# Patient Record
Sex: MALE | Race: WHITE | Employment: OTHER | URBAN - NONMETROPOLITAN AREA
[De-identification: names, ages, dates, MRNs, and addresses within clinical notes are randomized per-mention and may not be internally consistent; named-entity substitution may affect disease eponyms.]

---

## 2017-02-07 ENCOUNTER — HOSPITAL ENCOUNTER (OUTPATIENT)
Dept: NON INVASIVE DIAGNOSTICS | Age: 32
Discharge: HOME | End: 2017-02-07
Payer: MEDICAID

## 2017-02-07 DIAGNOSIS — I42.0 DILATED CARDIOMYOPATHY (HCC): ICD-10-CM

## 2017-02-07 LAB
LV EF: 23 %
LVEF MODALITY: NORMAL

## 2017-02-07 PROCEDURE — 93306 TTE W/DOPPLER COMPLETE: CPT

## 2017-02-09 ENCOUNTER — TELEPHONE (OUTPATIENT)
Dept: CARDIOLOGY | Age: 32
End: 2017-02-09

## 2017-02-13 ENCOUNTER — OFFICE VISIT (OUTPATIENT)
Dept: CARDIOLOGY | Age: 32
End: 2017-02-13
Payer: MEDICAID

## 2017-02-13 VITALS
HEIGHT: 67 IN | SYSTOLIC BLOOD PRESSURE: 116 MMHG | BODY MASS INDEX: 34.37 KG/M2 | DIASTOLIC BLOOD PRESSURE: 74 MMHG | WEIGHT: 219 LBS | HEART RATE: 76 BPM

## 2017-02-13 DIAGNOSIS — I50.21 ACUTE SYSTOLIC CONGESTIVE HEART FAILURE (HCC): Primary | ICD-10-CM

## 2017-02-13 PROCEDURE — 93000 ELECTROCARDIOGRAM COMPLETE: CPT | Performed by: INTERNAL MEDICINE

## 2017-02-13 PROCEDURE — 99244 OFF/OP CNSLTJ NEW/EST MOD 40: CPT | Performed by: INTERNAL MEDICINE

## 2017-05-03 ENCOUNTER — TELEPHONE (OUTPATIENT)
Dept: CARDIOLOGY | Age: 32
End: 2017-05-03

## 2018-02-08 ENCOUNTER — APPOINTMENT (OUTPATIENT)
Dept: CT IMAGING | Age: 33
End: 2018-02-08
Payer: MEDICAID

## 2018-02-08 ENCOUNTER — HOSPITAL ENCOUNTER (EMERGENCY)
Age: 33
Discharge: HOME | End: 2018-02-08
Attending: EMERGENCY MEDICINE
Payer: MEDICAID

## 2018-02-08 ENCOUNTER — APPOINTMENT (OUTPATIENT)
Dept: GENERAL RADIOLOGY | Age: 33
End: 2018-02-08
Payer: MEDICAID

## 2018-02-08 VITALS
OXYGEN SATURATION: 98 % | DIASTOLIC BLOOD PRESSURE: 82 MMHG | WEIGHT: 205 LBS | RESPIRATION RATE: 24 BRPM | HEART RATE: 110 BPM | TEMPERATURE: 97.7 F | SYSTOLIC BLOOD PRESSURE: 116 MMHG | BODY MASS INDEX: 32.18 KG/M2 | HEIGHT: 67 IN

## 2018-02-08 DIAGNOSIS — J18.9 PNEUMONIA DUE TO ORGANISM: ICD-10-CM

## 2018-02-08 DIAGNOSIS — J90 PLEURAL EFFUSION: ICD-10-CM

## 2018-02-08 DIAGNOSIS — I50.9 ACUTE ON CHRONIC CONGESTIVE HEART FAILURE, UNSPECIFIED CONGESTIVE HEART FAILURE TYPE: Primary | ICD-10-CM

## 2018-02-08 LAB
ALBUMIN SERPL-MCNC: 3.5 G/DL (ref 3.5–5.2)
ALP BLD-CCNC: 105 U/L (ref 40–130)
ALT SERPL-CCNC: 21 U/L (ref 5–41)
ANION GAP SERPL CALCULATED.3IONS-SCNC: 15 MMOL/L (ref 7–19)
AST SERPL-CCNC: 19 U/L (ref 5–40)
BASOPHILS ABSOLUTE: 0.1 K/UL (ref 0–0.2)
BASOPHILS RELATIVE PERCENT: 0.5 % (ref 0–1)
BILIRUB SERPL-MCNC: 0.7 MG/DL (ref 0.2–1.2)
BUN BLDV-MCNC: 9 MG/DL (ref 6–20)
CALCIUM SERPL-MCNC: 8.9 MG/DL (ref 8.6–10)
CHLORIDE BLD-SCNC: 100 MMOL/L (ref 98–111)
CO2: 23 MMOL/L (ref 22–29)
CREAT SERPL-MCNC: 0.6 MG/DL (ref 0.5–1.2)
D DIMER: 0.87 UG/ML FEU (ref 0–0.48)
EOSINOPHILS ABSOLUTE: 0.2 K/UL (ref 0–0.6)
EOSINOPHILS RELATIVE PERCENT: 2.2 % (ref 0–5)
GFR NON-AFRICAN AMERICAN: >60
GLUCOSE BLD-MCNC: 150 MG/DL (ref 74–109)
HCT VFR BLD CALC: 40.6 % (ref 42–52)
HEMOGLOBIN: 13.6 G/DL (ref 14–18)
LYMPHOCYTES ABSOLUTE: 2 K/UL (ref 1.1–4.5)
LYMPHOCYTES RELATIVE PERCENT: 18.4 % (ref 20–40)
MCH RBC QN AUTO: 31.1 PG (ref 27–31)
MCHC RBC AUTO-ENTMCNC: 33.5 G/DL (ref 33–37)
MCV RBC AUTO: 92.7 FL (ref 80–94)
MONOCYTES ABSOLUTE: 0.8 K/UL (ref 0–0.9)
MONOCYTES RELATIVE PERCENT: 7.3 % (ref 0–10)
NEUTROPHILS ABSOLUTE: 7.8 K/UL (ref 1.5–7.5)
NEUTROPHILS RELATIVE PERCENT: 71.1 % (ref 50–65)
PDW BLD-RTO: 14.4 % (ref 11.5–14.5)
PERFORMED ON: NORMAL
PLATELET # BLD: 331 K/UL (ref 130–400)
PMV BLD AUTO: 10 FL (ref 9.4–12.4)
POC TROPONIN I: 0.02 NG/ML (ref 0–0.08)
POTASSIUM SERPL-SCNC: 3.8 MMOL/L (ref 3.5–5)
PRO-BNP: 3952 PG/ML (ref 0–450)
RAPID INFLUENZA  B AGN: NEGATIVE
RAPID INFLUENZA A AGN: NEGATIVE
RBC # BLD: 4.38 M/UL (ref 4.7–6.1)
SODIUM BLD-SCNC: 138 MMOL/L (ref 136–145)
TOTAL PROTEIN: 6.8 G/DL (ref 6.6–8.7)
WBC # BLD: 11 K/UL (ref 4.8–10.8)

## 2018-02-08 PROCEDURE — 80053 COMPREHEN METABOLIC PANEL: CPT

## 2018-02-08 PROCEDURE — 6370000000 HC RX 637 (ALT 250 FOR IP): Performed by: EMERGENCY MEDICINE

## 2018-02-08 PROCEDURE — 6360000002 HC RX W HCPCS: Performed by: EMERGENCY MEDICINE

## 2018-02-08 PROCEDURE — 84484 ASSAY OF TROPONIN QUANT: CPT

## 2018-02-08 PROCEDURE — 99285 EMERGENCY DEPT VISIT HI MDM: CPT | Performed by: EMERGENCY MEDICINE

## 2018-02-08 PROCEDURE — 93005 ELECTROCARDIOGRAM TRACING: CPT

## 2018-02-08 PROCEDURE — 71275 CT ANGIOGRAPHY CHEST: CPT

## 2018-02-08 PROCEDURE — 96374 THER/PROPH/DIAG INJ IV PUSH: CPT

## 2018-02-08 PROCEDURE — 87804 INFLUENZA ASSAY W/OPTIC: CPT

## 2018-02-08 PROCEDURE — 71046 X-RAY EXAM CHEST 2 VIEWS: CPT

## 2018-02-08 PROCEDURE — 94640 AIRWAY INHALATION TREATMENT: CPT

## 2018-02-08 PROCEDURE — 36415 COLL VENOUS BLD VENIPUNCTURE: CPT

## 2018-02-08 PROCEDURE — 85379 FIBRIN DEGRADATION QUANT: CPT

## 2018-02-08 PROCEDURE — 6360000004 HC RX CONTRAST MEDICATION: Performed by: EMERGENCY MEDICINE

## 2018-02-08 PROCEDURE — 83880 ASSAY OF NATRIURETIC PEPTIDE: CPT

## 2018-02-08 PROCEDURE — 99285 EMERGENCY DEPT VISIT HI MDM: CPT

## 2018-02-08 PROCEDURE — 85025 COMPLETE CBC W/AUTO DIFF WBC: CPT

## 2018-02-08 RX ORDER — FUROSEMIDE 10 MG/ML
40 INJECTION INTRAMUSCULAR; INTRAVENOUS ONCE
Status: COMPLETED | OUTPATIENT
Start: 2018-02-08 | End: 2018-02-08

## 2018-02-08 RX ORDER — LEVOFLOXACIN 750 MG/1
750 TABLET ORAL DAILY
Qty: 7 TABLET | Refills: 0 | Status: SHIPPED | OUTPATIENT
Start: 2018-02-08 | End: 2018-02-15

## 2018-02-08 RX ORDER — FUROSEMIDE 40 MG/1
40 TABLET ORAL DAILY
Qty: 5 TABLET | Refills: 0 | Status: SHIPPED | OUTPATIENT
Start: 2018-02-08 | End: 2018-08-15 | Stop reason: SDUPTHER

## 2018-02-08 RX ORDER — IPRATROPIUM BROMIDE AND ALBUTEROL SULFATE 2.5; .5 MG/3ML; MG/3ML
1 SOLUTION RESPIRATORY (INHALATION) ONCE
Status: COMPLETED | OUTPATIENT
Start: 2018-02-08 | End: 2018-02-08

## 2018-02-08 RX ADMIN — FUROSEMIDE 40 MG: 10 INJECTION, SOLUTION INTRAMUSCULAR; INTRAVENOUS at 14:25

## 2018-02-08 RX ADMIN — IPRATROPIUM BROMIDE AND ALBUTEROL SULFATE 1 AMPULE: .5; 3 SOLUTION RESPIRATORY (INHALATION) at 12:09

## 2018-02-08 RX ADMIN — IOPAMIDOL 90 ML: 755 INJECTION, SOLUTION INTRAVENOUS at 13:39

## 2018-02-08 ASSESSMENT — ENCOUNTER SYMPTOMS
RHINORRHEA: 0
BACK PAIN: 0
SORE THROAT: 0
DIARRHEA: 0
VOMITING: 0
ABDOMINAL PAIN: 0
COUGH: 1
NAUSEA: 0
SHORTNESS OF BREATH: 1

## 2018-02-09 LAB
EKG P AXIS: 56 DEGREES
EKG P-R INTERVAL: 146 MS
EKG Q-T INTERVAL: 332 MS
EKG QRS DURATION: 100 MS
EKG QTC CALCULATION (BAZETT): 388 MS
EKG T AXIS: 56 DEGREES

## 2018-03-03 ENCOUNTER — HOSPITAL ENCOUNTER (INPATIENT)
Facility: HOSPITAL | Age: 33
LOS: 5 days | Discharge: HOME | End: 2018-03-09
Attending: EMERGENCY MEDICINE | Admitting: INTERNAL MEDICINE

## 2018-03-03 DIAGNOSIS — F15.10 METHAMPHETAMINE ABUSE (HCC): ICD-10-CM

## 2018-03-03 DIAGNOSIS — R00.0 TACHYCARDIA: ICD-10-CM

## 2018-03-03 DIAGNOSIS — J18.9 PNEUMONIA DUE TO INFECTIOUS ORGANISM, UNSPECIFIED LATERALITY, UNSPECIFIED PART OF LUNG: ICD-10-CM

## 2018-03-03 DIAGNOSIS — K42.9 UMBILICAL HERNIA WITHOUT OBSTRUCTION AND WITHOUT GANGRENE: ICD-10-CM

## 2018-03-03 DIAGNOSIS — I50.9 ACUTE ON CHRONIC CONGESTIVE HEART FAILURE, UNSPECIFIED CONGESTIVE HEART FAILURE TYPE: Primary | ICD-10-CM

## 2018-03-03 PROCEDURE — 80053 COMPREHEN METABOLIC PANEL: CPT | Performed by: NURSE PRACTITIONER

## 2018-03-03 PROCEDURE — 84145 PROCALCITONIN (PCT): CPT | Performed by: EMERGENCY MEDICINE

## 2018-03-03 PROCEDURE — 84484 ASSAY OF TROPONIN QUANT: CPT | Performed by: EMERGENCY MEDICINE

## 2018-03-03 PROCEDURE — 83605 ASSAY OF LACTIC ACID: CPT | Performed by: EMERGENCY MEDICINE

## 2018-03-03 PROCEDURE — 80307 DRUG TEST PRSMV CHEM ANLYZR: CPT | Performed by: NURSE PRACTITIONER

## 2018-03-03 PROCEDURE — 99284 EMERGENCY DEPT VISIT MOD MDM: CPT

## 2018-03-03 PROCEDURE — 86140 C-REACTIVE PROTEIN: CPT | Performed by: EMERGENCY MEDICINE

## 2018-03-03 PROCEDURE — 83690 ASSAY OF LIPASE: CPT | Performed by: NURSE PRACTITIONER

## 2018-03-03 PROCEDURE — 83880 ASSAY OF NATRIURETIC PEPTIDE: CPT | Performed by: EMERGENCY MEDICINE

## 2018-03-03 PROCEDURE — 82553 CREATINE MB FRACTION: CPT | Performed by: EMERGENCY MEDICINE

## 2018-03-03 PROCEDURE — 85025 COMPLETE CBC W/AUTO DIFF WBC: CPT | Performed by: NURSE PRACTITIONER

## 2018-03-03 RX ORDER — ASPIRIN 81 MG/1
81 TABLET ORAL
Status: ON HOLD | COMMUNITY
Start: 2016-11-21 | End: 2018-12-20 | Stop reason: SDUPTHER

## 2018-03-04 ENCOUNTER — APPOINTMENT (OUTPATIENT)
Dept: NUCLEAR MEDICINE | Facility: HOSPITAL | Age: 33
End: 2018-03-04

## 2018-03-04 ENCOUNTER — APPOINTMENT (OUTPATIENT)
Dept: CARDIOLOGY | Facility: HOSPITAL | Age: 33
End: 2018-03-04
Attending: FAMILY MEDICINE

## 2018-03-04 ENCOUNTER — APPOINTMENT (OUTPATIENT)
Dept: GENERAL RADIOLOGY | Facility: HOSPITAL | Age: 33
End: 2018-03-04

## 2018-03-04 ENCOUNTER — APPOINTMENT (OUTPATIENT)
Dept: CT IMAGING | Facility: HOSPITAL | Age: 33
End: 2018-03-04

## 2018-03-04 PROBLEM — I50.9 ACUTE ON CHRONIC CONGESTIVE HEART FAILURE (HCC): Status: ACTIVE | Noted: 2018-03-04

## 2018-03-04 PROBLEM — I50.9 CHF EXACERBATION (HCC): Status: ACTIVE | Noted: 2018-03-04

## 2018-03-04 LAB
ALBUMIN SERPL-MCNC: 3.3 G/DL (ref 3.5–5)
ALBUMIN SERPL-MCNC: 3.4 G/DL (ref 3.5–5)
ALBUMIN/GLOB SERPL: 1.2 G/DL (ref 1.1–2.5)
ALBUMIN/GLOB SERPL: 1.3 G/DL (ref 1.1–2.5)
ALP SERPL-CCNC: 103 U/L (ref 24–120)
ALP SERPL-CCNC: 96 U/L (ref 24–120)
ALT SERPL W P-5'-P-CCNC: 126 U/L (ref 0–54)
ALT SERPL W P-5'-P-CCNC: 385 U/L (ref 0–54)
AMPHET+METHAMPHET UR QL: POSITIVE
AMYLASE SERPL-CCNC: 34 U/L (ref 30–110)
ANION GAP SERPL CALCULATED.3IONS-SCNC: 10 MMOL/L (ref 4–13)
ANION GAP SERPL CALCULATED.3IONS-SCNC: 13 MMOL/L (ref 4–13)
ANION GAP SERPL CALCULATED.3IONS-SCNC: 9 MMOL/L (ref 4–13)
ARTERIAL PATENCY WRIST A: POSITIVE
ARTICHOKE IGE QN: 109 MG/DL (ref 0–99)
AST SERPL-CCNC: 105 U/L (ref 7–45)
AST SERPL-CCNC: 411 U/L (ref 7–45)
ATMOSPHERIC PRESS: 760 MMHG
BACTERIA UR QL AUTO: ABNORMAL /HPF
BARBITURATES UR QL SCN: NEGATIVE
BASE EXCESS BLDA CALC-SCNC: -4.9 MMOL/L (ref 0–2)
BASOPHILS # BLD AUTO: 0.05 10*3/MM3 (ref 0–0.2)
BASOPHILS # BLD AUTO: 0.05 10*3/MM3 (ref 0–0.2)
BASOPHILS # BLD AUTO: 0.08 10*3/MM3 (ref 0–0.2)
BASOPHILS NFR BLD AUTO: 0.5 % (ref 0–2)
BASOPHILS NFR BLD AUTO: 0.5 % (ref 0–2)
BASOPHILS NFR BLD AUTO: 0.8 % (ref 0–2)
BDY SITE: ABNORMAL
BENZODIAZ UR QL SCN: NEGATIVE
BH CV ECHO MEAS - AO MAX PG (FULL): 0.75 MMHG
BH CV ECHO MEAS - AO MAX PG: 3.6 MMHG
BH CV ECHO MEAS - AO MEAN PG (FULL): 0 MMHG
BH CV ECHO MEAS - AO MEAN PG: 2 MMHG
BH CV ECHO MEAS - AO ROOT AREA (BSA CORRECTED): 1.5
BH CV ECHO MEAS - AO ROOT AREA: 7.5 CM^2
BH CV ECHO MEAS - AO ROOT DIAM: 3.1 CM
BH CV ECHO MEAS - AO V2 MAX: 94.7 CM/SEC
BH CV ECHO MEAS - AO V2 MEAN: 69.2 CM/SEC
BH CV ECHO MEAS - AO V2 VTI: 14.1 CM
BH CV ECHO MEAS - AVA(I,A): 3.4 CM^2
BH CV ECHO MEAS - AVA(I,D): 3.4 CM^2
BH CV ECHO MEAS - AVA(V,A): 3.4 CM^2
BH CV ECHO MEAS - AVA(V,D): 3.4 CM^2
BH CV ECHO MEAS - BSA(HAYCOCK): 2.2 M^2
BH CV ECHO MEAS - BSA: 2.1 M^2
BH CV ECHO MEAS - BZI_BMI: 32.9 KILOGRAMS/M^2
BH CV ECHO MEAS - BZI_METRIC_HEIGHT: 170.2 CM
BH CV ECHO MEAS - BZI_METRIC_WEIGHT: 95.3 KG
BH CV ECHO MEAS - EDV(CUBED): 592.7 ML
BH CV ECHO MEAS - EDV(TEICH): 384.2 ML
BH CV ECHO MEAS - EF(CUBED): 37 %
BH CV ECHO MEAS - EF(TEICH): 29.2 %
BH CV ECHO MEAS - ESV(CUBED): 373.2 ML
BH CV ECHO MEAS - ESV(TEICH): 272.2 ML
BH CV ECHO MEAS - FS: 14.3 %
BH CV ECHO MEAS - IVS/LVPW: 1.1
BH CV ECHO MEAS - IVSD: 1.1 CM
BH CV ECHO MEAS - LA DIMENSION: 6.1 CM
BH CV ECHO MEAS - LA/AO: 2
BH CV ECHO MEAS - LAT PEAK E' VEL: 5.4 CM/SEC
BH CV ECHO MEAS - LV MASS(C)D: 470.6 GRAMS
BH CV ECHO MEAS - LV MASS(C)DI: 228 GRAMS/M^2
BH CV ECHO MEAS - LV MAX PG: 2.8 MMHG
BH CV ECHO MEAS - LV MEAN PG: 2 MMHG
BH CV ECHO MEAS - LV V1 MAX: 84.2 CM/SEC
BH CV ECHO MEAS - LV V1 MEAN: 61.9 CM/SEC
BH CV ECHO MEAS - LV V1 VTI: 12.6 CM
BH CV ECHO MEAS - LVIDD: 8.4 CM
BH CV ECHO MEAS - LVIDS: 7.2 CM
BH CV ECHO MEAS - LVOT AREA (M): 3.8 CM^2
BH CV ECHO MEAS - LVOT AREA: 3.8 CM^2
BH CV ECHO MEAS - LVOT DIAM: 2.2 CM
BH CV ECHO MEAS - LVPWD: 1 CM
BH CV ECHO MEAS - MED PEAK E' VEL: 5.77 CM/SEC
BH CV ECHO MEAS - MR MAX PG: 115.8 MMHG
BH CV ECHO MEAS - MR MAX VEL: 538 CM/SEC
BH CV ECHO MEAS - MR MEAN PG: 76 MMHG
BH CV ECHO MEAS - MR MEAN VEL: 403 CM/SEC
BH CV ECHO MEAS - MR PISA RADIUS: 0.4 CM
BH CV ECHO MEAS - MR PISA: 1 CM^2
BH CV ECHO MEAS - MR VTI: 131 CM
BH CV ECHO MEAS - MV A MAX VEL: 69 CM/SEC
BH CV ECHO MEAS - MV DEC TIME: 0.13 SEC
BH CV ECHO MEAS - MV E MAX VEL: 137 CM/SEC
BH CV ECHO MEAS - MV E/A: 2
BH CV ECHO MEAS - RAP SYSTOLE: 5 MMHG
BH CV ECHO MEAS - RVSP: 58.3 MMHG
BH CV ECHO MEAS - SI(AO): 51.5 ML/M^2
BH CV ECHO MEAS - SI(CUBED): 106.3 ML/M^2
BH CV ECHO MEAS - SI(LVOT): 23.2 ML/M^2
BH CV ECHO MEAS - SI(TEICH): 54.3 ML/M^2
BH CV ECHO MEAS - SV(AO): 106.4 ML
BH CV ECHO MEAS - SV(CUBED): 219.5 ML
BH CV ECHO MEAS - SV(LVOT): 47.9 ML
BH CV ECHO MEAS - SV(TEICH): 112 ML
BH CV ECHO MEAS - TR MAX VEL: 365 CM/SEC
BILIRUB SERPL-MCNC: 0.3 MG/DL (ref 0.1–1)
BILIRUB SERPL-MCNC: 0.6 MG/DL (ref 0.1–1)
BILIRUB UR QL STRIP: NEGATIVE
BODY TEMPERATURE: 37 C
BUN BLD-MCNC: 17 MG/DL (ref 5–21)
BUN BLD-MCNC: 19 MG/DL (ref 5–21)
BUN BLD-MCNC: 20 MG/DL (ref 5–21)
BUN/CREAT SERPL: 17.2 (ref 7–25)
BUN/CREAT SERPL: 18.8 (ref 7–25)
BUN/CREAT SERPL: 19 (ref 7–25)
CALCIUM SPEC-SCNC: 7.9 MG/DL (ref 8.4–10.4)
CALCIUM SPEC-SCNC: 8.1 MG/DL (ref 8.4–10.4)
CALCIUM SPEC-SCNC: 8.5 MG/DL (ref 8.4–10.4)
CANNABINOIDS SERPL QL: NEGATIVE
CHLORIDE SERPL-SCNC: 103 MMOL/L (ref 98–110)
CHLORIDE SERPL-SCNC: 105 MMOL/L (ref 98–110)
CHLORIDE SERPL-SCNC: 105 MMOL/L (ref 98–110)
CHOLEST SERPL-MCNC: 148 MG/DL (ref 130–200)
CK MB SERPL-CCNC: 2.07 NG/ML (ref 0–5)
CLARITY UR: CLEAR
CO2 SERPL-SCNC: 21 MMOL/L (ref 24–31)
CO2 SERPL-SCNC: 23 MMOL/L (ref 24–31)
CO2 SERPL-SCNC: 26 MMOL/L (ref 24–31)
COCAINE UR QL: NEGATIVE
COLOR UR: YELLOW
CREAT BLD-MCNC: 0.99 MG/DL (ref 0.5–1.4)
CREAT BLD-MCNC: 1.01 MG/DL (ref 0.5–1.4)
CREAT BLD-MCNC: 1.05 MG/DL (ref 0.5–1.4)
CRP SERPL-MCNC: 3.17 MG/DL (ref 0–0.99)
D-LACTATE SERPL-SCNC: 1.7 MMOL/L (ref 0.5–2)
DEPRECATED RDW RBC AUTO: 51.2 FL (ref 40–54)
DEPRECATED RDW RBC AUTO: 51.5 FL (ref 40–54)
DEPRECATED RDW RBC AUTO: 53 FL (ref 40–54)
E/E' RATIO: 23.7
EOSINOPHIL # BLD AUTO: 0.03 10*3/MM3 (ref 0–0.7)
EOSINOPHIL # BLD AUTO: 0.06 10*3/MM3 (ref 0–0.7)
EOSINOPHIL # BLD AUTO: 0.11 10*3/MM3 (ref 0–0.7)
EOSINOPHIL NFR BLD AUTO: 0.3 % (ref 0–4)
EOSINOPHIL NFR BLD AUTO: 0.6 % (ref 0–4)
EOSINOPHIL NFR BLD AUTO: 1.1 % (ref 0–4)
ERYTHROCYTE [DISTWIDTH] IN BLOOD BY AUTOMATED COUNT: 15.8 % (ref 12–15)
ERYTHROCYTE [DISTWIDTH] IN BLOOD BY AUTOMATED COUNT: 15.9 % (ref 12–15)
ERYTHROCYTE [DISTWIDTH] IN BLOOD BY AUTOMATED COUNT: 15.9 % (ref 12–15)
ETHANOL UR QL: <0.01 %
FLUAV AG NPH QL: NEGATIVE
FLUBV AG NPH QL IA: NEGATIVE
GFR SERPL CREATININE-BSD FRML MDRD: 82 ML/MIN/1.73
GFR SERPL CREATININE-BSD FRML MDRD: 86 ML/MIN/1.73
GFR SERPL CREATININE-BSD FRML MDRD: 88 ML/MIN/1.73
GLOBULIN UR ELPH-MCNC: 2.6 GM/DL
GLOBULIN UR ELPH-MCNC: 2.8 GM/DL
GLUCOSE BLD-MCNC: 128 MG/DL (ref 70–100)
GLUCOSE BLD-MCNC: 86 MG/DL (ref 70–100)
GLUCOSE BLD-MCNC: 96 MG/DL (ref 70–100)
GLUCOSE UR STRIP-MCNC: NEGATIVE MG/DL
HAV IGM SERPL QL IA: NEGATIVE
HBV CORE IGM SERPL QL IA: NEGATIVE
HBV SURFACE AG SERPL QL IA: NEGATIVE
HCO3 BLDA-SCNC: 18.8 MMOL/L (ref 20–26)
HCT VFR BLD AUTO: 34.3 % (ref 40–52)
HCT VFR BLD AUTO: 34.7 % (ref 40–52)
HCT VFR BLD AUTO: 37 % (ref 40–52)
HCV AB SER DONR QL: NEGATIVE
HCV S/C RATIO: 0.06 (ref 0–0.99)
HDLC SERPL-MCNC: 22 MG/DL
HGB BLD-MCNC: 11.7 G/DL (ref 14–18)
HGB BLD-MCNC: 11.8 G/DL (ref 14–18)
HGB BLD-MCNC: 12.2 G/DL (ref 14–18)
HGB UR QL STRIP.AUTO: NEGATIVE
HOLD SPECIMEN: NORMAL
HOLD SPECIMEN: NORMAL
HYALINE CASTS UR QL AUTO: ABNORMAL /LPF
IMM GRANULOCYTES # BLD: 0.03 10*3/MM3 (ref 0–0.03)
IMM GRANULOCYTES # BLD: 0.03 10*3/MM3 (ref 0–0.03)
IMM GRANULOCYTES # BLD: 0.04 10*3/MM3 (ref 0–0.03)
IMM GRANULOCYTES NFR BLD: 0.3 % (ref 0–5)
IMM GRANULOCYTES NFR BLD: 0.3 % (ref 0–5)
IMM GRANULOCYTES NFR BLD: 0.4 % (ref 0–5)
INR PPP: 1.33 (ref 0.91–1.09)
KETONES UR QL STRIP: NEGATIVE
LDLC/HDLC SERPL: 4.78 {RATIO}
LEFT ATRIUM VOLUME INDEX: 81.1 ML/M2
LEFT ATRIUM VOLUME: 167 CM3
LEUKOCYTE ESTERASE UR QL STRIP.AUTO: NEGATIVE
LIPASE SERPL-CCNC: 55 U/L (ref 23–203)
LIPASE SERPL-CCNC: 63 U/L (ref 23–203)
LYMPHOCYTES # BLD AUTO: 1.16 10*3/MM3 (ref 0.72–4.86)
LYMPHOCYTES # BLD AUTO: 1.54 10*3/MM3 (ref 0.72–4.86)
LYMPHOCYTES # BLD AUTO: 2.37 10*3/MM3 (ref 0.72–4.86)
LYMPHOCYTES NFR BLD AUTO: 10.8 % (ref 15–45)
LYMPHOCYTES NFR BLD AUTO: 15 % (ref 15–45)
LYMPHOCYTES NFR BLD AUTO: 22.8 % (ref 15–45)
Lab: ABNORMAL
MAGNESIUM SERPL-MCNC: 1.6 MG/DL (ref 1.4–2.2)
MCH RBC QN AUTO: 30.7 PG (ref 28–32)
MCH RBC QN AUTO: 31 PG (ref 28–32)
MCH RBC QN AUTO: 31.1 PG (ref 28–32)
MCHC RBC AUTO-ENTMCNC: 33 G/DL (ref 33–36)
MCHC RBC AUTO-ENTMCNC: 34 G/DL (ref 33–36)
MCHC RBC AUTO-ENTMCNC: 34.1 G/DL (ref 33–36)
MCV RBC AUTO: 90.7 FL (ref 82–95)
MCV RBC AUTO: 91.6 FL (ref 82–95)
MCV RBC AUTO: 93.2 FL (ref 82–95)
METHADONE UR QL SCN: NEGATIVE
MODALITY: ABNORMAL
MONOCYTES # BLD AUTO: 0.67 10*3/MM3 (ref 0.19–1.3)
MONOCYTES # BLD AUTO: 0.97 10*3/MM3 (ref 0.19–1.3)
MONOCYTES # BLD AUTO: 1.01 10*3/MM3 (ref 0.19–1.3)
MONOCYTES NFR BLD AUTO: 6.5 % (ref 4–12)
MONOCYTES NFR BLD AUTO: 9 % (ref 4–12)
MONOCYTES NFR BLD AUTO: 9.7 % (ref 4–12)
NEUTROPHILS # BLD AUTO: 6.88 10*3/MM3 (ref 1.87–8.4)
NEUTROPHILS # BLD AUTO: 7.84 10*3/MM3 (ref 1.87–8.4)
NEUTROPHILS # BLD AUTO: 8.49 10*3/MM3 (ref 1.87–8.4)
NEUTROPHILS NFR BLD AUTO: 66.1 % (ref 39–78)
NEUTROPHILS NFR BLD AUTO: 76.6 % (ref 39–78)
NEUTROPHILS NFR BLD AUTO: 78.7 % (ref 39–78)
NITRITE UR QL STRIP: NEGATIVE
NRBC BLD MANUAL-RTO: 0 /100 WBC (ref 0–0)
NRBC BLD MANUAL-RTO: 0 /100 WBC (ref 0–0)
NRBC BLD MANUAL-RTO: 0.2 /100 WBC (ref 0–0)
NT-PROBNP SERPL-MCNC: 9930 PG/ML (ref 0–450)
NT-PROBNP SERPL-MCNC: ABNORMAL PG/ML (ref 0–450)
OPIATES UR QL: NEGATIVE
PCO2 BLDA: 30.3 MM HG (ref 35–45)
PCP UR QL SCN: NEGATIVE
PH BLDA: 7.4 PH UNITS (ref 7.35–7.45)
PH UR STRIP.AUTO: <=5 [PH] (ref 5–8)
PHOSPHATE SERPL-MCNC: 4 MG/DL (ref 2.5–4.5)
PLATELET # BLD AUTO: 231 10*3/MM3 (ref 130–400)
PLATELET # BLD AUTO: 238 10*3/MM3 (ref 130–400)
PLATELET # BLD AUTO: 275 10*3/MM3 (ref 130–400)
PMV BLD AUTO: 10.1 FL (ref 6–12)
PMV BLD AUTO: 10.4 FL (ref 6–12)
PMV BLD AUTO: 10.6 FL (ref 6–12)
PO2 BLDA: 69.7 MM HG (ref 83–108)
POTASSIUM BLD-SCNC: 4.1 MMOL/L (ref 3.5–5.3)
POTASSIUM BLD-SCNC: 4.4 MMOL/L (ref 3.5–5.3)
POTASSIUM BLD-SCNC: 4.5 MMOL/L (ref 3.5–5.3)
PROCALCITONIN SERPL-MCNC: 0.55 NG/ML
PROT SERPL-MCNC: 5.9 G/DL (ref 6.3–8.7)
PROT SERPL-MCNC: 6.2 G/DL (ref 6.3–8.7)
PROT UR QL STRIP: ABNORMAL
PROTHROMBIN TIME: 16.9 SECONDS (ref 11.9–14.6)
RBC # BLD AUTO: 3.78 10*6/MM3 (ref 4.8–5.9)
RBC # BLD AUTO: 3.79 10*6/MM3 (ref 4.8–5.9)
RBC # BLD AUTO: 3.97 10*6/MM3 (ref 4.8–5.9)
RBC # UR: ABNORMAL /HPF
REF LAB TEST METHOD: ABNORMAL
SAO2 % BLDCOA: 93.7 % (ref 94–99)
SODIUM BLD-SCNC: 136 MMOL/L (ref 135–145)
SODIUM BLD-SCNC: 138 MMOL/L (ref 135–145)
SODIUM BLD-SCNC: 141 MMOL/L (ref 135–145)
SP GR UR STRIP: >=1.03 (ref 1–1.03)
SQUAMOUS #/AREA URNS HPF: ABNORMAL /HPF
TRIGL SERPL-MCNC: 104 MG/DL (ref 0–149)
TROPONIN I SERPL-MCNC: 0.03 NG/ML (ref 0–0.03)
TROPONIN I SERPL-MCNC: 0.07 NG/ML (ref 0–0.03)
UROBILINOGEN UR QL STRIP: ABNORMAL
VENTILATOR MODE: ABNORMAL
WBC NRBC COR # BLD: 10.24 10*3/MM3 (ref 4.8–10.8)
WBC NRBC COR # BLD: 10.4 10*3/MM3 (ref 4.8–10.8)
WBC NRBC COR # BLD: 10.77 10*3/MM3 (ref 4.8–10.8)
WBC UR QL AUTO: ABNORMAL /HPF
WHOLE BLOOD HOLD SPECIMEN: NORMAL
WHOLE BLOOD HOLD SPECIMEN: NORMAL

## 2018-03-04 PROCEDURE — 85025 COMPLETE CBC W/AUTO DIFF WBC: CPT | Performed by: FAMILY MEDICINE

## 2018-03-04 PROCEDURE — 85610 PROTHROMBIN TIME: CPT | Performed by: FAMILY MEDICINE

## 2018-03-04 PROCEDURE — 80053 COMPREHEN METABOLIC PANEL: CPT | Performed by: FAMILY MEDICINE

## 2018-03-04 PROCEDURE — 80061 LIPID PANEL: CPT | Performed by: FAMILY MEDICINE

## 2018-03-04 PROCEDURE — 25010000002 ENOXAPARIN PER 10 MG: Performed by: EMERGENCY MEDICINE

## 2018-03-04 PROCEDURE — 93306 TTE W/DOPPLER COMPLETE: CPT | Performed by: INTERNAL MEDICINE

## 2018-03-04 PROCEDURE — 0 TECHNETIUM ALBUMIN AGGREGATED: Performed by: EMERGENCY MEDICINE

## 2018-03-04 PROCEDURE — 80048 BASIC METABOLIC PNL TOTAL CA: CPT | Performed by: FAMILY MEDICINE

## 2018-03-04 PROCEDURE — 82803 BLOOD GASES ANY COMBINATION: CPT

## 2018-03-04 PROCEDURE — 80307 DRUG TEST PRSMV CHEM ANLYZR: CPT | Performed by: NURSE PRACTITIONER

## 2018-03-04 PROCEDURE — 25010000002 VANCOMYCIN 10 G RECONSTITUTED SOLUTION: Performed by: FAMILY MEDICINE

## 2018-03-04 PROCEDURE — 25010000002 CEFTRIAXONE PER 250 MG: Performed by: EMERGENCY MEDICINE

## 2018-03-04 PROCEDURE — 93306 TTE W/DOPPLER COMPLETE: CPT

## 2018-03-04 PROCEDURE — 78582 LUNG VENTILAT&PERFUS IMAGING: CPT

## 2018-03-04 PROCEDURE — 0 XENON XE 133: Performed by: EMERGENCY MEDICINE

## 2018-03-04 PROCEDURE — 0399T HC MYOCARDL STRAIN IMAG QUAN ASSMT PER SESS: CPT

## 2018-03-04 PROCEDURE — 36600 WITHDRAWAL OF ARTERIAL BLOOD: CPT

## 2018-03-04 PROCEDURE — A9558 XE133 XENON 10MCI: HCPCS | Performed by: EMERGENCY MEDICINE

## 2018-03-04 PROCEDURE — 82150 ASSAY OF AMYLASE: CPT | Performed by: FAMILY MEDICINE

## 2018-03-04 PROCEDURE — 87804 INFLUENZA ASSAY W/OPTIC: CPT | Performed by: FAMILY MEDICINE

## 2018-03-04 PROCEDURE — 80074 ACUTE HEPATITIS PANEL: CPT | Performed by: FAMILY MEDICINE

## 2018-03-04 PROCEDURE — 71045 X-RAY EXAM CHEST 1 VIEW: CPT

## 2018-03-04 PROCEDURE — 74177 CT ABD & PELVIS W/CONTRAST: CPT

## 2018-03-04 PROCEDURE — 87040 BLOOD CULTURE FOR BACTERIA: CPT | Performed by: FAMILY MEDICINE

## 2018-03-04 PROCEDURE — 83690 ASSAY OF LIPASE: CPT | Performed by: FAMILY MEDICINE

## 2018-03-04 PROCEDURE — 25010000002 AZITHROMYCIN PER 500 MG: Performed by: EMERGENCY MEDICINE

## 2018-03-04 PROCEDURE — 25010000002 LORAZEPAM PER 2 MG: Performed by: EMERGENCY MEDICINE

## 2018-03-04 PROCEDURE — 83735 ASSAY OF MAGNESIUM: CPT | Performed by: FAMILY MEDICINE

## 2018-03-04 PROCEDURE — 93005 ELECTROCARDIOGRAM TRACING: CPT | Performed by: EMERGENCY MEDICINE

## 2018-03-04 PROCEDURE — 25010000002 ENOXAPARIN PER 10 MG: Performed by: FAMILY MEDICINE

## 2018-03-04 PROCEDURE — 25010000002 ONDANSETRON PER 1 MG: Performed by: NURSE PRACTITIONER

## 2018-03-04 PROCEDURE — A9540 TC99M MAA: HCPCS | Performed by: EMERGENCY MEDICINE

## 2018-03-04 PROCEDURE — 25010000002 FUROSEMIDE PER 20 MG: Performed by: FAMILY MEDICINE

## 2018-03-04 PROCEDURE — 84100 ASSAY OF PHOSPHORUS: CPT | Performed by: FAMILY MEDICINE

## 2018-03-04 PROCEDURE — 81001 URINALYSIS AUTO W/SCOPE: CPT | Performed by: NURSE PRACTITIONER

## 2018-03-04 PROCEDURE — 84484 ASSAY OF TROPONIN QUANT: CPT | Performed by: FAMILY MEDICINE

## 2018-03-04 PROCEDURE — 83880 ASSAY OF NATRIURETIC PEPTIDE: CPT | Performed by: FAMILY MEDICINE

## 2018-03-04 PROCEDURE — 0 IOPAMIDOL 61 % SOLUTION: Performed by: EMERGENCY MEDICINE

## 2018-03-04 PROCEDURE — 25010000002 FUROSEMIDE PER 20 MG: Performed by: EMERGENCY MEDICINE

## 2018-03-04 PROCEDURE — 0399T ADULT TRANSTHORACIC ECHO COMPLETE W/ CONT IF NECESSARY PER PROTOCOL: CPT | Performed by: INTERNAL MEDICINE

## 2018-03-04 PROCEDURE — 93010 ELECTROCARDIOGRAM REPORT: CPT | Performed by: INTERNAL MEDICINE

## 2018-03-04 RX ORDER — LORAZEPAM 2 MG/ML
1 INJECTION INTRAMUSCULAR ONCE
Status: COMPLETED | OUTPATIENT
Start: 2018-03-04 | End: 2018-03-04

## 2018-03-04 RX ORDER — ASPIRIN 81 MG/1
81 TABLET ORAL DAILY
Status: DISCONTINUED | OUTPATIENT
Start: 2018-03-04 | End: 2018-03-09 | Stop reason: HOSPADM

## 2018-03-04 RX ORDER — FUROSEMIDE 10 MG/ML
60 INJECTION INTRAMUSCULAR; INTRAVENOUS ONCE
Status: COMPLETED | OUTPATIENT
Start: 2018-03-04 | End: 2018-03-04

## 2018-03-04 RX ORDER — IPRATROPIUM BROMIDE AND ALBUTEROL SULFATE 2.5; .5 MG/3ML; MG/3ML
3 SOLUTION RESPIRATORY (INHALATION) EVERY 4 HOURS PRN
Status: DISCONTINUED | OUTPATIENT
Start: 2018-03-04 | End: 2018-03-04

## 2018-03-04 RX ORDER — FUROSEMIDE 10 MG/ML
40 INJECTION INTRAMUSCULAR; INTRAVENOUS DAILY
Status: DISCONTINUED | OUTPATIENT
Start: 2018-03-04 | End: 2018-03-07

## 2018-03-04 RX ORDER — ONDANSETRON 2 MG/ML
4 INJECTION INTRAMUSCULAR; INTRAVENOUS ONCE
Status: COMPLETED | OUTPATIENT
Start: 2018-03-04 | End: 2018-03-04

## 2018-03-04 RX ORDER — FAMOTIDINE 20 MG/1
40 TABLET, FILM COATED ORAL DAILY
Status: DISCONTINUED | OUTPATIENT
Start: 2018-03-04 | End: 2018-03-09 | Stop reason: HOSPADM

## 2018-03-04 RX ORDER — SODIUM CHLORIDE 0.9 % (FLUSH) 0.9 %
1-10 SYRINGE (ML) INJECTION AS NEEDED
Status: DISCONTINUED | OUTPATIENT
Start: 2018-03-04 | End: 2018-03-09 | Stop reason: HOSPADM

## 2018-03-04 RX ORDER — LORAZEPAM 2 MG/ML
1 CONCENTRATE ORAL EVERY 4 HOURS PRN
Status: DISCONTINUED | OUTPATIENT
Start: 2018-03-04 | End: 2018-03-09 | Stop reason: HOSPADM

## 2018-03-04 RX ORDER — ONDANSETRON 2 MG/ML
4 INJECTION INTRAMUSCULAR; INTRAVENOUS EVERY 6 HOURS PRN
Status: DISCONTINUED | OUTPATIENT
Start: 2018-03-04 | End: 2018-03-09 | Stop reason: HOSPADM

## 2018-03-04 RX ADMIN — FUROSEMIDE 60 MG: 10 INJECTION, SOLUTION INTRAMUSCULAR; INTRAVENOUS at 04:06

## 2018-03-04 RX ADMIN — CEFTRIAXONE SODIUM 2 G: 2 INJECTION, POWDER, FOR SOLUTION INTRAMUSCULAR; INTRAVENOUS at 04:14

## 2018-03-04 RX ADMIN — AZITHROMYCIN MONOHYDRATE 500 MG: 500 INJECTION, POWDER, LYOPHILIZED, FOR SOLUTION INTRAVENOUS at 04:23

## 2018-03-04 RX ADMIN — SODIUM CHLORIDE 1000 ML: 9 INJECTION, SOLUTION INTRAVENOUS at 01:59

## 2018-03-04 RX ADMIN — SODIUM CHLORIDE 500 ML: 9 INJECTION, SOLUTION INTRAVENOUS at 00:17

## 2018-03-04 RX ADMIN — ONDANSETRON 4 MG: 2 INJECTION INTRAMUSCULAR; INTRAVENOUS at 00:18

## 2018-03-04 RX ADMIN — FAMOTIDINE 40 MG: 20 TABLET, FILM COATED ORAL at 09:48

## 2018-03-04 RX ADMIN — LORAZEPAM 1 MG: 2 INJECTION INTRAMUSCULAR; INTRAVENOUS at 04:20

## 2018-03-04 RX ADMIN — ENOXAPARIN SODIUM 90 MG: 100 INJECTION SUBCUTANEOUS at 04:13

## 2018-03-04 RX ADMIN — XENON XE-133 16.3 MILLICURIE: 10 GAS RESPIRATORY (INHALATION) at 05:30

## 2018-03-04 RX ADMIN — FUROSEMIDE 40 MG: 10 INJECTION, SOLUTION INTRAMUSCULAR; INTRAVENOUS at 09:47

## 2018-03-04 RX ADMIN — ENOXAPARIN SODIUM 100 MG: 100 INJECTION SUBCUTANEOUS at 17:17

## 2018-03-04 RX ADMIN — VANCOMYCIN HYDROCHLORIDE 1500 MG: 10 INJECTION, POWDER, LYOPHILIZED, FOR SOLUTION INTRAVENOUS at 10:15

## 2018-03-04 RX ADMIN — ASPIRIN 81 MG: 81 TABLET ORAL at 10:15

## 2018-03-04 RX ADMIN — Medication 1 DOSE: at 05:32

## 2018-03-04 RX ADMIN — VANCOMYCIN HYDROCHLORIDE 1500 MG: 10 INJECTION, POWDER, LYOPHILIZED, FOR SOLUTION INTRAVENOUS at 23:16

## 2018-03-04 RX ADMIN — IOPAMIDOL 100 ML: 612 INJECTION, SOLUTION INTRAVENOUS at 01:33

## 2018-03-04 NOTE — PROGRESS NOTES
"Pharmacy Dosing Service  Pharmacokinetics  Vancomycin Initial Evaluation    Assessment/Action/Plan:  Initiated Vancomycin 1500 mg IVPB every 12 hours. Pharmacy will monitor renal function and adjust dose accordingly.     Subjective:  Arjun Oropeza is a 32 y.o. male with a Vancomycin \"Pharmacy to Dose\" consult for the treatment of PNA .    Objective:  Ht: 170.2 cm (67\"); Wt: 95.5 kg (210 lb 9 oz)  Estimated Creatinine Clearance: 115.7 mL/min (by C-G formula based on Cr of 1.01).   Lab Results   Component Value Date    CREATININE 1.01 03/04/2018    CREATININE 1.05 03/03/2018      Lab Results   Component Value Date    WBC 10.77 03/04/2018    WBC 10.24 03/03/2018      Baseline culture results:  Microbiology Results (last 10 days)       ** No results found for the last 240 hours. **            Chastity Tiwari, [facility]  03/04/18 8:46 AM    "

## 2018-03-04 NOTE — PROGRESS NOTES
[facility] Medicine Services  INPATIENT PROGRESS NOTE    Length of Stay: 0  Date of Admission: 3/3/2018  Primary Care Physician: No Known Provider    Subjective   Chief Complaint: distress  HPI   I was asked to see the patient who the nursing staff felt he was in distress. I found a pt who was tachynpeic and tachycardic with a heart rate of 130 on telemetry. He is sleeping. I awaken him and ask him if he is hurting. He denies any pain. He states he uses Meth every 8-12 hours depending on how good it is. His last use was 2 days ago. He is denying any chest pain. Nursing staff placed him on 2L nasal cannula because of his tachypnea. He has a temperature of 100.     Record review  Pt was seen at [facility] on 2/8 with similar complaints. He was diagnosed with acute heart failure and pneumonia-he was prescribed Levaquin and lasix (5 days). He was supposed to follow up with [facility].  He apparently has moved back from [place] and has not been taking his medications.    Review of Systems   All pertinent negatives and positives are as above. All other systems have been reviewed and are negative unless otherwise stated.     Objective    Temp:  [98.2 °F (36.8 °C)-100 °F (37.8 °C)] 100 °F (37.8 °C)  Heart Rate:  [128-159] 131  Resp:  [19-38] 36  BP: (104-122)/(65-86) 112/72  Physical Exam   Constitutional: He appears well-developed and well-nourished. He appears distressed.   HENT:   Head: Normocephalic and atraumatic.   Eyes: EOM are normal. Pupils are equal, round, and reactive to light. No scleral icterus.   Neck: Normal range of motion. Neck supple. No JVD present. Carotid bruit is not present. No tracheal deviation present. No thyromegaly present.   Cardiovascular: Normal rate and regular rhythm.  Exam reveals no gallop and no friction rub.    No murmur heard.  Tachycardia 130   Pulmonary/Chest: Effort normal. No respiratory distress. He has no wheezes. He has rales. He  exhibits no tenderness.   Abdominal: Soft. Bowel sounds are normal. He exhibits no distension. There is no tenderness.   Musculoskeletal: Normal range of motion. He exhibits no edema.   Neurological: He is alert. No cranial nerve deficit.   somnolent   Skin: Skin is warm and dry. No rash noted.   Psychiatric:   sleepy     Results Review:  I have reviewed the labs, radiology results, and diagnostic studies.    Laboratory Data:     Results from last 7 days  Lab Units 03/03/18  2359   WBC 10*3/mm3 10.24   HEMOGLOBIN g/dL 12.2*   HEMATOCRIT % 37.0*   PLATELETS 10*3/mm3 275       Results from last 7 days  Lab Units 03/03/18  2359   SODIUM mmol/L 141   POTASSIUM mmol/L 4.4   CHLORIDE mmol/L 105   CO2 mmol/L 23.0*   BUN mg/dL 20   CREATININE mg/dL 1.05   CALCIUM mg/dL 8.5   BILIRUBIN mg/dL 0.3   ALK PHOS U/L 96   ALT (SGPT) U/L 126*   AST (SGOT) U/L 105*   GLUCOSE mg/dL 128*     Radiology Data:   Imaging Results (last 24 hours)     Procedure Component Value Units Date/Time    CT Abdomen Pelvis With Contrast [[id number]] Updated:  03/04/18 0131    NM Lung Ventilation Perfusion [[id number]] Updated:  03/04/18 0539        CTA from 2/8/2018 at [facility].  1. There is some breathing motion artifact. No CT evidence of  pulmonary embolus.  2. Moderate to severe cardiomegaly with fairly marked enlargement of  the left ventricular and atrial chambers.  3. Small bilateral pleural effusions right greater than left.  4. Patchy groundglass infiltrates in both lungs may be due to edema.  Infection/inflammation are in the differential. There is atelectasis  in both lung bases.  The full report of this exam was immediately signed and available to  the emergency room. The patient is currently in the emergency room.  Signed by Dr Hiren Castillo on 2/8/2018 1:54 PM  I have reviewed the patient current medications.        Weight:        205 pounds    Sonographer              Radha [facility]     BSA:           1.95 m^2      Interpreting  Physician   Sumit BULL     BMI:           36.31 kg/m^2    Procedure    Type of Study     TTE procedure:ECHO NO CONTRAST WITH DOP/COLR.    Study Location: Echo Lab  Technical Quality: Adequate visualization    Patient Status: Outpatient    Indications:Cardiomyopathy, idiopathic dilated.     Conclusions     Summary   Structurally normal mitral valve.   Mitral valve leaflet mobility is normal   RVSP of 35 mm Hg.   Mildly dilated left ventricle.   Generalized hypokinesis of the left ventricle with overall   Left ventricular ejection fraction is visually estimated at 20-25%%   moderate impairment of LV systolic function.   Structurally normal mitral valve.   Mitral valve leaflet mobility is normal   Severe mitral regurgitation is present.   There is severe tricuspid regurgitation with estimated RVSP of 35 mm Hg.   Mildly dilated left ventricle.   Generalized hypokinesis of the left ventricle with overall   Left ventricular ejection fraction is visually estimated at 20-25%%   moderate impairment of LV systolic function.     Signature     ----------------------------------------------------------------   Electronically signed by Sumit Griggs MD(Interpreting   physician) on 02/09/2017 07:43 AM   ----------------------------------------------------------------  Assessment/Plan   Assessment:  1. Acute on Chronic systolic heart failure-previous EF 20-25% 2/9/2017  2. Possible pulmonary embolus-VQ scan is intermediate.   3. Methamphetamine abuse-ongoing  4. Tranamainitis  5. Community acquired pneumonia-tree in bud on CT  6. Abnormal pancreas on CT  7. Anemia    Plan:  1. Have some concern for methamphetamine withdrawal. He was positive for amphetamine on UDS in the emergency department.   2. Rocephin/Azithromycin day 1  3. Will check ABG's. Now  4. Transfer to ICU.   5. Hepatitis panel. May need HIV panel as well.   6  He was given Lasix 60 mg in  the ER. He was also given 1500 ml saline bolus in the ER.   7. Further recommendations are pending.     Discharge Planning: Uncertain discharge.    Chastity Roberts, APRN   03/04/18   7:15 AM     Patient examined while on the floor prior to transfer and again in the unit.  He remains tachypneic and tachycardic.  Heart sounds with gallop with abnormal click possible just incompetent valve.  However, since he uses IV drugs 2-3 times per day must consider valvular endocarditis.  Continue antibiotic.  Fluids should be used with caution.  Will re evaluate this afternoon sooner if nursing notes need.   Echocardiogram.   VQ scan done in ER shows indeterminate   Will add lovenox 1mg/kg q12h  Repeat lab at 1400    Agree with assessment.    Priscilla Winter DO  03/04/18  8:20 AM    ICU time 30 minutes

## 2018-03-04 NOTE — PROGRESS NOTES
Discharge Planning Assessment  [facility]     Patient Name: Arjun Oropeza  MRN: [id number]  Today's Date: 3/4/2018    Admit Date: 3/3/2018          Discharge Needs Assessment       03/04/18 1027    Living Environment    Lives With parent(s)    Home Accessibility no concerns    Stair Railings at Home none    Type of Financial/Environmental Concern no insurance   Med Assist referral.    Transportation Available car;family or friend will provide    Living Environment    Provides Primary Care For no one    Quality Of Family Relationships helpful;involved    Able to Return to Prior Living Arrangements yes    Discharge Needs Assessment    Concerns To Be Addressed substance/tobacco abuse/use concerns   Pt is an active meth user.  SW will address substance abuse issues/tx once pt moves out of ICU.    Readmission Within The Last 30 Days no previous admission in last 30 days    Anticipated Changes Related to Illness none    Equipment Currently Used at Home none    Equipment Needed After Discharge none            Discharge Plan     None        Discharge Placement     No information found                Demographic Summary     None            Functional Status     None            Psychosocial     None            Abuse/Neglect     None            Legal     None            Substance Abuse     None            Patient Forms     None          BRENDEN Daniel

## 2018-03-04 NOTE — NURSING NOTE
Patient received from ED with laboring respirations 38, oxygen applied at 1 lpm nc for laboring respirations. CARMEN Roberts present and Dr. Winter at bedside . Sinus tach 129 per telemetry, abg's drawn

## 2018-03-04 NOTE — PLAN OF CARE
Problem: Patient Care Overview (Adult)  Goal: Plan of Care Review  Outcome: Ongoing (interventions implemented as appropriate)   03/04/18 1505   Coping/Psychosocial Response Interventions   Plan Of Care Reviewed With patient   Patient Care Overview   Progress improving   Outcome Evaluation   Outcome Summary/Follow up Plan -130. RR 24-32. Fever resolved. Flu negative. HIV test pending for AM. Echo obtained.       Problem: Cardiac: Heart Failure (Adult)  Goal: Signs and Symptoms of Listed Potential Problems Will be Absent or Manageable (Cardiac: Heart Failure)  Outcome: Ongoing (interventions implemented as appropriate)      Problem: Infection, Risk/Actual (Adult)  Goal: Identify Related Risk Factors and Signs and Symptoms  Outcome: Ongoing (interventions implemented as appropriate)      Problem: Fall Risk (Adult)  Goal: Identify Related Risk Factors and Signs and Symptoms  Outcome: Ongoing (interventions implemented as appropriate)    Goal: Absence of Falls  Outcome: Ongoing (interventions implemented as appropriate)

## 2018-03-04 NOTE — H&P
"    [facility] Medicine Services  HISTORY AND PHYSICAL    Date of Admission: 3/3/2018  Primary Care Physician: No Known Provider    Subjective     Chief Complaint: Shortness of breath and abdominal pain    History of Present Illness  32-year-old male with past medical history of CHF secondary to methamphetamine use comes into the ER with abdominal pain and shortness of breath.  Patient states this started a few days ago and progressively getting worse.  Patient had similar episodes of shortness of breath in the past secondary to CHF.  During initial evaluation patient was noted to be tachycardic.  CT abdomen pelvis was done.  Possible pneumonia noted.  Pro-calcitonin was elevated.  Lab work noted for transaminitis.  She will be admitted for possible pneumonia, sepsis and possible underlying CHF.        Review of Systems     Otherwise complete ROS reviewed and negative except as mentioned in the HPI.    Past Medical History:   Past Medical History:   Diagnosis Date   • CHF (congestive heart failure)      Past Surgical History:  Past Surgical History:   Procedure Laterality Date   • HAND SURGERY       Social History:  reports that he has been smoking.  He does not have any smokeless tobacco history on file. He reports that he drinks alcohol. He reports that he uses illicit drugs, including Methamphetamines.    Family History: Family history of hypertension    Allergies:  No Known Allergies  Medications:  Prior to Admission medications    Medication Sig Start Date End Date Taking? Authorizing Provider   aspirin 81 MG EC tablet Take 81 mg by mouth. 11/21/16  Yes Historical Provider, MD     Objective     Vital Signs: /86  Pulse (!) 130  Temp 98.5 °F (36.9 °C) (Temporal Artery )   Resp 20  Ht 170.2 cm (67\")  Wt 86.2 kg (190 lb)  SpO2 92%  BMI 29.76 kg/m2       Physical Exam   Constitutional: He is oriented to person, place, and time. He appears well-developed and " well-nourished.   HENT:   Head: Normocephalic and atraumatic.   Eyes: EOM are normal. Pupils are equal, round, and reactive to light.   Neck: Normal range of motion. Neck supple.   Cardiovascular: Normal rate, regular rhythm and normal heart sounds.    Pulmonary/Chest: Effort normal and breath sounds normal.   Abdominal: Soft. Bowel sounds are normal.   Musculoskeletal: Normal range of motion.   Neurological: He is alert and oriented to person, place, and time.   Skin: Skin is warm.   Psychiatric: He has a normal mood and affect. His behavior is normal. Thought content normal.             Results Reviewed:  Lab Results (last 24 hours)     Procedure Component Value Units Date/Time    Comprehensive Metabolic Panel [[id number]]  (Abnormal) Collected:  03/03/18 2359    Specimen:  Blood Updated:  03/04/18 0025     Glucose 128 (H) mg/dL      BUN 20 mg/dL      Creatinine 1.05 mg/dL      Sodium 141 mmol/L      Potassium 4.4 mmol/L      Chloride 105 mmol/L      CO2 23.0 (L) mmol/L      Calcium 8.5 mg/dL      Total Protein 6.2 (L) g/dL      Albumin 3.40 (L) g/dL      ALT (SGPT) 126 (H) U/L      AST (SGOT) 105 (H) U/L      Alkaline Phosphatase 96 U/L      Total Bilirubin 0.3 mg/dL      eGFR Non African Amer 82 mL/min/1.73      Globulin 2.8 gm/dL      A/G Ratio 1.2 g/dL      BUN/Creatinine Ratio 19.0     Anion Gap 13.0 mmol/L     Lipase [[id number]]  (Normal) Collected:  03/03/18 2359    Specimen:  Blood Updated:  03/04/18 0025     Lipase 63 U/L     Ethanol [[id number]]  (Normal) Collected:  03/03/18 2359    Specimen:  Blood Updated:  03/04/18 0025     Ethanol % <0.010 %     Narrative:       Not for legal purposes. Chain of Custody not followed.     CBC & Differential [[id number]] Collected:  03/03/18 2359    Specimen:  Blood Updated:  03/04/18 0048    Narrative:       The following orders were created for panel order CBC & Differential.  Procedure                               Abnormality         Status                     ---------                                -----------         ------                     CBC Auto Differential[[id number]]         Abnormal            Final result                 Please view results for these tests on the individual orders.    CBC Auto Differential [[id number]]  (Abnormal) Collected:  03/03/18 2359    Specimen:  Blood Updated:  03/04/18 0048     WBC 10.24 10*3/mm3      RBC 3.97 (L) 10*6/mm3      Hemoglobin 12.2 (L) g/dL      Hematocrit 37.0 (L) %      MCV 93.2 fL      MCH 30.7 pg      MCHC 33.0 g/dL      RDW 15.9 (H) %      RDW-SD 53.0 fl      MPV 10.6 fL      Platelets 275 10*3/mm3      Neutrophil % 76.6 %      Lymphocyte % 15.0 %      Monocyte % 6.5 %      Eosinophil % 1.1 %      Basophil % 0.5 %      Immature Grans % 0.3 %      Neutrophils, Absolute 7.84 10*3/mm3      Lymphocytes, Absolute 1.54 10*3/mm3      Monocytes, Absolute 0.67 10*3/mm3      Eosinophils, Absolute 0.11 10*3/mm3      Basophils, Absolute 0.05 10*3/mm3      Immature Grans, Absolute 0.03 10*3/mm3      nRBC 0.2 (H) /100 WBC     [place] Draw [[id number]] Collected:  03/03/18 2359    Specimen:  Blood Updated:  03/04/18 0101    Narrative:       The following orders were created for panel order [place] Draw.  Procedure                               Abnormality         Status                     ---------                               -----------         ------                     Light Blue Top[[id number]]                                    Final result               Green Top (Gel)[[id number]]                                   Final result               Lavender Top[[id number]]                                      Final result               Red Top[[id number]]                                           Final result                 Please view results for these tests on the individual orders.    Light Blue Top [[id number]] Collected:  03/03/18 2359    Specimen:  Blood Updated:  03/04/18 0101     Extra Tube hold for add-on      Auto resulted       Green Top (Gel)  [[id number]] Collected:  03/03/18 2359    Specimen:  Blood Updated:  03/04/18 0101     Extra Tube Hold for add-ons.      Auto resulted.       Lavender Top [[id number]] Collected:  03/03/18 2359    Specimen:  Blood Updated:  03/04/18 0101     Extra Tube hold for add-on      Auto resulted       Red Top [[id number]] Collected:  03/03/18 2359    Specimen:  Blood Updated:  03/04/18 0101     Extra Tube Hold for add-ons.      Auto resulted.       Urinalysis With / Microscopic If Indicated - Urine, Clean Catch [[id number]]  (Abnormal) Collected:  03/04/18 0110    Specimen:  Urine from Urine, Clean Catch Updated:  03/04/18 0128     Color, UA Yellow     Appearance, UA Clear     pH, UA <=5.0     Specific Gravity, UA >=1.030     Glucose, UA Negative     Ketones, UA Negative     Bilirubin, UA Negative     Blood, UA Negative     Protein, UA 30 mg/dL (1+) (A)     Leuk Esterase, UA Negative     Nitrite, UA Negative     Urobilinogen, UA 1.0 E.U./dL    Urinalysis, Microscopic Only - Urine, Clean Catch [[id number]]  (Abnormal) Collected:  03/04/18 0110    Specimen:  Urine from Urine, Clean Catch Updated:  03/04/18 0128     RBC, UA 3-5 (A) /HPF      WBC, UA None Seen /HPF      Bacteria, UA None Seen /HPF      Squamous Epithelial Cells, UA None Seen /HPF      Hyaline Casts, UA None Seen /LPF      Methodology Automated Microscopy    Urine Drug Screen - Urine, Clean Catch [[id number]]  (Abnormal) Collected:  03/04/18 0110    Specimen:  Urine from Urine, Clean Catch Updated:  03/04/18 0235     Amphetamine Screen, Urine Positive (A)     Barbiturates Screen, Urine Negative     Benzodiazepine Screen, Urine Negative     Cocaine Screen, Urine Negative     Methadone Screen, Urine Negative     Opiate Screen Negative     Phencyclidine (PCP), Urine Negative     THC, Screen, Urine Negative    Narrative:       Negative Thresholds For Drugs Screened in Urine:    Amphetamines          500 ng/ml  Barbiturates          200 ng/ml  Benzodiazepines       200  ng/ml  Cocaine               150 ng/ml  Methadone             150 ng/ml  Opiates               300 ng/ml  Phencyclidine         25 ng/ml  THC                      50 ng/ml    The normal value for all drugs tested is negative. This report includes final unconfirmed screening results.  A positive result by this assay can be, at your request, sent to the Reference Lab for confirmation by gas chromatography. Unconfirmed results must not be used for non-medical purposes, such as employment or legal testing. Clinical consideration should be applied to any drug of abuse test result, particularly when unconfirmed results are used.    Lactic Acid, Plasma [[id number]]  (Normal) Collected:  03/03/18 2359    Specimen:  Blood Updated:  03/04/18 0433     Lactate 1.7 mmol/L     Procalcitonin [[id number]]  (Abnormal) Collected:  03/03/18 2359    Specimen:  Blood Updated:  03/04/18 0439     Procalcitonin 0.55 (H) ng/mL     Narrative:       SIRS, sepsis, severe sepsis, and septic shock are categorized according to the criteria of the consensus conference of the American College of Chest Physicians/Society of Critical Care Medicine.    PCT < 0.5 ng/mL     Systemic infection (sepsis) is not likely.    PCT >0.5 and < 2.0 ng/mL Systemic infection (sepsis) is possible, but other conditions are known to elevate PCT as well.    PCT > 2.0 ng/mL     Systemic infection (sepsis) is likely, unless other causes are known.      PCT > 10.0 ng/mL    Important systemic inflammatory response, almost exclusively due to severe bacterial sepsis or septic shock.    PCT values of < 0.5 ng/mL do not exclude an infection, because localized infections (without systemic signs) may be associated with such low concentrations, or a systemic infection in its initial stages (<6 hours).  Increased PCT can occur without infection.  PCT concentrations between 0.5 and 2.0 ng/mL should be interpreted taking into account the patients history.  It is recommended to retest  PCT within 6-24 hours if any concentrations < 2.0 ng/mL are obtained.        Imaging Results (last 24 hours)     Procedure Component Value Units Date/Time    CT Abdomen Pelvis With Contrast [[id number]] Updated:  03/04/18 0131        I have personally reviewed and interpreted the radiology studies and ECG obtained at time of admission.     Assessment / Plan     Assessment:     1.  CHF exacerbation  2.  Tachycardia rule out PE  3.  Transaminitis possibly secondary to cirrhosis  4.  Abnormal findings of pancreas on CT  5.  Pneumonia    Plan:      -Admit for further evaluation  -Continue cardiac monitoring  -Continuous pulse ox  -Follow-up echocardiogram  -Strict ins and outs  -Follow-up VQ scan-continue IV therapeutic dose Lovenox  -Follow-up lipase and amylase level  -Follow-up hepatitis panel  -Follow-up blood culture  -Follow-up urinalysis  -Follow-up a.m. chest x-ray  -Continue IV antibiotic  -Lactate noted to be negative  -Consider cardiology consult if indicated  -GI prophylaxis  -DVT prophylaxis          Code Status: Full code     I discussed the patients findings and my recommendations with the patient's RN    Estimated length of stay 2-3 days    Liliana Echeverria MD   03/04/18   4:49 AM

## 2018-03-04 NOTE — PROGRESS NOTES
"Pharmacy Dosing Service  Anticoagulant  Enoxaparin    Assessment/Action/Plan:  Pt received Lovenox 90 mg once dose with subsequent pharmacy to dose for full anticoag for indeterminate vq scan. Ordered INR and initiated Lovenox 100 mg (1mg/kg q12h to begin 12 hours post first dose     Subjective:  Arjun Oropeza is a 32 y.o. male on Enoxaparin 100 mg SQ every 12 hours for indication of Full anticoagulation for indeterminate vq scan .  Objective:  [Ht: 170.2 cm (67\"); Wt: 95.5 kg (210 lb 9 oz); BMI: Body mass index is 32.98 kg/(m^2).]  Estimated Creatinine Clearance: 115.7 mL/min (by C-G formula based on Cr of 1.01).   Lab Results   Component Value Date    INR 1.33 (H) 03/04/2018    PROTIME 16.9 (H) 03/04/2018      Lab Results   Component Value Date    HGB 11.8 (L) 03/04/2018    HGB 12.2 (L) 03/03/2018      Lab Results   Component Value Date     03/04/2018     03/03/2018       Chastity Tiwari [facility]  03/04/18 10:21 AM     "

## 2018-03-05 PROBLEM — R74.01 TRANSAMINITIS: Status: ACTIVE | Noted: 2018-03-05

## 2018-03-05 PROBLEM — R10.9 ABDOMINAL PAIN: Status: ACTIVE | Noted: 2018-03-05

## 2018-03-05 PROBLEM — I42.8 NONISCHEMIC CARDIOMYOPATHY (HCC): Status: ACTIVE | Noted: 2018-03-05

## 2018-03-05 PROBLEM — Z91.199 NONCOMPLIANCE: Status: ACTIVE | Noted: 2018-03-05

## 2018-03-05 PROBLEM — F15.10 METHAMPHETAMINE ABUSE (HCC): Status: ACTIVE | Noted: 2018-03-05

## 2018-03-05 LAB
ALBUMIN SERPL-MCNC: 3.1 G/DL (ref 3.5–5)
ALBUMIN/GLOB SERPL: 1.2 G/DL (ref 1.1–2.5)
ALP SERPL-CCNC: 94 U/L (ref 24–120)
ALT SERPL W P-5'-P-CCNC: 397 U/L (ref 0–54)
ANION GAP SERPL CALCULATED.3IONS-SCNC: 10 MMOL/L (ref 4–13)
AST SERPL-CCNC: 339 U/L (ref 7–45)
BASOPHILS # BLD AUTO: 0.06 10*3/MM3 (ref 0–0.2)
BASOPHILS NFR BLD AUTO: 0.7 % (ref 0–2)
BILIRUB SERPL-MCNC: 0.2 MG/DL (ref 0.1–1)
BUN BLD-MCNC: 17 MG/DL (ref 5–21)
BUN/CREAT SERPL: 18.7 (ref 7–25)
CALCIUM SPEC-SCNC: 7.9 MG/DL (ref 8.4–10.4)
CHLORIDE SERPL-SCNC: 103 MMOL/L (ref 98–110)
CK SERPL-CCNC: 150 U/L (ref 0–203)
CO2 SERPL-SCNC: 24 MMOL/L (ref 24–31)
CREAT BLD-MCNC: 0.91 MG/DL (ref 0.5–1.4)
DEPRECATED RDW RBC AUTO: 52.5 FL (ref 40–54)
EOSINOPHIL # BLD AUTO: 0.27 10*3/MM3 (ref 0–0.7)
EOSINOPHIL NFR BLD AUTO: 3.2 % (ref 0–4)
ERYTHROCYTE [DISTWIDTH] IN BLOOD BY AUTOMATED COUNT: 15.9 % (ref 12–15)
GFR SERPL CREATININE-BSD FRML MDRD: 97 ML/MIN/1.73
GLOBULIN UR ELPH-MCNC: 2.5 GM/DL
GLUCOSE BLD-MCNC: 134 MG/DL (ref 70–100)
HCT VFR BLD AUTO: 34.7 % (ref 40–52)
HGB BLD-MCNC: 11.6 G/DL (ref 14–18)
HIV1+2 AB SER QL: NEGATIVE
IMM GRANULOCYTES # BLD: 0.02 10*3/MM3 (ref 0–0.03)
IMM GRANULOCYTES NFR BLD: 0.2 % (ref 0–5)
LYMPHOCYTES # BLD AUTO: 2.51 10*3/MM3 (ref 0.72–4.86)
LYMPHOCYTES NFR BLD AUTO: 29.5 % (ref 15–45)
MCH RBC QN AUTO: 30.8 PG (ref 28–32)
MCHC RBC AUTO-ENTMCNC: 33.4 G/DL (ref 33–36)
MCV RBC AUTO: 92 FL (ref 82–95)
MONOCYTES # BLD AUTO: 0.85 10*3/MM3 (ref 0.19–1.3)
MONOCYTES NFR BLD AUTO: 10 % (ref 4–12)
NEUTROPHILS # BLD AUTO: 4.79 10*3/MM3 (ref 1.87–8.4)
NEUTROPHILS NFR BLD AUTO: 56.4 % (ref 39–78)
NRBC BLD MANUAL-RTO: 0 /100 WBC (ref 0–0)
PLATELET # BLD AUTO: 255 10*3/MM3 (ref 130–400)
PMV BLD AUTO: 10.4 FL (ref 6–12)
POTASSIUM BLD-SCNC: 3.6 MMOL/L (ref 3.5–5.3)
PROT SERPL-MCNC: 5.6 G/DL (ref 6.3–8.7)
RBC # BLD AUTO: 3.77 10*6/MM3 (ref 4.8–5.9)
SODIUM BLD-SCNC: 137 MMOL/L (ref 135–145)
WBC NRBC COR # BLD: 8.5 10*3/MM3 (ref 4.8–10.8)

## 2018-03-05 PROCEDURE — 25010000002 AZITHROMYCIN PER 500 MG: Performed by: FAMILY MEDICINE

## 2018-03-05 PROCEDURE — 25010000002 FUROSEMIDE PER 20 MG: Performed by: FAMILY MEDICINE

## 2018-03-05 PROCEDURE — 99222 1ST HOSP IP/OBS MODERATE 55: CPT | Performed by: INTERNAL MEDICINE

## 2018-03-05 PROCEDURE — 80053 COMPREHEN METABOLIC PANEL: CPT | Performed by: FAMILY MEDICINE

## 2018-03-05 PROCEDURE — 94799 UNLISTED PULMONARY SVC/PX: CPT

## 2018-03-05 PROCEDURE — 85025 COMPLETE CBC W/AUTO DIFF WBC: CPT | Performed by: FAMILY MEDICINE

## 2018-03-05 PROCEDURE — 25010000002 VANCOMYCIN 10 G RECONSTITUTED SOLUTION: Performed by: FAMILY MEDICINE

## 2018-03-05 PROCEDURE — 82550 ASSAY OF CK (CPK): CPT | Performed by: INTERNAL MEDICINE

## 2018-03-05 PROCEDURE — 25010000002 ENOXAPARIN PER 10 MG: Performed by: FAMILY MEDICINE

## 2018-03-05 PROCEDURE — G0432 EIA HIV-1/HIV-2 SCREEN: HCPCS | Performed by: FAMILY MEDICINE

## 2018-03-05 PROCEDURE — 25010000002 CEFTRIAXONE PER 250 MG: Performed by: FAMILY MEDICINE

## 2018-03-05 RX ORDER — LISINOPRIL 2.5 MG/1
2.5 TABLET ORAL
Status: DISCONTINUED | OUTPATIENT
Start: 2018-03-06 | End: 2018-03-09 | Stop reason: HOSPADM

## 2018-03-05 RX ORDER — ENALAPRILAT 2.5 MG/2ML
0.62 INJECTION INTRAVENOUS EVERY 6 HOURS
Status: DISCONTINUED | OUTPATIENT
Start: 2018-03-05 | End: 2018-03-05

## 2018-03-05 RX ORDER — BENZONATATE 100 MG/1
200 CAPSULE ORAL 3 TIMES DAILY PRN
Status: DISCONTINUED | OUTPATIENT
Start: 2018-03-05 | End: 2018-03-09 | Stop reason: HOSPADM

## 2018-03-05 RX ADMIN — ENOXAPARIN SODIUM 100 MG: 100 INJECTION SUBCUTANEOUS at 07:09

## 2018-03-05 RX ADMIN — LORAZEPAM 1 MG: 2 SOLUTION, CONCENTRATE ORAL at 02:53

## 2018-03-05 RX ADMIN — FUROSEMIDE 40 MG: 10 INJECTION, SOLUTION INTRAMUSCULAR; INTRAVENOUS at 08:20

## 2018-03-05 RX ADMIN — ENALAPRILAT 0.62 MG: 1.25 INJECTION INTRAVENOUS at 08:43

## 2018-03-05 RX ADMIN — AZITHROMYCIN MONOHYDRATE 500 MG: 500 INJECTION, POWDER, LYOPHILIZED, FOR SOLUTION INTRAVENOUS at 04:19

## 2018-03-05 RX ADMIN — CEFTRIAXONE SODIUM 1 G: 1 INJECTION, POWDER, FOR SOLUTION INTRAMUSCULAR; INTRAVENOUS at 04:19

## 2018-03-05 RX ADMIN — LORAZEPAM 1 MG: 2 SOLUTION, CONCENTRATE ORAL at 21:52

## 2018-03-05 RX ADMIN — ASPIRIN 81 MG: 81 TABLET ORAL at 08:20

## 2018-03-05 RX ADMIN — VANCOMYCIN HYDROCHLORIDE 1500 MG: 10 INJECTION, POWDER, LYOPHILIZED, FOR SOLUTION INTRAVENOUS at 20:41

## 2018-03-05 RX ADMIN — ENALAPRILAT 0.62 MG: 1.25 INJECTION INTRAVENOUS at 16:00

## 2018-03-05 RX ADMIN — BENZONATATE 200 MG: 100 CAPSULE ORAL at 22:11

## 2018-03-05 RX ADMIN — BENZONATATE 200 MG: 100 CAPSULE ORAL at 02:54

## 2018-03-05 RX ADMIN — FAMOTIDINE 40 MG: 20 TABLET, FILM COATED ORAL at 08:20

## 2018-03-05 RX ADMIN — VANCOMYCIN HYDROCHLORIDE 1500 MG: 10 INJECTION, POWDER, LYOPHILIZED, FOR SOLUTION INTRAVENOUS at 08:41

## 2018-03-05 NOTE — PLAN OF CARE
Problem: Patient Care Overview (Adult)  Goal: Plan of Care Review  Outcome: Ongoing (interventions implemented as appropriate)   03/05/18 1620   Coping/Psychosocial Response Interventions   Plan Of Care Reviewed With patient   Patient Care Overview   Progress improving     Goal: Adult Individualization and Mutuality  Outcome: Ongoing (interventions implemented as appropriate)    Goal: Discharge Needs Assessment  Outcome: Ongoing (interventions implemented as appropriate)      Problem: Cardiac: Heart Failure (Adult)  Goal: Signs and Symptoms of Listed Potential Problems Will be Absent or Manageable (Cardiac: Heart Failure)  Outcome: Ongoing (interventions implemented as appropriate)      Problem: Infection, Risk/Actual (Adult)  Goal: Infection Prevention/Resolution  Outcome: Ongoing (interventions implemented as appropriate)

## 2018-03-05 NOTE — CONSULTS
[facility] HEART GROUP CONSULT NOTE    Referring Provider: Liliaan Echeverria MD    Reason for Consultation: Nonischemic Cardiomyopathy    Chief complaint:   Chief Complaint   Patient presents with   • Abdominal Pain       Subjective .     History of present illness:  Arjun Oropeza is a 32 y.o. male with a 2 day history of abdominal pain and shortness of breath. Patient was admitted to the hospital and is being worked up for fever, abdominal pain transaminitis and shortness of breath. Patient has a known history of nonischemic cardiomyopathy thought to be caused by IV methamphetamine use. Patient was last seen by a cardiologist at [facility] in February 2017. Patient at that time moved to [place], and has not followed with a medical provider since that time. Patient has been out of his medications since May. Patient previously was on Lisinopril, Aspirin, Lopressor, Bumex and Aldactone. Patient was seen 2/8/17 at [facility] ER and was started on Levaquin and Lasix for pneumonia and acute on chronic congestive heart failure. Patient states for the past two days he has been having abdominal pain and swelling. He states his shortness of breath has also been increasingly worse. Patient continues to use IV methamphetamine. Patient states his breathing has improved with IV diuretics. Blood Cultures have been checked and at 24 hours show no growth.     Cardiac History:  10/31/16 ECHO: EF 20%, severe MR, severe TR, mitral leaflet thickening, dilated LV  11/7/16 Heart Cath: normal coronaries, estimated EF 10%  2/7/17 ECHO: EF 20-25%, severe MR, severe TR    History  Past Medical History:   Diagnosis Date   • CHF (congestive heart failure)    ,   Past Surgical History:   Procedure Laterality Date   • HAND SURGERY     ,   History reviewed. No pertinent family history.,   Social History   Substance Use Topics   • Smoking status: Current Every Day Smoker   • Smokeless tobacco: None   • Alcohol use Yes      Comment: occasional    ,     Medications    Prior to Admission medications    Medication Sig Start Date End Date Taking? Authorizing Provider   aspirin 81 MG EC tablet Take 81 mg by mouth. 11/21/16   Historical Provider, MD       Current Facility-Administered Medications   Medication Dose Route Frequency Provider Last Rate Last Dose   • aspirin EC tablet 81 mg  81 mg Oral Daily Liliana Echeverria MD   81 mg at 03/05/18 0820   • cefTRIAXone (ROCEPHIN) 1 g/10mL IV PUSH syringe  1 g Intravenous Q24H Liliana Echeverria MD   1 g at 03/05/18 0419    And   • AZITHROMYCIN 500 MG/250 ML 0.9% NS IVPB (vial-mate)  500 mg Intravenous Q24H Liliana Echeverria MD   500 mg at 03/05/18 0419   • benzonatate (TESSALON) capsule 200 mg  200 mg Oral TID PRN Liliana Echeverria MD   200 mg at 03/05/18 0254   • enalaprilat (VASOTEC) injection 0.625 mg  0.625 mg Intravenous Q6H Twin Salter MD   0.625 mg at 03/05/18 0843   • [START ON 3/6/2018] enoxaparin (LOVENOX) syringe 40 mg  40 mg Subcutaneous Q24H Twin Salter MD       • famotidine (PEPCID) tablet 40 mg  40 mg Oral Daily Liliana Echeverria MD   40 mg at 03/05/18 0820   • furosemide (LASIX) injection 40 mg  40 mg Intravenous Daily Liliana Echeverria MD   40 mg at 03/05/18 0820   • LORazepam (ATIVAN) 2 MG/ML concentrated solution 1 mg  1 mg Oral Q4H PRN Priscilla Winter DO   1 mg at 03/05/18 0253   • ondansetron (ZOFRAN) injection 4 mg  4 mg Intravenous Q6H PRN Liliana Echeverria MD       • Pharmacy to Dose enoxaparin (LOVENOX)   Does not apply Continuous PRN Priscilla Winter DO       • sodium chloride 0.9 % flush 1-10 mL  1-10 mL Intravenous PRN Liliana Echeverria MD       • vancomycin 1500 mg/500 mL 0.9% NS IVPB ([facility])  1,500 mg Intravenous Q12H Priscilla Winter DO   1,500 mg at 03/05/18 0841       Allergies:  Review of patient's allergies indicates no known allergies.    Review of Systems  Review of Systems   Constitution: Negative for chills, decreased appetite, fever,  malaise/fatigue, weight gain and weight loss.   HENT: Negative for nosebleeds.    Eyes: Negative for visual disturbance.   Cardiovascular: Positive for dyspnea on exertion. Negative for chest pain, leg swelling, near-syncope, orthopnea, palpitations, paroxysmal nocturnal dyspnea and syncope.   Respiratory: Positive for shortness of breath. Negative for cough, hemoptysis and snoring.    Endocrine: Negative for cold intolerance and heat intolerance.   Hematologic/Lymphatic: Negative for bleeding problem. Does not bruise/bleed easily.   Skin: Negative for rash.   Musculoskeletal: Negative for back pain and falls.   Gastrointestinal: Positive for bloating and abdominal pain. Negative for constipation, diarrhea, heartburn, melena, nausea and vomiting.   Genitourinary: Negative for hematuria.   Neurological: Negative for dizziness, headaches and light-headedness.   Psychiatric/Behavioral: Negative for altered mental status.   Allergic/Immunologic: Negative for persistent infections.       Objective     Physical Exam:  Patient Vitals for the past 24 hrs:   BP Temp Temp src Pulse Resp SpO2 Weight   03/05/18 0843 123/90 - - 115 - - -   03/05/18 0834 - - - 115 22 95 % -   03/05/18 0600 93/68 - - 120 20 94 % 95 kg (209 lb 8 oz)   03/05/18 0500 102/71 - - (!) 122 22 95 % -   03/05/18 0405 106/71 - - 104 20 93 % -   03/05/18 0400 106/71 98.5 °F (36.9 °C) Oral 103 - 98 % -   03/05/18 0300 104/71 - - 107 22 91 % -   03/05/18 0205 125/83 - - 107 24 95 % -   03/05/18 0105 99/69 - - 116 20 96 % -   03/05/18 0000 106/67 97.8 °F (36.6 °C) Oral 102 19 97 % -   03/04/18 2300 109/84 - - 110 20 97 % -   03/04/18 2200 122/77 - - 113 20 - -   03/04/18 2100 - - - - 22 - -   03/04/18 2000 105/72 98.2 °F (36.8 °C) Oral 114 18 - -   03/04/18 1900 110/71 - - 105 20 - -   03/04/18 1800 107/84 - - 103 18 - -   03/04/18 1745 121/82 - - 103 21 - -   03/04/18 1730 126/75 - - 107 20 97 % -   03/04/18 1645 110/71 - - 107 22 91 % -   03/04/18 1630 100/72  - - 107 20 93 % -   03/04/18 1430 90/62 - - 110 21 95 % -   03/04/18 1245 115/71 - - 108 (!) 29 91 % -   03/04/18 1215 109/63 - - 109 28 93 % -   03/04/18 1200 110/66 - - 107 28 (!) 86 % -   03/04/18 1145 110/71 - - 110 27 94 % -   03/04/18 1130 114/90 - - 118 24 97 % -   03/04/18 1100 114/65 - - 112 28 92 % -   03/04/18 1045 122/69 - - 117 24 98 % -   03/04/18 1000 109/78 - - (!) 125 28 96 % -   03/04/18 0950 114/79 - - (!) 125 - 98 % -     Physical Exam   Constitutional: He is oriented to person, place, and time. He appears well-developed and well-nourished.   HENT:   Head: Normocephalic and atraumatic.   Eyes: Pupils are equal, round, and reactive to light.   Neck: Normal range of motion. Neck supple. No JVD present. Carotid bruit is not present.   Cardiovascular: Normal rate, regular rhythm, normal heart sounds and intact distal pulses.    Pulmonary/Chest: Effort normal and breath sounds normal.   Abdominal: Soft. Bowel sounds are normal.   Musculoskeletal: Normal range of motion.   Neurological: He is alert and oriented to person, place, and time. He has normal reflexes.   Skin: Skin is warm and dry.   Psychiatric: He has a normal mood and affect. His behavior is normal. Judgment and thought content normal.       Results Review:   I reviewed the patient's new clinical results.  Lab Results (last 72 hours)     Procedure Component Value Units Date/Time    Comprehensive Metabolic Panel [[id number]]  (Abnormal) Collected:  03/03/18 2359    Specimen:  Blood Updated:  03/04/18 0025     Glucose 128 (H) mg/dL      BUN 20 mg/dL      Creatinine 1.05 mg/dL      Sodium 141 mmol/L      Potassium 4.4 mmol/L      Chloride 105 mmol/L      CO2 23.0 (L) mmol/L      Calcium 8.5 mg/dL      Total Protein 6.2 (L) g/dL      Albumin 3.40 (L) g/dL      ALT (SGPT) 126 (H) U/L      AST (SGOT) 105 (H) U/L      Alkaline Phosphatase 96 U/L      Total Bilirubin 0.3 mg/dL      eGFR Non African Amer 82 mL/min/1.73      Globulin 2.8 gm/dL       A/G Ratio 1.2 g/dL      BUN/Creatinine Ratio 19.0     Anion Gap 13.0 mmol/L     Lipase [[id number]]  (Normal) Collected:  03/03/18 2359    Specimen:  Blood Updated:  03/04/18 0025     Lipase 63 U/L     Ethanol [[id number]]  (Normal) Collected:  03/03/18 2359    Specimen:  Blood Updated:  03/04/18 0025     Ethanol % <0.010 %     Narrative:       Not for legal purposes. Chain of Custody not followed.     CBC & Differential [[id number]] Collected:  03/03/18 2359    Specimen:  Blood Updated:  03/04/18 0048    Narrative:       The following orders were created for panel order CBC & Differential.  Procedure                               Abnormality         Status                     ---------                               -----------         ------                     CBC Auto Differential[[id number]]         Abnormal            Final result                 Please view results for these tests on the individual orders.    CBC Auto Differential [[id number]]  (Abnormal) Collected:  03/03/18 2359    Specimen:  Blood Updated:  03/04/18 0048     WBC 10.24 10*3/mm3      RBC 3.97 (L) 10*6/mm3      Hemoglobin 12.2 (L) g/dL      Hematocrit 37.0 (L) %      MCV 93.2 fL      MCH 30.7 pg      MCHC 33.0 g/dL      RDW 15.9 (H) %      RDW-SD 53.0 fl      MPV 10.6 fL      Platelets 275 10*3/mm3      Neutrophil % 76.6 %      Lymphocyte % 15.0 %      Monocyte % 6.5 %      Eosinophil % 1.1 %      Basophil % 0.5 %      Immature Grans % 0.3 %      Neutrophils, Absolute 7.84 10*3/mm3      Lymphocytes, Absolute 1.54 10*3/mm3      Monocytes, Absolute 0.67 10*3/mm3      Eosinophils, Absolute 0.11 10*3/mm3      Basophils, Absolute 0.05 10*3/mm3      Immature Grans, Absolute 0.03 10*3/mm3      nRBC 0.2 (H) /100 WBC     [place] Draw [[id number]] Collected:  03/03/18 2359    Specimen:  Blood Updated:  03/04/18 0101    Narrative:       The following orders were created for panel order [place] Draw.  Procedure                               Abnormality         Status                      ---------                               -----------         ------                     Light Blue Top[[id number]]                                    Final result               Green Top (Gel)[[id number]]                                   Final result               Lavender Top[[id number]]                                      Final result               Red Top[[id number]]                                           Final result                 Please view results for these tests on the individual orders.    Light Blue Top [[id number]] Collected:  03/03/18 2359    Specimen:  Blood Updated:  03/04/18 0101     Extra Tube hold for add-on      Auto resulted       Green Top (Gel) [[id number]] Collected:  03/03/18 2359    Specimen:  Blood Updated:  03/04/18 0101     Extra Tube Hold for add-ons.      Auto resulted.       Lavender Top [[id number]] Collected:  03/03/18 2359    Specimen:  Blood Updated:  03/04/18 0101     Extra Tube hold for add-on      Auto resulted       Red Top [[id number]] Collected:  03/03/18 2359    Specimen:  Blood Updated:  03/04/18 0101     Extra Tube Hold for add-ons.      Auto resulted.       Urinalysis With / Microscopic If Indicated - Urine, Clean Catch [[id number]]  (Abnormal) Collected:  03/04/18 0110    Specimen:  Urine from Urine, Clean Catch Updated:  03/04/18 0128     Color, UA Yellow     Appearance, UA Clear     pH, UA <=5.0     Specific Gravity, UA >=1.030     Glucose, UA Negative     Ketones, UA Negative     Bilirubin, UA Negative     Blood, UA Negative     Protein, UA 30 mg/dL (1+) (A)     Leuk Esterase, UA Negative     Nitrite, UA Negative     Urobilinogen, UA 1.0 E.U./dL    Urinalysis, Microscopic Only - Urine, Clean Catch [[id number]]  (Abnormal) Collected:  03/04/18 0110    Specimen:  Urine from Urine, Clean Catch Updated:  03/04/18 0128     RBC, UA 3-5 (A) /HPF      WBC, UA None Seen /HPF      Bacteria, UA None Seen /HPF      Squamous Epithelial Cells, UA None Seen /HPF      Hyaline  Casts, UA None Seen /LPF      Methodology Automated Microscopy    Urine Drug Screen - Urine, Clean Catch [[id number]]  (Abnormal) Collected:  03/04/18 0110    Specimen:  Urine from Urine, Clean Catch Updated:  03/04/18 0235     Amphetamine Screen, Urine Positive (A)     Barbiturates Screen, Urine Negative     Benzodiazepine Screen, Urine Negative     Cocaine Screen, Urine Negative     Methadone Screen, Urine Negative     Opiate Screen Negative     Phencyclidine (PCP), Urine Negative     THC, Screen, Urine Negative    Narrative:       Negative Thresholds For Drugs Screened in Urine:    Amphetamines          500 ng/ml  Barbiturates          200 ng/ml  Benzodiazepines       200 ng/ml  Cocaine               150 ng/ml  Methadone             150 ng/ml  Opiates               300 ng/ml  Phencyclidine         25 ng/ml  THC                      50 ng/ml    The normal value for all drugs tested is negative. This report includes final unconfirmed screening results.  A positive result by this assay can be, at your request, sent to the Reference Lab for confirmation by gas chromatography. Unconfirmed results must not be used for non-medical purposes, such as employment or legal testing. Clinical consideration should be applied to any drug of abuse test result, particularly when unconfirmed results are used.    Lactic Acid, Plasma [[id number]]  (Normal) Collected:  03/03/18 2359    Specimen:  Blood Updated:  03/04/18 0433     Lactate 1.7 mmol/L     Procalcitonin [[id number]]  (Abnormal) Collected:  03/03/18 2359    Specimen:  Blood Updated:  03/04/18 0439     Procalcitonin 0.55 (H) ng/mL     Narrative:       SIRS, sepsis, severe sepsis, and septic shock are categorized according to the criteria of the consensus conference of the American College of Chest Physicians/Society of Critical Care Medicine.    PCT < 0.5 ng/mL     Systemic infection (sepsis) is not likely.    PCT >0.5 and < 2.0 ng/mL Systemic infection (sepsis) is possible, but  other conditions are known to elevate PCT as well.    PCT > 2.0 ng/mL     Systemic infection (sepsis) is likely, unless other causes are known.      PCT > 10.0 ng/mL    Important systemic inflammatory response, almost exclusively due to severe bacterial sepsis or septic shock.    PCT values of < 0.5 ng/mL do not exclude an infection, because localized infections (without systemic signs) may be associated with such low concentrations, or a systemic infection in its initial stages (<6 hours).  Increased PCT can occur without infection.  PCT concentrations between 0.5 and 2.0 ng/mL should be interpreted taking into account the patients history.  It is recommended to retest PCT within 6-24 hours if any concentrations < 2.0 ng/mL are obtained.    C-reactive Protein [[id number]]  (Abnormal) Collected:  03/03/18 2359    Specimen:  Blood Updated:  03/04/18 0516     C-Reactive Protein 3.17 (H) mg/dL     BNP [[id number]]  (Abnormal) Collected:  03/03/18 2359    Specimen:  Blood Updated:  03/04/18 0526     proBNP 9930.0 (H) pg/mL     Troponin [[id number]]  (Normal) Collected:  03/03/18 2359    Specimen:  Blood Updated:  03/04/18 0526     Troponin I 0.032 ng/mL     CK-MB [[id number]]  (Normal) Collected:  03/03/18 2359    Specimen:  Blood Updated:  03/04/18 0526     CKMB 2.07 ng/mL     Narrative:       CKMB Index not indicated    Blood Gas, Arterial [[id number]]  (Abnormal) Collected:  03/04/18 0715    Specimen:  Arterial Blood Updated:  03/04/18 0719     Site Right Radial     Jed's Test Positive     pH, Arterial 7.402 pH units      pCO2, Arterial 30.3 (L) mm Hg      pO2, Arterial 69.7 (L) mm Hg      HCO3, Arterial 18.8 (L) mmol/L      Base Excess, Arterial -4.9 (L) mmol/L      O2 Saturation, Arterial 93.7 (L) %      Temperature 37.0 C      Barometric Pressure for Blood Gas 760 mmHg      Modality Room Air     Ventilator Mode NA     Collected by [id number]    CBC Auto Differential [[id number]]  (Abnormal) Collected:  03/04/18 0727     Specimen:  Blood Updated:  03/04/18 0820     WBC 10.77 10*3/mm3      RBC 3.79 (L) 10*6/mm3      Hemoglobin 11.8 (L) g/dL      Hematocrit 34.7 (L) %      MCV 91.6 fL      MCH 31.1 pg      MCHC 34.0 g/dL      RDW 15.8 (H) %      RDW-SD 51.5 fl      MPV 10.4 fL      Platelets 231 10*3/mm3      Neutrophil % 78.7 (H) %      Lymphocyte % 10.8 (L) %      Monocyte % 9.0 %      Eosinophil % 0.6 %      Basophil % 0.5 %      Immature Grans % 0.4 %      Neutrophils, Absolute 8.49 (H) 10*3/mm3      Lymphocytes, Absolute 1.16 10*3/mm3      Monocytes, Absolute 0.97 10*3/mm3      Eosinophils, Absolute 0.06 10*3/mm3      Basophils, Absolute 0.05 10*3/mm3      Immature Grans, Absolute 0.04 (H) 10*3/mm3      nRBC 0.0 /100 WBC     Comprehensive Metabolic Panel [[id number]]  (Abnormal) Collected:  03/04/18 0749    Specimen:  Blood Updated:  03/04/18 0834     Glucose 96 mg/dL      BUN 19 mg/dL      Creatinine 1.01 mg/dL      Sodium 136 mmol/L      Potassium 4.5 mmol/L      Chloride 105 mmol/L      CO2 21.0 (L) mmol/L      Calcium 8.1 (L) mg/dL      Total Protein 5.9 (L) g/dL      Albumin 3.30 (L) g/dL      ALT (SGPT) 385 (H) U/L      AST (SGOT) 411 (H) U/L      Alkaline Phosphatase 103 U/L      Total Bilirubin 0.6 mg/dL      eGFR Non African Amer 86 mL/min/1.73      Globulin 2.6 gm/dL      A/G Ratio 1.3 g/dL      BUN/Creatinine Ratio 18.8     Anion Gap 10.0 mmol/L     Magnesium [[id number]]  (Normal) Collected:  03/04/18 0749    Specimen:  Blood Updated:  03/04/18 0834     Magnesium 1.6 mg/dL     Phosphorus [[id number]]  (Normal) Collected:  03/04/18 0749    Specimen:  Blood Updated:  03/04/18 0834     Phosphorus 4.0 mg/dL     Amylase [[id number]]  (Normal) Collected:  03/04/18 0749    Specimen:  Blood Updated:  03/04/18 0834     Amylase 34 U/L     Lipase [[id number]]  (Normal) Collected:  03/04/18 0749    Specimen:  Blood Updated:  03/04/18 0834     Lipase 55 U/L     Lipid Panel [[id number]]  (Abnormal) Collected:  03/04/18 0749    Specimen:   Blood Updated:  03/04/18 0845     Total Cholesterol 148 mg/dL      Triglycerides 104 mg/dL      HDL Cholesterol 22 (L) mg/dL      LDL Cholesterol  109 (H) mg/dL      LDL/HDL Ratio 4.78    BNP [[id number]]  (Abnormal) Collected:  03/04/18 0749    Specimen:  Blood Updated:  03/04/18 0850     proBNP 30882.0 (H) pg/mL     Troponin [[id number]]  (Abnormal) Collected:  03/04/18 0749    Specimen:  Blood Updated:  03/04/18 0850     Troponin I 0.065 (H) ng/mL     Protime-INR [[id number]]  (Abnormal) Collected:  03/04/18 0847    Specimen:  Blood Updated:  03/04/18 0922     Protime 16.9 (H) Seconds      INR 1.33 (H)    Hepatitis Panel, Acute [[id number]]  (Normal) Collected:  03/04/18 0749    Specimen:  Blood Updated:  03/04/18 1204     HCV S/C Ratio 0.06     Hepatitis C Ab Negative     Hep A IgM Negative     Hep B C IgM Negative     Hepatitis B Surface Ag Negative    CBC Auto Differential [[id number]]  (Abnormal) Collected:  03/04/18 1400    Specimen:  Blood Updated:  03/04/18 1409     WBC 10.40 10*3/mm3      RBC 3.78 (L) 10*6/mm3      Hemoglobin 11.7 (L) g/dL      Hematocrit 34.3 (L) %      MCV 90.7 fL      MCH 31.0 pg      MCHC 34.1 g/dL      RDW 15.9 (H) %      RDW-SD 51.2 fl      MPV 10.1 fL      Platelets 238 10*3/mm3      Neutrophil % 66.1 %      Lymphocyte % 22.8 %      Monocyte % 9.7 %      Eosinophil % 0.3 %      Basophil % 0.8 %      Immature Grans % 0.3 %      Neutrophils, Absolute 6.88 10*3/mm3      Lymphocytes, Absolute 2.37 10*3/mm3      Monocytes, Absolute 1.01 10*3/mm3      Eosinophils, Absolute 0.03 10*3/mm3      Basophils, Absolute 0.08 10*3/mm3      Immature Grans, Absolute 0.03 10*3/mm3      nRBC 0.0 /100 WBC     CBC & Differential [[id number]] Collected:  03/04/18 1400    Specimen:  Blood Updated:  03/04/18 1409    Narrative:       The following orders were created for panel order CBC & Differential.  Procedure                               Abnormality         Status                     ---------                                -----------         ------                     CBC Auto Differential[[id number]]        Abnormal            Final result                 Please view results for these tests on the individual orders.    Basic Metabolic Panel [[id number]]  (Abnormal) Collected:  03/04/18 1400    Specimen:  Blood Updated:  03/04/18 1421     Glucose 86 mg/dL      BUN 17 mg/dL      Creatinine 0.99 mg/dL      Sodium 138 mmol/L      Potassium 4.1 mmol/L      Chloride 103 mmol/L      CO2 26.0 mmol/L      Calcium 7.9 (L) mg/dL      eGFR Non African Amer 88 mL/min/1.73      BUN/Creatinine Ratio 17.2     Anion Gap 9.0 mmol/L     Narrative:       GFR Normal >60  Chronic Kidney Disease <60  Kidney Failure <15    Influenza Antigen, Rapid - Swab, Nasopharynx [[id number]]  (Normal) Collected:  03/04/18 1407    Specimen:  Swab from Nasopharynx Updated:  03/04/18 1428     Influenza A Ag, EIA Negative     Influenza B Ag, EIA Negative    Narrative:         Recommend confirmation of negative results by viral culture or molecular assay.    Blood Culture With VAUGHN - Blood, [[id number]]  (Normal) Collected:  03/04/18 0749    Specimen:  Blood from Arm, Left Updated:  03/04/18 2346     Blood Culture No growth at less than 24 hours    Blood Culture With VAUGHN - Blood, [[id number]]  (Normal) Collected:  03/04/18 0834    Specimen:  Blood from Arm, Right Updated:  03/04/18 2346     Blood Culture No growth at less than 24 hours    CBC & Differential [[id number]] Collected:  03/05/18 0143    Specimen:  Blood Updated:  03/05/18 0215    Narrative:       The following orders were created for panel order CBC & Differential.  Procedure                               Abnormality         Status                     ---------                               -----------         ------                     CBC Auto Differential[[id number]]        Abnormal            Final result                 Please view results for these tests on the individual orders.    CBC Auto  Differential [[id number]]  (Abnormal) Collected:  03/05/18 0143    Specimen:  Blood Updated:  03/05/18 0215     WBC 8.50 10*3/mm3      RBC 3.77 (L) 10*6/mm3      Hemoglobin 11.6 (L) g/dL      Hematocrit 34.7 (L) %      MCV 92.0 fL      MCH 30.8 pg      MCHC 33.4 g/dL      RDW 15.9 (H) %      RDW-SD 52.5 fl      MPV 10.4 fL      Platelets 255 10*3/mm3      Neutrophil % 56.4 %      Lymphocyte % 29.5 %      Monocyte % 10.0 %      Eosinophil % 3.2 %      Basophil % 0.7 %      Immature Grans % 0.2 %      Neutrophils, Absolute 4.79 10*3/mm3      Lymphocytes, Absolute 2.51 10*3/mm3      Monocytes, Absolute 0.85 10*3/mm3      Eosinophils, Absolute 0.27 10*3/mm3      Basophils, Absolute 0.06 10*3/mm3      Immature Grans, Absolute 0.02 10*3/mm3      nRBC 0.0 /100 WBC     Comprehensive Metabolic Panel [[id number]]  (Abnormal) Collected:  03/05/18 0143    Specimen:  Blood Updated:  03/05/18 0230     Glucose 134 (H) mg/dL      BUN 17 mg/dL      Creatinine 0.91 mg/dL      Sodium 137 mmol/L      Potassium 3.6 mmol/L      Chloride 103 mmol/L      CO2 24.0 mmol/L      Calcium 7.9 (L) mg/dL      Total Protein 5.6 (L) g/dL      Albumin 3.10 (L) g/dL      ALT (SGPT) 397 (H) U/L      AST (SGOT) 339 (H) U/L      Alkaline Phosphatase 94 U/L      Total Bilirubin 0.2 mg/dL      eGFR Non African Amer 97 mL/min/1.73      Globulin 2.5 gm/dL      A/G Ratio 1.2 g/dL      BUN/Creatinine Ratio 18.7     Anion Gap 10.0 mmol/L     HIV-1 & HIV-2 Antibodies [[id number]] Collected:  03/05/18 0143    Specimen:  Blood Updated:  03/05/18 0307    Narrative:       The following orders were created for panel order HIV-1 & HIV-2 Antibodies.  Procedure                               Abnormality         Status                     ---------                               -----------         ------                     HIV-1 & HIV-2 Antibodies[[id number]]     Normal              Final result                 Please view results for these tests on the individual orders.     HIV-1 & HIV-2 Antibodies [[id number]]  (Normal) Collected:  03/05/18 0143    Specimen:  Blood Updated:  03/05/18 0307     HIV-1/ HIV-2 Negative    CK [[id number]]  (Normal) Collected:  03/05/18 0828    Specimen:  Blood Updated:  03/05/18 0906     Creatine Kinase 150 U/L           No results found for: ECHOEFEST    Imaging Results (last 72 hours)     Procedure Component Value Units Date/Time    XR Chest 1 View [[id number]] Collected:  03/04/18 0849     Updated:  03/04/18 0854    Narrative:       History:  32-year-old evaluated to follow-up pneumonia.     Reference:  CT abdomen pelvis same day.     Findings:  Frontal chest radiograph performed.     Cardiomegaly. Unfortunately the airspace opacities in the right lower  lobe (by CT several hours prior) are obscured by the right  hemidiaphragm. Lungs are otherwise clear. No pleural effusion or  pneumothorax          Impression:          1. Right lower lobe opacities are not visualized by plain film.  2. Cardiomegaly in this 32-year-old patient.  This report was finalized on 03/04/2018 08:51 by  Marco Antonio Orourke, .    NM Lung Ventilation Perfusion [[id number]] Collected:  03/04/18 1118     Updated:  03/04/18 1125    Narrative:       History:  32-year-old evaluated for tachycardia.     Radiopharmaceutical:  16.3 mCi xenon-133 by inhalation  5 mCi technetium 99m MAA by intravenous injection     Reference:  Correlation with chest radiograph same day. No prior VQ scans.     Findings:  There is heterogeneous decreased perfusion to the entire left lower  lobe. However this appears matched to ventilatory images where there is  heterogeneity and some mild retention in this area. Perfusion to the  right lung is unremarkable. No definite perfusion dilatation mismatches.          Impression:       Low probability for pulmonary embolus. Abnormal perfusion and  ventilation in the left lower lobe is felt to be on a nonembolic basis.     A preliminary report was provided by [email or web address].   This report  was finalized on 03/04/2018 11:22 by  Marco Antonio Orourke, .    CT Abdomen Pelvis With Contrast [[id number]] Collected:  03/04/18 1127     Updated:  03/04/18 1243    Narrative:          History:  32-year-old evaluated for right lower quadrant abdominal pain.      Reference:  None.     Technique  Contrast-enhanced CT abdomen/pelvis was performed with coronal and  sagittal reformatted images provided.     For this CT exam, one or more of the following dose reduction techniques  was employed:  -automated exposure control  -mA and/or kVp adjustment for patient size  -iterative reconstruction      mGy-cm     Findings     Chest  Incidental scanning through the lower chest demonstrates cardiomegaly.  Particular nodular opacities are present in both lower lobes greater on  the right.     Abdomen  Liver is mildly heterogeneous but maintains normal morphology. The  gallbladder is contracted and thick-walled. Additionally there is  pericholecystic edema. Spleen is unremarkable. No adrenal nodule. No  significant renal lesions or obstructive uropathy. Normal caliber  abdominal aorta. There is a trace of free fluid/edema in the right  subhepatic space tracking along the right anterior pararenal region. No  significant lymphadenopathy. No free air. No bowel obstruction or acute  inflammatory process. Normal appendix. Tiny umbilical fat containing  hernia incidentally noted.     Pelvis  Urinary bladder is nondistended and not well evaluated otherwise. Small  amount of free fluid in the dependent pelvis.     Review of osseous structures shows no acute osseous abnormality.       Impression:          1. Cardiomegaly. Reticulonodular opacities in both lower lobes greater  on the right suspicious for pneumonia.   2. Mildly heterogeneous liver with contracted thick-walled gallbladder  with pericholecystic edema. Findings are nonspecific but can be seen  with hepatitis.  3. Minimal free fluid in the subhepatic space and within the  dependent  pelvis.  This report was finalized on 03/04/2018 12:40 by  Marco Antonio Orourke, .        Assessment/Plan     Active Problems:      Acute on chronic congestive heart failure    Transaminitis    Nonischemic cardiomyopathy    Methamphetamine abuse    Noncompliance    Abdominal pain    Possible Pneumonia    Plan:  Agree with IV diuresis   IV antibiotics per primary team  Previously on Lisinopril 10 BID, Lopressor 25 BID, Bumex 1mg daily, and Aldactone 25 daily- has not had any of these medications since May. At this time blood pressure is borderline low will not tolerate adding all of these medications back. Would consider adding Lopressor back first once BP is felt stable  Patient refuses any type of AICD or LifeVest discussed in depth the need for these and patient refuses  Strict I&O, Low Salt Diet, Daily Weights  Monitor blood cultures, no growth at 24 hours.     Further orders per Dr. Alcocer    Thank you for asking us to follow this patient with you.     Cornado Macias, DIOGENES  03/05/18  9:37 AM

## 2018-03-05 NOTE — PROGRESS NOTES
32-year-old  man admitted earlier today by Dr. King concern for congestive heart failure, tachycardia rule out the, transaminitis with concern for possible cirrhosis and concern for pneumonia.  Patient has been having's some discomfort in his abdomen for at least a week.  He denies any nausea,  vomiting or diarrhea. He had some issues on breathing recently described shortness of breath without accompanying wheezing but has some nonproductive coughing spell.  He is not aware of any febrile episode.  Admitting H&P indicates history of congestive heart failure which she did not endorse to me nor did he endorse illicit drug use.    He is not on any medications besides when necessary aspirin.    Review of diagnostic studies:  CT scan of the abdomen and pelvis showed mildly heterogeneous liver with contracted thick walled gallbladder with pericholecystic edema.  Findings are nonspecific.  Minimal free fluid in the subhepatic space and within the dependent pelvis.  Cardiomegaly.  Reticulonodular opacities in both lower lobe lobes greater on the right.   Amylase and lipase are 34 and 55 respectively      Chest x-ray showed right lower back opacities are not visualized by plain film.  This was described as obscured by the right hemidiaphragm.  Lungs are otherwise clear.  No pleural effusion or pneumothorax.  Otherwise in my own art interpretation of this chest x-ray I did not find any consolidation or interstitial infiltrate to suggest pneumonia.  Gas showed pH of 7.4, PCO2 30, PO2 of this 70 taken on room air    VQ scan noted to have low probability for pulmonary embolus.  Abnormal perfusion and ventilation in left lower lobe is felt to be a non-embolic basis per radiologist.    On laboratories:  ALT and AST are elevated at 397 and 339, acute hepatitis panel are negative CO2 is 24, potassium 3.6, creatinine 0.91  White count is 8.5 with hemoglobin of 11.6, platelet of 255  HIV-1 and 2 are negative,  Influenza A  and B are negative    Blood cultures no growth to date  PT 16.9, INR 1.3  Procalcitonin 0.55; CRP 3.1  Urine drug screen is positive for methamphetamine, alcohol level is less than 0.010  ProBNP is 11,100, troponin is 0.065 (0.032)    Echocardiogram  · Left ventricular systolic function is severely decreased. Estimated EF appears to be in the range of 21 - 25%.  · The left ventricular cavity is moderate-to-severely dilated.  · The findings are consistent with dilated cardiomyopathy.  · Severe mitral valve regurgitation is present  · Moderate tricuspid valve regurgitation is present. Estimated right ventricular systolic pressure from tricuspid regurgitation is markedly elevated (>55 mmHg).  · Cardiac valves well visualized and without any obvious valvular vegetations.  · Based on report of previous echocardiogram from 2/9/17, no obvious significant changes (previously noted severe MR and similar LVEF).      Vitals:    03/05/18 0600   BP: 93/68   Pulse: 120   Resp: 20   Temp:    SpO2: 94%   Awake alert oriented, ×3 grossly nonfocal exam  Calm, appropriate affect, coherent  He is seated upright  No apparent distress, accessory muscle use;   he is well-built  Normocephalic and atraumatic head  Anicteric sclera, pupils are equally reactive to light  No JVD, no thyromegaly  Chest is clear to auscultation with no wheezes or crackles  Tachycardic, S1-S2, loud murmur at the apex  Soft abdomen, normal bowel sounds, no obvious tenderness, no obvious hepatosplenomegaly  Cyanosis, clubbing or edema  Warm dry skin  No obvious skin popping, multiple tattoos in different parts of the body    Assessment  Acute congestive heart failure (systolic) on chronic systolic heart failure - consult cardiology  IV Amphetamine abuse possibly with withdrawal - supportive care; benzodiazepine when necessary   Tachycardia, fever possibly due to amphetamine; no evidence of pulmonary embolism - currently on empiric antibiotics; no mentioned valvular  abnormalities from TTE -- will defer to cardiology if any need for EVA given sirs  Poor medical compliance  Transaminitis    Follow cultures  Other plan per orders  Discussed with nurse  Patient updated  Additional time spent greater than 30 minutes

## 2018-03-05 NOTE — PLAN OF CARE
Problem: Patient Care Overview (Adult)  Goal: Plan of Care Review  Outcome: Ongoing (interventions implemented as appropriate)   03/05/18 1122   Coping/Psychosocial Response Interventions   Plan Of Care Reviewed With patient   Patient Care Overview   Progress improving   Outcome Evaluation   Outcome Summary/Follow up Plan Cardiac diet, oral intake 100% of one meal. Provided Cardiac edu . cont to follow

## 2018-03-06 PROCEDURE — 25010000002 AZITHROMYCIN PER 500 MG: Performed by: FAMILY MEDICINE

## 2018-03-06 PROCEDURE — 25010000002 ENOXAPARIN PER 10 MG: Performed by: INTERNAL MEDICINE

## 2018-03-06 PROCEDURE — 25010000002 FUROSEMIDE PER 20 MG: Performed by: FAMILY MEDICINE

## 2018-03-06 PROCEDURE — 99232 SBSQ HOSP IP/OBS MODERATE 35: CPT | Performed by: INTERNAL MEDICINE

## 2018-03-06 PROCEDURE — 25010000002 CEFTRIAXONE PER 250 MG: Performed by: FAMILY MEDICINE

## 2018-03-06 RX ORDER — METOPROLOL SUCCINATE 25 MG/1
25 TABLET, EXTENDED RELEASE ORAL
Status: DISCONTINUED | OUTPATIENT
Start: 2018-03-06 | End: 2018-03-07

## 2018-03-06 RX ORDER — AZITHROMYCIN 250 MG/1
500 TABLET, FILM COATED ORAL
Status: DISCONTINUED | OUTPATIENT
Start: 2018-03-07 | End: 2018-03-09 | Stop reason: HOSPADM

## 2018-03-06 RX ADMIN — AZITHROMYCIN MONOHYDRATE 500 MG: 500 INJECTION, POWDER, LYOPHILIZED, FOR SOLUTION INTRAVENOUS at 05:30

## 2018-03-06 RX ADMIN — ENOXAPARIN SODIUM 40 MG: 40 INJECTION SUBCUTANEOUS at 08:26

## 2018-03-06 RX ADMIN — METOPROLOL SUCCINATE 25 MG: 25 TABLET, FILM COATED, EXTENDED RELEASE ORAL at 10:04

## 2018-03-06 RX ADMIN — FUROSEMIDE 40 MG: 10 INJECTION, SOLUTION INTRAMUSCULAR; INTRAVENOUS at 08:22

## 2018-03-06 RX ADMIN — FAMOTIDINE 40 MG: 20 TABLET, FILM COATED ORAL at 08:22

## 2018-03-06 RX ADMIN — ASPIRIN 81 MG: 81 TABLET ORAL at 08:22

## 2018-03-06 RX ADMIN — CEFTRIAXONE SODIUM 1 G: 1 INJECTION, POWDER, FOR SOLUTION INTRAMUSCULAR; INTRAVENOUS at 04:07

## 2018-03-06 RX ADMIN — LISINOPRIL 2.5 MG: 2.5 TABLET ORAL at 08:22

## 2018-03-06 NOTE — PROGRESS NOTES
Automatic IV to PO Pharmacy Note - Antimicrobial    Arjun Oropeza is a 32 y.o. male who meets the following criteria for IV to PO therapy conversion :     · Tolerating oral fluids or 40ml/hour of enteral nutrition and oral route not otherwise compromised  · Receiving other oral medications on a scheduled basis  · Afebrile (temperature less than 100.4 degrees F) for at least 24 hours  · WBC within/decreasing toward normal range    Lab Results   Component Value Date    WBC 8.50 03/05/2018     Temp Readings from Last 1 Encounters:   03/06/18 98.4 °F (36.9 °C) (Oral)       Assessment/Plan  Based on this criteria, Azithromycin 500 mg IV Q24H has been changed to Azithromycin 500 mg PO Q24H per the directives and guidelines established by [facility] and Therapeutics Committee and [facility] Medical Executive Committee .       Sekou Ruiz [facility]  03/06/187:49 AM

## 2018-03-06 NOTE — PROGRESS NOTES
"    RE:  Arjun Oropeza  :  1985         Subjective: Patient denies any issues or complaints today.  Feels breathing continues to improve.  Remained sinus tachycardia on telemetry with no evidence of any significant dysrhythmias.    ROS: Pertinent positives and negatives listed above.  All other systems reviewed and negative.    Objective:   /100  Pulse 118  Temp 98.4 °F (36.9 °C) (Oral)   Resp 20  Ht 170.2 cm (67\")  Wt 95.8 kg (211 lb 3.2 oz)  SpO2 96%  BMI 33.08 kg/m2  Temp:  [98 °F (36.7 °C)-98.5 °F (36.9 °C)] 98.4 °F (36.9 °C)  Heart Rate:  [] 118  Resp:  [20-25] 20  BP: ()/() 124/100    Intake/Output Summary (Last 24 hours) at 18 0904  Last data filed at 18 0744   Gross per 24 hour   Intake             2600 ml   Output             3725 ml   Net            -1125 ml       Current Facility-Administered Medications:   •  aspirin EC tablet 81 mg, 81 mg, Oral, Daily, Liliana Echeverria MD, 81 mg at 18 0822  •  [START ON 3/7/2018] azithromycin (ZITHROMAX) tablet 500 mg, 500 mg, Oral, Q24H, Twin Salter MD  •  benzonatate (TESSALON) capsule 200 mg, 200 mg, Oral, TID PRN, Liliana Echeverria MD, 200 mg at 18 2211  •  cefTRIAXone (ROCEPHIN) 1 g/10mL IV PUSH syringe, 1 g, Intravenous, Q24H, 1 g at 18 0407 **AND** [DISCONTINUED] AZITHROMYCIN 500 MG/250 ML 0.9% NS IVPB (vial-mate), 500 mg, Intravenous, Q24H, Liliana Echeverria MD, 500 mg at 18 0530  •  enoxaparin (LOVENOX) syringe 40 mg, 40 mg, Subcutaneous, Q24H, Twin Salter MD, 40 mg at 18 0826  •  famotidine (PEPCID) tablet 40 mg, 40 mg, Oral, Daily, Liliana Echeverria MD, 40 mg at 18  •  furosemide (LASIX) injection 40 mg, 40 mg, Intravenous, Daily, Liliana Echeverria MD, 40 mg at 18  •  lisinopril (PRINIVIL,ZESTRIL) tablet 2.5 mg, 2.5 mg, Oral, Q24H, Herbie Alcocer MD, 2.5 mg at 18  •  LORazepam (ATIVAN) 2 MG/ML concentrated solution 1 mg, 1 " mg, Oral, Q4H PRN, Priscilla Winter DO, 1 mg at 03/05/18 2152  •  ondansetron (ZOFRAN) injection 4 mg, 4 mg, Intravenous, Q6H PRN, Liliana Echeverria MD  •  sodium chloride 0.9 % flush 1-10 mL, 1-10 mL, Intravenous, PRN, Liliana Echeverria MD  •  vancomycin 1500 mg/500 mL 0.9% NS IVPB ([facility]), 1,500 mg, Intravenous, Q12H, Priscilla Winter DO, 1,500 mg at 03/05/18 2041    Physical Exam:   Gen: Alert and oriented x3, no acute distress  Heart: Tachycardia, grade 2 systolic murmur at apex  Chest: Lungs clear to auscultation bilaterally, normal respiratory effort  Abd: Soft, non tender, bowel sounds are positive  Ext: No clubbing, cyanosis, or edema      Lab Results (last 24 hours)     Procedure Component Value Units Date/Time    CK [[id number]]  (Normal) Collected:  03/05/18 0828    Specimen:  Blood Updated:  03/05/18 0906     Creatine Kinase 150 U/L     Blood Culture With VAUGHN - Blood, [[id number]]  (Normal) Collected:  03/04/18 0749    Specimen:  Blood from Arm, Left Updated:  03/05/18 1146     Blood Culture No growth at 24 hours    Blood Culture With VAUGHN - Blood, [[id number]]  (Normal) Collected:  03/04/18 0834    Specimen:  Blood from Arm, Right Updated:  03/05/18 1146     Blood Culture No growth at 24 hours        Imaging Results (last 24 hours)     ** No results found for the last 24 hours. **        Previous cardiac testing:  10/31/16 ECHO: EF 20%, severe MR, severe TR, mitral leaflet thickening, dilated LV  11/7/16 Heart Cath: normal coronaries, estimated EF 10%  2/7/17 ECHO: EF 20-25%, severe MR, severe TR  3/4/2018 ECHO: EF 21-25%, moderate to severe LV dilation, severe MR, moderate TR, RVSP greater than 55 mmHg    Assessment:   1.  Acute on chronic systolic congestive heart failure: Improving volume status  2.  Nonischemic cardiomyopathy  3.  Methamphetamine abuse  4.  Noncompliance  5.  Abdominal pain  6.  Mitral regurgitation: Severe      Plan:   - Continue IV diuresis  - Monitor BMP closely  - Start  metoprolol succinate   - Continue lisinopril  - Continue to optimize medical therapy   -  on avoiding illicit drugs

## 2018-03-06 NOTE — PLAN OF CARE
Problem: Patient Care Overview (Adult)  Goal: Plan of Care Review  Outcome: Outcome(s) achieved Date Met: 03/06/18    Goal: Adult Individualization and Mutuality  Outcome: Ongoing (interventions implemented as appropriate)    Goal: Discharge Needs Assessment  Outcome: Ongoing (interventions implemented as appropriate)      Problem: Cardiac: Heart Failure (Adult)  Goal: Signs and Symptoms of Listed Potential Problems Will be Absent or Manageable (Cardiac: Heart Failure)  Outcome: Ongoing (interventions implemented as appropriate)      Problem: Infection, Risk/Actual (Adult)  Goal: Identify Related Risk Factors and Signs and Symptoms  Outcome: Ongoing (interventions implemented as appropriate)    Goal: Infection Prevention/Resolution  Outcome: Ongoing (interventions implemented as appropriate)    Goal: Identify Related Risk Factors and Signs and Symptoms  Outcome: Ongoing (interventions implemented as appropriate)    Goal: Infection Prevention/Resolution  Outcome: Ongoing (interventions implemented as appropriate)
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
## 2018-03-06 NOTE — PROGRESS NOTES
[facility] Medicine Services  INPATIENT PROGRESS NOTE    Length of Stay: 2  Date of Admission: 3/3/2018  Primary Care Physician: No Known Provider    Subjective   Chief Complaint:  Follow   HPI   afebrile  Calm/no agitation   + cough - white phlegm  Noted that vanc is BID dose per pharm and 500 ml volume each bag  No nausea, vomiting  He is more able to lay less reclined    Review of Systems     All pertinent negatives and positives are as above. All other systems have been reviewed and are negative unless otherwise stated.     Objective    Temp:  [98 °F (36.7 °C)-98.5 °F (36.9 °C)] 98.4 °F (36.9 °C)  Heart Rate:  [] 118  Resp:  [20-25] 20  BP: ()/() 124/100  Physical Exam   /82; hr 104  Awake alert oriented, ×3 grossly nonfocal exam  Calm, appropriate affect, coherent  Laying on his side  No apparent distress, accessory muscle use;   he is well-built  Normocephalic and atraumatic head  Anicteric sclera, pink conjunstivae  No JVD, no thyromegaly  Chest is clear to auscultation with no wheezes or crackles  Tachycardic, S1-S2, loud murmur at the apex  Soft abdomen, normal bowel sounds, no obvious tenderness, no obvious hepatosplenomegaly  Cyanosis, clubbing or edema  Warm dry skin  No obvious skin popping, multiple tattoos in different parts of the body         Results Review:  I have reviewed the labs, radiology results, and diagnostic studies.    Laboratory Data:     Results from last 7 days  Lab Units 03/05/18 0143 03/04/18 1400 03/04/18  0749   WBC 10*3/mm3 8.50 10.40 10.77   HEMOGLOBIN g/dL 11.6* 11.7* 11.8*   HEMATOCRIT % 34.7* 34.3* 34.7*   PLATELETS 10*3/mm3 255 238 231          Results from last 7 days  Lab Units 03/05/18 0143 03/04/18  1400 03/04/18  0749 03/03/18  2549   SODIUM mmol/L 137 138 136 141   POTASSIUM mmol/L 3.6 4.1 4.5 4.4   CHLORIDE mmol/L 103 103 105 105   CO2 mmol/L 24.0 26.0 21.0* 23.0*   BUN mg/dL 17 17 19 20   CREATININE mg/dL  0.91 0.99 1.01 1.05   CALCIUM mg/dL 7.9* 7.9* 8.1* 8.5   BILIRUBIN mg/dL 0.2  --  0.6 0.3   ALK PHOS U/L 94  --  103 96   ALT (SGPT) U/L 397*  --  385* 126*   AST (SGOT) U/L 339*  --  411* 105*   GLUCOSE mg/dL 134* 86 96 128*       Culture Data:   Blood Culture   Date Value Ref Range Status   03/04/2018 No growth at 24 hours  Preliminary   03/04/2018 No growth at 24 hours  Preliminary       Radiology Data:   Imaging Results (last 24 hours)     ** No results found for the last 24 hours. **        Total Cholesterol 148 mg/dL           Triglycerides 104 mg/dL         HDL Cholesterol 22 (L) mg/dL         LDL Cholesterol  109 (H) mg/dL         LDL/HDL Ratio 4.78        I have reviewed the patient current medications.     Assessment/Plan     Hospital Problem List     CHF exacerbation    Acute on chronic congestive heart failure    Transaminitis    Nonischemic cardiomyopathy    Methamphetamine abuse    Noncompliance    Abdominal pain        Assessment  Acute congestive heart failure (systolic) on chronic systolic heart failure/nonischemic cardiomyopathy  - consulted and discussed with  cardiology  IV Amphetamine abuse possibly with withdrawal - supportive care; benzodiazepine when necessary   Tachycardia, fever possibly due to amphetamine; no evidence of pulmonary embolism - currently on empiric antibiotics; no mentioned valvular abnormalities from TTE -- will defer to cardiology if any need for EVA given sirs  Poor medical compliance  Transaminitis - may be from hepatic congestion; will monitor and initiate statin when level is acceptable   HLD    Per discussion with Dr. Alcocer, will consider EVA if any fever, bacteremia.  Currently goal is to optimize treatment  Will have sw screen him further on issues that limited his follow up with cardiologist and intake of medications.  He denies any limitation from my conversation with him.  Cont diuretic  D/c vanc  Ok switch to oral zithromax; on his cxr on march 4, there were no rll  opacities.  What may have been found incidentally on chest ct could be heart failure changes which fits more on his clinical condition.   We may be able to streamline and or just give a short course of abx and monitor clinically.         Discharge Plannin-4 days    Twin Salter MD   18   8:48 AM

## 2018-03-07 LAB
ALBUMIN SERPL-MCNC: 3.3 G/DL (ref 3.5–5)
ALBUMIN/GLOB SERPL: 1.1 G/DL (ref 1.1–2.5)
ALP SERPL-CCNC: 99 U/L (ref 24–120)
ALT SERPL W P-5'-P-CCNC: 427 U/L (ref 0–54)
ANION GAP SERPL CALCULATED.3IONS-SCNC: 11 MMOL/L (ref 4–13)
AST SERPL-CCNC: 207 U/L (ref 7–45)
BILIRUB SERPL-MCNC: 0.1 MG/DL (ref 0.1–1)
BUN BLD-MCNC: 11 MG/DL (ref 5–21)
BUN/CREAT SERPL: 13.4 (ref 7–25)
CALCIUM SPEC-SCNC: 8.6 MG/DL (ref 8.4–10.4)
CHLORIDE SERPL-SCNC: 102 MMOL/L (ref 98–110)
CO2 SERPL-SCNC: 27 MMOL/L (ref 24–31)
CREAT BLD-MCNC: 0.82 MG/DL (ref 0.5–1.4)
GFR SERPL CREATININE-BSD FRML MDRD: 109 ML/MIN/1.73
GLOBULIN UR ELPH-MCNC: 3 GM/DL
GLUCOSE BLD-MCNC: 111 MG/DL (ref 70–100)
POTASSIUM BLD-SCNC: 3.8 MMOL/L (ref 3.5–5.3)
PROT SERPL-MCNC: 6.3 G/DL (ref 6.3–8.7)
SODIUM BLD-SCNC: 140 MMOL/L (ref 135–145)

## 2018-03-07 PROCEDURE — 99232 SBSQ HOSP IP/OBS MODERATE 35: CPT | Performed by: INTERNAL MEDICINE

## 2018-03-07 PROCEDURE — 25010000002 ENOXAPARIN PER 10 MG: Performed by: INTERNAL MEDICINE

## 2018-03-07 PROCEDURE — 25010000002 FUROSEMIDE PER 20 MG: Performed by: FAMILY MEDICINE

## 2018-03-07 PROCEDURE — 80053 COMPREHEN METABOLIC PANEL: CPT | Performed by: INTERNAL MEDICINE

## 2018-03-07 PROCEDURE — 94799 UNLISTED PULMONARY SVC/PX: CPT

## 2018-03-07 PROCEDURE — 25010000002 CEFTRIAXONE PER 250 MG: Performed by: FAMILY MEDICINE

## 2018-03-07 RX ORDER — SPIRONOLACTONE 25 MG/1
25 TABLET ORAL DAILY
Status: DISCONTINUED | OUTPATIENT
Start: 2018-03-07 | End: 2018-03-09 | Stop reason: HOSPADM

## 2018-03-07 RX ORDER — CEFDINIR 300 MG/1
300 CAPSULE ORAL EVERY 12 HOURS SCHEDULED
Status: DISCONTINUED | OUTPATIENT
Start: 2018-03-07 | End: 2018-03-09 | Stop reason: HOSPADM

## 2018-03-07 RX ORDER — FUROSEMIDE 40 MG/1
40 TABLET ORAL DAILY
Status: DISCONTINUED | OUTPATIENT
Start: 2018-03-08 | End: 2018-03-09 | Stop reason: HOSPADM

## 2018-03-07 RX ORDER — METOPROLOL SUCCINATE 50 MG/1
50 TABLET, EXTENDED RELEASE ORAL
Status: DISCONTINUED | OUTPATIENT
Start: 2018-03-07 | End: 2018-03-08

## 2018-03-07 RX ADMIN — AZITHROMYCIN 500 MG: 250 TABLET, FILM COATED ORAL at 08:51

## 2018-03-07 RX ADMIN — FUROSEMIDE 40 MG: 10 INJECTION, SOLUTION INTRAMUSCULAR; INTRAVENOUS at 08:51

## 2018-03-07 RX ADMIN — ENOXAPARIN SODIUM 40 MG: 40 INJECTION SUBCUTANEOUS at 08:52

## 2018-03-07 RX ADMIN — CEFDINIR 300 MG: 300 CAPSULE ORAL at 11:15

## 2018-03-07 RX ADMIN — ASPIRIN 81 MG: 81 TABLET ORAL at 08:52

## 2018-03-07 RX ADMIN — FAMOTIDINE 40 MG: 20 TABLET, FILM COATED ORAL at 08:51

## 2018-03-07 RX ADMIN — LORAZEPAM 1 MG: 2 SOLUTION, CONCENTRATE ORAL at 23:50

## 2018-03-07 RX ADMIN — BENZONATATE 200 MG: 100 CAPSULE ORAL at 23:49

## 2018-03-07 RX ADMIN — LISINOPRIL 2.5 MG: 2.5 TABLET ORAL at 08:52

## 2018-03-07 RX ADMIN — LORAZEPAM 1 MG: 2 SOLUTION, CONCENTRATE ORAL at 04:03

## 2018-03-07 RX ADMIN — METOPROLOL SUCCINATE 50 MG: 50 TABLET, FILM COATED, EXTENDED RELEASE ORAL at 08:52

## 2018-03-07 RX ADMIN — CEFDINIR 300 MG: 300 CAPSULE ORAL at 20:19

## 2018-03-07 RX ADMIN — CEFTRIAXONE SODIUM 1 G: 1 INJECTION, POWDER, FOR SOLUTION INTRAMUSCULAR; INTRAVENOUS at 03:35

## 2018-03-07 RX ADMIN — SPIRONOLACTONE 25 MG: 25 TABLET ORAL at 11:15

## 2018-03-07 NOTE — PROGRESS NOTES
Received request from  to speak with patient about Transition Visit from [facility]. Noted patient will receive chemical dependence information and initiation of Code Gray on 3-6-18. [facility] will not make Transition Visit due to increased safety concerns in the home. Discussed with DEVENDRA Gaffney CM. Jackelyn Connelly RN, [facility].
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
## 2018-03-07 NOTE — PLAN OF CARE
Problem: Patient Care Overview (Adult)  Goal: Plan of Care Review  Outcome: Ongoing (interventions implemented as appropriate)   03/06/18 5350   Coping/Psychosocial Response Interventions   Plan Of Care Reviewed With patient   Patient Care Overview   Progress improving     Goal: Adult Individualization and Mutuality  Outcome: Ongoing (interventions implemented as appropriate)    Goal: Discharge Needs Assessment  Outcome: Ongoing (interventions implemented as appropriate)      Problem: Cardiac: Heart Failure (Adult)  Goal: Signs and Symptoms of Listed Potential Problems Will be Absent or Manageable (Cardiac: Heart Failure)  Outcome: Ongoing (interventions implemented as appropriate)      Problem: Infection, Risk/Actual (Adult)  Goal: Infection Prevention/Resolution  Outcome: Ongoing (interventions implemented as appropriate)

## 2018-03-07 NOTE — PLAN OF CARE
Problem: Patient Care Overview (Adult)  Goal: Plan of Care Review  Outcome: Ongoing (interventions implemented as appropriate)   03/07/18 1641   Coping/Psychosocial Response Interventions   Plan Of Care Reviewed With patient   Patient Care Overview   Progress no change   Outcome Evaluation   Outcome Summary/Follow up Plan Pt VSS. No c/o pain. No s/s of withdrawal. Added aldactone and omnicef, lasix changed to PO. 1500ml/24hr fld restriction. S/ST  and had 7 bts of vtach once. Encourage to use urinal to get the exact I&O. Cont to monitor pt.       Goal: Adult Individualization and Mutuality  Outcome: Ongoing (interventions implemented as appropriate)      Problem: Cardiac: Heart Failure (Adult)  Goal: Signs and Symptoms of Listed Potential Problems Will be Absent or Manageable (Cardiac: Heart Failure)  Outcome: Ongoing (interventions implemented as appropriate)      Problem: Infection, Risk/Actual (Adult)  Goal: Identify Related Risk Factors and Signs and Symptoms  Outcome: Outcome(s) achieved Date Met: 03/07/18    Goal: Infection Prevention/Resolution  Outcome: Ongoing (interventions implemented as appropriate)    Goal: Identify Related Risk Factors and Signs and Symptoms  Outcome: Outcome(s) achieved Date Met: 03/07/18    Goal: Infection Prevention/Resolution  Outcome: Ongoing (interventions implemented as appropriate)

## 2018-03-07 NOTE — PROGRESS NOTES
1           [facility] Medicine Services  INPATIENT PROGRESS NOTE    Length of Stay: 3  Date of Admission: 3/3/2018  Primary Care Physician: No Known Provider    Subjective   Chief Complaint:  Follow-up  HPI   Breathing better  Has been calm; has required 1 dose of Ativan due to anxiety last night  denies any shortness of breath, difficulty breathing, chest pain, palpitation    No nausea, vomiting, abdominal pain    Review of Systems     All pertinent negatives and positives are as above. All other systems have been reviewed and are negative unless otherwise stated.     Objective    Temp:  [97 °F (36.1 °C)-98.4 °F (36.9 °C)] 98.4 °F (36.9 °C)  Heart Rate:  [] 118  Resp:  [20-22] 20  BP: (110-132)/(54-91) 128/85  Physical Exam  Resting, laying on his right side, readily arousable;Calm, appropriate affect, coherent  No apparent distress, accessory muscle use;   he is well-built  Normocephalic and atraumatic head  Anicteric sclera, pink conjunstivae  No JVD, no thyromegaly  Chest is clear to auscultation with no wheezes or crackles  Tachycardic, S1-S2, loud murmur at the apex.  No murmur is likewise heard at his back  Soft abdomen, normal bowel sounds, no obvious tenderness, no obvious hepatosplenomegaly  Cyanosis, clubbing or edema  Warm dry skin  No obvious skin popping, multiple tattoos in different parts of the body         Results Review:  I have reviewed the labs, radiology results, and diagnostic studies.    Laboratory Data:     Results from last 7 days  Lab Units 03/05/18  0143 03/04/18  1400 03/04/18  0749   WBC 10*3/mm3 8.50 10.40 10.77   HEMOGLOBIN g/dL 11.6* 11.7* 11.8*   HEMATOCRIT % 34.7* 34.3* 34.7*   PLATELETS 10*3/mm3 255 238 231          Results from last 7 days  Lab Units 03/07/18  0415 03/05/18  0143 03/04/18  1400 03/04/18  0749   SODIUM mmol/L 140 137 138 136   POTASSIUM mmol/L 3.8 3.6 4.1 4.5   CHLORIDE mmol/L 102 103 103 105   CO2 mmol/L 27.0 24.0 26.0  21.0*   BUN mg/dL 11 17 17 19   CREATININE mg/dL 0.82 0.91 0.99 1.01   CALCIUM mg/dL 8.6 7.9* 7.9* 8.1*   BILIRUBIN mg/dL 0.1 0.2  --  0.6   ALK PHOS U/L 99 94  --  103   ALT (SGPT) U/L 427* 397*  --  385*   AST (SGOT) U/L 207* 339*  --  411*   GLUCOSE mg/dL 111* 134* 86 96       Culture Data:   Blood Culture   Date Value Ref Range Status   03/04/2018 No growth at 2 days  Preliminary   03/04/2018 No growth at 2 days  Preliminary       Radiology Data:   Imaging Results (last 24 hours)     ** No results found for the last 24 hours. **          I have reviewed the patient current medications.     Assessment/Plan     Hospital Problem List     CHF exacerbation    Acute on chronic congestive heart failure    Transaminitis    Nonischemic cardiomyopathy    Methamphetamine abuse    Noncompliance    Abdominal pain        Assessment  Acute on chronic systolic heart failure/nonischemic cardiomyopathy  - consulted and discussed with  cardiology  IV Amphetamine abuse possibly with withdrawal - supportive care; benzodiazepine when necessary   Tachycardia, fever possibly due to amphetamine; no evidence of pulmonary embolism - currently on empiric antibiotics; no mentioned valvular abnormalities from TTE --   Poor medical compliance  Transaminitis - may be from hepatic congestion; will monitor and initiate statin when level is acceptable   HLD       To date there is no growth in his cultures.  He has been afebrile.  White count is normal  I have discontinued vancomycin yesterday; anticipate stopping Zithromax after tomorrow's dose (Day5); change to  Omnicef  for 3 more days  (total of 7 days)   Optimize heart failure treatment (add spironolactone on regimen of lasix, beta blocker and ACEI); limit fluid intake  Daily weight  Home in 1-2 days        Twin Salter MD   03/07/18   9:27 AM

## 2018-03-07 NOTE — PLAN OF CARE
Problem: Patient Care Overview (Adult)  Goal: Plan of Care Review  Outcome: Ongoing (interventions implemented as appropriate)   03/07/18 0429   Coping/Psychosocial Response Interventions   Plan Of Care Reviewed With patient   Patient Care Overview   Progress progress toward functional goals as expected   Outcome Evaluation   Outcome Summary/Follow up Plan S/ST . Transfer from the unit yesterday. Ativan given one time this shift for anxiety. Will continue to monitor.        Problem: Cardiac: Heart Failure (Adult)  Goal: Signs and Symptoms of Listed Potential Problems Will be Absent or Manageable (Cardiac: Heart Failure)  Outcome: Ongoing (interventions implemented as appropriate)   03/06/18 1817   Cardiac: Heart Failure   Problems Assessed (Heart Failure) all   Problems Present (Heart Failure) cardiac pump dysfunction       Problem: Infection, Risk/Actual (Adult)  Goal: Identify Related Risk Factors and Signs and Symptoms  Outcome: Ongoing (interventions implemented as appropriate)   03/07/18 0429   Infection, Risk/Actual   Infection, Risk/Actual: Related Risk Factors substance abuse;sleep disturbance   Signs and Symptoms (Infection, Risk/Actual) respiratory rate increase     Goal: Infection Prevention/Resolution  Outcome: Ongoing (interventions implemented as appropriate)   03/07/18 0429   Infection, Risk/Actual (Adult)   Infection Prevention/Resolution making progress toward outcome     Goal: Identify Related Risk Factors and Signs and Symptoms  Outcome: Ongoing (interventions implemented as appropriate)   03/07/18 0429   Infection, Risk/Actual   Infection, Risk/Actual: Related Risk Factors substance abuse;sleep disturbance   Signs and Symptoms (Infection, Risk/Actual) respiratory rate increase     Goal: Infection Prevention/Resolution  Outcome: Ongoing (interventions implemented as appropriate)   03/06/18 1817   Infection, Risk/Actual (Adult)   Infection Prevention/Resolution making progress toward outcome

## 2018-03-08 LAB
ANION GAP SERPL CALCULATED.3IONS-SCNC: 10 MMOL/L (ref 4–13)
BUN BLD-MCNC: 20 MG/DL (ref 5–21)
BUN/CREAT SERPL: 21.5 (ref 7–25)
CALCIUM SPEC-SCNC: 9.3 MG/DL (ref 8.4–10.4)
CHLORIDE SERPL-SCNC: 100 MMOL/L (ref 98–110)
CO2 SERPL-SCNC: 28 MMOL/L (ref 24–31)
CREAT BLD-MCNC: 0.93 MG/DL (ref 0.5–1.4)
GFR SERPL CREATININE-BSD FRML MDRD: 94 ML/MIN/1.73
GLUCOSE BLD-MCNC: 104 MG/DL (ref 70–100)
POTASSIUM BLD-SCNC: 4 MMOL/L (ref 3.5–5.3)
SODIUM BLD-SCNC: 138 MMOL/L (ref 135–145)

## 2018-03-08 PROCEDURE — 80048 BASIC METABOLIC PNL TOTAL CA: CPT | Performed by: INTERNAL MEDICINE

## 2018-03-08 PROCEDURE — 99232 SBSQ HOSP IP/OBS MODERATE 35: CPT | Performed by: INTERNAL MEDICINE

## 2018-03-08 PROCEDURE — 25010000002 ENOXAPARIN PER 10 MG: Performed by: INTERNAL MEDICINE

## 2018-03-08 RX ORDER — METOPROLOL SUCCINATE 100 MG/1
100 TABLET, EXTENDED RELEASE ORAL
Status: DISCONTINUED | OUTPATIENT
Start: 2018-03-08 | End: 2018-03-09 | Stop reason: HOSPADM

## 2018-03-08 RX ADMIN — METOPROLOL SUCCINATE 100 MG: 100 TABLET, FILM COATED, EXTENDED RELEASE ORAL at 09:16

## 2018-03-08 RX ADMIN — FAMOTIDINE 40 MG: 20 TABLET, FILM COATED ORAL at 09:15

## 2018-03-08 RX ADMIN — SPIRONOLACTONE 25 MG: 25 TABLET ORAL at 09:14

## 2018-03-08 RX ADMIN — BENZONATATE 200 MG: 100 CAPSULE ORAL at 21:54

## 2018-03-08 RX ADMIN — LORAZEPAM 1 MG: 2 SOLUTION, CONCENTRATE ORAL at 21:54

## 2018-03-08 RX ADMIN — ASPIRIN 81 MG: 81 TABLET ORAL at 09:15

## 2018-03-08 RX ADMIN — LISINOPRIL 2.5 MG: 2.5 TABLET ORAL at 09:15

## 2018-03-08 RX ADMIN — CEFDINIR 300 MG: 300 CAPSULE ORAL at 20:00

## 2018-03-08 RX ADMIN — AZITHROMYCIN 500 MG: 250 TABLET, FILM COATED ORAL at 09:15

## 2018-03-08 RX ADMIN — ENOXAPARIN SODIUM 40 MG: 40 INJECTION SUBCUTANEOUS at 09:15

## 2018-03-08 RX ADMIN — CEFDINIR 300 MG: 300 CAPSULE ORAL at 09:14

## 2018-03-08 RX ADMIN — FUROSEMIDE 40 MG: 40 TABLET ORAL at 09:15

## 2018-03-08 NOTE — PROGRESS NOTES
"    RE:  Arjun Oropeza  :  1985         Subjective: Patient woke up this morning having some difficulty breathing.  He feels that it is better now.  Denies any palpitations.  Has noticed some occasional pressure in his chest.    ROS: Pertinent positives and negatives listed above.  All other systems reviewed and negative.    Objective:   /78 (BP Location: Left arm, Patient Position: Lying)  Pulse 110  Temp 96.9 °F (36.1 °C) (Temporal Artery )   Resp 20  Ht 170.2 cm (67\")  Wt 89.1 kg (196 lb 6 oz)  SpO2 97%  BMI 30.76 kg/m2  Temp:  [96.9 °F (36.1 °C)-98.4 °F (36.9 °C)] 96.9 °F (36.1 °C)  Heart Rate:  [] 110  Resp:  [20] 20  BP: (106-134)/(67-85) 132/78    Intake/Output Summary (Last 24 hours) at 18 0734  Last data filed at 18 1850   Gross per 24 hour   Intake              720 ml   Output                0 ml   Net              720 ml       Current Facility-Administered Medications:   •  aspirin EC tablet 81 mg, 81 mg, Oral, Daily, Liliana Echeverria MD, 81 mg at 18 0852  •  azithromycin (ZITHROMAX) tablet 500 mg, 500 mg, Oral, Q24H, Twin Salter MD, 500 mg at 18 0851  •  benzonatate (TESSALON) capsule 200 mg, 200 mg, Oral, TID PRN, Liliana Echeverria MD, 200 mg at 18 2349  •  cefdinir (OMNICEF) capsule 300 mg, 300 mg, Oral, Q12H, Twin Salter MD, 300 mg at 18 2019  •  enoxaparin (LOVENOX) syringe 40 mg, 40 mg, Subcutaneous, Q24H, Twin Salter MD, 40 mg at 18 0852  •  famotidine (PEPCID) tablet 40 mg, 40 mg, Oral, Daily, Liliana Echeverria MD, 40 mg at 18 0851  •  furosemide (LASIX) tablet 40 mg, 40 mg, Oral, Daily, Twin Salter MD  •  lisinopril (PRINIVIL,ZESTRIL) tablet 2.5 mg, 2.5 mg, Oral, Q24H, Herbie Alcocer MD, 2.5 mg at 18 0852  •  LORazepam (ATIVAN) 2 MG/ML concentrated solution 1 mg, 1 mg, Oral, Q4H PRN, Priscilla Winter DO, 1 mg at 18 2500  •  metoprolol succinate XL (TOPROL-XL) " 24 hr tablet 100 mg, 100 mg, Oral, Q24H, Herbie Alcocer MD  •  ondansetron (ZOFRAN) injection 4 mg, 4 mg, Intravenous, Q6H PRN, Liliana Echeverria MD  •  sodium chloride 0.9 % flush 1-10 mL, 1-10 mL, Intravenous, PRN, Liliana Echeverria MD  •  spironolactone (ALDACTONE) tablet 25 mg, 25 mg, Oral, Daily, Twin Salter MD, 25 mg at 03/07/18 1115    Physical Exam:   Gen: Alert and oriented x3, no acute distress  Heart: Regular rate and rhythm, grade I/VI systolic murmur  Chest: Lungs clear to auscultation bilaterally, normal respiratory effort  Abd: Soft, non tender, bowel sounds are positive  Ext: No clubbing, cyanosis, or edema      Lab Results (last 24 hours)     Procedure Component Value Units Date/Time    Blood Culture With VAUGHN - Blood, [[id number]]  (Normal) Collected:  03/04/18 0749    Specimen:  Blood from Arm, Left Updated:  03/07/18 1146     Blood Culture No growth at 3 days    Blood Culture With VAUGHN - Blood, [[id number]]  (Normal) Collected:  03/04/18 0834    Specimen:  Blood from Arm, Right Updated:  03/07/18 1146     Blood Culture No growth at 3 days    Basic Metabolic Panel [[id number]]  (Abnormal) Collected:  03/08/18 0334    Specimen:  Blood Updated:  03/08/18 0420     Glucose 104 (H) mg/dL      BUN 20 mg/dL      Creatinine 0.93 mg/dL      Sodium 138 mmol/L      Potassium 4.0 mmol/L      Chloride 100 mmol/L      CO2 28.0 mmol/L      Calcium 9.3 mg/dL      eGFR Non African Amer 94 mL/min/1.73      BUN/Creatinine Ratio 21.5     Anion Gap 10.0 mmol/L     Narrative:       GFR Normal >60  Chronic Kidney Disease <60  Kidney Failure <15        Imaging Results (last 24 hours)     ** No results found for the last 24 hours. **            Assessment:   1.  Acute on chronic systolic congestive heart failure: Improving volume status  2.  Nonischemic cardiomyopathy  3.  Methamphetamine abuse  4.  Noncompliance  5.  Abdominal pain  6.  Mitral regurgitation: Severe      Plan:   - Agree with change to oral  Lasix  - titrate up metoprolol  - continue spironolactone and lisinopril

## 2018-03-08 NOTE — PLAN OF CARE
Problem: Patient Care Overview (Adult)  Goal: Plan of Care Review  Outcome: Ongoing (interventions implemented as appropriate)   03/08/18 0354   Coping/Psychosocial Response Interventions   Plan Of Care Reviewed With patient;significant other   Patient Care Overview   Progress no change   Outcome Evaluation   Outcome Summary/Follow up Plan VSS. Denies pain this shift. Sinus tach 101-108 on tele. No s/s of withdrawal. PO abx for poss PNA. Labs pending. Will cont to monitor.       Problem: Cardiac: Heart Failure (Adult)  Goal: Signs and Symptoms of Listed Potential Problems Will be Absent or Manageable (Cardiac: Heart Failure)  Outcome: Ongoing (interventions implemented as appropriate)      Problem: Infection, Risk/Actual (Adult)  Goal: Infection Prevention/Resolution  Outcome: Ongoing (interventions implemented as appropriate)

## 2018-03-08 NOTE — PROGRESS NOTES
[facility] Medicine Services  INPATIENT PROGRESS NOTE    Length of Stay: 4  Date of Admission: 3/3/2018  Primary Care Physician: No Known Provider    Subjective   Chief Complaint:  Follow up  HPI   Spoke to telemetry staff.  HR remains > 100 in significant amount of time  BP so far tolerated addition of spironolactone.    He is afebrile.  I had switched him to oral antibiotic and anticipating completion soon  Culture has not shown any growth to date  States  that he has amended his relationship with girlfriend who is now in the room     Review of Systems     All pertinent negatives and positives are as above. All other systems have been reviewed and are negative unless otherwise stated.     Objective    Temp:  [96.6 °F (35.9 °C)-97.8 °F (36.6 °C)] 96.6 °F (35.9 °C)  Heart Rate:  [] 98  Resp:  [20] 20  BP: (103-134)/(67-85) 103/73  Physical Exam   He laying all the time when I see him   Calm, appropriate affect, coherent  No apparent distress, accessory muscle use;   he is well-built  Normocephalic and atraumatic head  Anicteric sclera, pink conjunstivae  No JVD, no thyromegaly  Chest is clear to auscultation with no wheezes or crackles  Tachycardic, S1-S2, loud murmur at the apex.  murmur is likewise heard at his back  Soft abdomen, normal bowel sounds, no obvious tenderness, no obvious hepatosplenomegaly  Cyanosis, clubbing or edema  Warm dry skin  No obvious skin popping, multiple tattoos in different parts of the body               Results Review:  I have reviewed the labs, radiology results, and diagnostic studies.    Laboratory Data:     Results from last 7 days  Lab Units 03/05/18  0143 03/04/18  1400 03/04/18  0749   WBC 10*3/mm3 8.50 10.40 10.77   HEMOGLOBIN g/dL 11.6* 11.7* 11.8*   HEMATOCRIT % 34.7* 34.3* 34.7*   PLATELETS 10*3/mm3 255 238 231          Results from last 7 days  Lab Units 03/08/18  0334 03/07/18  0415 03/05/18  0143  03/04/18  0749   SODIUM mmol/L  138 140 137  < > 136   POTASSIUM mmol/L 4.0 3.8 3.6  < > 4.5   CHLORIDE mmol/L 100 102 103  < > 105   CO2 mmol/L 28.0 27.0 24.0  < > 21.0*   BUN mg/dL 20 11 17  < > 19   CREATININE mg/dL 0.93 0.82 0.91  < > 1.01   CALCIUM mg/dL 9.3 8.6 7.9*  < > 8.1*   BILIRUBIN mg/dL  --  0.1 0.2  --  0.6   ALK PHOS U/L  --  99 94  --  103   ALT (SGPT) U/L  --  427* 397*  --  385*   AST (SGOT) U/L  --  207* 339*  --  411*   GLUCOSE mg/dL 104* 111* 134*  < > 96   < > = values in this interval not displayed.    Culture Data:   Blood Culture   Date Value Ref Range Status   03/04/2018 No growth at 3 days  Preliminary   03/04/2018 No growth at 3 days  Preliminary       Radiology Data:   Imaging Results (last 24 hours)     ** No results found for the last 24 hours. **          I have reviewed the patient current medications.     Assessment/Plan     Hospital Problem List         Acute on chronic congestive heart failure    Transaminitis    Nonischemic cardiomyopathy    Methamphetamine abuse    Noncompliance    Abdominal pain   Tachycardia, persistent         Assessment  Acute on chronic systolic heart failure/nonischemic cardiomyopathy  - consulted and discussed with  cardiology  IV Amphetamine abuse possibly with withdrawal - supportive care; benzodiazepine when necessary   Tachycardia, fever possibly due to amphetamine; no evidence of pulmonary embolism - currently on empiric antibiotics; no mentioned valvular abnormalities from TTE --   Poor medical compliance  Transaminitis - may be from hepatic congestion; will monitor and initiate statin when level is acceptable   HLD    Clinically doing better  Will observe another night since we have been adjusting or adding med to optimize treatment for HF.  Also, I have talked to sw re: hooking him with a primary care provider.  He will have to follow with cardiologist as well.  Ambulate in [place]  Increase activities  Anticipate completion of abx soon.   Discussed with nurse          Discharge  Plannin day  Twin Salter MD   18   11:17 AM

## 2018-03-08 NOTE — PLAN OF CARE
Problem: Patient Care Overview (Adult)  Goal: Plan of Care Review  Outcome: Ongoing (interventions implemented as appropriate)   03/08/18 1116   Coping/Psychosocial Response Interventions   Plan Of Care Reviewed With patient   Patient Care Overview   Progress no change   Outcome Evaluation   Outcome Summary/Follow up Plan Cont to have good po intake, 100% of the last 4 meals. Pt was sleeping upon RD visit. Has received cardiac diet education with literature to take home. Will cont to follow for nutrition needs.

## 2018-03-08 NOTE — PLAN OF CARE
Problem: Patient Care Overview (Adult)  Goal: Plan of Care Review  Outcome: Ongoing (interventions implemented as appropriate)   03/08/18 8939   Coping/Psychosocial Response Interventions   Plan Of Care Reviewed With patient   Patient Care Overview   Progress progress toward functional goals as expected   Outcome Evaluation   Outcome Summary/Follow up Plan VSS, HR continues to improve, no c/o pain or soa this shift, pt up ad lupe, continue to monitor HR, will be DC'd to home tomorrow.       Problem: Cardiac: Heart Failure (Adult)  Goal: Signs and Symptoms of Listed Potential Problems Will be Absent or Manageable (Cardiac: Heart Failure)  Outcome: Ongoing (interventions implemented as appropriate)   03/08/18 8709   Cardiac: Heart Failure   Problems Assessed (Heart Failure) all   Problems Present (Heart Failure) cardiac pump dysfunction;situational response       Problem: Infection, Risk/Actual (Adult)  Goal: Infection Prevention/Resolution  Outcome: Ongoing (interventions implemented as appropriate)    Goal: Infection Prevention/Resolution  Outcome: Ongoing (interventions implemented as appropriate)   03/08/18 1608   Infection, Risk/Actual (Adult)   Infection Prevention/Resolution making progress toward outcome

## 2018-03-09 VITALS
WEIGHT: 197.63 LBS | RESPIRATION RATE: 22 BRPM | SYSTOLIC BLOOD PRESSURE: 111 MMHG | HEART RATE: 97 BPM | OXYGEN SATURATION: 97 % | HEIGHT: 67 IN | DIASTOLIC BLOOD PRESSURE: 84 MMHG | BODY MASS INDEX: 31.02 KG/M2 | TEMPERATURE: 97.5 F

## 2018-03-09 LAB
ALBUMIN SERPL-MCNC: 3.6 G/DL (ref 3.5–5)
ALBUMIN/GLOB SERPL: 1.1 G/DL (ref 1.1–2.5)
ALP SERPL-CCNC: 111 U/L (ref 24–120)
ALT SERPL W P-5'-P-CCNC: 350 U/L (ref 0–54)
ANION GAP SERPL CALCULATED.3IONS-SCNC: 14 MMOL/L (ref 4–13)
AST SERPL-CCNC: 100 U/L (ref 7–45)
BACTERIA SPEC AEROBE CULT: NORMAL
BACTERIA SPEC AEROBE CULT: NORMAL
BILIRUB SERPL-MCNC: 0.3 MG/DL (ref 0.1–1)
BUN BLD-MCNC: 23 MG/DL (ref 5–21)
BUN/CREAT SERPL: 25.3 (ref 7–25)
CALCIUM SPEC-SCNC: 9.4 MG/DL (ref 8.4–10.4)
CHLORIDE SERPL-SCNC: 100 MMOL/L (ref 98–110)
CO2 SERPL-SCNC: 26 MMOL/L (ref 24–31)
CREAT BLD-MCNC: 0.91 MG/DL (ref 0.5–1.4)
GFR SERPL CREATININE-BSD FRML MDRD: 97 ML/MIN/1.73
GLOBULIN UR ELPH-MCNC: 3.3 GM/DL
GLUCOSE BLD-MCNC: 114 MG/DL (ref 70–100)
POTASSIUM BLD-SCNC: 4.5 MMOL/L (ref 3.5–5.3)
PROT SERPL-MCNC: 6.9 G/DL (ref 6.3–8.7)
SODIUM BLD-SCNC: 140 MMOL/L (ref 135–145)

## 2018-03-09 PROCEDURE — 80053 COMPREHEN METABOLIC PANEL: CPT | Performed by: INTERNAL MEDICINE

## 2018-03-09 PROCEDURE — 25010000002 ENOXAPARIN PER 10 MG: Performed by: INTERNAL MEDICINE

## 2018-03-09 RX ORDER — SPIRONOLACTONE 25 MG/1
25 TABLET ORAL DAILY
Qty: 30 TABLET | Refills: 0 | Status: SHIPPED | OUTPATIENT
Start: 2018-03-10 | End: 2018-03-09

## 2018-03-09 RX ORDER — SPIRONOLACTONE 25 MG/1
25 TABLET ORAL DAILY
Qty: 30 TABLET | Refills: 0 | Status: ON HOLD | OUTPATIENT
Start: 2018-03-10 | End: 2018-07-30

## 2018-03-09 RX ORDER — NICOTINE 21 MG/24HR
1 PATCH, TRANSDERMAL 24 HOURS TRANSDERMAL EVERY 24 HOURS
Status: DISCONTINUED | OUTPATIENT
Start: 2018-03-09 | End: 2018-03-09 | Stop reason: HOSPADM

## 2018-03-09 RX ORDER — METOPROLOL SUCCINATE 100 MG/1
100 TABLET, EXTENDED RELEASE ORAL
Qty: 30 TABLET | Refills: 0 | Status: ON HOLD | OUTPATIENT
Start: 2018-03-10 | End: 2018-07-30

## 2018-03-09 RX ORDER — LISINOPRIL 2.5 MG/1
2.5 TABLET ORAL
Qty: 30 TABLET | Refills: 0 | Status: SHIPPED | OUTPATIENT
Start: 2018-03-10 | End: 2018-03-09

## 2018-03-09 RX ORDER — METOPROLOL SUCCINATE 100 MG/1
100 TABLET, EXTENDED RELEASE ORAL
Qty: 30 TABLET | Refills: 0 | Status: SHIPPED | OUTPATIENT
Start: 2018-03-10 | End: 2018-03-09

## 2018-03-09 RX ORDER — CEFDINIR 300 MG/1
300 CAPSULE ORAL EVERY 12 HOURS SCHEDULED
Qty: 1 CAPSULE | Refills: 0 | Status: SHIPPED | OUTPATIENT
Start: 2018-03-09 | End: 2018-03-09

## 2018-03-09 RX ORDER — LISINOPRIL 2.5 MG/1
2.5 TABLET ORAL
Qty: 30 TABLET | Refills: 0 | Status: ON HOLD | OUTPATIENT
Start: 2018-03-10 | End: 2018-07-30

## 2018-03-09 RX ORDER — CEFDINIR 300 MG/1
300 CAPSULE ORAL EVERY 12 HOURS SCHEDULED
Qty: 1 CAPSULE | Refills: 0 | Status: SHIPPED | OUTPATIENT
Start: 2018-03-09 | End: 2018-03-10

## 2018-03-09 RX ORDER — ONDANSETRON 4 MG/1
4 TABLET, ORALLY DISINTEGRATING ORAL EVERY 6 HOURS PRN
Status: DISCONTINUED | OUTPATIENT
Start: 2018-03-09 | End: 2018-03-09 | Stop reason: HOSPADM

## 2018-03-09 RX ORDER — FUROSEMIDE 40 MG/1
40 TABLET ORAL DAILY
Qty: 30 TABLET | Refills: 0 | Status: ON HOLD | OUTPATIENT
Start: 2018-03-10 | End: 2018-07-30

## 2018-03-09 RX ORDER — FUROSEMIDE 40 MG/1
40 TABLET ORAL DAILY
Qty: 30 TABLET | Refills: 0 | Status: SHIPPED | OUTPATIENT
Start: 2018-03-10 | End: 2018-03-09

## 2018-03-09 RX ADMIN — LORAZEPAM 1 MG: 2 SOLUTION, CONCENTRATE ORAL at 07:05

## 2018-03-09 RX ADMIN — FUROSEMIDE 40 MG: 40 TABLET ORAL at 09:33

## 2018-03-09 RX ADMIN — CEFDINIR 300 MG: 300 CAPSULE ORAL at 09:33

## 2018-03-09 RX ADMIN — FAMOTIDINE 40 MG: 20 TABLET, FILM COATED ORAL at 09:33

## 2018-03-09 RX ADMIN — AZITHROMYCIN 500 MG: 250 TABLET, FILM COATED ORAL at 09:33

## 2018-03-09 RX ADMIN — SPIRONOLACTONE 25 MG: 25 TABLET ORAL at 09:34

## 2018-03-09 RX ADMIN — LISINOPRIL 2.5 MG: 2.5 TABLET ORAL at 09:33

## 2018-03-09 RX ADMIN — METOPROLOL SUCCINATE 100 MG: 100 TABLET, FILM COATED, EXTENDED RELEASE ORAL at 09:34

## 2018-03-09 RX ADMIN — ASPIRIN 81 MG: 81 TABLET ORAL at 09:33

## 2018-03-09 RX ADMIN — NICOTINE 1 PATCH: 21 PATCH, EXTENDED RELEASE TRANSDERMAL at 04:31

## 2018-03-09 RX ADMIN — ENOXAPARIN SODIUM 40 MG: 40 INJECTION SUBCUTANEOUS at 09:47

## 2018-03-09 NOTE — PLAN OF CARE
Problem: Patient Care Overview (Adult)  Goal: Plan of Care Review  Outcome: Ongoing (interventions implemented as appropriate)   03/09/18 0354   Coping/Psychosocial Response Interventions   Plan Of Care Reviewed With patient;significant other   Patient Care Overview   Progress no change   Outcome Evaluation   Outcome Summary/Follow up Plan VSS. NSR/ST . Denies pain and SOA this shift. Poss DC home today. Will cont to monitor.       Problem: Cardiac: Heart Failure (Adult)  Goal: Signs and Symptoms of Listed Potential Problems Will be Absent or Manageable (Cardiac: Heart Failure)  Outcome: Ongoing (interventions implemented as appropriate)      Problem: Infection, Risk/Actual (Adult)  Goal: Infection Prevention/Resolution  Outcome: Ongoing (interventions implemented as appropriate)

## 2018-03-09 NOTE — PROGRESS NOTES
Have called [facility] liasion Shama several times today with no answer and she has not returned the call. Pt actually has left the hospital. Spoke with Gregoria at [facility] [phone] and she is going to speak with her supervisor and work to make pt an appointment.

## 2018-03-09 NOTE — PROGRESS NOTES
Continued Stay Note   [place]     Patient Name: Arjun Oropeza  MRN: [id number]  Today's Date: 3/9/2018    Admit Date: 3/3/2018          Discharge Plan       03/09/18 1118    Case Management/Social Work Plan    Plan Home    Patient/Family In Agreement With Plan yes    Additional Comments Pt is going home today. Gave him a chemical dependency packet. Pt does not currently have insurance so checked his medication. The majority of them are on the $4 list at [facility] and the most expensive is Toprol XL which is $47.52 at [facility] according to their pharmacy [phone]. Discussed approximate cost with pt of a total of a little over $60 and he said that he can afford. He plans to go to [facility] as a PCP so have called their nurse liasion Shama Emery [phone] to make an appointment. Awaiting return call.              Discharge Codes     None        Expected Discharge Date and Time     Expected Discharge Date Expected Discharge Time    Mar 9, 2018             LAN Solorzano

## 2018-03-09 NOTE — DISCHARGE SUMMARY
[facility] Medicine Services  DISCHARGE SUMMARY       Date of Admission: 3/3/2018  Date of Discharge:  3/9/2018  Primary Care Physician: No Known Provider    Discharge Diagnoses:  Hospital Problem List     * (Principal)Acute on chronic systolic congestive heart failure    Nonischemic cardiomyopathy    Methamphetamine abuse    Noncompliance    Transaminitis/hepatic congestion     Abdominal pain - resolved            Presenting Problem/History of Present Illness:  CHF exacerbation [I50.9]  Acute on chronic congestive heart failure, unspecified congestive heart failure type [I50.9]         Hospital Course  Her old man admitted for concern of congestive heart failure, tachycardia, transaminitis, concerning for pneumonia.  Patient reported that he has been having normal discomfort but denies any nausea or vomiting or diarrhea.  He also has some breathing issues described as shortness of breath without accompanying wheezing.  He has nonproductive cough.  He has no mention of any febrile episode.     He had abdominopelvic CT scan  in evaluation of abdominal pain.  This  showed mildly heterogeneous liver with contracted thick walled gallbladder with pericholecystic edema.  This was referred as a nonspecific finding.  He has cardiomegaly and reticulonodular opacities in both lower lobes greater on the right.  Amylase and lipase were negative. He did not have any recurrence of abdominal pain.  Has elevated transaminase which I felt related to hepatic congestion.  Acute viral hepatitis panel is negative.  It is now trending down.  I recommend follow-up through primary care provider of choice.      His chest x-ray showed that his lungs are otherwise clear past these mention an abdominopelvic CT scan was not visualized on the chest x-ray.  His blood gas showed pH of 7.4, PCO2 of 30,  PO2 of 70 taken on room air.  He received empiric treatment for pneumonia.  Culture  had not shown any growth.   Influenza A and B are negative.    V/Q scan showed low probability for pulmonary embolus.     Initially a follow-up he has amphetamine withdrawal but with other information such as stated below treated him for acute on chronic systolic heart failure.    ProBNP is 11,100, troponin is 0.065 (0.032)     Echocardiogram  · Left ventricular systolic function is severely decreased. Estimated EF appears to be in the range of 21 - 25%.  · The left ventricular cavity is moderate-to-severely dilated.  · The findings are consistent with dilated cardiomyopathy.  · Severe mitral valve regurgitation is present  · Moderate tricuspid valve regurgitation is present. Estimated right ventricular systolic pressure from tricuspid regurgitation is markedly elevated (>55 mmHg).  · Cardiac valves well visualized and without any obvious valvular vegetations.  · Based on report of previous echocardiogram from 2/9/17, no obvious significant changes (previously noted severe MR and similar LVEF).      I subsequently consulted Dr. Alcocer to assist in optimizing medical treatment.  He is doing better and felt medically appropriate for discharge.   I requested  to assist in establishing care with a primary care provider, drug abuse program  and acquisition  of medications.    I  felt he is at  his best condition and medically appropriate for discharge.      Procedures Performed:  None     Consults:   Dr. Alcocer    Pertinent Test Results:   Lab Results (last 48 hours)     Procedure Component Value Units Date/Time    Basic Metabolic Panel [[id number]]  (Abnormal) Collected:  03/08/18 0334    Specimen:  Blood Updated:  03/08/18 0420     Glucose 104 (H) mg/dL      BUN 20 mg/dL      Creatinine 0.93 mg/dL      Sodium 138 mmol/L      Potassium 4.0 mmol/L      Chloride 100 mmol/L      CO2 28.0 mmol/L      Calcium 9.3 mg/dL      eGFR Non African Amer 94 mL/min/1.73      BUN/Creatinine Ratio 21.5     Anion Gap 10.0 mmol/L     Narrative:       GFR  "Normal >60  Chronic Kidney Disease <60  Kidney Failure <15    Blood Culture With VAUGHN - Blood, [[id number]]  (Normal) Collected:  03/04/18 0749    Specimen:  Blood from Arm, Left Updated:  03/08/18 1146     Blood Culture No growth at 4 days    Blood Culture With VAUGHN - Blood, [[id number]]  (Normal) Collected:  03/04/18 0834    Specimen:  Blood from Arm, Right Updated:  03/08/18 1146     Blood Culture No growth at 4 days    Comprehensive Metabolic Panel [[id number]]  (Abnormal) Collected:  03/09/18 0440    Specimen:  Blood Updated:  03/09/18 0519     Glucose 114 (H) mg/dL      BUN 23 (H) mg/dL      Creatinine 0.91 mg/dL      Sodium 140 mmol/L      Potassium 4.5 mmol/L      Chloride 100 mmol/L      CO2 26.0 mmol/L      Calcium 9.4 mg/dL      Total Protein 6.9 g/dL      Albumin 3.60 g/dL      ALT (SGPT) 350 (H) U/L      AST (SGOT) 100 (H) U/L      Alkaline Phosphatase 111 U/L      Total Bilirubin 0.3 mg/dL      eGFR Non African Amer 97 mL/min/1.73      Globulin 3.3 gm/dL      A/G Ratio 1.1 g/dL      BUN/Creatinine Ratio 25.3 (H)     Anion Gap 14.0 (H) mmol/L             Condition on Discharge:   Stable  Physical Exam on Discharge:  /84 (BP Location: Left arm, Patient Position: Lying)  Pulse 97  Temp 97.5 °F (36.4 °C) (Temporal Artery )   Resp 22  Ht 170.2 cm (67\")  Wt 89.6 kg (197 lb 10 oz)  SpO2 97%  BMI 30.95 kg/m2  Physical Exam  Calm, appropriate affect, coherent  No apparent distress, accessory muscle use;   he is well-built  Normocephalic and atraumatic head  Anicteric sclera, pink conjunstivae  No JVD, no thyromegaly  Chest is clear to auscultation with no wheezes or crackles  , S1-S2, loud murmur at the apex.  murmur is likewise heard on his back  Soft abdomen, normal bowel sounds, no obvious tenderness, no obvious hepatosplenomegaly  Cyanosis, clubbing or edema  Warm dry skin   multiple tattoos in different parts of the body               Discharge Disposition:  Home or Self Care    Discharge " Medications:   Arjun Oropeza   Home Medication Instructions LORRIE:[id number]    Printed on:03/09/18 1052   Medication Information                      aspirin 81 MG EC tablet  Take 81 mg by mouth.             cefdinir (OMNICEF) 300 MG capsule  Take 1 capsule by mouth Every 12 (Twelve) Hours for 1 dose. Indications: febrile illness questionably PNA             furosemide (LASIX) 40 MG tablet  Take 1 tablet by mouth Daily.             lisinopril (PRINIVIL,ZESTRIL) 2.5 MG tablet  Take 1 tablet by mouth Daily.             metoprolol succinate XL (TOPROL-XL) 100 MG 24 hr tablet  Take 1 tablet by mouth Daily.             spironolactone (ALDACTONE) 25 MG tablet  Take 1 tablet by mouth Daily.                 Discharge Diet:   Diet Instructions     Diet: Regular, Cardiac       Discharge Diet:   Regular  Cardiac      Fluid Restriction per day:  1500 mL Fluid                     Activity at Discharge:   Activity Instructions     Activity as Tolerated                     Follow-up Appointments: PCP of choice in 1 wk; recommend CMP  Cardiology of choice  per Dr. Alcocer consult  Test Results Pending at Discharge:    Order Current Status    Blood Culture With VAUGHN - Blood, Preliminary result    Blood Culture With VAUGHN - Blood, Preliminary result           Twin Satler MD  03/09/18  10:50 AM    Time: > 30 mins  Please note that portions of this note may have been completed with a voice recognition program. Efforts were made to edit the dictations, but occasionally words are mistranscribed.

## 2018-03-13 ENCOUNTER — HOSPITAL ENCOUNTER (INPATIENT)
Age: 33
LOS: 4 days | Discharge: HOME | DRG: 445 | End: 2018-03-17
Attending: EMERGENCY MEDICINE | Admitting: FAMILY MEDICINE
Payer: MEDICAID

## 2018-03-13 ENCOUNTER — APPOINTMENT (OUTPATIENT)
Dept: GENERAL RADIOLOGY | Age: 33
DRG: 445 | End: 2018-03-13
Payer: MEDICAID

## 2018-03-13 ENCOUNTER — APPOINTMENT (OUTPATIENT)
Dept: CT IMAGING | Age: 33
DRG: 445 | End: 2018-03-13
Payer: MEDICAID

## 2018-03-13 ENCOUNTER — APPOINTMENT (OUTPATIENT)
Dept: ULTRASOUND IMAGING | Age: 33
DRG: 445 | End: 2018-03-13
Payer: MEDICAID

## 2018-03-13 DIAGNOSIS — K81.0 ACUTE ACALCULOUS CHOLECYSTITIS: Primary | ICD-10-CM

## 2018-03-13 DIAGNOSIS — R06.00 ACUTE DYSPNEA: ICD-10-CM

## 2018-03-13 DIAGNOSIS — R10.9 ACUTE ABDOMINAL PAIN: ICD-10-CM

## 2018-03-13 PROBLEM — I50.9 CHF WITH UNKNOWN LVEF (HCC): Status: ACTIVE | Noted: 2018-03-13

## 2018-03-13 LAB
ALBUMIN SERPL-MCNC: 3.5 G/DL (ref 3.5–5.2)
ALP BLD-CCNC: 136 U/L (ref 40–130)
ALT SERPL-CCNC: 281 U/L (ref 5–41)
ANION GAP SERPL CALCULATED.3IONS-SCNC: 15 MMOL/L (ref 7–19)
AST SERPL-CCNC: 191 U/L (ref 5–40)
BASOPHILS ABSOLUTE: 0.1 K/UL (ref 0–0.2)
BASOPHILS RELATIVE PERCENT: 0.7 % (ref 0–1)
BILIRUB SERPL-MCNC: 0.3 MG/DL (ref 0.2–1.2)
BUN BLDV-MCNC: 32 MG/DL (ref 6–20)
CALCIUM SERPL-MCNC: 8.4 MG/DL (ref 8.6–10)
CHLORIDE BLD-SCNC: 97 MMOL/L (ref 98–111)
CO2: 21 MMOL/L (ref 22–29)
CREAT SERPL-MCNC: 1 MG/DL (ref 0.5–1.2)
EOSINOPHILS ABSOLUTE: 0.2 K/UL (ref 0–0.6)
EOSINOPHILS RELATIVE PERCENT: 2.1 % (ref 0–5)
GFR NON-AFRICAN AMERICAN: >60
GLUCOSE BLD-MCNC: 161 MG/DL (ref 74–109)
HCT VFR BLD CALC: 39.9 % (ref 42–52)
HEMOGLOBIN: 13 G/DL (ref 14–18)
LIPASE: 34 U/L (ref 13–60)
LYMPHOCYTES ABSOLUTE: 2.8 K/UL (ref 1.1–4.5)
LYMPHOCYTES RELATIVE PERCENT: 26.8 % (ref 20–40)
MCH RBC QN AUTO: 30.4 PG (ref 27–31)
MCHC RBC AUTO-ENTMCNC: 32.6 G/DL (ref 33–37)
MCV RBC AUTO: 93.2 FL (ref 80–94)
MONOCYTES ABSOLUTE: 0.8 K/UL (ref 0–0.9)
MONOCYTES RELATIVE PERCENT: 7.6 % (ref 0–10)
NEUTROPHILS ABSOLUTE: 6.4 K/UL (ref 1.5–7.5)
NEUTROPHILS RELATIVE PERCENT: 62.4 % (ref 50–65)
PDW BLD-RTO: 15.5 % (ref 11.5–14.5)
PLATELET # BLD: 334 K/UL (ref 130–400)
PMV BLD AUTO: 9.4 FL (ref 9.4–12.4)
POTASSIUM SERPL-SCNC: 4.4 MMOL/L (ref 3.5–5)
PRO-BNP: 4066 PG/ML (ref 0–450)
RBC # BLD: 4.28 M/UL (ref 4.7–6.1)
SODIUM BLD-SCNC: 133 MMOL/L (ref 136–145)
TOTAL PROTEIN: 6 G/DL (ref 6.6–8.7)
TROPONIN: <0.01 NG/ML (ref 0–0.03)
WBC # BLD: 10.3 K/UL (ref 4.8–10.8)

## 2018-03-13 PROCEDURE — 80053 COMPREHEN METABOLIC PANEL: CPT

## 2018-03-13 PROCEDURE — 36415 COLL VENOUS BLD VENIPUNCTURE: CPT

## 2018-03-13 PROCEDURE — 74177 CT ABD & PELVIS W/CONTRAST: CPT

## 2018-03-13 PROCEDURE — 96375 TX/PRO/DX INJ NEW DRUG ADDON: CPT

## 2018-03-13 PROCEDURE — 99285 EMERGENCY DEPT VISIT HI MDM: CPT | Performed by: EMERGENCY MEDICINE

## 2018-03-13 PROCEDURE — 83880 ASSAY OF NATRIURETIC PEPTIDE: CPT

## 2018-03-13 PROCEDURE — 85025 COMPLETE CBC W/AUTO DIFF WBC: CPT

## 2018-03-13 PROCEDURE — 2580000003 HC RX 258: Performed by: EMERGENCY MEDICINE

## 2018-03-13 PROCEDURE — 6360000002 HC RX W HCPCS: Performed by: EMERGENCY MEDICINE

## 2018-03-13 PROCEDURE — 83690 ASSAY OF LIPASE: CPT

## 2018-03-13 PROCEDURE — 96365 THER/PROPH/DIAG IV INF INIT: CPT

## 2018-03-13 PROCEDURE — 84484 ASSAY OF TROPONIN QUANT: CPT

## 2018-03-13 PROCEDURE — 76705 ECHO EXAM OF ABDOMEN: CPT

## 2018-03-13 PROCEDURE — 6360000004 HC RX CONTRAST MEDICATION: Performed by: EMERGENCY MEDICINE

## 2018-03-13 PROCEDURE — 2140000000 HC CCU INTERMEDIATE R&B

## 2018-03-13 PROCEDURE — 93005 ELECTROCARDIOGRAM TRACING: CPT

## 2018-03-13 PROCEDURE — 71045 X-RAY EXAM CHEST 1 VIEW: CPT

## 2018-03-13 PROCEDURE — 99285 EMERGENCY DEPT VISIT HI MDM: CPT

## 2018-03-13 RX ORDER — MORPHINE SULFATE 4 MG/ML
4 INJECTION, SOLUTION INTRAMUSCULAR; INTRAVENOUS ONCE
Status: COMPLETED | OUTPATIENT
Start: 2018-03-13 | End: 2018-03-13

## 2018-03-13 RX ORDER — ONDANSETRON 2 MG/ML
4 INJECTION INTRAMUSCULAR; INTRAVENOUS ONCE
Status: COMPLETED | OUTPATIENT
Start: 2018-03-13 | End: 2018-03-13

## 2018-03-13 RX ORDER — SODIUM CHLORIDE 9 MG/ML
INJECTION, SOLUTION INTRAVENOUS CONTINUOUS
Status: DISCONTINUED | OUTPATIENT
Start: 2018-03-13 | End: 2018-03-16

## 2018-03-13 RX ADMIN — TAZOBACTAM SODIUM AND PIPERACILLIN SODIUM 3.38 G: 375; 3 INJECTION, SOLUTION INTRAVENOUS at 22:47

## 2018-03-13 RX ADMIN — SODIUM CHLORIDE: 9 INJECTION, SOLUTION INTRAVENOUS at 22:47

## 2018-03-13 RX ADMIN — IOPAMIDOL 90 ML: 755 INJECTION, SOLUTION INTRAVENOUS at 20:01

## 2018-03-13 RX ADMIN — Medication 4 MG: at 19:19

## 2018-03-13 RX ADMIN — ONDANSETRON 4 MG: 2 INJECTION INTRAMUSCULAR; INTRAVENOUS at 19:19

## 2018-03-13 ASSESSMENT — ENCOUNTER SYMPTOMS
VOMITING: 0
DIARRHEA: 0
COUGH: 0
RHINORRHEA: 0
NAUSEA: 0
SHORTNESS OF BREATH: 1
ABDOMINAL PAIN: 1
SORE THROAT: 0
BACK PAIN: 0

## 2018-03-13 ASSESSMENT — PAIN SCALES - GENERAL
PAINLEVEL_OUTOF10: 7
PAINLEVEL_OUTOF10: 7

## 2018-03-13 NOTE — ED PROVIDER NOTES
murmur heard. Pulmonary/Chest: Breath sounds normal. He has no wheezes. He has no rales. Abdominal: Soft. Bowel sounds are normal. He exhibits no distension and no mass. There is tenderness in the right upper quadrant and periumbilical area. There is no rebound. Periumbilical hernia soft seems to easily reduce but  tender on exam   Musculoskeletal: Normal range of motion. He exhibits no edema. Neurological: He is alert and oriented to person, place, and time. Skin: Skin is warm and dry. He is not diaphoretic. Vitals reviewed. DIAGNOSTIC RESULTS     EKG: All EKG's are interpreted by the Emergency Department Physician who either signs or Co-signs this chart in the absence of a cardiologist.    99, normal sinus rhythm, nonspecific EKG    RADIOLOGY:   Non-plain film images such as CT, Ultrasound and MRI are read by the radiologist. Plain radiographic images are visualized and preliminarily interpreted by the emergency physician with the below findings:          [address]   Final Result   1. Diffuse gallbladder wall thickening and pericholecystic fluid   compatible with a calculus cholecystitis. No gallstones are seen. Signed by Dr Gino Dumont on 3/13/2018 10:05 PM      CT ABDOMEN PELVIS W IV CONTRAST Additional Contrast? None   Final Result   1. Periportal edema with gallbladder wall thickening. 2. Gallbladder ultrasound recommended to assess for stones and to   better assess gallbladder wall thickening. 3. Correlation with amylase and lipase values recommended to assess   for pancreatitis. 4. The small fat-containing umbilical hernia does not appear to be   significant. Signed by Dr Gino Dumont on 3/13/2018 8:25 PM      XR CHEST PORTABLE   Final Result   1. Cardiomegaly. 2. No acute lung disease.    Signed by Dr Gino Dumont on 3/13/2018 7:33 PM              LABS:  Labs Reviewed   CBC WITH AUTO DIFFERENTIAL - Abnormal; Notable for the following:

## 2018-03-14 LAB
HAV IGM SER IA-ACNC: NORMAL
HEPATITIS B CORE IGM ANTIBODY: NORMAL
HEPATITIS B SURFACE ANTIGEN INTERPRETATION: NORMAL
HEPATITIS C ANTIBODY INTERPRETATION: NORMAL
LV EF: 18 %
LVEF MODALITY: NORMAL

## 2018-03-14 PROCEDURE — 36415 COLL VENOUS BLD VENIPUNCTURE: CPT

## 2018-03-14 PROCEDURE — 2140000000 HC CCU INTERMEDIATE R&B

## 2018-03-14 PROCEDURE — 93306 TTE W/DOPPLER COMPLETE: CPT

## 2018-03-14 PROCEDURE — 2580000003 HC RX 258: Performed by: INTERNAL MEDICINE

## 2018-03-14 PROCEDURE — 6370000000 HC RX 637 (ALT 250 FOR IP): Performed by: INTERNAL MEDICINE

## 2018-03-14 PROCEDURE — 6360000002 HC RX W HCPCS: Performed by: INTERNAL MEDICINE

## 2018-03-14 PROCEDURE — 99223 1ST HOSP IP/OBS HIGH 75: CPT | Performed by: INTERNAL MEDICINE

## 2018-03-14 PROCEDURE — 99253 IP/OBS CNSLTJ NEW/EST LOW 45: CPT | Performed by: SURGERY

## 2018-03-14 PROCEDURE — 2580000003 HC RX 258: Performed by: EMERGENCY MEDICINE

## 2018-03-14 PROCEDURE — 80074 ACUTE HEPATITIS PANEL: CPT

## 2018-03-14 PROCEDURE — 2500000003 HC RX 250 WO HCPCS: Performed by: INTERNAL MEDICINE

## 2018-03-14 PROCEDURE — 99255 IP/OBS CONSLTJ NEW/EST HI 80: CPT | Performed by: INTERNAL MEDICINE

## 2018-03-14 PROCEDURE — 6370000000 HC RX 637 (ALT 250 FOR IP): Performed by: FAMILY MEDICINE

## 2018-03-14 RX ORDER — LISINOPRIL 10 MG/1
10 TABLET ORAL EVERY 12 HOURS
Status: DISCONTINUED | OUTPATIENT
Start: 2018-03-14 | End: 2018-03-17 | Stop reason: HOSPADM

## 2018-03-14 RX ORDER — SODIUM CHLORIDE 9 MG/ML
INJECTION, SOLUTION INTRAVENOUS CONTINUOUS
Status: DISCONTINUED | OUTPATIENT
Start: 2018-03-14 | End: 2018-03-14

## 2018-03-14 RX ORDER — ASPIRIN 81 MG/1
81 TABLET ORAL DAILY
Status: DISCONTINUED | OUTPATIENT
Start: 2018-03-14 | End: 2018-03-17 | Stop reason: HOSPADM

## 2018-03-14 RX ORDER — ONDANSETRON 2 MG/ML
4 INJECTION INTRAMUSCULAR; INTRAVENOUS EVERY 6 HOURS PRN
Status: DISCONTINUED | OUTPATIENT
Start: 2018-03-14 | End: 2018-03-17 | Stop reason: HOSPADM

## 2018-03-14 RX ORDER — ACETAMINOPHEN 325 MG/1
650 TABLET ORAL EVERY 4 HOURS PRN
Status: DISCONTINUED | OUTPATIENT
Start: 2018-03-14 | End: 2018-03-17 | Stop reason: HOSPADM

## 2018-03-14 RX ORDER — SODIUM CHLORIDE 0.9 % (FLUSH) 0.9 %
10 SYRINGE (ML) INJECTION PRN
Status: DISCONTINUED | OUTPATIENT
Start: 2018-03-14 | End: 2018-03-17 | Stop reason: HOSPADM

## 2018-03-14 RX ORDER — FUROSEMIDE 40 MG/1
40 TABLET ORAL DAILY
Status: DISCONTINUED | OUTPATIENT
Start: 2018-03-14 | End: 2018-03-17 | Stop reason: HOSPADM

## 2018-03-14 RX ORDER — SPIRONOLACTONE 25 MG/1
25 TABLET ORAL DAILY
Status: DISCONTINUED | OUTPATIENT
Start: 2018-03-14 | End: 2018-03-17 | Stop reason: HOSPADM

## 2018-03-14 RX ORDER — BISACODYL 10 MG
10 SUPPOSITORY, RECTAL RECTAL DAILY PRN
Status: DISCONTINUED | OUTPATIENT
Start: 2018-03-14 | End: 2018-03-17 | Stop reason: HOSPADM

## 2018-03-14 RX ORDER — DOCUSATE SODIUM 100 MG/1
100 CAPSULE, LIQUID FILLED ORAL 2 TIMES DAILY
Status: DISCONTINUED | OUTPATIENT
Start: 2018-03-14 | End: 2018-03-17 | Stop reason: HOSPADM

## 2018-03-14 RX ORDER — SODIUM CHLORIDE 0.9 % (FLUSH) 0.9 %
10 SYRINGE (ML) INJECTION EVERY 12 HOURS SCHEDULED
Status: DISCONTINUED | OUTPATIENT
Start: 2018-03-14 | End: 2018-03-17 | Stop reason: HOSPADM

## 2018-03-14 RX ORDER — MORPHINE SULFATE 4 MG/ML
2 INJECTION, SOLUTION INTRAMUSCULAR; INTRAVENOUS EVERY 4 HOURS PRN
Status: DISCONTINUED | OUTPATIENT
Start: 2018-03-14 | End: 2018-03-17 | Stop reason: HOSPADM

## 2018-03-14 RX ADMIN — LISINOPRIL 10 MG: 10 TABLET ORAL at 22:55

## 2018-03-14 RX ADMIN — Medication 2 MG: at 04:13

## 2018-03-14 RX ADMIN — Medication 10 ML: at 09:17

## 2018-03-14 RX ADMIN — SODIUM CHLORIDE 50 ML/HR: 9 INJECTION, SOLUTION INTRAVENOUS at 09:10

## 2018-03-14 RX ADMIN — TAZOBACTAM SODIUM AND PIPERACILLIN SODIUM 3.38 G: 375; 3 INJECTION, SOLUTION INTRAVENOUS at 09:10

## 2018-03-14 RX ADMIN — DOCUSATE SODIUM 100 MG: 100 CAPSULE, LIQUID FILLED ORAL at 09:17

## 2018-03-14 RX ADMIN — ENOXAPARIN SODIUM 40 MG: 40 INJECTION SUBCUTANEOUS at 09:17

## 2018-03-14 RX ADMIN — DOCUSATE SODIUM 100 MG: 100 CAPSULE, LIQUID FILLED ORAL at 22:54

## 2018-03-14 RX ADMIN — METOPROLOL TARTRATE 25 MG: 25 TABLET ORAL at 22:56

## 2018-03-14 RX ADMIN — FUROSEMIDE 40 MG: 40 TABLET ORAL at 22:54

## 2018-03-14 RX ADMIN — TAZOBACTAM SODIUM AND PIPERACILLIN SODIUM 3.38 G: 375; 3 INJECTION, SOLUTION INTRAVENOUS at 16:27

## 2018-03-14 RX ADMIN — ASPIRIN 81 MG: 81 TABLET, COATED ORAL at 22:55

## 2018-03-14 RX ADMIN — DOCUSATE SODIUM 100 MG: 100 CAPSULE, LIQUID FILLED ORAL at 04:14

## 2018-03-14 RX ADMIN — TAZOBACTAM SODIUM AND PIPERACILLIN SODIUM 3.38 G: 375; 3 INJECTION, SOLUTION INTRAVENOUS at 23:10

## 2018-03-14 RX ADMIN — SPIRONOLACTONE 25 MG: 25 TABLET, FILM COATED ORAL at 18:37

## 2018-03-14 ASSESSMENT — ENCOUNTER SYMPTOMS
VOMITING: 0
CONSTIPATION: 0
PHOTOPHOBIA: 0
NAUSEA: 0
DOUBLE VISION: 0
DIARRHEA: 0
ABDOMINAL PAIN: 1
BLURRED VISION: 0
SHORTNESS OF BREATH: 1

## 2018-03-14 ASSESSMENT — PAIN DESCRIPTION - LOCATION: LOCATION: ABDOMEN

## 2018-03-14 ASSESSMENT — PAIN SCALES - GENERAL
PAINLEVEL_OUTOF10: 5
PAINLEVEL_OUTOF10: 5
PAINLEVEL_OUTOF10: 2

## 2018-03-14 ASSESSMENT — PAIN DESCRIPTION - PAIN TYPE: TYPE: ACUTE PAIN

## 2018-03-14 NOTE — CONSULTS
[facility]       Cardiology Consultation      Date of Admission:  3/13/2018  6:38 PM    Date of Initially Being Seen / Consultation:  3/14/18    Cardiologist:  DENNIS Lee MD    Attending: hospitalist      PCP:  No primary care provider on file. Reason for Consultation or Admission / Chief Complaint:  3/14/18    SUBJECTIVE AND HISTORY OF PRESENT ILLNESS:    Source of the history:  Patient, family, previous inpatient and outpatient records in [facility], and from current chart    Daniel Vargas is a 28 y.o. male who presents to [facility] progressive care unit with symptoms / signs / problem or diagnosis of acute cholecystitis. The patient has a history of a nonischemic cardiomyopathy with a markedly reduced left ventricular ejection fraction of approximately 20%. He was at [facility] last week with the abdominal discomfort and congestive heart failure. Now he comes to [facility] with complaints of severe abdominal pain and is found to have acalculus cholecystitis. CARDIAC RISK PROFILE:    Risk Factor Yes / No / Unknown       Cigarette Use See below   Diabetes Mellitus See below   Family History of CAD See below   Hypercholesteremia See below   Hypertension See below          Cardiac Specific Problems:    Specialty Problems        Cardiology Problems    Acute systolic congestive heart failure (HCC)        Nonischemic cardiomyopathy (HCC)        CHF with unknown LVEF (HCC)                PRIOR CARDIAC PROBLEM LIST  (IF APPLICABLE):    Nonischemic cardiomyopathy with chronic congestive heart failure      Past Medical History:  Past Medical History:   Diagnosis Date    CHF (congestive heart failure) (HCC)     Hypertension        Past Surgical History:  Past Surgical History:   Procedure Laterality Date    HAND SURGERY         Home Medications:   Prior to Admission medications    Medication Sig Start Date End Date Taking?  Authorizing Provider   furosemide (LASIX) 40 MG diarrhea, or constipation. No reflux or gastroesophageal reflux disease. positive for abdominal pain  GENITOURINARY:  No dysuria, urgency, frequency, or history of urinary tract infections. No history of nephrolithiasis or renal insufficiency. NEUROLOGIC:  No history of cerebrovascular accident, transient ischemic attack, or amaurosis fugax. No history of seizure disorder. INTEGUMENTARY:  No history of nonhealing wounds or skin cancer removal.  PSYCHIATRIC:  No excessive anxiety or depression. ENDOCRINE:  No polyuria, polydipsia, or significant weight gain. No heat or cold intolerance. MUSCULOSKELETAL:  No limit to range of motion of joints or swelling of limbs. HEMATOLOGIC:  No history of DVT, PE, or anemia. All other review of systems is negative. PHYSICAL EXAMINATION:     /74   Pulse 89 Comment: read wrong on moniter  Temp 98 °F (36.7 °C) (Temporal)   Resp 18   Ht 5' 7\" (1.702 m)   Wt 202 lb 6 oz (91.8 kg)   SpO2 97%   BMI 31.70 kg/m²     GENERAL:  Alert and oriented X3 in no apparent distress. Short term and long term memory intact. Judgement intact. HEENT:  Normocephalic without evidence of old or recent trauma. Sclerae clear. Conjunctivae pink. EOMs intact. Pupils equal and round. Tympanic membranes not visualized. No epistaxis, runny nose. No lesions on lips or buccal mucosa. Tongue protrudes in midline and is well papillated. NECK:  Supple without mass or JVD. Carotid pulses 2+ to palpation bilaterally without bruit. No thyromegaly noted. CARDIOVASCULAR:  Regular rate and rhythm without S3, S4 or murmur. PULMONARY:  Equal bilateral expansion. Bibasilar rales to auscultation and percussion. ABDOMEN:  Soft, non tender. Bowel sounds X4 quadrants. No hepatomegaly, splenomegaly or palpable mass. MUSCULOSKELETAL:  Negative. UPPER EXTREMITIES:  Radial pulses palpable bilaterally. No cyanosis, clubbing, or edema.   LOWER EXTREMITIES:  Femoral, popliteal,

## 2018-03-14 NOTE — H&P
PHYSICAL EXAM:  /79   Pulse 92   Temp 98.2 °F (36.8 °C) (Temporal)   Resp 18   Ht 5' 7\" (1.702 m)   Wt 202 lb 6 oz (91.8 kg)   SpO2 100%   BMI 31.70 kg/m²   Physical Exam   Constitutional: He is oriented to person, place, and time. He appears well-developed and well-nourished. Non-toxic appearance. He does not have a sickly appearance. He does not appear ill. No distress. HENT:   Head: Normocephalic and atraumatic. Eyes: Conjunctivae and lids are normal. Pupils are equal, round, and reactive to light. Neck: No JVD present. Carotid bruit is not present. Cardiovascular: Normal rate and regular rhythm. Pulses:       Carotid pulses are 2+ on the right side, and 2+ on the left side. Radial pulses are 2+ on the right side, and 2+ on the left side. Pulmonary/Chest: He has no decreased breath sounds. He has no wheezes. He has no rhonchi. He has no rales. Abdominal: Bowel sounds are normal. There is tenderness in the right upper quadrant. There is rigidity and rebound. Musculoskeletal:        Right lower leg: He exhibits no swelling and no edema. Left lower leg: He exhibits no swelling and no edema. Neurological: He is alert and oriented to person, place, and time. GCS eye subscore is 4. GCS verbal subscore is 5. GCS motor subscore is 6. Skin: Skin is warm and dry. Psychiatric: He has a normal mood and affect. His speech is normal.     DATA:  EKG:  I have reviewed EKG with the following interpretation:  Imaging:    US GALLBLADDER RUQ   Final Result   1. Diffuse gallbladder wall thickening and pericholecystic fluid   compatible with a calculus cholecystitis. No gallstones are seen. Signed by Dr Katalina Warren on 3/13/2018 10:05 PM      CT ABDOMEN PELVIS W IV CONTRAST Additional Contrast? None   Final Result   1. Periportal edema with gallbladder wall thickening.    2. Gallbladder ultrasound recommended to assess for stones and to   better assess gallbladder wall

## 2018-03-15 LAB
ALBUMIN SERPL-MCNC: 3.4 G/DL (ref 3.5–5.2)
ALP BLD-CCNC: 133 U/L (ref 40–130)
ALT SERPL-CCNC: 386 U/L (ref 5–41)
ANION GAP SERPL CALCULATED.3IONS-SCNC: 14 MMOL/L (ref 7–19)
AST SERPL-CCNC: 248 U/L (ref 5–40)
BASOPHILS ABSOLUTE: 0.1 K/UL (ref 0–0.2)
BASOPHILS RELATIVE PERCENT: 0.8 % (ref 0–1)
BILIRUB SERPL-MCNC: 0.6 MG/DL (ref 0.2–1.2)
BUN BLDV-MCNC: 21 MG/DL (ref 6–20)
CALCIUM SERPL-MCNC: 8.7 MG/DL (ref 8.6–10)
CHLORIDE BLD-SCNC: 98 MMOL/L (ref 98–111)
CO2: 23 MMOL/L (ref 22–29)
CREAT SERPL-MCNC: 1 MG/DL (ref 0.5–1.2)
EKG P AXIS: 48 DEGREES
EKG P-R INTERVAL: 158 MS
EKG Q-T INTERVAL: 336 MS
EKG QRS DURATION: 110 MS
EKG QTC CALCULATION (BAZETT): 404 MS
EKG T AXIS: -8 DEGREES
EOSINOPHILS ABSOLUTE: 0.2 K/UL (ref 0–0.6)
EOSINOPHILS RELATIVE PERCENT: 2.2 % (ref 0–5)
GFR NON-AFRICAN AMERICAN: >60
GLUCOSE BLD-MCNC: 139 MG/DL (ref 74–109)
HCT VFR BLD CALC: 39.8 % (ref 42–52)
HEMOGLOBIN: 13 G/DL (ref 14–18)
LYMPHOCYTES ABSOLUTE: 2.3 K/UL (ref 1.1–4.5)
LYMPHOCYTES RELATIVE PERCENT: 29.7 % (ref 20–40)
MCH RBC QN AUTO: 30.1 PG (ref 27–31)
MCHC RBC AUTO-ENTMCNC: 32.7 G/DL (ref 33–37)
MCV RBC AUTO: 92.1 FL (ref 80–94)
MONOCYTES ABSOLUTE: 0.7 K/UL (ref 0–0.9)
MONOCYTES RELATIVE PERCENT: 9.5 % (ref 0–10)
NEUTROPHILS ABSOLUTE: 4.4 K/UL (ref 1.5–7.5)
NEUTROPHILS RELATIVE PERCENT: 57.3 % (ref 50–65)
PDW BLD-RTO: 15.5 % (ref 11.5–14.5)
PLATELET # BLD: 375 K/UL (ref 130–400)
PMV BLD AUTO: 10 FL (ref 9.4–12.4)
POTASSIUM SERPL-SCNC: 4.4 MMOL/L (ref 3.5–5)
RBC # BLD: 4.32 M/UL (ref 4.7–6.1)
SODIUM BLD-SCNC: 135 MMOL/L (ref 136–145)
TOTAL PROTEIN: 6.3 G/DL (ref 6.6–8.7)
TROPONIN: <0.01 NG/ML (ref 0–0.03)
WBC # BLD: 7.7 K/UL (ref 4.8–10.8)

## 2018-03-15 PROCEDURE — 6360000002 HC RX W HCPCS: Performed by: INTERNAL MEDICINE

## 2018-03-15 PROCEDURE — 97750 PHYSICAL PERFORMANCE TEST: CPT

## 2018-03-15 PROCEDURE — 99232 SBSQ HOSP IP/OBS MODERATE 35: CPT | Performed by: FAMILY MEDICINE

## 2018-03-15 PROCEDURE — G8980 MOBILITY D/C STATUS: HCPCS

## 2018-03-15 PROCEDURE — 99232 SBSQ HOSP IP/OBS MODERATE 35: CPT | Performed by: SURGERY

## 2018-03-15 PROCEDURE — 6370000000 HC RX 637 (ALT 250 FOR IP)

## 2018-03-15 PROCEDURE — 99232 SBSQ HOSP IP/OBS MODERATE 35: CPT | Performed by: INTERNAL MEDICINE

## 2018-03-15 PROCEDURE — 2580000003 HC RX 258: Performed by: INTERNAL MEDICINE

## 2018-03-15 PROCEDURE — G8978 MOBILITY CURRENT STATUS: HCPCS

## 2018-03-15 PROCEDURE — 2500000003 HC RX 250 WO HCPCS: Performed by: SURGERY

## 2018-03-15 PROCEDURE — 80053 COMPREHEN METABOLIC PANEL: CPT

## 2018-03-15 PROCEDURE — 84484 ASSAY OF TROPONIN QUANT: CPT

## 2018-03-15 PROCEDURE — 85025 COMPLETE CBC W/AUTO DIFF WBC: CPT

## 2018-03-15 PROCEDURE — 6370000000 HC RX 637 (ALT 250 FOR IP): Performed by: INTERNAL MEDICINE

## 2018-03-15 PROCEDURE — 6370000000 HC RX 637 (ALT 250 FOR IP): Performed by: SURGERY

## 2018-03-15 PROCEDURE — 2500000003 HC RX 250 WO HCPCS: Performed by: INTERNAL MEDICINE

## 2018-03-15 PROCEDURE — 36415 COLL VENOUS BLD VENIPUNCTURE: CPT

## 2018-03-15 PROCEDURE — 2140000000 HC CCU INTERMEDIATE R&B

## 2018-03-15 PROCEDURE — 6370000000 HC RX 637 (ALT 250 FOR IP): Performed by: FAMILY MEDICINE

## 2018-03-15 PROCEDURE — 97161 PT EVAL LOW COMPLEX 20 MIN: CPT

## 2018-03-15 PROCEDURE — G8979 MOBILITY GOAL STATUS: HCPCS

## 2018-03-15 RX ORDER — NITROGLYCERIN 0.4 MG/1
TABLET SUBLINGUAL
Status: COMPLETED
Start: 2018-03-15 | End: 2018-03-15

## 2018-03-15 RX ORDER — AMOXICILLIN AND CLAVULANATE POTASSIUM 875; 125 MG/1; MG/1
1 TABLET, FILM COATED ORAL EVERY 12 HOURS SCHEDULED
Status: DISCONTINUED | OUTPATIENT
Start: 2018-03-15 | End: 2018-03-17 | Stop reason: HOSPADM

## 2018-03-15 RX ORDER — NITROGLYCERIN 0.4 MG/1
0.4 TABLET SUBLINGUAL EVERY 5 MIN PRN
Status: DISCONTINUED | OUTPATIENT
Start: 2018-03-15 | End: 2018-03-17 | Stop reason: HOSPADM

## 2018-03-15 RX ADMIN — NITROGLYCERIN 0.4 MG: 0.4 TABLET SUBLINGUAL at 07:06

## 2018-03-15 RX ADMIN — ENOXAPARIN SODIUM 40 MG: 40 INJECTION SUBCUTANEOUS at 08:20

## 2018-03-15 RX ADMIN — LISINOPRIL 10 MG: 10 TABLET ORAL at 22:32

## 2018-03-15 RX ADMIN — Medication 10 ML: at 22:32

## 2018-03-15 RX ADMIN — TAZOBACTAM SODIUM AND PIPERACILLIN SODIUM 3.38 G: 375; 3 INJECTION, SOLUTION INTRAVENOUS at 08:18

## 2018-03-15 RX ADMIN — DOCUSATE SODIUM 100 MG: 100 CAPSULE, LIQUID FILLED ORAL at 22:32

## 2018-03-15 RX ADMIN — SPIRONOLACTONE 25 MG: 25 TABLET, FILM COATED ORAL at 08:20

## 2018-03-15 RX ADMIN — ASPIRIN 81 MG: 81 TABLET, COATED ORAL at 08:20

## 2018-03-15 RX ADMIN — METOPROLOL TARTRATE 25 MG: 25 TABLET ORAL at 08:20

## 2018-03-15 RX ADMIN — TAZOBACTAM SODIUM AND PIPERACILLIN SODIUM 3.38 G: 375; 3 INJECTION, SOLUTION INTRAVENOUS at 15:53

## 2018-03-15 RX ADMIN — AMOXICILLIN AND CLAVULANATE POTASSIUM 1 TABLET: 875; 125 TABLET, FILM COATED ORAL at 22:32

## 2018-03-15 RX ADMIN — LISINOPRIL 10 MG: 10 TABLET ORAL at 08:20

## 2018-03-15 RX ADMIN — METOPROLOL TARTRATE 25 MG: 25 TABLET ORAL at 22:32

## 2018-03-15 RX ADMIN — DOCUSATE SODIUM 100 MG: 100 CAPSULE, LIQUID FILLED ORAL at 08:20

## 2018-03-15 RX ADMIN — FUROSEMIDE 40 MG: 40 TABLET ORAL at 08:20

## 2018-03-15 ASSESSMENT — PAIN SCALES - GENERAL
PAINLEVEL_OUTOF10: 0
PAINLEVEL_OUTOF10: 6
PAINLEVEL_OUTOF10: 0

## 2018-03-15 ASSESSMENT — PAIN DESCRIPTION - PAIN TYPE
TYPE: ACUTE PAIN

## 2018-03-15 ASSESSMENT — PAIN DESCRIPTION - ORIENTATION: ORIENTATION: MID

## 2018-03-15 ASSESSMENT — PAIN DESCRIPTION - LOCATION
LOCATION: CHEST
LOCATION: CHEST

## 2018-03-15 ASSESSMENT — PAIN SCALES - WONG BAKER: WONGBAKER_NUMERICALRESPONSE: 6

## 2018-03-15 NOTE — CARE COORDINATION
Pt resides at home with his Dtr and reports family and friends as supports. Pt denies any in home services prior to admission. Pt denies any use/need of DME prior to admission. Pt has admitted history of methamphetamine and cocaine use and reports will no longer use/abuse substances using his Dtr as motivation to quit. Pt is not a surgical candidate and SW will continue to follow and further assist with dc needs.

## 2018-03-16 LAB
ALBUMIN SERPL-MCNC: 3.5 G/DL (ref 3.5–5.2)
ALP BLD-CCNC: 125 U/L (ref 40–130)
ALT SERPL-CCNC: 292 U/L (ref 5–41)
ANION GAP SERPL CALCULATED.3IONS-SCNC: 15 MMOL/L (ref 7–19)
AST SERPL-CCNC: 113 U/L (ref 5–40)
BILIRUB SERPL-MCNC: 0.4 MG/DL (ref 0.2–1.2)
BUN BLDV-MCNC: 15 MG/DL (ref 6–20)
CALCIUM SERPL-MCNC: 8.8 MG/DL (ref 8.6–10)
CHLORIDE BLD-SCNC: 101 MMOL/L (ref 98–111)
CO2: 24 MMOL/L (ref 22–29)
CREAT SERPL-MCNC: 1 MG/DL (ref 0.5–1.2)
GFR NON-AFRICAN AMERICAN: >60
GLUCOSE BLD-MCNC: 92 MG/DL (ref 74–109)
POTASSIUM SERPL-SCNC: 4.3 MMOL/L (ref 3.5–5)
SODIUM BLD-SCNC: 140 MMOL/L (ref 136–145)
TOTAL PROTEIN: 6.4 G/DL (ref 6.6–8.7)

## 2018-03-16 PROCEDURE — 36415 COLL VENOUS BLD VENIPUNCTURE: CPT

## 2018-03-16 PROCEDURE — 2140000000 HC CCU INTERMEDIATE R&B

## 2018-03-16 PROCEDURE — 6370000000 HC RX 637 (ALT 250 FOR IP): Performed by: INTERNAL MEDICINE

## 2018-03-16 PROCEDURE — 99232 SBSQ HOSP IP/OBS MODERATE 35: CPT | Performed by: INTERNAL MEDICINE

## 2018-03-16 PROCEDURE — 6370000000 HC RX 637 (ALT 250 FOR IP): Performed by: SURGERY

## 2018-03-16 PROCEDURE — 99232 SBSQ HOSP IP/OBS MODERATE 35: CPT | Performed by: SURGERY

## 2018-03-16 PROCEDURE — 80053 COMPREHEN METABOLIC PANEL: CPT

## 2018-03-16 PROCEDURE — 6370000000 HC RX 637 (ALT 250 FOR IP): Performed by: FAMILY MEDICINE

## 2018-03-16 PROCEDURE — 2580000003 HC RX 258: Performed by: INTERNAL MEDICINE

## 2018-03-16 PROCEDURE — 6360000002 HC RX W HCPCS: Performed by: INTERNAL MEDICINE

## 2018-03-16 RX ADMIN — SPIRONOLACTONE 25 MG: 25 TABLET, FILM COATED ORAL at 09:14

## 2018-03-16 RX ADMIN — LISINOPRIL 10 MG: 10 TABLET ORAL at 20:10

## 2018-03-16 RX ADMIN — Medication 10 ML: at 09:15

## 2018-03-16 RX ADMIN — METOPROLOL TARTRATE 25 MG: 25 TABLET ORAL at 09:14

## 2018-03-16 RX ADMIN — ASPIRIN 81 MG: 81 TABLET, COATED ORAL at 09:15

## 2018-03-16 RX ADMIN — Medication 10 ML: at 20:12

## 2018-03-16 RX ADMIN — ENOXAPARIN SODIUM 40 MG: 40 INJECTION SUBCUTANEOUS at 09:14

## 2018-03-16 RX ADMIN — FUROSEMIDE 40 MG: 40 TABLET ORAL at 09:15

## 2018-03-16 RX ADMIN — LISINOPRIL 10 MG: 10 TABLET ORAL at 09:14

## 2018-03-16 RX ADMIN — AMOXICILLIN AND CLAVULANATE POTASSIUM 1 TABLET: 875; 125 TABLET, FILM COATED ORAL at 20:10

## 2018-03-16 RX ADMIN — AMOXICILLIN AND CLAVULANATE POTASSIUM 1 TABLET: 875; 125 TABLET, FILM COATED ORAL at 09:15

## 2018-03-16 RX ADMIN — METOPROLOL TARTRATE 25 MG: 25 TABLET ORAL at 20:10

## 2018-03-16 ASSESSMENT — PAIN SCALES - GENERAL
PAINLEVEL_OUTOF10: 0

## 2018-03-16 NOTE — PLAN OF CARE
Problem: Falls - Risk of  Goal: Absence of falls  Outcome: Ongoing      Problem: Pain:  Goal: Pain level will decrease  Pain level will decrease   Outcome: Ongoing    Goal: Control of acute pain  Control of acute pain   Outcome: Ongoing    Goal: Control of chronic pain  Control of chronic pain   Outcome: Ongoing      Problem: Fluid Volume:  Goal: Hemodynamic stability will improve  Hemodynamic stability will improve   Outcome: Ongoing    Goal: Ability to maintain a balanced intake and output will improve  Ability to maintain a balanced intake and output will improve   Outcome: Ongoing

## 2018-03-17 VITALS
HEIGHT: 67 IN | WEIGHT: 197.2 LBS | RESPIRATION RATE: 16 BRPM | TEMPERATURE: 98 F | OXYGEN SATURATION: 97 % | SYSTOLIC BLOOD PRESSURE: 111 MMHG | BODY MASS INDEX: 30.95 KG/M2 | DIASTOLIC BLOOD PRESSURE: 68 MMHG | HEART RATE: 78 BPM

## 2018-03-17 LAB
ALBUMIN SERPL-MCNC: 3.4 G/DL (ref 3.5–5.2)
ALP BLD-CCNC: 130 U/L (ref 40–130)
ALT SERPL-CCNC: 248 U/L (ref 5–41)
ANION GAP SERPL CALCULATED.3IONS-SCNC: 15 MMOL/L (ref 7–19)
AST SERPL-CCNC: 79 U/L (ref 5–40)
BASOPHILS ABSOLUTE: 0.1 K/UL (ref 0–0.2)
BASOPHILS RELATIVE PERCENT: 0.9 % (ref 0–1)
BILIRUB SERPL-MCNC: 0.3 MG/DL (ref 0.2–1.2)
BUN BLDV-MCNC: 16 MG/DL (ref 6–20)
CALCIUM SERPL-MCNC: 9 MG/DL (ref 8.6–10)
CHLORIDE BLD-SCNC: 98 MMOL/L (ref 98–111)
CO2: 23 MMOL/L (ref 22–29)
CREAT SERPL-MCNC: 0.9 MG/DL (ref 0.5–1.2)
EOSINOPHILS ABSOLUTE: 0.5 K/UL (ref 0–0.6)
EOSINOPHILS RELATIVE PERCENT: 5.6 % (ref 0–5)
GFR NON-AFRICAN AMERICAN: >60
GLUCOSE BLD-MCNC: 130 MG/DL (ref 74–109)
HCT VFR BLD CALC: 44.9 % (ref 42–52)
HEMOGLOBIN: 14.6 G/DL (ref 14–18)
LYMPHOCYTES ABSOLUTE: 2.7 K/UL (ref 1.1–4.5)
LYMPHOCYTES RELATIVE PERCENT: 30.3 % (ref 20–40)
MCH RBC QN AUTO: 29.9 PG (ref 27–31)
MCHC RBC AUTO-ENTMCNC: 32.5 G/DL (ref 33–37)
MCV RBC AUTO: 91.8 FL (ref 80–94)
MONOCYTES ABSOLUTE: 0.8 K/UL (ref 0–0.9)
MONOCYTES RELATIVE PERCENT: 9.3 % (ref 0–10)
NEUTROPHILS ABSOLUTE: 4.7 K/UL (ref 1.5–7.5)
NEUTROPHILS RELATIVE PERCENT: 53.4 % (ref 50–65)
PDW BLD-RTO: 15.4 % (ref 11.5–14.5)
PLATELET # BLD: 428 K/UL (ref 130–400)
PMV BLD AUTO: 9.5 FL (ref 9.4–12.4)
POTASSIUM SERPL-SCNC: 4.2 MMOL/L (ref 3.5–5)
RBC # BLD: 4.89 M/UL (ref 4.7–6.1)
SODIUM BLD-SCNC: 136 MMOL/L (ref 136–145)
TOTAL PROTEIN: 6.4 G/DL (ref 6.6–8.7)
WBC # BLD: 8.8 K/UL (ref 4.8–10.8)

## 2018-03-17 PROCEDURE — 6370000000 HC RX 637 (ALT 250 FOR IP): Performed by: INTERNAL MEDICINE

## 2018-03-17 PROCEDURE — 80053 COMPREHEN METABOLIC PANEL: CPT

## 2018-03-17 PROCEDURE — 99238 HOSP IP/OBS DSCHRG MGMT 30/<: CPT | Performed by: INTERNAL MEDICINE

## 2018-03-17 PROCEDURE — 6370000000 HC RX 637 (ALT 250 FOR IP): Performed by: FAMILY MEDICINE

## 2018-03-17 PROCEDURE — 6360000002 HC RX W HCPCS: Performed by: INTERNAL MEDICINE

## 2018-03-17 PROCEDURE — 6370000000 HC RX 637 (ALT 250 FOR IP): Performed by: SURGERY

## 2018-03-17 PROCEDURE — 99231 SBSQ HOSP IP/OBS SF/LOW 25: CPT | Performed by: INTERNAL MEDICINE

## 2018-03-17 PROCEDURE — 36415 COLL VENOUS BLD VENIPUNCTURE: CPT

## 2018-03-17 PROCEDURE — 2580000003 HC RX 258: Performed by: INTERNAL MEDICINE

## 2018-03-17 PROCEDURE — 85025 COMPLETE CBC W/AUTO DIFF WBC: CPT

## 2018-03-17 RX ORDER — SPIRONOLACTONE 25 MG/1
25 TABLET ORAL DAILY
Qty: 30 TABLET | Refills: 3 | Status: SHIPPED | OUTPATIENT
Start: 2018-03-18 | End: 2018-07-25 | Stop reason: SDUPTHER

## 2018-03-17 RX ORDER — NITROGLYCERIN 0.4 MG/1
TABLET SUBLINGUAL
Qty: 25 TABLET | Refills: 3 | Status: SHIPPED | OUTPATIENT
Start: 2018-03-17 | End: 2018-07-25 | Stop reason: SDUPTHER

## 2018-03-17 RX ORDER — LISINOPRIL 10 MG/1
10 TABLET ORAL EVERY 12 HOURS
Qty: 30 TABLET | Refills: 3 | Status: SHIPPED | OUTPATIENT
Start: 2018-03-17 | End: 2018-07-25 | Stop reason: SDUPTHER

## 2018-03-17 RX ORDER — AMOXICILLIN AND CLAVULANATE POTASSIUM 875; 125 MG/1; MG/1
1 TABLET, FILM COATED ORAL EVERY 12 HOURS SCHEDULED
Qty: 14 TABLET | Refills: 0 | Status: SHIPPED | OUTPATIENT
Start: 2018-03-17 | End: 2018-03-24

## 2018-03-17 RX ADMIN — METOPROLOL TARTRATE 25 MG: 25 TABLET ORAL at 08:43

## 2018-03-17 RX ADMIN — FUROSEMIDE 40 MG: 40 TABLET ORAL at 08:42

## 2018-03-17 RX ADMIN — Medication 10 ML: at 08:43

## 2018-03-17 RX ADMIN — ENOXAPARIN SODIUM 40 MG: 40 INJECTION SUBCUTANEOUS at 08:43

## 2018-03-17 RX ADMIN — DOCUSATE SODIUM 100 MG: 100 CAPSULE, LIQUID FILLED ORAL at 08:43

## 2018-03-17 RX ADMIN — ASPIRIN 81 MG: 81 TABLET, COATED ORAL at 08:43

## 2018-03-17 RX ADMIN — LISINOPRIL 10 MG: 10 TABLET ORAL at 08:42

## 2018-03-17 RX ADMIN — AMOXICILLIN AND CLAVULANATE POTASSIUM 1 TABLET: 875; 125 TABLET, FILM COATED ORAL at 08:42

## 2018-03-17 RX ADMIN — SPIRONOLACTONE 25 MG: 25 TABLET, FILM COATED ORAL at 08:43

## 2018-03-17 NOTE — PROGRESS NOTES
Consulted on patient for concern of acalculous cholecystitis. He presented to the ER with complaint of abdominal pain and shortness of breath. The patient was recently admitted to [facility] with the same complaint. He reports that he started having abdominal pain about two weeks ago. On review of his records at [facility], he was positive for amphetamines, he had elevated liver enzymes and wall thickening of the gallbladder on CT. He has a history of CHF, likely due to methamphetamine use. He had an ECHO at [facility] in February of this year with an EF of 20-25%. He had an ECHO during his hospitalization at [facility] which also showed an EF of 21-25%. At admissions here his alk phos was 136, , . He had ultrasound and CT showing some wall thickening and periportal edema. No elevated WBC, no elevated bilirubin. An ECHO was repeated, now showing EF of 15-20%. The patient states that when the pain started, it felt like a knot in the epigastric area. He had never had this pain before . The pain had a gradual onset. It migrated towards his umbilicus, and then to the RUQ. He also described the pain as dull. Eating made the pain worse, especially hot foods. Nothing made the pain better. He was on antibiotics at [facility], but did not take any after going home. He denies any constipation or diarrhea, denies acid reflux. He has had some chills and shortness of breath. After reviewing his recent outside charts as well as his current results, I feel his cardiac function is worsening, making him a poor surgical candidate. He has not evidence of stones, and his elevated liver enzymes are likely due to his poor cardiac function. This would also cause edema in the periportal area as well. I discussed this with the patient, and my recommendation is to work on his cardiac function, continue IV antibiotics, and trial of low fat diet.  Will follow up repeat labs in the am.
Hospitalist Progress Note  3/15/2018 2:40 PM  Subjective:   Admit Date: 3/13/2018  PCP: No primary care provider on file. [id number]      Chief Complaint: abd pain    Subjective / History of present illness:   patient is able to tolerate PO. still has pain but relatively well controlled. understands that he is not a surgical candidate because of his siginifcantly compromised cardiac fun and also understands that this CHF is 2/2 his methamphetamines use. patient plans to stop for his child. Cumulative hospital history:   Jeffrey Jovel is a 28 y.o. male who presents to [facility] progressive care unit with symptoms / signs / problem or diagnosis of acute cholecystitis. The patient has a history of a nonischemic cardiomyopathy with a markedly reduced left ventricular ejection fraction of approximately 20%. He was at [facility] last week with the abdominal discomfort and congestive heart failure. Now he comes to [facility] with complaints of severe abdominal pain and is found to have acalculus cholecystitis. Was given IV abx (cards/surgery determined not a candidate for surgery) during inpatient stay.     ROS:  Constitutional / general:  No fever / chills / sweats  Head:  No headache / neck stiffness / trauma / visual change  Eyes:  No blurry vision / acute visual change or loss / itching / redness  ENT: No sore throat / hoarseness / nasal drainage / ear pain  CV:  No chest pain / palpitations/ orthopnea   Respiratory:  No cough / shortness of breath / sputum / hemoptysis  :  No dysuria / hesitancy / urgency / hematuria   Neuro: No paralysis / syncope / seizure / dysphagia / headache / paresthesias  Musculoskeletal:  No muscle weakness /joint stiffness / pain  Vascular: No edema / claudication / thrombosis / varicosities  Heme / endocrine: No easy bruising / bleeding / excessive sweating / heat or cold intolerance  Psychiatric:  No depression / anxiety / insomnia / mood changes  Skin:  No new rashes /
Pt admitted to PCU at [place] [id number]. Pt alert and oriented with c/o abdominal pain. Telemetry monitor applied and shows sinus rhythm. Admission completed by Charlee. Medlist incomplete d/t pt being unsure of dosage amounts. Assessment complete. Paged hospitalist to notify him of pt arrival. Waiting for return call.  Electronically signed by Jonatan Skelton RN on 3/14/2018 at 2:13 AM
Subjective:  Mr. Colby Lazcano reports that he is feeling some better today as far as his abdomen is concerned. He did have some chest pain this morning, and this was followed up by cardiology. He states that this has resolved. He also reports that his abdominal pain is a lot better. He has tolerated the low fat diet without issue. Objective:  /65   Pulse 90   Temp 97.7 °F (36.5 °C) (Temporal)   Resp 16   Ht 5' 7\" (1.702 m)   Wt 197 lb 12.8 oz (89.7 kg)   SpO2 98%   BMI 30.98 kg/m²   General: NAD, AAO  Chest: regular, non-labored  Abdomen: Soft, ND. No TTP today    CBC:   Lab Results   Component Value Date    WBC 7.7 03/15/2018    RBC 4.32 03/15/2018    HGB 13.0 03/15/2018    HCT 39.8 03/15/2018    MCV 92.1 03/15/2018    MCH 30.1 03/15/2018    MCHC 32.7 03/15/2018    RDW 15.5 03/15/2018     03/15/2018    MPV 10.0 03/15/2018     BMP:    Lab Results   Component Value Date     03/15/2018    K 4.4 03/15/2018    CL 98 03/15/2018    CO2 23 03/15/2018    BUN 21 03/15/2018    LABALBU 3.4 03/15/2018    CREATININE 1.0 03/15/2018    CALCIUM 8.7 03/15/2018    LABGLOM >60 03/15/2018    GLUCOSE 139 03/15/2018     Assessment and plan:  28year old male with CHF and acalculous cholecystitis  1) He seems to be doing quite a bit better concerning his abdominal pain today. I discussed with him again that he really needs to stay on a low fat diet, and work on improving and not damaging his heart function any further. He is in agreement with this. I will go ahead and switch him to oral antibiotics, and continue with the low fat diet. 2) Other cares per cardiology and medicine teams.
SpO2 98%   BMI 30.89 kg/m²     CONSTITUTIONAL:  Awake, alert, cooperative, no apparent distress, and appears stated age. HEENT: Normal jugular venous pulsations, no carotid bruits. Head is atraumatic, normocephalic. Eyes and oral mucosa are normal.  LUNGS: Respiratory effort for the work of breathing is normal.  On auscultation: decreased breath sounds  CARDIOVASCULAR:  Normal apical impulse, regular rate and rhythm, normal S1 and S2, no S3 or S4, and no murmur or rub is noted. ABDOMEN: Soft, nontender, nondistended. Bowel sounds are present. No masses or tenderness. SKIN: Warm and dry. EXTREMITIES: no lower extremity edema. No cyanosis or clubbing. NEUROLOGY:  Motor movement grossly intact. Focal signs are not identified. Current Inpatient Medications:   amoxicillin-clavulanate  1 tablet Oral 2 times per day    sodium chloride flush  10 mL Intravenous 2 times per day    docusate sodium  100 mg Oral BID    enoxaparin  40 mg Subcutaneous Daily    lisinopril  10 mg Oral Q12H    metoprolol tartrate  25 mg Oral BID    spironolactone  25 mg Oral Daily    aspirin EC  81 mg Oral Daily    furosemide  40 mg Oral Daily       IV Infusions (if any):      Diagnostics:    EKG: normal sinus rhythm, unchanged from previous tracings. ECHO: reviewed. Stress Test: not obtained. Cardiac Angiography: not obtained. ASSESSMENT:    Patient Active Problem List    Diagnosis Date Noted    Acute acalculous cholecystitis      Priority: High    Chronic systolic heart failure ([address]      Priority: High    Acute systolic congestive heart failure ([address]      Priority: High    IVDU (intravenous drug user) 10/31/2016     Priority: Medium    CHF with unknown LVEF ([address] 03/13/2018     Priority: Low    Nonischemic cardiomyopathy ([address] 11/04/2016     Priority: Low     Consider Methamphetamine induced.       Leukocytosis 10/31/2016     Priority: Low    Tobacco use 10/31/2016     Priority: Low     A 40-year-old male with
The patient was not found in his room or on the cardiac floor during my rounds today. We will try again tomorrow.
(): At least 1 percent but less than 20 percent impaired, limited or restricted  Mobility: Walking and Moving Around Goal Status (): At least 1 percent but less than 20 percent impaired, limited or restricted  Mobility: Walking and Moving Around Discharge Status ():  At least 1 percent but less than 20 percent impaired, limited or restricted  OutComes Score                                           AM-PAC Score             Goals  Patient Goals   Patient goals : go home       Therapy Time   Individual Concurrent Group Co-treatment   Time In           Time Out           Minutes                   Katerin Tan PT    Electronically signed by Katerin Tan PT on 3/15/2018 at 12:26 PM
diet  Discharge planning: home after tolerating po and pain well controlled      Leela Wakefield MD  Internal Medicine Hospitalist

## 2018-03-22 ENCOUNTER — OFFICE VISIT (OUTPATIENT)
Dept: CARDIOLOGY | Age: 33
End: 2018-03-22
Payer: MEDICAID

## 2018-03-22 VITALS
WEIGHT: 192 LBS | HEART RATE: 90 BPM | SYSTOLIC BLOOD PRESSURE: 120 MMHG | HEIGHT: 67 IN | DIASTOLIC BLOOD PRESSURE: 64 MMHG | BODY MASS INDEX: 30.13 KG/M2

## 2018-03-22 DIAGNOSIS — F19.90 IVDU (INTRAVENOUS DRUG USER): Chronic | ICD-10-CM

## 2018-03-22 DIAGNOSIS — I50.22 CHRONIC SYSTOLIC HEART FAILURE (HCC): Primary | ICD-10-CM

## 2018-03-22 DIAGNOSIS — Z72.0 TOBACCO USE: Chronic | ICD-10-CM

## 2018-03-22 PROCEDURE — G8427 DOCREV CUR MEDS BY ELIG CLIN: HCPCS | Performed by: NURSE PRACTITIONER

## 2018-03-22 PROCEDURE — 1111F DSCHRG MED/CURRENT MED MERGE: CPT | Performed by: NURSE PRACTITIONER

## 2018-03-22 PROCEDURE — 99213 OFFICE O/P EST LOW 20 MIN: CPT | Performed by: NURSE PRACTITIONER

## 2018-03-22 PROCEDURE — 4004F PT TOBACCO SCREEN RCVD TLK: CPT | Performed by: NURSE PRACTITIONER

## 2018-03-22 PROCEDURE — G8417 CALC BMI ABV UP PARAM F/U: HCPCS | Performed by: NURSE PRACTITIONER

## 2018-03-22 PROCEDURE — 93000 ELECTROCARDIOGRAM COMPLETE: CPT | Performed by: NURSE PRACTITIONER

## 2018-03-22 PROCEDURE — G8484 FLU IMMUNIZE NO ADMIN: HCPCS | Performed by: NURSE PRACTITIONER

## 2018-03-22 RX ORDER — FUROSEMIDE 40 MG/1
40 TABLET ORAL DAILY
COMMUNITY
Start: 2018-03-10 | End: 2018-07-25 | Stop reason: SDUPTHER

## 2018-03-22 NOTE — PROGRESS NOTES
Dear Drs. No ref. provider found & No primary care provider on file.,    Thank you for allowing me to participate in the care of Mr. Brian Pelletier. He presents today at the [address] [place] [address] in the [facility]. As you know, Mr. Alexandre Zimmerman is a 28 y.o. male with history of hypertension and CHF who presents with the chief complaint of hospital follow up. He is a patient of Dr. Randy Nance. Mr. Alexandre Zimmerman is a patient of Dr. Randy Nanec who has a know history of IV drug use. He was noted to have a low EF and in fact wore a life vest in the past however he did not want an ICD at that time. He then moved to [place] for a period of time and came off all of his medications. He has moved back and was admitted to [facility] earlier this month followed by an admission to [facility] the next day for his gallbladder. A repeat Echo revealed his EF to be 15-20%. He does not want an ICD and feels he is too young. \"If it is my time then I just as soon go. \" He last used drugs prior to his admission at [facility]. No alcohol for over a year. Denies abdominal pain or chest pain. Sleeps with 1 pillow and flat in the bed. Denies PND/orthopnea. No swelling. SOB with exertion or long distances, none at rest. No problems with inclines. Watches salt intake. Has a scale but has not been weighing every day. Since back from [facility] (June of last year, he has been off all of his medications until seen at [facility]. He has been back on his medications approximately 2 weeks. Good urine output with normal color. Just started checking BP at home. This am was 120/71. Labs at discharge K 4.2, Creatinine 0.9, BUN 16. He otherwise denies chest pain, SOA, PND, orthopnea, syncope or near syncope. He has no other complaints. Review of Systems    Constitutional: Negative for fever, chills, diaphoresis, activity change, appetite change, fatigue and unexpected weight change.    Eyes: Negative for photophobia, pain, redness and visual mildly thickened.   Decreased mitral valve mobility noted.   Severe mitral regurgitation is present.   Tricuspid valve is structurally normal.   Moderate-to-severe tricuspid regurgitation.   Moderate pulmonic regurgitation present.   Moderately dilated left atrium.   Severely dilated left ventricle.   Globally hypokinesis is present.   Estimated ejection fraction is 15-20%   Normal size right ventricle cavity.   Right ventricular systolic pressure is noted at 58mmHg.      Signature   ----------------------------------------------------------------   Electronically signed by Eusebio Romeo MD(Interpreting   physician) on 03/14/2018 04:14 PM        Assessment    1. Chronic systolic heart failure ([address]    2. IVDU (intravenous drug user)    3. Tobacco use          Plan:    Long discussion with him in regards to CHF, pathophysiology, ICD, and drug use. We discussed each medication and why he needs to be on them. We discussed sodium and fluid intake, diet, and exercise. At this time he does not wish to have an ICD. He understands the risks that are associated with an EF of 15-20%. He has agreed to stay on his medications. Disposition - RTC in 6-8 weeks or sooner if needed    Please do not hesitate to contact me for any questions or concerns.     Sincerely yours,    KAYLEEN Sullivan

## 2018-04-16 ASSESSMENT — ENCOUNTER SYMPTOMS
SORE THROAT: 0
DIARRHEA: 0
NAUSEA: 0
DOUBLE VISION: 0
ABDOMINAL PAIN: 1
COUGH: 0
SHORTNESS OF BREATH: 1
VOMITING: 0
CONSTIPATION: 0
BLURRED VISION: 0

## 2018-04-16 NOTE — CONSULTS
Consults     Mr. Brian Vickers is a 28year old male. General surgery is consulted on this patient for concern of acalculous cholecystitis. He presented to the ER with complaint of abdominal pain and shortness of breath. The patient was recently admitted to [facility] with the same complaint. He reports that he started having abdominal pain about two weeks ago. On review of his records at [facility], he was positive for amphetamines, he had elevated liver enzymes and wall thickening of the gallbladder on CT. He has a history of CHF, likely due to methamphetamine use. He had an ECHO at [facility] in February of this year with an EF of 20-25%. He had an ECHO during his hospitalization at [facility] which also showed an EF of 21-25%. At admissions here his alk phos was 136, , . He had ultrasound and CT showing some wall thickening and periportal edema. No elevated WBC, no elevated bilirubin. An ECHO was repeated, now showing EF of 15-20%.    The patient states that when the pain started, it felt like a knot in the epigastric area. He had never had this pain before . The pain had a gradual onset. It migrated towards his umbilicus, and then to the RUQ. He also described the pain as dull. Eating made the pain worse, especially hot foods. Nothing made the pain better. He was on antibiotics at [facility], but did not take any after going home. He denies any constipation or diarrhea, denies acid reflux. He has had some chills and shortness of breath.       Past Medical History:   Diagnosis Date    CHF (congestive heart failure) (HCC)     Hypertension      Past Surgical History:   Procedure Laterality Date    HAND SURGERY       No current facility-administered medications for this encounter. Current Outpatient Prescriptions   Medication Sig Dispense Refill    nitroGLYCERIN (NITROSTAT) 0.4 MG SL tablet up to max of 3 total doses.  If no relief after 1 dose, call 911. 25 tablet 3    lisinopril (PRINIVIL;ZESTRIL) 10 MG CT:  Impression   1. Periportal edema with gallbladder wall thickening. 2. Gallbladder ultrasound recommended to assess for stones and to   better assess gallbladder wall thickening. 3. Correlation with amylase and lipase values recommended to assess   for pancreatitis. 4. The small fat-containing umbilical hernia does not appear to be   significant. Signed by Dr Irvin Brittle on 3/13/2018 8:25 PM         Assessment and plan:  28year old male with heart failure secondary to drug use, now with acalculous cholecystitis  After reviewing his recent outside charts as well as his current results, I feel his cardiac function is worsening, making him a poor surgical candidate. He has not evidence of stones, and his elevated liver enzymes are likely due to his poor cardiac function. This would also cause edema in the periportal area as well. I discussed this with the patient, and my recommendation is to work on his cardiac function, continue IV antibiotics, and trial of low fat diet.  Will follow up repeat labs in the am.

## 2018-05-08 ENCOUNTER — TELEPHONE (OUTPATIENT)
Dept: CARDIOLOGY | Age: 33
End: 2018-05-08

## 2018-07-25 RX ORDER — NITROGLYCERIN 0.4 MG/1
TABLET SUBLINGUAL
Qty: 25 TABLET | Refills: 3 | Status: ON HOLD | OUTPATIENT
Start: 2018-07-25 | End: 2018-10-19 | Stop reason: HOSPADM

## 2018-07-25 RX ORDER — SPIRONOLACTONE 25 MG/1
25 TABLET ORAL DAILY
Qty: 30 TABLET | Refills: 3 | Status: SHIPPED | OUTPATIENT
Start: 2018-07-25 | End: 2019-01-23 | Stop reason: SDUPTHER

## 2018-07-25 RX ORDER — LISINOPRIL 10 MG/1
10 TABLET ORAL EVERY 12 HOURS
Qty: 30 TABLET | Refills: 3 | Status: SHIPPED | OUTPATIENT
Start: 2018-07-25 | End: 2018-08-15 | Stop reason: CLARIF

## 2018-07-25 RX ORDER — FUROSEMIDE 40 MG/1
40 TABLET ORAL DAILY
Qty: 60 TABLET | Refills: 3 | Status: ON HOLD | OUTPATIENT
Start: 2018-07-25 | End: 2019-05-09 | Stop reason: SDUPTHER

## 2018-07-25 RX ORDER — SPIRONOLACTONE 25 MG/1
25 TABLET ORAL DAILY
Qty: 30 TABLET | Refills: 3 | Status: CANCELLED | OUTPATIENT
Start: 2018-07-25

## 2018-07-25 RX ORDER — NITROGLYCERIN 0.4 MG/1
TABLET SUBLINGUAL
Qty: 25 TABLET | Refills: 3 | Status: CANCELLED | OUTPATIENT
Start: 2018-07-25

## 2018-07-25 RX ORDER — FUROSEMIDE 40 MG/1
40 TABLET ORAL DAILY
Qty: 60 TABLET | Refills: 3 | Status: CANCELLED | OUTPATIENT
Start: 2018-07-25

## 2018-07-25 RX ORDER — LISINOPRIL 10 MG/1
10 TABLET ORAL EVERY 12 HOURS
Qty: 30 TABLET | Refills: 3 | Status: CANCELLED | OUTPATIENT
Start: 2018-07-25

## 2018-07-29 ENCOUNTER — APPOINTMENT (OUTPATIENT)
Dept: CT IMAGING | Facility: HOSPITAL | Age: 33
End: 2018-07-29

## 2018-07-29 ENCOUNTER — HOSPITAL ENCOUNTER (INPATIENT)
Facility: HOSPITAL | Age: 33
LOS: 1 days | Discharge: HOME | End: 2018-07-30
Attending: FAMILY MEDICINE | Admitting: INTERNAL MEDICINE

## 2018-07-29 ENCOUNTER — APPOINTMENT (OUTPATIENT)
Dept: GENERAL RADIOLOGY | Facility: HOSPITAL | Age: 33
End: 2018-07-29

## 2018-07-29 DIAGNOSIS — I50.9 ACUTE ON CHRONIC CONGESTIVE HEART FAILURE, UNSPECIFIED CONGESTIVE HEART FAILURE TYPE: Primary | ICD-10-CM

## 2018-07-29 LAB
ALBUMIN SERPL-MCNC: 4 G/DL (ref 3.5–5)
ALBUMIN/GLOB SERPL: 1.4 G/DL (ref 1.1–2.5)
ALP SERPL-CCNC: 112 U/L (ref 24–120)
ALT SERPL W P-5'-P-CCNC: 39 U/L (ref 0–54)
AMPHET+METHAMPHET UR QL: POSITIVE
ANION GAP SERPL CALCULATED.3IONS-SCNC: 11 MMOL/L (ref 4–13)
APAP SERPL-MCNC: <10 MCG/ML (ref 10–30)
APTT PPP: 29.9 SECONDS (ref 24.1–34.8)
ARTERIAL PATENCY WRIST A: POSITIVE
AST SERPL-CCNC: 40 U/L (ref 7–45)
ATMOSPHERIC PRESS: 752 MMHG
BARBITURATES UR QL SCN: NEGATIVE
BASE EXCESS BLDA CALC-SCNC: 2 MMOL/L (ref 0–2)
BASOPHILS # BLD AUTO: 0.08 10*3/MM3 (ref 0–0.2)
BASOPHILS NFR BLD AUTO: 0.8 % (ref 0–2)
BDY SITE: ABNORMAL
BENZODIAZ UR QL SCN: NEGATIVE
BILIRUB SERPL-MCNC: 0.7 MG/DL (ref 0.1–1)
BILIRUB UR QL STRIP: NEGATIVE
BODY TEMPERATURE: 37 C
BUN BLD-MCNC: 13 MG/DL (ref 5–21)
BUN/CREAT SERPL: 19.1 (ref 7–25)
CALCIUM SPEC-SCNC: 9.4 MG/DL (ref 8.4–10.4)
CANNABINOIDS SERPL QL: NEGATIVE
CHLORIDE SERPL-SCNC: 104 MMOL/L (ref 98–110)
CLARITY UR: CLEAR
CO2 SERPL-SCNC: 25 MMOL/L (ref 24–31)
COCAINE UR QL: NEGATIVE
COLOR UR: YELLOW
CREAT BLD-MCNC: 0.68 MG/DL (ref 0.5–1.4)
D DIMER PPP FEU-MCNC: 1.71 MG/L (FEU) (ref 0–0.5)
DEPRECATED RDW RBC AUTO: 57.6 FL (ref 40–54)
EOSINOPHIL # BLD AUTO: 0.18 10*3/MM3 (ref 0–0.7)
EOSINOPHIL NFR BLD AUTO: 1.8 % (ref 0–4)
ERYTHROCYTE [DISTWIDTH] IN BLOOD BY AUTOMATED COUNT: 17.5 % (ref 12–15)
ETHANOL UR QL: <0.01 %
GFR SERPL CREATININE-BSD FRML MDRD: 135 ML/MIN/1.73
GLOBULIN UR ELPH-MCNC: 2.8 GM/DL
GLUCOSE BLD-MCNC: 122 MG/DL (ref 70–100)
GLUCOSE UR STRIP-MCNC: NEGATIVE MG/DL
HCO3 BLDA-SCNC: 25.8 MMOL/L (ref 20–26)
HCT VFR BLD AUTO: 39 % (ref 40–52)
HGB BLD-MCNC: 13.2 G/DL (ref 14–18)
HGB UR QL STRIP.AUTO: NEGATIVE
HOLD SPECIMEN: NORMAL
IMM GRANULOCYTES # BLD: 0.07 10*3/MM3 (ref 0–0.03)
IMM GRANULOCYTES NFR BLD: 0.7 % (ref 0–5)
INR PPP: 1 (ref 0.91–1.09)
KETONES UR QL STRIP: NEGATIVE
LEUKOCYTE ESTERASE UR QL STRIP.AUTO: NEGATIVE
LIPASE SERPL-CCNC: 132 U/L (ref 23–203)
LYMPHOCYTES # BLD AUTO: 1.88 10*3/MM3 (ref 0.72–4.86)
LYMPHOCYTES NFR BLD AUTO: 18.4 % (ref 15–45)
Lab: ABNORMAL
MCH RBC QN AUTO: 30.5 PG (ref 28–32)
MCHC RBC AUTO-ENTMCNC: 33.8 G/DL (ref 33–36)
MCV RBC AUTO: 90.1 FL (ref 82–95)
METHADONE UR QL SCN: NEGATIVE
MODALITY: ABNORMAL
MONOCYTES # BLD AUTO: 0.79 10*3/MM3 (ref 0.19–1.3)
MONOCYTES NFR BLD AUTO: 7.8 % (ref 4–12)
NEUTROPHILS # BLD AUTO: 7.19 10*3/MM3 (ref 1.87–8.4)
NEUTROPHILS NFR BLD AUTO: 70.5 % (ref 39–78)
NITRITE UR QL STRIP: NEGATIVE
NRBC BLD MANUAL-RTO: 0 /100 WBC (ref 0–0)
NT-PROBNP SERPL-MCNC: 5180 PG/ML (ref 0–450)
OPIATES UR QL: NEGATIVE
PCO2 BLDA: 36.6 MM HG (ref 35–45)
PCP UR QL SCN: NEGATIVE
PH BLDA: 7.46 PH UNITS (ref 7.35–7.45)
PH UR STRIP.AUTO: 6 [PH] (ref 5–8)
PLATELET # BLD AUTO: 304 10*3/MM3 (ref 130–400)
PMV BLD AUTO: 10.4 FL (ref 6–12)
PO2 BLDA: 74.1 MM HG (ref 83–108)
POTASSIUM BLD-SCNC: 4.3 MMOL/L (ref 3.5–5.3)
PROT SERPL-MCNC: 6.8 G/DL (ref 6.3–8.7)
PROT UR QL STRIP: NEGATIVE
PROTHROMBIN TIME: 13.5 SECONDS (ref 11.9–14.6)
RBC # BLD AUTO: 4.33 10*6/MM3 (ref 4.8–5.9)
SALICYLATES SERPL-MCNC: <1 MG/DL (ref 15–30)
SAO2 % BLDCOA: 95.8 % (ref 94–99)
SODIUM BLD-SCNC: 140 MMOL/L (ref 135–145)
SP GR UR STRIP: 1.01 (ref 1–1.03)
TROPONIN I SERPL-MCNC: 0.04 NG/ML (ref 0–0.03)
TROPONIN I SERPL-MCNC: 0.06 NG/ML (ref 0–0.03)
UROBILINOGEN UR QL STRIP: NORMAL
VENTILATOR MODE: ABNORMAL
WBC NRBC COR # BLD: 10.19 10*3/MM3 (ref 4.8–10.8)
WHOLE BLOOD HOLD SPECIMEN: NORMAL
WHOLE BLOOD HOLD SPECIMEN: NORMAL

## 2018-07-29 PROCEDURE — 80307 DRUG TEST PRSMV CHEM ANLYZR: CPT | Performed by: FAMILY MEDICINE

## 2018-07-29 PROCEDURE — 93010 ELECTROCARDIOGRAM REPORT: CPT | Performed by: INTERNAL MEDICINE

## 2018-07-29 PROCEDURE — 85025 COMPLETE CBC W/AUTO DIFF WBC: CPT | Performed by: FAMILY MEDICINE

## 2018-07-29 PROCEDURE — 25010000002 ENOXAPARIN PER 10 MG: Performed by: INTERNAL MEDICINE

## 2018-07-29 PROCEDURE — 71275 CT ANGIOGRAPHY CHEST: CPT

## 2018-07-29 PROCEDURE — 84484 ASSAY OF TROPONIN QUANT: CPT | Performed by: INTERNAL MEDICINE

## 2018-07-29 PROCEDURE — 36600 WITHDRAWAL OF ARTERIAL BLOOD: CPT

## 2018-07-29 PROCEDURE — 85610 PROTHROMBIN TIME: CPT | Performed by: FAMILY MEDICINE

## 2018-07-29 PROCEDURE — 0 IOPAMIDOL PER 1 ML: Performed by: FAMILY MEDICINE

## 2018-07-29 PROCEDURE — 84484 ASSAY OF TROPONIN QUANT: CPT | Performed by: FAMILY MEDICINE

## 2018-07-29 PROCEDURE — 25010000002 FUROSEMIDE PER 20 MG: Performed by: FAMILY MEDICINE

## 2018-07-29 PROCEDURE — 81003 URINALYSIS AUTO W/O SCOPE: CPT | Performed by: FAMILY MEDICINE

## 2018-07-29 PROCEDURE — 94799 UNLISTED PULMONARY SVC/PX: CPT

## 2018-07-29 PROCEDURE — 99284 EMERGENCY DEPT VISIT MOD MDM: CPT

## 2018-07-29 PROCEDURE — 71045 X-RAY EXAM CHEST 1 VIEW: CPT

## 2018-07-29 PROCEDURE — 80053 COMPREHEN METABOLIC PANEL: CPT | Performed by: FAMILY MEDICINE

## 2018-07-29 PROCEDURE — 85379 FIBRIN DEGRADATION QUANT: CPT | Performed by: FAMILY MEDICINE

## 2018-07-29 PROCEDURE — 74177 CT ABD & PELVIS W/CONTRAST: CPT

## 2018-07-29 PROCEDURE — 83690 ASSAY OF LIPASE: CPT | Performed by: FAMILY MEDICINE

## 2018-07-29 PROCEDURE — 82803 BLOOD GASES ANY COMBINATION: CPT

## 2018-07-29 PROCEDURE — 83880 ASSAY OF NATRIURETIC PEPTIDE: CPT | Performed by: FAMILY MEDICINE

## 2018-07-29 PROCEDURE — 93005 ELECTROCARDIOGRAM TRACING: CPT | Performed by: FAMILY MEDICINE

## 2018-07-29 PROCEDURE — 85730 THROMBOPLASTIN TIME PARTIAL: CPT | Performed by: FAMILY MEDICINE

## 2018-07-29 RX ORDER — FUROSEMIDE 10 MG/ML
40 INJECTION INTRAMUSCULAR; INTRAVENOUS EVERY 12 HOURS
Status: DISCONTINUED | OUTPATIENT
Start: 2018-07-30 | End: 2018-07-30 | Stop reason: HOSPADM

## 2018-07-29 RX ORDER — SPIRONOLACTONE 25 MG/1
25 TABLET ORAL DAILY
Status: DISCONTINUED | OUTPATIENT
Start: 2018-07-29 | End: 2018-07-30 | Stop reason: HOSPADM

## 2018-07-29 RX ORDER — METOPROLOL SUCCINATE 100 MG/1
100 TABLET, EXTENDED RELEASE ORAL
Status: DISCONTINUED | OUTPATIENT
Start: 2018-07-29 | End: 2018-07-30 | Stop reason: HOSPADM

## 2018-07-29 RX ORDER — BISACODYL 5 MG/1
5 TABLET, DELAYED RELEASE ORAL DAILY PRN
Status: DISCONTINUED | OUTPATIENT
Start: 2018-07-29 | End: 2018-07-30 | Stop reason: HOSPADM

## 2018-07-29 RX ORDER — ACETAMINOPHEN 325 MG/1
650 TABLET ORAL EVERY 4 HOURS PRN
Status: DISCONTINUED | OUTPATIENT
Start: 2018-07-29 | End: 2018-07-30 | Stop reason: HOSPADM

## 2018-07-29 RX ORDER — LISINOPRIL 2.5 MG/1
2.5 TABLET ORAL
Status: DISCONTINUED | OUTPATIENT
Start: 2018-07-29 | End: 2018-07-30 | Stop reason: HOSPADM

## 2018-07-29 RX ORDER — ONDANSETRON 2 MG/ML
4 INJECTION INTRAMUSCULAR; INTRAVENOUS EVERY 6 HOURS PRN
Status: DISCONTINUED | OUTPATIENT
Start: 2018-07-29 | End: 2018-07-30 | Stop reason: HOSPADM

## 2018-07-29 RX ORDER — FUROSEMIDE 10 MG/ML
40 INJECTION INTRAMUSCULAR; INTRAVENOUS ONCE
Status: COMPLETED | OUTPATIENT
Start: 2018-07-29 | End: 2018-07-29

## 2018-07-29 RX ORDER — NICOTINE 21 MG/24HR
1 PATCH, TRANSDERMAL 24 HOURS TRANSDERMAL EVERY 24 HOURS
Status: DISCONTINUED | OUTPATIENT
Start: 2018-07-29 | End: 2018-07-30 | Stop reason: HOSPADM

## 2018-07-29 RX ORDER — ASPIRIN 81 MG/1
81 TABLET ORAL DAILY
Status: DISCONTINUED | OUTPATIENT
Start: 2018-07-29 | End: 2018-07-30 | Stop reason: HOSPADM

## 2018-07-29 RX ORDER — SODIUM CHLORIDE 0.9 % (FLUSH) 0.9 %
1-10 SYRINGE (ML) INJECTION AS NEEDED
Status: DISCONTINUED | OUTPATIENT
Start: 2018-07-29 | End: 2018-07-30 | Stop reason: HOSPADM

## 2018-07-29 RX ADMIN — SPIRONOLACTONE 25 MG: 25 TABLET ORAL at 18:04

## 2018-07-29 RX ADMIN — ASPIRIN 81 MG: 81 TABLET ORAL at 18:04

## 2018-07-29 RX ADMIN — METOPROLOL SUCCINATE 100 MG: 100 TABLET, FILM COATED, EXTENDED RELEASE ORAL at 18:04

## 2018-07-29 RX ADMIN — IOPAMIDOL 150 ML: 755 INJECTION, SOLUTION INTRAVENOUS at 14:05

## 2018-07-29 RX ADMIN — LISINOPRIL 2.5 MG: 2.5 TABLET ORAL at 18:04

## 2018-07-29 RX ADMIN — FUROSEMIDE 40 MG: 10 INJECTION, SOLUTION INTRAMUSCULAR; INTRAVENOUS at 14:47

## 2018-07-29 RX ADMIN — ENOXAPARIN SODIUM 40 MG: 100 INJECTION SUBCUTANEOUS at 18:05

## 2018-07-29 RX ADMIN — NICOTINE 1 PATCH: 14 PATCH, EXTENDED RELEASE TRANSDERMAL at 18:04

## 2018-07-30 ENCOUNTER — APPOINTMENT (OUTPATIENT)
Dept: ULTRASOUND IMAGING | Facility: HOSPITAL | Age: 33
End: 2018-07-30

## 2018-07-30 VITALS
HEIGHT: 68 IN | HEART RATE: 86 BPM | OXYGEN SATURATION: 98 % | TEMPERATURE: 97.6 F | RESPIRATION RATE: 16 BRPM | BODY MASS INDEX: 30.01 KG/M2 | WEIGHT: 198 LBS | SYSTOLIC BLOOD PRESSURE: 138 MMHG | DIASTOLIC BLOOD PRESSURE: 73 MMHG

## 2018-07-30 LAB
ANION GAP SERPL CALCULATED.3IONS-SCNC: 13 MMOL/L (ref 4–13)
BUN BLD-MCNC: 15 MG/DL (ref 5–21)
BUN/CREAT SERPL: 17.9 (ref 7–25)
CALCIUM SPEC-SCNC: 9.4 MG/DL (ref 8.4–10.4)
CHLORIDE SERPL-SCNC: 99 MMOL/L (ref 98–110)
CO2 SERPL-SCNC: 29 MMOL/L (ref 24–31)
CREAT BLD-MCNC: 0.84 MG/DL (ref 0.5–1.4)
GFR SERPL CREATININE-BSD FRML MDRD: 106 ML/MIN/1.73
GLUCOSE BLD-MCNC: 119 MG/DL (ref 70–100)
POTASSIUM BLD-SCNC: 4.4 MMOL/L (ref 3.5–5.3)
SODIUM BLD-SCNC: 141 MMOL/L (ref 135–145)
TROPONIN I SERPL-MCNC: 0.05 NG/ML (ref 0–0.03)

## 2018-07-30 PROCEDURE — 80048 BASIC METABOLIC PNL TOTAL CA: CPT | Performed by: INTERNAL MEDICINE

## 2018-07-30 PROCEDURE — 25010000002 FUROSEMIDE PER 20 MG: Performed by: INTERNAL MEDICINE

## 2018-07-30 PROCEDURE — 94760 N-INVAS EAR/PLS OXIMETRY 1: CPT

## 2018-07-30 PROCEDURE — 99406 BEHAV CHNG SMOKING 3-10 MIN: CPT

## 2018-07-30 PROCEDURE — 94799 UNLISTED PULMONARY SVC/PX: CPT

## 2018-07-30 RX ORDER — LISINOPRIL 2.5 MG/1
2.5 TABLET ORAL
Qty: 30 TABLET | Refills: 0 | Status: ON HOLD | OUTPATIENT
Start: 2018-07-30 | End: 2018-12-18

## 2018-07-30 RX ORDER — FUROSEMIDE 40 MG/1
40 TABLET ORAL DAILY
Qty: 30 TABLET | Refills: 0 | Status: SHIPPED | OUTPATIENT
Start: 2018-07-30 | End: 2018-12-20 | Stop reason: HOSPADM

## 2018-07-30 RX ORDER — METOPROLOL SUCCINATE 100 MG/1
100 TABLET, EXTENDED RELEASE ORAL
Qty: 30 TABLET | Refills: 0 | Status: ON HOLD | OUTPATIENT
Start: 2018-07-30 | End: 2018-12-18

## 2018-07-30 RX ORDER — SPIRONOLACTONE 25 MG/1
25 TABLET ORAL DAILY
Qty: 30 TABLET | Refills: 0 | Status: ON HOLD | OUTPATIENT
Start: 2018-07-30 | End: 2018-12-20 | Stop reason: SDUPTHER

## 2018-07-30 RX ADMIN — SPIRONOLACTONE 25 MG: 25 TABLET ORAL at 09:59

## 2018-07-30 RX ADMIN — FUROSEMIDE 40 MG: 10 INJECTION, SOLUTION INTRAMUSCULAR; INTRAVENOUS at 02:02

## 2018-07-30 RX ADMIN — LISINOPRIL 2.5 MG: 2.5 TABLET ORAL at 09:59

## 2018-07-30 RX ADMIN — ASPIRIN 81 MG: 81 TABLET ORAL at 09:56

## 2018-07-30 RX ADMIN — METOPROLOL SUCCINATE 100 MG: 100 TABLET, FILM COATED, EXTENDED RELEASE ORAL at 09:56

## 2018-08-15 ENCOUNTER — OFFICE VISIT (OUTPATIENT)
Dept: CARDIOLOGY | Age: 33
End: 2018-08-15
Payer: COMMERCIAL

## 2018-08-15 VITALS
SYSTOLIC BLOOD PRESSURE: 110 MMHG | HEART RATE: 104 BPM | HEIGHT: 67 IN | WEIGHT: 199 LBS | DIASTOLIC BLOOD PRESSURE: 60 MMHG | BODY MASS INDEX: 31.23 KG/M2

## 2018-08-15 DIAGNOSIS — I50.21 ACUTE SYSTOLIC CONGESTIVE HEART FAILURE (HCC): Primary | ICD-10-CM

## 2018-08-15 PROCEDURE — 99213 OFFICE O/P EST LOW 20 MIN: CPT | Performed by: NURSE PRACTITIONER

## 2018-08-15 PROCEDURE — 93000 ELECTROCARDIOGRAM COMPLETE: CPT | Performed by: NURSE PRACTITIONER

## 2018-08-15 RX ORDER — LISINOPRIL 10 MG/1
10 TABLET ORAL DAILY
COMMUNITY
End: 2019-06-17 | Stop reason: SDUPTHER

## 2018-08-15 RX ORDER — METOPROLOL SUCCINATE 25 MG/1
25 TABLET, EXTENDED RELEASE ORAL EVERY 12 HOURS
Qty: 60 TABLET | Refills: 3 | Status: SHIPPED | OUTPATIENT
Start: 2018-08-15 | End: 2019-06-17 | Stop reason: SDUPTHER

## 2018-08-15 NOTE — PATIENT INSTRUCTIONS
quitting, talk to your doctor about stop-smoking programs and medicines. These can increase your chances of quitting for good. Quitting smoking may be the most important step you can take to protect your heart.     · Limit alcohol to 2 drinks a day for men and 1 drink a day for women. Too much alcohol can cause health problems.     · Avoid getting sick from colds and the flu. Get a pneumococcal vaccine shot. If you have had one before, ask your doctor whether you need another dose. Get a flu shot each year. If you must be around people with colds or the flu, wash your hands often. When should you call for help? Call 911 if you have symptoms of sudden heart failure such as:    · You have severe trouble breathing.     · You cough up pink, foamy mucus.     · You have a new irregular or rapid heartbeat.    Call your doctor now or seek immediate medical care if:    · You have new or increased shortness of breath.     · You are dizzy or lightheaded, or you feel like you may faint.     · You have sudden weight gain, such as more than 2 to 3 pounds in a day or 5 pounds in a week. (Your doctor may suggest a different range of weight gain.)     · You have increased swelling in your legs, ankles, or feet.     · You are suddenly so tired or weak that you cannot do your usual activities.    Watch closely for changes in your health, and be sure to contact your doctor if you develop new symptoms. Where can you learn more? Go to https://[place].IMVU. org and sign in to your adRise account. Enter R168 in the Ma-papeterie box to learn more about \"Heart Failure: Care Instructions. \"     If you do not have an account, please click on the \"Sign Up Now\" link. Current as of: May 10, 2017  Content Version: 11.7  © 0165-1607 [employer]. Care instructions adapted under license by [facility] ([facility]).  If you have questions about a medical condition or this instruction, always ask your healthcare professional. [address] any warranty or liability for your use of this information. Patient Education        Avoiding Triggers With Heart Failure: Care Instructions  Your Care Instructions    Triggers are anything that make your heart failure flare up. A flare-up is also called \"sudden heart failure\" or \"acute heart failure. \" When you have a flare-up, fluid builds up in your lungs, and you have problems breathing. You might need to go to the hospital. By watching for changes in your condition and avoiding triggers, you can prevent heart failure flare-ups. Follow-up care is a key part of your treatment and safety. Be sure to make and go to all appointments, and call your doctor if you are having problems. It's also a good idea to know your test results and keep a list of the medicines you take. How can you care for yourself at home? Watch for changes in your weight and condition  · Weigh yourself without clothing at the same time each day. Record your weight. Call your doctor if you have sudden weight gain, such as more than 2 to 3 pounds in a day or 5 pounds in a week. (Your doctor may suggest a different range of weight gain.) A sudden weight gain may mean that your heart failure is getting worse. · Keep a daily record of your symptoms. Write down any changes in how you feel, such as new shortness of breath, cough, or problems eating. Also record if your ankles are more swollen than usual and if you feel more tired than usual. Note anything that you ate or did that could have triggered these changes. Limit sodium  Sodium causes your body to hold on to extra water. This may cause your heart failure symptoms to get worse. People get most of their sodium from processed foods. Fast food and restaurant meals also tend to be very high in sodium. · Your doctor may suggest that you limit sodium to 2,000 milligrams (mg) a day or less.  That is less than 1 teaspoon of salt a day, including instructions adapted under license by [facility] ([facility]). If you have questions about a medical condition or this instruction, always ask your healthcare professional. [address] any warranty or liability for your use of this information. Patient Education        Medicines for Heart Failure: Care Instructions  Your Care Instructions    Most people with heart failure are helped by taking several medicines to protect their heart. These medicines can help you feel better and live longer. It is important to take all your medicines exactly as prescribed to get the best results. They can also cause side effects. If you think that any of your medicines are causing side effects, talk with your doctor. Follow-up care is a key part of your treatment and safety. Be sure to make and go to all appointments. Call your doctor if you are having problems. It's also a good idea to know your test results and keep a list of the medicines you take. What medicines are used for heart failure? · Angiotensin-converting enzyme (ACE) inhibitors help blood flow. They relax the blood vessels and lower your blood pressure. The heart can then pump more blood through your body without working harder. These include lisinopril and captopril. · Angiotensin II receptor blockers (ARBs) work like ACE inhibitors. Some people use them instead. They include losartan. · Angiotensin receptor neprilysin inhibitor (ARNI) medicine works like ARBs and ACE inhibitors. Some people take an ARNI medicine instead. An example is sacubitril/valsartan. · Diuretics reduce swelling. They do this by helping the kidneys get rid of excess fluid. They are also called water pills. · Beta-blockers can slow the heart rate and decrease blood pressure. They can help heart failure. They include bisoprolol, carvedilol, and metoprolol. · Digoxin relieves symptoms in some people with heart failure.   · Aspirin and other blood thinners prevent blood stools. Where can you learn more? Go to https://chpepiceweb.Intellocorp. org and sign in to your Incomparable Things account. Enter F132 in the [employer]GN Holdings[employer] box to learn more about \"Medicines for Heart Failure: Care Instructions. \"     If you do not have an account, please click on the \"Sign Up Now\" link. Current as of: December 6, 2017  Content Version: 11.7  © 3514-5474 "[employer] Inc.". Care instructions adapted under license by [facility] ([facility]). If you have questions about a medical condition or this instruction, always ask your healthcare professional. [place] [address] any warranty or liability for your use of this information. Patient Education        Medicines to Avoid With Heart Failure: Care Instructions  Your Care Instructions    Your doctor gave you medicines to help treat your heart failure. But did you know that many other medicines can make heart failure worse? Even medicines and herbs that you buy over the counter (OTC) can harm you. Be sure your doctor knows all of the OTC and prescription drugs you take. And don't start to take any medicine unless your doctor says it's okay. Follow-up care is a key part of your treatment and safety. Be sure to make and go to all appointments, and call your doctor if you are having problems. It's also a good idea to know your test results and keep a list of the medicines you take. How can you care for yourself at home? Over-the-counter drugs  · Before you take any over-the-counter drug, ask your doctor or pharmacist if it is safe. This includes herbs and vitamins. Medicines to avoid include:  ¨ Pain relievers called NSAIDs. These include ibuprofen and naproxen. Use acetaminophen instead. For example, you can take Tylenol for pain or fever. ¨ Low-dose aspirin. If your doctor has told you to take aspirin every day for your heart, follow his or her instructions on how much to take. Do not take aspirin for pain.   ¨ Antacids or laxatives. Do not take ones that have sodium in them. These include Jennifer-Lonedell. ¨ Cold, cough, flu, or sinus medicines. Read the label. Do not take ones that have pseudoephedrine, ephedrine, phenylephrine, or oxymetazoline in them. And make sure they don't have aspirin or ibuprofen in them. Watch for all of these in allergy medicines, nose sprays, and herbal products too. ¨ Supplements and vitamins. These include black cohosh, Hermann's wort, and vitamin E.  Prescription drugs  · Each time you see a doctor, make sure he or she knows that you take drugs for heart failure. Before you fill any new prescription, ask the pharmacist if it's okay to take the new drug. Drugs that can make heart failure worse include:  ¨ Calcium channel blockers. These include nifedipine. If you need to take this type of drug for another health problem, your doctor will closely watch your health. ¨ Heart rhythm drugs. These include disopyramide and flecainide. These can treat a fast or uneven heart rhythm. ¨ Prescription NSAIDs. These include celecoxib (Celebrex) and diclofenac. ¨ Certain medicines for diabetes. These include pioglitazone and rosiglitazone. Where can you learn more? Go to https://Pocket TalespeExceleraRx.Knimbus. org and sign in to your MC10 account. Enter F221 in the [facility] box to learn more about \"Medicines to Avoid With Heart Failure: Care Instructions. \"     If you do not have an account, please click on the \"Sign Up Now\" link. Current as of: December 6, 2017  Content Version: 11.7  © 7969-6401 [employer]Y Network Technology. Care instructions adapted under license by [employer]. If you have questions about a medical condition or this instruction, always ask your healthcare professional. [place] [address] any warranty or liability for your use of this information.        Patient Education        Low Sodium Diet (2,000 Milligram): Care Instructions  Your Care Instructions    Too much sodium causes your body to hold on to extra water. This can raise your blood pressure and force your heart and kidneys to work harder. In very serious cases, this could cause you to be put in the hospital. It might even be life-threatening. By limiting sodium, you will feel better and lower your risk of serious problems. The most common source of sodium is salt. People get most of the salt in their diet from canned, prepared, and packaged foods. Fast food and restaurant meals also are very high in sodium. Your doctor will probably limit your sodium to less than 2,000 milligrams (mg) a day. This limit counts all the sodium in prepared and packaged foods and any salt you add to your food. Follow-up care is a key part of your treatment and safety. Be sure to make and go to all appointments, and call your doctor if you are having problems. It's also a good idea to know your test results and keep a list of the medicines you take. How can you care for yourself at home? Read food labels  · Read labels on cans and food packages. The labels tell you how much sodium is in each serving. Make sure that you look at the serving size. If you eat more than the serving size, you have eaten more sodium. · Food labels also tell you the Percent Daily Value for sodium. Choose products with low Percent Daily Values for sodium. · Be aware that sodium can come in forms other than salt, including monosodium glutamate (MSG), sodium citrate, and sodium bicarbonate (baking soda). MSG is often added to Asian food. When you eat out, you can sometimes ask for food without MSG or added salt. Buy low-sodium foods  · Buy foods that are labeled \"unsalted\" (no salt added), \"sodium-free\" (less than 5 mg of sodium per serving), or \"low-sodium\" (less than 140 mg of sodium per serving). Foods labeled \"reduced-sodium\" and \"light sodium\" may still have too much sodium. Be sure to read the label to see how much sodium you are getting.   · Buy fresh

## 2018-10-15 ENCOUNTER — APPOINTMENT (OUTPATIENT)
Dept: GENERAL RADIOLOGY | Age: 33
DRG: 287 | End: 2018-10-15
Payer: COMMERCIAL

## 2018-10-15 ENCOUNTER — APPOINTMENT (OUTPATIENT)
Dept: CT IMAGING | Age: 33
DRG: 287 | End: 2018-10-15
Payer: COMMERCIAL

## 2018-10-15 ENCOUNTER — HOSPITAL ENCOUNTER (INPATIENT)
Age: 33
LOS: 4 days | Discharge: HOME | DRG: 287 | End: 2018-10-19
Attending: FAMILY MEDICINE | Admitting: INTERNAL MEDICINE
Payer: COMMERCIAL

## 2018-10-15 DIAGNOSIS — J81.0 ACUTE PULMONARY EDEMA (HCC): ICD-10-CM

## 2018-10-15 DIAGNOSIS — F15.10 METHAMPHETAMINE ABUSE (HCC): ICD-10-CM

## 2018-10-15 DIAGNOSIS — I50.23 ACUTE ON CHRONIC SYSTOLIC CONGESTIVE HEART FAILURE (HCC): Primary | ICD-10-CM

## 2018-10-15 DIAGNOSIS — E87.70 HYPERVOLEMIA, UNSPECIFIED HYPERVOLEMIA TYPE: ICD-10-CM

## 2018-10-15 DIAGNOSIS — R10.9 ABDOMINAL PAIN, UNSPECIFIED ABDOMINAL LOCATION: ICD-10-CM

## 2018-10-15 DIAGNOSIS — K81.0 ACUTE ACALCULOUS CHOLECYSTITIS: ICD-10-CM

## 2018-10-15 DIAGNOSIS — J96.01 ACUTE RESPIRATORY FAILURE WITH HYPOXIA (HCC): ICD-10-CM

## 2018-10-15 LAB
ALBUMIN SERPL-MCNC: 4.1 G/DL (ref 3.5–5.2)
ALP BLD-CCNC: 181 U/L (ref 40–130)
ALT SERPL-CCNC: 331 U/L (ref 5–41)
AMMONIA: 63 UMOL/L (ref 16–60)
AMPHETAMINE SCREEN, URINE: POSITIVE
AMYLASE: 38 U/L (ref 28–100)
ANION GAP SERPL CALCULATED.3IONS-SCNC: 16 MMOL/L (ref 7–19)
APTT: 31.5 SEC (ref 26–36.2)
AST SERPL-CCNC: 215 U/L (ref 5–40)
BARBITURATE SCREEN URINE: NEGATIVE
BASE EXCESS ARTERIAL: 0.1 MMOL/L (ref -2–2)
BASOPHILS ABSOLUTE: 0.1 K/UL (ref 0–0.2)
BASOPHILS RELATIVE PERCENT: 0.8 % (ref 0–1)
BENZODIAZEPINE SCREEN, URINE: NEGATIVE
BILIRUB SERPL-MCNC: 0.7 MG/DL (ref 0.2–1.2)
BILIRUBIN URINE: NEGATIVE
BLOOD, URINE: NEGATIVE
BUN BLDV-MCNC: 17 MG/DL (ref 6–20)
C-REACTIVE PROTEIN: 2.3 MG/DL (ref 0–0.5)
CALCIUM SERPL-MCNC: 9.7 MG/DL (ref 8.6–10)
CANNABINOID SCREEN URINE: NEGATIVE
CARBOXYHEMOGLOBIN ARTERIAL: 3.3 % (ref 0–5)
CHLORIDE BLD-SCNC: 96 MMOL/L (ref 98–111)
CLARITY: CLEAR
CO2: 25 MMOL/L (ref 22–29)
COCAINE METABOLITE SCREEN URINE: NEGATIVE
COLOR: YELLOW
CREAT SERPL-MCNC: 1 MG/DL (ref 0.5–1.2)
EKG P AXIS: 59 DEGREES
EKG P-R INTERVAL: 158 MS
EKG Q-T INTERVAL: 366 MS
EKG QRS DURATION: 100 MS
EKG QTC CALCULATION (BAZETT): 429 MS
EKG T AXIS: 75 DEGREES
EOSINOPHILS ABSOLUTE: 0.3 K/UL (ref 0–0.6)
EOSINOPHILS RELATIVE PERCENT: 2.3 % (ref 0–5)
ETHANOL: <10 MG/DL (ref 0–0.08)
GFR NON-AFRICAN AMERICAN: >60
GLUCOSE BLD-MCNC: 121 MG/DL (ref 74–109)
GLUCOSE URINE: NEGATIVE MG/DL
HCO3 ARTERIAL: 23.6 MMOL/L (ref 22–26)
HCT VFR BLD CALC: 42.7 % (ref 42–52)
HEMOGLOBIN, ART, EXTENDED: 13.9 G/DL (ref 14–18)
HEMOGLOBIN: 13.9 G/DL (ref 14–18)
INR BLD: 1.19 (ref 0.88–1.18)
KETONES, URINE: NEGATIVE MG/DL
LACTIC ACID: 1.8 MMOL/L (ref 0.5–1.9)
LACTIC ACID: 2.7 MMOL/L (ref 0.5–1.9)
LEUKOCYTE ESTERASE, URINE: NEGATIVE
LIPASE: 18 U/L (ref 13–60)
LYMPHOCYTES ABSOLUTE: 3.9 K/UL (ref 1.1–4.5)
LYMPHOCYTES RELATIVE PERCENT: 29.6 % (ref 20–40)
Lab: ABNORMAL
MCH RBC QN AUTO: 31.2 PG (ref 27–31)
MCHC RBC AUTO-ENTMCNC: 32.6 G/DL (ref 33–37)
MCV RBC AUTO: 96 FL (ref 80–94)
METHEMOGLOBIN ARTERIAL: 0.8 %
MONOCYTES ABSOLUTE: 1.2 K/UL (ref 0–0.9)
MONOCYTES RELATIVE PERCENT: 9.2 % (ref 0–10)
NEUTROPHILS ABSOLUTE: 7.5 K/UL (ref 1.5–7.5)
NEUTROPHILS RELATIVE PERCENT: 57.6 % (ref 50–65)
NITRITE, URINE: NEGATIVE
O2 CONTENT ARTERIAL: 17.4 ML/DL
O2 SAT, ARTERIAL: 89 %
O2 THERAPY: ABNORMAL
OPIATE SCREEN URINE: NEGATIVE
PCO2 ARTERIAL: 34 MMHG (ref 35–45)
PDW BLD-RTO: 17.7 % (ref 11.5–14.5)
PH ARTERIAL: 7.45 (ref 7.35–7.45)
PH UA: 6
PLATELET # BLD: 272 K/UL (ref 130–400)
PMV BLD AUTO: 9.8 FL (ref 9.4–12.4)
PO2 ARTERIAL: 62 MMHG (ref 80–100)
POTASSIUM SERPL-SCNC: 4.7 MMOL/L (ref 3.5–5)
POTASSIUM, WHOLE BLOOD: 4.4
PRO-BNP: ABNORMAL PG/ML (ref 0–450)
PROTEIN UA: NEGATIVE MG/DL
PROTHROMBIN TIME: 15 SEC (ref 12–14.6)
RBC # BLD: 4.45 M/UL (ref 4.7–6.1)
SODIUM BLD-SCNC: 137 MMOL/L (ref 136–145)
SPECIFIC GRAVITY UA: 1.01
TOTAL PROTEIN: 7.2 G/DL (ref 6.6–8.7)
TROPONIN: 0.02 NG/ML (ref 0–0.03)
URINE REFLEX TO CULTURE: NORMAL
UROBILINOGEN, URINE: 0.2 E.U./DL
WBC # BLD: 13.1 K/UL (ref 4.8–10.8)

## 2018-10-15 PROCEDURE — 83605 ASSAY OF LACTIC ACID: CPT

## 2018-10-15 PROCEDURE — 87040 BLOOD CULTURE FOR BACTERIA: CPT

## 2018-10-15 PROCEDURE — 83690 ASSAY OF LIPASE: CPT

## 2018-10-15 PROCEDURE — 36415 COLL VENOUS BLD VENIPUNCTURE: CPT

## 2018-10-15 PROCEDURE — 96376 TX/PRO/DX INJ SAME DRUG ADON: CPT

## 2018-10-15 PROCEDURE — 99285 EMERGENCY DEPT VISIT HI MDM: CPT | Performed by: EMERGENCY MEDICINE

## 2018-10-15 PROCEDURE — G0480 DRUG TEST DEF 1-7 CLASSES: HCPCS

## 2018-10-15 PROCEDURE — 93005 ELECTROCARDIOGRAM TRACING: CPT

## 2018-10-15 PROCEDURE — 82150 ASSAY OF AMYLASE: CPT

## 2018-10-15 PROCEDURE — 84132 ASSAY OF SERUM POTASSIUM: CPT

## 2018-10-15 PROCEDURE — 84484 ASSAY OF TROPONIN QUANT: CPT

## 2018-10-15 PROCEDURE — C9113 INJ PANTOPRAZOLE SODIUM, VIA: HCPCS | Performed by: HOSPITALIST

## 2018-10-15 PROCEDURE — 6360000002 HC RX W HCPCS: Performed by: FAMILY MEDICINE

## 2018-10-15 PROCEDURE — 86140 C-REACTIVE PROTEIN: CPT

## 2018-10-15 PROCEDURE — 85610 PROTHROMBIN TIME: CPT

## 2018-10-15 PROCEDURE — 6360000002 HC RX W HCPCS: Performed by: EMERGENCY MEDICINE

## 2018-10-15 PROCEDURE — 99222 1ST HOSP IP/OBS MODERATE 55: CPT | Performed by: HOSPITALIST

## 2018-10-15 PROCEDURE — 85025 COMPLETE CBC W/AUTO DIFF WBC: CPT

## 2018-10-15 PROCEDURE — 96374 THER/PROPH/DIAG INJ IV PUSH: CPT

## 2018-10-15 PROCEDURE — 82140 ASSAY OF AMMONIA: CPT

## 2018-10-15 PROCEDURE — 71045 X-RAY EXAM CHEST 1 VIEW: CPT

## 2018-10-15 PROCEDURE — 99285 EMERGENCY DEPT VISIT HI MDM: CPT

## 2018-10-15 PROCEDURE — 74177 CT ABD & PELVIS W/CONTRAST: CPT

## 2018-10-15 PROCEDURE — 6360000002 HC RX W HCPCS: Performed by: HOSPITALIST

## 2018-10-15 PROCEDURE — 82803 BLOOD GASES ANY COMBINATION: CPT

## 2018-10-15 PROCEDURE — 96375 TX/PRO/DX INJ NEW DRUG ADDON: CPT

## 2018-10-15 PROCEDURE — 6370000000 HC RX 637 (ALT 250 FOR IP): Performed by: FAMILY MEDICINE

## 2018-10-15 PROCEDURE — 80053 COMPREHEN METABOLIC PANEL: CPT

## 2018-10-15 PROCEDURE — 36600 WITHDRAWAL OF ARTERIAL BLOOD: CPT

## 2018-10-15 PROCEDURE — 99255 IP/OBS CONSLTJ NEW/EST HI 80: CPT | Performed by: INTERNAL MEDICINE

## 2018-10-15 PROCEDURE — 2140000000 HC CCU INTERMEDIATE R&B

## 2018-10-15 PROCEDURE — 83880 ASSAY OF NATRIURETIC PEPTIDE: CPT

## 2018-10-15 PROCEDURE — 81003 URINALYSIS AUTO W/O SCOPE: CPT

## 2018-10-15 PROCEDURE — 85730 THROMBOPLASTIN TIME PARTIAL: CPT

## 2018-10-15 PROCEDURE — 2580000003 HC RX 258: Performed by: EMERGENCY MEDICINE

## 2018-10-15 PROCEDURE — 6360000004 HC RX CONTRAST MEDICATION: Performed by: FAMILY MEDICINE

## 2018-10-15 PROCEDURE — 6370000000 HC RX 637 (ALT 250 FOR IP): Performed by: HOSPITALIST

## 2018-10-15 PROCEDURE — 80307 DRUG TEST PRSMV CHEM ANLYZR: CPT

## 2018-10-15 RX ORDER — SPIRONOLACTONE 25 MG/1
25 TABLET ORAL DAILY
Status: DISCONTINUED | OUTPATIENT
Start: 2018-10-15 | End: 2018-10-16

## 2018-10-15 RX ORDER — LISINOPRIL 10 MG/1
10 TABLET ORAL DAILY
Status: DISCONTINUED | OUTPATIENT
Start: 2018-10-15 | End: 2018-10-16

## 2018-10-15 RX ORDER — ASPIRIN 81 MG/1
81 TABLET ORAL DAILY
Status: DISCONTINUED | OUTPATIENT
Start: 2018-10-16 | End: 2018-10-19 | Stop reason: HOSPADM

## 2018-10-15 RX ORDER — FUROSEMIDE 10 MG/ML
40 INJECTION INTRAMUSCULAR; INTRAVENOUS 2 TIMES DAILY
Status: DISCONTINUED | OUTPATIENT
Start: 2018-10-15 | End: 2018-10-16

## 2018-10-15 RX ORDER — FUROSEMIDE 10 MG/ML
60 INJECTION INTRAMUSCULAR; INTRAVENOUS ONCE
Status: COMPLETED | OUTPATIENT
Start: 2018-10-15 | End: 2018-10-15

## 2018-10-15 RX ORDER — PANTOPRAZOLE SODIUM 40 MG/10ML
40 INJECTION, POWDER, LYOPHILIZED, FOR SOLUTION INTRAVENOUS 2 TIMES DAILY
Status: DISCONTINUED | OUTPATIENT
Start: 2018-10-15 | End: 2018-10-19 | Stop reason: HOSPADM

## 2018-10-15 RX ORDER — ONDANSETRON 2 MG/ML
4 INJECTION INTRAMUSCULAR; INTRAVENOUS ONCE
Status: COMPLETED | OUTPATIENT
Start: 2018-10-15 | End: 2018-10-15

## 2018-10-15 RX ORDER — FUROSEMIDE 10 MG/ML
20 INJECTION INTRAMUSCULAR; INTRAVENOUS ONCE
Status: COMPLETED | OUTPATIENT
Start: 2018-10-15 | End: 2018-10-15

## 2018-10-15 RX ORDER — METOPROLOL SUCCINATE 25 MG/1
25 TABLET, EXTENDED RELEASE ORAL 2 TIMES DAILY
Status: DISCONTINUED | OUTPATIENT
Start: 2018-10-15 | End: 2018-10-19 | Stop reason: HOSPADM

## 2018-10-15 RX ADMIN — PANTOPRAZOLE SODIUM 40 MG: 40 INJECTION, POWDER, FOR SOLUTION INTRAVENOUS at 19:29

## 2018-10-15 RX ADMIN — FUROSEMIDE 40 MG: 10 INJECTION, SOLUTION INTRAMUSCULAR; INTRAVENOUS at 19:29

## 2018-10-15 RX ADMIN — PIPERACILLIN SODIUM AND TAZOBACTAM SODIUM 4.5 G: 4; .5 INJECTION, POWDER, LYOPHILIZED, FOR SOLUTION INTRAVENOUS at 09:24

## 2018-10-15 RX ADMIN — METOPROLOL SUCCINATE 25 MG: 25 TABLET, EXTENDED RELEASE ORAL at 19:29

## 2018-10-15 RX ADMIN — LIDOCAINE HYDROCHLORIDE: 20 SOLUTION ORAL; TOPICAL at 05:59

## 2018-10-15 RX ADMIN — ONDANSETRON 4 MG: 2 INJECTION INTRAMUSCULAR; INTRAVENOUS at 05:59

## 2018-10-15 RX ADMIN — FUROSEMIDE 20 MG: 10 INJECTION, SOLUTION INTRAMUSCULAR; INTRAVENOUS at 06:12

## 2018-10-15 RX ADMIN — IOPAMIDOL 90 ML: 755 INJECTION, SOLUTION INTRAVENOUS at 06:55

## 2018-10-15 RX ADMIN — FUROSEMIDE 60 MG: 10 INJECTION, SOLUTION INTRAMUSCULAR; INTRAVENOUS at 07:28

## 2018-10-15 ASSESSMENT — PAIN DESCRIPTION - LOCATION: LOCATION: ABDOMEN

## 2018-10-15 ASSESSMENT — ENCOUNTER SYMPTOMS
WHEEZING: 0
VOMITING: 0
BACK PAIN: 0
COUGH: 0
SHORTNESS OF BREATH: 1
DIARRHEA: 0
ABDOMINAL PAIN: 1
COLOR CHANGE: 0
NAUSEA: 1
SORE THROAT: 0

## 2018-10-15 ASSESSMENT — PAIN DESCRIPTION - FREQUENCY: FREQUENCY: INTERMITTENT

## 2018-10-15 ASSESSMENT — PAIN SCALES - GENERAL: PAINLEVEL_OUTOF10: 8

## 2018-10-15 ASSESSMENT — PAIN DESCRIPTION - PAIN TYPE: TYPE: ACUTE PAIN

## 2018-10-15 NOTE — H&P
History and Physical    Patient Name:  Jory Blair    :  1985    Chief Complaint:   Abdominal pain, dyspnea, leg swelling    History of Present Illness:   Jory Blair presents to [facility] with upper abdominal pain for couple days getting worse, no tx prior to the admission. In ED he received GI coctail which helped the pain. Now he is pain free. He did not have any N/V/D/C. No history of hepatitis. CT chest showed mesenteric edema, pericholecystic fluid, and a small amount of free fluid in the pelvis. Patient has know CHF with EF 15%, he is IVDU, his dyspnea and swelling resolved with IV lasix. He does not follow up with cardiology. He denies any dyspnea or chest pain at this time. Past Medical History:   has a past medical history of CHF (congestive heart failure) ([address] and Hypertension. Surgical History:   has a past surgical history that includes Hand surgery. Social History:   reports that he has been smoking Cigarettes. He has a 6.50 pack-year smoking history. He has never used smokeless tobacco. He reports that he uses drugs, including Methamphetamines, about 3 times per week. He reports that he does not drink alcohol. Family History:  family history includes Cancer in his mother; No Known Problems in his brother and father. Medications:  Prior to Admission medications    Medication Sig Start Date End Date Taking?  Authorizing Provider   lisinopril (PRINIVIL;ZESTRIL) 10 MG tablet Take 10 mg by mouth daily    Yes Historical Provider, MD   metoprolol succinate (TOPROL XL) 25 MG extended release tablet Take 1 tablet by mouth every 12 hours 8/15/18  Yes KAYLEEN Camp   furosemide (LASIX) 40 MG tablet Take 1 tablet by mouth daily 18  Yes KAYLEEN Pastrana   spironolactone (ALDACTONE) 25 MG tablet Take 1 tablet by mouth daily 18  Yes KAYLEEN Pastrana   aspirin EC 81 MG EC tablet Take 1 tablet by mouth daily 16  Yes KAYLEEN Hernandez   nitroGLYCERIN

## 2018-10-15 NOTE — ED NOTES
ASSESSMENT:    PT ALERT/ORIENTED X4. PUPILS EQUAL/REACTIVE    SKIN:  WARM/DRY PINK CAPILLARY REFILL < 2SECS    CARDIAC:  S1 S2 NOTED     LUNGS: CLEAR UPPER AND LOWER LOBES, RESPIRATIONS EVEN/UNLABORED     ABDOMEN: Pt presents with upper abd pain x 2 days. Pt reports having normal bowel movements     EXTREMITIES:  BILATERAL DP AND PT AND NO EDEMA NOTED. NO DISTRESS NOTED. SIDE RAILS UP AND CALL LIGHT IN REACH.      Jesus Murray RN  10/15/18 4833

## 2018-10-16 LAB
ALBUMIN SERPL-MCNC: 4 G/DL (ref 3.5–5.2)
ALP BLD-CCNC: 174 U/L (ref 40–130)
ALT SERPL-CCNC: 473 U/L (ref 5–41)
ANION GAP SERPL CALCULATED.3IONS-SCNC: 15 MMOL/L (ref 7–19)
AST SERPL-CCNC: 300 U/L (ref 5–40)
BILIRUB SERPL-MCNC: 0.6 MG/DL (ref 0.2–1.2)
BUN BLDV-MCNC: 27 MG/DL (ref 6–20)
CALCIUM SERPL-MCNC: 9.2 MG/DL (ref 8.6–10)
CHLORIDE BLD-SCNC: 93 MMOL/L (ref 98–111)
CO2: 29 MMOL/L (ref 22–29)
CREAT SERPL-MCNC: 1.4 MG/DL (ref 0.5–1.2)
GFR NON-AFRICAN AMERICAN: 58
GLUCOSE BLD-MCNC: 96 MG/DL (ref 74–109)
HAV IGM SER IA-ACNC: NORMAL
HCT VFR BLD CALC: 45.1 % (ref 42–52)
HEMOGLOBIN: 14.7 G/DL (ref 14–18)
HEPATITIS B CORE IGM ANTIBODY: NORMAL
HEPATITIS B SURFACE ANTIGEN INTERPRETATION: NORMAL
HEPATITIS C ANTIBODY INTERPRETATION: NORMAL
LV EF: 58 %
LVEF MODALITY: NORMAL
MCH RBC QN AUTO: 31.5 PG (ref 27–31)
MCHC RBC AUTO-ENTMCNC: 32.6 G/DL (ref 33–37)
MCV RBC AUTO: 96.6 FL (ref 80–94)
PDW BLD-RTO: 18.2 % (ref 11.5–14.5)
PLATELET # BLD: 322 K/UL (ref 130–400)
PMV BLD AUTO: 10.4 FL (ref 9.4–12.4)
POTASSIUM SERPL-SCNC: 5.1 MMOL/L (ref 3.5–5)
PRO-BNP: 6638 PG/ML (ref 0–450)
RBC # BLD: 4.67 M/UL (ref 4.7–6.1)
SODIUM BLD-SCNC: 137 MMOL/L (ref 136–145)
TOTAL PROTEIN: 6.8 G/DL (ref 6.6–8.7)
TSH SERPL DL<=0.05 MIU/L-ACNC: 1.67 UIU/ML (ref 0.27–4.2)
WBC # BLD: 10.1 K/UL (ref 4.8–10.8)

## 2018-10-16 PROCEDURE — 6360000002 HC RX W HCPCS: Performed by: HOSPITALIST

## 2018-10-16 PROCEDURE — 85027 COMPLETE CBC AUTOMATED: CPT

## 2018-10-16 PROCEDURE — 83880 ASSAY OF NATRIURETIC PEPTIDE: CPT

## 2018-10-16 PROCEDURE — 93306 TTE W/DOPPLER COMPLETE: CPT

## 2018-10-16 PROCEDURE — C9113 INJ PANTOPRAZOLE SODIUM, VIA: HCPCS | Performed by: HOSPITALIST

## 2018-10-16 PROCEDURE — 36415 COLL VENOUS BLD VENIPUNCTURE: CPT

## 2018-10-16 PROCEDURE — 99232 SBSQ HOSP IP/OBS MODERATE 35: CPT | Performed by: HOSPITALIST

## 2018-10-16 PROCEDURE — 80074 ACUTE HEPATITIS PANEL: CPT

## 2018-10-16 PROCEDURE — 6370000000 HC RX 637 (ALT 250 FOR IP): Performed by: HOSPITALIST

## 2018-10-16 PROCEDURE — 84443 ASSAY THYROID STIM HORMONE: CPT

## 2018-10-16 PROCEDURE — 99233 SBSQ HOSP IP/OBS HIGH 50: CPT | Performed by: INTERNAL MEDICINE

## 2018-10-16 PROCEDURE — 80053 COMPREHEN METABOLIC PANEL: CPT

## 2018-10-16 PROCEDURE — 2140000000 HC CCU INTERMEDIATE R&B

## 2018-10-16 RX ORDER — LISINOPRIL 5 MG/1
2.5 TABLET ORAL DAILY
Status: DISCONTINUED | OUTPATIENT
Start: 2018-10-16 | End: 2018-10-19 | Stop reason: HOSPADM

## 2018-10-16 RX ADMIN — METOPROLOL SUCCINATE 25 MG: 25 TABLET, EXTENDED RELEASE ORAL at 09:24

## 2018-10-16 RX ADMIN — PANTOPRAZOLE SODIUM 40 MG: 40 INJECTION, POWDER, FOR SOLUTION INTRAVENOUS at 19:23

## 2018-10-16 RX ADMIN — LISINOPRIL 2.5 MG: 5 TABLET ORAL at 09:24

## 2018-10-16 RX ADMIN — ENOXAPARIN SODIUM 30 MG: 30 INJECTION SUBCUTANEOUS at 19:23

## 2018-10-16 RX ADMIN — PANTOPRAZOLE SODIUM 40 MG: 40 INJECTION, POWDER, FOR SOLUTION INTRAVENOUS at 09:25

## 2018-10-16 RX ADMIN — METOPROLOL SUCCINATE 25 MG: 25 TABLET, EXTENDED RELEASE ORAL at 19:22

## 2018-10-16 RX ADMIN — FUROSEMIDE 40 MG: 10 INJECTION, SOLUTION INTRAMUSCULAR; INTRAVENOUS at 09:25

## 2018-10-16 RX ADMIN — ASPIRIN 81 MG: 81 TABLET ORAL at 09:24

## 2018-10-16 ASSESSMENT — PAIN SCALES - GENERAL
PAINLEVEL_OUTOF10: 0

## 2018-10-16 NOTE — PROGRESS NOTES
Telemetry called, pt had 8-10 beats of Afib.  Pt lying in bed resting at this time  Electronically signed by Ambrose Nazario RN on 10/16/2018 at 9:46 AM

## 2018-10-17 ENCOUNTER — APPOINTMENT (OUTPATIENT)
Dept: NUCLEAR MEDICINE | Age: 33
DRG: 287 | End: 2018-10-17
Payer: COMMERCIAL

## 2018-10-17 ENCOUNTER — APPOINTMENT (OUTPATIENT)
Dept: ULTRASOUND IMAGING | Age: 33
DRG: 287 | End: 2018-10-17
Payer: COMMERCIAL

## 2018-10-17 LAB
ANION GAP SERPL CALCULATED.3IONS-SCNC: 11 MMOL/L (ref 7–19)
BUN BLDV-MCNC: 21 MG/DL (ref 6–20)
CALCIUM SERPL-MCNC: 9.3 MG/DL (ref 8.6–10)
CHLORIDE BLD-SCNC: 98 MMOL/L (ref 98–111)
CO2: 29 MMOL/L (ref 22–29)
CREAT SERPL-MCNC: 1 MG/DL (ref 0.5–1.2)
GFR NON-AFRICAN AMERICAN: >60
GLUCOSE BLD-MCNC: 102 MG/DL (ref 74–109)
POTASSIUM SERPL-SCNC: 4.3 MMOL/L (ref 3.5–5)
SODIUM BLD-SCNC: 138 MMOL/L (ref 136–145)

## 2018-10-17 PROCEDURE — 93017 CV STRESS TEST TRACING ONLY: CPT

## 2018-10-17 PROCEDURE — A9500 TC99M SESTAMIBI: HCPCS | Performed by: INTERNAL MEDICINE

## 2018-10-17 PROCEDURE — 36415 COLL VENOUS BLD VENIPUNCTURE: CPT

## 2018-10-17 PROCEDURE — 6370000000 HC RX 637 (ALT 250 FOR IP): Performed by: HOSPITALIST

## 2018-10-17 PROCEDURE — 6360000002 HC RX W HCPCS: Performed by: INTERNAL MEDICINE

## 2018-10-17 PROCEDURE — 80048 BASIC METABOLIC PNL TOTAL CA: CPT

## 2018-10-17 PROCEDURE — 2140000000 HC CCU INTERMEDIATE R&B

## 2018-10-17 PROCEDURE — 99232 SBSQ HOSP IP/OBS MODERATE 35: CPT | Performed by: HOSPITALIST

## 2018-10-17 PROCEDURE — 99232 SBSQ HOSP IP/OBS MODERATE 35: CPT | Performed by: INTERNAL MEDICINE

## 2018-10-17 PROCEDURE — 78452 HT MUSCLE IMAGE SPECT MULT: CPT

## 2018-10-17 PROCEDURE — 3430000000 HC RX DIAGNOSTIC RADIOPHARMACEUTICAL: Performed by: INTERNAL MEDICINE

## 2018-10-17 PROCEDURE — C9113 INJ PANTOPRAZOLE SODIUM, VIA: HCPCS | Performed by: HOSPITALIST

## 2018-10-17 PROCEDURE — 87536 HIV-1 QUANT&REVRSE TRNSCRPJ: CPT

## 2018-10-17 PROCEDURE — 76705 ECHO EXAM OF ABDOMEN: CPT

## 2018-10-17 PROCEDURE — 6360000002 HC RX W HCPCS: Performed by: HOSPITALIST

## 2018-10-17 RX ORDER — FUROSEMIDE 40 MG/1
40 TABLET ORAL DAILY
Status: DISCONTINUED | OUTPATIENT
Start: 2018-10-17 | End: 2018-10-19 | Stop reason: HOSPADM

## 2018-10-17 RX ADMIN — LISINOPRIL 2.5 MG: 5 TABLET ORAL at 16:55

## 2018-10-17 RX ADMIN — ASPIRIN 81 MG: 81 TABLET ORAL at 16:54

## 2018-10-17 RX ADMIN — FUROSEMIDE 40 MG: 40 TABLET ORAL at 21:00

## 2018-10-17 RX ADMIN — METOPROLOL SUCCINATE 25 MG: 25 TABLET, EXTENDED RELEASE ORAL at 16:54

## 2018-10-17 RX ADMIN — METOPROLOL SUCCINATE 25 MG: 25 TABLET, EXTENDED RELEASE ORAL at 19:52

## 2018-10-17 RX ADMIN — PANTOPRAZOLE SODIUM 40 MG: 40 INJECTION, POWDER, FOR SOLUTION INTRAVENOUS at 16:55

## 2018-10-17 RX ADMIN — REGADENOSON 0.4 MG: 0.08 INJECTION, SOLUTION INTRAVENOUS at 11:03

## 2018-10-17 RX ADMIN — ENOXAPARIN SODIUM 30 MG: 30 INJECTION SUBCUTANEOUS at 17:12

## 2018-10-17 RX ADMIN — PANTOPRAZOLE SODIUM 40 MG: 40 INJECTION, POWDER, FOR SOLUTION INTRAVENOUS at 19:52

## 2018-10-17 RX ADMIN — TETRAKIS(2-METHOXYISOBUTYLISOCYANIDE)COPPER(I) TETRAFLUOROBORATE 30 MILLICURIE: 1 INJECTION, POWDER, LYOPHILIZED, FOR SOLUTION INTRAVENOUS at 11:03

## 2018-10-17 RX ADMIN — TETRAKIS(2-METHOXYISOBUTYLISOCYANIDE)COPPER(I) TETRAFLUOROBORATE 10 MILLICURIE: 1 INJECTION, POWDER, LYOPHILIZED, FOR SOLUTION INTRAVENOUS at 11:02

## 2018-10-17 ASSESSMENT — PAIN SCALES - GENERAL
PAINLEVEL_OUTOF10: 0

## 2018-10-17 NOTE — PROGRESS NOTES
102     ABGs:   Lab Results   Component Value Date    PHART 7.450 10/15/2018    PO2ART 62.0 10/15/2018    PCE5EUT 34.0 10/15/2018     INR:   Recent Labs      10/15/18   0525   INR  1.19*         Objective:   Vitals: /72   Pulse 80   Temp 97.4 °F (36.3 °C) (Temporal)   Resp 14   Ht 5' 7\" (1.702 m)   Wt 191 lb 6.4 oz (86.8 kg)   SpO2 98%   BMI 29.98 kg/m²   General appearance: alert, appears stated age and cooperative  Skin: Skin color, texture, turgor normal.   HEENT: Head: Normocephalic, no lesions, without obvious abnormality.   Neck: no adenopathy, no carotid bruit, no JVD and supple, symmetrical, trachea midline  Lungs: clear to auscultation bilaterally  Heart: regular rate and rhythm, S1, S2 normal, no murmur, click, rub or gallop  Abdomen: soft, non-tender; bowel sounds normal; no masses,  no organomegaly  Extremities: extremities normal, atraumatic, no cyanosis or edema  Lymphatic: No significant lymph node enlargement papable  Neurologic: Mental status: Alert, oriented, thought content appropriate        Assessment & Plan:      · Abdominal pain - epigastric - RESOLVED after gi coctail and PPI IV  · Abdominal pain with CT findings of  mesenteric edema, pericholecystic fluid, and a small amount of free fluid in the pelvis with known history of CHF- on lasix, fluid restriction  · Acute on chronic systolic CHF with ef 83% per last echo- with abnormal crystal scan- cath am   · IVDU (intravenous drug user)  · Tobacco use  · Elevated LFT- hepatitis panel neg, us liver confirms changes related to chf  · emma- resolved         Disposition: home tomorrow hopefully      Alexandra Larry KOCH

## 2018-10-17 NOTE — CONSULTS
[facility]       Cardiology Consultation          I personally saw the patient and rounded with:  Rita RN, on  10/15/18      The observations documented in this note, including the assessment and plan are mine              Date of Admission:  10/15/2018  5:16 AM    Date of Initially Being Seen / Consultation:  10/15/18    Cardiologist:  Samantha Dahl MD     Cardiology Attending: [facility] Attending: Hospitalist Service      PCP:  No primary care provider on file. Reason for Consultation or Admission / Chief Complaint:  shortness of air    SUBJECTIVE AND HISTORY OF PRESENT ILLNESS:    Source of the history:  Patient, family, previous inpatient and outpatient records in [facility] Margarita Tucker is a 35 y.o. male who presents to [facility] with symptoms / signs / problem or diagnosis of dyspnea. He was admitted with upper abdominal discomfort. When being examined this evening, he has had no symptoms of exertional chest discomfort, unusual or change in shortness of air, presyncope or syncope.    He has a prior history of amphen use and a severely depressed LVFX    Family present:  Yes: a female      CARDIAC RISK PROFILE:    Risk Factor Yes / No / Unknown       Gender Male   Cigarette Use Yes:    Family History of Cardiovascular Disease No   Diabetes Mellitus no   Hypercholesteremia no   Hypertension yes          Cardiac Specific Problems:    Specialty Problems        Cardiology Problems    Acute systolic congestive heart failure (HCC)        Chronic systolic heart failure (HCC)        Nonischemic cardiomyopathy ([address]        CHF with unknown LVEF (HCC)        Acute on chronic systolic congestive heart failure ([address]                PRIOR CARDIAC PROBLEM LIST  (IF APPLICABLE):    Echo on 3/14/2018:     Globally hypokinesis is present.   Estimated ejection fraction is 15-20%      Past Medical History:    Past Medical History:   Diagnosis Date    CHF (congestive heart failure) 93*   CO2  25   --   29   BUN  17   --   27*   CREATININE  1.0   --   1.4*     Cardiac Enzymes:   Recent Labs      10/15/18   0525   TROPONINI  0.02     PT/INR:   Recent Labs      10/15/18   0525   PROTIME  15.0*   INR  1.19*     APTT:   Recent Labs      10/15/18   0525   APTT  31.5     Liver Profile:  Lab Results   Component Value Date     10/16/2018     10/16/2018    BILITOT 0.6 10/16/2018    ALKPHOS 174 10/16/2018     Lab Results   Component Value Date    CHOL 199 11/01/2016    HDL 31 11/01/2016    TRIG 317 11/01/2016     TSH:  Lab Results   Component Value Date    TSH 1.670 10/16/2018     UA:   Lab Results   Component Value Date    COLORU YELLOW 10/15/2018    PHUR 6.0 10/15/2018    CLARITYU Clear 10/15/2018    SPECGRAV 1.010 10/15/2018    LEUKOCYTESUR Negative 10/15/2018    UROBILINOGEN 0.2 10/15/2018    BILIRUBINUR Negative 10/15/2018    BLOODU Negative 10/15/2018    GLUCOSEU Negative 10/15/2018             ALL THE CARDIOLOGY PROBLEMS ARE LISTED ABOVE; HOWEVER, THE FOLLOWING SPECIFIC CARDIAC PROBLEMS WERE ADDRESSED AND TREATED DURING THE HOSPITAL VISIT TODAY:                                                                                                                                                                                                                                            MEDICAL DECISION MAKING             Cardiac Specific Problem / Diagnosis  Discussion and Data Reviewed Diagnostic Procedures Ordered Management Options Selected           1. Presenting problem / symptom    History of cardiomyopathy  are worsening   Will check another echo Yes: echo Continue current medications:     Yes:            2.  Cigarette and drug use Initial presentation during this evaluation   The patient has been advised of the potential contribution this risk factor makes to coronary atherosclerotic disease and multiple other systemic problems and the absolute need to discontinue    I advised the

## 2018-10-17 NOTE — PROGRESS NOTES
Medium    Acute on chronic systolic congestive heart failure ([facility] 10/15/2018     Priority: Low    CHF with unknown LVEF ([facility] 03/13/2018     Priority: Low    Nonischemic cardiomyopathy ([facility] 11/04/2016     Priority: Low    Leukocytosis 10/31/2016     Priority: Low    Tobacco use 10/31/2016     Priority: Low     Current Facility-Administered Medications   Medication Dose Route Frequency Provider Last Rate Last Dose    lisinopril (PRINIVIL;ZESTRIL) tablet 2.5 mg  2.5 mg Oral Daily Anthony Gifford MD   2.5 mg at 10/16/18 0924    enoxaparin (LOVENOX) injection 30 mg  30 mg Subcutaneous Daily Anthony Gifford MD   30 mg at 10/16/18 1923    aspirin EC tablet 81 mg  81 mg Oral Daily Anthony Gifford MD   81 mg at 10/16/18 5809    metoprolol succinate (TOPROL XL) extended release tablet 25 mg  25 mg Oral BID Anthony Gifford MD   25 mg at 10/16/18 1922    pantoprazole (PROTONIX) injection 40 mg  40 mg Intravenous BID Anthony Gifford MD   40 mg at 10/16/18 1923    aluminum & magnesium hydroxide-simethicone (MAALOX) 30 mL, lidocaine viscous (XYLOCAINE) 5 mL (GI COCKTAIL)   Oral Daily PRN Anthony Gifford MD         Allergies: Patient has no known allergies.   Past Medical History:   Diagnosis Date    CHF (congestive heart failure) (HCC)     Hypertension      Past Surgical History:   Procedure Laterality Date    HAND SURGERY       Family History   Problem Relation Age of Onset    Cancer Mother     No Known Problems Father     No Known Problems Brother      Social History   Substance Use Topics    Smoking status: Current Every Day Smoker     Packs/day: 0.50     Years: 13.00     Types: Cigarettes    Smokeless tobacco: Never Used    Alcohol use No      Comment: Quit around 2017          Review of Systems:    General:      Complaint / Symptom Yes / No / Description if Yes       Fatigue Yes:  chronic   Weight gain NA   Insomnia NA       Respiratory:        Complaint /

## 2018-10-18 LAB
ALBUMIN SERPL-MCNC: 3.6 G/DL (ref 3.5–5.2)
ALBUMIN SERPL-MCNC: 3.6 G/DL (ref 3.5–5.2)
ALP BLD-CCNC: 143 U/L (ref 40–130)
ALP BLD-CCNC: 145 U/L (ref 40–130)
ALT SERPL-CCNC: 273 U/L (ref 5–41)
ALT SERPL-CCNC: 290 U/L (ref 5–41)
ANION GAP SERPL CALCULATED.3IONS-SCNC: 10 MMOL/L (ref 7–19)
ANION GAP SERPL CALCULATED.3IONS-SCNC: 13 MMOL/L (ref 7–19)
AST SERPL-CCNC: 100 U/L (ref 5–40)
AST SERPL-CCNC: 110 U/L (ref 5–40)
BILIRUB SERPL-MCNC: 0.3 MG/DL (ref 0.2–1.2)
BILIRUB SERPL-MCNC: 0.3 MG/DL (ref 0.2–1.2)
BUN BLDV-MCNC: 21 MG/DL (ref 6–20)
BUN BLDV-MCNC: 24 MG/DL (ref 6–20)
CALCIUM SERPL-MCNC: 8.7 MG/DL (ref 8.6–10)
CALCIUM SERPL-MCNC: 9 MG/DL (ref 8.6–10)
CHLORIDE BLD-SCNC: 101 MMOL/L (ref 98–111)
CHLORIDE BLD-SCNC: 102 MMOL/L (ref 98–111)
CHOLESTEROL, TOTAL: 252 MG/DL (ref 160–199)
CO2: 25 MMOL/L (ref 22–29)
CO2: 26 MMOL/L (ref 22–29)
CREAT SERPL-MCNC: 1 MG/DL (ref 0.5–1.2)
CREAT SERPL-MCNC: 1.2 MG/DL (ref 0.5–1.2)
GFR NON-AFRICAN AMERICAN: >60
GFR NON-AFRICAN AMERICAN: >60
GLUCOSE BLD-MCNC: 100 MG/DL (ref 74–109)
GLUCOSE BLD-MCNC: 99 MG/DL (ref 74–109)
HCT VFR BLD CALC: 44.5 % (ref 42–52)
HDLC SERPL-MCNC: 30 MG/DL (ref 55–121)
HEMOGLOBIN: 14.8 G/DL (ref 14–18)
LDL CHOLESTEROL CALCULATED: 160 MG/DL
LV EF: 26 %
LVEF MODALITY: NORMAL
MCH RBC QN AUTO: 31.2 PG (ref 27–31)
MCHC RBC AUTO-ENTMCNC: 33.3 G/DL (ref 33–37)
MCV RBC AUTO: 93.7 FL (ref 80–94)
PDW BLD-RTO: 18.1 % (ref 11.5–14.5)
PLATELET # BLD: 342 K/UL (ref 130–400)
PMV BLD AUTO: 9.5 FL (ref 9.4–12.4)
POTASSIUM REFLEX MAGNESIUM: 4.5 MMOL/L (ref 3.5–5)
POTASSIUM SERPL-SCNC: 4.5 MMOL/L (ref 3.5–5)
RBC # BLD: 4.75 M/UL (ref 4.7–6.1)
SODIUM BLD-SCNC: 138 MMOL/L (ref 136–145)
SODIUM BLD-SCNC: 139 MMOL/L (ref 136–145)
TOTAL PROTEIN: 6.7 G/DL (ref 6.6–8.7)
TOTAL PROTEIN: 6.8 G/DL (ref 6.6–8.7)
TRIGL SERPL-MCNC: 309 MG/DL (ref 0–149)
WBC # BLD: 8.4 K/UL (ref 4.8–10.8)

## 2018-10-18 PROCEDURE — 93458 L HRT ARTERY/VENTRICLE ANGIO: CPT

## 2018-10-18 PROCEDURE — C1769 GUIDE WIRE: HCPCS

## 2018-10-18 PROCEDURE — 6370000000 HC RX 637 (ALT 250 FOR IP): Performed by: HOSPITALIST

## 2018-10-18 PROCEDURE — 36415 COLL VENOUS BLD VENIPUNCTURE: CPT

## 2018-10-18 PROCEDURE — C9113 INJ PANTOPRAZOLE SODIUM, VIA: HCPCS | Performed by: HOSPITALIST

## 2018-10-18 PROCEDURE — 6360000002 HC RX W HCPCS

## 2018-10-18 PROCEDURE — 2580000003 HC RX 258: Performed by: INTERNAL MEDICINE

## 2018-10-18 PROCEDURE — 85027 COMPLETE CBC AUTOMATED: CPT

## 2018-10-18 PROCEDURE — 2709999900 HC NON-CHARGEABLE SUPPLY

## 2018-10-18 PROCEDURE — 99231 SBSQ HOSP IP/OBS SF/LOW 25: CPT | Performed by: INTERNAL MEDICINE

## 2018-10-18 PROCEDURE — 93458 L HRT ARTERY/VENTRICLE ANGIO: CPT | Performed by: INTERNAL MEDICINE

## 2018-10-18 PROCEDURE — 4A023N7 MEASUREMENT OF CARDIAC SAMPLING AND PRESSURE, LEFT HEART, PERCUTANEOUS APPROACH: ICD-10-PCS | Performed by: INTERNAL MEDICINE

## 2018-10-18 PROCEDURE — 6360000002 HC RX W HCPCS: Performed by: HOSPITALIST

## 2018-10-18 PROCEDURE — C1894 INTRO/SHEATH, NON-LASER: HCPCS

## 2018-10-18 PROCEDURE — 2500000003 HC RX 250 WO HCPCS

## 2018-10-18 PROCEDURE — 6370000000 HC RX 637 (ALT 250 FOR IP): Performed by: INTERNAL MEDICINE

## 2018-10-18 PROCEDURE — B2111ZZ FLUOROSCOPY OF MULTIPLE CORONARY ARTERIES USING LOW OSMOLAR CONTRAST: ICD-10-PCS | Performed by: INTERNAL MEDICINE

## 2018-10-18 PROCEDURE — B2151ZZ FLUOROSCOPY OF LEFT HEART USING LOW OSMOLAR CONTRAST: ICD-10-PCS | Performed by: INTERNAL MEDICINE

## 2018-10-18 PROCEDURE — 80061 LIPID PANEL: CPT

## 2018-10-18 PROCEDURE — 80053 COMPREHEN METABOLIC PANEL: CPT

## 2018-10-18 PROCEDURE — 2140000000 HC CCU INTERMEDIATE R&B

## 2018-10-18 RX ORDER — ONDANSETRON 2 MG/ML
4 INJECTION INTRAMUSCULAR; INTRAVENOUS EVERY 6 HOURS PRN
Status: DISCONTINUED | OUTPATIENT
Start: 2018-10-18 | End: 2018-10-18 | Stop reason: SDUPTHER

## 2018-10-18 RX ORDER — ALPRAZOLAM 0.5 MG/1
0.5 TABLET ORAL
Status: ACTIVE | OUTPATIENT
Start: 2018-10-18 | End: 2018-10-18

## 2018-10-18 RX ORDER — ASPIRIN 81 MG/1
81 TABLET ORAL ONCE
Status: COMPLETED | OUTPATIENT
Start: 2018-10-18 | End: 2018-10-18

## 2018-10-18 RX ORDER — SODIUM CHLORIDE 0.9 % (FLUSH) 0.9 %
10 SYRINGE (ML) INJECTION EVERY 12 HOURS SCHEDULED
Status: DISCONTINUED | OUTPATIENT
Start: 2018-10-18 | End: 2018-10-18 | Stop reason: SDUPTHER

## 2018-10-18 RX ORDER — HYDRALAZINE HYDROCHLORIDE 20 MG/ML
10 INJECTION INTRAMUSCULAR; INTRAVENOUS EVERY 10 MIN PRN
Status: DISCONTINUED | OUTPATIENT
Start: 2018-10-18 | End: 2018-10-19 | Stop reason: HOSPADM

## 2018-10-18 RX ORDER — LABETALOL HYDROCHLORIDE 5 MG/ML
10 INJECTION, SOLUTION INTRAVENOUS EVERY 30 MIN PRN
Status: DISCONTINUED | OUTPATIENT
Start: 2018-10-18 | End: 2018-10-19 | Stop reason: HOSPADM

## 2018-10-18 RX ORDER — SODIUM CHLORIDE 9 MG/ML
1000 INJECTION, SOLUTION INTRAVENOUS CONTINUOUS
Status: DISCONTINUED | OUTPATIENT
Start: 2018-10-18 | End: 2018-10-19 | Stop reason: HOSPADM

## 2018-10-18 RX ORDER — SODIUM CHLORIDE 9 MG/ML
INJECTION, SOLUTION INTRAVENOUS CONTINUOUS
Status: DISCONTINUED | OUTPATIENT
Start: 2018-10-18 | End: 2018-10-18 | Stop reason: SDUPTHER

## 2018-10-18 RX ORDER — SODIUM CHLORIDE 0.9 % (FLUSH) 0.9 %
10 SYRINGE (ML) INJECTION PRN
Status: DISCONTINUED | OUTPATIENT
Start: 2018-10-18 | End: 2018-10-18 | Stop reason: SDUPTHER

## 2018-10-18 RX ORDER — ACETAMINOPHEN 325 MG/1
650 TABLET ORAL EVERY 4 HOURS PRN
Status: DISCONTINUED | OUTPATIENT
Start: 2018-10-18 | End: 2018-10-19 | Stop reason: HOSPADM

## 2018-10-18 RX ORDER — SODIUM CHLORIDE 0.9 % (FLUSH) 0.9 %
10 SYRINGE (ML) INJECTION EVERY 12 HOURS SCHEDULED
Status: DISCONTINUED | OUTPATIENT
Start: 2018-10-18 | End: 2018-10-19 | Stop reason: HOSPADM

## 2018-10-18 RX ORDER — SODIUM CHLORIDE 0.9 % (FLUSH) 0.9 %
10 SYRINGE (ML) INJECTION PRN
Status: DISCONTINUED | OUTPATIENT
Start: 2018-10-18 | End: 2018-10-19 | Stop reason: HOSPADM

## 2018-10-18 RX ORDER — ONDANSETRON 2 MG/ML
4 INJECTION INTRAMUSCULAR; INTRAVENOUS EVERY 6 HOURS PRN
Status: DISCONTINUED | OUTPATIENT
Start: 2018-10-18 | End: 2018-10-19 | Stop reason: HOSPADM

## 2018-10-18 RX ADMIN — ASPIRIN 81 MG: 81 TABLET ORAL at 08:58

## 2018-10-18 RX ADMIN — METOPROLOL SUCCINATE 25 MG: 25 TABLET, EXTENDED RELEASE ORAL at 21:59

## 2018-10-18 RX ADMIN — PANTOPRAZOLE SODIUM 40 MG: 40 INJECTION, POWDER, FOR SOLUTION INTRAVENOUS at 08:55

## 2018-10-18 RX ADMIN — ENOXAPARIN SODIUM 40 MG: 100 INJECTION SUBCUTANEOUS at 22:06

## 2018-10-18 RX ADMIN — PANTOPRAZOLE SODIUM 40 MG: 40 INJECTION, POWDER, FOR SOLUTION INTRAVENOUS at 21:59

## 2018-10-18 RX ADMIN — FUROSEMIDE 40 MG: 40 TABLET ORAL at 08:55

## 2018-10-18 RX ADMIN — LISINOPRIL 2.5 MG: 5 TABLET ORAL at 08:55

## 2018-10-18 RX ADMIN — METOPROLOL SUCCINATE 25 MG: 25 TABLET, EXTENDED RELEASE ORAL at 08:55

## 2018-10-18 RX ADMIN — SODIUM CHLORIDE 1000 ML: 9 INJECTION, SOLUTION INTRAVENOUS at 16:15

## 2018-10-18 RX ADMIN — SODIUM CHLORIDE 25 ML: 9 INJECTION, SOLUTION INTRAVENOUS at 08:30

## 2018-10-18 RX ADMIN — ASPIRIN 81 MG: 81 TABLET ORAL at 08:30

## 2018-10-18 RX ADMIN — Medication 10 ML: at 21:59

## 2018-10-18 ASSESSMENT — PAIN SCALES - GENERAL
PAINLEVEL_OUTOF10: 0

## 2018-10-18 NOTE — PROGRESS NOTES
Cardiology Daily Note Victorino Meng MD      Patient:  Lisa Guthrie  [id number]    Patient Active Problem List    Diagnosis Date Noted    Acute acalculous cholecystitis      Priority: High    Chronic systolic heart failure [facility])      Priority: High    Acute systolic congestive heart failure ([facility] [address]      Priority: High    IVDU (intravenous drug user) 10/31/2016     Priority: Medium    Acute on chronic systolic congestive heart failure ([address] 10/15/2018     Priority: Low    CHF with unknown LVEF ([facility] [address] 03/13/2018     Priority: Low    Nonischemic cardiomyopathy ([address] 11/04/2016     Priority: Low    Leukocytosis 10/31/2016     Priority: Low    Tobacco use 10/31/2016     Priority: Low       Admit Date:  10/15/2018    Admission Problem List: Present on Admission:   Acute on chronic systolic congestive heart failure [facility])      Cardiac Specific Data:  Specialty Problems        Cardiology Problems    Acute systolic congestive heart failure (HCC)        Chronic systolic heart failure (HCC)        Nonischemic cardiomyopathy ([facility] [address]        CHF with unknown LVEF (HCC)        Acute on chronic systolic congestive heart failure (HCC)              Subjective:  Mr. Gigi Newton seen today for  telemetry as he unexpectedly had to go out of town for several days. Patient presented with increasing dyspnea and congestive heart failure. Denies angina. Stress test today ejection fraction 26% diffuse heterogeneous uptake. No previous catheterization. Objective:   /75   Pulse 76   Temp 97.6 °F (36.4 °C) (Temporal)   Resp 14   Ht 5' 7\" (1.702 m)   Wt 191 lb 6.4 oz (86.8 kg)   SpO2 98%   BMI 29.98 kg/m²       Intake/Output Summary (Last 24 hours) at 10/17/18 2006  Last data filed at 10/17/18 1839   Gross per 24 hour   Intake             1440 ml   Output             1950 ml   Net             -510 ml       Prior to Admission medications    Medication Sig Start Date End Date Taking?  Authorizing Provider   lisinopril had symptoms of dyspnea during infusion that resolved in recovery. Baseline EKG showed sinus rhythm. During stress there were no significant EKG changes or rhythm changes. Baseline and peak blood pressures were 155/77, and 153/81 respectively. Baseline and peak heart rates were 82 and  88 respectively. Lexiscan/Cardiolyte Nuclear Medicine Report Date of Procedure: 10/17/2018 The patient was injected with 0.49 millicuries (mCi) of Technetium (Tc99m). After an appropriate level of stress the patient was re-injected with 86.0 millicuries (mCi) of Technetium (Tc99m). Repeat gated images were then performed per standard protocol. Findings: 1. Analysis of the the stress and rest images reveals diffuse heterogenosis uptake. 2.  Analysis of the gated images reveals grossly normal left ventricular function with a calculated ejection fraction of 26 %. 3.  There is 37 % ischemic myocardium at risk on stress and 38 % ischemic myocardium at risk on rest.      Impression: There is diffuse heterogenous upttake, with a calculated ejection fraction of 26 % with a -1 % of ischemic burden. Suggest: consideration for cardiac catheterization Signed by Dr Catherine Tapia on 10/17/2018 3:44 PM        Assessment:  1. Dyspnea with congestive heart failure and pro BNP level 6638  2. Abnormal stress test 10/17/18 with ejection fraction 26% diffuse heterogeneous uptake  3. History of substance abuse IV meth amphetamines per patient  4. History of tobacco usage  5.  Cardiomegaly  6. Echocardiogram 10/16/18 normal left ventricular function  7. Echocardiogram 3/14/18 severe mitral regurgitation moderate to severe tricuspid regurgitation severely dilated left ventricle with ejection fraction 15-20%        Plan:  I have discussed with the patient regarding indications for the proposed procedure LEFT HEART CATHETERIZATION AND POSSIBLE PERCUTANEOUS INTERVENTION  along with possible alternatives benefits and risks including but not limited to risks

## 2018-10-18 NOTE — PROGRESS NOTES
[facility]      Patient:  Sd Jennings  YOB: 1985  Date of Service: 10/18/2018  MRN: [id number]   Acct: [de-identified]   Primary Care Physician:No primary care provider on file. Advance Directive:Full Code  Admit Date: 10/15/2018       Hospital Day: 3    CHIEF COMPLAINT: Abdominal Pain    SUBJECTIVE:  No acute duodenitis vent reported. Denies any acute distress today. Review of Systems  14 point review of systems is negative except as specifically addressed above. Objective:   VITALS:  /66   Pulse 91   Temp 98.3 °F (36.8 °C) (Temporal)   Resp 16   Ht 5' 7\" (1.702 m)   Wt 192 lb 6.4 oz (87.3 kg)   SpO2 96%   BMI 30.13 kg/m²   24HR INTAKE/OUTPUT:    Intake/Output Summary (Last 24 hours) at 10/18/18 1053  Last data filed at 10/18/18 0939   Gross per 24 hour   Intake             1320 ml   Output              450 ml   Net              870 ml       Physical Exam   Constitutional: He is oriented to person, place, and time. He appears well-developed and well-nourished. No distress. HENT:   Head: Normocephalic and atraumatic. Mouth/Throat: No oropharyngeal exudate. Eyes: Pupils are equal, round, and reactive to light. Conjunctivae and EOM are normal.   Neck: Normal range of motion. Neck supple. Cardiovascular: Normal rate, regular rhythm and normal heart sounds. Pulmonary/Chest: Effort normal and breath sounds normal. No respiratory distress. He has no wheezes. Abdominal: Soft. Bowel sounds are normal. He exhibits no distension. There is no tenderness. Musculoskeletal: He exhibits no edema. Neurological: He is alert and oriented to person, place, and time. Skin: Skin is warm and dry. He is not diaphoretic. No erythema. Psychiatric: He has a normal mood and affect. His behavior is normal. Thought content normal.   Vitals reviewed.       Medications:      sodium chloride        sodium chloride flush  10 mL Intravenous 2 times per day    furosemide  40 mg Oral Daily  enoxaparin  40 mg Subcutaneous Daily    lisinopril  2.5 mg Oral Daily    regadenoson  0.4 mg Intravenous Once    aspirin EC  81 mg Oral Daily    metoprolol succinate  25 mg Oral BID    pantoprazole  40 mg Intravenous BID     sodium chloride flush, ALPRAZolam, ondansetron, GI cocktail  Diet NPO Time Specified  Diet NPO, After Midnight     Lab and other Data:     Recent Labs      10/16/18   0238   WBC  10.1   HGB  14.7   PLT  322     Recent Labs      10/16/18   0238  10/17/18   0229  10/18/18   0224   NA  137  138  138   K  5.1*  4.3  4.5   CL  93*  98  102   CO2  29  29  26   BUN  27*  21*  21*   CREATININE  1.4*  1.0  1.2   GLUCOSE  96  102  100     Recent Labs      10/16/18   0238  10/18/18   0224   AST  300*  110*   ALT  473*  290*   BILITOT  0.6  0.3   ALKPHOS  174*  143*     Troponin T: No results for input(s): TROPONINI in the last 72 hours. Pro-BNP: No results for input(s): BNP in the last 72 hours. INR: No results for input(s): INR in the last 72 hours. ABGs: Lab Results   Component Value Date    PHART 7.450 10/15/2018    PO2ART 62.0 10/15/2018    ILP0BCK 34.0 10/15/2018     UA:No results for input(s): NITRITE, COLORU, PHUR, LABCAST, WBCUA, RBCUA, MUCUS, TRICHOMONAS, YEAST, BACTERIA, CLARITYU, SPECGRAV, LEUKOCYTESUR, UROBILINOGEN, BILIRUBINUR, BLOODU, GLUCOSEU, AMORPHOUS in the last 72 hours. Invalid input(s): KETONESU      RAD:   Ct Abdomen Pelvis W Iv Contrast Additional Contrast? None    Result Date: 10/15/2018  History: 66-year-old with mid abdominal pain and nausea. Reference: CT abdomen pelvis March 13, 2018 Technique Contrast-enhanced CT abdomen/pelvis was performed with coronal and sagittal reformatted images provided.  For this CT exam, one or more of the following dose reduction techniques was employed: -automated exposure control -mA and/or kVp adjustment for patient size -iterative reconstruction DLP 1758 mGy-cm Findings Chest Incidental scanning through the lower chest demonstrates marked global cardiac enlargement. Hazy groundglass in both lung bases suggest slight edema. Contrast reflux into the hepatic veins. Abdomen No focal liver lesions. Trace of fluid in the right hepatorenal recess. Pericholecystic fluid/gallbladder wall thickening. Normal spleen. No pancreatic or adrenal abnormality. Tiny cystic focus of the upper left renal cortex is unchanged. Kidneys otherwise unremarkable. Symmetric perinephric edema upon background of mild mesenteric edema. No bowel obstruction or acute inflammatory process. Minimal soft atherosclerotic plaquing of the abdominal aorta, age accelerated. No aneurysm. No free air. No lymphadenopathy. Umbilical fat-containing hernia. Pelvis Small amount of free fluid in the pelvis. Urinary bladder is unremarkable. No pelvic lymphadenopathy. Tiny fat-containing right inguinal hernia. Unchanged marked cardiomegaly with findings of increased right heart pressure and fluid overload. Specifically there is mild basilar pulmonary edema, mesenteric edema, pericholecystic fluid, and a small amount of free fluid in the pelvis Signed by Dr Fay Miller on 10/15/2018 7:20 AM    Us Liver    Result Date: 10/17/2018  History: 79-year-old with abnormal liver function tests. Reference: CT abdomen pelvis 2 days prior. Findings: Real time sonography of the liver was performed. The gallbladder is incompletely distended limiting its evaluation. The wall does appear edematous correlating with CT 2 days ago. No gallstones are seen however. Normal common bile duct diameter of 5 mm. The liver appears normal in size, contour, and echogenicity. Slight distention of the hepatic veins. No masses. Visualized pancreas is unremarkable. The right kidney is normal. The main portal vein is grossly patent with some pulsatility of flow. The spleen is normal with a maximal dimension of 11.4 cm. No ascites. 1. Partially contracted and thick-walled gallbladder is a typical finding in the setting of CHF. reveals grossly normal left ventricular function with a calculated ejection fraction of 26 %. 3.  There is 37 % ischemic myocardium at risk on stress and 38 % ischemic myocardium at risk on rest.      Impression: There is diffuse heterogenous upttake, with a calculated ejection fraction of 26 % with a -1 % of ischemic burden. Suggest: consideration for cardiac catheterization Signed by Dr Albina Dubin on 10/17/2018 3:44 PM        Echo:     :ECHOCARDIOGRAM COMPLETE 2D [address] [facility].     Study Location: Echo Lab  Technical Quality: Good visualization    Patient Status: Inpatient    Rhythm: Within normal limits    Indications:Dyspnea/SOB.     Conclusions     Findings      Mitral Valve   Structurally normal mitral valve with normal leaflet mobility.   No evidence of mitral regurgitation.      Aortic Valve   Aortic valve appears to be tricuspid.   Structurally normal aortic valve.   No significant aortic regurgitation or stenosis is noted.      Tricuspid Valve   Tricuspid valve is structurally normal.   No evidence of tricuspid regurgitation.      Pulmonic Valve   Mild-to-moderate pulmonic regurgitation present.      Left Atrium   Normal size left atrium.      Left Ventricle   Normal left ventricular size with preserved LV function and an estimated   ejection fraction of approximately 55-60%.   No evidence of left ventricular mass or thrombus noted.      Right Atrium   Normal right atrial dimension with no evidence of thrombus or mass noted.      Right Ventricle   Normal right ventricular size with preserved RV function.      Pericardial Effusion   No evidence of significant pericardial effusion is noted.     Allergies    - No known allergies.     M-Mode Measurements (cm)      LVIDd: 7.05 cm                           LVIDs: 6.61 cm   IVSd: 1.22 cm   LVPWd: 1.35 cm                           AO Root Dimension: 3 cm   % Ejection Fraction: 13.8 %              LA: 5.4 cm                                            LVOT: 2 visually estimated at 20-25%%   moderate impairment of LV systolic function. Prophylaxis Orders:   Antibiotic: None  DVT Prophylaxis: Lovenox  GI prophylaxis:  Frontal further treatment.       NutritionOrders: Diet NPO Time Specified  Diet NPO, After Midnight        Consults  IP CONSULT TO CARDIOLOGY      DISCHARGE PLAN: tbd          Electronically signed by   Priya Bergman MD   10/18/2018 10:53 AM

## 2018-10-19 VITALS
RESPIRATION RATE: 18 BRPM | TEMPERATURE: 97.1 F | WEIGHT: 198.13 LBS | HEART RATE: 79 BPM | OXYGEN SATURATION: 96 % | HEIGHT: 67 IN | DIASTOLIC BLOOD PRESSURE: 63 MMHG | SYSTOLIC BLOOD PRESSURE: 101 MMHG | BODY MASS INDEX: 31.1 KG/M2

## 2018-10-19 LAB
ALBUMIN SERPL-MCNC: 3.6 G/DL (ref 3.5–5.2)
ALP BLD-CCNC: 137 U/L (ref 40–130)
ALT SERPL-CCNC: 228 U/L (ref 5–41)
ANION GAP SERPL CALCULATED.3IONS-SCNC: 13 MMOL/L (ref 7–19)
AST SERPL-CCNC: 80 U/L (ref 5–40)
BILIRUB SERPL-MCNC: <0.2 MG/DL (ref 0.2–1.2)
BUN BLDV-MCNC: 22 MG/DL (ref 6–20)
CALCIUM SERPL-MCNC: 8.7 MG/DL (ref 8.6–10)
CHLORIDE BLD-SCNC: 97 MMOL/L (ref 98–111)
CO2: 26 MMOL/L (ref 22–29)
CREAT SERPL-MCNC: 1 MG/DL (ref 0.5–1.2)
GFR NON-AFRICAN AMERICAN: >60
GLUCOSE BLD-MCNC: 112 MG/DL (ref 74–109)
POTASSIUM SERPL-SCNC: 4.1 MMOL/L (ref 3.5–5)
SODIUM BLD-SCNC: 136 MMOL/L (ref 136–145)
TOTAL PROTEIN: 6.7 G/DL (ref 6.6–8.7)

## 2018-10-19 PROCEDURE — 6370000000 HC RX 637 (ALT 250 FOR IP): Performed by: HOSPITALIST

## 2018-10-19 PROCEDURE — 36415 COLL VENOUS BLD VENIPUNCTURE: CPT

## 2018-10-19 PROCEDURE — 2580000003 HC RX 258: Performed by: INTERNAL MEDICINE

## 2018-10-19 PROCEDURE — 6360000002 HC RX W HCPCS: Performed by: HOSPITALIST

## 2018-10-19 PROCEDURE — C9113 INJ PANTOPRAZOLE SODIUM, VIA: HCPCS | Performed by: HOSPITALIST

## 2018-10-19 PROCEDURE — 99238 HOSP IP/OBS DSCHRG MGMT 30/<: CPT | Performed by: NURSE PRACTITIONER

## 2018-10-19 PROCEDURE — 80053 COMPREHEN METABOLIC PANEL: CPT

## 2018-10-19 PROCEDURE — 99232 SBSQ HOSP IP/OBS MODERATE 35: CPT | Performed by: INTERNAL MEDICINE

## 2018-10-19 RX ORDER — ASPIRIN 81 MG/1
81 TABLET ORAL DAILY
Qty: 30 TABLET | Refills: 3 | Status: ON HOLD | COMMUNITY
Start: 2018-10-20 | End: 2019-09-27 | Stop reason: HOSPADM

## 2018-10-19 RX ADMIN — LISINOPRIL 2.5 MG: 5 TABLET ORAL at 08:33

## 2018-10-19 RX ADMIN — METOPROLOL SUCCINATE 25 MG: 25 TABLET, EXTENDED RELEASE ORAL at 08:32

## 2018-10-19 RX ADMIN — FUROSEMIDE 40 MG: 40 TABLET ORAL at 08:32

## 2018-10-19 RX ADMIN — PANTOPRAZOLE SODIUM 40 MG: 40 INJECTION, POWDER, FOR SOLUTION INTRAVENOUS at 08:32

## 2018-10-19 RX ADMIN — Medication 10 ML: at 08:32

## 2018-10-19 RX ADMIN — ASPIRIN 81 MG: 81 TABLET ORAL at 08:32

## 2018-10-19 ASSESSMENT — PAIN SCALES - GENERAL
PAINLEVEL_OUTOF10: 0
PAINLEVEL_OUTOF10: 0

## 2018-10-19 NOTE — PROGRESS NOTES
-565 ml       Prior to Admission medications    Medication Sig Start Date End Date Taking? Authorizing Provider   aspirin 81 MG EC tablet Take 1 tablet by mouth daily 10/20/18  Yes Sandra Cancino MD   lisinopril (PRINIVIL;ZESTRIL) 10 MG tablet Take 10 mg by mouth daily    Yes Historical Provider, MD   metoprolol succinate (TOPROL XL) 25 MG extended release tablet Take 1 tablet by mouth every 12 hours 8/15/18  Yes Chares Peabody, APRN   furosemide (LASIX) 40 MG tablet Take 1 tablet by mouth daily 7/25/18  Yes KAYLEEN Gongora   spironolactone (ALDACTONE) 25 MG tablet Take 1 tablet by mouth daily 7/25/18  Yes KAYLEEN Gongora        sodium chloride flush  10 mL Intravenous 2 times per day    furosemide  40 mg Oral Daily    enoxaparin  40 mg Subcutaneous Daily    lisinopril  2.5 mg Oral Daily    regadenoson  0.4 mg Intravenous Once    aspirin EC  81 mg Oral Daily    metoprolol succinate  25 mg Oral BID    pantoprazole  40 mg Intravenous BID       TELEMETRY: Sinus     Physical Exam:      Physical Exam      General:  Awake, alert, NAD  Skin:  Warm and dry  Neck:  no jvd , no carotid bruits  Chest:  Clear to auscultation, no wheezing or rales  Cardiovascular:  RRR [id number] no murmurs, clicks, gallups, or rubs  Abdomen:  Soft nontender, nondistended, bowel sounds present  Extremities:  Edema: none right Radial Artery with 2+ palpable pulses;  Hematoma: No  Neuro: Intact neurovascular function Yes    Lab Data:  CBC: Recent Labs      10/18/18   1039   WBC  8.4   HGB  14.8   HCT  44.5   MCV  93.7   PLT  342     BMP: Recent Labs      10/18/18   0224  10/18/18   1039  10/19/18   0132   NA  138  139  136   K  4.5  4.5  4.1   CL  102  101  97*   CO2  26  25  26   BUN  21*  24*  22*   CREATININE  1.2  1.0  1.0     LIVER PROFILE:   Recent Labs      10/18/18   0224  10/18/18   1039  10/19/18   0132   AST  110*  100*  80*   ALT  290*  273*  228*   BILITOT  0.3  0.3  <0.2   ALKPHOS  143*  145*  137*     PT/INR:

## 2018-10-20 LAB
BLOOD CULTURE, ROUTINE: NORMAL
CULTURE, BLOOD 2: NORMAL

## 2018-10-21 LAB
HIV RNA PCR INTERPRETATION: NOT DETECTED
HIV-1 RNA BY PCR, QN: <1.3 LOG
HIV-1 RNA BY PCR, QN: <20 CPY/ML

## 2018-10-22 ENCOUNTER — TELEPHONE (OUTPATIENT)
Dept: CARDIOLOGY | Age: 33
End: 2018-10-22

## 2018-12-18 ENCOUNTER — APPOINTMENT (OUTPATIENT)
Dept: GENERAL RADIOLOGY | Facility: HOSPITAL | Age: 33
End: 2018-12-18

## 2018-12-18 ENCOUNTER — HOSPITAL ENCOUNTER (OUTPATIENT)
Facility: HOSPITAL | Age: 33
Setting detail: OBSERVATION
Discharge: HOME | End: 2018-12-20
Attending: EMERGENCY MEDICINE | Admitting: EMERGENCY MEDICINE

## 2018-12-18 DIAGNOSIS — R77.8 TROPONIN I ABOVE REFERENCE RANGE: ICD-10-CM

## 2018-12-18 DIAGNOSIS — F19.90 RECREATIONAL DRUG USE: ICD-10-CM

## 2018-12-18 DIAGNOSIS — I50.43 ACUTE ON CHRONIC COMBINED SYSTOLIC AND DIASTOLIC CONGESTIVE HEART FAILURE (HCC): Primary | ICD-10-CM

## 2018-12-18 LAB
ALBUMIN SERPL-MCNC: 3.7 G/DL (ref 3.5–5)
ALBUMIN/GLOB SERPL: 1.3 G/DL (ref 1.1–2.5)
ALP SERPL-CCNC: 100 U/L (ref 24–120)
ALT SERPL W P-5'-P-CCNC: 124 U/L (ref 0–54)
AMPHET+METHAMPHET UR QL: POSITIVE
ANION GAP SERPL CALCULATED.3IONS-SCNC: 13 MMOL/L (ref 4–13)
ARTERIAL PATENCY WRIST A: POSITIVE
AST SERPL-CCNC: 112 U/L (ref 7–45)
ATMOSPHERIC PRESS: 754 MMHG
BARBITURATES UR QL SCN: NEGATIVE
BASE EXCESS BLDA CALC-SCNC: -1.1 MMOL/L (ref 0–2)
BASOPHILS # BLD AUTO: 0.06 10*3/MM3 (ref 0–0.2)
BASOPHILS NFR BLD AUTO: 0.7 % (ref 0–2)
BDY SITE: ABNORMAL
BENZODIAZ UR QL SCN: NEGATIVE
BILIRUB SERPL-MCNC: 0.5 MG/DL (ref 0.1–1)
BODY TEMPERATURE: 37 C
BUN BLD-MCNC: 14 MG/DL (ref 5–21)
BUN/CREAT SERPL: 17.9 (ref 7–25)
CALCIUM SPEC-SCNC: 8.7 MG/DL (ref 8.4–10.4)
CANNABINOIDS SERPL QL: NEGATIVE
CHLORIDE SERPL-SCNC: 101 MMOL/L (ref 98–110)
CO2 SERPL-SCNC: 23 MMOL/L (ref 24–31)
COCAINE UR QL: NEGATIVE
CREAT BLD-MCNC: 0.78 MG/DL (ref 0.5–1.4)
DEPRECATED RDW RBC AUTO: 51.4 FL (ref 40–54)
EOSINOPHIL # BLD AUTO: 0.12 10*3/MM3 (ref 0–0.7)
EOSINOPHIL NFR BLD AUTO: 1.4 % (ref 0–4)
ERYTHROCYTE [DISTWIDTH] IN BLOOD BY AUTOMATED COUNT: 15.3 % (ref 12–15)
GFR SERPL CREATININE-BSD FRML MDRD: 115 ML/MIN/1.73
GLOBULIN UR ELPH-MCNC: 2.9 GM/DL
GLUCOSE BLD-MCNC: 125 MG/DL (ref 70–100)
HCO3 BLDA-SCNC: 23 MMOL/L (ref 20–26)
HCT VFR BLD AUTO: 39.6 % (ref 40–52)
HGB BLD-MCNC: 13.6 G/DL (ref 14–18)
IMM GRANULOCYTES # BLD: 0.04 10*3/MM3 (ref 0–0.03)
IMM GRANULOCYTES NFR BLD: 0.5 % (ref 0–5)
LYMPHOCYTES # BLD AUTO: 1.77 10*3/MM3 (ref 0.72–4.86)
LYMPHOCYTES NFR BLD AUTO: 20.2 % (ref 15–45)
Lab: ABNORMAL
MCH RBC QN AUTO: 32 PG (ref 28–32)
MCHC RBC AUTO-ENTMCNC: 34.3 G/DL (ref 33–36)
MCV RBC AUTO: 93.2 FL (ref 82–95)
METHADONE UR QL SCN: NEGATIVE
MODALITY: ABNORMAL
MONOCYTES # BLD AUTO: 0.98 10*3/MM3 (ref 0.19–1.3)
MONOCYTES NFR BLD AUTO: 11.2 % (ref 4–12)
NEUTROPHILS # BLD AUTO: 5.8 10*3/MM3 (ref 1.87–8.4)
NEUTROPHILS NFR BLD AUTO: 66 % (ref 39–78)
NRBC BLD MANUAL-RTO: 0 /100 WBC (ref 0–0)
NT-PROBNP SERPL-MCNC: 7630 PG/ML (ref 0–450)
OPIATES UR QL: NEGATIVE
PCO2 BLDA: 35.8 MM HG (ref 35–45)
PCP UR QL SCN: NEGATIVE
PH BLDA: 7.42 PH UNITS (ref 7.35–7.45)
PLATELET # BLD AUTO: 278 10*3/MM3 (ref 130–400)
PMV BLD AUTO: 9.9 FL (ref 6–12)
PO2 BLDA: 90.7 MM HG (ref 83–108)
POTASSIUM BLD-SCNC: 3.8 MMOL/L (ref 3.5–5.3)
PROT SERPL-MCNC: 6.6 G/DL (ref 6.3–8.7)
RBC # BLD AUTO: 4.25 10*6/MM3 (ref 4.8–5.9)
SAO2 % BLDCOA: 97.6 % (ref 94–99)
SODIUM BLD-SCNC: 137 MMOL/L (ref 135–145)
TROPONIN I SERPL-MCNC: 0.1 NG/ML (ref 0–0.03)
TROPONIN I SERPL-MCNC: 0.11 NG/ML (ref 0–0.03)
TROPONIN I SERPL-MCNC: 0.13 NG/ML (ref 0–0.03)
VENTILATOR MODE: ABNORMAL
WBC NRBC COR # BLD: 8.77 10*3/MM3 (ref 4.8–10.8)

## 2018-12-18 PROCEDURE — 25010000002 FUROSEMIDE PER 20 MG: Performed by: EMERGENCY MEDICINE

## 2018-12-18 PROCEDURE — 94799 UNLISTED PULMONARY SVC/PX: CPT

## 2018-12-18 PROCEDURE — 80307 DRUG TEST PRSMV CHEM ANLYZR: CPT | Performed by: EMERGENCY MEDICINE

## 2018-12-18 PROCEDURE — 93010 ELECTROCARDIOGRAM REPORT: CPT | Performed by: INTERNAL MEDICINE

## 2018-12-18 PROCEDURE — 71045 X-RAY EXAM CHEST 1 VIEW: CPT

## 2018-12-18 PROCEDURE — 93005 ELECTROCARDIOGRAM TRACING: CPT | Performed by: EMERGENCY MEDICINE

## 2018-12-18 PROCEDURE — 36600 WITHDRAWAL OF ARTERIAL BLOOD: CPT

## 2018-12-18 PROCEDURE — G0378 HOSPITAL OBSERVATION PER HR: HCPCS

## 2018-12-18 PROCEDURE — 80053 COMPREHEN METABOLIC PANEL: CPT | Performed by: EMERGENCY MEDICINE

## 2018-12-18 PROCEDURE — 25010000002 ENOXAPARIN PER 10 MG: Performed by: NURSE PRACTITIONER

## 2018-12-18 PROCEDURE — 84484 ASSAY OF TROPONIN QUANT: CPT | Performed by: EMERGENCY MEDICINE

## 2018-12-18 PROCEDURE — 94640 AIRWAY INHALATION TREATMENT: CPT

## 2018-12-18 PROCEDURE — 94760 N-INVAS EAR/PLS OXIMETRY 1: CPT

## 2018-12-18 PROCEDURE — 99284 EMERGENCY DEPT VISIT MOD MDM: CPT

## 2018-12-18 PROCEDURE — 96372 THER/PROPH/DIAG INJ SC/IM: CPT

## 2018-12-18 PROCEDURE — 85025 COMPLETE CBC W/AUTO DIFF WBC: CPT | Performed by: EMERGENCY MEDICINE

## 2018-12-18 PROCEDURE — 96374 THER/PROPH/DIAG INJ IV PUSH: CPT

## 2018-12-18 PROCEDURE — 82803 BLOOD GASES ANY COMBINATION: CPT

## 2018-12-18 PROCEDURE — 83880 ASSAY OF NATRIURETIC PEPTIDE: CPT | Performed by: EMERGENCY MEDICINE

## 2018-12-18 PROCEDURE — 99214 OFFICE O/P EST MOD 30 MIN: CPT | Performed by: INTERNAL MEDICINE

## 2018-12-18 PROCEDURE — 84484 ASSAY OF TROPONIN QUANT: CPT | Performed by: NURSE PRACTITIONER

## 2018-12-18 RX ORDER — ASPIRIN 81 MG/1
81 TABLET ORAL DAILY
Status: DISCONTINUED | OUTPATIENT
Start: 2018-12-18 | End: 2018-12-20 | Stop reason: HOSPADM

## 2018-12-18 RX ORDER — HYDROCODONE BITARTRATE AND ACETAMINOPHEN 5; 325 MG/1; MG/1
1 TABLET ORAL EVERY 6 HOURS PRN
Status: DISCONTINUED | OUTPATIENT
Start: 2018-12-18 | End: 2018-12-20 | Stop reason: HOSPADM

## 2018-12-18 RX ORDER — SODIUM CHLORIDE 0.9 % (FLUSH) 0.9 %
3-10 SYRINGE (ML) INJECTION AS NEEDED
Status: DISCONTINUED | OUTPATIENT
Start: 2018-12-18 | End: 2018-12-20 | Stop reason: HOSPADM

## 2018-12-18 RX ORDER — ONDANSETRON 2 MG/ML
4 INJECTION INTRAMUSCULAR; INTRAVENOUS EVERY 6 HOURS PRN
Status: DISCONTINUED | OUTPATIENT
Start: 2018-12-18 | End: 2018-12-20 | Stop reason: HOSPADM

## 2018-12-18 RX ORDER — SPIRONOLACTONE 25 MG/1
25 TABLET ORAL DAILY
Status: DISCONTINUED | OUTPATIENT
Start: 2018-12-19 | End: 2018-12-20 | Stop reason: HOSPADM

## 2018-12-18 RX ORDER — LISINOPRIL 10 MG/1
10 TABLET ORAL EVERY 12 HOURS
Status: ON HOLD | COMMUNITY
End: 2018-12-20 | Stop reason: SDUPTHER

## 2018-12-18 RX ORDER — FUROSEMIDE 10 MG/ML
40 INJECTION INTRAMUSCULAR; INTRAVENOUS EVERY 12 HOURS
Status: DISCONTINUED | OUTPATIENT
Start: 2018-12-19 | End: 2018-12-19

## 2018-12-18 RX ORDER — NICOTINE 21 MG/24HR
1 PATCH, TRANSDERMAL 24 HOURS TRANSDERMAL
Status: DISCONTINUED | OUTPATIENT
Start: 2018-12-18 | End: 2018-12-20 | Stop reason: HOSPADM

## 2018-12-18 RX ORDER — FUROSEMIDE 10 MG/ML
40 INJECTION INTRAMUSCULAR; INTRAVENOUS ONCE
Status: COMPLETED | OUTPATIENT
Start: 2018-12-18 | End: 2018-12-18

## 2018-12-18 RX ORDER — LISINOPRIL 10 MG/1
10 TABLET ORAL EVERY 12 HOURS SCHEDULED
Status: DISCONTINUED | OUTPATIENT
Start: 2018-12-18 | End: 2018-12-20 | Stop reason: HOSPADM

## 2018-12-18 RX ORDER — METOPROLOL SUCCINATE 25 MG/1
25 TABLET, EXTENDED RELEASE ORAL DAILY
Status: ON HOLD | COMMUNITY
End: 2018-12-20 | Stop reason: SDUPTHER

## 2018-12-18 RX ORDER — METOPROLOL SUCCINATE 25 MG/1
25 TABLET, EXTENDED RELEASE ORAL
Status: DISCONTINUED | OUTPATIENT
Start: 2018-12-18 | End: 2018-12-20 | Stop reason: HOSPADM

## 2018-12-18 RX ORDER — SODIUM CHLORIDE 0.9 % (FLUSH) 0.9 %
3 SYRINGE (ML) INJECTION EVERY 12 HOURS SCHEDULED
Status: DISCONTINUED | OUTPATIENT
Start: 2018-12-18 | End: 2018-12-20 | Stop reason: HOSPADM

## 2018-12-18 RX ORDER — LISINOPRIL 2.5 MG/1
2.5 TABLET ORAL
Status: DISCONTINUED | OUTPATIENT
Start: 2018-12-18 | End: 2018-12-18

## 2018-12-18 RX ADMIN — SODIUM CHLORIDE, PRESERVATIVE FREE 3 ML: 5 INJECTION INTRAVENOUS at 21:48

## 2018-12-18 RX ADMIN — IPRATROPIUM BROMIDE 0.5 MG: 0.5 SOLUTION RESPIRATORY (INHALATION) at 20:20

## 2018-12-18 RX ADMIN — METOPROLOL SUCCINATE 25 MG: 25 TABLET, FILM COATED, EXTENDED RELEASE ORAL at 17:53

## 2018-12-18 RX ADMIN — ENOXAPARIN SODIUM 40 MG: 40 INJECTION SUBCUTANEOUS at 17:53

## 2018-12-18 RX ADMIN — ASPIRIN 81 MG: 81 TABLET, DELAYED RELEASE ORAL at 17:53

## 2018-12-18 RX ADMIN — LISINOPRIL 10 MG: 10 TABLET ORAL at 20:34

## 2018-12-18 RX ADMIN — NICOTINE 1 PATCH: 21 PATCH, EXTENDED RELEASE TRANSDERMAL at 17:54

## 2018-12-18 RX ADMIN — FUROSEMIDE 40 MG: 10 INJECTION, SOLUTION INTRAMUSCULAR; INTRAVENOUS at 15:37

## 2018-12-18 NOTE — CONSULTS
Patient Care Team:  Provider, No Known as PCP - General  Jere Kent MD  REASON FOR REFERRAL: CHF, elevated troponin    Chief complaint : shortness of breath     Subjective     Patient is a 33 y.o. male presents with a 5 day history of worsening shortness of breath, orthopnea and PND. He also reports lower extremity edema and chronic abdominal discomfort over the past year. The patient has a known history of non ischemic cardiomyopathy and chronic systolic congestive heart failure diagnosed 11/2016 felt to be secondary to chronic meth use. He follows with [facility] cardiology and has been offered a LifeVest and ICD in the past and has refused and continues to refuse. His LVEF was 15-20% by echo at [facility] in March and 21-25% with severe MR on [facility] echo in March. Recent cardiac cath in October at [facility] revealed non obstructive coronary disease and an LVEF of 15%. However, echo at [facility] in October revealed an LVEF of 55-60%.     He reports he has been compliant with his medications with the exception of missing his meds for 3 days about a week and a half ago. He reports his last meth use was a week and a half ago as well. BNP is elevated. Troponin reveals a flat trend thus far - 0.102-0.107. EKG reveals sinus tachycardia, not significantly changed from previous. LFTs are chronically elevated. CXR is negative. UDS is positive for amphetamines. IV diuresis has been initiated per the primary team.     Review of Systems   Review of Systems   Constitutional: Negative for diaphoresis, fatigue, fever and unexpected weight change.   HENT: Negative for nosebleeds.    Respiratory: Positive for shortness of breath (orthopnea and PND). Negative for apnea, cough, chest tightness and wheezing.    Cardiovascular: Positive for leg swelling. Negative for chest pain and palpitations.   Gastrointestinal: Positive for abdominal pain (chronic over past year ). Negative for abdominal distention, nausea and vomiting.    Genitourinary: Negative for hematuria.   Musculoskeletal: Negative for gait problem.   Skin: Negative for color change.   Neurological: Negative for dizziness, syncope, weakness and light-headedness.       History  Past Medical History:   Diagnosis Date   • CHF (congestive heart failure) (CMS/HCC)    • Hypertension      Past Surgical History:   Procedure Laterality Date   • HAND SURGERY       History reviewed. No pertinent family history.  Social History     Tobacco Use   • Smoking status: Current Every Day Smoker     Packs/day: 1.00     Types: Cigarettes   Substance Use Topics   • Alcohol use: No   • Drug use: Yes     Types: Methamphetamines     Comment: 2 weeks ago     Medications Prior to Admission   Medication Sig Dispense Refill Last Dose   • aspirin 81 MG EC tablet Take 81 mg by mouth.   Past Week at Unknown time   • furosemide (LASIX) 40 MG tablet Take 1 tablet by mouth Daily. 30 tablet 0 Past Week at Unknown time   • lisinopril (PRINIVIL,ZESTRIL) 2.5 MG tablet Take 1 tablet by mouth Daily. (Patient taking differently: Take 10 mg by mouth 2 (Two) Times a Day.) 30 tablet 0 Past Week at Unknown time   • metoprolol tartrate (LOPRESSOR) 25 MG tablet Take 25 mg by mouth 2 (Two) Times a Day.      • spironolactone (ALDACTONE) 25 MG tablet Take 1 tablet by mouth Daily. 30 tablet 0 Past Week at Unknown time   • metoprolol succinate XL (TOPROL-XL) 100 MG 24 hr tablet Take 1 tablet by mouth Daily. (Patient taking differently: Take 25 mg by mouth Daily.) 30 tablet 0        Current Facility-Administered Medications:   •  aspirin EC tablet 81 mg, 81 mg, Oral, Daily, Chastity Roberts APRN  •  enoxaparin (LOVENOX) syringe 40 mg, 40 mg, Subcutaneous, Q24H, Chastity Roberts APRN  •  [START ON 12/19/2018] furosemide (LASIX) injection 40 mg, 40 mg, Intravenous, Q12H, Chastity Roberts APRN  •  HYDROcodone-acetaminophen (NORCO) 5-325 MG per tablet 1 tablet, 1 tablet, Oral, Q6H PRN, Chastity Roberts  DIOGENES Buchanan  •  ipratropium (ATROVENT) nebulizer solution 0.5 mg, 0.5 mg, Nebulization, 4x Daily - RT, Chastity Roberts APRN  •  lisinopril (PRINIVIL,ZESTRIL) tablet 2.5 mg, 2.5 mg, Oral, Q24H, Chastity Roberts APRN  •  metoprolol succinate XL (TOPROL-XL) 24 hr tablet 25 mg, 25 mg, Oral, Q24H, Chastity Roberts APRN  •  nicotine (NICODERM CQ) 21 MG/24HR patch 1 patch, 1 patch, Transdermal, Q24H, Chastity Roberts APRN  •  ondansetron (ZOFRAN) injection 4 mg, 4 mg, Intravenous, Q6H PRN, Chastity Roberts APRN  •  sodium chloride 0.9 % flush 3 mL, 3 mL, Intravenous, Q12H, Chastity Roberts APRN  •  sodium chloride 0.9 % flush 3-10 mL, 3-10 mL, Intravenous, PRN, Chastity Roberts APRN  •  [START ON 12/19/2018] spironolactone (ALDACTONE) tablet 25 mg, 25 mg, Oral, Daily, Chastity Roberts APRN  Allergies:  Patient has no known allergies.    Objective     Vital Signs  Temp:  [96.9 °F (36.1 °C)-98.6 °F (37 °C)] 96.9 °F (36.1 °C)  Heart Rate:  [] 101  Resp:  [22-26] 22  BP: (104-129)/(72-90) 116/82    Physical Exam:   Physical Exam   Constitutional: He is oriented to person, place, and time. He appears well-developed and well-nourished. No distress.   HENT:   Head: Normocephalic and atraumatic.   Eyes: Pupils are equal, round, and reactive to light.   Neck: Normal range of motion. Neck supple. JVD present. No thyromegaly present.   Cardiovascular: Regular rhythm and intact distal pulses. Tachycardia present. Exam reveals no gallop and no friction rub.   Murmur (3/6 systolic murmur ) heard.  Pulmonary/Chest: Effort normal and breath sounds normal. No respiratory distress. He has no wheezes. He has no rales. He exhibits no tenderness.   Abdominal: Soft. Bowel sounds are normal. He exhibits no distension. There is no tenderness.   Musculoskeletal: Normal range of motion. He exhibits no edema.   Neurological: He is alert and oriented to person, place, and time.  No cranial nerve deficit.   Skin: Skin is warm and dry. He is not diaphoretic.   Psychiatric: He has a normal mood and affect. His behavior is normal.     Results Review:     Lab Results (last 72 hours)     Procedure Component Value Units Date/Time    Troponin [[id number]] Collected:  12/18/18 1635    Specimen:  Blood Updated:  12/18/18 1640    BNP [[id number]]  (Abnormal) Collected:  12/18/18 1412    Specimen:  Blood Updated:  12/18/18 1450     proBNP 7,630.0 pg/mL     Troponin [[id number]]  (Abnormal) Collected:  12/18/18 1412    Specimen:  Blood Updated:  12/18/18 1450     Troponin I 0.102 ng/mL     Urine Drug Screen - Urine, Clean Catch [[id number]]  (Abnormal) Collected:  12/18/18 1412    Specimen:  Urine, Clean Catch Updated:  12/18/18 1442     Amphetamine Screen, Urine Positive     Barbiturates Screen, Urine Negative     Benzodiazepine Screen, Urine Negative     Cocaine Screen, Urine Negative     Methadone Screen, Urine Negative     Opiate Screen Negative     Phencyclidine (PCP), Urine Negative     THC, Screen, Urine Negative    Narrative:       Negative Thresholds For Drugs Screened in Urine:    Amphetamines          500 ng/ml  Barbiturates          200 ng/ml  Benzodiazepines       200 ng/ml  Cocaine               150 ng/ml  Methadone             150 ng/ml  Opiates               300 ng/ml  Phencyclidine         25 ng/ml  THC                      50 ng/ml    The normal value for all drugs tested is negative. This report includes final unconfirmed screening results.  A positive result by this assay can be, at your request, sent to the Reference Lab for confirmation by gas chromatography. Unconfirmed results must not be used for non-medical purposes, such as employment or legal testing. Clinical consideration should be applied to any drug of abuse test result, particularly when unconfirmed results are used.    Comprehensive Metabolic Panel [[id number]]  (Abnormal) Collected:  12/18/18 1412    Specimen:  Blood  Updated:  12/18/18 1432     Glucose 125 mg/dL      BUN 14 mg/dL      Creatinine 0.78 mg/dL      Sodium 137 mmol/L      Potassium 3.8 mmol/L      Chloride 101 mmol/L      CO2 23.0 mmol/L      Calcium 8.7 mg/dL      Total Protein 6.6 g/dL      Albumin 3.70 g/dL      ALT (SGPT) 124 U/L      AST (SGOT) 112 U/L      Alkaline Phosphatase 100 U/L      Total Bilirubin 0.5 mg/dL      eGFR Non African Amer 115 mL/min/1.73      Globulin 2.9 gm/dL      A/G Ratio 1.3 g/dL      BUN/Creatinine Ratio 17.9     Anion Gap 13.0 mmol/L     CBC & Differential [[id number]] Collected:  12/18/18 1412    Specimen:  Blood Updated:  12/18/18 1421    Narrative:       The following orders were created for panel order CBC & Differential.  Procedure                               Abnormality         Status                     ---------                               -----------         ------                     CBC Auto Differential[[id number]]        Abnormal            Final result                 Please view results for these tests on the individual orders.    CBC Auto Differential [[id number]]  (Abnormal) Collected:  12/18/18 1412    Specimen:  Blood Updated:  12/18/18 1421     WBC 8.77 10*3/mm3      RBC 4.25 10*6/mm3      Hemoglobin 13.6 g/dL      Hematocrit 39.6 %      MCV 93.2 fL      MCH 32.0 pg      MCHC 34.3 g/dL      RDW 15.3 %      RDW-SD 51.4 fl      MPV 9.9 fL      Platelets 278 10*3/mm3      Neutrophil % 66.0 %      Lymphocyte % 20.2 %      Monocyte % 11.2 %      Eosinophil % 1.4 %      Basophil % 0.7 %      Immature Grans % 0.5 %      Neutrophils, Absolute 5.80 10*3/mm3      Lymphocytes, Absolute 1.77 10*3/mm3      Monocytes, Absolute 0.98 10*3/mm3      Eosinophils, Absolute 0.12 10*3/mm3      Basophils, Absolute 0.06 10*3/mm3      Immature Grans, Absolute 0.04 10*3/mm3      nRBC 0.0 /100 WBC     Blood Gas, Arterial [[id number]]  (Abnormal) Collected:  12/18/18 1415    Specimen:  Arterial Blood Updated:  12/18/18 1419     Site Left  Radial     Jed's Test Positive     pH, Arterial 7.416 pH units      pCO2, Arterial 35.8 mm Hg      pO2, Arterial 90.7 mm Hg      HCO3, Arterial 23.0 mmol/L      Base Excess, Arterial -1.1 mmol/L      Comment: 84 Value below reference range        O2 Saturation, Arterial 97.6 %      Temperature 37.0 C      Barometric Pressure for Blood Gas 754 mmHg      Modality Room Air     Ventilator Mode NA     Collected by [id number]     Comment: Meter: [id number]     :  [id number]             Assessment/Plan     -Acute on chronic systolic congestive heart failure  -Non ischemic cardiomyopathy  -Chronically elevated LFTs ?hepatic congestion secondary to HF and/or meth use   -Methamphetamine use   -Severe mitral regurgitation   -Mild troponin elevation, flat trend, secondary to CHF  -Medical non compliance   -Tobacco abuse     Plan-  Agree with diuresis   Monitor volume status, renal function, electrolytes closely   Limited 2d echo  Continue toprol xl, lisinopril, aldactone     I discussed the patient's findings and my recommendations with patient.     Huyen Leroy, DIOGENES  12/18/18  5:01 PM

## 2018-12-18 NOTE — ED PROVIDER NOTES
Subjective     Shortness of Breath   Severity:  Moderate  Onset quality:  Gradual  Timing:  Constant  Progression:  Worsening  Chronicity:  Recurrent  Context: not activity, not animal exposure, not emotional upset, not fumes, not known allergens, not occupational exposure, not pollens, not URI and not weather changes    Relieved by:  Nothing  Worsened by:  Nothing  Ineffective treatments:  None tried  Associated symptoms: sputum production    Associated symptoms: no abdominal pain, no chest pain, no cough, no diaphoresis, no fever, no headaches, no hemoptysis, no neck pain, no PND, no rash, no syncope, no swollen glands, no vomiting and no wheezing    Risk factors: no recent alcohol use, no family hx of DVT, no obesity and no recent surgery        Review of Systems   Constitutional: Negative.  Negative for chills, diaphoresis, fatigue and fever.   HENT: Negative.  Negative for congestion.    Respiratory: Positive for sputum production and shortness of breath. Negative for cough, hemoptysis, chest tightness, wheezing and stridor.    Cardiovascular: Negative.  Negative for chest pain, syncope and PND.   Gastrointestinal: Negative.  Negative for abdominal distention, abdominal pain, nausea and vomiting.   Endocrine: Negative.    Genitourinary: Negative.  Negative for difficulty urinating and flank pain.   Musculoskeletal: Negative.  Negative for neck pain.   Skin: Negative.  Negative for color change and rash.   Neurological: Negative.  Negative for dizziness and headaches.   All other systems reviewed and are negative.      Past Medical History:   Diagnosis Date   • CHF (congestive heart failure) (CMS/HCC)    • Hypertension        No Known Allergies    Past Surgical History:   Procedure Laterality Date   • HAND SURGERY         History reviewed. No pertinent family history.    Social History     Socioeconomic History   • Marital status: Single     Spouse name: Not on file   • Number of children: Not on file   • Years  of education: Not on file   • Highest education level: Not on file   Tobacco Use   • Smoking status: Current Every Day Smoker     Packs/day: 1.00     Types: Cigarettes   Substance and Sexual Activity   • Alcohol use: No   • Drug use: Yes     Types: Methamphetamines     Comment: 2 weeks ago   • Sexual activity: Defer           Objective   Physical Exam   Constitutional: He is oriented to person, place, and time. He appears well-developed and well-nourished.   HENT:   Head: Normocephalic and atraumatic.   Eyes: Conjunctivae and EOM are normal. Pupils are equal, round, and reactive to light.   Neck: Normal range of motion. Neck supple.   Cardiovascular: Normal rate, regular rhythm, normal heart sounds and intact distal pulses.   Pulmonary/Chest: Effort normal and breath sounds normal.   Abdominal: Soft. Bowel sounds are normal.   Musculoskeletal: Normal range of motion.   Neurological: He is alert and oriented to person, place, and time. He has normal reflexes.   Skin: Skin is warm and dry.   Psychiatric: He has a normal mood and affect.   Nursing note and vitals reviewed.      Procedures           ED Course  ED Course as of Dec 20 0657   Tue Dec 18, 2018   1354 Soa  has drug induced cardiomyopathy and still doing drugs   [TS]   1455 · Left ventricular systolic function is severely decreased. Estimated EF appears to be in the range of 21 - 25%.  · The left ventricular cavity is moderate-to-severely dilated.  · The findings are consistent with dilated cardiomyopathy.  · Severe mitral valve regurgitation is present  · Moderate tricuspid valve regurgitation is present. Estimated right ventricular systolic pressure from tricuspid regurgitation is markedly elevated (>55 mmHg).  · Cardiac valves well visualized and without any obvious valvular vegetations.  · Based on report of previous echocardiogram from 2/9/17, no obvious significant changes (previously noted severe MR and similar LVEF).  [TS]   1457 Mild nonobstructive  coronary artery disease   Severe left ventricular systolic dysfunction   Consistent with dilated nonischemic cardiomyopathy     Recent cath in 2018  [TS]   1515 Patient without the cardiomyopathy recently did some stimulants but some markers are elevated will have to get a rule out  [TS]      ED Course User Index  [TS] Raymond Chase MD                  [facility]      Final diagnoses:   Acute on chronic combined systolic and diastolic congestive heart failure (CMS/HCC)   Recreational drug use   Troponin I above reference range            Raymond Chase MD  12/20/18 0609

## 2018-12-18 NOTE — PLAN OF CARE
Problem: Patient Care Overview  Goal: Plan of Care Review  Outcome: Ongoing (interventions implemented as appropriate)   12/18/18 6445   Coping/Psychosocial   Plan of Care Reviewed With patient   Plan of Care Review   Progress no change   OTHER   Outcome Summary Patient admitted from ER with c/o SOA. No c/o SOA since on floor. VSS. S 95-99 on tele. IV lasix given in ER, good urine output. Cardiology consulted. Continue to monitor.        Problem: Cardiac: Heart Failure (Adult)  Goal: Signs and Symptoms of Listed Potential Problems Will be Absent, Minimized or Managed (Cardiac: Heart Failure)  Outcome: Ongoing (interventions implemented as appropriate)

## 2018-12-18 NOTE — H&P
[facility] Medicine Services  HISTORY AND PHYSICAL    Date of Admission: 12/18/2018  Primary Care Physician: Provider, No Known    Subjective     Chief Complaint: shortness of breath    History of Present Illness  Mr. Oropeza is a 32 yo male with past medical history significant for nonischemic cardiomyopathy with an estimated ejection fraction of 21-25 percent, and methamphetamine abuse.  Patient presents emergency department today with a 45 day history of shortness of breath.  Patient states he has orthopnea.  He has not had any lower extremity edema.  He denies any chest pain.  Upon record review, patient was admitted into [facility] 10/15-10/19.  Patient actually presented that time over two-day history of shortness of breath and lower extremity edema.  His liver functions were elevated at that time and he was positive for methamphetamine.  Echocardiogram does reveal normal LV function with a previous documented ejection fraction 15-20%.  Cardiolite Lexiscan on 10/17 that showed a calculated ejection fraction of 26% thus triggering a coronary catheterization which did reveal nonischemic cardiomyopathy.  He was discharged was most a follow-up within 5 days the cardiology clinic.  He has yet to follow-up.  He apparently has an appointment to/20 with Dr. Marvin Coronel at [facility].  He did recently receive a refill of his medications for 1 month from [facility] on 12/15.    He is denying any chest pain.  He states release on a lower extremity edema.  He is just more short of breath.  He has received 40 mg of Lasix in the emerge pertinent.  Spine was elevated at 0.102.  He does admit to me that he continues to use methamphetamine.  He tells me last and he was possibly 1.5 weeks ago.  He told the emergency department nurse that he had not used in 2 weeks.  He does admit to injecting his methamphetamine.  He tells me for had any problems with rales for methamphetamine.  He does  continue to smoke about 1 pack of cigarettes per day.  He is aware of the methamphetamines is what has caused his cardiomyopathy.     Yesterday that he is actually taking his medications and he tries to adhere to his diet as much as he can.  He does admit to being homeless and states he bounces around the states with friends.  He also admits to me that he has been eating [employer] recently.    Review of Systems   Otherwise complete ROS reviewed and negative except as mentioned in the HPI.    Past Medical History:   Past Medical History:   Diagnosis Date   • CHF (congestive heart failure) (CMS/HCC)    • Hypertension      Past Surgical History:  Past Surgical History:   Procedure Laterality Date   • HAND SURGERY       Social History:  reports that he has been smoking cigarettes.  He has been smoking about 1.00 pack per day. He does not have any smokeless tobacco history on file. He reports that he uses drugs. Drug: Methamphetamines. He reports that he does not drink alcohol.    Family History: His paternal grandfather had heart disease.  Mother with cancer.  Father with unknown illnesses.    Allergies:  No Known Allergies  Medications:  Prior to Admission medications    Medication Sig Start Date End Date Taking? Authorizing Provider   aspirin 81 MG EC tablet Take 81 mg by mouth. 11/21/16  Yes Provider, MD Umang   furosemide (LASIX) 40 MG tablet Take 1 tablet by mouth Daily. 7/30/18  Yes Priscilla Winter DO   lisinopril (PRINIVIL,ZESTRIL) 2.5 MG tablet Take 1 tablet by mouth Daily. 7/30/18  Yes Priscilla Winter DO   metoprolol succinate XL (TOPROL-XL) 100 MG 24 hr tablet Take 1 tablet by mouth Daily.  Patient taking differently: Take 25 mg by mouth Daily. 7/30/18  Yes Priscilla Winter DO   spironolactone (ALDACTONE) 25 MG tablet Take 1 tablet by mouth Daily. 7/30/18  Yes Priscilla Winter DO     Objective     Vital Signs: /82 (BP Location: Left arm, Patient Position: Lying)   Pulse 101    "Temp 96.9 °F (36.1 °C) (Temporal)   Resp 22   Ht 170.2 cm (67\")   Wt 86.2 kg (190 lb)   SpO2 97%   BMI 29.76 kg/m²   Physical Exam   Constitutional: He is oriented to person, place, and time. He appears well-developed and well-nourished.   HENT:   Head: Normocephalic and atraumatic.   Eyes: EOM are normal. Pupils are equal, round, and reactive to light. No scleral icterus.   Neck: Normal range of motion. Neck supple. No JVD present. Carotid bruit is not present. No tracheal deviation present. No thyromegaly present.   Cardiovascular: Normal rate and regular rhythm. Exam reveals no gallop and no friction rub.   No murmur heard.  Pulmonary/Chest: Effort normal. No respiratory distress. He has no wheezes. He has no rales. He exhibits no tenderness.   Coarse bilaterally.    Abdominal: Soft. Bowel sounds are normal. He exhibits no distension. There is no tenderness.   Musculoskeletal: Normal range of motion. He exhibits no edema.   Neurological: He is alert and oriented to person, place, and time. No cranial nerve deficit.   Skin: Skin is warm and dry. No rash noted.   Multiple scabbed areas scattered all over body.    Psychiatric: He has a normal mood and affect.       Results Reviewed:  Lab Results (last 24 hours)     Procedure Component Value Units Date/Time    Troponin [[id number]] Collected:  12/18/18 1635    Specimen:  Blood Updated:  12/18/18 1640    BNP [[id number]]  (Abnormal) Collected:  12/18/18 1412    Specimen:  Blood Updated:  12/18/18 1450     proBNP 7,630.0 pg/mL     Troponin [[id number]]  (Abnormal) Collected:  12/18/18 1412    Specimen:  Blood Updated:  12/18/18 1450     Troponin I 0.102 ng/mL     Urine Drug Screen - Urine, Clean Catch [[id number]]  (Abnormal) Collected:  12/18/18 1412    Specimen:  Urine, Clean Catch Updated:  12/18/18 1442     Amphetamine Screen, Urine Positive     Barbiturates Screen, Urine Negative     Benzodiazepine Screen, Urine Negative     Cocaine Screen, Urine Negative     " Methadone Screen, Urine Negative     Opiate Screen Negative     Phencyclidine (PCP), Urine Negative     THC, Screen, Urine Negative    Narrative:       Negative Thresholds For Drugs Screened in Urine:    Amphetamines          500 ng/ml  Barbiturates          200 ng/ml  Benzodiazepines       200 ng/ml  Cocaine               150 ng/ml  Methadone             150 ng/ml  Opiates               300 ng/ml  Phencyclidine         25 ng/ml  THC                      50 ng/ml    The normal value for all drugs tested is negative. This report includes final unconfirmed screening results.  A positive result by this assay can be, at your request, sent to the Reference Lab for confirmation by gas chromatography. Unconfirmed results must not be used for non-medical purposes, such as employment or legal testing. Clinical consideration should be applied to any drug of abuse test result, particularly when unconfirmed results are used.    Comprehensive Metabolic Panel [[id number]]  (Abnormal) Collected:  12/18/18 1412    Specimen:  Blood Updated:  12/18/18 1432     Glucose 125 mg/dL      BUN 14 mg/dL      Creatinine 0.78 mg/dL      Sodium 137 mmol/L      Potassium 3.8 mmol/L      Chloride 101 mmol/L      CO2 23.0 mmol/L      Calcium 8.7 mg/dL      Total Protein 6.6 g/dL      Albumin 3.70 g/dL      ALT (SGPT) 124 U/L      AST (SGOT) 112 U/L      Alkaline Phosphatase 100 U/L      Total Bilirubin 0.5 mg/dL      eGFR Non African Amer 115 mL/min/1.73      Globulin 2.9 gm/dL      A/G Ratio 1.3 g/dL      BUN/Creatinine Ratio 17.9     Anion Gap 13.0 mmol/L     CBC & Differential [[id number]] Collected:  12/18/18 1412    Specimen:  Blood Updated:  12/18/18 1421    Narrative:       The following orders were created for panel order CBC & Differential.  Procedure                               Abnormality         Status                     ---------                               -----------         ------                     CBC Auto  Differential[[id number]]        Abnormal            Final result                 Please view results for these tests on the individual orders.    CBC Auto Differential [[id number]]  (Abnormal) Collected:  12/18/18 1412    Specimen:  Blood Updated:  12/18/18 1421     WBC 8.77 10*3/mm3      RBC 4.25 10*6/mm3      Hemoglobin 13.6 g/dL      Hematocrit 39.6 %      MCV 93.2 fL      MCH 32.0 pg      MCHC 34.3 g/dL      RDW 15.3 %      RDW-SD 51.4 fl      MPV 9.9 fL      Platelets 278 10*3/mm3      Neutrophil % 66.0 %      Lymphocyte % 20.2 %      Monocyte % 11.2 %      Eosinophil % 1.4 %      Basophil % 0.7 %      Immature Grans % 0.5 %      Neutrophils, Absolute 5.80 10*3/mm3      Lymphocytes, Absolute 1.77 10*3/mm3      Monocytes, Absolute 0.98 10*3/mm3      Eosinophils, Absolute 0.12 10*3/mm3      Basophils, Absolute 0.06 10*3/mm3      Immature Grans, Absolute 0.04 10*3/mm3      nRBC 0.0 /100 WBC     Blood Gas, Arterial [[id number]]  (Abnormal) Collected:  12/18/18 1415    Specimen:  Arterial Blood Updated:  12/18/18 1419     Site Left Radial     Jed's Test Positive     pH, Arterial 7.416 pH units      pCO2, Arterial 35.8 mm Hg      pO2, Arterial 90.7 mm Hg      HCO3, Arterial 23.0 mmol/L      Base Excess, Arterial -1.1 mmol/L      Comment: 84 Value below reference range        O2 Saturation, Arterial 97.6 %      Temperature 37.0 C      Barometric Pressure for Blood Gas 754 mmHg      Modality Room Air     Ventilator Mode NA     Collected by [id number]     Comment: Meter: [id number]     :  [id number]           Imaging Results (last 24 hours)     Procedure Component Value Units Date/Time    XR Chest 1 View [[id number]] Collected:  12/18/18 1448     Updated:  12/18/18 1458    Narrative:       XR CHEST 1 VW- 12/18/2018 2:17 PM CST     HISTORY: spa       COMPARISON: 7/29/2018.     FINDINGS:   Marked cardiomegaly, similar to the comparison exam. No consolidation.  No pleural effusion. Pulmonary vasculature are  unremarkable.      The osseous structures and surrounding soft tissues demonstrate no acute  abnormality.       Impression:       1. Overall stable chest exam. Stable prominent cardiomegaly without  pulmonary edema, infiltrate or pleural effusion.  This report was finalized on 12/18/2018 14:54 by Dr Herbert Rahman, .        I have personally reviewed and interpreted the radiology studies and ECG obtained at time of admission.     Assessment / Plan     Assessment:   Active Hospital Problems    Diagnosis   • **Acute on chronic congestive heart failure (CMS/HCC)   • Elevated troponin I level   • Methamphetamine abuse (CMS/HCC)   • Noncompliance   • Nonischemic cardiomyopathy (CMS/HCC)   • Transaminitis     Plan:   1.  He will be admitted to the hospitalist service for further evaluation management.   2.  We will trend his troponins and consult cardiology.  3.  We will resume his home medications give IV Lasix twice a day.  4.  Daily weights and salt restriction  5.  Repeat laboratory data in the morning.  6.  Lovenox for DVT prophylaxis.  7.  Further recommendations pending as workup is ongoing    Code Status: NO CPR/Intubation. DNR     I discussed the patient's findings and my recommendations with the patient and his nurseGay.     Estimated length of stay 1-2 days.     DIOGENES Dobbs   12/18/18   4:48 PM    I personally discussed  the patient in conjunction with DIOGENES Gamino and agree with the assessment, treatment plan, and disposition of the patient as recorded by her. My history, exam, and further recommendations are:     Patient has non-ischemic cardiomyopathy.  Continues to use methamphetamine.  Patient volume overloaded requiring diuresis.        Jere Kent MD  12/20/18  9:32 PM

## 2018-12-19 ENCOUNTER — APPOINTMENT (OUTPATIENT)
Dept: CARDIOLOGY | Facility: HOSPITAL | Age: 33
End: 2018-12-19

## 2018-12-19 LAB
ANION GAP SERPL CALCULATED.3IONS-SCNC: 16 MMOL/L (ref 4–13)
BASOPHILS # BLD AUTO: 0.09 10*3/MM3 (ref 0–0.2)
BASOPHILS NFR BLD AUTO: 0.9 % (ref 0–2)
BH CV ECHO MEAS - AO MAX PG (FULL): 2.4 MMHG
BH CV ECHO MEAS - AO MAX PG: 3.4 MMHG
BH CV ECHO MEAS - AO MEAN PG (FULL): 1 MMHG
BH CV ECHO MEAS - AO MEAN PG: 2 MMHG
BH CV ECHO MEAS - AO ROOT AREA (BSA CORRECTED): 2
BH CV ECHO MEAS - AO ROOT AREA: 12.6 CM^2
BH CV ECHO MEAS - AO ROOT DIAM: 4 CM
BH CV ECHO MEAS - AO V2 MAX: 91.6 CM/SEC
BH CV ECHO MEAS - AO V2 MEAN: 60.8 CM/SEC
BH CV ECHO MEAS - AO V2 VTI: 11.4 CM
BH CV ECHO MEAS - AVA(I,A): 2.2 CM^2
BH CV ECHO MEAS - AVA(I,D): 2.2 CM^2
BH CV ECHO MEAS - AVA(V,A): 1.6 CM^2
BH CV ECHO MEAS - AVA(V,D): 1.6 CM^2
BH CV ECHO MEAS - BSA(HAYCOCK): 2 M^2
BH CV ECHO MEAS - BSA: 2 M^2
BH CV ECHO MEAS - BZI_BMI: 29.8 KILOGRAMS/M^2
BH CV ECHO MEAS - BZI_METRIC_HEIGHT: 170.2 CM
BH CV ECHO MEAS - BZI_METRIC_WEIGHT: 86.2 KG
BH CV ECHO MEAS - EDV(CUBED): 423.6 ML
BH CV ECHO MEAS - EDV(MOD-SP2): 351 ML
BH CV ECHO MEAS - EDV(MOD-SP4): 317 ML
BH CV ECHO MEAS - EDV(TEICH): 299.2 ML
BH CV ECHO MEAS - EF(CUBED): 44.8 %
BH CV ECHO MEAS - EF(MOD-SP2): 28.2 %
BH CV ECHO MEAS - EF(MOD-SP4): 30.6 %
BH CV ECHO MEAS - EF(TEICH): 36.1 %
BH CV ECHO MEAS - ESV(CUBED): 233.7 ML
BH CV ECHO MEAS - ESV(MOD-SP2): 252 ML
BH CV ECHO MEAS - ESV(MOD-SP4): 220 ML
BH CV ECHO MEAS - ESV(TEICH): 191.1 ML
BH CV ECHO MEAS - FS: 18 %
BH CV ECHO MEAS - IVS/LVPW: 0.84
BH CV ECHO MEAS - IVSD: 1.1 CM
BH CV ECHO MEAS - LA DIMENSION: 5.4 CM
BH CV ECHO MEAS - LA/AO: 1.4
BH CV ECHO MEAS - LAT PEAK E' VEL: 5.4 CM/SEC
BH CV ECHO MEAS - LV DIASTOLIC VOL/BSA (35-75): 160.2 ML/M^2
BH CV ECHO MEAS - LV MASS(C)D: 448.2 GRAMS
BH CV ECHO MEAS - LV MASS(C)DI: 226.5 GRAMS/M^2
BH CV ECHO MEAS - LV MAX PG: 0.92 MMHG
BH CV ECHO MEAS - LV MEAN PG: 1 MMHG
BH CV ECHO MEAS - LV SYSTOLIC VOL/BSA (12-30): 111.2 ML/M^2
BH CV ECHO MEAS - LV V1 MAX: 48 CM/SEC
BH CV ECHO MEAS - LV V1 MEAN: 31.9 CM/SEC
BH CV ECHO MEAS - LV V1 VTI: 7.8 CM
BH CV ECHO MEAS - LVIDD: 7.5 CM
BH CV ECHO MEAS - LVIDS: 6.2 CM
BH CV ECHO MEAS - LVLD AP2: 10.8 CM
BH CV ECHO MEAS - LVLD AP4: 10.1 CM
BH CV ECHO MEAS - LVLS AP2: 9.2 CM
BH CV ECHO MEAS - LVLS AP4: 9.7 CM
BH CV ECHO MEAS - LVOT AREA (M): 3.1 CM^2
BH CV ECHO MEAS - LVOT AREA: 3.1 CM^2
BH CV ECHO MEAS - LVOT DIAM: 2 CM
BH CV ECHO MEAS - LVPWD: 1.3 CM
BH CV ECHO MEAS - MED PEAK E' VEL: 6.7 CM/SEC
BH CV ECHO MEAS - MR ALIAS VEL: 47.9 CM/SEC
BH CV ECHO MEAS - MR ERO: 0.22 CM^2
BH CV ECHO MEAS - MR FLOW RATE: 108.3 CM^3/SEC
BH CV ECHO MEAS - MR MAX PG: 92.9 MMHG
BH CV ECHO MEAS - MR MAX VEL: 482 CM/SEC
BH CV ECHO MEAS - MR MEAN PG: 47 MMHG
BH CV ECHO MEAS - MR MEAN VEL: 306 CM/SEC
BH CV ECHO MEAS - MR PISA RADIUS: 0.6 CM
BH CV ECHO MEAS - MR PISA: 2.3 CM^2
BH CV ECHO MEAS - MR VOLUME: 25 ML
BH CV ECHO MEAS - MR VTI: 111 CM
BH CV ECHO MEAS - MV A MAX VEL: 63 CM/SEC
BH CV ECHO MEAS - MV AREA (1 DIAM): 1.5 CM^2
BH CV ECHO MEAS - MV DEC SLOPE: 723 CM/SEC^2
BH CV ECHO MEAS - MV DEC TIME: 0.13 SEC
BH CV ECHO MEAS - MV DIAM: 1.4 CM
BH CV ECHO MEAS - MV E MAX VEL: 129 CM/SEC
BH CV ECHO MEAS - MV E/A: 2
BH CV ECHO MEAS - MV FLOW AREA(1DIAM): 1.5 CM^2
BH CV ECHO MEAS - MV MAX PG: 11 MMHG
BH CV ECHO MEAS - MV MEAN PG: 3 MMHG
BH CV ECHO MEAS - MV P1/2T MAX VEL: 163 CM/SEC
BH CV ECHO MEAS - MV P1/2T: 66 MSEC
BH CV ECHO MEAS - MV V2 MAX: 166 CM/SEC
BH CV ECHO MEAS - MV V2 MEAN: 80.2 CM/SEC
BH CV ECHO MEAS - MV V2 VTI: 29.7 CM
BH CV ECHO MEAS - MVA P1/2T LCG: 1.3 CM^2
BH CV ECHO MEAS - MVA(P1/2T): 3.3 CM^2
BH CV ECHO MEAS - MVA(VTI): 0.83 CM^2
BH CV ECHO MEAS - PI END-D VEL: 232 CM/SEC
BH CV ECHO MEAS - RAP SYSTOLE: 5 MMHG
BH CV ECHO MEAS - RF(MV,AO)(1 DIAM): -2.1
BH CV ECHO MEAS - RF(MV,LVOT)(1DIAM): 0.46
BH CV ECHO MEAS - RVSP: 58.3 MMHG
BH CV ECHO MEAS - SI(AO): 72.4 ML/M^2
BH CV ECHO MEAS - SI(CUBED): 95.9 ML/M^2
BH CV ECHO MEAS - SI(LVOT): 12.4 ML/M^2
BH CV ECHO MEAS - SI(MOD-SP2): 50 ML/M^2
BH CV ECHO MEAS - SI(MOD-SP4): 49 ML/M^2
BH CV ECHO MEAS - SI(MV 1 DIAM): 23.1 ML/M^2
BH CV ECHO MEAS - SI(TEICH): 54.6 ML/M^2
BH CV ECHO MEAS - SV(AO): 143.3 ML
BH CV ECHO MEAS - SV(CUBED): 189.8 ML
BH CV ECHO MEAS - SV(LVOT): 24.6 ML
BH CV ECHO MEAS - SV(MOD-SP2): 99 ML
BH CV ECHO MEAS - SV(MOD-SP4): 97 ML
BH CV ECHO MEAS - SV(MV 1 DIAM): 45.7 ML
BH CV ECHO MEAS - SV(TEICH): 108 ML
BH CV ECHO MEAS - TR MAX VEL: 365 CM/SEC
BH CV ECHO MEASUREMENTS AVERAGE E/E' RATIO: 21.32
BUN BLD-MCNC: 15 MG/DL (ref 5–21)
BUN/CREAT SERPL: 19.2 (ref 7–25)
CALCIUM SPEC-SCNC: 8.5 MG/DL (ref 8.4–10.4)
CHLORIDE SERPL-SCNC: 97 MMOL/L (ref 98–110)
CO2 SERPL-SCNC: 24 MMOL/L (ref 24–31)
CREAT BLD-MCNC: 0.78 MG/DL (ref 0.5–1.4)
DEPRECATED RDW RBC AUTO: 50.6 FL (ref 40–54)
EOSINOPHIL # BLD AUTO: 0.19 10*3/MM3 (ref 0–0.7)
EOSINOPHIL NFR BLD AUTO: 1.9 % (ref 0–4)
ERYTHROCYTE [DISTWIDTH] IN BLOOD BY AUTOMATED COUNT: 14.7 % (ref 12–15)
GFR SERPL CREATININE-BSD FRML MDRD: 115 ML/MIN/1.73
GLUCOSE BLD-MCNC: 92 MG/DL (ref 70–100)
HCT VFR BLD AUTO: 40.2 % (ref 40–52)
HGB BLD-MCNC: 13.6 G/DL (ref 14–18)
IMM GRANULOCYTES # BLD: 0.03 10*3/MM3 (ref 0–0.03)
IMM GRANULOCYTES NFR BLD: 0.3 % (ref 0–5)
LEFT ATRIUM VOLUME INDEX: 127 ML/M2
LYMPHOCYTES # BLD AUTO: 3.41 10*3/MM3 (ref 0.72–4.86)
LYMPHOCYTES NFR BLD AUTO: 34.5 % (ref 15–45)
MAXIMAL PREDICTED HEART RATE: 187 BPM
MCH RBC QN AUTO: 31.4 PG (ref 28–32)
MCHC RBC AUTO-ENTMCNC: 33.8 G/DL (ref 33–36)
MCV RBC AUTO: 92.8 FL (ref 82–95)
MONOCYTES # BLD AUTO: 0.96 10*3/MM3 (ref 0.19–1.3)
MONOCYTES NFR BLD AUTO: 9.7 % (ref 4–12)
NEUTROPHILS # BLD AUTO: 5.2 10*3/MM3 (ref 1.87–8.4)
NEUTROPHILS NFR BLD AUTO: 52.7 % (ref 39–78)
NRBC BLD MANUAL-RTO: 0 /100 WBC (ref 0–0)
PLATELET # BLD AUTO: 289 10*3/MM3 (ref 130–400)
PMV BLD AUTO: 10.5 FL (ref 6–12)
POTASSIUM BLD-SCNC: 4.1 MMOL/L (ref 3.5–5.3)
RBC # BLD AUTO: 4.33 10*6/MM3 (ref 4.8–5.9)
SODIUM BLD-SCNC: 137 MMOL/L (ref 135–145)
STRESS TARGET HR: 159 BPM
TROPONIN I SERPL-MCNC: 0.14 NG/ML (ref 0–0.03)
WBC NRBC COR # BLD: 9.88 10*3/MM3 (ref 4.8–10.8)

## 2018-12-19 PROCEDURE — 93005 ELECTROCARDIOGRAM TRACING: CPT | Performed by: INTERNAL MEDICINE

## 2018-12-19 PROCEDURE — 96372 THER/PROPH/DIAG INJ SC/IM: CPT

## 2018-12-19 PROCEDURE — 93325 DOPPLER ECHO COLOR FLOW MAPG: CPT

## 2018-12-19 PROCEDURE — 94799 UNLISTED PULMONARY SVC/PX: CPT

## 2018-12-19 PROCEDURE — 93308 TTE F-UP OR LMTD: CPT

## 2018-12-19 PROCEDURE — 93308 TTE F-UP OR LMTD: CPT | Performed by: INTERNAL MEDICINE

## 2018-12-19 PROCEDURE — 25010000002 ENOXAPARIN PER 10 MG: Performed by: NURSE PRACTITIONER

## 2018-12-19 PROCEDURE — 80048 BASIC METABOLIC PNL TOTAL CA: CPT | Performed by: NURSE PRACTITIONER

## 2018-12-19 PROCEDURE — 0399T ADULT TRANSTHORACIC ECHO LIMITED W/ CONT IF NECESSARY PER PROTOCOL: CPT | Performed by: INTERNAL MEDICINE

## 2018-12-19 PROCEDURE — G0378 HOSPITAL OBSERVATION PER HR: HCPCS

## 2018-12-19 PROCEDURE — 93010 ELECTROCARDIOGRAM REPORT: CPT | Performed by: INTERNAL MEDICINE

## 2018-12-19 PROCEDURE — 85025 COMPLETE CBC W/AUTO DIFF WBC: CPT | Performed by: NURSE PRACTITIONER

## 2018-12-19 PROCEDURE — 96376 TX/PRO/DX INJ SAME DRUG ADON: CPT

## 2018-12-19 PROCEDURE — 94760 N-INVAS EAR/PLS OXIMETRY 1: CPT

## 2018-12-19 PROCEDURE — 93321 DOPPLER ECHO F-UP/LMTD STD: CPT

## 2018-12-19 PROCEDURE — 25010000002 FUROSEMIDE PER 20 MG: Performed by: NURSE PRACTITIONER

## 2018-12-19 PROCEDURE — 84484 ASSAY OF TROPONIN QUANT: CPT | Performed by: NURSE PRACTITIONER

## 2018-12-19 PROCEDURE — 0399T HC MYOCARDL STRAIN IMAG QUAN ASSMT PER SESS: CPT

## 2018-12-19 PROCEDURE — 93321 DOPPLER ECHO F-UP/LMTD STD: CPT | Performed by: INTERNAL MEDICINE

## 2018-12-19 PROCEDURE — 93325 DOPPLER ECHO COLOR FLOW MAPG: CPT | Performed by: INTERNAL MEDICINE

## 2018-12-19 PROCEDURE — 99213 OFFICE O/P EST LOW 20 MIN: CPT | Performed by: INTERNAL MEDICINE

## 2018-12-19 RX ORDER — FUROSEMIDE 40 MG/1
40 TABLET ORAL
Status: DISCONTINUED | OUTPATIENT
Start: 2018-12-19 | End: 2018-12-20 | Stop reason: HOSPADM

## 2018-12-19 RX ADMIN — METOPROLOL SUCCINATE 25 MG: 25 TABLET, FILM COATED, EXTENDED RELEASE ORAL at 08:37

## 2018-12-19 RX ADMIN — FUROSEMIDE 40 MG: 10 INJECTION, SOLUTION INTRAVENOUS at 05:14

## 2018-12-19 RX ADMIN — LISINOPRIL 10 MG: 10 TABLET ORAL at 21:06

## 2018-12-19 RX ADMIN — FUROSEMIDE 40 MG: 40 TABLET ORAL at 17:24

## 2018-12-19 RX ADMIN — ENOXAPARIN SODIUM 40 MG: 40 INJECTION SUBCUTANEOUS at 17:25

## 2018-12-19 RX ADMIN — SPIRONOLACTONE 25 MG: 25 TABLET ORAL at 08:37

## 2018-12-19 RX ADMIN — LISINOPRIL 10 MG: 10 TABLET ORAL at 08:37

## 2018-12-19 RX ADMIN — ASPIRIN 81 MG: 81 TABLET, DELAYED RELEASE ORAL at 08:37

## 2018-12-19 RX ADMIN — IPRATROPIUM BROMIDE 0.5 MG: 0.5 SOLUTION RESPIRATORY (INHALATION) at 06:18

## 2018-12-19 RX ADMIN — NICOTINE 1 PATCH: 21 PATCH, EXTENDED RELEASE TRANSDERMAL at 17:25

## 2018-12-19 RX ADMIN — SODIUM CHLORIDE, PRESERVATIVE FREE 3 ML: 5 INJECTION INTRAVENOUS at 08:37

## 2018-12-19 NOTE — PROGRESS NOTES
[facility] -  Progress Note     LOS: 0 days   Patient Care Team:  Provider, No Known as PCP - General    Chief Complaint: shortness of breath     Subjective     Interval History:     Patient Complaints: The patient reports he is feeling better. His shortness of breath has improved overall and his orthopnea and edema have resolved. He denies pain. He is net negative approximately 4,000 cc with IV diuresis. He is maintaining a NSR, mostly in the 90s/low 100s without significant arrhythmia on telemetry.       Review of Systems:     Review of Systems   Constitutional: Negative for diaphoresis, fatigue, fever and unexpected weight change.   HENT: Negative for nosebleeds.    Respiratory: Positive for shortness of breath (improved ). Negative for apnea, cough, chest tightness and wheezing.    Cardiovascular: Negative for chest pain, palpitations and leg swelling.   Gastrointestinal: Negative for abdominal distention, nausea and vomiting.   Genitourinary: Negative for hematuria.   Musculoskeletal: Negative for gait problem.   Skin: Negative for color change.   Neurological: Negative for dizziness, syncope, weakness and light-headedness.     Objective     Vital Sign Min/Max for last 24 hours  Temp  Min: 96.9 °F (36.1 °C)  Max: 98.6 °F (37 °C)   BP  Min: 104/81  Max: 129/85   Pulse  Min: 86  Max: 104   Resp  Min: 14  Max: 26   SpO2  Min: 96 %  Max: 100 %   No Data Recorded   Weight  Min: 86.2 kg (190 lb)  Max: 86.2 kg (190 lb 0.6 oz)         12/19/18  0716   Weight: 86.2 kg (190 lb 0.6 oz)       Physical Exam:    Physical Exam   Constitutional: He is oriented to person, place, and time. He appears well-developed and well-nourished. No distress.   HENT:   Head: Normocephalic and atraumatic.   Eyes: Pupils are equal, round, and reactive to light.   Neck: Normal range of motion. Neck supple. No JVD present. No thyromegaly present.   Cardiovascular: Normal rate, regular rhythm and intact distal pulses.  Exam reveals no gallop and no friction rub.   Murmur (3/6 systolic murmur ) heard.  Pulmonary/Chest: Effort normal and breath sounds normal. No respiratory distress. He has no wheezes. He has no rales. He exhibits no tenderness.   Abdominal: Soft. Bowel sounds are normal. He exhibits no distension. There is no tenderness.   Musculoskeletal: Normal range of motion. He exhibits no edema.   Neurological: He is alert and oriented to person, place, and time. No cranial nerve deficit.   Skin: Skin is warm and dry. He is not diaphoretic.   Psychiatric: He has a normal mood and affect. His behavior is normal.     Results Review:   Lab Results (last 72 hours)     Procedure Component Value Units Date/Time    Troponin [[id number]]  (Abnormal) Collected:  12/19/18 0338    Specimen:  Blood Updated:  12/19/18 0407     Troponin I 0.139 ng/mL     Basic Metabolic Panel [[id number]]  (Abnormal) Collected:  12/19/18 0338    Specimen:  Blood Updated:  12/19/18 0357     Glucose 92 mg/dL      BUN 15 mg/dL      Creatinine 0.78 mg/dL      Sodium 137 mmol/L      Potassium 4.1 mmol/L      Chloride 97 mmol/L      CO2 24.0 mmol/L      Calcium 8.5 mg/dL      eGFR Non African Amer 115 mL/min/1.73      BUN/Creatinine Ratio 19.2     Anion Gap 16.0 mmol/L     Narrative:       GFR Normal >60  Chronic Kidney Disease <60  Kidney Failure <15    CBC & Differential [[id number]] Collected:  12/19/18 0337    Specimen:  Blood Updated:  12/19/18 0344    Narrative:       The following orders were created for panel order CBC & Differential.  Procedure                               Abnormality         Status                     ---------                               -----------         ------                     CBC Auto Differential[[id number]]        Abnormal            Final result                 Please view results for these tests on the individual orders.    CBC Auto Differential [[id number]]  (Abnormal) Collected:  12/19/18 0337    Specimen:  Blood Updated:   12/19/18 0344     WBC 9.88 10*3/mm3      RBC 4.33 10*6/mm3      Hemoglobin 13.6 g/dL      Hematocrit 40.2 %      MCV 92.8 fL      MCH 31.4 pg      MCHC 33.8 g/dL      RDW 14.7 %      RDW-SD 50.6 fl      MPV 10.5 fL      Platelets 289 10*3/mm3      Neutrophil % 52.7 %      Lymphocyte % 34.5 %      Monocyte % 9.7 %      Eosinophil % 1.9 %      Basophil % 0.9 %      Immature Grans % 0.3 %      Neutrophils, Absolute 5.20 10*3/mm3      Lymphocytes, Absolute 3.41 10*3/mm3      Monocytes, Absolute 0.96 10*3/mm3      Eosinophils, Absolute 0.19 10*3/mm3      Basophils, Absolute 0.09 10*3/mm3      Immature Grans, Absolute 0.03 10*3/mm3      nRBC 0.0 /100 WBC     Troponin [[id number]]  (Abnormal) Collected:  12/18/18 2206    Specimen:  Blood Updated:  12/18/18 2234     Troponin I 0.134 ng/mL     Troponin [[id number]]  (Abnormal) Collected:  12/18/18 1635    Specimen:  Blood Updated:  12/18/18 1703     Troponin I 0.107 ng/mL     BNP [[id number]]  (Abnormal) Collected:  12/18/18 1412    Specimen:  Blood Updated:  12/18/18 1450     proBNP 7,630.0 pg/mL     Troponin [[id number]]  (Abnormal) Collected:  12/18/18 1412    Specimen:  Blood Updated:  12/18/18 1450     Troponin I 0.102 ng/mL     Urine Drug Screen - Urine, Clean Catch [[id number]]  (Abnormal) Collected:  12/18/18 1412    Specimen:  Urine, Clean Catch Updated:  12/18/18 1442     Amphetamine Screen, Urine Positive     Barbiturates Screen, Urine Negative     Benzodiazepine Screen, Urine Negative     Cocaine Screen, Urine Negative     Methadone Screen, Urine Negative     Opiate Screen Negative     Phencyclidine (PCP), Urine Negative     THC, Screen, Urine Negative    Narrative:       Negative Thresholds For Drugs Screened in Urine:    Amphetamines          500 ng/ml  Barbiturates          200 ng/ml  Benzodiazepines       200 ng/ml  Cocaine               150 ng/ml  Methadone             150 ng/ml  Opiates               300 ng/ml  Phencyclidine         25 ng/ml  THC                       50 ng/ml    The normal value for all drugs tested is negative. This report includes final unconfirmed screening results.  A positive result by this assay can be, at your request, sent to the Reference Lab for confirmation by gas chromatography. Unconfirmed results must not be used for non-medical purposes, such as employment or legal testing. Clinical consideration should be applied to any drug of abuse test result, particularly when unconfirmed results are used.    Comprehensive Metabolic Panel [[id number]]  (Abnormal) Collected:  12/18/18 1412    Specimen:  Blood Updated:  12/18/18 1432     Glucose 125 mg/dL      BUN 14 mg/dL      Creatinine 0.78 mg/dL      Sodium 137 mmol/L      Potassium 3.8 mmol/L      Chloride 101 mmol/L      CO2 23.0 mmol/L      Calcium 8.7 mg/dL      Total Protein 6.6 g/dL      Albumin 3.70 g/dL      ALT (SGPT) 124 U/L      AST (SGOT) 112 U/L      Alkaline Phosphatase 100 U/L      Total Bilirubin 0.5 mg/dL      eGFR Non African Amer 115 mL/min/1.73      Globulin 2.9 gm/dL      A/G Ratio 1.3 g/dL      BUN/Creatinine Ratio 17.9     Anion Gap 13.0 mmol/L     CBC & Differential [[id number]] Collected:  12/18/18 1412    Specimen:  Blood Updated:  12/18/18 1421    Narrative:       The following orders were created for panel order CBC & Differential.  Procedure                               Abnormality         Status                     ---------                               -----------         ------                     CBC Auto Differential[[id number]]        Abnormal            Final result                 Please view results for these tests on the individual orders.    CBC Auto Differential [[id number]]  (Abnormal) Collected:  12/18/18 1412    Specimen:  Blood Updated:  12/18/18 1421     WBC 8.77 10*3/mm3      RBC 4.25 10*6/mm3      Hemoglobin 13.6 g/dL      Hematocrit 39.6 %      MCV 93.2 fL      MCH 32.0 pg      MCHC 34.3 g/dL      RDW 15.3 %      RDW-SD 51.4 fl      MPV 9.9 fL       Platelets 278 10*3/mm3      Neutrophil % 66.0 %      Lymphocyte % 20.2 %      Monocyte % 11.2 %      Eosinophil % 1.4 %      Basophil % 0.7 %      Immature Grans % 0.5 %      Neutrophils, Absolute 5.80 10*3/mm3      Lymphocytes, Absolute 1.77 10*3/mm3      Monocytes, Absolute 0.98 10*3/mm3      Eosinophils, Absolute 0.12 10*3/mm3      Basophils, Absolute 0.06 10*3/mm3      Immature Grans, Absolute 0.04 10*3/mm3      nRBC 0.0 /100 WBC     Blood Gas, Arterial [[id number]]  (Abnormal) Collected:  12/18/18 1415    Specimen:  Arterial Blood Updated:  12/18/18 1419     Site Left Radial     Jed's Test Positive     pH, Arterial 7.416 pH units      pCO2, Arterial 35.8 mm Hg      pO2, Arterial 90.7 mm Hg      HCO3, Arterial 23.0 mmol/L      Base Excess, Arterial -1.1 mmol/L      Comment: 84 Value below reference range        O2 Saturation, Arterial 97.6 %      Temperature 37.0 C      Barometric Pressure for Blood Gas 754 mmHg      Modality Room Air     Ventilator Mode NA     Collected by [id number]     Comment: Meter: [id number]     :  [id number]                 Echo EF Estimated  No results found for: ECHOEFEST      Cath Ejection Fraction Quantitative  No results found for: CATHEF        Medication Review: yes  Current Facility-Administered Medications   Medication Dose Route Frequency Provider Last Rate Last Dose   • aspirin EC tablet 81 mg  81 mg Oral Daily Chastity Roberts APRN   81 mg at 12/19/18 0837   • enoxaparin (LOVENOX) syringe 40 mg  40 mg Subcutaneous Q24H Chastity Roberts APRN   40 mg at 12/18/18 1753   • furosemide (LASIX) injection 40 mg  40 mg Intravenous Q12H Chastity Roberts APRN   40 mg at 12/19/18 0514   • HYDROcodone-acetaminophen (NORCO) 5-325 MG per tablet 1 tablet  1 tablet Oral Q6H PRN Chastity Roberts APRN       • lisinopril (PRINIVIL,ZESTRIL) tablet 10 mg  10 mg Oral Q12H Chastity Roberts APRN   10 mg at 12/19/18 0837   • metoprolol succinate XL  (TOPROL-XL) 24 hr tablet 25 mg  25 mg Oral Q24H Chastity Roberts APRN   25 mg at 12/19/18 0837   • nicotine (NICODERM CQ) 21 MG/24HR patch 1 patch  1 patch Transdermal Q24H Chastity Roberts APRN   1 patch at 12/18/18 1754   • ondansetron (ZOFRAN) injection 4 mg  4 mg Intravenous Q6H PRN Chastity Roberts APRN       • sodium chloride 0.9 % flush 3 mL  3 mL Intravenous Q12H Chastity Roberts APRN   3 mL at 12/19/18 0837   • sodium chloride 0.9 % flush 3-10 mL  3-10 mL Intravenous PRN Chastity Roberts APRN       • spironolactone (ALDACTONE) tablet 25 mg  25 mg Oral Daily Chastity Roberts APRN   25 mg at 12/19/18 0837         Assessment/Plan     -Acute on chronic systolic congestive heart failure  -Non ischemic cardiomyopathy  -Chronically elevated LFTs ?hepatic congestion secondary to HF and/or meth use   -Methamphetamine use   -Severe mitral regurgitation   -Mild troponin elevation, secondary to CHF, no evidence of ACS   -Medical non compliance   -Tobacco abuse     Plan-  Continue IV diuresis. If he continues to improve, can likely be transitioned to PO lasix tomorrow.  Echo results pending   Continue toprol xl, lisinopril and aldactone   Monitor daily weights, strict intake, output, renal function, and electrolytes   Low sodium diet   Counseled on importance of discontinuation of meth use   Previously refused LifeVest/ICD and continues to decline this     DIOGENES Lee  12/19/18  10:45 AM

## 2018-12-19 NOTE — PROGRESS NOTES
[facility] Medicine Services  INPATIENT PROGRESS NOTE    Patient Name: Arjun Oropeza  Date of Admission: 12/18/2018  Today's Date: 12/19/18  Length of Stay: 0  Primary Care Physician: Provider, No Known    Subjective   Chief Complaint: follow up heart failure  HPI   Has diuresed about 4 Liters since admission. Repeat echocardiogram is pending. He states he is  Breathing better. No chest pain. No leg cramps. No additional complaints. He was sleeping when I entered the room.     Review of Systems   All pertinent negatives and positives are as above. All other systems have been reviewed and are negative unless otherwise stated.     Objective    Temp:  [96.9 °F (36.1 °C)-98.6 °F (37 °C)] 97.6 °F (36.4 °C)  Heart Rate:  [] 104  Resp:  [14-26] 16  BP: (104-129)/(72-91) 128/91  Physical Exam   Constitutional: He is oriented to person, place, and time. He appears well-developed and well-nourished.   HENT:   Head: Normocephalic and atraumatic.   Eyes: EOM are normal. Pupils are equal, round, and reactive to light. No scleral icterus.   Neck: Normal range of motion. Neck supple. No JVD present. Carotid bruit is not present. No tracheal deviation present. No thyromegaly present.   Cardiovascular: Normal rate and regular rhythm. Exam reveals no gallop and no friction rub.   No murmur heard.  Sinus to sinus tachycardia,    Pulmonary/Chest: Effort normal and breath sounds normal. No respiratory distress. He has no wheezes. He has no rales. He exhibits no tenderness.   Abdominal: Soft. Bowel sounds are normal. He exhibits no distension. There is no tenderness.   Musculoskeletal: Normal range of motion. He exhibits no edema.   Neurological: He is alert and oriented to person, place, and time. No cranial nerve deficit.   Skin: Skin is warm and dry. No rash noted.   Psychiatric: He has a normal mood and affect.     Results Review:  I have reviewed the labs, radiology results, and  diagnostic studies.    Laboratory Data:   Results from last 7 days   Lab Units  12/19/18   0337  12/18/18   1412   WBC 10*3/mm3  9.88  8.77   HEMOGLOBIN g/dL  13.6*  13.6*   HEMATOCRIT %  40.2  39.6*   PLATELETS 10*3/mm3  289  278        Results from last 7 days   Lab Units  12/19/18   0338  12/18/18   1412   SODIUM mmol/L  137  137   POTASSIUM mmol/L  4.1  3.8   CHLORIDE mmol/L  97*  101   CO2 mmol/L  24.0  23.0*   BUN mg/dL  15  14   CREATININE mg/dL  0.78  0.78   CALCIUM mg/dL  8.5  8.7   BILIRUBIN mg/dL   --   0.5   ALK PHOS U/L   --   100   ALT (SGPT) U/L   --   124*   AST (SGOT) U/L   --   112*   GLUCOSE mg/dL  92  125*     Radiology Data:   Imaging Results (last 24 hours)     Procedure Component Value Units Date/Time    XR Chest 1 View [[id number]] Collected:  12/18/18 1448     Updated:  12/18/18 1458    Narrative:       XR CHEST 1 VW- 12/18/2018 2:17 PM CST     HISTORY: spa       COMPARISON: 7/29/2018.     FINDINGS:   Marked cardiomegaly, similar to the comparison exam. No consolidation.  No pleural effusion. Pulmonary vasculature are unremarkable.      The osseous structures and surrounding soft tissues demonstrate no acute  abnormality.       Impression:       1. Overall stable chest exam. Stable prominent cardiomegaly without  pulmonary edema, infiltrate or pleural effusion.  This report was finalized on 12/18/2018 14:54 by Dr Herbert Rahman, .          I have reviewed the patient's current medications.     Assessment/Plan     Active Hospital Problems    Diagnosis   • **Acute on chronic congestive heart failure (CMS/HCC)   • Elevated troponin I level   • Methamphetamine abuse (CMS/HCC)   • Noncompliance   • Nonischemic cardiomyopathy (CMS/HCC)   • Transaminitis     Plan:  1. Continue IV lasix for now. He is improving clinically  2. Discouraged further methamphetamine use  3. Await official read of 2d echo  4. He has refused lifeVest, AICD in the past.   5. He declines needing a place to stay.   6. Repeat  labs in am.   7. Strick I&O's.     Discharge Planning: I expect the patient to be discharged to home in 1-2 days.    DIOGENES Dobbs   12/19/18   9:34 AM     I personally evaluated and examined the patient in conjunction with DIOGENES Gamino and agree with the assessment, treatment plan, and disposition of the patient as recorded by her. My history, exam, and further recommendations are:     Patient diursing well.  Patient noncompliant with therapy.  Continues to use drugs.  Examinatin reveals grossly clear lungs with mild crackles.    Jere Kent MD  12/19/18  11:06 PM

## 2018-12-19 NOTE — PROGRESS NOTES
Continued Stay Note   [facility]     Patient Name: Arjun Oropeza  MRN: [id number]  Today's Date: 12/19/2018    Admit Date: 12/18/2018    Discharge Plan     Row Name 12/19/18 1548       Plan    Plan Comments  ATTEMPTED TO SEE FOR DC PLANNING BUT PT ASLEEP AND NO FAMILY/FRIENDS IN ROOM. WILL TRY AGAIN LATER OR IN AM.         Discharge Codes    No documentation.             ANSELMO Jung

## 2018-12-19 NOTE — PLAN OF CARE
Problem: Patient Care Overview  Goal: Plan of Care Review  Outcome: Ongoing (interventions implemented as appropriate)   12/18/18 1924 12/19/18 0251   Coping/Psychosocial   Plan of Care Reviewed With patient --    Plan of Care Review   Progress --  improving   OTHER   Outcome Summary --  VSS. PT HAS HAD NO C/O PAIN OR SOA. GOOD URINE OUTPUT. ECHO SCHEDULED FOR AM. WILL CONTINUE TO MONITOR AND NOTIFY MD OF CHANGES.     Goal: Individualization and Mutuality  Outcome: Ongoing (interventions implemented as appropriate)      Problem: Cardiac: Heart Failure (Adult)  Goal: Signs and Symptoms of Listed Potential Problems Will be Absent, Minimized or Managed (Cardiac: Heart Failure)  Outcome: Ongoing (interventions implemented as appropriate)

## 2018-12-19 NOTE — PLAN OF CARE
Problem: Patient Care Overview  Goal: Plan of Care Review  Outcome: Ongoing (interventions implemented as appropriate)   12/19/18 1611   Coping/Psychosocial   Plan of Care Reviewed With patient   Plan of Care Review   Progress no change   OTHER   Outcome Summary VSS. No complains of pain today. Echo completed, results pending. Continue to monitor overall condition and notify Md of any changes.

## 2018-12-19 NOTE — PAYOR COMM NOTE
"  12/19/18 [facility] [phone]  [phone]      FAXING CLINICAL FOR INPATIENT REVIEW.  ADMIT DATE 12/18/19            Arjun Hernández (33 y.o. Male)     Date of Birth Social Security Number Address Home Phone MRN    1985  [id number] [facility] [address] [phone] [id number]    [employer] Marital Status          Other Single       Admission Date Admission Type Admitting Provider Attending Provider Department, Room/Bed    12/18/18 Emergency Jere Kent MD Fleming, John Eric, MD [facility] 4B, 402/1    Discharge Date Discharge Disposition Discharge Destination                       Attending Provider:  Jere Kent MD    Allergies:  No Known Allergies    Isolation:  None   Infection:  None   Code Status:  No CPR    Ht:  170.2 cm (67.01\")   Wt:  86.2 kg (190 lb 0.6 oz)    Admission Cmt:  None   Principal Problem:  Acute on chronic congestive heart failure (CMS/HCC) [I50.9]                 Active Insurance as of 12/18/2018     Primary Coverage     Payor Plan Insurance Group Employer/Plan Group    PASSPORT PASSPORT MEDICAID     Payor Plan Address Payor Plan Phone Number Payor Plan Fax Number Effective Dates    [address] [phone]  11/1/1997 - None Entered    [facility] [id number]       Subscriber Name Subscriber Birth Date Member ID       ARJUN HERNÁNDEZ 1985 [id number]                 Emergency Contacts      (Rel.) Home Phone Work Phone Mobile Phone    Aurelia Hernández (Father) -- -- [phone]        , DIOGENES Rivera   Nurse Practitioner   Medicine   H&P   Cosign Needed   Date of Service: 12/18/2018 4:12 PM                Cosign Needed      Expand All Collapse All       Show:Clear all   ManualTemplateCopied  Added by:   Chastity Roberts APRN Hover for details                                                                                                                                       [facility] " [facility] Medicine Services   HISTORY AND PHYSICAL   Date of Admission: 12/18/2018   Primary Care Physician: Provider, No Known     Subjective    Chief Complaint: shortness of breath   History of Present Illness   Mr. Oropeza is a 34 yo male with past medical history significant for nonischemic cardiomyopathy with an estimated ejection fraction of 21-25 percent, and methamphetamine abuse. Patient presents emergency department today with a 45 day history of shortness of breath. Patient states he has orthopnea. He has not had any lower extremity edema. He denies any chest pain. Upon record review, patient was admitted into [facility] 10/15-10/19. Patient actually presented that time over two-day history of shortness of breath and lower extremity edema. His liver functions were elevated at that time and he was positive for methamphetamine. Echocardiogram does reveal normal LV function with a previous documented ejection fraction 15-20%. Cardiolite Lexiscan on 10/17 that showed a calculated ejection fraction of 26% thus triggering a coronary catheterization which did reveal nonischemic cardiomyopathy. He was discharged was most a follow-up within 5 days the cardiology clinic. He has yet to follow-up. He apparently has an appointment to/20 with Dr. Marvin Coronel at [facility]. He did recently receive a refill of his medications for 1 month from [facility] on 12/15.   He is denying any chest pain. He states release on a lower extremity edema. He is just more short of breath. He has received 40 mg of Lasix in the emerge pertinent. Spine was elevated at 0.102. He does admit to me that he continues to use methamphetamine. He tells me last and he was possibly 1.5 weeks ago. He told the emergency department nurse that he had not used in 2 weeks. He does admit to injecting his methamphetamine. He tells me for had any problems with rales for methamphetamine. He does continue to smoke about 1 pack of cigarettes per day.  He is aware of the methamphetamines is what has caused his cardiomyopathy.   Yesterday that he is actually taking his medications and he tries to adhere to his diet as much as he can. He does admit to being homeless and states he bounces around the states with friends. He also admits to me that he has been eating [employer] recently.   Review of Systems   Otherwise complete ROS reviewed and negative except as mentioned in the HPI.   Past Medical History:     Medical History          Past Medical History:    Diagnosis Date    • CHF (congestive heart failure) (CMS/[facility])     • Hypertension            ED to Hosp-Admission (Current) on 12/18/2018                  Knees, Huyen E, APRN   Nurse Practitioner   Cardiology   Consults   Attested   Date of Service: 12/18/2018 5:00 PM             Consult Orders   Inpatient Cardiology Consult [[id number]] ordered by Chastity Roberts APRN at 12/18/18 1611      Attested      Attestation signed by Charles Dickens MD at 12/18/2018 9:40 PM     I have seen and examined the patient. I have discussed the findings with the patient, their family and DIOGENES Carrero. I agree with her findings as documented above.   CC: SOA   Reason for consultation: CHF   Subjective: Patient is a 33-year-old male with known non-ischemic cardiomyopathy who presented to our facility yesterday with 1 week's worth of increasing shortness of breath. This was associated with lower extremity edema, orthopnea, and PND. Aggravating factors: Reports he ran out of all his medications (which include ACE inhibitor, beta blocker, spironolactone, and Lasix) at least one week ago. Also, he reports he has been using methamphetamine again. By way of review, he was diagnosed with systolic congestive heart failure and was hospitalized here in March of this year. He was started on medical therapy as previously referenced, but then was admitted again in [facility] in October. Echo performed their notes and normal ejection  fraction, but then a cath was done this admission and shows no obstructive coronary disease but reports EF 15%. Currently, he reports he is already feeling significant improvement since arrival, having been given 1 dose of IV Lasix this afternoon.   PFSH, ROS: as per Mrs. LeroyDIOGENES   Objective:   Vitals reviewed   GEN:NAD   LUNGS: nl effort, cta b   CV: rrr, III/VI late-peaking systolic murmur loudest over apex   EXT: warm, trace edema   DATA REVIEWED:   I have reviewed all laboratory data, with pertinent findings: Troponin minimally elevated 0.1 with no significant delta. ProBNP 7630. Creatinine normal. , . UDS positive for amphetamines.   I have personally visualized the chest x-ray, with findings (my interpretation): Enlarged cardiac silhouette with diffuse interstitial edema   I have reviewed telemetry, which shows no malignant arrhythmias   I have visualized all the electrocardiograms performed, with my interpretations to follow: NSR, left atrial enlargement, nonspecific T-wave abnormalities   Assessment and plans/recommendations: I agree with the problems listed and plans provided by DIOGENES Carrero. Patient's acute on chronic systolic congestive heart failure appears improved already; continue IV diuresis for now with strict intake and output monitoring and a fluid/sodium strict a diet. Resume all home medications including ACE inhibitor, beta blocker, and aldosterone receptor antagonist. Continue to monitor blood pressure closely, along with daily electrolytes. Repeat echocardiogram here for definitive yes assessment; echocardiogram at [facility] in October reports normal ejection fraction by left ventriculogram performed during catheterization that same procedure notes EF 15%. We did, of course, reemphasized to the patient the absolute need for complete cessation of methamphetamine use and the need for medical compliance.   Charles Dickens MD      Expand All Collapse All        Show:Clear all    ManualTemplateCopied  Added by:   Huyen Leroy APRN Hover for details                                                                                                                                                                                       Patient Care Team:   Provider, No Known as PCP - General   Jere Kent MD   REASON FOR REFERRAL: CHF, elevated troponin   Chief complaint : shortness of breath   Subjective   Patient is a 33 y.o. male presents with a 5 day history of worsening shortness of breath, orthopnea and PND. He also reports lower extremity edema and chronic abdominal discomfort over the past year. The patient has a known history of non ischemic cardiomyopathy and chronic systolic congestive heart failure diagnosed 11/2016 felt to be secondary to chronic meth use. He follows with [facility] cardiology and has been offered a LifeVest and ICD in the past and has refused and continues to refuse. His LVEF was 15-20% by echo at [facility] in March and 21-25% with severe MR on [facility] echo in March. Recent cardiac cath in October at [facility] revealed non obstructive coronary disease and an LVEF of 15%. However, echo at [facility] in October revealed an LVEF of 55-60%.   He reports he has been compliant with his medications with the exception of missing his meds for 3 days about a week and a half ago. He reports his last meth use was a week and a half ago as well. BNP is elevated. Troponin reveals a flat trend thus far - 0.102-0.107. EKG reveals sinus tachycardia, not significantly changed from previous. LFTs are chronically elevated. CXR is negative. UDS is positive for amphetamines. IV diuresis has been initiated per the primary team.   Review of Systems   Review of Systems   Constitutional: Negative for diaphoresis, fatigue, fever and unexpected weight change.   HENT: Negative for nosebleeds.   Respiratory: Positive for shortness of breath (orthopnea and PND). Negative for apnea, cough, chest tightness  and wheezing.   Cardiovascular: Positive for leg swelling. Negative for chest pain and palpitations.   Gastrointestinal: Positive for abdominal pain (chronic over past year ). Negative for abdominal distention, nausea and vomiting.   Genitourinary: Negative for hematuria.   Musculoskeletal: Negative for gait problem.   Skin: Negative for color change.   Neurological: Negative for dizziness, syncope, weakness and light-headedness.     History      Medical History            Past Medical History:     Diagnosis Date     • CHF (congestive heart failure) (CMS/HCC)      • Hypertension        Cosigned by: Charles Dickens MD at 12/18/2018 9:40 PM              Assessment/Plan         -Acute on chronic systolic congestive heart failure  -Non ischemic cardiomyopathy  -Chronically elevated LFTs ?hepatic congestion secondary to HF and/or meth use   -Methamphetamine use   -Severe mitral regurgitation   -Mild troponin elevation, flat trend, secondary to CHF  -Medical non compliance   -Tobacco abuse      Plan-  Agree with diuresis   Monitor volume status, renal function, electrolytes closely   Limited 2d echo  Continue toprol xl, lisinopril, aldactone      I discussed the patient's findings and my recommendations with patient.      Huyen Leroy, DIOGENES  12/18/18  5:01 PM             Hospital Medications (active)       Dose Frequency Start End    aspirin EC tablet 81 mg 81 mg Daily 12/18/2018     Sig - Route: Take 1 tablet by mouth Daily. - Oral    enoxaparin (LOVENOX) syringe 40 mg 40 mg Every 24 Hours 12/18/2018     Sig - Route: Inject 0.4 mL under the skin into the appropriate area as directed Daily. - Subcutaneous    furosemide (LASIX) injection 40 mg 40 mg Once 12/18/2018 12/18/2018    Sig - Route: Infuse 4 mL into a venous catheter 1 (One) Time. - Intravenous    furosemide (LASIX) injection 40 mg 40 mg Every 12 Hours 12/19/2018     Sig - Route: Infuse 4 mL into a venous catheter Every 12 (Twelve) Hours. - Intravenous     HYDROcodone-acetaminophen (NORCO) 5-325 MG per tablet 1 tablet 1 tablet Every 6 Hours PRN 12/18/2018 12/28/2018    Sig - Route: Take 1 tablet by mouth Every 6 (Six) Hours As Needed for Moderate Pain . - Oral    lisinopril (PRINIVIL,ZESTRIL) tablet 10 mg 10 mg Every 12 Hours Scheduled 12/18/2018     Sig - Route: Take 1 tablet by mouth Every 12 (Twelve) Hours. - Oral    metoprolol succinate XL (TOPROL-XL) 24 hr tablet 25 mg 25 mg Every 24 Hours Scheduled 12/18/2018     Sig - Route: Take 1 tablet by mouth Daily. - Oral    nicotine (NICODERM CQ) 21 MG/24HR patch 1 patch 1 patch Every 24 Hours Scheduled 12/18/2018     Sig - Route: Place 1 patch on the skin as directed by provider Daily. - Transdermal    ondansetron (ZOFRAN) injection 4 mg 4 mg Every 6 Hours PRN 12/18/2018     Sig - Route: Infuse 2 mL into a venous catheter Every 6 (Six) Hours As Needed for Nausea or Vomiting. - Intravenous    sodium chloride 0.9 % flush 3 mL 3 mL Every 12 Hours Scheduled 12/18/2018     Sig - Route: Infuse 3 mL into a venous catheter Every 12 (Twelve) Hours. - Intravenous    sodium chloride 0.9 % flush 3-10 mL 3-10 mL As Needed 12/18/2018     Sig - Route: Infuse 3-10 mL into a venous catheter As Needed for Line Care. - Intravenous    spironolactone (ALDACTONE) tablet 25 mg 25 mg Daily 12/19/2018     Sig - Route: Take 1 tablet by mouth Daily. - Oral    ipratropium (ATROVENT) nebulizer solution 0.5 mg (Discontinued) 0.5 mg 4 Times Daily - RT 12/18/2018 12/19/2018    Sig - Route: Take 2.5 mL by nebulization 4 (Four) Times a Day. - Nebulization    lisinopril (PRINIVIL,ZESTRIL) tablet 2.5 mg (Discontinued) 2.5 mg Every 24 Hours Scheduled 12/18/2018 12/18/2018    Sig - Route: Take 1 tablet by mouth Daily. - Oral

## 2018-12-20 VITALS
RESPIRATION RATE: 20 BRPM | HEART RATE: 97 BPM | HEIGHT: 67 IN | BODY MASS INDEX: 29.83 KG/M2 | WEIGHT: 190.04 LBS | SYSTOLIC BLOOD PRESSURE: 123 MMHG | TEMPERATURE: 97.3 F | OXYGEN SATURATION: 98 % | DIASTOLIC BLOOD PRESSURE: 82 MMHG

## 2018-12-20 LAB
ALBUMIN SERPL-MCNC: 3.5 G/DL (ref 3.5–5)
ALBUMIN/GLOB SERPL: 1.3 G/DL (ref 1.1–2.5)
ALP SERPL-CCNC: 97 U/L (ref 24–120)
ALT SERPL W P-5'-P-CCNC: 106 U/L (ref 0–54)
ANION GAP SERPL CALCULATED.3IONS-SCNC: 13 MMOL/L (ref 4–13)
AST SERPL-CCNC: 71 U/L (ref 7–45)
BILIRUB SERPL-MCNC: 0.6 MG/DL (ref 0.1–1)
BUN BLD-MCNC: 16 MG/DL (ref 5–21)
BUN/CREAT SERPL: 19.5 (ref 7–25)
CALCIUM SPEC-SCNC: 8.7 MG/DL (ref 8.4–10.4)
CHLORIDE SERPL-SCNC: 98 MMOL/L (ref 98–110)
CO2 SERPL-SCNC: 26 MMOL/L (ref 24–31)
CREAT BLD-MCNC: 0.82 MG/DL (ref 0.5–1.4)
GFR SERPL CREATININE-BSD FRML MDRD: 108 ML/MIN/1.73
GLOBULIN UR ELPH-MCNC: 2.7 GM/DL
GLUCOSE BLD-MCNC: 93 MG/DL (ref 70–100)
NT-PROBNP SERPL-MCNC: 4980 PG/ML (ref 0–450)
POTASSIUM BLD-SCNC: 4.1 MMOL/L (ref 3.5–5.3)
PROT SERPL-MCNC: 6.2 G/DL (ref 6.3–8.7)
SODIUM BLD-SCNC: 137 MMOL/L (ref 135–145)

## 2018-12-20 PROCEDURE — G0378 HOSPITAL OBSERVATION PER HR: HCPCS

## 2018-12-20 PROCEDURE — 80053 COMPREHEN METABOLIC PANEL: CPT | Performed by: INTERNAL MEDICINE

## 2018-12-20 PROCEDURE — 83880 ASSAY OF NATRIURETIC PEPTIDE: CPT | Performed by: INTERNAL MEDICINE

## 2018-12-20 RX ORDER — FUROSEMIDE 40 MG/1
40 TABLET ORAL 2 TIMES DAILY
Qty: 60 TABLET | Refills: 0 | Status: SHIPPED | OUTPATIENT
Start: 2018-12-20

## 2018-12-20 RX ORDER — SPIRONOLACTONE 25 MG/1
25 TABLET ORAL DAILY
Qty: 30 TABLET | Refills: 0 | Status: SHIPPED | OUTPATIENT
Start: 2018-12-20 | End: 2019-01-01 | Stop reason: HOSPADM

## 2018-12-20 RX ORDER — LISINOPRIL 10 MG/1
10 TABLET ORAL EVERY 12 HOURS
Qty: 60 TABLET | Refills: 0 | Status: ON HOLD | OUTPATIENT
Start: 2018-12-20 | End: 2019-09-15

## 2018-12-20 RX ORDER — METOPROLOL SUCCINATE 25 MG/1
25 TABLET, EXTENDED RELEASE ORAL DAILY
Qty: 30 TABLET | Refills: 0 | Status: SHIPPED | OUTPATIENT
Start: 2018-12-20 | End: 2019-01-01 | Stop reason: HOSPADM

## 2018-12-20 RX ORDER — ASPIRIN 81 MG/1
81 TABLET ORAL DAILY
Qty: 30 TABLET | Refills: 0 | Status: ON HOLD | OUTPATIENT
Start: 2018-12-20 | End: 2019-01-01 | Stop reason: ALTCHOICE

## 2018-12-20 RX ADMIN — METOPROLOL SUCCINATE 25 MG: 25 TABLET, FILM COATED, EXTENDED RELEASE ORAL at 08:54

## 2018-12-20 RX ADMIN — ASPIRIN 81 MG: 81 TABLET, DELAYED RELEASE ORAL at 08:54

## 2018-12-20 RX ADMIN — SODIUM CHLORIDE, PRESERVATIVE FREE 3 ML: 5 INJECTION INTRAVENOUS at 08:55

## 2018-12-20 RX ADMIN — FUROSEMIDE 40 MG: 40 TABLET ORAL at 08:54

## 2018-12-20 RX ADMIN — SPIRONOLACTONE 25 MG: 25 TABLET ORAL at 08:54

## 2018-12-20 RX ADMIN — LISINOPRIL 10 MG: 10 TABLET ORAL at 08:54

## 2018-12-20 NOTE — DISCHARGE SUMMARY
[facility] Medicine Services  DISCHARGE SUMMARY       Date of Admission: 12/18/2018  Date of Discharge:  12/20/2018  Primary Care Physician: Provider, No Known    Presenting Problem/History of Present Illness:  Shortness of breath    Final Discharge Diagnoses:  • **Acute on chronic congestive heart failure (CMS/HCC)   • Elevated troponin I level   • Methamphetamine abuse (CMS/HCC)   • Noncompliance   • Nonischemic cardiomyopathy (CMS/HCC)   • Transaminitis     Consults:   Dr. Charles Dickens-cardiology    Procedures Performed: none    Pertinent Test Results:   Imaging Results (last 7 days)     Procedure Component Value Units Date/Time    XR Chest 1 View [[id number]] Collected:  12/18/18 1448     Updated:  12/18/18 1458    Narrative:       XR CHEST 1 VW- 12/18/2018 2:17 PM CST     HISTORY: spa       COMPARISON: 7/29/2018.     FINDINGS:   Marked cardiomegaly, similar to the comparison exam. No consolidation.  No pleural effusion. Pulmonary vasculature are unremarkable.      The osseous structures and surrounding soft tissues demonstrate no acute  abnormality.       Impression:       1. Overall stable chest exam. Stable prominent cardiomegaly without  pulmonary edema, infiltrate or pleural effusion.  This report was finalized on 12/18/2018 14:54 by Dr Herbert Rahman, .        Lab Results (last 7 days)     Procedure Component Value Units Date/Time    BNP [[id number]]  (Abnormal) Collected:  12/20/18 0450    Specimen:  Blood Updated:  12/20/18 0539     proBNP 4,980.0 pg/mL     Comprehensive Metabolic Panel [[id number]]  (Abnormal) Collected:  12/20/18 0450    Specimen:  Blood Updated:  12/20/18 0536     Glucose 93 mg/dL      BUN 16 mg/dL      Creatinine 0.82 mg/dL      Sodium 137 mmol/L      Potassium 4.1 mmol/L      Chloride 98 mmol/L      CO2 26.0 mmol/L      Calcium 8.7 mg/dL      Total Protein 6.2 g/dL      Albumin 3.50 g/dL      ALT (SGPT) 106 U/L      AST (SGOT) 71 U/L      Alkaline  Phosphatase 97 U/L      Total Bilirubin 0.6 mg/dL      eGFR Non African Amer 108 mL/min/1.73      Globulin 2.7 gm/dL      A/G Ratio 1.3 g/dL      BUN/Creatinine Ratio 19.5     Anion Gap 13.0 mmol/L     Troponin [[id number]]  (Abnormal) Collected:  12/19/18 0338    Specimen:  Blood Updated:  12/19/18 0407     Troponin I 0.139 ng/mL     Basic Metabolic Panel [[id number]]  (Abnormal) Collected:  12/19/18 0338    Specimen:  Blood Updated:  12/19/18 0357     Glucose 92 mg/dL      BUN 15 mg/dL      Creatinine 0.78 mg/dL      Sodium 137 mmol/L      Potassium 4.1 mmol/L      Chloride 97 mmol/L      CO2 24.0 mmol/L      Calcium 8.5 mg/dL      eGFR Non African Amer 115 mL/min/1.73      BUN/Creatinine Ratio 19.2     Anion Gap 16.0 mmol/L     Narrative:       GFR Normal >60  Chronic Kidney Disease <60  Kidney Failure <15    CBC & Differential [[id number]] Collected:  12/19/18 0337    Specimen:  Blood Updated:  12/19/18 0344    Narrative:       The following orders were created for panel order CBC & Differential.  Procedure                               Abnormality         Status                     ---------                               -----------         ------                     CBC Auto Differential[[id number]]        Abnormal            Final result                 Please view results for these tests on the individual orders.    CBC Auto Differential [[id number]]  (Abnormal) Collected:  12/19/18 0337    Specimen:  Blood Updated:  12/19/18 0344     WBC 9.88 10*3/mm3      RBC 4.33 10*6/mm3      Hemoglobin 13.6 g/dL      Hematocrit 40.2 %      MCV 92.8 fL      MCH 31.4 pg      MCHC 33.8 g/dL      RDW 14.7 %      RDW-SD 50.6 fl      MPV 10.5 fL      Platelets 289 10*3/mm3      Neutrophil % 52.7 %      Lymphocyte % 34.5 %      Monocyte % 9.7 %      Eosinophil % 1.9 %      Basophil % 0.9 %      Immature Grans % 0.3 %      Neutrophils, Absolute 5.20 10*3/mm3      Lymphocytes, Absolute 3.41 10*3/mm3      Monocytes, Absolute 0.96  10*3/mm3      Eosinophils, Absolute 0.19 10*3/mm3      Basophils, Absolute 0.09 10*3/mm3      Immature Grans, Absolute 0.03 10*3/mm3      nRBC 0.0 /100 WBC     Troponin [[id number]]  (Abnormal) Collected:  12/18/18 2206    Specimen:  Blood Updated:  12/18/18 2234     Troponin I 0.134 ng/mL     Troponin [[id number]]  (Abnormal) Collected:  12/18/18 1635    Specimen:  Blood Updated:  12/18/18 1703     Troponin I 0.107 ng/mL     BNP [[id number]]  (Abnormal) Collected:  12/18/18 1412    Specimen:  Blood Updated:  12/18/18 1450     proBNP 7,630.0 pg/mL     Troponin [[id number]]  (Abnormal) Collected:  12/18/18 1412    Specimen:  Blood Updated:  12/18/18 1450     Troponin I 0.102 ng/mL     Urine Drug Screen - Urine, Clean Catch [[id number]]  (Abnormal) Collected:  12/18/18 1412    Specimen:  Urine, Clean Catch Updated:  12/18/18 1442     Amphetamine Screen, Urine Positive     Barbiturates Screen, Urine Negative     Benzodiazepine Screen, Urine Negative     Cocaine Screen, Urine Negative     Methadone Screen, Urine Negative     Opiate Screen Negative     Phencyclidine (PCP), Urine Negative     THC, Screen, Urine Negative    Narrative:       Negative Thresholds For Drugs Screened in Urine:    Amphetamines          500 ng/ml  Barbiturates          200 ng/ml  Benzodiazepines       200 ng/ml  Cocaine               150 ng/ml  Methadone             150 ng/ml  Opiates               300 ng/ml  Phencyclidine         25 ng/ml  THC                      50 ng/ml    The normal value for all drugs tested is negative. This report includes final unconfirmed screening results.  A positive result by this assay can be, at your request, sent to the Reference Lab for confirmation by gas chromatography. Unconfirmed results must not be used for non-medical purposes, such as employment or legal testing. Clinical consideration should be applied to any drug of abuse test result, particularly when unconfirmed results are used.    Comprehensive Metabolic  Panel [[id number]]  (Abnormal) Collected:  12/18/18 1412    Specimen:  Blood Updated:  12/18/18 1432     Glucose 125 mg/dL      BUN 14 mg/dL      Creatinine 0.78 mg/dL      Sodium 137 mmol/L      Potassium 3.8 mmol/L      Chloride 101 mmol/L      CO2 23.0 mmol/L      Calcium 8.7 mg/dL      Total Protein 6.6 g/dL      Albumin 3.70 g/dL      ALT (SGPT) 124 U/L      AST (SGOT) 112 U/L      Alkaline Phosphatase 100 U/L      Total Bilirubin 0.5 mg/dL      eGFR Non African Amer 115 mL/min/1.73      Globulin 2.9 gm/dL      A/G Ratio 1.3 g/dL      BUN/Creatinine Ratio 17.9     Anion Gap 13.0 mmol/L     CBC & Differential [[id number]] Collected:  12/18/18 1412    Specimen:  Blood Updated:  12/18/18 1421    Narrative:       The following orders were created for panel order CBC & Differential.  Procedure                               Abnormality         Status                     ---------                               -----------         ------                     CBC Auto Differential[[id number]]        Abnormal            Final result                 Please view results for these tests on the individual orders.    CBC Auto Differential [[id number]]  (Abnormal) Collected:  12/18/18 1412    Specimen:  Blood Updated:  12/18/18 1421     WBC 8.77 10*3/mm3      RBC 4.25 10*6/mm3      Hemoglobin 13.6 g/dL      Hematocrit 39.6 %      MCV 93.2 fL      MCH 32.0 pg      MCHC 34.3 g/dL      RDW 15.3 %      RDW-SD 51.4 fl      MPV 9.9 fL      Platelets 278 10*3/mm3      Neutrophil % 66.0 %      Lymphocyte % 20.2 %      Monocyte % 11.2 %      Eosinophil % 1.4 %      Basophil % 0.7 %      Immature Grans % 0.5 %      Neutrophils, Absolute 5.80 10*3/mm3      Lymphocytes, Absolute 1.77 10*3/mm3      Monocytes, Absolute 0.98 10*3/mm3      Eosinophils, Absolute 0.12 10*3/mm3      Basophils, Absolute 0.06 10*3/mm3      Immature Grans, Absolute 0.04 10*3/mm3      nRBC 0.0 /100 WBC     Blood Gas, Arterial [[id number]]  (Abnormal) Collected:  12/18/18  1415    Specimen:  Arterial Blood Updated:  12/18/18 1419     Site Left Radial     Jed's Test Positive     pH, Arterial 7.416 pH units      pCO2, Arterial 35.8 mm Hg      pO2, Arterial 90.7 mm Hg      HCO3, Arterial 23.0 mmol/L      Base Excess, Arterial -1.1 mmol/L      Comment: 84 Value below reference range        O2 Saturation, Arterial 97.6 %      Temperature 37.0 C      Barometric Pressure for Blood Gas 754 mmHg      Modality Room Air     Ventilator Mode NA     Collected by [id number]     Comment: Meter: [id number]     :  [id number]           Hospital Course:  The patient is a 33 y.o. male with past medical history significant for nonischemic cardiomyopathy with an estimated ejection fraction of 21-25 percent, and methamphetamine abuse.  Patient presents emergency department today with a 45 day history of shortness of breath.  Patient states he has orthopnea.  He has not had any lower extremity edema.  He denies any chest pain.  Upon record review, patient was admitted into [facility] 10/15-10/19.  Patient actually presented that time over two-day history of shortness of breath and lower extremity edema.  His liver functions were elevated at that time and he was positive for methamphetamine.  Echocardiogram does reveal normal LV function with a previous documented ejection fraction 15-20%.  Cardiolite Lexiscan on 10/17 that showed a calculated ejection fraction of 26% thus triggering a coronary catheterization which did reveal nonischemic cardiomyopathy.  He was discharged was most a follow-up within 5 days the cardiology clinic.  He has yet to follow-up.  He apparently has an appointment 2/19 with Dr. Marvin Coronel at [facility].  He did recently receive a refill of his medications for 1 month from [facility] on 12/15. His BNP is elevated and was admitted to the hospitalist service for further evaluation and mangement.     He was given IV lasix 40 mg bid. He underwent 2d echo showing moderate to  "severe mitral regurgitation. Ejection fraction 35-40%. He diuresed well. States he feels much better. It was discussed with him again, in detail, that he needs to quit doing methamphetamine. He states he is aware. I have moved his appointment with Dr. Coronel up to January so he can follow up with cardiology at [facility]. I have given him 1 month of all his medications. Please note that Dr. Dickens increased his lasix to 40 mg bid. His lisinopril remains the same at 10 mg bid. Toprol XL at 25 mg daily as well. He does not have a primary care provider, however, his symptoms are all heart related, will have him follow up with cardiology in January.    Physical Exam on Discharge:  /82 (BP Location: Left arm, Patient Position: Sitting)   Pulse 97   Temp 97.3 °F (36.3 °C) (Temporal)   Resp 20   Ht 170.2 cm (67.01\")   Wt 86.2 kg (190 lb 0.6 oz)   SpO2 98%   BMI 29.76 kg/m²   Physical Exam   Constitutional: He is oriented to person, place, and time. He appears well-developed and well-nourished.   HENT:   Head: Normocephalic and atraumatic.   Eyes: EOM are normal. Pupils are equal, round, and reactive to light. No scleral icterus.   Neck: Normal range of motion. Neck supple. No JVD present. Carotid bruit is not present. No tracheal deviation present. No thyromegaly present.   Cardiovascular: Normal rate and regular rhythm. Exam reveals no gallop and no friction rub.   Pulmonary/Chest: Effort normal and breath sounds normal. No respiratory distress. He has no wheezes. He has no rales. He exhibits no tenderness.   Abdominal: Soft. Bowel sounds are normal. He exhibits no distension. There is no tenderness.   Musculoskeletal: Normal range of motion. He exhibits no edema.   Neurological: He is alert and oriented to person, place, and time. No cranial nerve deficit.   Skin: Skin is warm and dry. No rash noted.   Psychiatric: He has a normal mood and affect.     Condition on Discharge: stable    Discharge " Disposition:  Home or Self Care    Discharge Medications:     Discharge Medications      Changes to Medications      Instructions Start Date   aspirin 81 MG EC tablet  What changed:  when to take this   81 mg, Oral, Daily      furosemide 40 MG tablet  Commonly known as:  LASIX  What changed:  when to take this   40 mg, Oral, 2 Times Daily         Continue These Medications      Instructions Start Date   lisinopril 10 MG tablet  Commonly known as:  PRINIVIL,ZESTRIL   10 mg, Oral, Every 12 Hours      metoprolol succinate XL 25 MG 24 hr tablet  Commonly known as:  TOPROL-XL   25 mg, Oral, Daily      spironolactone 25 MG tablet  Commonly known as:  ALDACTONE   25 mg, Oral, Daily         Stop These Medications    metoprolol tartrate 25 MG tablet  Commonly known as:  LOPRESSOR          Discharge Diet:   Diet Instructions     Diet: Cardiac; Thin      Discharge Diet:  Cardiac    Fluid Consistency:  Thin        Activity at Discharge:   Activity Instructions     Activity as Tolerated            Follow-up Appointments:   1. Dr. Coronel with Cardiology at [facility] 1/7    Test Results Pending at Discharge: none    DIOGENES Dobbs  12/20/18  9:53 AM    Time: 25 minutes.     I personally discussed the patient in conjunction with DIOGENES Gamino and agree with the assessment, treatment plan, and disposition of the patient as recorded by her. My history, exam, and further recommendations are:     Patient high risk for recurrence of continued cardiac issues due to continued drug use.  Patients overall prognosis is very poor due to compliance issues and detrimental choices regarding lifestyle.  Patient has cardiac follow-up soon, hope he keeps the appointment.    Jere Kent MD  12/20/18  9:10 PM

## 2018-12-20 NOTE — PROGRESS NOTES
Continued Stay Note  [facility]     Patient Name: Arjun Oropeza  MRN: [id number]  Today's Date: 12/20/2018    Admit Date: 12/18/2018    Discharge Plan     Row Name 12/20/18 1240       Plan    Final Discharge Disposition Code  01 - home or self-care    Final Note  PT DCD HOME WITH A FRIEND. PT DENIED DC NEEDS    Row Name 12/20/18 1143       Plan    Final Discharge Disposition Code  01 - home or self-care        Discharge Codes    No documentation.       Expected Discharge Date and Time     Expected Discharge Date Expected Discharge Time    Dec 20, 2018             ANSELMO Jung

## 2018-12-20 NOTE — PLAN OF CARE
Problem: Patient Care Overview  Goal: Plan of Care Review  Outcome: Ongoing (interventions implemented as appropriate)   12/20/18 6086   Coping/Psychosocial   Plan of Care Reviewed With patient   Plan of Care Review   Progress improving   OTHER   Outcome Summary VSS, no c/o pain. Pt slept peacefully tonight. EF 36-40% with mod to severe MV regurg. Possibly home soon. Cont to monitor     Goal: Individualization and Mutuality  Outcome: Ongoing (interventions implemented as appropriate)    Goal: Discharge Needs Assessment  Outcome: Ongoing (interventions implemented as appropriate)      Problem: Cardiac: Heart Failure (Adult)  Goal: Signs and Symptoms of Listed Potential Problems Will be Absent, Minimized or Managed (Cardiac: Heart Failure)  Outcome: Ongoing (interventions implemented as appropriate)   12/18/18 5527   Goal/Outcome Evaluation   Problems Assessed (Heart Failure) all   Problems Present (Heart Failure) cardiac pump dysfunction;respiratory compromise

## 2018-12-21 ENCOUNTER — READMISSION MANAGEMENT (OUTPATIENT)
Dept: CALL CENTER | Facility: HOSPITAL | Age: 33
End: 2018-12-21

## 2018-12-21 NOTE — OUTREACH NOTE
Prep Survey      Responses   Facility patient discharged from?  [facility]   Is patient eligible?  Yes   Does the patient have one of the following disease processes/diagnoses(primary or secondary)?  CHF   Does the patient have Home health ordered?  No   Is there a DME ordered?  No   Prep survey completed?  Yes          Carolyn Dumont RN

## 2018-12-26 ENCOUNTER — READMISSION MANAGEMENT (OUTPATIENT)
Dept: CALL CENTER | Facility: HOSPITAL | Age: 33
End: 2018-12-26

## 2018-12-26 NOTE — OUTREACH NOTE
CHF Week 1 Survey      Responses   Facility patient discharged from?  [facility]   Does the patient have one of the following disease processes/diagnoses(primary or secondary)?  CHF   Is there a successful TCM telephone encounter documented?  No   CHF Week 1 attempt successful?  No   Unsuccessful attempts  Attempt 1          Marvin Haque RN

## 2018-12-27 ENCOUNTER — READMISSION MANAGEMENT (OUTPATIENT)
Dept: CALL CENTER | Facility: HOSPITAL | Age: 33
End: 2018-12-27

## 2018-12-27 NOTE — OUTREACH NOTE
CHF Week 1 Survey      Responses   Facility patient discharged from?  [place]   Does the patient have one of the following disease processes/diagnoses(primary or secondary)?  CHF   Is there a successful TCM telephone encounter documented?  No   CHF Week 1 attempt successful?  Yes   Call start time  1717   Revoke  Phone number issues   Call end time  1718          Yasmin Moreira RN

## 2019-01-01 ENCOUNTER — OUTSIDE FACILITY SERVICE (OUTPATIENT)
Dept: PULMONOLOGY | Facility: CLINIC | Age: 34
End: 2019-01-01

## 2019-01-01 ENCOUNTER — READMISSION MANAGEMENT (OUTPATIENT)
Dept: CALL CENTER | Facility: HOSPITAL | Age: 34
End: 2019-01-01

## 2019-01-01 ENCOUNTER — APPOINTMENT (OUTPATIENT)
Dept: CT IMAGING | Facility: HOSPITAL | Age: 34
End: 2019-01-01

## 2019-01-01 ENCOUNTER — APPOINTMENT (OUTPATIENT)
Dept: GENERAL RADIOLOGY | Facility: HOSPITAL | Age: 34
End: 2019-01-01

## 2019-01-01 ENCOUNTER — HOSPITAL ENCOUNTER (INPATIENT)
Facility: HOSPITAL | Age: 34
LOS: 6 days | Discharge: HOME | End: 2019-10-25
Attending: EMERGENCY MEDICINE | Admitting: FAMILY MEDICINE

## 2019-01-01 VITALS
TEMPERATURE: 98 F | SYSTOLIC BLOOD PRESSURE: 127 MMHG | BODY MASS INDEX: 32.22 KG/M2 | DIASTOLIC BLOOD PRESSURE: 76 MMHG | HEART RATE: 110 BPM | OXYGEN SATURATION: 98 % | WEIGHT: 205.3 LBS | HEIGHT: 67 IN | RESPIRATION RATE: 18 BRPM

## 2019-01-01 DIAGNOSIS — J18.9 PNEUMONIA OF RIGHT MIDDLE LOBE DUE TO INFECTIOUS ORGANISM: Primary | ICD-10-CM

## 2019-01-01 LAB
ALBUMIN SERPL-MCNC: 3 G/DL (ref 3.5–5.2)
ALBUMIN SERPL-MCNC: 3 G/DL (ref 3.5–5.2)
ALBUMIN SERPL-MCNC: 3.3 G/DL (ref 3.5–5.2)
ALBUMIN SERPL-MCNC: 3.6 G/DL (ref 3.5–5.2)
ALBUMIN/GLOB SERPL: 0.7 G/DL
ALBUMIN/GLOB SERPL: 0.8 G/DL
ALBUMIN/GLOB SERPL: 0.9 G/DL
ALBUMIN/GLOB SERPL: 0.9 G/DL
ALP SERPL-CCNC: 146 U/L (ref 39–117)
ALP SERPL-CCNC: 147 U/L (ref 39–117)
ALP SERPL-CCNC: 176 U/L (ref 39–117)
ALP SERPL-CCNC: 189 U/L (ref 39–117)
ALT SERPL W P-5'-P-CCNC: 14 U/L (ref 1–41)
ALT SERPL W P-5'-P-CCNC: 19 U/L (ref 1–41)
ALT SERPL W P-5'-P-CCNC: 21 U/L (ref 1–41)
ALT SERPL W P-5'-P-CCNC: 26 U/L (ref 1–41)
ANION GAP SERPL CALCULATED.3IONS-SCNC: 13 MMOL/L (ref 5–15)
ANION GAP SERPL CALCULATED.3IONS-SCNC: 14 MMOL/L (ref 5–15)
ANION GAP SERPL CALCULATED.3IONS-SCNC: 14 MMOL/L (ref 5–15)
ANION GAP SERPL CALCULATED.3IONS-SCNC: 15 MMOL/L (ref 5–15)
ANION GAP SERPL CALCULATED.3IONS-SCNC: 15 MMOL/L (ref 5–15)
ANION GAP SERPL CALCULATED.3IONS-SCNC: 16 MMOL/L (ref 5–15)
ANION GAP SERPL CALCULATED.3IONS-SCNC: 18 MMOL/L (ref 5–15)
ARTERIAL PATENCY WRIST A: POSITIVE
AST SERPL-CCNC: 19 U/L (ref 1–40)
AST SERPL-CCNC: 22 U/L (ref 1–40)
AST SERPL-CCNC: 26 U/L (ref 1–40)
AST SERPL-CCNC: 30 U/L (ref 1–40)
ATMOSPHERIC PRESS: 744 MMHG
BACTERIA SPEC AEROBE CULT: NORMAL
BACTERIA SPEC AEROBE CULT: NORMAL
BACTERIA SPEC RESP CULT: NORMAL
BASE EXCESS BLDA CALC-SCNC: 2.2 MMOL/L (ref 0–2)
BASOPHILS # BLD AUTO: 0.02 10*3/MM3 (ref 0–0.2)
BASOPHILS # BLD AUTO: 0.03 10*3/MM3 (ref 0–0.2)
BASOPHILS # BLD AUTO: 0.03 10*3/MM3 (ref 0–0.2)
BASOPHILS # BLD AUTO: 0.05 10*3/MM3 (ref 0–0.2)
BASOPHILS NFR BLD AUTO: 0.2 % (ref 0–1.5)
BASOPHILS NFR BLD AUTO: 0.6 % (ref 0–1.5)
BDY SITE: ABNORMAL
BILIRUB SERPL-MCNC: 1 MG/DL (ref 0.2–1.2)
BILIRUB SERPL-MCNC: 1.6 MG/DL (ref 0.2–1.2)
BILIRUB SERPL-MCNC: 1.6 MG/DL (ref 0.2–1.2)
BILIRUB SERPL-MCNC: 1.7 MG/DL (ref 0.2–1.2)
BODY TEMPERATURE: 37 C
BUN BLD-MCNC: 14 MG/DL (ref 6–20)
BUN BLD-MCNC: 15 MG/DL (ref 6–20)
BUN BLD-MCNC: 21 MG/DL (ref 6–20)
BUN BLD-MCNC: 23 MG/DL (ref 6–20)
BUN BLD-MCNC: 26 MG/DL (ref 6–20)
BUN BLD-MCNC: 27 MG/DL (ref 6–20)
BUN BLD-MCNC: 27 MG/DL (ref 6–20)
BUN/CREAT SERPL: 17.9 (ref 7–25)
BUN/CREAT SERPL: 18.4 (ref 7–25)
BUN/CREAT SERPL: 20.3 (ref 7–25)
BUN/CREAT SERPL: 23.2 (ref 7–25)
BUN/CREAT SERPL: 24.8 (ref 7–25)
BUN/CREAT SERPL: 25.3 (ref 7–25)
BUN/CREAT SERPL: 28.1 (ref 7–25)
CALCIUM SPEC-SCNC: 8.5 MG/DL (ref 8.6–10.5)
CALCIUM SPEC-SCNC: 8.6 MG/DL (ref 8.6–10.5)
CALCIUM SPEC-SCNC: 8.6 MG/DL (ref 8.6–10.5)
CALCIUM SPEC-SCNC: 8.7 MG/DL (ref 8.6–10.5)
CALCIUM SPEC-SCNC: 8.8 MG/DL (ref 8.6–10.5)
CALCIUM SPEC-SCNC: 8.8 MG/DL (ref 8.6–10.5)
CALCIUM SPEC-SCNC: 9 MG/DL (ref 8.6–10.5)
CHLORIDE SERPL-SCNC: 87 MMOL/L (ref 98–107)
CHLORIDE SERPL-SCNC: 88 MMOL/L (ref 98–107)
CHLORIDE SERPL-SCNC: 89 MMOL/L (ref 98–107)
CO2 SERPL-SCNC: 20 MMOL/L (ref 22–29)
CO2 SERPL-SCNC: 25 MMOL/L (ref 22–29)
CO2 SERPL-SCNC: 25 MMOL/L (ref 22–29)
CO2 SERPL-SCNC: 26 MMOL/L (ref 22–29)
CO2 SERPL-SCNC: 27 MMOL/L (ref 22–29)
CREAT BLD-MCNC: 0.74 MG/DL (ref 0.76–1.27)
CREAT BLD-MCNC: 0.78 MG/DL (ref 0.76–1.27)
CREAT BLD-MCNC: 0.91 MG/DL (ref 0.76–1.27)
CREAT BLD-MCNC: 0.96 MG/DL (ref 0.76–1.27)
CREAT BLD-MCNC: 1.09 MG/DL (ref 0.76–1.27)
CREAT BLD-MCNC: 1.12 MG/DL (ref 0.76–1.27)
CREAT BLD-MCNC: 1.14 MG/DL (ref 0.76–1.27)
D-LACTATE SERPL-SCNC: 1.1 MMOL/L (ref 0.5–2)
DEPRECATED RDW RBC AUTO: 53.4 FL (ref 37–54)
DEPRECATED RDW RBC AUTO: 53.6 FL (ref 37–54)
DEPRECATED RDW RBC AUTO: 54.4 FL (ref 37–54)
DEPRECATED RDW RBC AUTO: 54.4 FL (ref 37–54)
EOSINOPHIL # BLD AUTO: 0.02 10*3/MM3 (ref 0–0.4)
EOSINOPHIL # BLD AUTO: 0.03 10*3/MM3 (ref 0–0.4)
EOSINOPHIL # BLD AUTO: 0.04 10*3/MM3 (ref 0–0.4)
EOSINOPHIL # BLD AUTO: 0.14 10*3/MM3 (ref 0–0.4)
EOSINOPHIL NFR BLD AUTO: 0.2 % (ref 0.3–6.2)
EOSINOPHIL NFR BLD AUTO: 0.2 % (ref 0.3–6.2)
EOSINOPHIL NFR BLD AUTO: 0.4 % (ref 0.3–6.2)
EOSINOPHIL NFR BLD AUTO: 1.7 % (ref 0.3–6.2)
ERYTHROCYTE [DISTWIDTH] IN BLOOD BY AUTOMATED COUNT: 18 % (ref 12.3–15.4)
ERYTHROCYTE [DISTWIDTH] IN BLOOD BY AUTOMATED COUNT: 18.1 % (ref 12.3–15.4)
ERYTHROCYTE [DISTWIDTH] IN BLOOD BY AUTOMATED COUNT: 18.2 % (ref 12.3–15.4)
ERYTHROCYTE [DISTWIDTH] IN BLOOD BY AUTOMATED COUNT: 18.3 % (ref 12.3–15.4)
GAS FLOW AIRWAY: 2 LPM
GFR SERPL CREATININE-BSD FRML MDRD: 114 ML/MIN/1.73
GFR SERPL CREATININE-BSD FRML MDRD: 121 ML/MIN/1.73
GFR SERPL CREATININE-BSD FRML MDRD: 74 ML/MIN/1.73
GFR SERPL CREATININE-BSD FRML MDRD: 75 ML/MIN/1.73
GFR SERPL CREATININE-BSD FRML MDRD: 77 ML/MIN/1.73
GFR SERPL CREATININE-BSD FRML MDRD: 90 ML/MIN/1.73
GFR SERPL CREATININE-BSD FRML MDRD: 95 ML/MIN/1.73
GLOBULIN UR ELPH-MCNC: 3.8 GM/DL
GLOBULIN UR ELPH-MCNC: 3.8 GM/DL
GLOBULIN UR ELPH-MCNC: 4.1 GM/DL
GLOBULIN UR ELPH-MCNC: 4.2 GM/DL
GLUCOSE BLD-MCNC: 104 MG/DL (ref 65–99)
GLUCOSE BLD-MCNC: 107 MG/DL (ref 65–99)
GLUCOSE BLD-MCNC: 111 MG/DL (ref 65–99)
GLUCOSE BLD-MCNC: 111 MG/DL (ref 65–99)
GLUCOSE BLD-MCNC: 120 MG/DL (ref 65–99)
GLUCOSE BLD-MCNC: 174 MG/DL (ref 65–99)
GLUCOSE BLD-MCNC: 87 MG/DL (ref 65–99)
GRAM STN SPEC: NORMAL
HCO3 BLDA-SCNC: 25.6 MMOL/L (ref 20–26)
HCT VFR BLD AUTO: 30.2 % (ref 37.5–51)
HCT VFR BLD AUTO: 30.2 % (ref 37.5–51)
HCT VFR BLD AUTO: 31.4 % (ref 37.5–51)
HCT VFR BLD AUTO: 35.3 % (ref 37.5–51)
HGB BLD-MCNC: 10 G/DL (ref 13–17.7)
HGB BLD-MCNC: 10.2 G/DL (ref 13–17.7)
HGB BLD-MCNC: 10.4 G/DL (ref 13–17.7)
HGB BLD-MCNC: 11.8 G/DL (ref 13–17.7)
HOLD SPECIMEN: NORMAL
IMM GRANULOCYTES # BLD AUTO: 0.04 10*3/MM3 (ref 0–0.05)
IMM GRANULOCYTES # BLD AUTO: 0.05 10*3/MM3 (ref 0–0.05)
IMM GRANULOCYTES # BLD AUTO: 0.07 10*3/MM3 (ref 0–0.05)
IMM GRANULOCYTES # BLD AUTO: 0.08 10*3/MM3 (ref 0–0.05)
IMM GRANULOCYTES NFR BLD AUTO: 0.4 % (ref 0–0.5)
IMM GRANULOCYTES NFR BLD AUTO: 0.5 % (ref 0–0.5)
IMM GRANULOCYTES NFR BLD AUTO: 0.6 % (ref 0–0.5)
IMM GRANULOCYTES NFR BLD AUTO: 0.7 % (ref 0–0.5)
INR PPP: 1.89 (ref 0.91–1.09)
L PNEUMO1 AG UR QL IA: NEGATIVE
LYMPHOCYTES # BLD AUTO: 0.67 10*3/MM3 (ref 0.7–3.1)
LYMPHOCYTES # BLD AUTO: 0.83 10*3/MM3 (ref 0.7–3.1)
LYMPHOCYTES # BLD AUTO: 0.94 10*3/MM3 (ref 0.7–3.1)
LYMPHOCYTES # BLD AUTO: 1.14 10*3/MM3 (ref 0.7–3.1)
LYMPHOCYTES NFR BLD AUTO: 6.7 % (ref 19.6–45.3)
LYMPHOCYTES NFR BLD AUTO: 8.1 % (ref 19.6–45.3)
LYMPHOCYTES NFR BLD AUTO: 8.8 % (ref 19.6–45.3)
LYMPHOCYTES NFR BLD AUTO: 9.1 % (ref 19.6–45.3)
Lab: ABNORMAL
M PNEUMO DNA SPEC QL NAA+PROBE: NEGATIVE
MCH RBC QN AUTO: 27.4 PG (ref 26.6–33)
MCH RBC QN AUTO: 27.4 PG (ref 26.6–33)
MCH RBC QN AUTO: 27.6 PG (ref 26.6–33)
MCH RBC QN AUTO: 27.6 PG (ref 26.6–33)
MCHC RBC AUTO-ENTMCNC: 33.1 G/DL (ref 31.5–35.7)
MCHC RBC AUTO-ENTMCNC: 33.1 G/DL (ref 31.5–35.7)
MCHC RBC AUTO-ENTMCNC: 33.4 G/DL (ref 31.5–35.7)
MCHC RBC AUTO-ENTMCNC: 33.8 G/DL (ref 31.5–35.7)
MCV RBC AUTO: 81.6 FL (ref 79–97)
MCV RBC AUTO: 82.1 FL (ref 79–97)
MCV RBC AUTO: 82.8 FL (ref 79–97)
MCV RBC AUTO: 83.4 FL (ref 79–97)
MODALITY: ABNORMAL
MONOCYTES # BLD AUTO: 0.93 10*3/MM3 (ref 0.1–0.9)
MONOCYTES # BLD AUTO: 1.29 10*3/MM3 (ref 0.1–0.9)
MONOCYTES # BLD AUTO: 1.69 10*3/MM3 (ref 0.1–0.9)
MONOCYTES # BLD AUTO: 1.85 10*3/MM3 (ref 0.1–0.9)
MONOCYTES NFR BLD AUTO: 11.2 % (ref 5–12)
MONOCYTES NFR BLD AUTO: 12.1 % (ref 5–12)
MONOCYTES NFR BLD AUTO: 13.7 % (ref 5–12)
MONOCYTES NFR BLD AUTO: 14.7 % (ref 5–12)
MRSA SPEC QL CULT: NORMAL
NEUTROPHILS # BLD AUTO: 6.44 10*3/MM3 (ref 1.7–7)
NEUTROPHILS # BLD AUTO: 8.32 10*3/MM3 (ref 1.7–7)
NEUTROPHILS # BLD AUTO: 9.49 10*3/MM3 (ref 1.7–7)
NEUTROPHILS # BLD AUTO: 9.71 10*3/MM3 (ref 1.7–7)
NEUTROPHILS NFR BLD AUTO: 75.4 % (ref 42.7–76)
NEUTROPHILS NFR BLD AUTO: 77.8 % (ref 42.7–76)
NEUTROPHILS NFR BLD AUTO: 77.9 % (ref 42.7–76)
NEUTROPHILS NFR BLD AUTO: 78.6 % (ref 42.7–76)
NRBC BLD AUTO-RTO: 0 /100 WBC (ref 0–0.2)
NT-PROBNP SERPL-MCNC: 4910 PG/ML (ref 5–450)
NT-PROBNP SERPL-MCNC: 5195 PG/ML (ref 5–450)
PCO2 BLDA: 34.6 MM HG (ref 35–45)
PH BLDA: 7.48 PH UNITS (ref 7.35–7.45)
PLATELET # BLD AUTO: 242 10*3/MM3 (ref 140–450)
PLATELET # BLD AUTO: 286 10*3/MM3 (ref 140–450)
PLATELET # BLD AUTO: 292 10*3/MM3 (ref 140–450)
PLATELET # BLD AUTO: 351 10*3/MM3 (ref 140–450)
PMV BLD AUTO: 10 FL (ref 6–12)
PMV BLD AUTO: 9.4 FL (ref 6–12)
PO2 BLDA: 108 MM HG (ref 83–108)
POTASSIUM BLD-SCNC: 3.6 MMOL/L (ref 3.5–5.2)
POTASSIUM BLD-SCNC: 4.2 MMOL/L (ref 3.5–5.2)
POTASSIUM BLD-SCNC: 4.3 MMOL/L (ref 3.5–5.2)
POTASSIUM BLD-SCNC: 4.3 MMOL/L (ref 3.5–5.2)
POTASSIUM BLD-SCNC: 4.4 MMOL/L (ref 3.5–5.2)
POTASSIUM BLD-SCNC: 4.4 MMOL/L (ref 3.5–5.2)
POTASSIUM BLD-SCNC: 4.6 MMOL/L (ref 3.5–5.2)
PROT SERPL-MCNC: 6.8 G/DL (ref 6–8.5)
PROT SERPL-MCNC: 7.1 G/DL (ref 6–8.5)
PROT SERPL-MCNC: 7.1 G/DL (ref 6–8.5)
PROT SERPL-MCNC: 7.8 G/DL (ref 6–8.5)
PROTHROMBIN TIME: 22.4 SECONDS (ref 11.9–14.6)
RBC # BLD AUTO: 3.62 10*6/MM3 (ref 4.14–5.8)
RBC # BLD AUTO: 3.7 10*6/MM3 (ref 4.14–5.8)
RBC # BLD AUTO: 3.79 10*6/MM3 (ref 4.14–5.8)
RBC # BLD AUTO: 4.3 10*6/MM3 (ref 4.14–5.8)
S PNEUM AG SPEC QL LA: NEGATIVE
SAO2 % BLDCOA: 98.8 % (ref 94–99)
SODIUM BLD-SCNC: 127 MMOL/L (ref 136–145)
SODIUM BLD-SCNC: 127 MMOL/L (ref 136–145)
SODIUM BLD-SCNC: 128 MMOL/L (ref 136–145)
SODIUM BLD-SCNC: 129 MMOL/L (ref 136–145)
SODIUM BLD-SCNC: 131 MMOL/L (ref 136–145)
TROPONIN T SERPL-MCNC: <0.01 NG/ML (ref 0–0.03)
VENTILATOR MODE: ABNORMAL
WBC NRBC COR # BLD: 10.68 10*3/MM3 (ref 3.4–10.8)
WBC NRBC COR # BLD: 12.37 10*3/MM3 (ref 3.4–10.8)
WBC NRBC COR # BLD: 12.58 10*3/MM3 (ref 3.4–10.8)
WBC NRBC COR # BLD: 8.27 10*3/MM3 (ref 3.4–10.8)
WHOLE BLOOD HOLD SPECIMEN: NORMAL
WHOLE BLOOD HOLD SPECIMEN: NORMAL

## 2019-01-01 PROCEDURE — 25010000002 ENOXAPARIN PER 10 MG: Performed by: FAMILY MEDICINE

## 2019-01-01 PROCEDURE — 99232 SBSQ HOSP IP/OBS MODERATE 35: CPT | Performed by: INTERNAL MEDICINE

## 2019-01-01 PROCEDURE — 94760 N-INVAS EAR/PLS OXIMETRY 1: CPT

## 2019-01-01 PROCEDURE — 85025 COMPLETE CBC W/AUTO DIFF WBC: CPT | Performed by: INTERNAL MEDICINE

## 2019-01-01 PROCEDURE — 99255 IP/OBS CONSLTJ NEW/EST HI 80: CPT | Performed by: INTERNAL MEDICINE

## 2019-01-01 PROCEDURE — 80053 COMPREHEN METABOLIC PANEL: CPT | Performed by: FAMILY MEDICINE

## 2019-01-01 PROCEDURE — 94799 UNLISTED PULMONARY SVC/PX: CPT

## 2019-01-01 PROCEDURE — 85025 COMPLETE CBC W/AUTO DIFF WBC: CPT | Performed by: EMERGENCY MEDICINE

## 2019-01-01 PROCEDURE — 71275 CT ANGIOGRAPHY CHEST: CPT

## 2019-01-01 PROCEDURE — 85025 COMPLETE CBC W/AUTO DIFF WBC: CPT | Performed by: FAMILY MEDICINE

## 2019-01-01 PROCEDURE — 25010000002 FUROSEMIDE PER 20 MG: Performed by: FAMILY MEDICINE

## 2019-01-01 PROCEDURE — 80048 BASIC METABOLIC PNL TOTAL CA: CPT | Performed by: INTERNAL MEDICINE

## 2019-01-01 PROCEDURE — 25010000002 FUROSEMIDE PER 20 MG: Performed by: INTERNAL MEDICINE

## 2019-01-01 PROCEDURE — 80053 COMPREHEN METABOLIC PANEL: CPT | Performed by: INTERNAL MEDICINE

## 2019-01-01 PROCEDURE — 25010000002 MORPHINE PER 10 MG: Performed by: FAMILY MEDICINE

## 2019-01-01 PROCEDURE — 99233 SBSQ HOSP IP/OBS HIGH 50: CPT | Performed by: INTERNAL MEDICINE

## 2019-01-01 PROCEDURE — 87899 AGENT NOS ASSAY W/OPTIC: CPT | Performed by: FAMILY MEDICINE

## 2019-01-01 PROCEDURE — 36600 WITHDRAWAL OF ARTERIAL BLOOD: CPT

## 2019-01-01 PROCEDURE — 99222 1ST HOSP IP/OBS MODERATE 55: CPT | Performed by: INTERNAL MEDICINE

## 2019-01-01 PROCEDURE — 25010000002 FUROSEMIDE PER 20 MG: Performed by: EMERGENCY MEDICINE

## 2019-01-01 PROCEDURE — 71045 X-RAY EXAM CHEST 1 VIEW: CPT

## 2019-01-01 PROCEDURE — 87205 SMEAR GRAM STAIN: CPT | Performed by: FAMILY MEDICINE

## 2019-01-01 PROCEDURE — 85610 PROTHROMBIN TIME: CPT | Performed by: EMERGENCY MEDICINE

## 2019-01-01 PROCEDURE — 80053 COMPREHEN METABOLIC PANEL: CPT | Performed by: EMERGENCY MEDICINE

## 2019-01-01 PROCEDURE — 87040 BLOOD CULTURE FOR BACTERIA: CPT | Performed by: EMERGENCY MEDICINE

## 2019-01-01 PROCEDURE — 87081 CULTURE SCREEN ONLY: CPT | Performed by: INTERNAL MEDICINE

## 2019-01-01 PROCEDURE — 94640 AIRWAY INHALATION TREATMENT: CPT

## 2019-01-01 PROCEDURE — 83605 ASSAY OF LACTIC ACID: CPT | Performed by: EMERGENCY MEDICINE

## 2019-01-01 PROCEDURE — 83880 ASSAY OF NATRIURETIC PEPTIDE: CPT | Performed by: NURSE PRACTITIONER

## 2019-01-01 PROCEDURE — 25010000002 VANCOMYCIN PER 500 MG: Performed by: INTERNAL MEDICINE

## 2019-01-01 PROCEDURE — 82803 BLOOD GASES ANY COMBINATION: CPT

## 2019-01-01 PROCEDURE — 25010000002 MORPHINE PER 10 MG: Performed by: EMERGENCY MEDICINE

## 2019-01-01 PROCEDURE — 25010000002 CEFTRIAXONE PER 250 MG: Performed by: FAMILY MEDICINE

## 2019-01-01 PROCEDURE — 25010000002 ONDANSETRON PER 1 MG: Performed by: EMERGENCY MEDICINE

## 2019-01-01 PROCEDURE — 25010000002 CEFTRIAXONE PER 250 MG: Performed by: EMERGENCY MEDICINE

## 2019-01-01 PROCEDURE — 93010 ELECTROCARDIOGRAM REPORT: CPT | Performed by: INTERNAL MEDICINE

## 2019-01-01 PROCEDURE — 87581 M.PNEUMON DNA AMP PROBE: CPT | Performed by: FAMILY MEDICINE

## 2019-01-01 PROCEDURE — 99406 BEHAV CHNG SMOKING 3-10 MIN: CPT

## 2019-01-01 PROCEDURE — 87070 CULTURE OTHR SPECIMN AEROBIC: CPT | Performed by: FAMILY MEDICINE

## 2019-01-01 PROCEDURE — 99285 EMERGENCY DEPT VISIT HI MDM: CPT

## 2019-01-01 PROCEDURE — 83880 ASSAY OF NATRIURETIC PEPTIDE: CPT | Performed by: EMERGENCY MEDICINE

## 2019-01-01 PROCEDURE — 0 IOPAMIDOL PER 1 ML: Performed by: EMERGENCY MEDICINE

## 2019-01-01 PROCEDURE — 84484 ASSAY OF TROPONIN QUANT: CPT | Performed by: EMERGENCY MEDICINE

## 2019-01-01 PROCEDURE — 93005 ELECTROCARDIOGRAM TRACING: CPT | Performed by: EMERGENCY MEDICINE

## 2019-01-01 RX ORDER — IPRATROPIUM BROMIDE AND ALBUTEROL SULFATE 2.5; .5 MG/3ML; MG/3ML
3 SOLUTION RESPIRATORY (INHALATION) ONCE
Status: COMPLETED | OUTPATIENT
Start: 2019-01-01 | End: 2019-01-01

## 2019-01-01 RX ORDER — METOPROLOL SUCCINATE 50 MG/1
50 TABLET, EXTENDED RELEASE ORAL DAILY
Status: DISCONTINUED | OUTPATIENT
Start: 2019-01-01 | End: 2019-01-01

## 2019-01-01 RX ORDER — LISINOPRIL 2.5 MG/1
2.5 TABLET ORAL
Status: DISCONTINUED | OUTPATIENT
Start: 2019-01-01 | End: 2019-01-01

## 2019-01-01 RX ORDER — PANTOPRAZOLE SODIUM 40 MG/1
40 TABLET, DELAYED RELEASE ORAL DAILY
Status: DISCONTINUED | OUTPATIENT
Start: 2019-01-01 | End: 2019-01-01

## 2019-01-01 RX ORDER — SODIUM CHLORIDE 0.9 % (FLUSH) 0.9 %
10 SYRINGE (ML) INJECTION AS NEEDED
Status: DISCONTINUED | OUTPATIENT
Start: 2019-01-01 | End: 2019-01-01 | Stop reason: HOSPADM

## 2019-01-01 RX ORDER — FUROSEMIDE 10 MG/ML
20 INJECTION INTRAMUSCULAR; INTRAVENOUS ONCE
Status: DISCONTINUED | OUTPATIENT
Start: 2019-01-01 | End: 2019-01-01

## 2019-01-01 RX ORDER — METOPROLOL SUCCINATE 100 MG/1
100 TABLET, EXTENDED RELEASE ORAL DAILY
Qty: 30 TABLET | Refills: 0 | Status: SHIPPED | OUTPATIENT
Start: 2019-01-01

## 2019-01-01 RX ORDER — ACETAMINOPHEN 325 MG/1
650 TABLET ORAL EVERY 6 HOURS PRN
Status: DISCONTINUED | OUTPATIENT
Start: 2019-01-01 | End: 2019-01-01 | Stop reason: HOSPADM

## 2019-01-01 RX ORDER — ONDANSETRON 2 MG/ML
4 INJECTION INTRAMUSCULAR; INTRAVENOUS EVERY 6 HOURS PRN
Status: DISCONTINUED | OUTPATIENT
Start: 2019-01-01 | End: 2019-01-01 | Stop reason: HOSPADM

## 2019-01-01 RX ORDER — WARFARIN SODIUM 5 MG/1
5 TABLET ORAL
COMMUNITY
End: 2019-01-01 | Stop reason: HOSPADM

## 2019-01-01 RX ORDER — IPRATROPIUM BROMIDE AND ALBUTEROL SULFATE 2.5; .5 MG/3ML; MG/3ML
3 SOLUTION RESPIRATORY (INHALATION)
Status: DISCONTINUED | OUTPATIENT
Start: 2019-01-01 | End: 2019-01-01

## 2019-01-01 RX ORDER — FUROSEMIDE 10 MG/ML
20 INJECTION INTRAMUSCULAR; INTRAVENOUS ONCE
Status: COMPLETED | OUTPATIENT
Start: 2019-01-01 | End: 2019-01-01

## 2019-01-01 RX ORDER — LISINOPRIL 5 MG/1
5 TABLET ORAL
Qty: 30 TABLET | Refills: 0 | Status: SHIPPED | OUTPATIENT
Start: 2019-01-01

## 2019-01-01 RX ORDER — FUROSEMIDE 10 MG/ML
40 INJECTION INTRAMUSCULAR; INTRAVENOUS EVERY 12 HOURS
Status: DISCONTINUED | OUTPATIENT
Start: 2019-01-01 | End: 2019-01-01

## 2019-01-01 RX ORDER — SODIUM CHLORIDE 1000 MG
1 TABLET, SOLUBLE MISCELLANEOUS 3 TIMES DAILY
Status: DISCONTINUED | OUTPATIENT
Start: 2019-01-01 | End: 2019-01-01

## 2019-01-01 RX ORDER — MORPHINE SULFATE 2 MG/ML
1 INJECTION, SOLUTION INTRAMUSCULAR; INTRAVENOUS ONCE
Status: COMPLETED | OUTPATIENT
Start: 2019-01-01 | End: 2019-01-01

## 2019-01-01 RX ORDER — ONDANSETRON 2 MG/ML
4 INJECTION INTRAMUSCULAR; INTRAVENOUS ONCE
Status: COMPLETED | OUTPATIENT
Start: 2019-01-01 | End: 2019-01-01

## 2019-01-01 RX ORDER — CEFDINIR 300 MG/1
300 CAPSULE ORAL EVERY 12 HOURS SCHEDULED
Qty: 3 CAPSULE | Refills: 0 | Status: SHIPPED | OUTPATIENT
Start: 2019-01-01 | End: 2019-01-01

## 2019-01-01 RX ORDER — LISINOPRIL 10 MG/1
10 TABLET ORAL DAILY
Status: DISCONTINUED | OUTPATIENT
Start: 2019-01-01 | End: 2019-01-01

## 2019-01-01 RX ORDER — FUROSEMIDE 40 MG/1
40 TABLET ORAL
Status: DISCONTINUED | OUTPATIENT
Start: 2019-01-01 | End: 2019-01-01 | Stop reason: HOSPADM

## 2019-01-01 RX ORDER — HYDROCODONE BITARTRATE AND ACETAMINOPHEN 5; 325 MG/1; MG/1
1 TABLET ORAL EVERY 6 HOURS PRN
Status: DISCONTINUED | OUTPATIENT
Start: 2019-01-01 | End: 2019-01-01

## 2019-01-01 RX ORDER — LEVALBUTEROL INHALATION SOLUTION 1.25 MG/3ML
1.25 SOLUTION RESPIRATORY (INHALATION)
Status: DISCONTINUED | OUTPATIENT
Start: 2019-01-01 | End: 2019-01-01

## 2019-01-01 RX ORDER — CEFDINIR 300 MG/1
300 CAPSULE ORAL EVERY 12 HOURS SCHEDULED
Status: DISCONTINUED | OUTPATIENT
Start: 2019-01-01 | End: 2019-01-01 | Stop reason: HOSPADM

## 2019-01-01 RX ORDER — SUCRALFATE 1 G/1
1 TABLET ORAL
Status: DISCONTINUED | OUTPATIENT
Start: 2019-01-01 | End: 2019-01-01

## 2019-01-01 RX ORDER — VANCOMYCIN HYDROCHLORIDE 1 G/200ML
1000 INJECTION, SOLUTION INTRAVENOUS EVERY 8 HOURS
Status: DISCONTINUED | OUTPATIENT
Start: 2019-01-01 | End: 2019-01-01 | Stop reason: ALTCHOICE

## 2019-01-01 RX ORDER — FUROSEMIDE 10 MG/ML
40 INJECTION INTRAMUSCULAR; INTRAVENOUS ONCE
Status: COMPLETED | OUTPATIENT
Start: 2019-01-01 | End: 2019-01-01

## 2019-01-01 RX ORDER — ASPIRIN 81 MG/1
81 TABLET ORAL DAILY
Status: DISCONTINUED | OUTPATIENT
Start: 2019-01-01 | End: 2019-01-01 | Stop reason: HOSPADM

## 2019-01-01 RX ORDER — FUROSEMIDE 10 MG/ML
20 INJECTION INTRAMUSCULAR; INTRAVENOUS EVERY 12 HOURS
Status: DISCONTINUED | OUTPATIENT
Start: 2019-01-01 | End: 2019-01-01

## 2019-01-01 RX ORDER — METOPROLOL SUCCINATE 100 MG/1
100 TABLET, EXTENDED RELEASE ORAL DAILY
Status: DISCONTINUED | OUTPATIENT
Start: 2019-01-01 | End: 2019-01-01 | Stop reason: HOSPADM

## 2019-01-01 RX ORDER — LISINOPRIL 5 MG/1
5 TABLET ORAL
Status: DISCONTINUED | OUTPATIENT
Start: 2019-01-01 | End: 2019-01-01 | Stop reason: HOSPADM

## 2019-01-01 RX ORDER — INDOMETHACIN 75 MG/1
75 CAPSULE, EXTENDED RELEASE ORAL 2 TIMES DAILY PRN
Status: DISCONTINUED | OUTPATIENT
Start: 2019-01-01 | End: 2019-01-01

## 2019-01-01 RX ORDER — FUROSEMIDE 40 MG/1
40 TABLET ORAL
Status: DISCONTINUED | OUTPATIENT
Start: 2019-01-01 | End: 2019-01-01

## 2019-01-01 RX ORDER — AZITHROMYCIN 250 MG/1
250 TABLET, FILM COATED ORAL DAILY
COMMUNITY
End: 2019-01-01 | Stop reason: HOSPADM

## 2019-01-01 RX ORDER — METOPROLOL SUCCINATE 25 MG/1
25 TABLET, EXTENDED RELEASE ORAL DAILY
Status: DISCONTINUED | OUTPATIENT
Start: 2019-01-01 | End: 2019-01-01

## 2019-01-01 RX ORDER — HYDROCODONE BITARTRATE AND ACETAMINOPHEN 10; 325 MG/1; MG/1
1 TABLET ORAL EVERY 4 HOURS PRN
Status: DISCONTINUED | OUTPATIENT
Start: 2019-01-01 | End: 2019-01-01 | Stop reason: HOSPADM

## 2019-01-01 RX ADMIN — LEVALBUTEROL HYDROCHLORIDE 1.25 MG: 1.25 SOLUTION RESPIRATORY (INHALATION) at 13:19

## 2019-01-01 RX ADMIN — METOPROLOL SUCCINATE 50 MG: 50 TABLET, FILM COATED, EXTENDED RELEASE ORAL at 08:27

## 2019-01-01 RX ADMIN — IPRATROPIUM BROMIDE 0.5 MG: 0.5 SOLUTION RESPIRATORY (INHALATION) at 18:15

## 2019-01-01 RX ADMIN — SUCRALFATE 1 G: 1 TABLET ORAL at 20:55

## 2019-01-01 RX ADMIN — ENOXAPARIN SODIUM 90 MG: 100 INJECTION SUBCUTANEOUS at 21:41

## 2019-01-01 RX ADMIN — CEFDINIR 300 MG: 300 CAPSULE ORAL at 12:28

## 2019-01-01 RX ADMIN — CEFDINIR 300 MG: 300 CAPSULE ORAL at 21:07

## 2019-01-01 RX ADMIN — FUROSEMIDE 20 MG: 10 INJECTION, SOLUTION INTRAMUSCULAR; INTRAVENOUS at 17:30

## 2019-01-01 RX ADMIN — CEFDINIR 300 MG: 300 CAPSULE ORAL at 20:55

## 2019-01-01 RX ADMIN — ASPIRIN 81 MG: 81 TABLET ORAL at 08:49

## 2019-01-01 RX ADMIN — IPRATROPIUM BROMIDE 0.5 MG: 0.5 SOLUTION RESPIRATORY (INHALATION) at 07:30

## 2019-01-01 RX ADMIN — IPRATROPIUM BROMIDE 0.5 MG: 0.5 SOLUTION RESPIRATORY (INHALATION) at 13:19

## 2019-01-01 RX ADMIN — FUROSEMIDE 40 MG: 10 INJECTION, SOLUTION INTRAMUSCULAR; INTRAVENOUS at 10:06

## 2019-01-01 RX ADMIN — DOXYCYCLINE 100 MG: 100 INJECTION, POWDER, LYOPHILIZED, FOR SOLUTION INTRAVENOUS at 15:02

## 2019-01-01 RX ADMIN — MORPHINE SULFATE 1 MG: 2 INJECTION, SOLUTION INTRAMUSCULAR; INTRAVENOUS at 12:32

## 2019-01-01 RX ADMIN — FUROSEMIDE 20 MG: 10 INJECTION, SOLUTION INTRAMUSCULAR; INTRAVENOUS at 17:31

## 2019-01-01 RX ADMIN — ENOXAPARIN SODIUM 90 MG: 100 INJECTION SUBCUTANEOUS at 11:38

## 2019-01-01 RX ADMIN — METOPROLOL SUCCINATE 25 MG: 25 TABLET, FILM COATED, EXTENDED RELEASE ORAL at 16:03

## 2019-01-01 RX ADMIN — IPRATROPIUM BROMIDE 0.5 MG: 0.5 SOLUTION RESPIRATORY (INHALATION) at 07:22

## 2019-01-01 RX ADMIN — IOPAMIDOL 100 ML: 755 INJECTION, SOLUTION INTRAVENOUS at 11:00

## 2019-01-01 RX ADMIN — ENOXAPARIN SODIUM 90 MG: 100 INJECTION SUBCUTANEOUS at 20:54

## 2019-01-01 RX ADMIN — ENOXAPARIN SODIUM 90 MG: 100 INJECTION SUBCUTANEOUS at 11:16

## 2019-01-01 RX ADMIN — ASPIRIN 81 MG: 81 TABLET ORAL at 09:22

## 2019-01-01 RX ADMIN — LEVALBUTEROL HYDROCHLORIDE 1.25 MG: 1.25 SOLUTION RESPIRATORY (INHALATION) at 18:15

## 2019-01-01 RX ADMIN — VANCOMYCIN HYDROCHLORIDE 1000 MG: 1 INJECTION, SOLUTION INTRAVENOUS at 03:33

## 2019-01-01 RX ADMIN — METOPROLOL SUCCINATE 50 MG: 50 TABLET, FILM COATED, EXTENDED RELEASE ORAL at 10:02

## 2019-01-01 RX ADMIN — LEVALBUTEROL HYDROCHLORIDE 1.25 MG: 1.25 SOLUTION RESPIRATORY (INHALATION) at 00:38

## 2019-01-01 RX ADMIN — LEVALBUTEROL HYDROCHLORIDE 1.25 MG: 1.25 SOLUTION RESPIRATORY (INHALATION) at 06:34

## 2019-01-01 RX ADMIN — ONDANSETRON HYDROCHLORIDE 4 MG: 2 SOLUTION INTRAMUSCULAR; INTRAVENOUS at 10:07

## 2019-01-01 RX ADMIN — LISINOPRIL 2.5 MG: 2.5 TABLET ORAL at 15:36

## 2019-01-01 RX ADMIN — ENOXAPARIN SODIUM 90 MG: 100 INJECTION SUBCUTANEOUS at 08:49

## 2019-01-01 RX ADMIN — ENOXAPARIN SODIUM 90 MG: 100 INJECTION SUBCUTANEOUS at 21:16

## 2019-01-01 RX ADMIN — CEFDINIR 300 MG: 300 CAPSULE ORAL at 20:29

## 2019-01-01 RX ADMIN — LEVALBUTEROL HYDROCHLORIDE 1.25 MG: 1.25 SOLUTION RESPIRATORY (INHALATION) at 07:22

## 2019-01-01 RX ADMIN — IPRATROPIUM BROMIDE 0.5 MG: 0.5 SOLUTION RESPIRATORY (INHALATION) at 01:13

## 2019-01-01 RX ADMIN — CEFTRIAXONE SODIUM 1 G: 1 INJECTION, POWDER, FOR SOLUTION INTRAMUSCULAR; INTRAVENOUS at 12:33

## 2019-01-01 RX ADMIN — ENOXAPARIN SODIUM 90 MG: 100 INJECTION SUBCUTANEOUS at 10:54

## 2019-01-01 RX ADMIN — METOPROLOL SUCCINATE 25 MG: 25 TABLET, FILM COATED, EXTENDED RELEASE ORAL at 08:49

## 2019-01-01 RX ADMIN — FUROSEMIDE 20 MG: 10 INJECTION, SOLUTION INTRAMUSCULAR; INTRAVENOUS at 17:11

## 2019-01-01 RX ADMIN — CEFDINIR 300 MG: 300 CAPSULE ORAL at 10:02

## 2019-01-01 RX ADMIN — ENOXAPARIN SODIUM 90 MG: 100 INJECTION SUBCUTANEOUS at 20:29

## 2019-01-01 RX ADMIN — FUROSEMIDE 20 MG: 10 INJECTION, SOLUTION INTRAMUSCULAR; INTRAVENOUS at 19:11

## 2019-01-01 RX ADMIN — ENOXAPARIN SODIUM 90 MG: 100 INJECTION SUBCUTANEOUS at 21:07

## 2019-01-01 RX ADMIN — HYDROCODONE BITARTRATE AND ACETAMINOPHEN 1 TABLET: 5; 325 TABLET ORAL at 15:02

## 2019-01-01 RX ADMIN — HYDROCODONE BITARTRATE AND ACETAMINOPHEN 1 TABLET: 5; 325 TABLET ORAL at 03:54

## 2019-01-01 RX ADMIN — IPRATROPIUM BROMIDE AND ALBUTEROL SULFATE 3 ML: 2.5; .5 SOLUTION RESPIRATORY (INHALATION) at 13:01

## 2019-01-01 RX ADMIN — PANTOPRAZOLE SODIUM 40 MG: 40 TABLET, DELAYED RELEASE ORAL at 09:01

## 2019-01-01 RX ADMIN — FUROSEMIDE 40 MG: 10 INJECTION, SOLUTION INTRAVENOUS at 17:20

## 2019-01-01 RX ADMIN — SODIUM CHLORIDE TAB 1 GM 1 G: 1 TAB at 09:01

## 2019-01-01 RX ADMIN — LEVALBUTEROL HYDROCHLORIDE 1.25 MG: 1.25 SOLUTION RESPIRATORY (INHALATION) at 18:54

## 2019-01-01 RX ADMIN — INDOMETHACIN 75 MG: 75 CAPSULE, EXTENDED RELEASE ORAL at 16:02

## 2019-01-01 RX ADMIN — FUROSEMIDE 40 MG: 10 INJECTION, SOLUTION INTRAVENOUS at 17:19

## 2019-01-01 RX ADMIN — CEFDINIR 300 MG: 300 CAPSULE ORAL at 09:22

## 2019-01-01 RX ADMIN — FUROSEMIDE 40 MG: 10 INJECTION, SOLUTION INTRAVENOUS at 06:05

## 2019-01-01 RX ADMIN — ASPIRIN 81 MG: 81 TABLET ORAL at 10:02

## 2019-01-01 RX ADMIN — IPRATROPIUM BROMIDE 0.5 MG: 0.5 SOLUTION RESPIRATORY (INHALATION) at 00:17

## 2019-01-01 RX ADMIN — LEVALBUTEROL HYDROCHLORIDE 1.25 MG: 1.25 SOLUTION RESPIRATORY (INHALATION) at 01:13

## 2019-01-01 RX ADMIN — SODIUM CHLORIDE TAB 1 GM 1 G: 1 TAB at 20:55

## 2019-01-01 RX ADMIN — IPRATROPIUM BROMIDE 0.5 MG: 0.5 SOLUTION RESPIRATORY (INHALATION) at 11:33

## 2019-01-01 RX ADMIN — FUROSEMIDE 20 MG: 10 INJECTION, SOLUTION INTRAMUSCULAR; INTRAVENOUS at 06:38

## 2019-01-01 RX ADMIN — LEVALBUTEROL HYDROCHLORIDE 1.25 MG: 1.25 SOLUTION RESPIRATORY (INHALATION) at 11:33

## 2019-01-01 RX ADMIN — SUCRALFATE 1 G: 1 TABLET ORAL at 12:33

## 2019-01-01 RX ADMIN — FUROSEMIDE 40 MG: 10 INJECTION, SOLUTION INTRAVENOUS at 06:23

## 2019-01-01 RX ADMIN — CEFTRIAXONE SODIUM 1 G: 1 INJECTION, POWDER, FOR SOLUTION INTRAMUSCULAR; INTRAVENOUS at 12:06

## 2019-01-01 RX ADMIN — MORPHINE SULFATE 4 MG: 4 INJECTION, SOLUTION INTRAMUSCULAR; INTRAVENOUS at 10:06

## 2019-01-01 RX ADMIN — LISINOPRIL 10 MG: 10 TABLET ORAL at 16:02

## 2019-01-01 RX ADMIN — HYDROCODONE BITARTRATE AND ACETAMINOPHEN 1 TABLET: 5; 325 TABLET ORAL at 11:09

## 2019-01-01 RX ADMIN — VANCOMYCIN HYDROCHLORIDE 1000 MG: 1 INJECTION, SOLUTION INTRAVENOUS at 10:54

## 2019-01-01 RX ADMIN — SUCRALFATE 1 G: 1 TABLET ORAL at 09:01

## 2019-01-01 RX ADMIN — IPRATROPIUM BROMIDE 0.5 MG: 0.5 SOLUTION RESPIRATORY (INHALATION) at 00:38

## 2019-01-01 RX ADMIN — SODIUM CHLORIDE TAB 1 GM 1 G: 1 TAB at 15:03

## 2019-01-01 RX ADMIN — ASPIRIN 81 MG: 81 TABLET ORAL at 08:43

## 2019-01-01 RX ADMIN — LEVALBUTEROL HYDROCHLORIDE 1.25 MG: 1.25 SOLUTION RESPIRATORY (INHALATION) at 07:30

## 2019-01-01 RX ADMIN — HYDROCODONE BITARTRATE AND ACETAMINOPHEN 1 TABLET: 5; 325 TABLET ORAL at 20:55

## 2019-01-01 RX ADMIN — METOPROLOL SUCCINATE 100 MG: 100 TABLET, FILM COATED, EXTENDED RELEASE ORAL at 09:22

## 2019-01-01 RX ADMIN — IPRATROPIUM BROMIDE 0.5 MG: 0.5 SOLUTION RESPIRATORY (INHALATION) at 18:54

## 2019-01-01 RX ADMIN — SODIUM CHLORIDE, PRESERVATIVE FREE 10 ML: 5 INJECTION INTRAVENOUS at 08:26

## 2019-01-01 RX ADMIN — LISINOPRIL 2.5 MG: 2.5 TABLET ORAL at 08:27

## 2019-01-01 RX ADMIN — PANTOPRAZOLE SODIUM 40 MG: 40 TABLET, DELAYED RELEASE ORAL at 15:11

## 2019-01-01 RX ADMIN — LISINOPRIL 5 MG: 5 TABLET ORAL at 09:22

## 2019-01-01 RX ADMIN — LISINOPRIL 2.5 MG: 2.5 TABLET ORAL at 10:02

## 2019-01-01 RX ADMIN — FUROSEMIDE 40 MG: 10 INJECTION, SOLUTION INTRAVENOUS at 17:44

## 2019-01-01 RX ADMIN — CEFDINIR 300 MG: 300 CAPSULE ORAL at 08:27

## 2019-01-01 RX ADMIN — ASPIRIN 81 MG: 81 TABLET ORAL at 15:11

## 2019-01-01 RX ADMIN — INDOMETHACIN 75 MG: 75 CAPSULE, EXTENDED RELEASE ORAL at 21:16

## 2019-01-01 RX ADMIN — ASPIRIN 81 MG: 81 TABLET ORAL at 08:26

## 2019-01-01 RX ADMIN — FUROSEMIDE 20 MG: 10 INJECTION, SOLUTION INTRAMUSCULAR; INTRAVENOUS at 06:05

## 2019-01-01 RX ADMIN — IPRATROPIUM BROMIDE 0.5 MG: 0.5 SOLUTION RESPIRATORY (INHALATION) at 06:34

## 2019-01-01 RX ADMIN — CEFTRIAXONE SODIUM 1 G: 1 INJECTION, POWDER, FOR SOLUTION INTRAMUSCULAR; INTRAVENOUS at 12:02

## 2019-01-01 RX ADMIN — FUROSEMIDE 20 MG: 10 INJECTION, SOLUTION INTRAMUSCULAR; INTRAVENOUS at 06:20

## 2019-01-01 RX ADMIN — FUROSEMIDE 40 MG: 10 INJECTION, SOLUTION INTRAVENOUS at 06:09

## 2019-01-01 RX ADMIN — VANCOMYCIN HYDROCHLORIDE 1000 MG: 1 INJECTION, SOLUTION INTRAVENOUS at 17:30

## 2019-01-01 RX ADMIN — DOXYCYCLINE 100 MG: 100 INJECTION, POWDER, LYOPHILIZED, FOR SOLUTION INTRAVENOUS at 02:13

## 2019-01-01 RX ADMIN — VANCOMYCIN HYDROCHLORIDE 1000 MG: 1 INJECTION, SOLUTION INTRAVENOUS at 11:10

## 2019-01-01 RX ADMIN — ENOXAPARIN SODIUM 90 MG: 100 INJECTION SUBCUTANEOUS at 10:02

## 2019-01-01 RX ADMIN — LEVALBUTEROL HYDROCHLORIDE 1.25 MG: 1.25 SOLUTION RESPIRATORY (INHALATION) at 00:17

## 2019-01-01 RX ADMIN — ASPIRIN 81 MG: 81 TABLET ORAL at 09:01

## 2019-01-01 RX ADMIN — SUCRALFATE 1 G: 1 TABLET ORAL at 17:11

## 2019-01-01 RX ADMIN — ENOXAPARIN SODIUM 90 MG: 100 INJECTION SUBCUTANEOUS at 09:22

## 2019-01-01 RX ADMIN — METOPROLOL SUCCINATE 25 MG: 25 TABLET, FILM COATED, EXTENDED RELEASE ORAL at 08:43

## 2019-01-23 RX ORDER — SPIRONOLACTONE 25 MG/1
TABLET ORAL
Qty: 90 TABLET | Refills: 3 | Status: ON HOLD | OUTPATIENT
Start: 2019-01-23 | End: 2019-02-04 | Stop reason: HOSPADM

## 2019-01-27 ENCOUNTER — APPOINTMENT (OUTPATIENT)
Dept: GENERAL RADIOLOGY | Age: 34
DRG: 193 | End: 2019-01-27
Payer: COMMERCIAL

## 2019-01-27 ENCOUNTER — APPOINTMENT (OUTPATIENT)
Dept: CT IMAGING | Age: 34
DRG: 193 | End: 2019-01-27
Payer: COMMERCIAL

## 2019-01-27 ENCOUNTER — HOSPITAL ENCOUNTER (INPATIENT)
Age: 34
LOS: 7 days | Discharge: HOME | DRG: 193 | End: 2019-02-04
Attending: FAMILY MEDICINE | Admitting: INTERNAL MEDICINE
Payer: COMMERCIAL

## 2019-01-27 DIAGNOSIS — K81.9 CHOLECYSTITIS WITHOUT CALCULUS: Primary | ICD-10-CM

## 2019-01-27 DIAGNOSIS — J10.1 INFLUENZA A: ICD-10-CM

## 2019-01-27 DIAGNOSIS — R79.89 ELEVATED LIVER FUNCTION TESTS: ICD-10-CM

## 2019-01-27 DIAGNOSIS — E86.9 VOLUME DEPLETION: ICD-10-CM

## 2019-01-27 LAB
ACETAMINOPHEN LEVEL: <15 UG/ML
ALBUMIN SERPL-MCNC: 4.2 G/DL (ref 3.5–5.2)
ALP BLD-CCNC: 138 U/L (ref 40–130)
ALT SERPL-CCNC: 2832 U/L (ref 5–41)
AMYLASE: 33 U/L (ref 28–100)
ANION GAP SERPL CALCULATED.3IONS-SCNC: 15 MMOL/L (ref 7–19)
APTT: 32.5 SEC (ref 26–36.2)
AST SERPL-CCNC: 2891 U/L (ref 5–40)
BASOPHILS ABSOLUTE: 0.1 K/UL (ref 0–0.2)
BASOPHILS RELATIVE PERCENT: 0.7 % (ref 0–1)
BILIRUB SERPL-MCNC: 1.4 MG/DL (ref 0.2–1.2)
BUN BLDV-MCNC: 26 MG/DL (ref 6–20)
C-REACTIVE PROTEIN: 3.33 MG/DL (ref 0–0.5)
CALCIUM SERPL-MCNC: 8.5 MG/DL (ref 8.6–10)
CHLORIDE BLD-SCNC: 91 MMOL/L (ref 98–111)
CO2: 22 MMOL/L (ref 22–29)
CREAT SERPL-MCNC: 1.3 MG/DL (ref 0.5–1.2)
EOSINOPHILS ABSOLUTE: 0 K/UL (ref 0–0.6)
EOSINOPHILS RELATIVE PERCENT: 0.4 % (ref 0–5)
ETHANOL: <10 MG/DL (ref 0–0.08)
GFR NON-AFRICAN AMERICAN: >60
GLUCOSE BLD-MCNC: 149 MG/DL (ref 74–109)
HCT VFR BLD CALC: 47.1 % (ref 42–52)
HEMOGLOBIN: 15 G/DL (ref 14–18)
INR BLD: 1.62 (ref 0.88–1.18)
LACTIC ACID: 3.5 MMOL/L (ref 0.5–1.9)
LIPASE: 32 U/L (ref 13–60)
LYMPHOCYTES ABSOLUTE: 2.2 K/UL (ref 1.1–4.5)
LYMPHOCYTES RELATIVE PERCENT: 20 % (ref 20–40)
MCH RBC QN AUTO: 30.4 PG (ref 27–31)
MCHC RBC AUTO-ENTMCNC: 31.8 G/DL (ref 33–37)
MCV RBC AUTO: 95.3 FL (ref 80–94)
MONOCYTES ABSOLUTE: 1.3 K/UL (ref 0–0.9)
MONOCYTES RELATIVE PERCENT: 11.6 % (ref 0–10)
NEUTROPHILS ABSOLUTE: 7.3 K/UL (ref 1.5–7.5)
NEUTROPHILS RELATIVE PERCENT: 66.7 % (ref 50–65)
PDW BLD-RTO: 16.3 % (ref 11.5–14.5)
PLATELET # BLD: 226 K/UL (ref 130–400)
PMV BLD AUTO: 10.1 FL (ref 9.4–12.4)
POTASSIUM SERPL-SCNC: 4.3 MMOL/L (ref 3.5–5)
PRO-BNP: 5177 PG/ML (ref 0–450)
PROTHROMBIN TIME: 18.5 SEC (ref 12–14.6)
RBC # BLD: 4.94 M/UL (ref 4.7–6.1)
SALICYLATE, SERUM: <3 MG/DL (ref 3–10)
SODIUM BLD-SCNC: 128 MMOL/L (ref 136–145)
TOTAL PROTEIN: 7.2 G/DL (ref 6.6–8.7)
TROPONIN: 0.04 NG/ML (ref 0–0.03)
TSH SERPL DL<=0.05 MIU/L-ACNC: 3.44 UIU/ML (ref 0.27–4.2)
WBC # BLD: 10.9 K/UL (ref 4.8–10.8)

## 2019-01-27 PROCEDURE — 74022 RADEX COMPL AQT ABD SERIES: CPT

## 2019-01-27 PROCEDURE — 93005 ELECTROCARDIOGRAM TRACING: CPT

## 2019-01-27 PROCEDURE — 82150 ASSAY OF AMYLASE: CPT

## 2019-01-27 PROCEDURE — 85610 PROTHROMBIN TIME: CPT

## 2019-01-27 PROCEDURE — G0480 DRUG TEST DEF 1-7 CLASSES: HCPCS

## 2019-01-27 PROCEDURE — 80053 COMPREHEN METABOLIC PANEL: CPT

## 2019-01-27 PROCEDURE — 6360000002 HC RX W HCPCS: Performed by: FAMILY MEDICINE

## 2019-01-27 PROCEDURE — 85730 THROMBOPLASTIN TIME PARTIAL: CPT

## 2019-01-27 PROCEDURE — 96375 TX/PRO/DX INJ NEW DRUG ADDON: CPT

## 2019-01-27 PROCEDURE — 36415 COLL VENOUS BLD VENIPUNCTURE: CPT

## 2019-01-27 PROCEDURE — 6360000004 HC RX CONTRAST MEDICATION: Performed by: FAMILY MEDICINE

## 2019-01-27 PROCEDURE — 86140 C-REACTIVE PROTEIN: CPT

## 2019-01-27 PROCEDURE — 99285 EMERGENCY DEPT VISIT HI MDM: CPT

## 2019-01-27 PROCEDURE — 87040 BLOOD CULTURE FOR BACTERIA: CPT

## 2019-01-27 PROCEDURE — 83605 ASSAY OF LACTIC ACID: CPT

## 2019-01-27 PROCEDURE — 83690 ASSAY OF LIPASE: CPT

## 2019-01-27 PROCEDURE — 84443 ASSAY THYROID STIM HORMONE: CPT

## 2019-01-27 PROCEDURE — 99285 EMERGENCY DEPT VISIT HI MDM: CPT | Performed by: FAMILY MEDICINE

## 2019-01-27 PROCEDURE — 74177 CT ABD & PELVIS W/CONTRAST: CPT

## 2019-01-27 PROCEDURE — 85025 COMPLETE CBC W/AUTO DIFF WBC: CPT

## 2019-01-27 PROCEDURE — 84484 ASSAY OF TROPONIN QUANT: CPT

## 2019-01-27 PROCEDURE — 80074 ACUTE HEPATITIS PANEL: CPT

## 2019-01-27 PROCEDURE — 83880 ASSAY OF NATRIURETIC PEPTIDE: CPT

## 2019-01-27 RX ORDER — MORPHINE SULFATE/0.9% NACL/PF 1 MG/ML
2 SYRINGE (ML) INJECTION ONCE
Status: COMPLETED | OUTPATIENT
Start: 2019-01-27 | End: 2019-01-27

## 2019-01-27 RX ORDER — ONDANSETRON 2 MG/ML
2 INJECTION INTRAMUSCULAR; INTRAVENOUS ONCE
Status: COMPLETED | OUTPATIENT
Start: 2019-01-27 | End: 2019-01-27

## 2019-01-27 RX ADMIN — ONDANSETRON 2 MG: 2 INJECTION INTRAMUSCULAR; INTRAVENOUS at 22:39

## 2019-01-27 RX ADMIN — IOPAMIDOL 90 ML: 755 INJECTION, SOLUTION INTRAVENOUS at 23:59

## 2019-01-27 RX ADMIN — Medication 2 MG: at 22:38

## 2019-01-27 ASSESSMENT — PAIN DESCRIPTION - FREQUENCY: FREQUENCY: CONTINUOUS

## 2019-01-27 ASSESSMENT — PAIN SCALES - GENERAL
PAINLEVEL_OUTOF10: 10
PAINLEVEL_OUTOF10: 9

## 2019-01-27 ASSESSMENT — PAIN DESCRIPTION - LOCATION: LOCATION: ABDOMEN

## 2019-01-27 ASSESSMENT — PAIN DESCRIPTION - PAIN TYPE: TYPE: ACUTE PAIN

## 2019-01-28 ENCOUNTER — APPOINTMENT (OUTPATIENT)
Dept: ULTRASOUND IMAGING | Age: 34
DRG: 193 | End: 2019-01-28
Payer: COMMERCIAL

## 2019-01-28 PROBLEM — K81.9 CHOLECYSTITIS: Status: ACTIVE | Noted: 2019-01-28

## 2019-01-28 LAB
ALBUMIN SERPL-MCNC: 3 G/DL (ref 3.5–5.2)
ALP BLD-CCNC: 105 U/L (ref 40–130)
ALT SERPL-CCNC: 2869 U/L (ref 5–41)
AMMONIA: 33 UMOL/L (ref 16–60)
AMPHETAMINE SCREEN, URINE: POSITIVE
ANION GAP SERPL CALCULATED.3IONS-SCNC: 13 MMOL/L (ref 7–19)
AST SERPL-CCNC: 3605 U/L (ref 5–40)
BARBITURATE SCREEN URINE: NEGATIVE
BASOPHILS ABSOLUTE: 0 K/UL (ref 0–0.2)
BASOPHILS RELATIVE PERCENT: 0.3 % (ref 0–1)
BENZODIAZEPINE SCREEN, URINE: NEGATIVE
BILIRUB SERPL-MCNC: 1.2 MG/DL (ref 0.2–1.2)
BILIRUBIN URINE: NEGATIVE
BLOOD, URINE: NEGATIVE
BUN BLDV-MCNC: 29 MG/DL (ref 6–20)
CALCIUM SERPL-MCNC: 7.5 MG/DL (ref 8.6–10)
CANNABINOID SCREEN URINE: NEGATIVE
CHLORIDE BLD-SCNC: 92 MMOL/L (ref 98–111)
CLARITY: CLEAR
CO2: 20 MMOL/L (ref 22–29)
COCAINE METABOLITE SCREEN URINE: NEGATIVE
COLOR: YELLOW
CREAT SERPL-MCNC: 1.2 MG/DL (ref 0.5–1.2)
EOSINOPHILS ABSOLUTE: 0 K/UL (ref 0–0.6)
EOSINOPHILS RELATIVE PERCENT: 0.1 % (ref 0–5)
GFR NON-AFRICAN AMERICAN: >60
GLUCOSE BLD-MCNC: 181 MG/DL (ref 74–109)
GLUCOSE URINE: NEGATIVE MG/DL
HAV IGM SER IA-ACNC: NORMAL
HCT VFR BLD CALC: 35.3 % (ref 42–52)
HEMOGLOBIN: 12 G/DL (ref 14–18)
HEPATITIS B CORE IGM ANTIBODY: NORMAL
HEPATITIS B SURFACE ANTIGEN INTERPRETATION: NORMAL
HEPATITIS C ANTIBODY INTERPRETATION: NORMAL
KETONES, URINE: NEGATIVE MG/DL
LACTIC ACID: 2.4 MMOL/L (ref 0.5–1.9)
LACTIC ACID: 2.6 MMOL/L (ref 0.5–1.9)
LACTIC ACID: 2.8 MMOL/L (ref 0.5–1.9)
LEUKOCYTE ESTERASE, URINE: NEGATIVE
LYMPHOCYTES ABSOLUTE: 2 K/UL (ref 1.1–4.5)
LYMPHOCYTES RELATIVE PERCENT: 22.8 % (ref 20–40)
Lab: ABNORMAL
MCH RBC QN AUTO: 30.5 PG (ref 27–31)
MCHC RBC AUTO-ENTMCNC: 34 G/DL (ref 33–37)
MCV RBC AUTO: 89.8 FL (ref 80–94)
MONOCYTES ABSOLUTE: 0.9 K/UL (ref 0–0.9)
MONOCYTES RELATIVE PERCENT: 10.2 % (ref 0–10)
NEUTROPHILS ABSOLUTE: 5.8 K/UL (ref 1.5–7.5)
NEUTROPHILS RELATIVE PERCENT: 66.1 % (ref 50–65)
NITRITE, URINE: NEGATIVE
OPIATE SCREEN URINE: NEGATIVE
PDW BLD-RTO: 15.7 % (ref 11.5–14.5)
PH UA: 6
PLATELET # BLD: 188 K/UL (ref 130–400)
PMV BLD AUTO: 9.9 FL (ref 9.4–12.4)
POTASSIUM REFLEX MAGNESIUM: 3.9 MMOL/L (ref 3.5–5)
PROTEIN UA: NEGATIVE MG/DL
RAPID INFLUENZA  B AGN: NEGATIVE
RAPID INFLUENZA A AGN: POSITIVE
RBC # BLD: 3.93 M/UL (ref 4.7–6.1)
SODIUM BLD-SCNC: 125 MMOL/L (ref 136–145)
SPECIFIC GRAVITY UA: 1.02
TOTAL PROTEIN: 5.5 G/DL (ref 6.6–8.7)
TROPONIN: 0.04 NG/ML (ref 0–0.03)
URINE REFLEX TO CULTURE: NORMAL
UROBILINOGEN, URINE: 1 E.U./DL
WBC # BLD: 8.7 K/UL (ref 4.8–10.8)

## 2019-01-28 PROCEDURE — 80307 DRUG TEST PRSMV CHEM ANLYZR: CPT

## 2019-01-28 PROCEDURE — 1210000000 HC MED SURG R&B

## 2019-01-28 PROCEDURE — 6360000002 HC RX W HCPCS: Performed by: FAMILY MEDICINE

## 2019-01-28 PROCEDURE — 81003 URINALYSIS AUTO W/O SCOPE: CPT

## 2019-01-28 PROCEDURE — 84484 ASSAY OF TROPONIN QUANT: CPT

## 2019-01-28 PROCEDURE — 82140 ASSAY OF AMMONIA: CPT

## 2019-01-28 PROCEDURE — 2580000003 HC RX 258: Performed by: INTERNAL MEDICINE

## 2019-01-28 PROCEDURE — 76705 ECHO EXAM OF ABDOMEN: CPT

## 2019-01-28 PROCEDURE — 6360000002 HC RX W HCPCS: Performed by: INTERNAL MEDICINE

## 2019-01-28 PROCEDURE — 96365 THER/PROPH/DIAG IV INF INIT: CPT

## 2019-01-28 PROCEDURE — 99223 1ST HOSP IP/OBS HIGH 75: CPT | Performed by: INTERNAL MEDICINE

## 2019-01-28 PROCEDURE — 85025 COMPLETE CBC W/AUTO DIFF WBC: CPT

## 2019-01-28 PROCEDURE — 99252 IP/OBS CONSLTJ NEW/EST SF 35: CPT | Performed by: PHYSICIAN ASSISTANT

## 2019-01-28 PROCEDURE — 83605 ASSAY OF LACTIC ACID: CPT

## 2019-01-28 PROCEDURE — 36415 COLL VENOUS BLD VENIPUNCTURE: CPT

## 2019-01-28 PROCEDURE — 87040 BLOOD CULTURE FOR BACTERIA: CPT

## 2019-01-28 PROCEDURE — 6370000000 HC RX 637 (ALT 250 FOR IP): Performed by: INTERNAL MEDICINE

## 2019-01-28 PROCEDURE — 80053 COMPREHEN METABOLIC PANEL: CPT

## 2019-01-28 PROCEDURE — 2580000003 HC RX 258: Performed by: FAMILY MEDICINE

## 2019-01-28 PROCEDURE — 87804 INFLUENZA ASSAY W/OPTIC: CPT

## 2019-01-28 RX ORDER — SODIUM CHLORIDE 0.9 % (FLUSH) 0.9 %
10 SYRINGE (ML) INJECTION PRN
Status: DISCONTINUED | OUTPATIENT
Start: 2019-01-28 | End: 2019-02-04 | Stop reason: HOSPADM

## 2019-01-28 RX ORDER — SODIUM CHLORIDE 0.9 % (FLUSH) 0.9 %
10 SYRINGE (ML) INJECTION EVERY 12 HOURS SCHEDULED
Status: DISCONTINUED | OUTPATIENT
Start: 2019-01-28 | End: 2019-02-04 | Stop reason: HOSPADM

## 2019-01-28 RX ORDER — FENTANYL CITRATE 50 UG/ML
25 INJECTION, SOLUTION INTRAMUSCULAR; INTRAVENOUS EVERY 4 HOURS PRN
Status: COMPLETED | OUTPATIENT
Start: 2019-01-28 | End: 2019-01-28

## 2019-01-28 RX ORDER — LISINOPRIL 10 MG/1
10 TABLET ORAL DAILY
Status: DISCONTINUED | OUTPATIENT
Start: 2019-01-28 | End: 2019-02-01

## 2019-01-28 RX ORDER — 0.9 % SODIUM CHLORIDE 0.9 %
500 INTRAVENOUS SOLUTION INTRAVENOUS ONCE
Status: COMPLETED | OUTPATIENT
Start: 2019-01-28 | End: 2019-01-28

## 2019-01-28 RX ORDER — ASPIRIN 81 MG/1
81 TABLET ORAL DAILY
Status: DISCONTINUED | OUTPATIENT
Start: 2019-01-28 | End: 2019-02-04 | Stop reason: HOSPADM

## 2019-01-28 RX ORDER — 0.9 % SODIUM CHLORIDE 0.9 %
1000 INTRAVENOUS SOLUTION INTRAVENOUS ONCE
Status: COMPLETED | OUTPATIENT
Start: 2019-01-28 | End: 2019-01-28

## 2019-01-28 RX ORDER — SODIUM CHLORIDE 9 MG/ML
INJECTION, SOLUTION INTRAVENOUS CONTINUOUS
Status: ACTIVE | OUTPATIENT
Start: 2019-01-28 | End: 2019-01-28

## 2019-01-28 RX ORDER — SPIRONOLACTONE 25 MG/1
25 TABLET ORAL DAILY
Status: DISCONTINUED | OUTPATIENT
Start: 2019-01-28 | End: 2019-01-31

## 2019-01-28 RX ORDER — GUAIFENESIN/DEXTROMETHORPHAN 100-10MG/5
5 SYRUP ORAL EVERY 4 HOURS PRN
Status: DISCONTINUED | OUTPATIENT
Start: 2019-01-28 | End: 2019-01-31

## 2019-01-28 RX ORDER — OSELTAMIVIR PHOSPHATE 75 MG/1
75 CAPSULE ORAL 2 TIMES DAILY
Status: COMPLETED | OUTPATIENT
Start: 2019-01-28 | End: 2019-02-01

## 2019-01-28 RX ORDER — METOPROLOL SUCCINATE 50 MG/1
25 TABLET, EXTENDED RELEASE ORAL 2 TIMES DAILY
Status: DISCONTINUED | OUTPATIENT
Start: 2019-01-28 | End: 2019-02-04 | Stop reason: HOSPADM

## 2019-01-28 RX ORDER — ONDANSETRON 2 MG/ML
4 INJECTION INTRAMUSCULAR; INTRAVENOUS EVERY 6 HOURS PRN
Status: DISCONTINUED | OUTPATIENT
Start: 2019-01-28 | End: 2019-01-31

## 2019-01-28 RX ADMIN — SODIUM CHLORIDE 500 ML: 9 INJECTION, SOLUTION INTRAVENOUS at 01:18

## 2019-01-28 RX ADMIN — FENTANYL CITRATE 25 MCG: 50 INJECTION, SOLUTION INTRAMUSCULAR; INTRAVENOUS at 13:44

## 2019-01-28 RX ADMIN — SODIUM CHLORIDE: 9 INJECTION, SOLUTION INTRAVENOUS at 12:00

## 2019-01-28 RX ADMIN — ASPIRIN 81 MG: 81 TABLET, COATED ORAL at 14:25

## 2019-01-28 RX ADMIN — FENTANYL CITRATE 25 MCG: 50 INJECTION, SOLUTION INTRAMUSCULAR; INTRAVENOUS at 06:40

## 2019-01-28 RX ADMIN — METOPROLOL SUCCINATE 25 MG: 50 TABLET, EXTENDED RELEASE ORAL at 20:27

## 2019-01-28 RX ADMIN — METOPROLOL SUCCINATE 25 MG: 50 TABLET, EXTENDED RELEASE ORAL at 14:25

## 2019-01-28 RX ADMIN — Medication 10 ML: at 14:29

## 2019-01-28 RX ADMIN — LISINOPRIL 10 MG: 10 TABLET ORAL at 14:25

## 2019-01-28 RX ADMIN — ENOXAPARIN SODIUM 40 MG: 40 INJECTION SUBCUTANEOUS at 19:34

## 2019-01-28 RX ADMIN — OSELTAMIVIR PHOSPHATE 75 MG: 75 CAPSULE ORAL at 20:27

## 2019-01-28 RX ADMIN — SODIUM CHLORIDE 1000 ML: 9 INJECTION, SOLUTION INTRAVENOUS at 08:06

## 2019-01-28 RX ADMIN — SPIRONOLACTONE 25 MG: 25 TABLET ORAL at 14:25

## 2019-01-28 RX ADMIN — OSELTAMIVIR PHOSPHATE 75 MG: 75 CAPSULE ORAL at 08:06

## 2019-01-28 RX ADMIN — CEFTRIAXONE 1 G: 1 INJECTION, POWDER, FOR SOLUTION INTRAMUSCULAR; INTRAVENOUS at 02:15

## 2019-01-28 ASSESSMENT — PAIN SCALES - GENERAL
PAINLEVEL_OUTOF10: 2
PAINLEVEL_OUTOF10: 0
PAINLEVEL_OUTOF10: 10
PAINLEVEL_OUTOF10: 3
PAINLEVEL_OUTOF10: 8

## 2019-01-28 ASSESSMENT — ENCOUNTER SYMPTOMS
BLOOD IN STOOL: 0
BLURRED VISION: 0
SPUTUM PRODUCTION: 0
ABDOMINAL DISTENTION: 1
NAUSEA: 1
BACK PAIN: 1
PHOTOPHOBIA: 0
APNEA: 1
ORTHOPNEA: 1
COUGH: 1
HEMOPTYSIS: 0
DIARRHEA: 0
RHINORRHEA: 1
ALLERGIC/IMMUNOLOGIC NEGATIVE: 1
DOUBLE VISION: 0
EYES NEGATIVE: 1
SHORTNESS OF BREATH: 0
SINUS PRESSURE: 1
SHORTNESS OF BREATH: 1
CONSTIPATION: 1
CONSTIPATION: 0
VOMITING: 0
COUGH: 0
COLOR CHANGE: 0
BACK PAIN: 0
SORE THROAT: 0
WHEEZING: 0
ABDOMINAL PAIN: 1
WHEEZING: 1

## 2019-01-28 ASSESSMENT — PAIN DESCRIPTION - LOCATION
LOCATION: ABDOMEN
LOCATION: ABDOMEN

## 2019-01-28 ASSESSMENT — PAIN DESCRIPTION - PAIN TYPE
TYPE: ACUTE PAIN
TYPE: ACUTE PAIN

## 2019-01-29 LAB
ALBUMIN SERPL-MCNC: 3.2 G/DL (ref 3.5–5.2)
ALP BLD-CCNC: 126 U/L (ref 40–130)
ALT SERPL-CCNC: 2769 U/L (ref 5–41)
ANION GAP SERPL CALCULATED.3IONS-SCNC: 13 MMOL/L (ref 7–19)
AST SERPL-CCNC: 2855 U/L (ref 5–40)
BILIRUB SERPL-MCNC: 1.2 MG/DL (ref 0.2–1.2)
BUN BLDV-MCNC: 27 MG/DL (ref 6–20)
CALCIUM SERPL-MCNC: 7.7 MG/DL (ref 8.6–10)
CHLORIDE BLD-SCNC: 91 MMOL/L (ref 98–111)
CO2: 20 MMOL/L (ref 22–29)
CREAT SERPL-MCNC: 1.2 MG/DL (ref 0.5–1.2)
EKG P AXIS: 67 DEGREES
EKG P AXIS: 68 DEGREES
EKG P-R INTERVAL: 156 MS
EKG P-R INTERVAL: 160 MS
EKG Q-T INTERVAL: 334 MS
EKG Q-T INTERVAL: 350 MS
EKG QRS DURATION: 102 MS
EKG QRS DURATION: 108 MS
EKG QTC CALCULATION (BAZETT): 391 MS
EKG QTC CALCULATION (BAZETT): 406 MS
EKG T AXIS: 68 DEGREES
EKG T AXIS: 81 DEGREES
GFR NON-AFRICAN AMERICAN: >60
GLUCOSE BLD-MCNC: 116 MG/DL (ref 74–109)
LACTIC ACID: 2.3 MMOL/L (ref 0.5–1.9)
OSMOLALITY: 273 MOSM/KG (ref 275–300)
POTASSIUM SERPL-SCNC: 4.4 MMOL/L (ref 3.5–5)
SODIUM BLD-SCNC: 124 MMOL/L (ref 136–145)
TOTAL PROTEIN: 5.9 G/DL (ref 6.6–8.7)

## 2019-01-29 PROCEDURE — 6360000002 HC RX W HCPCS: Performed by: INTERNAL MEDICINE

## 2019-01-29 PROCEDURE — 1210000000 HC MED SURG R&B

## 2019-01-29 PROCEDURE — 83930 ASSAY OF BLOOD OSMOLALITY: CPT

## 2019-01-29 PROCEDURE — 2580000003 HC RX 258: Performed by: INTERNAL MEDICINE

## 2019-01-29 PROCEDURE — 83605 ASSAY OF LACTIC ACID: CPT

## 2019-01-29 PROCEDURE — 6370000000 HC RX 637 (ALT 250 FOR IP): Performed by: INTERNAL MEDICINE

## 2019-01-29 PROCEDURE — 99231 SBSQ HOSP IP/OBS SF/LOW 25: CPT | Performed by: SURGERY

## 2019-01-29 PROCEDURE — 99232 SBSQ HOSP IP/OBS MODERATE 35: CPT | Performed by: INTERNAL MEDICINE

## 2019-01-29 PROCEDURE — 80053 COMPREHEN METABOLIC PANEL: CPT

## 2019-01-29 PROCEDURE — 36415 COLL VENOUS BLD VENIPUNCTURE: CPT

## 2019-01-29 RX ORDER — FUROSEMIDE 10 MG/ML
40 INJECTION INTRAMUSCULAR; INTRAVENOUS 2 TIMES DAILY
Status: DISCONTINUED | OUTPATIENT
Start: 2019-01-29 | End: 2019-02-01

## 2019-01-29 RX ADMIN — CEFTRIAXONE 1 G: 1 INJECTION, POWDER, FOR SOLUTION INTRAMUSCULAR; INTRAVENOUS at 01:46

## 2019-01-29 RX ADMIN — FUROSEMIDE 40 MG: 10 INJECTION, SOLUTION INTRAMUSCULAR; INTRAVENOUS at 17:25

## 2019-01-29 RX ADMIN — OSELTAMIVIR PHOSPHATE 75 MG: 75 CAPSULE ORAL at 09:47

## 2019-01-29 RX ADMIN — Medication 10 ML: at 11:41

## 2019-01-29 RX ADMIN — ASPIRIN 81 MG: 81 TABLET, COATED ORAL at 09:49

## 2019-01-29 RX ADMIN — OSELTAMIVIR PHOSPHATE 75 MG: 75 CAPSULE ORAL at 20:35

## 2019-01-29 RX ADMIN — GUAIFENESIN AND DEXTROMETHORPHAN 5 ML: 100; 10 SYRUP ORAL at 20:35

## 2019-01-29 RX ADMIN — METOPROLOL SUCCINATE 25 MG: 50 TABLET, EXTENDED RELEASE ORAL at 13:00

## 2019-01-29 RX ADMIN — METOPROLOL SUCCINATE 25 MG: 50 TABLET, EXTENDED RELEASE ORAL at 20:35

## 2019-01-29 RX ADMIN — SPIRONOLACTONE 25 MG: 25 TABLET ORAL at 09:48

## 2019-01-29 RX ADMIN — LISINOPRIL 10 MG: 10 TABLET ORAL at 13:00

## 2019-01-29 RX ADMIN — ENOXAPARIN SODIUM 40 MG: 40 INJECTION SUBCUTANEOUS at 18:00

## 2019-01-29 ASSESSMENT — PAIN DESCRIPTION - PAIN TYPE
TYPE: ACUTE PAIN
TYPE: ACUTE PAIN

## 2019-01-29 ASSESSMENT — PAIN DESCRIPTION - ORIENTATION: ORIENTATION: RIGHT

## 2019-01-29 ASSESSMENT — PAIN DESCRIPTION - DESCRIPTORS: DESCRIPTORS: RADIATING;SHOOTING

## 2019-01-29 ASSESSMENT — PAIN SCALES - GENERAL
PAINLEVEL_OUTOF10: 5
PAINLEVEL_OUTOF10: 7

## 2019-01-29 ASSESSMENT — PAIN DESCRIPTION - LOCATION
LOCATION: ABDOMEN
LOCATION: ABDOMEN

## 2019-01-29 ASSESSMENT — PAIN DESCRIPTION - FREQUENCY: FREQUENCY: CONTINUOUS

## 2019-01-30 LAB
ALBUMIN SERPL-MCNC: 3.5 G/DL (ref 3.5–5.2)
ALP BLD-CCNC: 176 U/L (ref 40–130)
ALT SERPL-CCNC: 2120 U/L (ref 5–41)
ANION GAP SERPL CALCULATED.3IONS-SCNC: 14 MMOL/L (ref 7–19)
AST SERPL-CCNC: 1580 U/L (ref 5–40)
BASOPHILS ABSOLUTE: 0.1 K/UL (ref 0–0.2)
BASOPHILS RELATIVE PERCENT: 0.5 % (ref 0–1)
BILIRUB SERPL-MCNC: 1 MG/DL (ref 0.2–1.2)
BUN BLDV-MCNC: 24 MG/DL (ref 6–20)
CALCIUM SERPL-MCNC: 8.2 MG/DL (ref 8.6–10)
CHLORIDE BLD-SCNC: 89 MMOL/L (ref 98–111)
CO2: 23 MMOL/L (ref 22–29)
CREAT SERPL-MCNC: 1.1 MG/DL (ref 0.5–1.2)
EOSINOPHILS ABSOLUTE: 0.1 K/UL (ref 0–0.6)
EOSINOPHILS RELATIVE PERCENT: 0.5 % (ref 0–5)
GFR NON-AFRICAN AMERICAN: >60
GLUCOSE BLD-MCNC: 106 MG/DL (ref 74–109)
HCT VFR BLD CALC: 40.6 % (ref 42–52)
HEMOGLOBIN: 13.5 G/DL (ref 14–18)
LYMPHOCYTES ABSOLUTE: 3.7 K/UL (ref 1.1–4.5)
LYMPHOCYTES RELATIVE PERCENT: 35.1 % (ref 20–40)
MAGNESIUM: 2 MG/DL (ref 1.6–2.6)
MCH RBC QN AUTO: 30.5 PG (ref 27–31)
MCHC RBC AUTO-ENTMCNC: 33.3 G/DL (ref 33–37)
MCV RBC AUTO: 91.9 FL (ref 80–94)
MONOCYTES ABSOLUTE: 1.8 K/UL (ref 0–0.9)
MONOCYTES RELATIVE PERCENT: 17.4 % (ref 0–10)
NEUTROPHILS ABSOLUTE: 4.8 K/UL (ref 1.5–7.5)
NEUTROPHILS RELATIVE PERCENT: 45.8 % (ref 50–65)
PDW BLD-RTO: 15.9 % (ref 11.5–14.5)
PHOSPHORUS: 2.9 MG/DL (ref 2.5–4.5)
PLATELET # BLD: 213 K/UL (ref 130–400)
PMV BLD AUTO: 10.6 FL (ref 9.4–12.4)
POTASSIUM SERPL-SCNC: 4.8 MMOL/L (ref 3.5–5)
PRO-BNP: 3195 PG/ML (ref 0–450)
RBC # BLD: 4.42 M/UL (ref 4.7–6.1)
SODIUM BLD-SCNC: 126 MMOL/L (ref 136–145)
TOTAL PROTEIN: 6.5 G/DL (ref 6.6–8.7)
WBC # BLD: 10.5 K/UL (ref 4.8–10.8)

## 2019-01-30 PROCEDURE — 80053 COMPREHEN METABOLIC PANEL: CPT

## 2019-01-30 PROCEDURE — 6360000002 HC RX W HCPCS: Performed by: INTERNAL MEDICINE

## 2019-01-30 PROCEDURE — 6370000000 HC RX 637 (ALT 250 FOR IP): Performed by: INTERNAL MEDICINE

## 2019-01-30 PROCEDURE — 2580000003 HC RX 258: Performed by: INTERNAL MEDICINE

## 2019-01-30 PROCEDURE — 1210000000 HC MED SURG R&B

## 2019-01-30 PROCEDURE — 85025 COMPLETE CBC W/AUTO DIFF WBC: CPT

## 2019-01-30 PROCEDURE — 83880 ASSAY OF NATRIURETIC PEPTIDE: CPT

## 2019-01-30 PROCEDURE — 36415 COLL VENOUS BLD VENIPUNCTURE: CPT

## 2019-01-30 PROCEDURE — 99232 SBSQ HOSP IP/OBS MODERATE 35: CPT | Performed by: INTERNAL MEDICINE

## 2019-01-30 PROCEDURE — 83735 ASSAY OF MAGNESIUM: CPT

## 2019-01-30 PROCEDURE — 84100 ASSAY OF PHOSPHORUS: CPT

## 2019-01-30 RX ADMIN — OSELTAMIVIR PHOSPHATE 75 MG: 75 CAPSULE ORAL at 20:33

## 2019-01-30 RX ADMIN — FUROSEMIDE 40 MG: 10 INJECTION, SOLUTION INTRAMUSCULAR; INTRAVENOUS at 17:53

## 2019-01-30 RX ADMIN — ENOXAPARIN SODIUM 40 MG: 40 INJECTION SUBCUTANEOUS at 17:52

## 2019-01-30 RX ADMIN — FUROSEMIDE 40 MG: 10 INJECTION, SOLUTION INTRAMUSCULAR; INTRAVENOUS at 08:54

## 2019-01-30 RX ADMIN — LISINOPRIL 10 MG: 10 TABLET ORAL at 08:55

## 2019-01-30 RX ADMIN — OSELTAMIVIR PHOSPHATE 75 MG: 75 CAPSULE ORAL at 08:54

## 2019-01-30 RX ADMIN — METOPROLOL SUCCINATE 25 MG: 50 TABLET, EXTENDED RELEASE ORAL at 08:54

## 2019-01-30 RX ADMIN — ASPIRIN 81 MG: 81 TABLET, COATED ORAL at 08:54

## 2019-01-30 RX ADMIN — METOPROLOL SUCCINATE 25 MG: 50 TABLET, EXTENDED RELEASE ORAL at 20:34

## 2019-01-30 RX ADMIN — Medication 10 ML: at 08:55

## 2019-01-30 RX ADMIN — CEFTRIAXONE 1 G: 1 INJECTION, POWDER, FOR SOLUTION INTRAMUSCULAR; INTRAVENOUS at 03:04

## 2019-01-30 RX ADMIN — GUAIFENESIN AND DEXTROMETHORPHAN 5 ML: 100; 10 SYRUP ORAL at 03:10

## 2019-01-30 RX ADMIN — SPIRONOLACTONE 25 MG: 25 TABLET ORAL at 08:55

## 2019-01-31 ENCOUNTER — APPOINTMENT (OUTPATIENT)
Dept: ULTRASOUND IMAGING | Age: 34
DRG: 193 | End: 2019-01-31
Payer: COMMERCIAL

## 2019-01-31 ENCOUNTER — APPOINTMENT (OUTPATIENT)
Dept: GENERAL RADIOLOGY | Age: 34
DRG: 193 | End: 2019-01-31
Payer: COMMERCIAL

## 2019-01-31 LAB
ALBUMIN SERPL-MCNC: 3.3 G/DL (ref 3.5–5.2)
ALP BLD-CCNC: 205 U/L (ref 40–130)
ALT SERPL-CCNC: 1944 U/L (ref 5–41)
AMMONIA: 68 UMOL/L (ref 16–60)
ANION GAP SERPL CALCULATED.3IONS-SCNC: 14 MMOL/L (ref 7–19)
AST SERPL-CCNC: 1187 U/L (ref 5–40)
BASOPHILS ABSOLUTE: 0.1 K/UL (ref 0–0.2)
BASOPHILS RELATIVE PERCENT: 0.5 % (ref 0–1)
BILIRUB SERPL-MCNC: 1 MG/DL (ref 0.2–1.2)
BUN BLDV-MCNC: 23 MG/DL (ref 6–20)
CALCIUM SERPL-MCNC: 8.1 MG/DL (ref 8.6–10)
CHLORIDE BLD-SCNC: 91 MMOL/L (ref 98–111)
CO2: 21 MMOL/L (ref 22–29)
CREAT SERPL-MCNC: 1.1 MG/DL (ref 0.5–1.2)
EOSINOPHILS ABSOLUTE: 0.1 K/UL (ref 0–0.6)
EOSINOPHILS RELATIVE PERCENT: 0.9 % (ref 0–5)
FERRITIN: 170.4 NG/ML (ref 30–400)
GFR NON-AFRICAN AMERICAN: >60
GLUCOSE BLD-MCNC: 107 MG/DL (ref 70–99)
GLUCOSE BLD-MCNC: 120 MG/DL (ref 74–109)
HCT VFR BLD CALC: 42.1 % (ref 42–52)
HEMOGLOBIN: 13.6 G/DL (ref 14–18)
LYMPHOCYTES ABSOLUTE: 3.2 K/UL (ref 1.1–4.5)
LYMPHOCYTES RELATIVE PERCENT: 35 % (ref 20–40)
MCH RBC QN AUTO: 30.4 PG (ref 27–31)
MCHC RBC AUTO-ENTMCNC: 32.3 G/DL (ref 33–37)
MCV RBC AUTO: 94 FL (ref 80–94)
MONOCYTES ABSOLUTE: 1.3 K/UL (ref 0–0.9)
MONOCYTES RELATIVE PERCENT: 14.3 % (ref 0–10)
NEUTROPHILS ABSOLUTE: 4.4 K/UL (ref 1.5–7.5)
NEUTROPHILS RELATIVE PERCENT: 48.3 % (ref 50–65)
PDW BLD-RTO: 16.2 % (ref 11.5–14.5)
PERFORMED ON: ABNORMAL
PLATELET # BLD: 227 K/UL (ref 130–400)
PMV BLD AUTO: 10.7 FL (ref 9.4–12.4)
POTASSIUM SERPL-SCNC: 4.3 MMOL/L (ref 3.5–5)
RBC # BLD: 4.48 M/UL (ref 4.7–6.1)
SODIUM BLD-SCNC: 126 MMOL/L (ref 136–145)
TOTAL PROTEIN: 6.3 G/DL (ref 6.6–8.7)
WBC # BLD: 9.2 K/UL (ref 4.8–10.8)

## 2019-01-31 PROCEDURE — 85025 COMPLETE CBC W/AUTO DIFF WBC: CPT

## 2019-01-31 PROCEDURE — 82948 REAGENT STRIP/BLOOD GLUCOSE: CPT

## 2019-01-31 PROCEDURE — 82390 ASSAY OF CERULOPLASMIN: CPT

## 2019-01-31 PROCEDURE — 36415 COLL VENOUS BLD VENIPUNCTURE: CPT

## 2019-01-31 PROCEDURE — 6360000002 HC RX W HCPCS: Performed by: INTERNAL MEDICINE

## 2019-01-31 PROCEDURE — 82103 ALPHA-1-ANTITRYPSIN TOTAL: CPT

## 2019-01-31 PROCEDURE — 86664 EPSTEIN-BARR NUCLEAR ANTIGEN: CPT

## 2019-01-31 PROCEDURE — 2580000003 HC RX 258: Performed by: INTERNAL MEDICINE

## 2019-01-31 PROCEDURE — 99232 SBSQ HOSP IP/OBS MODERATE 35: CPT | Performed by: INTERNAL MEDICINE

## 2019-01-31 PROCEDURE — 86038 ANTINUCLEAR ANTIBODIES: CPT

## 2019-01-31 PROCEDURE — 82728 ASSAY OF FERRITIN: CPT

## 2019-01-31 PROCEDURE — 6370000000 HC RX 637 (ALT 250 FOR IP): Performed by: INTERNAL MEDICINE

## 2019-01-31 PROCEDURE — 82104 ALPHA-1-ANTITRYPSIN PHENO: CPT

## 2019-01-31 PROCEDURE — 1210000000 HC MED SURG R&B

## 2019-01-31 PROCEDURE — 82140 ASSAY OF AMMONIA: CPT

## 2019-01-31 PROCEDURE — 86665 EPSTEIN-BARR CAPSID VCA: CPT

## 2019-01-31 PROCEDURE — 86663 EPSTEIN-BARR ANTIBODY: CPT

## 2019-01-31 PROCEDURE — 80053 COMPREHEN METABOLIC PANEL: CPT

## 2019-01-31 PROCEDURE — 71045 X-RAY EXAM CHEST 1 VIEW: CPT

## 2019-01-31 PROCEDURE — 93976 VASCULAR STUDY: CPT

## 2019-01-31 PROCEDURE — 83516 IMMUNOASSAY NONANTIBODY: CPT

## 2019-01-31 RX ORDER — ONDANSETRON 2 MG/ML
4 INJECTION INTRAMUSCULAR; INTRAVENOUS EVERY 8 HOURS PRN
Status: DISCONTINUED | OUTPATIENT
Start: 2019-01-31 | End: 2019-02-04 | Stop reason: HOSPADM

## 2019-01-31 RX ORDER — NICOTINE 21 MG/24HR
1 PATCH, TRANSDERMAL 24 HOURS TRANSDERMAL DAILY
Status: DISCONTINUED | OUTPATIENT
Start: 2019-01-31 | End: 2019-02-04 | Stop reason: HOSPADM

## 2019-01-31 RX ADMIN — OSELTAMIVIR PHOSPHATE 75 MG: 75 CAPSULE ORAL at 20:32

## 2019-01-31 RX ADMIN — CALCIUM GLUCONATE 1 G: 98 INJECTION, SOLUTION INTRAVENOUS at 17:30

## 2019-01-31 RX ADMIN — LISINOPRIL 10 MG: 10 TABLET ORAL at 10:45

## 2019-01-31 RX ADMIN — CEFTRIAXONE 1 G: 1 INJECTION, POWDER, FOR SOLUTION INTRAMUSCULAR; INTRAVENOUS at 02:21

## 2019-01-31 RX ADMIN — Medication 10 ML: at 10:45

## 2019-01-31 RX ADMIN — OSELTAMIVIR PHOSPHATE 75 MG: 75 CAPSULE ORAL at 10:45

## 2019-01-31 RX ADMIN — ASPIRIN 81 MG: 81 TABLET, COATED ORAL at 10:44

## 2019-01-31 RX ADMIN — FUROSEMIDE 40 MG: 10 INJECTION, SOLUTION INTRAMUSCULAR; INTRAVENOUS at 17:30

## 2019-01-31 RX ADMIN — FUROSEMIDE 40 MG: 10 INJECTION, SOLUTION INTRAMUSCULAR; INTRAVENOUS at 10:45

## 2019-01-31 RX ADMIN — SPIRONOLACTONE 25 MG: 25 TABLET ORAL at 10:44

## 2019-01-31 RX ADMIN — METOPROLOL SUCCINATE 25 MG: 50 TABLET, EXTENDED RELEASE ORAL at 20:32

## 2019-01-31 RX ADMIN — METOPROLOL SUCCINATE 25 MG: 50 TABLET, EXTENDED RELEASE ORAL at 10:44

## 2019-01-31 ASSESSMENT — PAIN SCALES - GENERAL
PAINLEVEL_OUTOF10: 5
PAINLEVEL_OUTOF10: 3

## 2019-01-31 ASSESSMENT — PAIN DESCRIPTION - ORIENTATION: ORIENTATION: RIGHT

## 2019-01-31 ASSESSMENT — PAIN DESCRIPTION - PAIN TYPE: TYPE: ACUTE PAIN

## 2019-01-31 ASSESSMENT — PAIN DESCRIPTION - FREQUENCY: FREQUENCY: INTERMITTENT

## 2019-01-31 ASSESSMENT — PAIN DESCRIPTION - LOCATION: LOCATION: ABDOMEN

## 2019-01-31 ASSESSMENT — PAIN DESCRIPTION - DESCRIPTORS: DESCRIPTORS: CRAMPING;RADIATING

## 2019-02-01 LAB
ALBUMIN SERPL-MCNC: 3.3 G/DL (ref 3.5–5.2)
ALP BLD-CCNC: 213 U/L (ref 40–130)
ALT SERPL-CCNC: 1477 U/L (ref 5–41)
ANION GAP SERPL CALCULATED.3IONS-SCNC: 14 MMOL/L (ref 7–19)
AST SERPL-CCNC: 725 U/L (ref 5–40)
BASOPHILS ABSOLUTE: 0 K/UL (ref 0–0.2)
BASOPHILS RELATIVE PERCENT: 0.4 % (ref 0–1)
BILIRUB SERPL-MCNC: 1.1 MG/DL (ref 0.2–1.2)
BUN BLDV-MCNC: 19 MG/DL (ref 6–20)
CALCIUM SERPL-MCNC: 8.4 MG/DL (ref 8.6–10)
CHLORIDE BLD-SCNC: 90 MMOL/L (ref 98–111)
CHLORIDE URINE RANDOM: 51 MMOL/L
CO2: 25 MMOL/L (ref 22–29)
CREAT SERPL-MCNC: 0.9 MG/DL (ref 0.5–1.2)
EOSINOPHILS ABSOLUTE: 0.1 K/UL (ref 0–0.6)
EOSINOPHILS RELATIVE PERCENT: 1.1 % (ref 0–5)
GFR NON-AFRICAN AMERICAN: >60
GLUCOSE BLD-MCNC: 106 MG/DL (ref 74–109)
HCT VFR BLD CALC: 42.2 % (ref 42–52)
HEMOGLOBIN: 13.7 G/DL (ref 14–18)
LYMPHOCYTES ABSOLUTE: 2.6 K/UL (ref 1.1–4.5)
LYMPHOCYTES RELATIVE PERCENT: 27.1 % (ref 20–40)
MAGNESIUM: 1.7 MG/DL (ref 1.6–2.6)
MCH RBC QN AUTO: 30.5 PG (ref 27–31)
MCHC RBC AUTO-ENTMCNC: 32.5 G/DL (ref 33–37)
MCV RBC AUTO: 94 FL (ref 80–94)
MONOCYTES ABSOLUTE: 1 K/UL (ref 0–0.9)
MONOCYTES RELATIVE PERCENT: 10.4 % (ref 0–10)
NEUTROPHILS ABSOLUTE: 5.7 K/UL (ref 1.5–7.5)
NEUTROPHILS RELATIVE PERCENT: 60.2 % (ref 50–65)
PDW BLD-RTO: 16.5 % (ref 11.5–14.5)
PLATELET # BLD: 211 K/UL (ref 130–400)
PMV BLD AUTO: 10.6 FL (ref 9.4–12.4)
POTASSIUM SERPL-SCNC: 4.3 MMOL/L (ref 3.5–5)
POTASSIUM, UR: 5.94 MMOL/L
PRO-BNP: 3338 PG/ML (ref 0–450)
RBC # BLD: 4.49 M/UL (ref 4.7–6.1)
SODIUM BLD-SCNC: 129 MMOL/L (ref 136–145)
SODIUM URINE: 57 MMOL/L
TOTAL PROTEIN: 6.3 G/DL (ref 6.6–8.7)
WBC # BLD: 9.5 K/UL (ref 4.8–10.8)

## 2019-02-01 PROCEDURE — 84133 ASSAY OF URINE POTASSIUM: CPT

## 2019-02-01 PROCEDURE — 99232 SBSQ HOSP IP/OBS MODERATE 35: CPT | Performed by: INTERNAL MEDICINE

## 2019-02-01 PROCEDURE — 6370000000 HC RX 637 (ALT 250 FOR IP): Performed by: INTERNAL MEDICINE

## 2019-02-01 PROCEDURE — 6360000002 HC RX W HCPCS: Performed by: INTERNAL MEDICINE

## 2019-02-01 PROCEDURE — 1210000000 HC MED SURG R&B

## 2019-02-01 PROCEDURE — 2580000003 HC RX 258: Performed by: INTERNAL MEDICINE

## 2019-02-01 PROCEDURE — 85025 COMPLETE CBC W/AUTO DIFF WBC: CPT

## 2019-02-01 PROCEDURE — 83735 ASSAY OF MAGNESIUM: CPT

## 2019-02-01 PROCEDURE — 99253 IP/OBS CNSLTJ NEW/EST LOW 45: CPT | Performed by: INTERNAL MEDICINE

## 2019-02-01 PROCEDURE — 82436 ASSAY OF URINE CHLORIDE: CPT

## 2019-02-01 PROCEDURE — 84300 ASSAY OF URINE SODIUM: CPT

## 2019-02-01 PROCEDURE — 80053 COMPREHEN METABOLIC PANEL: CPT

## 2019-02-01 PROCEDURE — 2500000003 HC RX 250 WO HCPCS: Performed by: INTERNAL MEDICINE

## 2019-02-01 PROCEDURE — 83880 ASSAY OF NATRIURETIC PEPTIDE: CPT

## 2019-02-01 RX ORDER — LACTULOSE 10 G/15ML
20 SOLUTION ORAL 3 TIMES DAILY
Status: DISCONTINUED | OUTPATIENT
Start: 2019-02-01 | End: 2019-02-03

## 2019-02-01 RX ORDER — FUROSEMIDE 10 MG/ML
40 INJECTION INTRAMUSCULAR; INTRAVENOUS DAILY
Status: DISCONTINUED | OUTPATIENT
Start: 2019-02-02 | End: 2019-02-02

## 2019-02-01 RX ADMIN — METOPROLOL SUCCINATE 25 MG: 50 TABLET, EXTENDED RELEASE ORAL at 22:04

## 2019-02-01 RX ADMIN — ASPIRIN 81 MG: 81 TABLET, COATED ORAL at 09:07

## 2019-02-01 RX ADMIN — LACTULOSE 20 G: 20 SOLUTION ORAL at 10:34

## 2019-02-01 RX ADMIN — LACTULOSE 20 G: 20 SOLUTION ORAL at 14:41

## 2019-02-01 RX ADMIN — OSELTAMIVIR PHOSPHATE 75 MG: 75 CAPSULE ORAL at 09:08

## 2019-02-01 RX ADMIN — Medication 10 ML: at 09:09

## 2019-02-01 RX ADMIN — CEFTRIAXONE 1 G: 1 INJECTION, POWDER, FOR SOLUTION INTRAMUSCULAR; INTRAVENOUS at 06:14

## 2019-02-01 RX ADMIN — METRONIDAZOLE 500 MG: 500 INJECTION, SOLUTION INTRAVENOUS at 10:34

## 2019-02-01 RX ADMIN — METRONIDAZOLE 500 MG: 500 INJECTION, SOLUTION INTRAVENOUS at 17:38

## 2019-02-01 RX ADMIN — METOPROLOL SUCCINATE 25 MG: 50 TABLET, EXTENDED RELEASE ORAL at 09:07

## 2019-02-01 RX ADMIN — FUROSEMIDE 40 MG: 10 INJECTION, SOLUTION INTRAMUSCULAR; INTRAVENOUS at 09:07

## 2019-02-01 RX ADMIN — OSELTAMIVIR PHOSPHATE 75 MG: 75 CAPSULE ORAL at 22:03

## 2019-02-01 ASSESSMENT — PAIN SCALES - GENERAL: PAINLEVEL_OUTOF10: 0

## 2019-02-02 LAB
ALBUMIN SERPL-MCNC: 3.4 G/DL (ref 3.5–5.2)
ALP BLD-CCNC: 227 U/L (ref 40–130)
ALT SERPL-CCNC: 1299 U/L (ref 5–41)
AMMONIA: 61 UMOL/L (ref 16–60)
ANION GAP SERPL CALCULATED.3IONS-SCNC: 12 MMOL/L (ref 7–19)
AST SERPL-CCNC: 499 U/L (ref 5–40)
BASOPHILS ABSOLUTE: 0 K/UL (ref 0–0.2)
BASOPHILS RELATIVE PERCENT: 0.2 % (ref 0–1)
BILIRUB SERPL-MCNC: 0.7 MG/DL (ref 0.2–1.2)
BLOOD CULTURE, ROUTINE: NORMAL
BUN BLDV-MCNC: 16 MG/DL (ref 6–20)
CALCIUM SERPL-MCNC: 8.1 MG/DL (ref 8.6–10)
CHLORIDE BLD-SCNC: 92 MMOL/L (ref 98–111)
CO2: 27 MMOL/L (ref 22–29)
CREAT SERPL-MCNC: 0.8 MG/DL (ref 0.5–1.2)
CULTURE, BLOOD 2: NORMAL
EOSINOPHILS ABSOLUTE: 0.1 K/UL (ref 0–0.6)
EOSINOPHILS RELATIVE PERCENT: 1.3 % (ref 0–5)
GFR NON-AFRICAN AMERICAN: >60
GLUCOSE BLD-MCNC: 114 MG/DL (ref 74–109)
HCT VFR BLD CALC: 42 % (ref 42–52)
HEMOGLOBIN: 13.6 G/DL (ref 14–18)
LYMPHOCYTES ABSOLUTE: 2.1 K/UL (ref 1.1–4.5)
LYMPHOCYTES RELATIVE PERCENT: 23.8 % (ref 20–40)
MAGNESIUM: 2 MG/DL (ref 1.6–2.6)
MCH RBC QN AUTO: 30.4 PG (ref 27–31)
MCHC RBC AUTO-ENTMCNC: 32.4 G/DL (ref 33–37)
MCV RBC AUTO: 93.8 FL (ref 80–94)
MONOCYTES ABSOLUTE: 1.1 K/UL (ref 0–0.9)
MONOCYTES RELATIVE PERCENT: 12.7 % (ref 0–10)
NEUTROPHILS ABSOLUTE: 5.4 K/UL (ref 1.5–7.5)
NEUTROPHILS RELATIVE PERCENT: 61.5 % (ref 50–65)
PDW BLD-RTO: 16.6 % (ref 11.5–14.5)
PLATELET # BLD: 252 K/UL (ref 130–400)
PMV BLD AUTO: 10.5 FL (ref 9.4–12.4)
POTASSIUM SERPL-SCNC: 4.6 MMOL/L (ref 3.5–5)
RBC # BLD: 4.48 M/UL (ref 4.7–6.1)
SODIUM BLD-SCNC: 131 MMOL/L (ref 136–145)
TOTAL PROTEIN: 6.5 G/DL (ref 6.6–8.7)
WBC # BLD: 8.8 K/UL (ref 4.8–10.8)

## 2019-02-02 PROCEDURE — 85025 COMPLETE CBC W/AUTO DIFF WBC: CPT

## 2019-02-02 PROCEDURE — 83735 ASSAY OF MAGNESIUM: CPT

## 2019-02-02 PROCEDURE — 6370000000 HC RX 637 (ALT 250 FOR IP): Performed by: INTERNAL MEDICINE

## 2019-02-02 PROCEDURE — 80053 COMPREHEN METABOLIC PANEL: CPT

## 2019-02-02 PROCEDURE — 1210000000 HC MED SURG R&B

## 2019-02-02 PROCEDURE — 99232 SBSQ HOSP IP/OBS MODERATE 35: CPT | Performed by: INTERNAL MEDICINE

## 2019-02-02 PROCEDURE — 6360000002 HC RX W HCPCS: Performed by: INTERNAL MEDICINE

## 2019-02-02 PROCEDURE — 36415 COLL VENOUS BLD VENIPUNCTURE: CPT

## 2019-02-02 PROCEDURE — 2580000003 HC RX 258: Performed by: INTERNAL MEDICINE

## 2019-02-02 PROCEDURE — 2500000003 HC RX 250 WO HCPCS: Performed by: INTERNAL MEDICINE

## 2019-02-02 PROCEDURE — 82140 ASSAY OF AMMONIA: CPT

## 2019-02-02 RX ORDER — FUROSEMIDE 10 MG/ML
40 INJECTION INTRAMUSCULAR; INTRAVENOUS EVERY 12 HOURS
Status: DISCONTINUED | OUTPATIENT
Start: 2019-02-02 | End: 2019-02-04 | Stop reason: HOSPADM

## 2019-02-02 RX ADMIN — METRONIDAZOLE 500 MG: 500 INJECTION, SOLUTION INTRAVENOUS at 18:26

## 2019-02-02 RX ADMIN — METOPROLOL SUCCINATE 25 MG: 50 TABLET, EXTENDED RELEASE ORAL at 09:10

## 2019-02-02 RX ADMIN — METRONIDAZOLE 500 MG: 500 INJECTION, SOLUTION INTRAVENOUS at 09:27

## 2019-02-02 RX ADMIN — METRONIDAZOLE 500 MG: 500 INJECTION, SOLUTION INTRAVENOUS at 03:47

## 2019-02-02 RX ADMIN — LACTULOSE 20 G: 20 SOLUTION ORAL at 09:10

## 2019-02-02 RX ADMIN — Medication 10 ML: at 03:47

## 2019-02-02 RX ADMIN — FUROSEMIDE 40 MG: 10 INJECTION, SOLUTION INTRAMUSCULAR; INTRAVENOUS at 09:12

## 2019-02-02 RX ADMIN — METOPROLOL SUCCINATE 25 MG: 50 TABLET, EXTENDED RELEASE ORAL at 20:24

## 2019-02-02 RX ADMIN — ASPIRIN 81 MG: 81 TABLET, COATED ORAL at 09:11

## 2019-02-02 RX ADMIN — CEFTRIAXONE 2 G: 2 INJECTION, POWDER, FOR SOLUTION INTRAMUSCULAR; INTRAVENOUS at 09:27

## 2019-02-02 RX ADMIN — LACTULOSE 20 G: 20 SOLUTION ORAL at 20:24

## 2019-02-02 RX ADMIN — FUROSEMIDE 40 MG: 10 INJECTION, SOLUTION INTRAMUSCULAR; INTRAVENOUS at 20:24

## 2019-02-02 RX ADMIN — LACTULOSE 20 G: 20 SOLUTION ORAL at 18:26

## 2019-02-03 LAB
ALBUMIN SERPL-MCNC: 3.2 G/DL (ref 3.5–5.2)
ALP BLD-CCNC: 188 U/L (ref 40–130)
ALT SERPL-CCNC: 910 U/L (ref 5–41)
ANA IGG, ELISA: NORMAL
ANION GAP SERPL CALCULATED.3IONS-SCNC: 11 MMOL/L (ref 7–19)
AST SERPL-CCNC: 299 U/L (ref 5–40)
BASOPHILS ABSOLUTE: 0 K/UL (ref 0–0.2)
BASOPHILS RELATIVE PERCENT: 0.3 % (ref 0–1)
BILIRUB SERPL-MCNC: 0.8 MG/DL (ref 0.2–1.2)
BUN BLDV-MCNC: 16 MG/DL (ref 6–20)
CALCIUM SERPL-MCNC: 8.1 MG/DL (ref 8.6–10)
CHLORIDE BLD-SCNC: 96 MMOL/L (ref 98–111)
CO2: 24 MMOL/L (ref 22–29)
CREAT SERPL-MCNC: 0.8 MG/DL (ref 0.5–1.2)
EOSINOPHILS ABSOLUTE: 0.2 K/UL (ref 0–0.6)
EOSINOPHILS RELATIVE PERCENT: 2.3 % (ref 0–5)
EPSTEIN BARR VIRUS NUCLEAR AB IGG: 26.9 U/ML (ref 0–21.9)
EPSTEIN-BARR EARLY ANTIGEN ANTIBODY: <5 U/ML (ref 0–10.9)
EPSTEIN-BARR VCA IGG: 94.3 U/ML (ref 0–21.9)
EPSTEIN-BARR VCA IGM: <10 U/ML (ref 0–43.9)
GFR NON-AFRICAN AMERICAN: >60
GLUCOSE BLD-MCNC: 143 MG/DL (ref 74–109)
HCT VFR BLD CALC: 38.7 % (ref 42–52)
HEMOGLOBIN: 12.6 G/DL (ref 14–18)
LYMPHOCYTES ABSOLUTE: 1.5 K/UL (ref 1.1–4.5)
LYMPHOCYTES RELATIVE PERCENT: 22.4 % (ref 20–40)
MAGNESIUM: 1.7 MG/DL (ref 1.6–2.6)
MCH RBC QN AUTO: 30.4 PG (ref 27–31)
MCHC RBC AUTO-ENTMCNC: 32.6 G/DL (ref 33–37)
MCV RBC AUTO: 93.5 FL (ref 80–94)
MITOCHONDRIAL M2 AB, IGG: 4.8 UNITS (ref 0–20)
MONOCYTES ABSOLUTE: 0.8 K/UL (ref 0–0.9)
MONOCYTES RELATIVE PERCENT: 12.1 % (ref 0–10)
NEUTROPHILS ABSOLUTE: 4 K/UL (ref 1.5–7.5)
NEUTROPHILS RELATIVE PERCENT: 62.3 % (ref 50–65)
PDW BLD-RTO: 16.6 % (ref 11.5–14.5)
PLATELET # BLD: 236 K/UL (ref 130–400)
PMV BLD AUTO: 10.4 FL (ref 9.4–12.4)
POTASSIUM SERPL-SCNC: 4.3 MMOL/L (ref 3.5–5)
RBC # BLD: 4.14 M/UL (ref 4.7–6.1)
SODIUM BLD-SCNC: 131 MMOL/L (ref 136–145)
TOTAL PROTEIN: 6.1 G/DL (ref 6.6–8.7)
WBC # BLD: 6.5 K/UL (ref 4.8–10.8)

## 2019-02-03 PROCEDURE — 6370000000 HC RX 637 (ALT 250 FOR IP): Performed by: INTERNAL MEDICINE

## 2019-02-03 PROCEDURE — 2580000003 HC RX 258: Performed by: INTERNAL MEDICINE

## 2019-02-03 PROCEDURE — 99232 SBSQ HOSP IP/OBS MODERATE 35: CPT | Performed by: INTERNAL MEDICINE

## 2019-02-03 PROCEDURE — 6360000002 HC RX W HCPCS: Performed by: INTERNAL MEDICINE

## 2019-02-03 PROCEDURE — 1210000000 HC MED SURG R&B

## 2019-02-03 PROCEDURE — 83735 ASSAY OF MAGNESIUM: CPT

## 2019-02-03 PROCEDURE — 36415 COLL VENOUS BLD VENIPUNCTURE: CPT

## 2019-02-03 PROCEDURE — 85025 COMPLETE CBC W/AUTO DIFF WBC: CPT

## 2019-02-03 PROCEDURE — 80053 COMPREHEN METABOLIC PANEL: CPT

## 2019-02-03 PROCEDURE — 2500000003 HC RX 250 WO HCPCS: Performed by: INTERNAL MEDICINE

## 2019-02-03 RX ORDER — LACTULOSE 10 G/15ML
30 SOLUTION ORAL 3 TIMES DAILY
Status: DISCONTINUED | OUTPATIENT
Start: 2019-02-03 | End: 2019-02-04 | Stop reason: HOSPADM

## 2019-02-03 RX ADMIN — METRONIDAZOLE 500 MG: 500 INJECTION, SOLUTION INTRAVENOUS at 03:23

## 2019-02-03 RX ADMIN — CEFTRIAXONE 2 G: 2 INJECTION, POWDER, FOR SOLUTION INTRAMUSCULAR; INTRAVENOUS at 06:19

## 2019-02-03 RX ADMIN — LACTULOSE 30 G: 20 SOLUTION ORAL at 13:15

## 2019-02-03 RX ADMIN — LACTULOSE 30 G: 20 SOLUTION ORAL at 22:17

## 2019-02-03 RX ADMIN — METRONIDAZOLE 500 MG: 500 INJECTION, SOLUTION INTRAVENOUS at 09:45

## 2019-02-03 RX ADMIN — ASPIRIN 81 MG: 81 TABLET, COATED ORAL at 09:35

## 2019-02-03 RX ADMIN — METOPROLOL SUCCINATE 25 MG: 50 TABLET, EXTENDED RELEASE ORAL at 09:35

## 2019-02-03 RX ADMIN — FUROSEMIDE 40 MG: 10 INJECTION, SOLUTION INTRAMUSCULAR; INTRAVENOUS at 22:22

## 2019-02-03 RX ADMIN — METOPROLOL SUCCINATE 25 MG: 50 TABLET, EXTENDED RELEASE ORAL at 22:22

## 2019-02-03 RX ADMIN — FUROSEMIDE 40 MG: 10 INJECTION, SOLUTION INTRAMUSCULAR; INTRAVENOUS at 09:36

## 2019-02-03 RX ADMIN — METRONIDAZOLE 500 MG: 500 INJECTION, SOLUTION INTRAVENOUS at 17:37

## 2019-02-03 RX ADMIN — LACTULOSE 20 G: 20 SOLUTION ORAL at 09:35

## 2019-02-03 RX ADMIN — Medication 10 ML: at 09:36

## 2019-02-03 ASSESSMENT — PAIN SCALES - GENERAL: PAINLEVEL_OUTOF10: 0

## 2019-02-04 VITALS
HEART RATE: 90 BPM | HEIGHT: 67 IN | TEMPERATURE: 97.3 F | SYSTOLIC BLOOD PRESSURE: 139 MMHG | RESPIRATION RATE: 18 BRPM | OXYGEN SATURATION: 99 % | BODY MASS INDEX: 30.65 KG/M2 | DIASTOLIC BLOOD PRESSURE: 82 MMHG | WEIGHT: 195.31 LBS

## 2019-02-04 LAB
ALBUMIN SERPL-MCNC: 3 G/DL (ref 3.5–5.2)
ALP BLD-CCNC: 164 U/L (ref 40–130)
ALPHA-1 ANTITRYPSIN PHENOTYPE: ABNORMAL
ALPHA-1 ANTITRYPSIN: 201 MG/DL (ref 90–200)
ALT SERPL-CCNC: 653 U/L (ref 5–41)
ANION GAP SERPL CALCULATED.3IONS-SCNC: 11 MMOL/L (ref 7–19)
AST SERPL-CCNC: 183 U/L (ref 5–40)
BASOPHILS ABSOLUTE: 0 K/UL (ref 0–0.2)
BASOPHILS RELATIVE PERCENT: 0.7 % (ref 0–1)
BILIRUB SERPL-MCNC: 0.7 MG/DL (ref 0.2–1.2)
BUN BLDV-MCNC: 12 MG/DL (ref 6–20)
CALCIUM SERPL-MCNC: 8.3 MG/DL (ref 8.6–10)
CHLORIDE BLD-SCNC: 99 MMOL/L (ref 98–111)
CO2: 25 MMOL/L (ref 22–29)
CREAT SERPL-MCNC: 0.7 MG/DL (ref 0.5–1.2)
EOSINOPHILS ABSOLUTE: 0.2 K/UL (ref 0–0.6)
EOSINOPHILS RELATIVE PERCENT: 4.1 % (ref 0–5)
GFR NON-AFRICAN AMERICAN: >60
GLUCOSE BLD-MCNC: 126 MG/DL (ref 74–109)
HCT VFR BLD CALC: 40.6 % (ref 42–52)
HEMOGLOBIN: 13.4 G/DL (ref 14–18)
LYMPHOCYTES ABSOLUTE: 1.3 K/UL (ref 1.1–4.5)
LYMPHOCYTES RELATIVE PERCENT: 22.4 % (ref 20–40)
MAGNESIUM: 1.7 MG/DL (ref 1.6–2.6)
MCH RBC QN AUTO: 30.4 PG (ref 27–31)
MCHC RBC AUTO-ENTMCNC: 33 G/DL (ref 33–37)
MCV RBC AUTO: 92.1 FL (ref 80–94)
MONOCYTES ABSOLUTE: 0.8 K/UL (ref 0–0.9)
MONOCYTES RELATIVE PERCENT: 13.8 % (ref 0–10)
NEUTROPHILS ABSOLUTE: 3.3 K/UL (ref 1.5–7.5)
NEUTROPHILS RELATIVE PERCENT: 58.6 % (ref 50–65)
PDW BLD-RTO: 16.5 % (ref 11.5–14.5)
PLATELET # BLD: 247 K/UL (ref 130–400)
PMV BLD AUTO: 10.1 FL (ref 9.4–12.4)
POTASSIUM SERPL-SCNC: 4 MMOL/L (ref 3.5–5)
RBC # BLD: 4.41 M/UL (ref 4.7–6.1)
SODIUM BLD-SCNC: 135 MMOL/L (ref 136–145)
TOTAL PROTEIN: 5.8 G/DL (ref 6.6–8.7)
WBC # BLD: 5.6 K/UL (ref 4.8–10.8)

## 2019-02-04 PROCEDURE — 2500000003 HC RX 250 WO HCPCS: Performed by: INTERNAL MEDICINE

## 2019-02-04 PROCEDURE — 2580000003 HC RX 258: Performed by: INTERNAL MEDICINE

## 2019-02-04 PROCEDURE — 99238 HOSP IP/OBS DSCHRG MGMT 30/<: CPT | Performed by: INTERNAL MEDICINE

## 2019-02-04 PROCEDURE — 80053 COMPREHEN METABOLIC PANEL: CPT

## 2019-02-04 PROCEDURE — 83735 ASSAY OF MAGNESIUM: CPT

## 2019-02-04 PROCEDURE — 36415 COLL VENOUS BLD VENIPUNCTURE: CPT

## 2019-02-04 PROCEDURE — 6360000002 HC RX W HCPCS: Performed by: INTERNAL MEDICINE

## 2019-02-04 PROCEDURE — 6370000000 HC RX 637 (ALT 250 FOR IP): Performed by: INTERNAL MEDICINE

## 2019-02-04 PROCEDURE — 85025 COMPLETE CBC W/AUTO DIFF WBC: CPT

## 2019-02-04 RX ORDER — MAGNESIUM SULFATE 1 G/100ML
1 INJECTION INTRAVENOUS ONCE
Status: COMPLETED | OUTPATIENT
Start: 2019-02-04 | End: 2019-02-04

## 2019-02-04 RX ORDER — LACTULOSE 10 G/15ML
10 SOLUTION ORAL NIGHTLY
Qty: 225 ML | Refills: 0 | Status: SHIPPED | OUTPATIENT
Start: 2019-02-04 | End: 2019-02-19

## 2019-02-04 RX ADMIN — Medication 10 ML: at 09:25

## 2019-02-04 RX ADMIN — METOPROLOL SUCCINATE 25 MG: 50 TABLET, EXTENDED RELEASE ORAL at 09:22

## 2019-02-04 RX ADMIN — METRONIDAZOLE 500 MG: 500 INJECTION, SOLUTION INTRAVENOUS at 09:22

## 2019-02-04 RX ADMIN — CEFTRIAXONE 2 G: 2 INJECTION, POWDER, FOR SOLUTION INTRAMUSCULAR; INTRAVENOUS at 05:22

## 2019-02-04 RX ADMIN — ASPIRIN 81 MG: 81 TABLET, COATED ORAL at 09:21

## 2019-02-04 RX ADMIN — MAGNESIUM SULFATE HEPTAHYDRATE 1 G: 1 INJECTION, SOLUTION INTRAVENOUS at 11:12

## 2019-02-04 RX ADMIN — FUROSEMIDE 40 MG: 10 INJECTION, SOLUTION INTRAMUSCULAR; INTRAVENOUS at 09:21

## 2019-02-04 RX ADMIN — METRONIDAZOLE 500 MG: 500 INJECTION, SOLUTION INTRAVENOUS at 02:11

## 2019-02-04 RX ADMIN — LACTULOSE 30 G: 20 SOLUTION ORAL at 09:21

## 2019-02-06 LAB — CERULOPLASMIN: 48 MG/DL (ref 15–30)

## 2019-03-18 ENCOUNTER — OFFICE VISIT (OUTPATIENT)
Dept: URGENT CARE | Age: 34
End: 2019-03-18

## 2019-03-18 ENCOUNTER — APPOINTMENT (OUTPATIENT)
Dept: GENERAL RADIOLOGY | Age: 34
End: 2019-03-18
Payer: COMMERCIAL

## 2019-03-18 ENCOUNTER — HOSPITAL ENCOUNTER (EMERGENCY)
Age: 34
Discharge: HOME | End: 2019-03-18
Attending: EMERGENCY MEDICINE
Payer: COMMERCIAL

## 2019-03-18 VITALS
BODY MASS INDEX: 30.23 KG/M2 | SYSTOLIC BLOOD PRESSURE: 112 MMHG | RESPIRATION RATE: 32 BRPM | WEIGHT: 193 LBS | DIASTOLIC BLOOD PRESSURE: 86 MMHG | HEART RATE: 119 BPM | OXYGEN SATURATION: 99 % | TEMPERATURE: 97.3 F

## 2019-03-18 VITALS
OXYGEN SATURATION: 97 % | WEIGHT: 195 LBS | TEMPERATURE: 97.6 F | BODY MASS INDEX: 30.61 KG/M2 | DIASTOLIC BLOOD PRESSURE: 80 MMHG | HEART RATE: 113 BPM | HEIGHT: 67 IN | SYSTOLIC BLOOD PRESSURE: 111 MMHG | RESPIRATION RATE: 16 BRPM

## 2019-03-18 DIAGNOSIS — R05.9 COUGH: Primary | ICD-10-CM

## 2019-03-18 DIAGNOSIS — F17.210 CIGARETTE SMOKER: ICD-10-CM

## 2019-03-18 DIAGNOSIS — I50.22 CHRONIC SYSTOLIC HEART FAILURE (HCC): ICD-10-CM

## 2019-03-18 DIAGNOSIS — J06.9 ACUTE UPPER RESPIRATORY INFECTION: Primary | ICD-10-CM

## 2019-03-18 DIAGNOSIS — R06.02 SHORTNESS OF BREATH: Primary | ICD-10-CM

## 2019-03-18 DIAGNOSIS — R06.4 LABORED BREATHING: ICD-10-CM

## 2019-03-18 LAB
ALBUMIN SERPL-MCNC: 4 G/DL (ref 3.5–5.2)
ALP BLD-CCNC: 125 U/L (ref 40–130)
ALT SERPL-CCNC: 19 U/L (ref 5–41)
ANION GAP SERPL CALCULATED.3IONS-SCNC: 13 MMOL/L (ref 7–19)
AST SERPL-CCNC: 26 U/L (ref 5–40)
BASOPHILS ABSOLUTE: 0.1 K/UL (ref 0–0.2)
BASOPHILS RELATIVE PERCENT: 1.5 % (ref 0–1)
BILIRUB SERPL-MCNC: 0.4 MG/DL (ref 0.2–1.2)
BUN BLDV-MCNC: 14 MG/DL (ref 6–20)
CALCIUM SERPL-MCNC: 9 MG/DL (ref 8.6–10)
CHLORIDE BLD-SCNC: 96 MMOL/L (ref 98–111)
CO2: 22 MMOL/L (ref 22–29)
CREAT SERPL-MCNC: 0.7 MG/DL (ref 0.5–1.2)
EOSINOPHILS ABSOLUTE: 0.1 K/UL (ref 0–0.6)
EOSINOPHILS RELATIVE PERCENT: 1.5 % (ref 0–5)
GFR NON-AFRICAN AMERICAN: >60
GLUCOSE BLD-MCNC: 88 MG/DL (ref 74–109)
HCT VFR BLD CALC: 43.3 % (ref 42–52)
HEMOGLOBIN: 14 G/DL (ref 14–18)
LYMPHOCYTES ABSOLUTE: 1.4 K/UL (ref 1.1–4.5)
LYMPHOCYTES RELATIVE PERCENT: 19.3 % (ref 20–40)
MCH RBC QN AUTO: 29.3 PG (ref 27–31)
MCHC RBC AUTO-ENTMCNC: 32.3 G/DL (ref 33–37)
MCV RBC AUTO: 90.6 FL (ref 80–94)
MONOCYTES ABSOLUTE: 1.1 K/UL (ref 0–0.9)
MONOCYTES RELATIVE PERCENT: 14.5 % (ref 0–10)
NEUTROPHILS ABSOLUTE: 4.6 K/UL (ref 1.5–7.5)
NEUTROPHILS RELATIVE PERCENT: 62.8 % (ref 50–65)
PDW BLD-RTO: 17.7 % (ref 11.5–14.5)
PLATELET # BLD: 259 K/UL (ref 130–400)
PMV BLD AUTO: 9.9 FL (ref 9.4–12.4)
POTASSIUM REFLEX MAGNESIUM: 4.2 MMOL/L (ref 3.5–5)
PRO-BNP: 5344 PG/ML (ref 0–450)
RAPID INFLUENZA  B AGN: NEGATIVE
RAPID INFLUENZA A AGN: NEGATIVE
RBC # BLD: 4.78 M/UL (ref 4.7–6.1)
SODIUM BLD-SCNC: 131 MMOL/L (ref 136–145)
TOTAL PROTEIN: 7.5 G/DL (ref 6.6–8.7)
TROPONIN: <0.01 NG/ML (ref 0–0.03)
WBC # BLD: 7.3 K/UL (ref 4.8–10.8)

## 2019-03-18 PROCEDURE — 80053 COMPREHEN METABOLIC PANEL: CPT

## 2019-03-18 PROCEDURE — 94640 AIRWAY INHALATION TREATMENT: CPT

## 2019-03-18 PROCEDURE — 6370000000 HC RX 637 (ALT 250 FOR IP): Performed by: EMERGENCY MEDICINE

## 2019-03-18 PROCEDURE — 84484 ASSAY OF TROPONIN QUANT: CPT

## 2019-03-18 PROCEDURE — 71045 X-RAY EXAM CHEST 1 VIEW: CPT

## 2019-03-18 PROCEDURE — 99284 EMERGENCY DEPT VISIT MOD MDM: CPT | Performed by: EMERGENCY MEDICINE

## 2019-03-18 PROCEDURE — 93005 ELECTROCARDIOGRAM TRACING: CPT

## 2019-03-18 PROCEDURE — 87804 INFLUENZA ASSAY W/OPTIC: CPT

## 2019-03-18 PROCEDURE — 36415 COLL VENOUS BLD VENIPUNCTURE: CPT

## 2019-03-18 PROCEDURE — 94664 DEMO&/EVAL PT USE INHALER: CPT

## 2019-03-18 PROCEDURE — 99285 EMERGENCY DEPT VISIT HI MDM: CPT

## 2019-03-18 PROCEDURE — 96374 THER/PROPH/DIAG INJ IV PUSH: CPT

## 2019-03-18 PROCEDURE — 83880 ASSAY OF NATRIURETIC PEPTIDE: CPT

## 2019-03-18 PROCEDURE — 99999 PR OFFICE/OUTPT VISIT,PROCEDURE ONLY: CPT | Performed by: NURSE PRACTITIONER

## 2019-03-18 PROCEDURE — 85025 COMPLETE CBC W/AUTO DIFF WBC: CPT

## 2019-03-18 PROCEDURE — 6360000002 HC RX W HCPCS: Performed by: EMERGENCY MEDICINE

## 2019-03-18 RX ORDER — ALBUTEROL SULFATE 90 UG/1
2 AEROSOL, METERED RESPIRATORY (INHALATION) EVERY 6 HOURS PRN
Qty: 1 INHALER | Refills: 3 | Status: SHIPPED | OUTPATIENT
Start: 2019-03-18 | End: 2019-12-03 | Stop reason: SDUPTHER

## 2019-03-18 RX ORDER — IPRATROPIUM BROMIDE AND ALBUTEROL SULFATE 2.5; .5 MG/3ML; MG/3ML
1 SOLUTION RESPIRATORY (INHALATION) ONCE
Status: COMPLETED | OUTPATIENT
Start: 2019-03-18 | End: 2019-03-18

## 2019-03-18 RX ORDER — DEXAMETHASONE SODIUM PHOSPHATE 10 MG/ML
10 INJECTION, SOLUTION INTRAMUSCULAR; INTRAVENOUS ONCE
Status: COMPLETED | OUTPATIENT
Start: 2019-03-18 | End: 2019-03-18

## 2019-03-18 RX ORDER — LACTULOSE 10 G/15ML
20 SOLUTION ORAL NIGHTLY
COMMUNITY
End: 2019-08-31 | Stop reason: ALTCHOICE

## 2019-03-18 RX ADMIN — DEXAMETHASONE SODIUM PHOSPHATE 10 MG: 10 INJECTION INTRAMUSCULAR; INTRAVENOUS at 20:18

## 2019-03-18 RX ADMIN — IPRATROPIUM BROMIDE AND ALBUTEROL SULFATE 1 AMPULE: .5; 3 SOLUTION RESPIRATORY (INHALATION) at 18:07

## 2019-03-18 ASSESSMENT — ENCOUNTER SYMPTOMS
DIARRHEA: 0
ABDOMINAL PAIN: 1
COUGH: 1
SHORTNESS OF BREATH: 1
SINUS PRESSURE: 0
APNEA: 0
FACIAL SWELLING: 0
VOICE CHANGE: 0
NAUSEA: 0
CHOKING: 0
SORE THROAT: 0
BLOOD IN STOOL: 0
CONSTIPATION: 0
EYE DISCHARGE: 0

## 2019-03-20 LAB
EKG P AXIS: 63 DEGREES
EKG P-R INTERVAL: 158 MS
EKG Q-T INTERVAL: 314 MS
EKG QRS DURATION: 102 MS
EKG QTC CALCULATION (BAZETT): 412 MS
EKG T AXIS: 61 DEGREES

## 2019-03-28 LAB
CMV DNA QNT PCR: <2.6 LOG CPY/ML
CMV DNA QUANTATATIVE INTERPRETATION: <2.4 LOG IU/ML
CMV DNA QUANTATATIVE INTERPRETATION: NOT DETECTED
CMV DNA QUANTITATIVE: <227 IU/ML
CMV SOURCE: NORMAL
CMVQ COPY/ML: <390 CPY/ML

## 2019-04-24 ENCOUNTER — HOSPITAL ENCOUNTER (EMERGENCY)
Age: 34
Discharge: HOME | End: 2019-04-24
Attending: EMERGENCY MEDICINE
Payer: COMMERCIAL

## 2019-04-24 ENCOUNTER — APPOINTMENT (OUTPATIENT)
Dept: GENERAL RADIOLOGY | Age: 34
End: 2019-04-24
Payer: COMMERCIAL

## 2019-04-24 VITALS
BODY MASS INDEX: 29.82 KG/M2 | TEMPERATURE: 98 F | WEIGHT: 190 LBS | OXYGEN SATURATION: 97 % | RESPIRATION RATE: 19 BRPM | SYSTOLIC BLOOD PRESSURE: 123 MMHG | DIASTOLIC BLOOD PRESSURE: 91 MMHG | HEIGHT: 67 IN | HEART RATE: 113 BPM

## 2019-04-24 DIAGNOSIS — R06.01 ORTHOPNEA: ICD-10-CM

## 2019-04-24 DIAGNOSIS — R10.13 ABDOMINAL PAIN, EPIGASTRIC: Primary | ICD-10-CM

## 2019-04-24 LAB
ALBUMIN SERPL-MCNC: 4.3 G/DL (ref 3.5–5.2)
ALP BLD-CCNC: 114 U/L (ref 40–130)
ALT SERPL-CCNC: 21 U/L (ref 5–41)
ANION GAP SERPL CALCULATED.3IONS-SCNC: 14 MMOL/L (ref 7–19)
AST SERPL-CCNC: 29 U/L (ref 5–40)
BACTERIA: NEGATIVE /HPF
BASOPHILS ABSOLUTE: 0.1 K/UL (ref 0–0.2)
BASOPHILS RELATIVE PERCENT: 0.8 % (ref 0–1)
BILIRUB SERPL-MCNC: 0.4 MG/DL (ref 0.2–1.2)
BILIRUBIN URINE: NEGATIVE
BLOOD, URINE: NEGATIVE
BUN BLDV-MCNC: 20 MG/DL (ref 6–20)
CALCIUM SERPL-MCNC: 9.6 MG/DL (ref 8.6–10)
CHLORIDE BLD-SCNC: 97 MMOL/L (ref 98–111)
CLARITY: CLEAR
CO2: 21 MMOL/L (ref 22–29)
COLOR: YELLOW
CREAT SERPL-MCNC: 0.8 MG/DL (ref 0.5–1.2)
D DIMER: 0.44 UG/ML FEU (ref 0–0.48)
EKG P AXIS: 56 DEGREES
EKG P-R INTERVAL: 162 MS
EKG Q-T INTERVAL: 342 MS
EKG QRS DURATION: 106 MS
EKG QTC CALCULATION (BAZETT): 436 MS
EKG T AXIS: 65 DEGREES
EOSINOPHILS ABSOLUTE: 0.2 K/UL (ref 0–0.6)
EOSINOPHILS RELATIVE PERCENT: 1.9 % (ref 0–5)
EPITHELIAL CELLS, UA: 1 /HPF (ref 0–5)
GFR NON-AFRICAN AMERICAN: >60
GLUCOSE BLD-MCNC: 114 MG/DL (ref 74–109)
GLUCOSE URINE: NEGATIVE MG/DL
HCT VFR BLD CALC: 46.2 % (ref 42–52)
HEMOGLOBIN: 15 G/DL (ref 14–18)
HYALINE CASTS: 1 /HPF (ref 0–8)
KETONES, URINE: NEGATIVE MG/DL
LEUKOCYTE ESTERASE, URINE: NEGATIVE
LYMPHOCYTES ABSOLUTE: 2.5 K/UL (ref 1.1–4.5)
LYMPHOCYTES RELATIVE PERCENT: 24.4 % (ref 20–40)
MCH RBC QN AUTO: 29.2 PG (ref 27–31)
MCHC RBC AUTO-ENTMCNC: 32.5 G/DL (ref 33–37)
MCV RBC AUTO: 89.9 FL (ref 80–94)
MONOCYTES ABSOLUTE: 0.8 K/UL (ref 0–0.9)
MONOCYTES RELATIVE PERCENT: 7.8 % (ref 0–10)
NEUTROPHILS ABSOLUTE: 6.5 K/UL (ref 1.5–7.5)
NEUTROPHILS RELATIVE PERCENT: 64.6 % (ref 50–65)
NITRITE, URINE: NEGATIVE
PDW BLD-RTO: 19.9 % (ref 11.5–14.5)
PH UA: 5.5 (ref 5–8)
PLATELET # BLD: 298 K/UL (ref 130–400)
PMV BLD AUTO: 9.8 FL (ref 9.4–12.4)
POTASSIUM SERPL-SCNC: 4.7 MMOL/L (ref 3.5–5)
PRO-BNP: 3849 PG/ML (ref 0–450)
PROTEIN UA: 30 MG/DL
RBC # BLD: 5.14 M/UL (ref 4.7–6.1)
RBC UA: 0 /HPF (ref 0–4)
SODIUM BLD-SCNC: 132 MMOL/L (ref 136–145)
SPECIFIC GRAVITY UA: 1.02 (ref 1–1.03)
TOTAL PROTEIN: 8 G/DL (ref 6.6–8.7)
TROPONIN: <0.01 NG/ML (ref 0–0.03)
URINE REFLEX TO CULTURE: ABNORMAL
UROBILINOGEN, URINE: 0.2 E.U./DL
WBC # BLD: 10 K/UL (ref 4.8–10.8)
WBC UA: 4 /HPF (ref 0–5)

## 2019-04-24 PROCEDURE — 81001 URINALYSIS AUTO W/SCOPE: CPT

## 2019-04-24 PROCEDURE — 71046 X-RAY EXAM CHEST 2 VIEWS: CPT

## 2019-04-24 PROCEDURE — 85025 COMPLETE CBC W/AUTO DIFF WBC: CPT

## 2019-04-24 PROCEDURE — 99284 EMERGENCY DEPT VISIT MOD MDM: CPT

## 2019-04-24 PROCEDURE — 80053 COMPREHEN METABOLIC PANEL: CPT

## 2019-04-24 PROCEDURE — 84484 ASSAY OF TROPONIN QUANT: CPT

## 2019-04-24 PROCEDURE — 6370000000 HC RX 637 (ALT 250 FOR IP): Performed by: PHYSICIAN ASSISTANT

## 2019-04-24 PROCEDURE — 94640 AIRWAY INHALATION TREATMENT: CPT

## 2019-04-24 PROCEDURE — 93005 ELECTROCARDIOGRAM TRACING: CPT

## 2019-04-24 PROCEDURE — 85379 FIBRIN DEGRADATION QUANT: CPT

## 2019-04-24 PROCEDURE — 83880 ASSAY OF NATRIURETIC PEPTIDE: CPT

## 2019-04-24 PROCEDURE — 94664 DEMO&/EVAL PT USE INHALER: CPT

## 2019-04-24 PROCEDURE — 36415 COLL VENOUS BLD VENIPUNCTURE: CPT

## 2019-04-24 PROCEDURE — 99283 EMERGENCY DEPT VISIT LOW MDM: CPT | Performed by: PHYSICIAN ASSISTANT

## 2019-04-24 RX ORDER — IPRATROPIUM BROMIDE AND ALBUTEROL SULFATE 2.5; .5 MG/3ML; MG/3ML
1 SOLUTION RESPIRATORY (INHALATION) EVERY 4 HOURS PRN
Status: DISCONTINUED | OUTPATIENT
Start: 2019-04-24 | End: 2019-04-24

## 2019-04-24 RX ORDER — IPRATROPIUM BROMIDE AND ALBUTEROL SULFATE 2.5; .5 MG/3ML; MG/3ML
1 SOLUTION RESPIRATORY (INHALATION) ONCE
Status: COMPLETED | OUTPATIENT
Start: 2019-04-24 | End: 2019-04-24

## 2019-04-24 RX ORDER — ALBUTEROL SULFATE 90 UG/1
2 AEROSOL, METERED RESPIRATORY (INHALATION) 4 TIMES DAILY PRN
Qty: 1 INHALER | Refills: 0 | Status: ON HOLD | OUTPATIENT
Start: 2019-04-24 | End: 2019-05-09 | Stop reason: HOSPADM

## 2019-04-24 RX ADMIN — IPRATROPIUM BROMIDE AND ALBUTEROL SULFATE 1 AMPULE: .5; 3 SOLUTION RESPIRATORY (INHALATION) at 15:22

## 2019-04-24 RX ADMIN — LIDOCAINE HYDROCHLORIDE: 20 SOLUTION ORAL; TOPICAL at 15:06

## 2019-04-24 ASSESSMENT — ENCOUNTER SYMPTOMS
BACK PAIN: 0
APNEA: 0
COUGH: 0
ABDOMINAL DISTENTION: 1
SHORTNESS OF BREATH: 1
PHOTOPHOBIA: 0
CHOKING: 0
COLOR CHANGE: 0
WHEEZING: 0
ABDOMINAL PAIN: 1
CHEST TIGHTNESS: 0
EYE DISCHARGE: 0
STRIDOR: 0
EYE ITCHING: 0

## 2019-04-24 NOTE — ED PROVIDER NOTES
Attending Supervisory Note/Shared Visit    I have reviewed the mid-levels findings and agree. We have discussed the case I reviewed the diagnostic data. I agree with the assessment and plan.     Ericka Saunders MD  Attending Emergency Physician       Ericka Saunders MD  04/24/19 7999

## 2019-04-24 NOTE — ED PROVIDER NOTES
Negative for arthralgias, back pain, gait problem, joint swelling, myalgias, neck pain and neck stiffness. Skin: Negative for color change, pallor and rash. Neurological: Negative for dizziness, seizures and syncope. Psychiatric/Behavioral: Negative for agitation. The patient is not nervous/anxious. Except as noted above the remainder of the review of systems was reviewed and negative. PAST MEDICAL HISTORY     Past Medical History:   Diagnosis Date    CHF (congestive heart failure) ([address]     Hypertension          SURGICAL HISTORY       Past Surgical History:   Procedure Laterality Date    HAND SURGERY           CURRENT MEDICATIONS       Discharge Medication List as of 4/24/2019  5:23 PM      CONTINUE these medications which have NOT CHANGED    Details   lactulose (CHRONULAC) 10 GM/15ML solution Take 20 g by mouth nightlyHistorical Med      albuterol sulfate HFA (PROVENTIL HFA) 108 (90 Base) MCG/ACT inhaler Inhale 2 puffs into the lungs every 6 hours as needed for Wheezing, Disp-1 Inhaler, R-3Print      aspirin 81 MG EC tablet Take 1 tablet by mouth daily, Disp-30 tablet, R-3OTC      lisinopril (PRINIVIL;ZESTRIL) 10 MG tablet Take 10 mg by mouth daily Historical Med      metoprolol succinate (TOPROL XL) 25 MG extended release tablet Take 1 tablet by mouth every 12 hours, Disp-60 tablet, R-3Normal      furosemide (LASIX) 40 MG tablet Take 1 tablet by mouth daily, Disp-60 tablet, R-3Normal             ALLERGIES     Patient has no known allergies.     FAMILY HISTORY       Family History   Problem Relation Age of Onset   [facility] Cancer Mother     No Known Problems Father     No Known Problems Brother           SOCIAL HISTORY       Social History     Socioeconomic History    Marital status: Single     Spouse name: None    Number of children: None    Years of education: None    Highest education level: None   Occupational History    None   Social Needs    Financial resource strain: None   [employer] [facility] insecurity:     Worry: None     Inability: None    Transportation needs:     Medical: None     Non-medical: None   Tobacco Use    Smoking status: Current Every Day Smoker     Packs/day: 0.50     Years: 13.00     Pack years: 6.50     Types: Cigarettes    Smokeless tobacco: Never Used   Substance and Sexual Activity    Alcohol use: No     Comment: Quit around 2017    Drug use: Not Currently     Frequency: 3.0 times per week     Types: Methamphetamines    Sexual activity: None   Lifestyle    Physical activity:     Days per week: None     Minutes per session: None    Stress: None   Relationships    Social connections:     Talks on phone: None     Gets together: None     Attends [employer] service: None     Active member of club or organization: None     Attends meetings of clubs or organizations: None     Relationship status: None    Intimate partner violence:     Fear of current or ex partner: None     Emotionally abused: None     Physically abused: None     Forced sexual activity: None   Other Topics Concern    None   Social History Narrative    None       SCREENINGS    Bureau Coma Scale  Eye Opening: Spontaneous  Best Verbal Response: Oriented  Best Motor Response: Obeys commands  Luke Coma Scale Score: 15      PHYSICAL EXAM    (up to 7 forlevel 4, 8 or more for level 5)     ED Triage Vitals [04/24/19 1409]   BP Temp Temp Source Pulse Resp SpO2 Height Weight   109/86 98 °F (36.7 °C) Temporal 114 19 99 % 5' 7\" (1.702 m) 190 lb (86.2 kg)       Physical Exam   Constitutional: He is oriented to person, place, and time. He appears well-developed and well-nourished. No distress. HENT:   Head: Normocephalic and atraumatic. Right Ear: External ear normal.   Left Ear: External ear normal.   Nose: Nose normal.   Mouth/Throat: Oropharynx is clear and moist.   Eyes: Pupils are equal, round, and reactive to light. Conjunctivae and EOM are normal.   Neck: Normal range of motion. Neck supple.  No tracheal deviation present. Cardiovascular: Normal rate, regular rhythm, normal heart sounds and intact distal pulses. Exam reveals no gallop and no friction rub. No murmur heard. Pulmonary/Chest: Effort normal and breath sounds normal. No stridor. No respiratory distress. He has no wheezes. He has no rales. He exhibits no tenderness. Abdominal: Soft. Bowel sounds are normal. He exhibits no distension. There is no tenderness. Musculoskeletal: Normal range of motion. Neurological: He is alert and oriented to person, place, and time. He displays normal reflexes. No cranial nerve deficit or sensory deficit. He exhibits normal muscle tone. Coordination normal.   Skin: Skin is warm and dry. Capillary refill takes less than 2 seconds. He is not diaphoretic. Psychiatric: He has a normal mood and affect. His behavior is normal. Judgment and thought content normal.   Nursing note and vitals reviewed. DIAGNOSTIC RESULTS     RADIOLOGY:   Non-plain film images such as CT, Ultrasound and MRI are read by the radiologist. Plain radiographic images are visualized and preliminarilyinterpreted by No att. providers found with the below findings:      Interpretation per the Radiologist below, if available at the time of this note:    XR CHEST STANDARD (2 VW)   Final Result   1. No focal infiltrate or effusion. 2. Moderate to severe cardiomegaly.    Signed by Dr Aleena Chan on 4/24/2019 3:32 PM          LABS:  Labs Reviewed   BRAIN NATRIURETIC PEPTIDE - Abnormal; Notable for the following components:       Result Value    Pro-BNP 3,849 (*)     All other components within normal limits   CBC WITH AUTO DIFFERENTIAL - Abnormal; Notable for the following components:    MCHC 32.5 (*)     RDW 19.9 (*)     All other components within normal limits   COMPREHENSIVE METABOLIC PANEL - Abnormal; Notable for the following components:    Sodium 132 (*)     Chloride 97 (*)     CO2 21 (*)     Glucose 114 (*)     All other components within normal limits   URINE RT REFLEX TO CULTURE - Abnormal; Notable for the following components:    Protein, UA 30 (*)     All other components within normal limits   TROPONIN   MICROSCOPIC URINALYSIS   D-DIMER, QUANTITATIVE       All other labs were within normal range or notreturned as of this dictation. RE-ASSESSMENT        EMERGENCY DEPARTMENT COURSE and DIFFERENTIAL DIAGNOSIS/MDM:   Vitals:    Vitals:    04/24/19 1409 04/24/19 1428 04/24/19 1432 04/24/19 1522   BP: 109/86 (!) 126/96 (!) 123/91    Pulse: 114 118 113    Resp: 19      Temp: 98 °F (36.7 °C)      TempSrc: Temporal      SpO2: 99% 97% 95% 97%   Weight: 190 lb (86.2 kg)      Height: 5' 7\" (1.702 m)          MDM  Number of Diagnoses or Management Options  Abdominal pain, epigastric:   Orthopnea:   Diagnosis management comments: Patient has improvement with breathing treatment. Patient will up his water pill. This is unlikely PE based on exam and history. DDimer WNL. Patient is agreeable to plan. He is medically stable at this point. He will follow up with PCP within next 3 days. May return to ED with any new concerns or symptoms. PROCEDURES:    Procedures      FINAL IMPRESSION      1. Abdominal pain, epigastric    2.  Orthopnea          DISPOSITION/PLAN   DISPOSITION Decision To Discharge 04/24/2019 05:22:22 PM      PATIENT REFERRED TO:  [facility]  [place]  [phone]    If symptoms worsen      DISCHARGE MEDICATIONS:  Discharge Medication List as of 4/24/2019  5:23 PM          (Please note that portions of this note were completed with a voice recognition program.  Efforts were made to edit the dictations but occasionallywords are mis-transcribed.)    [employer]  04/24/19 0460

## 2019-04-26 ENCOUNTER — APPOINTMENT (OUTPATIENT)
Dept: GENERAL RADIOLOGY | Facility: HOSPITAL | Age: 34
End: 2019-04-26

## 2019-04-26 ENCOUNTER — APPOINTMENT (OUTPATIENT)
Dept: ULTRASOUND IMAGING | Facility: HOSPITAL | Age: 34
End: 2019-04-26

## 2019-04-26 ENCOUNTER — APPOINTMENT (OUTPATIENT)
Dept: CT IMAGING | Facility: HOSPITAL | Age: 34
End: 2019-04-26

## 2019-04-26 ENCOUNTER — HOSPITAL ENCOUNTER (EMERGENCY)
Facility: HOSPITAL | Age: 34
Discharge: HOME | End: 2019-04-26
Admitting: EMERGENCY MEDICINE

## 2019-04-26 VITALS
BODY MASS INDEX: 30.13 KG/M2 | RESPIRATION RATE: 18 BRPM | TEMPERATURE: 97.6 F | WEIGHT: 192 LBS | HEIGHT: 67 IN | DIASTOLIC BLOOD PRESSURE: 62 MMHG | HEART RATE: 110 BPM | SYSTOLIC BLOOD PRESSURE: 117 MMHG | OXYGEN SATURATION: 100 %

## 2019-04-26 DIAGNOSIS — R10.13 EPIGASTRIC PAIN: Primary | ICD-10-CM

## 2019-04-26 DIAGNOSIS — I50.812 CHRONIC RIGHT-SIDED CONGESTIVE HEART FAILURE (HCC): ICD-10-CM

## 2019-04-26 LAB
ALBUMIN SERPL-MCNC: 4.9 G/DL (ref 3.5–5)
ALBUMIN/GLOB SERPL: 1.3 G/DL (ref 1.1–2.5)
ALP SERPL-CCNC: 99 U/L (ref 24–120)
ALT SERPL W P-5'-P-CCNC: 23 U/L (ref 0–54)
AMPHET+METHAMPHET UR QL: NEGATIVE
AMYLASE SERPL-CCNC: 86 U/L (ref 30–110)
ANION GAP SERPL CALCULATED.3IONS-SCNC: 12 MMOL/L (ref 4–13)
AST SERPL-CCNC: 40 U/L (ref 7–45)
BARBITURATES UR QL SCN: NEGATIVE
BASOPHILS # BLD AUTO: 0.08 10*3/MM3 (ref 0–0.2)
BASOPHILS NFR BLD AUTO: 0.7 % (ref 0–2)
BENZODIAZ UR QL SCN: NEGATIVE
BILIRUB SERPL-MCNC: 1.1 MG/DL (ref 0.1–1)
BILIRUB UR QL STRIP: NEGATIVE
BUN BLD-MCNC: 24 MG/DL (ref 5–21)
BUN/CREAT SERPL: 24.7 (ref 7–25)
CALCIUM SPEC-SCNC: 9.7 MG/DL (ref 8.4–10.4)
CANNABINOIDS SERPL QL: NEGATIVE
CHLORIDE SERPL-SCNC: 95 MMOL/L (ref 98–110)
CLARITY UR: CLEAR
CO2 SERPL-SCNC: 28 MMOL/L (ref 24–31)
COCAINE UR QL: NEGATIVE
COLOR UR: YELLOW
CREAT BLD-MCNC: 0.97 MG/DL (ref 0.5–1.4)
D DIMER PPP FEU-MCNC: 0.48 MG/L (FEU) (ref 0–0.5)
DEPRECATED RDW RBC AUTO: 63.1 FL (ref 40–54)
EOSINOPHIL # BLD AUTO: 0.2 10*3/MM3 (ref 0–0.7)
EOSINOPHIL NFR BLD AUTO: 1.7 % (ref 0–4)
ERYTHROCYTE [DISTWIDTH] IN BLOOD BY AUTOMATED COUNT: 19.5 % (ref 12–15)
GFR SERPL CREATININE-BSD FRML MDRD: 89 ML/MIN/1.73
GLOBULIN UR ELPH-MCNC: 3.7 GM/DL
GLUCOSE BLD-MCNC: 122 MG/DL (ref 70–100)
GLUCOSE UR STRIP-MCNC: NEGATIVE MG/DL
HCT VFR BLD AUTO: 44.9 % (ref 40–52)
HGB BLD-MCNC: 14.7 G/DL (ref 14–18)
HGB UR QL STRIP.AUTO: NEGATIVE
HOLD SPECIMEN: NORMAL
HOLD SPECIMEN: NORMAL
IMM GRANULOCYTES # BLD AUTO: 0.04 10*3/MM3 (ref 0–0.05)
IMM GRANULOCYTES NFR BLD AUTO: 0.3 % (ref 0–5)
KETONES UR QL STRIP: NEGATIVE
LEUKOCYTE ESTERASE UR QL STRIP.AUTO: NEGATIVE
LIPASE SERPL-CCNC: 64 U/L (ref 23–203)
LYMPHOCYTES # BLD AUTO: 2.36 10*3/MM3 (ref 0.72–4.86)
LYMPHOCYTES NFR BLD AUTO: 20.4 % (ref 15–45)
MCH RBC QN AUTO: 29.6 PG (ref 28–32)
MCHC RBC AUTO-ENTMCNC: 32.7 G/DL (ref 33–36)
MCV RBC AUTO: 90.5 FL (ref 82–95)
METHADONE UR QL SCN: NEGATIVE
MONOCYTES # BLD AUTO: 0.88 10*3/MM3 (ref 0.19–1.3)
MONOCYTES NFR BLD AUTO: 7.6 % (ref 4–12)
NEUTROPHILS # BLD AUTO: 8 10*3/MM3 (ref 1.87–8.4)
NEUTROPHILS NFR BLD AUTO: 69.3 % (ref 39–78)
NITRITE UR QL STRIP: NEGATIVE
NRBC BLD AUTO-RTO: 0 /100 WBC (ref 0–0.2)
NT-PROBNP SERPL-MCNC: 4670 PG/ML (ref 0–450)
OPIATES UR QL: NEGATIVE
PCP UR QL SCN: NEGATIVE
PH UR STRIP.AUTO: <=5 [PH] (ref 5–8)
PLATELET # BLD AUTO: 272 10*3/MM3 (ref 130–400)
PMV BLD AUTO: 9.7 FL (ref 6–12)
POTASSIUM BLD-SCNC: 4.9 MMOL/L (ref 3.5–5.3)
PROT SERPL-MCNC: 8.6 G/DL (ref 6.3–8.7)
PROT UR QL STRIP: NEGATIVE
RBC # BLD AUTO: 4.96 10*6/MM3 (ref 4.8–5.9)
SODIUM BLD-SCNC: 135 MMOL/L (ref 135–145)
SP GR UR STRIP: 1.01 (ref 1–1.03)
TROPONIN I SERPL-MCNC: 0.03 NG/ML (ref 0–0.03)
UROBILINOGEN UR QL STRIP: NORMAL
WBC NRBC COR # BLD: 11.56 10*3/MM3 (ref 4.8–10.8)
WHOLE BLOOD HOLD SPECIMEN: NORMAL
WHOLE BLOOD HOLD SPECIMEN: NORMAL

## 2019-04-26 PROCEDURE — 93010 ELECTROCARDIOGRAM REPORT: CPT | Performed by: INTERNAL MEDICINE

## 2019-04-26 PROCEDURE — 85379 FIBRIN DEGRADATION QUANT: CPT | Performed by: NURSE PRACTITIONER

## 2019-04-26 PROCEDURE — 74177 CT ABD & PELVIS W/CONTRAST: CPT

## 2019-04-26 PROCEDURE — 80307 DRUG TEST PRSMV CHEM ANLYZR: CPT | Performed by: NURSE PRACTITIONER

## 2019-04-26 PROCEDURE — 82150 ASSAY OF AMYLASE: CPT | Performed by: NURSE PRACTITIONER

## 2019-04-26 PROCEDURE — 25010000002 ONDANSETRON PER 1 MG: Performed by: NURSE PRACTITIONER

## 2019-04-26 PROCEDURE — 85025 COMPLETE CBC W/AUTO DIFF WBC: CPT | Performed by: NURSE PRACTITIONER

## 2019-04-26 PROCEDURE — 83880 ASSAY OF NATRIURETIC PEPTIDE: CPT | Performed by: NURSE PRACTITIONER

## 2019-04-26 PROCEDURE — 71046 X-RAY EXAM CHEST 2 VIEWS: CPT

## 2019-04-26 PROCEDURE — 76705 ECHO EXAM OF ABDOMEN: CPT

## 2019-04-26 PROCEDURE — 99283 EMERGENCY DEPT VISIT LOW MDM: CPT

## 2019-04-26 PROCEDURE — 96375 TX/PRO/DX INJ NEW DRUG ADDON: CPT

## 2019-04-26 PROCEDURE — 96374 THER/PROPH/DIAG INJ IV PUSH: CPT

## 2019-04-26 PROCEDURE — 81003 URINALYSIS AUTO W/O SCOPE: CPT | Performed by: NURSE PRACTITIONER

## 2019-04-26 PROCEDURE — 25010000002 FUROSEMIDE PER 20 MG: Performed by: NURSE PRACTITIONER

## 2019-04-26 PROCEDURE — 84484 ASSAY OF TROPONIN QUANT: CPT | Performed by: NURSE PRACTITIONER

## 2019-04-26 PROCEDURE — 83690 ASSAY OF LIPASE: CPT | Performed by: NURSE PRACTITIONER

## 2019-04-26 PROCEDURE — 80053 COMPREHEN METABOLIC PANEL: CPT | Performed by: NURSE PRACTITIONER

## 2019-04-26 PROCEDURE — 25010000002 IOPAMIDOL 61 % SOLUTION: Performed by: NURSE PRACTITIONER

## 2019-04-26 PROCEDURE — 93005 ELECTROCARDIOGRAM TRACING: CPT | Performed by: NURSE PRACTITIONER

## 2019-04-26 RX ORDER — FUROSEMIDE 10 MG/ML
20 INJECTION INTRAMUSCULAR; INTRAVENOUS ONCE
Status: COMPLETED | OUTPATIENT
Start: 2019-04-26 | End: 2019-04-26

## 2019-04-26 RX ORDER — FAMOTIDINE 10 MG/ML
20 INJECTION, SOLUTION INTRAVENOUS ONCE
Status: COMPLETED | OUTPATIENT
Start: 2019-04-26 | End: 2019-04-26

## 2019-04-26 RX ORDER — OMEPRAZOLE 20 MG/1
20 CAPSULE, DELAYED RELEASE ORAL 2 TIMES DAILY
Qty: 30 CAPSULE | Refills: 0 | Status: ON HOLD | OUTPATIENT
Start: 2019-04-26 | End: 2019-09-15

## 2019-04-26 RX ORDER — ONDANSETRON 4 MG/1
4 TABLET, ORALLY DISINTEGRATING ORAL EVERY 6 HOURS PRN
Qty: 10 TABLET | Refills: 0 | Status: ON HOLD | OUTPATIENT
Start: 2019-04-26 | End: 2019-09-15

## 2019-04-26 RX ORDER — ONDANSETRON 2 MG/ML
4 INJECTION INTRAMUSCULAR; INTRAVENOUS ONCE
Status: COMPLETED | OUTPATIENT
Start: 2019-04-26 | End: 2019-04-26

## 2019-04-26 RX ADMIN — ONDANSETRON 4 MG: 2 INJECTION INTRAMUSCULAR; INTRAVENOUS at 18:02

## 2019-04-26 RX ADMIN — FAMOTIDINE 20 MG: 10 INJECTION, SOLUTION INTRAVENOUS at 18:02

## 2019-04-26 RX ADMIN — SODIUM CHLORIDE 500 ML: 9 INJECTION, SOLUTION INTRAVENOUS at 17:51

## 2019-04-26 RX ADMIN — FUROSEMIDE 20 MG: 10 INJECTION, SOLUTION INTRAMUSCULAR; INTRAVENOUS at 19:41

## 2019-04-26 RX ADMIN — IOPAMIDOL 100 ML: 612 INJECTION, SOLUTION INTRAVENOUS at 18:26

## 2019-04-28 ENCOUNTER — APPOINTMENT (OUTPATIENT)
Dept: CT IMAGING | Age: 34
End: 2019-04-28
Payer: COMMERCIAL

## 2019-04-28 ENCOUNTER — HOSPITAL ENCOUNTER (EMERGENCY)
Age: 34
Discharge: HOME | End: 2019-04-28
Attending: FAMILY MEDICINE
Payer: COMMERCIAL

## 2019-04-28 VITALS
DIASTOLIC BLOOD PRESSURE: 78 MMHG | RESPIRATION RATE: 20 BRPM | SYSTOLIC BLOOD PRESSURE: 108 MMHG | BODY MASS INDEX: 29.82 KG/M2 | WEIGHT: 190 LBS | HEIGHT: 67 IN | HEART RATE: 101 BPM | TEMPERATURE: 96.9 F | OXYGEN SATURATION: 99 %

## 2019-04-28 DIAGNOSIS — R10.12 LEFT UPPER QUADRANT PAIN: Primary | ICD-10-CM

## 2019-04-28 LAB
ALBUMIN SERPL-MCNC: 4.2 G/DL (ref 3.5–5.2)
ALP BLD-CCNC: 115 U/L (ref 40–130)
ALT SERPL-CCNC: 22 U/L (ref 5–41)
AMMONIA: 61 UMOL/L (ref 16–60)
AMPHETAMINE SCREEN, URINE: NEGATIVE
AMYLASE: 33 U/L (ref 28–100)
ANION GAP SERPL CALCULATED.3IONS-SCNC: 16 MMOL/L (ref 7–19)
APTT: 30.1 SEC (ref 26–36.2)
AST SERPL-CCNC: 30 U/L (ref 5–40)
BARBITURATE SCREEN URINE: NEGATIVE
BASOPHILS ABSOLUTE: 0.1 K/UL (ref 0–0.2)
BASOPHILS RELATIVE PERCENT: 0.8 % (ref 0–1)
BENZODIAZEPINE SCREEN, URINE: POSITIVE
BILIRUB SERPL-MCNC: 0.9 MG/DL (ref 0.2–1.2)
BILIRUBIN URINE: NEGATIVE
BLOOD, URINE: NEGATIVE
BUN BLDV-MCNC: 19 MG/DL (ref 6–20)
CALCIUM SERPL-MCNC: 9.5 MG/DL (ref 8.6–10)
CANNABINOID SCREEN URINE: NEGATIVE
CHLORIDE BLD-SCNC: 91 MMOL/L (ref 98–111)
CLARITY: CLEAR
CO2: 22 MMOL/L (ref 22–29)
COCAINE METABOLITE SCREEN URINE: NEGATIVE
COLOR: YELLOW
CREAT SERPL-MCNC: 0.8 MG/DL (ref 0.5–1.2)
EOSINOPHILS ABSOLUTE: 0.1 K/UL (ref 0–0.6)
EOSINOPHILS RELATIVE PERCENT: 1.1 % (ref 0–5)
ETHANOL: <10 MG/DL (ref 0–0.08)
GFR NON-AFRICAN AMERICAN: >60
GLUCOSE BLD-MCNC: 97 MG/DL (ref 74–109)
GLUCOSE URINE: NEGATIVE MG/DL
HCT VFR BLD CALC: 43.7 % (ref 42–52)
HEMOGLOBIN: 14.6 G/DL (ref 14–18)
INR BLD: 1.35 (ref 0.88–1.18)
KETONES, URINE: NEGATIVE MG/DL
LACTIC ACID: 1.6 MMOL/L (ref 0.5–1.9)
LEUKOCYTE ESTERASE, URINE: NEGATIVE
LIPASE: 14 U/L (ref 13–60)
LYMPHOCYTES ABSOLUTE: 2.6 K/UL (ref 1.1–4.5)
LYMPHOCYTES RELATIVE PERCENT: 24.3 % (ref 20–40)
Lab: ABNORMAL
MCH RBC QN AUTO: 29.7 PG (ref 27–31)
MCHC RBC AUTO-ENTMCNC: 33.4 G/DL (ref 33–37)
MCV RBC AUTO: 89 FL (ref 80–94)
MONOCYTES ABSOLUTE: 1 K/UL (ref 0–0.9)
MONOCYTES RELATIVE PERCENT: 9.4 % (ref 0–10)
NEUTROPHILS ABSOLUTE: 6.7 K/UL (ref 1.5–7.5)
NEUTROPHILS RELATIVE PERCENT: 64.1 % (ref 50–65)
NITRITE, URINE: NEGATIVE
OPIATE SCREEN URINE: NEGATIVE
PDW BLD-RTO: 19 % (ref 11.5–14.5)
PH UA: 6 (ref 5–8)
PLATELET # BLD: 238 K/UL (ref 130–400)
PMV BLD AUTO: 9.9 FL (ref 9.4–12.4)
POTASSIUM SERPL-SCNC: 5 MMOL/L (ref 3.5–5)
PRO-BNP: 3393 PG/ML (ref 0–450)
PROTEIN UA: ABNORMAL MG/DL
PROTHROMBIN TIME: 16 SEC (ref 12–14.6)
RBC # BLD: 4.91 M/UL (ref 4.7–6.1)
SODIUM BLD-SCNC: 129 MMOL/L (ref 136–145)
SPECIFIC GRAVITY UA: 1.02 (ref 1–1.03)
TOTAL PROTEIN: 6.9 G/DL (ref 6.6–8.7)
TROPONIN: <0.01 NG/ML (ref 0–0.03)
URINE REFLEX TO CULTURE: ABNORMAL
UROBILINOGEN, URINE: 1 E.U./DL
WBC # BLD: 10.5 K/UL (ref 4.8–10.8)

## 2019-04-28 PROCEDURE — 82150 ASSAY OF AMYLASE: CPT

## 2019-04-28 PROCEDURE — 84484 ASSAY OF TROPONIN QUANT: CPT

## 2019-04-28 PROCEDURE — 96374 THER/PROPH/DIAG INJ IV PUSH: CPT

## 2019-04-28 PROCEDURE — 85025 COMPLETE CBC W/AUTO DIFF WBC: CPT

## 2019-04-28 PROCEDURE — 96375 TX/PRO/DX INJ NEW DRUG ADDON: CPT

## 2019-04-28 PROCEDURE — 83880 ASSAY OF NATRIURETIC PEPTIDE: CPT

## 2019-04-28 PROCEDURE — 99284 EMERGENCY DEPT VISIT MOD MDM: CPT | Performed by: FAMILY MEDICINE

## 2019-04-28 PROCEDURE — 74177 CT ABD & PELVIS W/CONTRAST: CPT

## 2019-04-28 PROCEDURE — 6360000004 HC RX CONTRAST MEDICATION: Performed by: FAMILY MEDICINE

## 2019-04-28 PROCEDURE — 85730 THROMBOPLASTIN TIME PARTIAL: CPT

## 2019-04-28 PROCEDURE — 80307 DRUG TEST PRSMV CHEM ANLYZR: CPT

## 2019-04-28 PROCEDURE — 99284 EMERGENCY DEPT VISIT MOD MDM: CPT

## 2019-04-28 PROCEDURE — G0480 DRUG TEST DEF 1-7 CLASSES: HCPCS

## 2019-04-28 PROCEDURE — 83605 ASSAY OF LACTIC ACID: CPT

## 2019-04-28 PROCEDURE — 36415 COLL VENOUS BLD VENIPUNCTURE: CPT

## 2019-04-28 PROCEDURE — 80053 COMPREHEN METABOLIC PANEL: CPT

## 2019-04-28 PROCEDURE — 83690 ASSAY OF LIPASE: CPT

## 2019-04-28 PROCEDURE — 93005 ELECTROCARDIOGRAM TRACING: CPT

## 2019-04-28 PROCEDURE — 81003 URINALYSIS AUTO W/O SCOPE: CPT

## 2019-04-28 PROCEDURE — 6360000002 HC RX W HCPCS: Performed by: FAMILY MEDICINE

## 2019-04-28 PROCEDURE — 82140 ASSAY OF AMMONIA: CPT

## 2019-04-28 PROCEDURE — 2580000003 HC RX 258: Performed by: FAMILY MEDICINE

## 2019-04-28 PROCEDURE — 85610 PROTHROMBIN TIME: CPT

## 2019-04-28 RX ORDER — MORPHINE SULFATE 4 MG/ML
4 INJECTION, SOLUTION INTRAMUSCULAR; INTRAVENOUS ONCE
Status: COMPLETED | OUTPATIENT
Start: 2019-04-28 | End: 2019-04-28

## 2019-04-28 RX ORDER — SODIUM CHLORIDE 9 MG/ML
INJECTION, SOLUTION INTRAVENOUS CONTINUOUS
Status: DISCONTINUED | OUTPATIENT
Start: 2019-04-28 | End: 2019-04-29 | Stop reason: HOSPADM

## 2019-04-28 RX ORDER — SUCRALFATE 1 G/1
1 TABLET ORAL 4 TIMES DAILY
Qty: 120 TABLET | Refills: 3 | Status: SHIPPED | OUTPATIENT
Start: 2019-04-28 | End: 2019-10-13

## 2019-04-28 RX ORDER — PANTOPRAZOLE SODIUM 40 MG/1
40 TABLET, DELAYED RELEASE ORAL DAILY
Qty: 30 TABLET | Refills: 1 | Status: SHIPPED | OUTPATIENT
Start: 2019-04-28 | End: 2019-10-13

## 2019-04-28 RX ORDER — ONDANSETRON 2 MG/ML
4 INJECTION INTRAMUSCULAR; INTRAVENOUS ONCE
Status: COMPLETED | OUTPATIENT
Start: 2019-04-28 | End: 2019-04-28

## 2019-04-28 RX ADMIN — IOPAMIDOL 90 ML: 755 INJECTION, SOLUTION INTRAVENOUS at 20:40

## 2019-04-28 RX ADMIN — SODIUM CHLORIDE: 9 INJECTION, SOLUTION INTRAVENOUS at 19:49

## 2019-04-28 RX ADMIN — ONDANSETRON 4 MG: 2 INJECTION INTRAMUSCULAR; INTRAVENOUS at 19:49

## 2019-04-28 RX ADMIN — MORPHINE SULFATE 4 MG: 4 INJECTION INTRAVENOUS at 19:50

## 2019-04-28 ASSESSMENT — PAIN SCALES - GENERAL
PAINLEVEL_OUTOF10: 7
PAINLEVEL_OUTOF10: 7

## 2019-04-28 ASSESSMENT — PAIN DESCRIPTION - PAIN TYPE: TYPE: ACUTE PAIN

## 2019-04-28 ASSESSMENT — PAIN DESCRIPTION - LOCATION: LOCATION: ABDOMEN

## 2019-04-28 NOTE — ED PROVIDER NOTES
4/28/2019  9:38 PM      CONTINUE these medications which have NOT CHANGED    Details   !! albuterol sulfate  (90 Base) MCG/ACT inhaler Inhale 2 puffs into the lungs 4 times daily as needed for Wheezing, Disp-1 Inhaler, R-0Print      !! albuterol sulfate HFA (PROVENTIL HFA) 108 (90 Base) MCG/ACT inhaler Inhale 2 puffs into the lungs every 6 hours as needed for Wheezing, Disp-1 Inhaler, R-3Print      aspirin 81 MG EC tablet Take 1 tablet by mouth daily, Disp-30 tablet, R-3OTC      lisinopril (PRINIVIL;ZESTRIL) 10 MG tablet Take 10 mg by mouth daily Historical Med      metoprolol succinate (TOPROL XL) 25 MG extended release tablet Take 1 tablet by mouth every 12 hours, Disp-60 tablet, R-3Normal      furosemide (LASIX) 40 MG tablet Take 1 tablet by mouth daily, Disp-60 tablet, R-3Normal      lactulose (CHRONULAC) 10 GM/15ML solution Take 20 g by mouth nightlyHistorical Med       !! - Potential duplicate medications found. Please discuss with provider. ALLERGIES     Patient has no known allergies.     FAMILY HISTORY       Family History   Problem Relation Age of Onset   [facility] Cancer Mother     No Known Problems Father     No Known Problems Brother           SOCIAL HISTORY       Social History     Socioeconomic History    Marital status: Single     Spouse name: None    Number of children: None    Years of education: None    Highest education level: None   Occupational History    None   Social Needs    Financial resource strain: None    Food insecurity:     Worry: None     Inability: None    Transportation needs:     Medical: None     Non-medical: None   Tobacco Use    Smoking status: Current Every Day Smoker     Packs/day: 0.50     Years: 13.00     Pack years: 6.50     Types: Cigarettes    Smokeless tobacco: Never Used   Substance and Sexual Activity    Alcohol use: No     Comment: Quit around 2017    Drug use: Not Currently     Frequency: 3.0 times per week     Types: Methamphetamines    Sexual activity: None   Lifestyle    Physical activity:     Days per week: None     Minutes per session: None    Stress: None   Relationships    Social connections:     Talks on phone: None     Gets together: None     Attends Congregation service: None     Active member of club or organization: None     Attends meetings of clubs or organizations: None     Relationship status: None    Intimate partner violence:     Fear of current or ex partner: None     Emotionally abused: None     Physically abused: None     Forced sexual activity: None   Other Topics Concern    None   Social History Narrative    None       SCREENINGS    Luke Coma Scale  Eye Opening: Spontaneous  Best Verbal Response: Oriented  Best Motor Response: Obeys commands  Luke Coma Scale Score: 15        PHYSICAL EXAM    (up to 7 for level 4, 8 or more for level 5)     ED Triage Vitals [04/28/19 1822]   BP Temp Temp Source Pulse Resp SpO2 Height Weight   108/78 96.9 °F (36.1 °C) Temporal 101 20 99 % 5' 7\" (1.702 m) 190 lb (86.2 kg)       Physical Exam   Constitutional: He is oriented to person, place, and time. He appears well-developed and well-nourished. HENT:   Head: Normocephalic. Neck: Normal range of motion. Cardiovascular: Normal rate and normal heart sounds. Pulmonary/Chest: Effort normal and breath sounds normal.   Abdominal: Soft. Bowel sounds are normal.   Neurological: He is alert and oriented to person, place, and time. Psychiatric: He has a normal mood and affect.        DIAGNOSTIC RESULTS     EKG: All EKG's areinterpreted by the Emergency Department Physician who either signs or Co-signs this chart in the absence of a cardiologist.        RADIOLOGY:  Non-plain film images such as CT, Ultrasound and MRI are read by the radiologist. Plain radiographic images are visualized and preliminarily interpreted bythe emergency physician with the below findings:          CT ABDOMEN PELVIS W IV CONTRAST Additional Contrast? None   Final Result 1. Increasing abdominopelvic ascites since a CT scan done 2 days ago. The findings are most likely due to right heart failure and acute CHF   exacerbation. Findings were discussed with Dr. Lexi Foster at 8:57 PM on 4/28/2019. Signed by Dr Burton Stone on 4/28/2019 8:57 PM              LABS:  Labs Reviewed   CBC WITH AUTO DIFFERENTIAL - Abnormal; Notable for the following components:       Result Value    RDW 19.0 (*)     Monocytes # 1.00 (*)     All other components within normal limits   COMPREHENSIVE METABOLIC PANEL - Abnormal; Notable for the following components:    Sodium 129 (*)     Chloride 91 (*)     All other components within normal limits   AMMONIA - Abnormal; Notable for the following components:    Ammonia 61 (*)     All other components within normal limits   URINE RT REFLEX TO CULTURE - Abnormal; Notable for the following components:    Protein, UA TRACE (*)     All other components within normal limits   URINE DRUG SCREEN - Abnormal; Notable for the following components:    Benzodiazepine Screen, Urine Positive (*)     All other components within normal limits   BRAIN NATRIURETIC PEPTIDE - Abnormal; Notable for the following components:    Pro-BNP 3,393 (*)     All other components within normal limits   PROTIME-INR - Abnormal; Notable for the following components:    Protime 16.0 (*)     INR 1.35 (*)     All other components within normal limits   TROPONIN   AMYLASE   LIPASE   ETHANOL   LACTIC ACID, PLASMA   APTT       All other labs were within normal range or not returned as of this dictation.     EMERGENCY DEPARTMENT COURSE and DIFFERENTIAL DIAGNOSIS/MDM:   Vitals:    Vitals:    04/28/19 1822   BP: 108/78   Pulse: 101   Resp: 20   Temp: 96.9 °F (36.1 °C)   TempSrc: Temporal   SpO2: 99%   Weight: 190 lb (86.2 kg)   Height: 5' 7\" (1.702 m)       MDM    Reassessment  Patient's CT is essentially unchanged his edematous gallbladder would be less likely a source of his left sided abdominal pain for the past few weeks. CONSULTS:  None    PROCEDURES:  Unless otherwise noted below, none     Procedures    FINAL IMPRESSION      1. Left upper quadrant pain          DISPOSITION/PLAN   DISPOSITION Decision To Discharge 04/28/2019 09:36:08 PM      PATIENT REFERRED TO:  Marcy Jean MD  [place] [address]  [phone]    Call in 1 day      Mame Young MD  [address] [address]    Call   As needed      DISCHARGE MEDICATIONS:  Discharge Medication List as of 4/28/2019  9:38 PM      START taking these medications    Details   pantoprazole (PROTONIX) 40 MG tablet Take 1 tablet by mouth daily, Disp-30 tablet, R-1Print      sucralfate (CARAFATE) 1 GM tablet Take 1 tablet by mouth 4 times daily, Disp-120 tablet, R-3Print                (Please note that portions of this note were completed with a voice recognition program.  Efforts were made to edit thedictations but occasionally words are mis-transcribed.)    Lissette Gonzales MD (electronically signed)  Attending Emergency Physician         Froy Chowdary MD  04/28/19 8143       Froy Chowdary MD  05/02/19 3102

## 2019-04-29 LAB
EKG P AXIS: 65 DEGREES
EKG P-R INTERVAL: 164 MS
EKG Q-T INTERVAL: 352 MS
EKG QRS DURATION: 104 MS
EKG QTC CALCULATION (BAZETT): 437 MS
EKG T AXIS: 76 DEGREES

## 2019-05-02 ASSESSMENT — ENCOUNTER SYMPTOMS
SHORTNESS OF BREATH: 0
NAUSEA: 1
VOMITING: 0
COLOR CHANGE: 0
SORE THROAT: 0
DIARRHEA: 0
WHEEZING: 0
COUGH: 0
ABDOMINAL PAIN: 1
BACK PAIN: 0

## 2019-05-06 ENCOUNTER — HOSPITAL ENCOUNTER (INPATIENT)
Age: 34
LOS: 3 days | Discharge: HOME | DRG: 291 | End: 2019-05-09
Attending: EMERGENCY MEDICINE | Admitting: INTERNAL MEDICINE
Payer: COMMERCIAL

## 2019-05-06 ENCOUNTER — APPOINTMENT (OUTPATIENT)
Dept: GENERAL RADIOLOGY | Age: 34
DRG: 291 | End: 2019-05-06
Payer: COMMERCIAL

## 2019-05-06 DIAGNOSIS — F15.91 HISTORY OF METHAMPHETAMINE USE: ICD-10-CM

## 2019-05-06 DIAGNOSIS — I50.23 ACUTE ON CHRONIC SYSTOLIC CONGESTIVE HEART FAILURE (HCC): Primary | ICD-10-CM

## 2019-05-06 DIAGNOSIS — R79.89 ABNORMAL LFTS: ICD-10-CM

## 2019-05-06 DIAGNOSIS — R06.02 SOB (SHORTNESS OF BREATH): ICD-10-CM

## 2019-05-06 DIAGNOSIS — E87.1 HYPONATREMIA: ICD-10-CM

## 2019-05-06 DIAGNOSIS — E87.70 HYPERVOLEMIA, UNSPECIFIED HYPERVOLEMIA TYPE: ICD-10-CM

## 2019-05-06 PROBLEM — I50.9 CHF WITH UNKNOWN LVEF (HCC): Status: ACTIVE | Noted: 2019-05-06

## 2019-05-06 LAB
ALBUMIN SERPL-MCNC: 3.8 G/DL (ref 3.5–5.2)
ALP BLD-CCNC: 112 U/L (ref 40–130)
ALT SERPL-CCNC: 63 U/L (ref 5–41)
AMPHETAMINE SCREEN, URINE: NEGATIVE
ANION GAP SERPL CALCULATED.3IONS-SCNC: 14 MMOL/L (ref 7–19)
ANION GAP SERPL CALCULATED.3IONS-SCNC: 19 MMOL/L (ref 7–19)
AST SERPL-CCNC: 56 U/L (ref 5–40)
BARBITURATE SCREEN URINE: NEGATIVE
BASE EXCESS ARTERIAL: 1 MMOL/L (ref -2–2)
BASOPHILS ABSOLUTE: 0.1 K/UL (ref 0–0.2)
BASOPHILS RELATIVE PERCENT: 0.9 % (ref 0–1)
BENZODIAZEPINE SCREEN, URINE: POSITIVE
BILIRUB SERPL-MCNC: 0.9 MG/DL (ref 0.2–1.2)
BILIRUBIN URINE: NEGATIVE
BLOOD, URINE: NEGATIVE
BUN BLDV-MCNC: 16 MG/DL (ref 6–20)
BUN BLDV-MCNC: 16 MG/DL (ref 6–20)
CALCIUM SERPL-MCNC: 8.5 MG/DL (ref 8.6–10)
CALCIUM SERPL-MCNC: 8.9 MG/DL (ref 8.6–10)
CANNABINOID SCREEN URINE: NEGATIVE
CARBOXYHEMOGLOBIN ARTERIAL: 3.6 % (ref 0–5)
CHLORIDE BLD-SCNC: 87 MMOL/L (ref 98–111)
CHLORIDE BLD-SCNC: 89 MMOL/L (ref 98–111)
CLARITY: CLEAR
CO2: 22 MMOL/L (ref 22–29)
CO2: 22 MMOL/L (ref 22–29)
COCAINE METABOLITE SCREEN URINE: NEGATIVE
COLOR: YELLOW
CREAT SERPL-MCNC: 0.8 MG/DL (ref 0.5–1.2)
CREAT SERPL-MCNC: 1.1 MG/DL (ref 0.5–1.2)
EOSINOPHILS ABSOLUTE: 0 K/UL (ref 0–0.6)
EOSINOPHILS RELATIVE PERCENT: 0.3 % (ref 0–5)
GFR NON-AFRICAN AMERICAN: >60
GFR NON-AFRICAN AMERICAN: >60
GLUCOSE BLD-MCNC: 110 MG/DL (ref 74–109)
GLUCOSE BLD-MCNC: 266 MG/DL (ref 74–109)
GLUCOSE URINE: NEGATIVE MG/DL
HCO3 ARTERIAL: 23.4 MMOL/L (ref 22–26)
HCT VFR BLD CALC: 38.1 % (ref 42–52)
HEMOGLOBIN, ART, EXTENDED: 13.3 G/DL (ref 14–18)
HEMOGLOBIN: 12.9 G/DL (ref 14–18)
INR BLD: 1.48 (ref 0.88–1.18)
KETONES, URINE: NEGATIVE MG/DL
LACTIC ACID: 2.1 MMOL/L (ref 0.5–1.9)
LACTIC ACID: 2.1 MMOL/L (ref 0.5–1.9)
LEUKOCYTE ESTERASE, URINE: NEGATIVE
LIPASE: 14 U/L (ref 13–60)
LYMPHOCYTES ABSOLUTE: 1.5 K/UL (ref 1.1–4.5)
LYMPHOCYTES RELATIVE PERCENT: 15.4 % (ref 20–40)
Lab: ABNORMAL
MCH RBC QN AUTO: 29.7 PG (ref 27–31)
MCHC RBC AUTO-ENTMCNC: 33.9 G/DL (ref 33–37)
MCV RBC AUTO: 87.8 FL (ref 80–94)
METHEMOGLOBIN ARTERIAL: 1 %
MONOCYTES ABSOLUTE: 1.5 K/UL (ref 0–0.9)
MONOCYTES RELATIVE PERCENT: 15.9 % (ref 0–10)
NEUTROPHILS ABSOLUTE: 6.5 K/UL (ref 1.5–7.5)
NEUTROPHILS RELATIVE PERCENT: 67.1 % (ref 50–65)
NITRITE, URINE: NEGATIVE
O2 CONTENT ARTERIAL: 17.4 ML/DL
O2 SAT, ARTERIAL: 92.7 %
O2 THERAPY: ABNORMAL
OPIATE SCREEN URINE: NEGATIVE
PCO2 ARTERIAL: 30 MMHG (ref 35–45)
PDW BLD-RTO: 18.7 % (ref 11.5–14.5)
PH ARTERIAL: 7.5 (ref 7.35–7.45)
PH UA: 6.5 (ref 5–8)
PLATELET # BLD: 282 K/UL (ref 130–400)
PMV BLD AUTO: 10.1 FL (ref 9.4–12.4)
PO2 ARTERIAL: 73 MMHG (ref 80–100)
POTASSIUM SERPL-SCNC: 4 MMOL/L (ref 3.5–5)
POTASSIUM SERPL-SCNC: 4.6 MMOL/L (ref 3.5–5)
POTASSIUM, WHOLE BLOOD: 4.5
PRO-BNP: 7374 PG/ML (ref 0–450)
PROTEIN UA: NEGATIVE MG/DL
PROTHROMBIN TIME: 17.2 SEC (ref 12–14.6)
RBC # BLD: 4.34 M/UL (ref 4.7–6.1)
SODIUM BLD-SCNC: 125 MMOL/L (ref 136–145)
SODIUM BLD-SCNC: 128 MMOL/L (ref 136–145)
SPECIFIC GRAVITY UA: 1.01 (ref 1–1.03)
TOTAL PROTEIN: 6.8 G/DL (ref 6.6–8.7)
TROPONIN: 0.02 NG/ML (ref 0–0.03)
TROPONIN: 0.03 NG/ML (ref 0–0.03)
TROPONIN: <0.01 NG/ML (ref 0–0.03)
URINE REFLEX TO CULTURE: NORMAL
UROBILINOGEN, URINE: 1 E.U./DL
WBC # BLD: 9.7 K/UL (ref 4.8–10.8)

## 2019-05-06 PROCEDURE — 71046 X-RAY EXAM CHEST 2 VIEWS: CPT

## 2019-05-06 PROCEDURE — 6360000002 HC RX W HCPCS: Performed by: INTERNAL MEDICINE

## 2019-05-06 PROCEDURE — 83690 ASSAY OF LIPASE: CPT

## 2019-05-06 PROCEDURE — 94664 DEMO&/EVAL PT USE INHALER: CPT

## 2019-05-06 PROCEDURE — 36415 COLL VENOUS BLD VENIPUNCTURE: CPT

## 2019-05-06 PROCEDURE — 6360000002 HC RX W HCPCS: Performed by: EMERGENCY MEDICINE

## 2019-05-06 PROCEDURE — 87040 BLOOD CULTURE FOR BACTERIA: CPT

## 2019-05-06 PROCEDURE — 93005 ELECTROCARDIOGRAM TRACING: CPT

## 2019-05-06 PROCEDURE — 84132 ASSAY OF SERUM POTASSIUM: CPT

## 2019-05-06 PROCEDURE — 99285 EMERGENCY DEPT VISIT HI MDM: CPT | Performed by: EMERGENCY MEDICINE

## 2019-05-06 PROCEDURE — 83880 ASSAY OF NATRIURETIC PEPTIDE: CPT

## 2019-05-06 PROCEDURE — 2580000003 HC RX 258: Performed by: INTERNAL MEDICINE

## 2019-05-06 PROCEDURE — 80307 DRUG TEST PRSMV CHEM ANLYZR: CPT

## 2019-05-06 PROCEDURE — 96375 TX/PRO/DX INJ NEW DRUG ADDON: CPT

## 2019-05-06 PROCEDURE — 94640 AIRWAY INHALATION TREATMENT: CPT

## 2019-05-06 PROCEDURE — 80053 COMPREHEN METABOLIC PANEL: CPT

## 2019-05-06 PROCEDURE — 83605 ASSAY OF LACTIC ACID: CPT

## 2019-05-06 PROCEDURE — 6370000000 HC RX 637 (ALT 250 FOR IP): Performed by: EMERGENCY MEDICINE

## 2019-05-06 PROCEDURE — 36600 WITHDRAWAL OF ARTERIAL BLOOD: CPT

## 2019-05-06 PROCEDURE — 96376 TX/PRO/DX INJ SAME DRUG ADON: CPT

## 2019-05-06 PROCEDURE — 99222 1ST HOSP IP/OBS MODERATE 55: CPT | Performed by: PHYSICIAN ASSISTANT

## 2019-05-06 PROCEDURE — 85025 COMPLETE CBC W/AUTO DIFF WBC: CPT

## 2019-05-06 PROCEDURE — 96374 THER/PROPH/DIAG INJ IV PUSH: CPT

## 2019-05-06 PROCEDURE — 81003 URINALYSIS AUTO W/O SCOPE: CPT

## 2019-05-06 PROCEDURE — 2140000000 HC CCU INTERMEDIATE R&B

## 2019-05-06 PROCEDURE — 99285 EMERGENCY DEPT VISIT HI MDM: CPT

## 2019-05-06 PROCEDURE — 82803 BLOOD GASES ANY COMBINATION: CPT

## 2019-05-06 PROCEDURE — 84484 ASSAY OF TROPONIN QUANT: CPT

## 2019-05-06 PROCEDURE — 85610 PROTHROMBIN TIME: CPT

## 2019-05-06 PROCEDURE — 6370000000 HC RX 637 (ALT 250 FOR IP): Performed by: PHYSICIAN ASSISTANT

## 2019-05-06 RX ORDER — IPRATROPIUM BROMIDE AND ALBUTEROL SULFATE 2.5; .5 MG/3ML; MG/3ML
1 SOLUTION RESPIRATORY (INHALATION) ONCE
Status: COMPLETED | OUTPATIENT
Start: 2019-05-06 | End: 2019-05-06

## 2019-05-06 RX ORDER — FUROSEMIDE 10 MG/ML
80 INJECTION INTRAMUSCULAR; INTRAVENOUS ONCE
Status: COMPLETED | OUTPATIENT
Start: 2019-05-06 | End: 2019-05-06

## 2019-05-06 RX ORDER — ONDANSETRON 2 MG/ML
4 INJECTION INTRAMUSCULAR; INTRAVENOUS EVERY 6 HOURS PRN
Status: DISCONTINUED | OUTPATIENT
Start: 2019-05-06 | End: 2019-05-09 | Stop reason: HOSPADM

## 2019-05-06 RX ORDER — SUCRALFATE 1 G/1
1 TABLET ORAL 4 TIMES DAILY
Status: DISCONTINUED | OUTPATIENT
Start: 2019-05-06 | End: 2019-05-09 | Stop reason: HOSPADM

## 2019-05-06 RX ORDER — FUROSEMIDE 10 MG/ML
20 INJECTION INTRAMUSCULAR; INTRAVENOUS ONCE
Status: DISCONTINUED | OUTPATIENT
Start: 2019-05-06 | End: 2019-05-06

## 2019-05-06 RX ORDER — METOPROLOL SUCCINATE 25 MG/1
25 TABLET, EXTENDED RELEASE ORAL 2 TIMES DAILY
Status: DISCONTINUED | OUTPATIENT
Start: 2019-05-06 | End: 2019-05-09 | Stop reason: HOSPADM

## 2019-05-06 RX ORDER — SPIRONOLACTONE 25 MG/1
25 TABLET ORAL DAILY
COMMUNITY
End: 2019-06-17 | Stop reason: SDUPTHER

## 2019-05-06 RX ORDER — SPIRONOLACTONE 25 MG/1
25 TABLET ORAL DAILY
Status: DISCONTINUED | OUTPATIENT
Start: 2019-05-06 | End: 2019-05-09 | Stop reason: HOSPADM

## 2019-05-06 RX ORDER — SODIUM CHLORIDE 0.9 % (FLUSH) 0.9 %
10 SYRINGE (ML) INJECTION PRN
Status: DISCONTINUED | OUTPATIENT
Start: 2019-05-06 | End: 2019-05-09 | Stop reason: HOSPADM

## 2019-05-06 RX ORDER — ASPIRIN 81 MG/1
81 TABLET ORAL DAILY
Status: DISCONTINUED | OUTPATIENT
Start: 2019-05-06 | End: 2019-05-09 | Stop reason: HOSPADM

## 2019-05-06 RX ORDER — NICOTINE 21 MG/24HR
1 PATCH, TRANSDERMAL 24 HOURS TRANSDERMAL DAILY
Status: DISCONTINUED | OUTPATIENT
Start: 2019-05-06 | End: 2019-05-09 | Stop reason: HOSPADM

## 2019-05-06 RX ORDER — PANTOPRAZOLE SODIUM 40 MG/1
40 TABLET, DELAYED RELEASE ORAL DAILY
Status: DISCONTINUED | OUTPATIENT
Start: 2019-05-06 | End: 2019-05-09 | Stop reason: HOSPADM

## 2019-05-06 RX ORDER — SODIUM CHLORIDE 0.9 % (FLUSH) 0.9 %
10 SYRINGE (ML) INJECTION EVERY 12 HOURS SCHEDULED
Status: DISCONTINUED | OUTPATIENT
Start: 2019-05-06 | End: 2019-05-09 | Stop reason: HOSPADM

## 2019-05-06 RX ORDER — LISINOPRIL 10 MG/1
10 TABLET ORAL DAILY
Status: DISCONTINUED | OUTPATIENT
Start: 2019-05-06 | End: 2019-05-09 | Stop reason: HOSPADM

## 2019-05-06 RX ORDER — FUROSEMIDE 10 MG/ML
40 INJECTION INTRAMUSCULAR; INTRAVENOUS 2 TIMES DAILY
Status: DISCONTINUED | OUTPATIENT
Start: 2019-05-06 | End: 2019-05-09 | Stop reason: HOSPADM

## 2019-05-06 RX ORDER — METHYLPREDNISOLONE SODIUM SUCCINATE 40 MG/ML
40 INJECTION, POWDER, LYOPHILIZED, FOR SOLUTION INTRAMUSCULAR; INTRAVENOUS ONCE
Status: COMPLETED | OUTPATIENT
Start: 2019-05-06 | End: 2019-05-06

## 2019-05-06 RX ORDER — LACTULOSE 10 G/15ML
20 SOLUTION ORAL NIGHTLY
Status: DISCONTINUED | OUTPATIENT
Start: 2019-05-06 | End: 2019-05-09 | Stop reason: HOSPADM

## 2019-05-06 RX ADMIN — PANTOPRAZOLE SODIUM 40 MG: 40 TABLET, DELAYED RELEASE ORAL at 15:20

## 2019-05-06 RX ADMIN — IPRATROPIUM BROMIDE AND ALBUTEROL SULFATE 1 AMPULE: .5; 3 SOLUTION RESPIRATORY (INHALATION) at 09:10

## 2019-05-06 RX ADMIN — METHYLPREDNISOLONE SODIUM SUCCINATE 40 MG: 40 INJECTION, POWDER, FOR SOLUTION INTRAMUSCULAR; INTRAVENOUS at 11:34

## 2019-05-06 RX ADMIN — METOPROLOL SUCCINATE 25 MG: 25 TABLET, EXTENDED RELEASE ORAL at 15:20

## 2019-05-06 RX ADMIN — METOPROLOL SUCCINATE 25 MG: 25 TABLET, EXTENDED RELEASE ORAL at 20:18

## 2019-05-06 RX ADMIN — SUCRALFATE 1 G: 1 TABLET ORAL at 17:57

## 2019-05-06 RX ADMIN — FUROSEMIDE 40 MG: 10 INJECTION, SOLUTION INTRAMUSCULAR; INTRAVENOUS at 20:16

## 2019-05-06 RX ADMIN — LACTULOSE 20 G: 20 SOLUTION ORAL at 20:16

## 2019-05-06 RX ADMIN — SPIRONOLACTONE 25 MG: 25 TABLET ORAL at 15:20

## 2019-05-06 RX ADMIN — FUROSEMIDE 80 MG: 10 INJECTION, SOLUTION INTRAMUSCULAR; INTRAVENOUS at 11:34

## 2019-05-06 RX ADMIN — ASPIRIN 81 MG: 81 TABLET ORAL at 15:20

## 2019-05-06 RX ADMIN — SUCRALFATE 1 G: 1 TABLET ORAL at 20:16

## 2019-05-06 RX ADMIN — Medication 10 ML: at 20:17

## 2019-05-06 RX ADMIN — FUROSEMIDE 80 MG: 10 INJECTION, SOLUTION INTRAMUSCULAR; INTRAVENOUS at 09:50

## 2019-05-06 RX ADMIN — LISINOPRIL 10 MG: 10 TABLET ORAL at 15:20

## 2019-05-06 RX ADMIN — ENOXAPARIN SODIUM 40 MG: 40 INJECTION SUBCUTANEOUS at 14:33

## 2019-05-06 ASSESSMENT — ENCOUNTER SYMPTOMS
BACK PAIN: 0
SHORTNESS OF BREATH: 1
ABDOMINAL PAIN: 0
COUGH: 1

## 2019-05-06 ASSESSMENT — PAIN SCALES - GENERAL
PAINLEVEL_OUTOF10: 0
PAINLEVEL_OUTOF10: 0
PAINLEVEL_OUTOF10: 7
PAINLEVEL_OUTOF10: 0

## 2019-05-06 ASSESSMENT — PAIN DESCRIPTION - PAIN TYPE: TYPE: ACUTE PAIN

## 2019-05-06 ASSESSMENT — PAIN DESCRIPTION - LOCATION: LOCATION: ABDOMEN;RIB CAGE

## 2019-05-06 NOTE — PROGRESS NOTES
Contains abnormal data Blood Gas, Arterial   Status:  Final result    Ref Range & Units 05/06/19 0918   pH, Arterial 7.350 - 7.450 7.500High     pCO2, Arterial 35.0 - 45.0 mmHg 30. 0Low     pO2, Arterial 80.0 - 100.0 mmHg 73. 0Low     HCO3, Arterial 22.0 - 26.0 mmol/L 23.4    Base Excess, Arterial -2.0 - 2.0 mmol/L 1.0    Hemoglobin, Art, Extended 14.0 - 18.0 g/dL 13.3Low     O2 Sat, Arterial >92 % 92.7    Carboxyhgb, Arterial 0.0 - 5.0 % 3.6    Comment:      0.0-1.5   (Smokers 1.5-5.0)    Methemoglobin, Arterial <1.5 % 1.0    O2 Content, Arterial Not Established mL/dL 17.4    O2 Therapy  Unknown    Resulting Agency  [facility]        Specimen Collected: 05/06/19 0918 Last Resulted: 05/06/19 0919 View Other Order Details        Pt on room air, RR 36 site RR at+

## 2019-05-06 NOTE — ED TRIAGE NOTES
Pt seen multiple times in ER for SOA. SOA increasingly getting worse. Pt had US ordered for today to r/o gallbladder disease, ascites, elevated liver enzymes. Pt states that he has had significant loss of appetite and weight gain. Pt states that he has CHF d/t hx of meth use.

## 2019-05-06 NOTE — ED PROVIDER NOTES
[facility]  eMERGENCY dEPARTMENT eNCOUnter      Pt Name: Poonam Liao  MRN: [id number]  [place] 1985  Date of evaluation: 5/6/2019  Provider: Chico Mcconnell MD    CHIEF COMPLAINT       Chief Complaint   Patient presents with    Shortness of Breath         HISTORY OF PRESENT ILLNESS   (Location/Symptom, Timing/Onset,Context/Setting, Quality, Duration, Modifying Factors, Severity)  Note limiting factors. Poonam Liao is a 35 y.o. male who presents to the emergency department with shortness of breath. Patient is known to have nonischemic cardiomyopathy history of methamphetamine abuse. Patient states that he short of breath worsening over the last week he double his Lasix to 80 mg a day. The patient is short of breath at rest even. He has severe nonischemic cardiomyopathy. The patient feels weak. He denies meth use in the last couple weeks. The patient is sitting up in bed. He does smoke cigarettes. No fevers. He was scheduled today at an ultrasound and look for ascites. The history is provided by the patient, medical records and a parent. NursingNotes were reviewed. REVIEW OF SYSTEMS    (2-9 systems for level 4, 10 or more for level 5)     Review of Systems   Constitutional: Negative for fever. Respiratory: Positive for cough and shortness of breath. Cardiovascular: Negative for chest pain. Gastrointestinal: Negative for abdominal pain. Genitourinary: Negative for dysuria. Musculoskeletal: Negative for back pain. Neurological: Negative for seizures and syncope. Psychiatric/Behavioral: The patient is nervous/anxious. A complete review of systems was performed and is negative except as noted above in the HPI.        PAST MEDICAL HISTORY     Past Medical History:   Diagnosis Date    CHF (congestive heart failure) ([address]     Hypertension          SURGICAL HISTORY       Past Surgical History:   Procedure Laterality Date    HAND SURGERY           CURRENT MEDICATIONS Previous Medications    ALBUTEROL SULFATE HFA (PROVENTIL HFA) 108 (90 BASE) MCG/ACT INHALER    Inhale 2 puffs into the lungs every 6 hours as needed for Wheezing    ALBUTEROL SULFATE  (90 BASE) MCG/ACT INHALER    Inhale 2 puffs into the lungs 4 times daily as needed for Wheezing    ASPIRIN 81 MG EC TABLET    Take 1 tablet by mouth daily    FUROSEMIDE (LASIX) 40 MG TABLET    Take 1 tablet by mouth daily    LACTULOSE (CHRONULAC) 10 GM/15ML SOLUTION    Take 20 g by mouth nightly    LISINOPRIL (PRINIVIL;ZESTRIL) 10 MG TABLET    Take 10 mg by mouth daily     METOPROLOL SUCCINATE (TOPROL XL) 25 MG EXTENDED RELEASE TABLET    Take 1 tablet by mouth every 12 hours    PANTOPRAZOLE (PROTONIX) 40 MG TABLET    Take 1 tablet by mouth daily    SPIRONOLACTONE (ALDACTONE) 25 MG TABLET    Take 25 mg by mouth daily    SUCRALFATE (CARAFATE) 1 GM TABLET    Take 1 tablet by mouth 4 times daily       ALLERGIES     Patient has no known allergies.     FAMILY HISTORY       Family History   Problem Relation Age of Onset   [facility] Cancer Mother     No Known Problems Father     No Known Problems Brother           SOCIAL HISTORY       Social History     Socioeconomic History    Marital status: Single     Spouse name: None    Number of children: None    Years of education: None    Highest education level: None   Occupational History    None   Social Needs    Financial resource strain: None    Food insecurity:     Worry: None     Inability: None    Transportation needs:     Medical: None     Non-medical: None   Tobacco Use    Smoking status: Current Every Day Smoker     Packs/day: 0.50     Years: 13.00     Pack years: 6.50     Types: Cigarettes    Smokeless tobacco: Never Used   Substance and Sexual Activity    Alcohol use: No     Comment: Quit around 2017    Drug use: Not Currently     Frequency: 3.0 times per week     Types: Methamphetamines    Sexual activity: None   Lifestyle    Physical activity:     Days per week: None findings:      Interpretation per the Radiologist below, if available at the time of this note:    XR CHEST STANDARD (2 VW)   Final Result   No active cardiopulmonary disease. A persistent moderate cardiomegaly. Above findings are recorded on a digital voice clip in PACS.    Signed by Dr Odalys Andino on 5/6/2019 9:15 AM            ED BEDSIDE ULTRASOUND:   Performed by ED Physician - ECHO 5-10 % EF by me at bedside  NO ASCITIES - if any would be very small amount not seen on my US    LABS:  Labs Reviewed   COMPREHENSIVE METABOLIC PANEL - Abnormal; Notable for the following components:       Result Value    Sodium 125 (*)     Chloride 89 (*)     Glucose 110 (*)     ALT 63 (*)     AST 56 (*)     All other components within normal limits   CBC WITH AUTO DIFFERENTIAL - Abnormal; Notable for the following components:    RBC 4.34 (*)     Hemoglobin 12.9 (*)     Hematocrit 38.1 (*)     RDW 18.7 (*)     Neutrophils % 67.1 (*)     Lymphocytes % 15.4 (*)     Monocytes % 15.9 (*)     Monocytes # 1.50 (*)     All other components within normal limits   PROTIME-INR - Abnormal; Notable for the following components:    Protime 17.2 (*)     INR 1.48 (*)     All other components within normal limits   BRAIN NATRIURETIC PEPTIDE - Abnormal; Notable for the following components:    Pro-BNP 7,374 (*)     All other components within normal limits   LACTIC ACID, PLASMA - Abnormal; Notable for the following components:    Lactic Acid 2.1 (*)     All other components within normal limits    Narrative:     CALL  [place]  [facility]  Chemistry results called to and read back by Hugh westbrook at ed, 05/06/2019  09:48, by TK   BLOOD GAS, ARTERIAL - Abnormal; Notable for the following components:    pH, Arterial 7.500 (*)     pCO2, Arterial 30.0 (*)     pO2, Arterial 73.0 (*)     Hemoglobin, Art, Extended 13.3 (*)     All other components within normal limits   CULTURE BLOOD #1   CULTURE BLOOD #2   LIPASE   URINE RT REFLEX TO CULTURE   URINE DRUG SCREEN   TROPONIN   POTASSIUM, WHOLE BLOOD   LACTIC ACID, PLASMA       All other labs were within normal range or not returned as of this dictation. EMERGENCY DEPARTMENT COURSE and DIFFERENTIALDIAGNOSIS/MDM:   Vitals:    Vitals:    05/06/19 0846 05/06/19 0910   BP: 121/87    Pulse: 135    Resp: (!) 38    Temp: 98.5 °F (36.9 °C)    TempSrc: Oral    SpO2: 91% 94%   Weight: 205 lb (93 kg)    Height: 5' 7\" (1.702 m)        MDM  Number of Diagnoses or Management Options  Abnormal LFTs: new and requires workup  Acute on chronic systolic congestive heart failure ([address]): new and requires workup  History of methamphetamine use:   Hypervolemia, unspecified hypervolemia type: new and requires workup  Hyponatremia: new and requires workup  SOB (shortness of breath): new and requires workup  Diagnosis management comments: Patient with known nonischemic cardiopathy severe. Patient with volume overload clinically BNP more than doubled in the last week. Patient has increased his Lasix at home. Is tachycardic and tachypnea, arrival. The patient had a bedside ultrasound done by me showing no ascites, the patient also has profound reduction in his EF my estimate would be 5-10% on bedside ultrasound. I did give him 160 mg of Lasix. He did urinate 2 L and feel better in the emergency department. He still tachycardic and tachypneic. Clinically this is a volume overload if she was severe cardiomyopathy. The patient also was given a breathing treatment which helped. I think there is a reactive airway component given his smoking history as well as was given some steroids as well. His sodium is very low likely secondary to volume overload prognosis is poor given the entire picture. Admit to hospitalist service. Consult Dr. Leopoldo Patch cardiology to see the patient. Seen in ED.         Amount and/or Complexity of Data Reviewed  Clinical lab tests: ordered and reviewed  Tests in the radiology section of CPT®: reviewed and

## 2019-05-06 NOTE — PROGRESS NOTES
4 Eyes Skin Assessment    Wili Fountain is being assessed upon: Admission    I agree that I, Macy Guillen, along with *** (either 2 RN's or 1 LPN and 1 RN) have performed a thorough Head to Toe Skin Assessment on the patient. ALL assessment sites listed below have been assessed. Areas assessed by both nurses:     [x]   Head, Face, and Ears   [x]   Shoulders, Back, and Chest  [x]   Arms, Elbows, and Hands   [x]   Coccyx, Sacrum, and Ischium  [x]   Legs, Feet, and Heels    Does the Patient have Skin Breakdown?  No    [place] Prevention initiated: No  Wound Care Orders initiated: NA    [facility] nurse consulted for Pressure Injury (Stage 3,4, Unstageable, DTI, NWPT, and Complex wounds) and New or Established Ostomies: ABIOLA        Primary Nurse eSignature: Macy Guillen RN on 5/6/2019 at 1:22 PM      Co-Signer eSignature: {Esignature:[id number]}

## 2019-05-06 NOTE — H&P
[place] - History & Physical      PCP: No primary care provider on file. Date of Admission: 5/6/2019    Date of Service: 5/6/2019    Chief Complaint:  Dysnea    History Of Present Illness: The patient is a 35 y.o. male with PMH significant for nonischemic cardiomyopathy who presented to [facility] ED complaining of worsening dyspnea. He states it has worsened over the last week, associated with non-productive cough and he has increased his Lasix to 80 mg a day. He remains short of breath at rest, worse with exertion. He is a current 1/2 PPD smoker, denies alcohol or recent drug use. Past Medical History:        Diagnosis Date    CHF (congestive heart failure) (HCC)     Hypertension      Past Surgical History:        Procedure Laterality Date    HAND SURGERY       Home Medications:  Prior to Admission medications    Medication Sig Start Date End Date Taking?  Authorizing Provider   spironolactone (ALDACTONE) 25 MG tablet Take 25 mg by mouth daily   Yes Historical Provider, MD   pantoprazole (PROTONIX) 40 MG tablet Take 1 tablet by mouth daily 4/28/19  Yes Lily Singletary MD   sucralfate (CARAFATE) 1 GM tablet Take 1 tablet by mouth 4 times daily 4/28/19  Yes Lily Singletary MD   albuterol sulfate  (90 Base) MCG/ACT inhaler Inhale 2 puffs into the lungs 4 times daily as needed for Wheezing 4/24/19  Yes ANGELES Khan   lactulose (CHRONULAC) 10 GM/15ML solution Take 20 g by mouth nightly   Yes Historical Provider, MD   albuterol sulfate HFA (PROVENTIL HFA) 108 (90 Base) MCG/ACT inhaler Inhale 2 puffs into the lungs every 6 hours as needed for Wheezing 3/18/19  Yes Troy Pimentel MD   aspirin 81 MG EC tablet Take 1 tablet by mouth daily 10/20/18  Yes Chalino Peña MD   lisinopril (PRINIVIL;ZESTRIL) 10 MG tablet Take 10 mg by mouth daily    Yes Historical Provider, MD   metoprolol succinate (TOPROL XL) 25 MG extended release tablet Take 1 tablet by mouth every 12 hours distress  Head: Normocephalic, without obvious abnormality, atraumatic  Eyes: conjunctivae/corneas clear. PERRL, EOM's intact. Ears: normal external ears and nose, throat without exudate  Neck: no adenopathy, no carotid bruit, no JVD, supple, symmetrical, trachea midline and thyroid not enlarged, symmetric, no tenderness/mass/nodules  Lungs: diminished with faint bibasilar rales   Heart: tachycardic and regular rhythm, S1, S2 normal, no murmur  Abdomen:soft, non-tender; non-distended, normal bowel sounds no masses, no organomegaly  Extremities:trace lower extremity edema,  No erythema, no tenderness to palpation  Skin: Skin color, texture, turgor normal. No rashes or lesions  Lymphatic: No palpable lymph node enlargment  Neurologic: Alert and oriented X 3, normal strength and tone. Normal symmetric reflexes.  Mental status: Alert, oriented, thought content appropriate  Cranial nerves:II-XII Grossly intact Sensory: normal Motor:grossly normal  Psychiatric: Alert and oriented, thought content appropriate, normal insight, mood appropriate    Diagnostic Data:  CBC:  Recent Labs     05/06/19  0905   WBC 9.7   HGB 12.9*   HCT 38.1*        BMP:  Recent Labs     05/06/19  0905 05/06/19  0918   *  --    K 4.6 4.5   CL 89*  --    CO2 22  --    BUN 16  --    CREATININE 0.8  --    CALCIUM 8.9  --      Recent Labs     05/06/19  0905   AST 56*   ALT 63*   BILITOT 0.9   ALKPHOS 112     Coag Panel:   Recent Labs     05/06/19  0905   INR 1.48*   PROTIME 17.2*     Cardiac Enzymes:   Recent Labs     05/06/19  0905 05/06/19  1315   TROPONINI 0.03 0.02     ABGs:  Lab Results   Component Value Date    PHART 7.500 05/06/2019    PO2ART 73.0 05/06/2019    ITZ4MAN 30.0 05/06/2019     Urinalysis:  Lab Results   Component Value Date    NITRU Negative 05/06/2019    WBCUA 4 04/24/2019    BACTERIA NEGATIVE 04/24/2019    RBCUA 0 04/24/2019    BLOODU Negative 05/06/2019    SPECGRAV 1.011 05/06/2019    GLUCOSEU Negative 05/06/2019 A1C: No results for input(s): LABA1C in the last 72 hours. ABG:  Recent Labs     05/06/19  0918   PHART 7.500*   JGF0NZU 30.0*   PO2ART 73.0*   NRI1COR 23.4   BEART 1.0   HGBAE 13.3*   S5SAPWLZ 92.7   CARBOXHGBART 3.6       Xr Chest Standard (2 Vw)  No active cardiopulmonary disease. A persistent moderate cardiomegaly. Above findings are recorded on a digital voice clip in PACS. 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 Will order now and monitor for improvement.     Signed:  Adolph Neff PA-C

## 2019-05-07 ENCOUNTER — TRANSCRIBE ORDERS (OUTPATIENT)
Dept: ADMINISTRATIVE | Facility: HOSPITAL | Age: 34
End: 2019-05-07

## 2019-05-07 DIAGNOSIS — R79.89 ABNORMAL LFTS: Primary | ICD-10-CM

## 2019-05-07 LAB
ANION GAP SERPL CALCULATED.3IONS-SCNC: 11 MMOL/L (ref 7–19)
BASOPHILS ABSOLUTE: 0 K/UL (ref 0–0.2)
BASOPHILS RELATIVE PERCENT: 0.1 % (ref 0–1)
BUN BLDV-MCNC: 14 MG/DL (ref 6–20)
CALCIUM SERPL-MCNC: 8.9 MG/DL (ref 8.6–10)
CHLORIDE BLD-SCNC: 91 MMOL/L (ref 98–111)
CHOLESTEROL, TOTAL: 184 MG/DL (ref 160–199)
CO2: 29 MMOL/L (ref 22–29)
CREAT SERPL-MCNC: 1 MG/DL (ref 0.5–1.2)
EKG P AXIS: 72 DEGREES
EKG P-R INTERVAL: 154 MS
EKG Q-T INTERVAL: 312 MS
EKG QRS DURATION: 104 MS
EKG QTC CALCULATION (BAZETT): 438 MS
EKG T AXIS: 58 DEGREES
EOSINOPHILS ABSOLUTE: 0 K/UL (ref 0–0.6)
EOSINOPHILS RELATIVE PERCENT: 0.1 % (ref 0–5)
GFR NON-AFRICAN AMERICAN: >60
GLUCOSE BLD-MCNC: 170 MG/DL (ref 74–109)
HCT VFR BLD CALC: 38.2 % (ref 42–52)
HDLC SERPL-MCNC: 22 MG/DL (ref 55–121)
HEMOGLOBIN: 12.6 G/DL (ref 14–18)
INR BLD: 1.44 (ref 0.88–1.18)
LDL CHOLESTEROL CALCULATED: 142 MG/DL
LYMPHOCYTES ABSOLUTE: 0.7 K/UL (ref 1.1–4.5)
LYMPHOCYTES RELATIVE PERCENT: 7.8 % (ref 20–40)
MAGNESIUM: 1.8 MG/DL (ref 1.6–2.6)
MCH RBC QN AUTO: 29 PG (ref 27–31)
MCHC RBC AUTO-ENTMCNC: 33 G/DL (ref 33–37)
MCV RBC AUTO: 87.8 FL (ref 80–94)
MONOCYTES ABSOLUTE: 1.1 K/UL (ref 0–0.9)
MONOCYTES RELATIVE PERCENT: 13 % (ref 0–10)
NEUTROPHILS ABSOLUTE: 6.5 K/UL (ref 1.5–7.5)
NEUTROPHILS RELATIVE PERCENT: 78.3 % (ref 50–65)
PDW BLD-RTO: 18.5 % (ref 11.5–14.5)
PLATELET # BLD: 243 K/UL (ref 130–400)
PMV BLD AUTO: 9.4 FL (ref 9.4–12.4)
POTASSIUM SERPL-SCNC: 4.1 MMOL/L (ref 3.5–5)
PROTHROMBIN TIME: 16.9 SEC (ref 12–14.6)
RBC # BLD: 4.35 M/UL (ref 4.7–6.1)
SODIUM BLD-SCNC: 131 MMOL/L (ref 136–145)
TRIGL SERPL-MCNC: 102 MG/DL (ref 0–149)
WBC # BLD: 8.3 K/UL (ref 4.8–10.8)

## 2019-05-07 PROCEDURE — 6370000000 HC RX 637 (ALT 250 FOR IP): Performed by: PHYSICIAN ASSISTANT

## 2019-05-07 PROCEDURE — 80061 LIPID PANEL: CPT

## 2019-05-07 PROCEDURE — 36415 COLL VENOUS BLD VENIPUNCTURE: CPT

## 2019-05-07 PROCEDURE — 80048 BASIC METABOLIC PNL TOTAL CA: CPT

## 2019-05-07 PROCEDURE — 94640 AIRWAY INHALATION TREATMENT: CPT

## 2019-05-07 PROCEDURE — 6370000000 HC RX 637 (ALT 250 FOR IP): Performed by: INTERNAL MEDICINE

## 2019-05-07 PROCEDURE — 85025 COMPLETE CBC W/AUTO DIFF WBC: CPT

## 2019-05-07 PROCEDURE — 94664 DEMO&/EVAL PT USE INHALER: CPT

## 2019-05-07 PROCEDURE — 2140000000 HC CCU INTERMEDIATE R&B

## 2019-05-07 PROCEDURE — 99223 1ST HOSP IP/OBS HIGH 75: CPT | Performed by: INTERNAL MEDICINE

## 2019-05-07 PROCEDURE — 6360000002 HC RX W HCPCS: Performed by: INTERNAL MEDICINE

## 2019-05-07 PROCEDURE — 85610 PROTHROMBIN TIME: CPT

## 2019-05-07 PROCEDURE — 2580000003 HC RX 258: Performed by: INTERNAL MEDICINE

## 2019-05-07 PROCEDURE — 99232 SBSQ HOSP IP/OBS MODERATE 35: CPT | Performed by: PHYSICIAN ASSISTANT

## 2019-05-07 PROCEDURE — 83735 ASSAY OF MAGNESIUM: CPT

## 2019-05-07 RX ORDER — IPRATROPIUM BROMIDE AND ALBUTEROL SULFATE 2.5; .5 MG/3ML; MG/3ML
1 SOLUTION RESPIRATORY (INHALATION)
Status: DISCONTINUED | OUTPATIENT
Start: 2019-05-07 | End: 2019-05-07

## 2019-05-07 RX ORDER — IPRATROPIUM BROMIDE AND ALBUTEROL SULFATE 2.5; .5 MG/3ML; MG/3ML
1 SOLUTION RESPIRATORY (INHALATION) EVERY 4 HOURS PRN
Status: DISCONTINUED | OUTPATIENT
Start: 2019-05-07 | End: 2019-05-09 | Stop reason: HOSPADM

## 2019-05-07 RX ADMIN — LISINOPRIL 10 MG: 10 TABLET ORAL at 09:56

## 2019-05-07 RX ADMIN — SUCRALFATE 1 G: 1 TABLET ORAL at 09:56

## 2019-05-07 RX ADMIN — METOPROLOL SUCCINATE 25 MG: 25 TABLET, EXTENDED RELEASE ORAL at 19:46

## 2019-05-07 RX ADMIN — SUCRALFATE 1 G: 1 TABLET ORAL at 13:25

## 2019-05-07 RX ADMIN — FUROSEMIDE 40 MG: 10 INJECTION, SOLUTION INTRAMUSCULAR; INTRAVENOUS at 19:47

## 2019-05-07 RX ADMIN — METOPROLOL SUCCINATE 25 MG: 25 TABLET, EXTENDED RELEASE ORAL at 09:56

## 2019-05-07 RX ADMIN — IPRATROPIUM BROMIDE AND ALBUTEROL SULFATE 1 AMPULE: .5; 3 SOLUTION RESPIRATORY (INHALATION) at 12:07

## 2019-05-07 RX ADMIN — SUCRALFATE 1 G: 1 TABLET ORAL at 19:47

## 2019-05-07 RX ADMIN — Medication 10 ML: at 19:47

## 2019-05-07 RX ADMIN — PANTOPRAZOLE SODIUM 40 MG: 40 TABLET, DELAYED RELEASE ORAL at 09:56

## 2019-05-07 RX ADMIN — IPRATROPIUM BROMIDE AND ALBUTEROL SULFATE 1 AMPULE: .5; 3 SOLUTION RESPIRATORY (INHALATION) at 15:41

## 2019-05-07 RX ADMIN — FUROSEMIDE 40 MG: 10 INJECTION, SOLUTION INTRAMUSCULAR; INTRAVENOUS at 09:55

## 2019-05-07 RX ADMIN — SPIRONOLACTONE 25 MG: 25 TABLET ORAL at 09:56

## 2019-05-07 RX ADMIN — Medication 10 ML: at 09:59

## 2019-05-07 RX ADMIN — LACTULOSE 20 G: 20 SOLUTION ORAL at 19:47

## 2019-05-07 RX ADMIN — ENOXAPARIN SODIUM 40 MG: 40 INJECTION SUBCUTANEOUS at 13:25

## 2019-05-07 RX ADMIN — SUCRALFATE 1 G: 1 TABLET ORAL at 18:41

## 2019-05-07 RX ADMIN — ASPIRIN 81 MG: 81 TABLET ORAL at 09:56

## 2019-05-07 ASSESSMENT — ENCOUNTER SYMPTOMS
VOMITING: 0
EYES NEGATIVE: 1
RESPIRATORY NEGATIVE: 1
NAUSEA: 0
SHORTNESS OF BREATH: 0
GASTROINTESTINAL NEGATIVE: 1
DIARRHEA: 0

## 2019-05-07 ASSESSMENT — PAIN SCALES - GENERAL: PAINLEVEL_OUTOF10: 0

## 2019-05-07 NOTE — CONSULTS
[facility]  Cardiology Consult      Requesting MD:  Evan Donovan MD   Admit Status:  Inpatient [101]       History obtained from:   [] Patient  [] Other (specify):     Patient:  Lina Easley  [id number]     Chief Complaint:   Chief Complaint   Patient presents with    Shortness of Breath       HPI: Mr. Shanelle Fraga is a 35 y.o. male with a history of nonischemic dilated cardiopathy previous cardiac catheterization October 2018 now comes in with worsening dyspnea for the past few days. No other associated symptoms except for minimal nonproductive cough. He had reportedly increased his Lasix to 80 mg daily. Denies chest pain. 10 used to smoke denies alcohol or substance usage recently. Review of Systems:  Review of Systems   Constitutional: Negative. Negative for chills, fever and unexpected weight change. HENT: Negative. Eyes: Negative. Respiratory: Negative. Negative for shortness of breath. Cardiovascular: Negative. Negative for chest pain. Gastrointestinal: Negative. Negative for diarrhea, nausea and vomiting. Endocrine: Negative. Genitourinary: Negative. Musculoskeletal: Negative. Skin: Negative. Neurological: Negative. All other systems reviewed and are negative.       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)        * (Principal) Acute on chronic systolic (congestive) heart failure (HCC)              Past Medical History:  Past Medical History:   Diagnosis Date    CHF (congestive heart failure) (HCC)     Hypertension         Past Surgical History:  Past Surgical History:   Procedure Laterality Date    HAND SURGERY         Past Family History:  Family History   Problem Relation Age of Onset    Cancer Mother     No Known Problems Father     No Known Problems Brother        Past Social History:  Social History     Socioeconomic History    Marital status: Single     Spouse name: Not on file    Number of children: Not on file    Years of education: Not on file    Highest education level: Not on file   Occupational History    Not on file   Social Needs    Financial resource strain: Not on file    Food insecurity:     Worry: Not on file     Inability: Not on file    Transportation needs:     Medical: Not on file     Non-medical: Not on file   Tobacco Use    Smoking status: Current Every Day Smoker     Packs/day: 0.50     Years: 13.00     Pack years: 6.50     Types: Cigarettes    Smokeless tobacco: Never Used   Substance and Sexual Activity    Alcohol use: No     Comment: Quit around 2017    Drug use: Not Currently     Frequency: 3.0 times per week     Types: Methamphetamines    Sexual activity: Not on file   Lifestyle    Physical activity:     Days per week: Not on file     Minutes per session: Not on file    Stress: Not on file   Relationships    Social connections:     Talks on phone: Not on file     Gets together: Not on file     Attends Mandaen service: Not on file     Active member of club or organization: Not on file     Attends meetings of clubs or organizations: Not on file     Relationship status: Not on file    Intimate partner violence:     Fear of current or ex partner: Not on file     Emotionally abused: Not on file     Physically abused: Not on file     Forced sexual activity: Not on file   Other Topics Concern    Not on file   Social History Narrative    Not on file       Allergies:  No Known Allergies    Home Meds:  Prior to Admission medications    Medication Sig Start Date End Date Taking?  Authorizing Provider   spironolactone (ALDACTONE) 25 MG tablet Take 25 mg by mouth daily   Yes Historical Provider, MD   pantoprazole (PROTONIX) 40 MG tablet Take 1 tablet by mouth daily 4/28/19  Yes Emilia Small MD   sucralfate (CARAFATE) 1 GM tablet Take 1 tablet by mouth 4 times daily 4/28/19  Yes Emilia Small MD albuterol sulfate  (90 Base) MCG/ACT inhaler Inhale 2 puffs into the lungs 4 times daily as needed for Wheezing 4/24/19  Yes ANGELES Sloan   lactulose (CHRONULAC) 10 GM/15ML solution Take 20 g by mouth nightly   Yes Historical Provider, MD   albuterol sulfate HFA (PROVENTIL HFA) 108 (90 Base) MCG/ACT inhaler Inhale 2 puffs into the lungs every 6 hours as needed for Wheezing 3/18/19  Yes Gideon Oliver MD   aspirin 81 MG EC tablet Take 1 tablet by mouth daily 10/20/18  Yes Olivier Lozano MD   lisinopril (PRINIVIL;ZESTRIL) 10 MG tablet Take 10 mg by mouth daily    Yes Historical Provider, MD   metoprolol succinate (TOPROL XL) 25 MG extended release tablet Take 1 tablet by mouth every 12 hours 8/15/18  Yes KAYLEEN Field   furosemide (LASIX) 40 MG tablet Take 1 tablet by mouth daily  Patient taking differently: Take 80 mg by mouth daily  7/25/18  Yes KAYLEEN Peoples       Current Meds:   sodium chloride flush  10 mL Intravenous 2 times per day    enoxaparin  40 mg Subcutaneous Q24H    furosemide  40 mg Intravenous BID    nicotine  1 patch Transdermal Daily    aspirin  81 mg Oral Daily    lactulose  20 g Oral Nightly    lisinopril  10 mg Oral Daily    metoprolol succinate  25 mg Oral BID    pantoprazole  40 mg Oral Daily    spironolactone  25 mg Oral Daily    sucralfate  1 g Oral 4x Daily       Current Infused Meds:      Physical Exam:  Vitals:    05/07/19 0635   BP: 96/72   Pulse: 99   Resp: 20   Temp: 97.8 °F (36.6 °C)   SpO2: 94%       Intake/Output Summary (Last 24 hours) at 5/7/2019 1008  Last data filed at 5/7/2019 0959  Gross per 24 hour   Intake 2305 ml   Output 4380 ml   Net -2075 ml     Estimated body mass index is 31.49 kg/m² as calculated from the following:    Height as of this encounter: 5' 7\" (1.702 m). Weight as of this encounter: 201 lb 1 oz (91.2 kg). Physical Exam   Constitutional: He is oriented to person, place, and time.  He appears well-developed and well-nourished. No distress. HENT:   Head: Normocephalic and atraumatic. Eyes: Pupils are equal, round, and reactive to light. EOM are normal. No scleral icterus. Neck: Normal range of motion. Neck supple. No JVD present. Carotid bruit is not present. No tracheal deviation present. No thyromegaly present. No carotid bruits auscultated   Cardiovascular: Normal rate, regular rhythm and normal heart sounds. Exam reveals no gallop and no friction rub. No murmur heard. Pulmonary/Chest: Effort normal and breath sounds normal. No stridor. No respiratory distress. He has no wheezes. He has no rales. He exhibits no tenderness. Few basilar crackles   Abdominal: He exhibits no distension and no mass. There is no tenderness. There is no rebound and no guarding. No hernia. Musculoskeletal: He exhibits no edema. Lymphadenopathy:     He has no cervical adenopathy. Neurological: He is alert and oriented to person, place, and time. No cranial nerve deficit or sensory deficit. He exhibits normal muscle tone. Coordination normal.   Skin: Skin is warm and dry. He is not diaphoretic. Psychiatric: He has a normal mood and affect. His behavior is normal. Judgment and thought content normal.   Vitals reviewed. Labs:  Recent Labs     05/06/19  0905 05/07/19  0203   WBC 9.7 8.3   HGB 12.9* 12.6*    243       Recent Labs     05/06/19  0905 05/06/19  0918 05/06/19  1457 05/07/19  0204   *  --  128* 131*   K 4.6 4.5 4.0 4.1   CL 89*  --  87* 91*   CO2 22  --  22 29   BUN 16  --  16 14   CREATININE 0.8  --  1.1 1.0   LABGLOM >60  --  >60 >60   MG  --   --   --  1.8   CALCIUM 8.9  --  8.5* 8.9       CK, CKMB, Troponin: @LABRCNT (CKTOTAL:3, CKMB:3, TROPONINI:3)@    Last 3 BNP:  No results for input(s): BNP in the last 72 hours. IMAGING:  Xr Chest Standard (2 Vw)    Result Date: 5/6/2019  Examination. XR CHEST (2 VW) History: Shortness of breath. Frontal and left views of the chest are obtained.  The comparison is made with the previous study dated 4/24/2019. There are linear interstitial changes in the lower lungs bilaterally representing scarring and discoid atelectasis. There is a persistent moderate elevation right diaphragm. There is no evidence of pleural effusion, pulmonary congestion or pneumothorax. There is persistent moderate cardiomegaly. No bony abnormality. No active cardiopulmonary disease. A persistent moderate cardiomegaly. Above findings are recorded on a digital voice clip in PACS. Signed by Dr Carmella Meyer on 5/6/2019 9:15 AM    Xr Chest Standard (2 Vw)    Result Date: 4/24/2019  EXAMINATION:  XR CHEST (2 VW)  4/24/2019 3:31 PM HISTORY: Shortness of air. COMPARISON: 3/18/2019. TECHNIQUE: 2 views were obtained. FINDINGS:  The lungs are expanded and clear. There is moderate to severe cardiomegaly. There is no significant bony abnormality identified. 1. No focal infiltrate or effusion. 2. Moderate to severe cardiomegaly. Signed by Dr Etienne Foy on 4/24/2019 3:32 PM    Ct Abdomen Pelvis W Iv Contrast Additional Contrast? None    Result Date: 4/28/2019  CT ABDOMEN PELVIS W IV CONTRAST 4/28/2019 6:30 PM HISTORY: Abdominal pain COMPARISON: CT from an outside hospital dated 4/26/2019 and ultrasound dated 4/26/2019 from an outside facility. DLP: 1225 mGy cm. In order to have a CT radiation dose as low as reasonably achievable, Automated Exposure Control was utilized for adjustment of the mA and/or KV according to patient size. TECHNIQUE: Following the intravenous administration of contrast, helical CT tomographic images of the abdomen and pelvis were acquired. Coronal reformatted images were also provided for review. FINDINGS: LOWER CHEST: The heart is moderately to markedly enlarged. There is reflux of contrast into the hepatic veins and IVC. This is consistent with right heart failure. LIVER: No focal liver lesion. The hepatic vasculature is patent.  BILIARY SYSTEM: There is diffuse

## 2019-05-07 NOTE — PROGRESS NOTES
[facility]      Patient:  Kristine Lui  YOB: 1985  Date of Service: 5/7/2019  MRN: [id number]   Acct: [de-identified]   Primary Care Physician: No primary care provider on file. Advance Directive: Full Code  Admit Date: 5/6/2019       Hospital Day: 1    CHIEF COMPLAINT Dyspnea    SUBJECTIVE:  Mr. Mae Molina states dyspnea is improving. He denies chest pain, heart palpitations. Does have mild cough that is occasionally productive with clear sputum. Review of Systems:   14 point review of systems is negative except as specifically addressed above. Objective:   VITALS:  BP 96/72   Pulse 99   Temp 97.8 °F (36.6 °C) (Temporal)   Resp 20   Ht 5' 7\" (1.702 m)   Wt 201 lb 1 oz (91.2 kg)   SpO2 94%   BMI 31.49 kg/m²   24HR INTAKE/OUTPUT:      Intake/Output Summary (Last 24 hours) at 5/7/2019 0725  Last data filed at 5/7/2019 0154  Gross per 24 hour   Intake 2055 ml   Output 4080 ml   Net -2025 ml     General appearance: alert, appears stated age, cooperative and no distress  Head: Normocephalic, without obvious abnormality, atraumatic  Eyes: conjunctivae/corneas clear. PERRL, EOM's intact. Ears: normal external ears and nose, throat without exudate  Neck: no adenopathy, no carotid bruit, no JVD, supple, symmetrical, trachea midline and thyroid not enlarged, symmetric, no tenderness/mass/nodules  Lungs: improved aeration, few bibasilar rales   Heart: regular rate and rhythm, S1, S2 normal, no murmur  Abdomen:soft, non-tender; non-distended, normal bowel sounds no masses, no organomegaly  Extremities:No lower extremity edema,  No erythema, no tenderness to palpation  Skin: Skin color, texture, turgor normal. No rashes or lesions  Lymphatic: No palpable lymph node enlargment  Neurologic: Alert and oriented X 3, normal strength and tone.  No focal deficits appreciated  Psychiatric: Alert and oriented, thought content appropriate, normal insight, mood appropriate    Medications:      sodium chloride tests    Hypervolemia    Hyponatremia  Resolved Problems:    * No resolved hospital problems. *     Continue IV Lasix BID as he is improving on that dosage. Repeat AM labs. Consider discharge in the next 24-48 hours if continued improvement and no new recommendations per Cardiology.     DVT Prophylaxis: Lovenox    GI prophylaxis:  Protonix    Amanda Sauceda PA-C

## 2019-05-07 NOTE — PROGRESS NOTES
Nutrition Assessment    Type and Reason for Visit: Initial, Consult    Nutrition Recommendations: continue current POC. Monitor glucose    Nutrition Assessment: Received consult for CHF education. Pt has been instructed before. Very knowledgeable. Able to list foods to avoids, why they should be avoided. Not at nutritional risk at this time    Malnutrition Assessment:  · Malnutrition Status: No malnutrition  · Context: Acute illness or injury  · Findings of the 6 clinical characteristics of malnutrition (Minimum of 2 out of 6 clinical characteristics is required to make the diagnosis of moderate or severe Protein Calorie Malnutrition based on AND/ASPEN Guidelines):  1. Energy Intake- ,      2. Weight Loss-No significant weight loss,    3. Fat Loss-No significant subcutaneous fat loss,    4. Muscle Loss-No significant muscle mass loss,    5. Fluid Accumulation-No significant fluid accumulation,    6.   Strength-Not measured    Nutrition Risk Level: Low    Nutrition Diagnosis:   · Problem: Altered nutrition-related lab values  · Etiology: related to Endocrine dysfunction(medication being administered)     Signs and symptoms:  as evidenced by Lab values    Objective Information:  · Nutrition-Focused Physical Findings: well nourished appearing gentleman  · Wound Type: None  · Current Nutrition Therapies:  · Oral Diet Orders: Cardiac, 2gm Sodium, Fluid Restriction   · Oral Diet intake: %  · Oral Nutrition Supplement (ONS) Orders: None    · Anthropometric Measures:  · Ht: 5' 7\" (170.2 cm)   · Current Body Wt: 201 lb 1 oz (91.2 kg)  · Admission Body Wt: 205 lb (93 kg)  · Usual Body Wt: 195 lb (88.5 kg)(2/2019)  · % Weight Change:  ,  3.1% increase in 3  months  · Ideal Body Wt: 148 lb (67.1 kg), % Ideal Body 135.8%  · BMI Classification: BMI 30.0 - 34.9 Obese Class I    Nutrition Interventions:   Continue current diet  Continued Inpatient Monitoring, Education Not Indicated    Nutrition Evaluation: · Evaluation: Goals set   · Goals: po intake 75% or greater.   Glucose 150 or less    · Monitoring: Meal Intake, Diet Tolerance, Skin Integrity, Weight, Pertinent Labs      Electronically signed by Chidi Wall MS, RD, LD on 5/7/19 at 11:15 AM    Contact Number: [phone]

## 2019-05-08 ENCOUNTER — APPOINTMENT (OUTPATIENT)
Dept: GENERAL RADIOLOGY | Age: 34
DRG: 291 | End: 2019-05-08
Payer: COMMERCIAL

## 2019-05-08 PROBLEM — Z51.5 PALLIATIVE CARE PATIENT: Status: ACTIVE | Noted: 2019-05-08

## 2019-05-08 LAB
ANION GAP SERPL CALCULATED.3IONS-SCNC: 19 MMOL/L (ref 7–19)
BUN BLDV-MCNC: 17 MG/DL (ref 6–20)
CALCIUM SERPL-MCNC: 8.5 MG/DL (ref 8.6–10)
CHLORIDE BLD-SCNC: 88 MMOL/L (ref 98–111)
CO2: 22 MMOL/L (ref 22–29)
CREAT SERPL-MCNC: 0.9 MG/DL (ref 0.5–1.2)
GFR NON-AFRICAN AMERICAN: >60
GLUCOSE BLD-MCNC: 175 MG/DL (ref 74–109)
HCT VFR BLD CALC: 39.4 % (ref 42–52)
HEMOGLOBIN: 12.9 G/DL (ref 14–18)
MAGNESIUM: 1.7 MG/DL (ref 1.6–2.6)
MCH RBC QN AUTO: 28.9 PG (ref 27–31)
MCHC RBC AUTO-ENTMCNC: 32.7 G/DL (ref 33–37)
MCV RBC AUTO: 88.1 FL (ref 80–94)
PDW BLD-RTO: 18.7 % (ref 11.5–14.5)
PLATELET # BLD: 289 K/UL (ref 130–400)
PMV BLD AUTO: 9.5 FL (ref 9.4–12.4)
POTASSIUM SERPL-SCNC: 3.5 MMOL/L (ref 3.5–5)
PRO-BNP: 4974 PG/ML (ref 0–450)
RBC # BLD: 4.47 M/UL (ref 4.7–6.1)
SODIUM BLD-SCNC: 129 MMOL/L (ref 136–145)
WBC # BLD: 14 K/UL (ref 4.8–10.8)

## 2019-05-08 PROCEDURE — 94640 AIRWAY INHALATION TREATMENT: CPT

## 2019-05-08 PROCEDURE — 83735 ASSAY OF MAGNESIUM: CPT

## 2019-05-08 PROCEDURE — 6360000002 HC RX W HCPCS: Performed by: PHYSICIAN ASSISTANT

## 2019-05-08 PROCEDURE — 80048 BASIC METABOLIC PNL TOTAL CA: CPT

## 2019-05-08 PROCEDURE — 6370000000 HC RX 637 (ALT 250 FOR IP): Performed by: INTERNAL MEDICINE

## 2019-05-08 PROCEDURE — 71046 X-RAY EXAM CHEST 2 VIEWS: CPT

## 2019-05-08 PROCEDURE — 6370000000 HC RX 637 (ALT 250 FOR IP): Performed by: PHYSICIAN ASSISTANT

## 2019-05-08 PROCEDURE — 2580000003 HC RX 258: Performed by: PHYSICIAN ASSISTANT

## 2019-05-08 PROCEDURE — 83880 ASSAY OF NATRIURETIC PEPTIDE: CPT

## 2019-05-08 PROCEDURE — 36415 COLL VENOUS BLD VENIPUNCTURE: CPT

## 2019-05-08 PROCEDURE — 2140000000 HC CCU INTERMEDIATE R&B

## 2019-05-08 PROCEDURE — 87040 BLOOD CULTURE FOR BACTERIA: CPT

## 2019-05-08 PROCEDURE — 99231 SBSQ HOSP IP/OBS SF/LOW 25: CPT | Performed by: INTERNAL MEDICINE

## 2019-05-08 PROCEDURE — 2580000003 HC RX 258: Performed by: INTERNAL MEDICINE

## 2019-05-08 PROCEDURE — 94664 DEMO&/EVAL PT USE INHALER: CPT

## 2019-05-08 PROCEDURE — 85027 COMPLETE CBC AUTOMATED: CPT

## 2019-05-08 PROCEDURE — 6360000002 HC RX W HCPCS: Performed by: INTERNAL MEDICINE

## 2019-05-08 PROCEDURE — 99232 SBSQ HOSP IP/OBS MODERATE 35: CPT | Performed by: PHYSICIAN ASSISTANT

## 2019-05-08 RX ORDER — IPRATROPIUM BROMIDE AND ALBUTEROL SULFATE 2.5; .5 MG/3ML; MG/3ML
1 SOLUTION RESPIRATORY (INHALATION)
Status: DISCONTINUED | OUTPATIENT
Start: 2019-05-08 | End: 2019-05-09 | Stop reason: HOSPADM

## 2019-05-08 RX ORDER — GUAIFENESIN 600 MG/1
600 TABLET, EXTENDED RELEASE ORAL 2 TIMES DAILY
Status: DISCONTINUED | OUTPATIENT
Start: 2019-05-08 | End: 2019-05-09 | Stop reason: HOSPADM

## 2019-05-08 RX ORDER — FUROSEMIDE 10 MG/ML
20 INJECTION INTRAMUSCULAR; INTRAVENOUS ONCE
Status: COMPLETED | OUTPATIENT
Start: 2019-05-08 | End: 2019-05-08

## 2019-05-08 RX ADMIN — SPIRONOLACTONE 25 MG: 25 TABLET ORAL at 08:07

## 2019-05-08 RX ADMIN — IPRATROPIUM BROMIDE AND ALBUTEROL SULFATE 1 AMPULE: .5; 3 SOLUTION RESPIRATORY (INHALATION) at 19:51

## 2019-05-08 RX ADMIN — SUCRALFATE 1 G: 1 TABLET ORAL at 08:07

## 2019-05-08 RX ADMIN — IPRATROPIUM BROMIDE AND ALBUTEROL SULFATE 1 AMPULE: .5; 3 SOLUTION RESPIRATORY (INHALATION) at 15:01

## 2019-05-08 RX ADMIN — GUAIFENESIN 600 MG: 600 TABLET, EXTENDED RELEASE ORAL at 20:25

## 2019-05-08 RX ADMIN — FUROSEMIDE 20 MG: 10 INJECTION, SOLUTION INTRAMUSCULAR; INTRAVENOUS at 15:46

## 2019-05-08 RX ADMIN — LACTULOSE 20 G: 20 SOLUTION ORAL at 20:24

## 2019-05-08 RX ADMIN — AZITHROMYCIN MONOHYDRATE 500 MG: 500 INJECTION, POWDER, LYOPHILIZED, FOR SOLUTION INTRAVENOUS at 15:45

## 2019-05-08 RX ADMIN — METOPROLOL SUCCINATE 25 MG: 25 TABLET, EXTENDED RELEASE ORAL at 08:07

## 2019-05-08 RX ADMIN — METOPROLOL SUCCINATE 25 MG: 25 TABLET, EXTENDED RELEASE ORAL at 20:25

## 2019-05-08 RX ADMIN — LISINOPRIL 10 MG: 10 TABLET ORAL at 08:07

## 2019-05-08 RX ADMIN — ONDANSETRON 4 MG: 2 INJECTION INTRAMUSCULAR; INTRAVENOUS at 08:07

## 2019-05-08 RX ADMIN — ENOXAPARIN SODIUM 40 MG: 40 INJECTION SUBCUTANEOUS at 15:45

## 2019-05-08 RX ADMIN — SUCRALFATE 1 G: 1 TABLET ORAL at 15:47

## 2019-05-08 RX ADMIN — FUROSEMIDE 40 MG: 10 INJECTION, SOLUTION INTRAMUSCULAR; INTRAVENOUS at 20:25

## 2019-05-08 RX ADMIN — Medication 10 ML: at 08:07

## 2019-05-08 RX ADMIN — IPRATROPIUM BROMIDE AND ALBUTEROL SULFATE 1 AMPULE: .5; 3 SOLUTION RESPIRATORY (INHALATION) at 07:16

## 2019-05-08 RX ADMIN — IPRATROPIUM BROMIDE AND ALBUTEROL SULFATE 1 AMPULE: .5; 3 SOLUTION RESPIRATORY (INHALATION) at 11:07

## 2019-05-08 RX ADMIN — FUROSEMIDE 40 MG: 10 INJECTION, SOLUTION INTRAMUSCULAR; INTRAVENOUS at 08:07

## 2019-05-08 RX ADMIN — GUAIFENESIN 600 MG: 600 TABLET, EXTENDED RELEASE ORAL at 15:46

## 2019-05-08 RX ADMIN — ASPIRIN 81 MG: 81 TABLET ORAL at 08:07

## 2019-05-08 RX ADMIN — SUCRALFATE 1 G: 1 TABLET ORAL at 20:25

## 2019-05-08 RX ADMIN — PANTOPRAZOLE SODIUM 40 MG: 40 TABLET, DELAYED RELEASE ORAL at 08:07

## 2019-05-08 RX ADMIN — Medication 1 G: at 15:46

## 2019-05-08 RX ADMIN — Medication 10 ML: at 20:25

## 2019-05-08 NOTE — PROGRESS NOTES
Cardiology Daily Note Tay Viera MD      Patient:  Corinne Evans  [id number]    Patient Active Problem List    Diagnosis Date Noted    Chronic systolic heart failure [facility])      Priority: High    IVDU (intravenous drug user) 10/31/2016     Priority: Medium    Palliative care patient 05/08/2019     Priority: Low    Cholecystitis      Priority: Low    Acute on chronic respiratory failure with hypoxia (HCC)      Priority: Low    Hypervolemia      Priority: Low    Hyponatremia      Priority: Low    Cholecystitis without calculus 01/28/2019     Priority: Low    RUQ abdominal pain      Priority: Low    Elevated liver function tests      Priority: Low    Methamphetamine abuse ([address]      Priority: Low    Gastroesophageal reflux disease without esophagitis      Priority: Low    Acute on chronic systolic (congestive) heart failure ([address] 10/15/2018     Priority: Low    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 ([address] 11/04/2016     Priority: Low    Tobacco use 10/31/2016     Priority: Low       Admit Date:  5/6/2019    Admission Problem List: Present on Admission:   Hyponatremia   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 Katrina Del Toro seen today complains of persistent coughing dyspnea seems to be stable denies chest pain. Negative fluid balance.     Objective:   /89   Pulse 98   Temp 97.2 °F (36.2 °C) (Temporal)   Resp 18   Ht 5' 7\" (1.702 m)   Wt 198 lb 9.6 oz (90.1 kg)   SpO2 98%   BMI 31.11 kg/m²       Intake/Output Summary (Last 24 hours) at 5/8/2019 4822  Last data filed at carotid bruits  Chest:  Clear to auscultation, no wheezing or rales  Cardiovascular:  RRR B0K9 no murmurs, clicks, gallups, or rubs  Abdomen:  Soft nontender, nondistended, bowel sounds present  Extremities:  Edema: none      Lab Data:  CBC:   Recent Labs     05/06/19  0905 05/07/19  0203 05/08/19  0126   WBC 9.7 8.3 14.0*   HGB 12.9* 12.6* 12.9*   HCT 38.1* 38.2* 39.4*   MCV 87.8 87.8 88.1    243 289     BMP:   Recent Labs     05/06/19  1457 05/07/19  0204 05/08/19  0126   * 131* 129*   K 4.0 4.1 3.5   CL 87* 91* 88*   CO2 22 29 22   BUN 16 14 17   CREATININE 1.1 1.0 0.9     LIVER PROFILE:   Recent Labs     05/06/19  0905   AST 56*   ALT 63*   LIPASE 14   BILITOT 0.9   ALKPHOS 112     PT/INR:   Recent Labs     05/06/19  0905 05/07/19  0203   PROTIME 17.2* 16.9*   INR 1.48* 1.44*     APTT: No results for input(s): APTT in the last 72 hours. BNP:  No results for input(s): BNP in the last 72 hours. CK, CKMB, Troponin: @LABRCNT (CKTOTAL:3, CKMB:3, TROPONINI:3)@    IMAGING:  Xr Chest Standard (2 Vw)    Result Date: 5/6/2019  Examination. XR CHEST (2 VW) History: Shortness of breath. Frontal and left views of the chest are obtained. The comparison is made with the previous study dated 4/24/2019. There are linear interstitial changes in the lower lungs bilaterally representing scarring and discoid atelectasis. There is a persistent moderate elevation right diaphragm. There is no evidence of pleural effusion, pulmonary congestion or pneumothorax. There is persistent moderate cardiomegaly. No bony abnormality. No active cardiopulmonary disease. A persistent moderate cardiomegaly. Above findings are recorded on a digital voice clip in PACS. Signed by Dr Rigo Hung on 5/6/2019 9:15 AM    Xr Chest Standard (2 Vw)    Result Date: 4/24/2019  EXAMINATION:  XR CHEST (2 VW)  4/24/2019 3:31 PM HISTORY: Shortness of air. COMPARISON: 3/18/2019. TECHNIQUE: 2 views were obtained.  FINDINGS:  The lungs are expanded and clear. There is moderate to severe cardiomegaly. There is no significant bony abnormality identified. 1. No focal infiltrate or effusion. 2. Moderate to severe cardiomegaly. Signed by Dr Aleena Chan on 4/24/2019 3:32 PM    Ct Abdomen Pelvis W Iv Contrast Additional Contrast? None    Result Date: 4/28/2019  CT ABDOMEN PELVIS W IV CONTRAST 4/28/2019 6:30 PM HISTORY: Abdominal pain COMPARISON: CT from an outside hospital dated 4/26/2019 and ultrasound dated 4/26/2019 from an outside facility. DLP: 1225 mGy cm. In order to have a CT radiation dose as low as reasonably achievable, Automated Exposure Control was utilized for adjustment of the mA and/or KV according to patient size. TECHNIQUE: Following the intravenous administration of contrast, helical CT tomographic images of the abdomen and pelvis were acquired. Coronal reformatted images were also provided for review. FINDINGS: LOWER CHEST: The heart is moderately to markedly enlarged. There is reflux of contrast into the hepatic veins and IVC. This is consistent with right heart failure. LIVER: No focal liver lesion. The hepatic vasculature is patent. BILIARY SYSTEM: There is diffuse thickening of the gallbladder wall. No gallbladder distention is seen. This is stable. PANCREAS: No focal pancreatic lesion. SPLEEN: Unremarkable. KIDNEYS AND URETERS: Mild perinephric stranding is present. The ureters are decompressed and normal in appearance. ADRENALS: The adrenal glands are normal in appearance. RETROPERITONEUM: No mass, lymphadenopathy or hemorrhage. GI TRACT: No evidence of obstruction or bowel wall thickening. The appendix is visualized and unremarkable. OTHER: Ascites has slightly increased since the previous study from 2 days ago. The abdominopelvic vasculature is patent. The osseous structures and soft tissues demonstrate no worrisome lesions. PELVIS: No mass lesion, fluid collection or significant lymphadenopathy is seen in the pelvis.  The urinary bladder is normal in appearance. 1. Increasing abdominopelvic ascites since a CT scan done 2 days ago. The findings are most likely due to right heart failure and acute CHF exacerbation. Findings were discussed with Dr. Lexi Foster at 8:57 PM on 4/28/2019. Signed by Dr Burton Stone on 4/28/2019 8:57 PM        Assessment:  1. Worsening dyspnea due to decompensated congestive heart failure with proBNP 7374  2. Acute on chronic systolic and diastolic congestive heart failure  3. Cardiac catheterization 10/18/18 severe left ventricular systolic dysfunction mild nonobstructive coronary artery disease consistent with dilated nonischemic artery cardiomyopathy  4. Lexiscan stress test 10/15/18 ejection fraction 26% diffuse heterogeneous uptake  5. Prior echocardiogram 10/15/18  6. Medically noncompliant  7. History of hyponatremia  8. History of elevated liver function tests  9. Intravenous drug user  10. Tobacco abuse  11. Gastro esophageal reflux disease  12. Hyponatremia              Plan:  1.  Continue current treatment repeat pro BNP level improved at [address] [facility] MD Radha 5/8/2019 9:38 AM

## 2019-05-08 NOTE — CONSULTS
Palliative Care: Met with pt to initiate palliative care. Explained to pt what PC is and how we can assist, such as with goals of care, advance care directive, spiritual support. Past Medical History:        Past Medical History:   Diagnosis Date    [address]F (congestive heart failure) ([facility]     Hypertension     Palliative care patient 05/08/2019       Advance Directives:   Pt does not currently have a AD or HCS. PC nurse encouraged pt to think about this and talk with his father. Also encouraged hime to complete paperwork and assign a HCS in the event he is unable to make decisions. He verbalized understanding and also stated \"I probably should have one\". Pain/Other Symptoms:    Pt denies pain at this time. He does state abd pain d/t need for \"gall bladder to be removed\". Activity:   As edy            Psychological/Spiritual:    Pt lives with his dad. He tells me he has a 11 yr old dtr for which he has FULL CUSTODY of.          Patient/Family Discussion: Pt reviews with me when he found out he had CHF. He states about 3 yrs ago when he noticed SOA. He lives with his dad. This is his support. His dtr(5 yrs old) also lives in the home. He does not attend Yarsan[employer] or have spiritual support. We discussed if he has ever considered CHF clinic. He has not but is willing to have CHF nurse come and do some education and leave information. He hopeful to go home soon. Plan:  Diurese, abx? For PNU, medical management. Patients goal, what they hope for:    Pt states \"get healty. Watch sodium. Eat healthier. Quit smoking\". Palliative will follow for support and goals of care as well as advance care planning.               Electronically signed by Cleone Goltz, RN on 5/8/2019 at 1:48 PM

## 2019-05-08 NOTE — PROGRESS NOTES
[facility]      Patient:  Tanya Alvarez  YOB: 1985  Date of Service: 5/8/2019  MRN: [id number]   Acct: [de-identified]   Primary Care Physician: No primary care provider on file. Advance Directive: Full Code  Admit Date: 5/6/2019       Hospital Day: 2    CHIEF COMPLAINT Dyspnea    SUBJECTIVE:  Mr. [facility] cough has increased overnight and is more productive. Describes body aches as well today. Denies chest pain or heart palpitations. Has also been slightly nauseated. Denies vomiting. Review of Systems:   14 point review of systems is negative except as specifically addressed above. Objective:   VITALS:  /89   Pulse 98   Temp 97.2 °F (36.2 °C) (Temporal)   Resp 18   Ht 5' 7\" (1.702 m)   Wt 198 lb 9.6 oz (90.1 kg)   SpO2 98%   BMI 31.11 kg/m²   24HR INTAKE/OUTPUT:      Intake/Output Summary (Last 24 hours) at 5/8/2019 1041  Last data filed at 5/8/2019 0847  Gross per 24 hour   Intake 1755 ml   Output 2565 ml   Net -810 ml     General appearance: alert, appears stated age, cooperative and no distress, sitting comfortably in bed   Head: Normocephalic, without obvious abnormality, atraumatic  Eyes: conjunctivae/corneas clear. PERRL, EOM's intact. Ears: normal external ears and nose, throat without exudate  Neck: no adenopathy, no carotid bruit, no JVD, supple, symmetrical, trachea midline and thyroid not enlarged, symmetric, no tenderness/mass/nodules  Lungs: Improved aeration overall, few right basilar rales    Heart: regular rate and rhythm, S1, S2 normal, no murmur  Abdomen:soft, non-tender; non-distended, normal bowel sounds no masses, no organomegaly  Extremities:No lower extremity edema,  No erythema, no tenderness to palpation  Skin: Skin color, texture, turgor normal. No rashes or lesions  Lymphatic: No palpable lymph node enlargment  Neurologic: Alert and oriented X 3, normal strength and tone.  No focal deficits appreciated  Psychiatric: Alert and oriented, pleasant, thought content appropriate, normal insight, mood appropriate    Medications:      sodium chloride flush  10 mL Intravenous 2 times per day    enoxaparin  40 mg Subcutaneous Q24H    furosemide  40 mg Intravenous BID    nicotine  1 patch Transdermal Daily    aspirin  81 mg Oral Daily    lactulose  20 g Oral Nightly    lisinopril  10 mg Oral Daily    metoprolol succinate  25 mg Oral BID    pantoprazole  40 mg Oral Daily    spironolactone  25 mg Oral Daily    sucralfate  1 g Oral 4x Daily     ipratropium-albuterol, sodium chloride flush, magnesium hydroxide, ondansetron  DIET CARDIAC; Low Sodium (2 GM); Daily Fluid Restriction: 1500 ml     Lab and other Data:     Recent Labs     05/06/19  0905 05/07/19  0203 05/08/19  0126   WBC 9.7 8.3 14.0*   HGB 12.9* 12.6* 12.9*    243 289     Recent Labs     05/06/19  1457 05/07/19  0204 05/08/19  0126   * 131* 129*   K 4.0 4.1 3.5   CL 87* 91* 88*   CO2 22 29 22   BUN 16 14 17   CREATININE 1.1 1.0 0.9   GLUCOSE 266* 170* 175*     Recent Labs     05/06/19  0905   AST 56*   ALT 63*   BILITOT 0.9   ALKPHOS 112     Troponin T:   Recent Labs     05/06/19  0905 05/06/19  1315 05/06/19  1736   TROPONINI 0.03 0.02 <0.01     INR:   Recent Labs     05/06/19  0905 05/07/19  0203   INR 1.48* 1.44*     UA:  Recent Labs     05/06/19  1015   COLORU YELLOW   PHUR 6.5   CLARITYU Clear   SPECGRAV 1.011   LEUKOCYTESUR Negative   UROBILINOGEN 1.0   BILIRUBINUR Negative   BLOODU Negative   GLUCOSEU Negative     ABG:  Recent Labs     05/06/19  0918   PHART 7.500*   IYU0MCS 30.0*   PO2ART 73.0*   GSP2AIG 23.4   BEART 1.0   HGBAE 13.3*   H8PPOQOV 92.7   CARBOXHGBART 3.6     RAD:   Xr Chest Standard (2 Vw)  No active cardiopulmonary disease. A persistent moderate cardiomegaly. Above findings are recorded on a digital voice clip in PACS.  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 *     White count increased today, repeat CXR to r/o pneumonia. Will give extra dose IV Lasix x 1. Add incentive spirometry. Increase activity. Further orders per clinical course. Hopefully home soon.      DVT Prophylaxis: Lovenox    GI prophylaxis:  Protonix    Cherri Dejesus PA-C

## 2019-05-09 VITALS
WEIGHT: 200.6 LBS | BODY MASS INDEX: 31.48 KG/M2 | HEART RATE: 111 BPM | SYSTOLIC BLOOD PRESSURE: 102 MMHG | OXYGEN SATURATION: 95 % | HEIGHT: 67 IN | RESPIRATION RATE: 16 BRPM | TEMPERATURE: 97 F | DIASTOLIC BLOOD PRESSURE: 70 MMHG

## 2019-05-09 PROBLEM — J18.9 PNEUMONIA DUE TO ORGANISM: Status: ACTIVE | Noted: 2019-05-09

## 2019-05-09 LAB
ANION GAP SERPL CALCULATED.3IONS-SCNC: 13 MMOL/L (ref 7–19)
BUN BLDV-MCNC: 19 MG/DL (ref 6–20)
CALCIUM SERPL-MCNC: 8.8 MG/DL (ref 8.6–10)
CHLORIDE BLD-SCNC: 89 MMOL/L (ref 98–111)
CO2: 27 MMOL/L (ref 22–29)
CREAT SERPL-MCNC: 1 MG/DL (ref 0.5–1.2)
GFR NON-AFRICAN AMERICAN: >60
GLUCOSE BLD-MCNC: 110 MG/DL (ref 74–109)
MAGNESIUM: 1.9 MG/DL (ref 1.6–2.6)
POTASSIUM SERPL-SCNC: 3.8 MMOL/L (ref 3.5–5)
SODIUM BLD-SCNC: 129 MMOL/L (ref 136–145)

## 2019-05-09 PROCEDURE — 6370000000 HC RX 637 (ALT 250 FOR IP): Performed by: PHYSICIAN ASSISTANT

## 2019-05-09 PROCEDURE — 99239 HOSP IP/OBS DSCHRG MGMT >30: CPT | Performed by: PHYSICIAN ASSISTANT

## 2019-05-09 PROCEDURE — 94640 AIRWAY INHALATION TREATMENT: CPT

## 2019-05-09 PROCEDURE — 99231 SBSQ HOSP IP/OBS SF/LOW 25: CPT | Performed by: INTERNAL MEDICINE

## 2019-05-09 PROCEDURE — 36415 COLL VENOUS BLD VENIPUNCTURE: CPT

## 2019-05-09 PROCEDURE — 2580000003 HC RX 258: Performed by: INTERNAL MEDICINE

## 2019-05-09 PROCEDURE — 83735 ASSAY OF MAGNESIUM: CPT

## 2019-05-09 PROCEDURE — 80048 BASIC METABOLIC PNL TOTAL CA: CPT

## 2019-05-09 PROCEDURE — 6360000002 HC RX W HCPCS: Performed by: INTERNAL MEDICINE

## 2019-05-09 RX ORDER — CEFDINIR 300 MG/1
300 CAPSULE ORAL 2 TIMES DAILY
Qty: 14 CAPSULE | Refills: 0 | Status: SHIPPED | OUTPATIENT
Start: 2019-05-09 | End: 2019-05-16

## 2019-05-09 RX ORDER — AZITHROMYCIN 250 MG/1
250 TABLET, FILM COATED ORAL DAILY
Qty: 4 TABLET | Refills: 0 | Status: SHIPPED | OUTPATIENT
Start: 2019-05-09 | End: 2019-05-13

## 2019-05-09 RX ORDER — FUROSEMIDE 40 MG/1
40 TABLET ORAL 2 TIMES DAILY
Qty: 60 TABLET | Refills: 0 | Status: SHIPPED | OUTPATIENT
Start: 2019-05-09 | End: 2019-06-17 | Stop reason: SDUPTHER

## 2019-05-09 RX ADMIN — IPRATROPIUM BROMIDE AND ALBUTEROL SULFATE 1 AMPULE: .5; 3 SOLUTION RESPIRATORY (INHALATION) at 07:54

## 2019-05-09 RX ADMIN — METOPROLOL SUCCINATE 25 MG: 25 TABLET, EXTENDED RELEASE ORAL at 08:56

## 2019-05-09 RX ADMIN — LISINOPRIL 10 MG: 10 TABLET ORAL at 08:56

## 2019-05-09 RX ADMIN — ASPIRIN 81 MG: 81 TABLET ORAL at 08:56

## 2019-05-09 RX ADMIN — SUCRALFATE 1 G: 1 TABLET ORAL at 08:56

## 2019-05-09 RX ADMIN — SPIRONOLACTONE 25 MG: 25 TABLET ORAL at 08:56

## 2019-05-09 RX ADMIN — GUAIFENESIN 600 MG: 600 TABLET, EXTENDED RELEASE ORAL at 08:56

## 2019-05-09 RX ADMIN — FUROSEMIDE 40 MG: 10 INJECTION, SOLUTION INTRAMUSCULAR; INTRAVENOUS at 08:56

## 2019-05-09 RX ADMIN — PANTOPRAZOLE SODIUM 40 MG: 40 TABLET, DELAYED RELEASE ORAL at 08:56

## 2019-05-09 RX ADMIN — Medication 10 ML: at 08:56

## 2019-05-09 ASSESSMENT — PAIN SCALES - GENERAL: PAINLEVEL_OUTOF10: 0

## 2019-05-09 NOTE — DISCHARGE SUMMARY
discharge and the importance of follow up and compliance with diet, fluid intake and medication regimens have been discussed extensively. Physical Exam:  Vital Signs: /70   Pulse 111   Temp 97 °F (36.1 °C) (Temporal)   Resp 16   Ht 5' 7\" (1.702 m)   Wt 200 lb 9.6 oz (91 kg)   SpO2 95%   BMI 31.42 kg/m²   General appearance:. Alert and Cooperative   HEENT: Normocephalic. Chest: clear to auscultation bilaterally without wheezes or rhonchi. Cardiac: Normal heart tones with regular rate and rhythm, S1, S2 normal. No murmurs, gallops, or rubs auscultated. Abdomen:soft, non-tender; non-distended normal bowel sounds no masses, no organomegaly. Extremities: No clubbing or cyanosis. No peripheral edema. Peripheral pulses palpable. Neurologic: Grossly intact.     Discharge Medications:       BipinNexSteppe Medication Instructions IDH:[id number]    Printed on:05/09/19 1050   Medication Information                      albuterol sulfate HFA (PROVENTIL HFA) 108 (90 Base) MCG/ACT inhaler  Inhale 2 puffs into the lungs every 6 hours as needed for Wheezing             aspirin 81 MG EC tablet  Take 1 tablet by mouth daily             azithromycin (ZITHROMAX) 250 MG tablet  Take 1 tablet by mouth daily for 4 days             cefdinir (OMNICEF) 300 MG capsule  Take 1 capsule by mouth 2 times daily for 7 days             furosemide (LASIX) 40 MG tablet  Take 1 tablet by mouth 2 times daily             lactulose (CHRONULAC) 10 GM/15ML solution  Take 20 g by mouth nightly             lisinopril (PRINIVIL;ZESTRIL) 10 MG tablet  Take 10 mg by mouth daily              metoprolol succinate (TOPROL XL) 25 MG extended release tablet  Take 1 tablet by mouth every 12 hours             pantoprazole (PROTONIX) 40 MG tablet  Take 1 tablet by mouth daily             spironolactone (ALDACTONE) 25 MG tablet  Take 25 mg by mouth daily             sucralfate (CARAFATE) 1 GM tablet  Take 1 tablet by mouth 4 times daily Discharge Instructions: Take medications as directed. Resume activity as tolerated. Take medications as instructed. Complete entire course of antibiotics. Keep follow up appointments. Monitor daily weights and record for follow up appointments. If weight increases by 2-3lbs in 24 hours or >5 lbs in one week call Cardiology office for follow up and recommendations regarding additional dosage of Lasix. Diet: DIET CARDIAC; Low Sodium (2 GM); Daily Fluid Restriction: 1500 ml     Disposition: Patient is medically stable and will be discharged home. Time spent on discharge 35 minutes.     Signed:  Maria R Espinoza PA-C

## 2019-05-09 NOTE — PROGRESS NOTES
 spironolactone  25 mg Oral Daily    sucralfate  1 g Oral 4x Daily       TELEMETRY: Sinus     Physical Exam:      Physical Exam      General:  Awake, alert, NAD  Skin:  Warm and dry  Neck:  no jvd , no carotid bruits  Chest:  Clear to auscultation, no wheezing or rales  Cardiovascular:  RRR K8T6 no murmurs, clicks, gallups, or rubs  Abdomen:  Soft nontender, nondistended, bowel sounds present  Extremities:  Edema: none       Lab Data:  CBC:   Recent Labs     05/07/19  0203 05/08/19  0126   WBC 8.3 14.0*   HGB 12.6* 12.9*   HCT 38.2* 39.4*   MCV 87.8 88.1    289     BMP:   Recent Labs     05/07/19  0204 05/08/19 0126 05/09/19 0126   * 129* 129*   K 4.1 3.5 3.8   CL 91* 88* 89*   CO2 29 22 27   BUN 14 17 19   CREATININE 1.0 0.9 1.0     LIVER PROFILE: No results for input(s): AST, ALT, LIPASE, BILIDIR, BILITOT, ALKPHOS in the last 72 hours. Invalid input(s): AMYLASE,  ALB  PT/INR:   Recent Labs     05/07/19 0203   PROTIME 16.9*   INR 1.44*     APTT: No results for input(s): APTT in the last 72 hours. BNP:  No results for input(s): BNP in the last 72 hours. CK, CKMB, Troponin: @LABRCNT (CKTOTAL:3, CKMB:3, TROPONINI:3)@    IMAGING:  Xr Chest Standard (2 Vw)    Result Date: 5/8/2019  History: 35year-old with worsening cough and leukocytosis. Reference: Chest radiograph May 6, 2019. Findings: Frontal and lateral chest radiographs performed. Chronic cardiomegaly. Suspect some developing consolidation in the posterior left lower lobe base, best seen on lateral view. No pleural effusion or pneumothorax. No acute osseous abnormalities. Left lower lobe base pneumonia. Signed by Dr Ginger Castle on 5/8/2019 10:50 AM    Xr Chest Standard (2 Vw)    Result Date: 5/6/2019  Examination. XR CHEST (2 VW) History: Shortness of breath. Frontal and left views of the chest are obtained. The comparison is made with the previous study dated 4/24/2019.  There are linear interstitial changes in the lower lungs bilaterally representing scarring and discoid atelectasis. There is a persistent moderate elevation right diaphragm. There is no evidence of pleural effusion, pulmonary congestion or pneumothorax. There is persistent moderate cardiomegaly. No bony abnormality. No active cardiopulmonary disease. A persistent moderate cardiomegaly. Above findings are recorded on a digital voice clip in PACS. Signed by Dr Shireen Woodward on 5/6/2019 9:15 AM    Xr Chest Standard (2 Vw)    Result Date: 4/24/2019  EXAMINATION:  XR CHEST (2 VW)  4/24/2019 3:31 PM HISTORY: Shortness of air. COMPARISON: 3/18/2019. TECHNIQUE: 2 views were obtained. FINDINGS:  The lungs are expanded and clear. There is moderate to severe cardiomegaly. There is no significant bony abnormality identified. 1. No focal infiltrate or effusion. 2. Moderate to severe cardiomegaly. Signed by Dr Griselda Reyes on 4/24/2019 3:32 PM    Ct Abdomen Pelvis W Iv Contrast Additional Contrast? None    Result Date: 4/28/2019  CT ABDOMEN PELVIS W IV CONTRAST 4/28/2019 6:30 PM HISTORY: Abdominal pain COMPARISON: CT from an outside hospital dated 4/26/2019 and ultrasound dated 4/26/2019 from an outside facility. DLP: 1225 mGy cm. In order to have a CT radiation dose as low as reasonably achievable, Automated Exposure Control was utilized for adjustment of the mA and/or KV according to patient size. TECHNIQUE: Following the intravenous administration of contrast, helical CT tomographic images of the abdomen and pelvis were acquired. Coronal reformatted images were also provided for review. FINDINGS: LOWER CHEST: The heart is moderately to markedly enlarged. There is reflux of contrast into the hepatic veins and IVC. This is consistent with right heart failure. LIVER: No focal liver lesion. The hepatic vasculature is patent. BILIARY SYSTEM: There is diffuse thickening of the gallbladder wall. No gallbladder distention is seen. This is stable.  PANCREAS: No focal pancreatic lesion. SPLEEN: Unremarkable. KIDNEYS AND URETERS: Mild perinephric stranding is present. The ureters are decompressed and normal in appearance. ADRENALS: The adrenal glands are normal in appearance. RETROPERITONEUM: No mass, lymphadenopathy or hemorrhage. GI TRACT: No evidence of obstruction or bowel wall thickening. The appendix is visualized and unremarkable. OTHER: Ascites has slightly increased since the previous study from 2 days ago. The abdominopelvic vasculature is patent. The osseous structures and soft tissues demonstrate no worrisome lesions. PELVIS: No mass lesion, fluid collection or significant lymphadenopathy is seen in the pelvis. The urinary bladder is normal in appearance. 1. Increasing abdominopelvic ascites since a CT scan done 2 days ago. The findings are most likely due to right heart failure and acute CHF exacerbation. Findings were discussed with Dr. Ry Guevara at 8:57 PM on 4/28/2019. Signed by Dr Piper Haddad on 4/28/2019 8:57 PM        Assessment:  1. Worsening dyspnea due to decompensated congestive heart failure with [id number] 7009  2. Acute on chronic systolic and diastolic congestive heart failure  3. Cardiac catheterization 10/18/18 severe left ventricular systolic dysfunction mild nonobstructive coronary artery disease consistent with dilated nonischemic artery cardiomyopathy  4. Lexiscan stress test 10/15/18 ejection fraction 26% diffuse heterogeneous uptake  5. Prior echocardiogram 10/15/18  6. Medically noncompliant  7. History of hyponatremia  8. History of elevated liver function tests  9. Intravenous drug user  10. Tobacco abuse  11. Gastro esophageal reflux disease  12. Hyponatremia          Plan:  1.  Continue current treatment agree with plans for discharge follow-up in the office    Rodolfo Schulz MD 5/9/2019 9:18 AM

## 2019-05-09 NOTE — PLAN OF CARE
Problem: Falls - Risk of:  Goal: Will remain free from falls  Description  Will remain free from falls  Outcome: Ongoing  Goal: Absence of physical injury  Description  Absence of physical injury  Outcome: Ongoing     Problem: Gas Exchange - Impaired:  Goal: Levels of oxygenation will improve  Description  Levels of oxygenation will improve  Outcome: Ongoing     Problem: Fluid Volume:  Goal: Maintenance of adequate hydration will improve  Description  Maintenance of adequate hydration will improve  Outcome: Ongoing

## 2019-05-10 ENCOUNTER — APPOINTMENT (OUTPATIENT)
Dept: ULTRASOUND IMAGING | Facility: HOSPITAL | Age: 34
End: 2019-05-10

## 2019-05-11 LAB
BLOOD CULTURE, ROUTINE: NORMAL
CULTURE, BLOOD 2: NORMAL

## 2019-05-13 LAB
BLOOD CULTURE, ROUTINE: NORMAL
CULTURE, BLOOD 2: NORMAL

## 2019-06-12 ENCOUNTER — APPOINTMENT (OUTPATIENT)
Dept: CT IMAGING | Age: 34
End: 2019-06-12
Payer: COMMERCIAL

## 2019-06-12 ENCOUNTER — HOSPITAL ENCOUNTER (EMERGENCY)
Age: 34
Discharge: HOME | End: 2019-06-12
Attending: EMERGENCY MEDICINE
Payer: COMMERCIAL

## 2019-06-12 VITALS
WEIGHT: 200 LBS | TEMPERATURE: 98 F | SYSTOLIC BLOOD PRESSURE: 130 MMHG | RESPIRATION RATE: 20 BRPM | DIASTOLIC BLOOD PRESSURE: 78 MMHG | HEART RATE: 78 BPM | BODY MASS INDEX: 31.39 KG/M2 | OXYGEN SATURATION: 98 % | HEIGHT: 67 IN

## 2019-06-12 DIAGNOSIS — R10.11 ABDOMINAL PAIN, RIGHT UPPER QUADRANT: Primary | ICD-10-CM

## 2019-06-12 DIAGNOSIS — R11.2 NON-INTRACTABLE VOMITING WITH NAUSEA, UNSPECIFIED VOMITING TYPE: ICD-10-CM

## 2019-06-12 LAB
ALBUMIN SERPL-MCNC: 4 G/DL (ref 3.5–5.2)
ALP BLD-CCNC: 122 U/L (ref 40–130)
ALT SERPL-CCNC: 17 U/L (ref 5–41)
AMPHETAMINE SCREEN, URINE: NEGATIVE
AMYLASE: 40 U/L (ref 28–100)
ANION GAP SERPL CALCULATED.3IONS-SCNC: 14 MMOL/L (ref 7–19)
AST SERPL-CCNC: 23 U/L (ref 5–40)
BACTERIA: NEGATIVE /HPF
BARBITURATE SCREEN URINE: NEGATIVE
BENZODIAZEPINE SCREEN, URINE: NEGATIVE
BILIRUB SERPL-MCNC: 0.9 MG/DL (ref 0.2–1.2)
BILIRUBIN URINE: NEGATIVE
BLOOD, URINE: NEGATIVE
BUN BLDV-MCNC: 19 MG/DL (ref 6–20)
CALCIUM SERPL-MCNC: 9.1 MG/DL (ref 8.6–10)
CANNABINOID SCREEN URINE: NEGATIVE
CHLORIDE BLD-SCNC: 98 MMOL/L (ref 98–111)
CLARITY: CLEAR
CO2: 20 MMOL/L (ref 22–29)
COCAINE METABOLITE SCREEN URINE: NEGATIVE
COLOR: YELLOW
CREAT SERPL-MCNC: 0.8 MG/DL (ref 0.5–1.2)
EPITHELIAL CELLS, UA: 0 /HPF (ref 0–5)
GFR NON-AFRICAN AMERICAN: >60
GLUCOSE BLD-MCNC: 100 MG/DL (ref 74–109)
GLUCOSE URINE: NEGATIVE MG/DL
HCT VFR BLD CALC: 40.3 % (ref 42–52)
HEMOGLOBIN: 13.4 G/DL (ref 14–18)
HYALINE CASTS: 1 /HPF (ref 0–8)
KETONES, URINE: NEGATIVE MG/DL
LEUKOCYTE ESTERASE, URINE: NEGATIVE
LIPASE: 19 U/L (ref 13–60)
Lab: ABNORMAL
MCH RBC QN AUTO: 28.6 PG (ref 27–31)
MCHC RBC AUTO-ENTMCNC: 33.3 G/DL (ref 33–37)
MCV RBC AUTO: 86.1 FL (ref 80–94)
NITRITE, URINE: NEGATIVE
OPIATE SCREEN URINE: POSITIVE
PDW BLD-RTO: 19.7 % (ref 11.5–14.5)
PH UA: 6 (ref 5–8)
PLATELET # BLD: 244 K/UL (ref 130–400)
PMV BLD AUTO: 9.8 FL (ref 9.4–12.4)
POTASSIUM REFLEX MAGNESIUM: 4.4 MMOL/L (ref 3.5–5)
PROTEIN UA: 30 MG/DL
RBC # BLD: 4.68 M/UL (ref 4.7–6.1)
RBC UA: 0 /HPF (ref 0–4)
SODIUM BLD-SCNC: 132 MMOL/L (ref 136–145)
SPECIFIC GRAVITY UA: 1.01 (ref 1–1.03)
TOTAL PROTEIN: 7.3 G/DL (ref 6.6–8.7)
URINE REFLEX TO CULTURE: ABNORMAL
UROBILINOGEN, URINE: 1 E.U./DL
WBC # BLD: 10.2 K/UL (ref 4.8–10.8)
WBC UA: 1 /HPF (ref 0–5)

## 2019-06-12 PROCEDURE — 83690 ASSAY OF LIPASE: CPT

## 2019-06-12 PROCEDURE — 6360000004 HC RX CONTRAST MEDICATION: Performed by: EMERGENCY MEDICINE

## 2019-06-12 PROCEDURE — 36415 COLL VENOUS BLD VENIPUNCTURE: CPT

## 2019-06-12 PROCEDURE — 96375 TX/PRO/DX INJ NEW DRUG ADDON: CPT

## 2019-06-12 PROCEDURE — 6360000002 HC RX W HCPCS: Performed by: EMERGENCY MEDICINE

## 2019-06-12 PROCEDURE — 74177 CT ABD & PELVIS W/CONTRAST: CPT

## 2019-06-12 PROCEDURE — 99284 EMERGENCY DEPT VISIT MOD MDM: CPT

## 2019-06-12 PROCEDURE — 96374 THER/PROPH/DIAG INJ IV PUSH: CPT

## 2019-06-12 PROCEDURE — 81001 URINALYSIS AUTO W/SCOPE: CPT

## 2019-06-12 PROCEDURE — 80053 COMPREHEN METABOLIC PANEL: CPT

## 2019-06-12 PROCEDURE — 82150 ASSAY OF AMYLASE: CPT

## 2019-06-12 PROCEDURE — 80307 DRUG TEST PRSMV CHEM ANLYZR: CPT

## 2019-06-12 PROCEDURE — 85027 COMPLETE CBC AUTOMATED: CPT

## 2019-06-12 PROCEDURE — 99284 EMERGENCY DEPT VISIT MOD MDM: CPT | Performed by: EMERGENCY MEDICINE

## 2019-06-12 RX ORDER — ONDANSETRON 2 MG/ML
4 INJECTION INTRAMUSCULAR; INTRAVENOUS ONCE
Status: COMPLETED | OUTPATIENT
Start: 2019-06-12 | End: 2019-06-12

## 2019-06-12 RX ORDER — ONDANSETRON 4 MG/1
4 TABLET, ORALLY DISINTEGRATING ORAL EVERY 8 HOURS PRN
Qty: 15 TABLET | Refills: 0 | Status: SHIPPED | OUTPATIENT
Start: 2019-06-12 | End: 2019-11-27

## 2019-06-12 RX ORDER — TRAMADOL HYDROCHLORIDE 50 MG/1
50 TABLET ORAL EVERY 4 HOURS PRN
Qty: 18 TABLET | Refills: 0 | Status: SHIPPED | OUTPATIENT
Start: 2019-06-12 | End: 2019-06-15

## 2019-06-12 RX ORDER — MORPHINE SULFATE 4 MG/ML
4 INJECTION, SOLUTION INTRAMUSCULAR; INTRAVENOUS ONCE
Status: COMPLETED | OUTPATIENT
Start: 2019-06-12 | End: 2019-06-12

## 2019-06-12 RX ADMIN — IOPAMIDOL 95 ML: 755 INJECTION, SOLUTION INTRAVENOUS at 19:03

## 2019-06-12 RX ADMIN — ONDANSETRON 4 MG: 2 INJECTION INTRAMUSCULAR; INTRAVENOUS at 18:25

## 2019-06-12 RX ADMIN — MORPHINE SULFATE 4 MG: 4 INJECTION INTRAVENOUS at 18:25

## 2019-06-12 ASSESSMENT — PAIN SCALES - GENERAL
PAINLEVEL_OUTOF10: 6
PAINLEVEL_OUTOF10: 6
PAINLEVEL_OUTOF10: 0

## 2019-06-12 ASSESSMENT — ENCOUNTER SYMPTOMS
NAUSEA: 1
CONSTIPATION: 0
ABDOMINAL PAIN: 1
DIARRHEA: 0
VOMITING: 1

## 2019-06-12 NOTE — ED PROVIDER NOTES
Relationships    Social connections:     Talks on phone: None     Gets together: None     Attends Quaker service: None     Active member of club or organization: None     Attends meetings of clubs or organizations: None     Relationship status: None    Intimate partner violence:     Fear of current or ex partner: None     Emotionally abused: None     Physically abused: None     Forced sexual activity: None   Other Topics Concern    None   Social History Narrative    None       SCREENINGS    Luke Coma Scale  Eye Opening: Spontaneous  Best Verbal Response: Oriented  Best Motor Response: Obeys commands  [place] Coma Scale Score: 15 @FLOW([id number])@      PHYSICAL EXAM    (up to 7 for level 4, 8 or more for level 5)     ED Triage Vitals [06/12/19 1801]   BP Temp Temp src Pulse Resp SpO2 Height Weight   125/85 96.8 °F (36 °C) -- 92 16 100 % 5' 7\" (1.702 m) 200 lb (90.7 kg)       Physical Exam   Constitutional: He is oriented to person, place, and time. He appears well-developed and well-nourished. No distress. HENT:   Mouth/Throat: Oropharynx is clear and moist.   Eyes: Conjunctivae are normal.   Neck: Normal range of motion. Neck supple. Cardiovascular: Normal rate, regular rhythm and normal heart sounds. Pulmonary/Chest: Effort normal and breath sounds normal.   Abdominal: Soft. There is tenderness (Tmild epigastriun and RUQ). There is no rebound and no guarding. Musculoskeletal: He exhibits no edema. Neurological: He is alert and oriented to person, place, and time. No cranial nerve deficit. Skin: Skin is warm and dry. He is not diaphoretic. Psychiatric: He has a normal mood and affect. Nursing note and vitals reviewed.       DIAGNOSTIC RESULTS     EKG: All EKG's are interpreted by the Emergency Department Physician who either signs or Co-signsthis chart in the absence of a cardiologist.        RADIOLOGY:   Non-plain filmimages such as CT, Ultrasound and MRI are read by the radiologist. Kala Spencer radiographic images are visualized and preliminarily interpreted by the emergency physician with the below findings:        Interpretation per the Radiologist below, if available at the time ofthis note:    CT ABDOMEN PELVIS W IV CONTRAST Additional Contrast? None   Final Result   1. Marked cardiomegaly. 2. Mild amount of abdominal and pelvic ascites. 3. No definite acute bowel pathology. Signed by Dr Juvencio Diaz on 6/12/2019 7:27 PM            ED BEDSIDE ULTRASOUND:   Performed by ED Physician - none    LABS:  Labs Reviewed   CBC - Abnormal; Notable for the following components:       Result Value    RBC 4.68 (*)     Hemoglobin 13.4 (*)     Hematocrit 40.3 (*)     RDW 19.7 (*)     All other components within normal limits   COMPREHENSIVE METABOLIC PANEL W/ REFLEX TO MG FOR LOW K - Abnormal; Notable for the following components:    Sodium 132 (*)     CO2 20 (*)     All other components within normal limits   URINE DRUG SCREEN - Abnormal; Notable for the following components:    Opiate Scrn, Ur Positive (*)     All other components within normal limits   URINE RT REFLEX TO CULTURE - Abnormal; Notable for the following components:    Protein, UA 30 (*)     All other components within normal limits   LIPASE   AMYLASE   MICROSCOPIC URINALYSIS       All other labs were within normal range or not returned as of this dictation. EMERGENCY DEPARTMENT COURSE and DIFFERENTIAL DIAGNOSIS/MDM:   Vitals:    Vitals:    06/12/19 1801 06/12/19 1852 06/12/19 1935 06/12/19 1940   BP: 125/85 133/81  130/78   Pulse: 92 114 108 78   Resp: 16 16  20   Temp: 96.8 °F (36 °C)   98 °F (36.7 °C)   SpO2: 100% 100%  98%   Weight: 200 lb (90.7 kg)      Height: 5' 7\" (1.702 m)              MDM  Number of Diagnoses or Management Options  Abdominal pain, right upper quadrant:   Non-intractable vomiting with nausea, unspecified vomiting type:   Diagnosis management comments: Has had U/S, discussed HIDA as outpt.       CRITICAL CARE TIME Total Critical Care time was 0 minutes, excluding separately reportable procedures. There was a high probability of clinically significant/lifethreatening deterioration in the patient's condition which required my urgent intervention. CONSULTS:  None    PROCEDURES:  Unless otherwise noted below, none     Procedures    FINAL IMPRESSION      1. Abdominal pain, right upper quadrant    2. Non-intractable vomiting with nausea, unspecified vomiting type          DISPOSITION/PLAN   DISPOSITION        PATIENT REFERRED TO:  PCP asap. DISCHARGE MEDICATIONS:  Discharge Medication List as of 6/12/2019  7:36 PM      START taking these medications    Details   traMADol (ULTRAM) 50 MG tablet Take 1 tablet by mouth every 4 hours as needed for Pain for up to 3 days. Intended supply: 3 days.  Take lowest dose possible to manage pain, Disp-18 tablet, R-0Print      ondansetron (ZOFRAN ODT) 4 MG disintegrating tablet Take 1 tablet by mouth every 8 hours as needed for Nausea or Vomiting, Disp-15 tablet, R-0Print                (Please note that portions of this note were completed with a voice recognition program.  Efforts were made to edit the dictations but occasionally words are mis-transcribed.)    Renetta Shore MD (electronically signed)  Attending Emergency Physician          Renetta Shore MD  06/13/19 [id number]

## 2019-06-17 RX ORDER — METOPROLOL SUCCINATE 25 MG/1
25 TABLET, EXTENDED RELEASE ORAL EVERY 12 HOURS
Qty: 60 TABLET | Refills: 1 | Status: ON HOLD | OUTPATIENT
Start: 2019-06-17 | End: 2019-11-14 | Stop reason: HOSPADM

## 2019-06-17 RX ORDER — FUROSEMIDE 40 MG/1
40 TABLET ORAL 2 TIMES DAILY
Qty: 60 TABLET | Refills: 1 | Status: SHIPPED | OUTPATIENT
Start: 2019-06-17 | End: 2019-10-15 | Stop reason: SDUPTHER

## 2019-06-17 RX ORDER — SPIRONOLACTONE 25 MG/1
25 TABLET ORAL DAILY
Qty: 30 TABLET | Refills: 1 | Status: SHIPPED | OUTPATIENT
Start: 2019-06-17 | End: 2019-10-29

## 2019-06-17 RX ORDER — LISINOPRIL 10 MG/1
10 TABLET ORAL DAILY
Qty: 30 TABLET | Refills: 1 | Status: ON HOLD | OUTPATIENT
Start: 2019-06-17 | End: 2019-11-14 | Stop reason: HOSPADM

## 2019-07-19 ENCOUNTER — TELEPHONE (OUTPATIENT)
Dept: CARDIOLOGY | Age: 34
End: 2019-07-19

## 2019-08-31 ENCOUNTER — APPOINTMENT (OUTPATIENT)
Dept: GENERAL RADIOLOGY | Age: 34
DRG: 871 | End: 2019-08-31
Payer: COMMERCIAL

## 2019-08-31 ENCOUNTER — APPOINTMENT (OUTPATIENT)
Dept: CT IMAGING | Age: 34
DRG: 871 | End: 2019-08-31
Payer: COMMERCIAL

## 2019-08-31 ENCOUNTER — HOSPITAL ENCOUNTER (INPATIENT)
Age: 34
LOS: 7 days | Discharge: HOME | DRG: 871 | End: 2019-09-07
Attending: EMERGENCY MEDICINE | Admitting: HOSPITALIST
Payer: COMMERCIAL

## 2019-08-31 DIAGNOSIS — R93.89 ABNORMAL CT SCAN: Primary | ICD-10-CM

## 2019-08-31 DIAGNOSIS — I26.99 ACUTE PULMONARY EMBOLISM WITHOUT ACUTE COR PULMONALE, UNSPECIFIED PULMONARY EMBOLISM TYPE (HCC): ICD-10-CM

## 2019-08-31 DIAGNOSIS — J18.9 PNEUMONIA DUE TO ORGANISM: ICD-10-CM

## 2019-08-31 DIAGNOSIS — E87.1 HYPONATREMIA: ICD-10-CM

## 2019-08-31 PROBLEM — I42.0 DILATED CARDIOMYOPATHY (HCC): Status: ACTIVE | Noted: 2019-08-31

## 2019-08-31 PROBLEM — R04.2 HEMOPTYSIS: Status: ACTIVE | Noted: 2019-08-31

## 2019-08-31 LAB
ALBUMIN SERPL-MCNC: 4 G/DL (ref 3.5–5.2)
ALP BLD-CCNC: 181 U/L (ref 40–130)
ALT SERPL-CCNC: 15 U/L (ref 5–41)
ANION GAP SERPL CALCULATED.3IONS-SCNC: 15 MMOL/L (ref 7–19)
APTT: 31.2 SEC (ref 26–36.2)
AST SERPL-CCNC: 22 U/L (ref 5–40)
BILIRUB SERPL-MCNC: 1.3 MG/DL (ref 0.2–1.2)
BUN BLDV-MCNC: 11 MG/DL (ref 6–20)
CALCIUM SERPL-MCNC: 9.4 MG/DL (ref 8.6–10)
CHLORIDE BLD-SCNC: 98 MMOL/L (ref 98–111)
CO2: 24 MMOL/L (ref 22–29)
CREAT SERPL-MCNC: 0.7 MG/DL (ref 0.5–1.2)
D DIMER: 0.95 UG/ML FEU (ref 0–0.48)
GFR NON-AFRICAN AMERICAN: >60
GLUCOSE BLD-MCNC: 120 MG/DL (ref 74–109)
HCT VFR BLD CALC: 43.2 % (ref 42–52)
HEMOGLOBIN: 14.3 G/DL (ref 14–18)
INR BLD: 1.21 (ref 0.88–1.18)
LACTIC ACID: 1.7 MMOL/L (ref 0.5–1.9)
LIPASE: 19 U/L (ref 13–60)
MAGNESIUM: 1.7 MG/DL (ref 1.6–2.6)
MCH RBC QN AUTO: 29.2 PG (ref 27–31)
MCHC RBC AUTO-ENTMCNC: 33.1 G/DL (ref 33–37)
MCV RBC AUTO: 88.3 FL (ref 80–94)
PDW BLD-RTO: 21.7 % (ref 11.5–14.5)
PLATELET # BLD: 279 K/UL (ref 130–400)
PMV BLD AUTO: 9.4 FL (ref 9.4–12.4)
POTASSIUM SERPL-SCNC: 4.4 MMOL/L (ref 3.5–5)
PRO-BNP: 5985 PG/ML (ref 0–450)
PROTHROMBIN TIME: 14.7 SEC (ref 12–14.6)
RBC # BLD: 4.89 M/UL (ref 4.7–6.1)
SODIUM BLD-SCNC: 137 MMOL/L (ref 136–145)
TOTAL PROTEIN: 7.5 G/DL (ref 6.6–8.7)
TROPONIN: 0.02 NG/ML (ref 0–0.03)
WBC # BLD: 13.9 K/UL (ref 4.8–10.8)

## 2019-08-31 PROCEDURE — 6360000004 HC RX CONTRAST MEDICATION: Performed by: EMERGENCY MEDICINE

## 2019-08-31 PROCEDURE — 83880 ASSAY OF NATRIURETIC PEPTIDE: CPT

## 2019-08-31 PROCEDURE — 84484 ASSAY OF TROPONIN QUANT: CPT

## 2019-08-31 PROCEDURE — 74177 CT ABD & PELVIS W/CONTRAST: CPT

## 2019-08-31 PROCEDURE — 80053 COMPREHEN METABOLIC PANEL: CPT

## 2019-08-31 PROCEDURE — 6360000002 HC RX W HCPCS: Performed by: HOSPITALIST

## 2019-08-31 PROCEDURE — 2580000003 HC RX 258: Performed by: EMERGENCY MEDICINE

## 2019-08-31 PROCEDURE — 6370000000 HC RX 637 (ALT 250 FOR IP): Performed by: HOSPITALIST

## 2019-08-31 PROCEDURE — 71045 X-RAY EXAM CHEST 1 VIEW: CPT

## 2019-08-31 PROCEDURE — 71275 CT ANGIOGRAPHY CHEST: CPT

## 2019-08-31 PROCEDURE — 96375 TX/PRO/DX INJ NEW DRUG ADDON: CPT

## 2019-08-31 PROCEDURE — 2580000003 HC RX 258: Performed by: HOSPITALIST

## 2019-08-31 PROCEDURE — 99285 EMERGENCY DEPT VISIT HI MDM: CPT

## 2019-08-31 PROCEDURE — 83605 ASSAY OF LACTIC ACID: CPT

## 2019-08-31 PROCEDURE — 85730 THROMBOPLASTIN TIME PARTIAL: CPT

## 2019-08-31 PROCEDURE — 2000000000 HC ICU R&B

## 2019-08-31 PROCEDURE — 85027 COMPLETE CBC AUTOMATED: CPT

## 2019-08-31 PROCEDURE — 6370000000 HC RX 637 (ALT 250 FOR IP): Performed by: EMERGENCY MEDICINE

## 2019-08-31 PROCEDURE — 83735 ASSAY OF MAGNESIUM: CPT

## 2019-08-31 PROCEDURE — 87040 BLOOD CULTURE FOR BACTERIA: CPT

## 2019-08-31 PROCEDURE — 6360000002 HC RX W HCPCS: Performed by: EMERGENCY MEDICINE

## 2019-08-31 PROCEDURE — 83690 ASSAY OF LIPASE: CPT

## 2019-08-31 PROCEDURE — 96372 THER/PROPH/DIAG INJ SC/IM: CPT

## 2019-08-31 PROCEDURE — 85379 FIBRIN DEGRADATION QUANT: CPT

## 2019-08-31 PROCEDURE — 96374 THER/PROPH/DIAG INJ IV PUSH: CPT

## 2019-08-31 PROCEDURE — 85610 PROTHROMBIN TIME: CPT

## 2019-08-31 PROCEDURE — 93005 ELECTROCARDIOGRAM TRACING: CPT

## 2019-08-31 PROCEDURE — 36415 COLL VENOUS BLD VENIPUNCTURE: CPT

## 2019-08-31 RX ORDER — MAGNESIUM SULFATE 1 G/100ML
1 INJECTION INTRAVENOUS PRN
Status: DISCONTINUED | OUTPATIENT
Start: 2019-08-31 | End: 2019-09-07 | Stop reason: HOSPADM

## 2019-08-31 RX ORDER — METOPROLOL SUCCINATE 25 MG/1
25 TABLET, EXTENDED RELEASE ORAL EVERY 12 HOURS SCHEDULED
Status: DISCONTINUED | OUTPATIENT
Start: 2019-08-31 | End: 2019-09-01

## 2019-08-31 RX ORDER — MORPHINE SULFATE 2 MG/ML
4 INJECTION, SOLUTION INTRAMUSCULAR; INTRAVENOUS EVERY 4 HOURS PRN
Status: DISCONTINUED | OUTPATIENT
Start: 2019-08-31 | End: 2019-09-01

## 2019-08-31 RX ORDER — POTASSIUM CHLORIDE 20 MEQ/1
40 TABLET, EXTENDED RELEASE ORAL PRN
Status: DISCONTINUED | OUTPATIENT
Start: 2019-08-31 | End: 2019-09-01

## 2019-08-31 RX ORDER — ACETAMINOPHEN 325 MG/1
650 TABLET ORAL EVERY 4 HOURS PRN
Status: DISCONTINUED | OUTPATIENT
Start: 2019-08-31 | End: 2019-09-07 | Stop reason: HOSPADM

## 2019-08-31 RX ORDER — DOXYCYCLINE HYCLATE 100 MG/1
100 CAPSULE ORAL ONCE
Status: COMPLETED | OUTPATIENT
Start: 2019-08-31 | End: 2019-08-31

## 2019-08-31 RX ORDER — ONDANSETRON 2 MG/ML
4 INJECTION INTRAMUSCULAR; INTRAVENOUS EVERY 6 HOURS PRN
Status: DISCONTINUED | OUTPATIENT
Start: 2019-08-31 | End: 2019-09-07 | Stop reason: HOSPADM

## 2019-08-31 RX ORDER — LISINOPRIL 10 MG/1
10 TABLET ORAL DAILY
Status: DISCONTINUED | OUTPATIENT
Start: 2019-09-01 | End: 2019-09-01

## 2019-08-31 RX ORDER — ASPIRIN 81 MG/1
81 TABLET ORAL DAILY
Status: DISCONTINUED | OUTPATIENT
Start: 2019-09-01 | End: 2019-09-07 | Stop reason: HOSPADM

## 2019-08-31 RX ORDER — MORPHINE SULFATE 2 MG/ML
2 INJECTION, SOLUTION INTRAMUSCULAR; INTRAVENOUS EVERY 4 HOURS PRN
Status: DISCONTINUED | OUTPATIENT
Start: 2019-08-31 | End: 2019-09-06 | Stop reason: SDUPTHER

## 2019-08-31 RX ORDER — MORPHINE SULFATE 4 MG/ML
4 INJECTION, SOLUTION INTRAMUSCULAR; INTRAVENOUS ONCE
Status: COMPLETED | OUTPATIENT
Start: 2019-08-31 | End: 2019-08-31

## 2019-08-31 RX ORDER — ALBUTEROL SULFATE 90 UG/1
2 AEROSOL, METERED RESPIRATORY (INHALATION) EVERY 6 HOURS PRN
Status: DISCONTINUED | OUTPATIENT
Start: 2019-08-31 | End: 2019-09-07 | Stop reason: HOSPADM

## 2019-08-31 RX ORDER — SODIUM CHLORIDE 0.9 % (FLUSH) 0.9 %
10 SYRINGE (ML) INJECTION PRN
Status: DISCONTINUED | OUTPATIENT
Start: 2019-08-31 | End: 2019-09-03 | Stop reason: SDUPTHER

## 2019-08-31 RX ORDER — FUROSEMIDE 40 MG/1
40 TABLET ORAL 2 TIMES DAILY
Status: DISCONTINUED | OUTPATIENT
Start: 2019-08-31 | End: 2019-09-01

## 2019-08-31 RX ORDER — POTASSIUM CHLORIDE 7.45 MG/ML
10 INJECTION INTRAVENOUS PRN
Status: DISCONTINUED | OUTPATIENT
Start: 2019-08-31 | End: 2019-09-01

## 2019-08-31 RX ORDER — ONDANSETRON 2 MG/ML
4 INJECTION INTRAMUSCULAR; INTRAVENOUS ONCE
Status: COMPLETED | OUTPATIENT
Start: 2019-08-31 | End: 2019-08-31

## 2019-08-31 RX ORDER — DOXYCYCLINE HYCLATE 100 MG/1
100 CAPSULE ORAL EVERY 12 HOURS SCHEDULED
Status: DISCONTINUED | OUTPATIENT
Start: 2019-09-01 | End: 2019-09-01

## 2019-08-31 RX ORDER — SODIUM CHLORIDE 0.9 % (FLUSH) 0.9 %
10 SYRINGE (ML) INJECTION EVERY 12 HOURS SCHEDULED
Status: DISCONTINUED | OUTPATIENT
Start: 2019-08-31 | End: 2019-09-03 | Stop reason: SDUPTHER

## 2019-08-31 RX ORDER — SPIRONOLACTONE 25 MG/1
25 TABLET ORAL DAILY
Status: DISCONTINUED | OUTPATIENT
Start: 2019-09-01 | End: 2019-09-01

## 2019-08-31 RX ADMIN — MORPHINE SULFATE 4 MG: 2 INJECTION, SOLUTION INTRAMUSCULAR; INTRAVENOUS at 23:36

## 2019-08-31 RX ADMIN — CEFTRIAXONE 1 G: 1 INJECTION, POWDER, FOR SOLUTION INTRAMUSCULAR; INTRAVENOUS at 22:11

## 2019-08-31 RX ADMIN — ONDANSETRON 4 MG: 2 INJECTION INTRAMUSCULAR; INTRAVENOUS at 20:59

## 2019-08-31 RX ADMIN — ENOXAPARIN SODIUM 100 MG: 100 INJECTION SUBCUTANEOUS at 22:11

## 2019-08-31 RX ADMIN — Medication 10 ML: at 23:52

## 2019-08-31 RX ADMIN — MORPHINE SULFATE 4 MG: 4 INJECTION INTRAVENOUS at 20:59

## 2019-08-31 RX ADMIN — METOPROLOL SUCCINATE 25 MG: 25 TABLET, EXTENDED RELEASE ORAL at 23:51

## 2019-08-31 RX ADMIN — FUROSEMIDE 40 MG: 40 TABLET ORAL at 23:50

## 2019-08-31 RX ADMIN — DOXYCYCLINE HYCLATE 100 MG: 100 CAPSULE ORAL at 22:11

## 2019-08-31 RX ADMIN — IOPAMIDOL 95 ML: 755 INJECTION, SOLUTION INTRAVENOUS at 21:13

## 2019-08-31 ASSESSMENT — ENCOUNTER SYMPTOMS
NAUSEA: 0
EYE PAIN: 0
SORE THROAT: 0
SHORTNESS OF BREATH: 0
VOMITING: 0
DIARRHEA: 0
ABDOMINAL PAIN: 1
COUGH: 1

## 2019-08-31 ASSESSMENT — PAIN SCALES - GENERAL
PAINLEVEL_OUTOF10: 8
PAINLEVEL_OUTOF10: 8
PAINLEVEL_OUTOF10: 9

## 2019-08-31 ASSESSMENT — PAIN DESCRIPTION - ORIENTATION: ORIENTATION: RIGHT

## 2019-08-31 ASSESSMENT — PAIN DESCRIPTION - LOCATION: LOCATION: FLANK;CHEST

## 2019-09-01 ENCOUNTER — APPOINTMENT (OUTPATIENT)
Dept: ULTRASOUND IMAGING | Age: 34
DRG: 871 | End: 2019-09-01
Payer: COMMERCIAL

## 2019-09-01 ENCOUNTER — OUTSIDE FACILITY SERVICE (OUTPATIENT)
Dept: PULMONOLOGY | Facility: CLINIC | Age: 34
End: 2019-09-01

## 2019-09-01 ENCOUNTER — APPOINTMENT (OUTPATIENT)
Dept: GENERAL RADIOLOGY | Age: 34
DRG: 871 | End: 2019-09-01
Payer: COMMERCIAL

## 2019-09-01 PROBLEM — I26.99 PULMONARY INFARCT (HCC): Status: ACTIVE | Noted: 2019-09-01

## 2019-09-01 LAB
ALBUMIN SERPL-MCNC: 4 G/DL (ref 3.5–5.2)
ALP BLD-CCNC: 180 U/L (ref 40–130)
ALT SERPL-CCNC: 15 U/L (ref 5–41)
AMPHETAMINE SCREEN, URINE: NEGATIVE
ANION GAP SERPL CALCULATED.3IONS-SCNC: 15 MMOL/L (ref 7–19)
ANION GAP SERPL CALCULATED.3IONS-SCNC: 17 MMOL/L (ref 7–19)
ANION GAP SERPL CALCULATED.3IONS-SCNC: 19 MMOL/L (ref 7–19)
APTT: 120.3 SEC (ref 26–36.2)
APTT: 35.7 SEC (ref 26–36.2)
APTT: 60.9 SEC (ref 26–36.2)
AST SERPL-CCNC: 24 U/L (ref 5–40)
BARBITURATE SCREEN URINE: NEGATIVE
BASE EXCESS ARTERIAL: -3.7 MMOL/L (ref -2–2)
BASOPHILS ABSOLUTE: 0.1 K/UL (ref 0–0.2)
BASOPHILS RELATIVE PERCENT: 0.5 % (ref 0–1)
BENZODIAZEPINE SCREEN, URINE: NEGATIVE
BILIRUB SERPL-MCNC: 1.4 MG/DL (ref 0.2–1.2)
BILIRUBIN URINE: NEGATIVE
BLOOD, URINE: NEGATIVE
BUN BLDV-MCNC: 18 MG/DL (ref 6–20)
BUN BLDV-MCNC: 19 MG/DL (ref 6–20)
BUN BLDV-MCNC: 22 MG/DL (ref 6–20)
CALCIUM SERPL-MCNC: 8.6 MG/DL (ref 8.6–10)
CALCIUM SERPL-MCNC: 8.7 MG/DL (ref 8.6–10)
CALCIUM SERPL-MCNC: 8.9 MG/DL (ref 8.6–10)
CANNABINOID SCREEN URINE: NEGATIVE
CARBOXYHEMOGLOBIN ARTERIAL: 3.2 % (ref 0–5)
CHLORIDE BLD-SCNC: 93 MMOL/L (ref 98–111)
CHLORIDE BLD-SCNC: 93 MMOL/L (ref 98–111)
CHLORIDE BLD-SCNC: 95 MMOL/L (ref 98–111)
CLARITY: CLEAR
CO2: 20 MMOL/L (ref 22–29)
CO2: 20 MMOL/L (ref 22–29)
CO2: 24 MMOL/L (ref 22–29)
COCAINE METABOLITE SCREEN URINE: NEGATIVE
COLOR: NORMAL
CREAT SERPL-MCNC: 1.7 MG/DL (ref 0.5–1.2)
CREAT SERPL-MCNC: 1.8 MG/DL (ref 0.5–1.2)
CREAT SERPL-MCNC: 2 MG/DL (ref 0.5–1.2)
CREATININE URINE: 120.5 MG/DL (ref 4.2–622)
EKG P AXIS: 35 DEGREES
EKG P AXIS: 62 DEGREES
EKG P-R INTERVAL: 160 MS
EKG P-R INTERVAL: 178 MS
EKG Q-T INTERVAL: 320 MS
EKG Q-T INTERVAL: 324 MS
EKG QRS DURATION: 102 MS
EKG QRS DURATION: 108 MS
EKG QTC CALCULATION (BAZETT): 414 MS
EKG QTC CALCULATION (BAZETT): 427 MS
EKG T AXIS: 56 DEGREES
EKG T AXIS: 56 DEGREES
EOSINOPHIL,URINE: NORMAL
EOSINOPHILS ABSOLUTE: 0 K/UL (ref 0–0.6)
EOSINOPHILS RELATIVE PERCENT: 0.2 % (ref 0–5)
GFR NON-AFRICAN AMERICAN: 38
GFR NON-AFRICAN AMERICAN: 43
GFR NON-AFRICAN AMERICAN: 46
GLUCOSE BLD-MCNC: 122 MG/DL (ref 74–109)
GLUCOSE BLD-MCNC: 149 MG/DL (ref 74–109)
GLUCOSE BLD-MCNC: 179 MG/DL (ref 74–109)
GLUCOSE URINE: NEGATIVE MG/DL
HCO3 ARTERIAL: 22.1 MMOL/L (ref 22–26)
HCT VFR BLD CALC: 44.9 % (ref 42–52)
HCT VFR BLD CALC: 46.6 % (ref 42–52)
HEMOGLOBIN, ART, EXTENDED: 14.4 G/DL (ref 14–18)
HEMOGLOBIN: 14.5 G/DL (ref 14–18)
HEMOGLOBIN: 15.1 G/DL (ref 14–18)
IMMATURE GRANULOCYTES #: 0.1 K/UL
KETONES, URINE: NEGATIVE MG/DL
LACTIC ACID, SEPSIS: 2.7 MG/DL (ref 0.5–1.9)
LACTIC ACID: 2.4 MMOL/L (ref 0.5–1.9)
LEUKOCYTE ESTERASE, URINE: NEGATIVE
LV EF: 29 %
LVEF MODALITY: NORMAL
LYMPHOCYTES ABSOLUTE: 1.5 K/UL (ref 1.1–4.5)
LYMPHOCYTES RELATIVE PERCENT: 8 % (ref 20–40)
Lab: ABNORMAL
MAGNESIUM: 1.5 MG/DL (ref 1.6–2.6)
MAGNESIUM: 2.2 MG/DL (ref 1.6–2.6)
MCH RBC QN AUTO: 28.9 PG (ref 27–31)
MCH RBC QN AUTO: 29.2 PG (ref 27–31)
MCHC RBC AUTO-ENTMCNC: 32.3 G/DL (ref 33–37)
MCHC RBC AUTO-ENTMCNC: 32.4 G/DL (ref 33–37)
MCV RBC AUTO: 89.4 FL (ref 80–94)
MCV RBC AUTO: 90 FL (ref 80–94)
METHEMOGLOBIN ARTERIAL: 1.1 %
MONOCYTES ABSOLUTE: 1.8 K/UL (ref 0–0.9)
MONOCYTES RELATIVE PERCENT: 9.5 % (ref 0–10)
NEUTROPHILS ABSOLUTE: 15.7 K/UL (ref 1.5–7.5)
NEUTROPHILS RELATIVE PERCENT: 81.3 % (ref 50–65)
NITRITE, URINE: NEGATIVE
O2 CONTENT ARTERIAL: 19 ML/DL
O2 SAT, ARTERIAL: 93.6 %
O2 THERAPY: ABNORMAL
OPIATE SCREEN URINE: POSITIVE
OSMOLALITY URINE: 359 MOSM/KG (ref 250–1200)
PCO2 ARTERIAL: 42 MMHG (ref 35–45)
PDW BLD-RTO: 21.7 % (ref 11.5–14.5)
PDW BLD-RTO: 22 % (ref 11.5–14.5)
PH ARTERIAL: 7.33 (ref 7.35–7.45)
PH UA: 5.5 (ref 5–8)
PLATELET # BLD: 310 K/UL (ref 130–400)
PLATELET # BLD: 311 K/UL (ref 130–400)
PMV BLD AUTO: 9.6 FL (ref 9.4–12.4)
PMV BLD AUTO: 9.6 FL (ref 9.4–12.4)
PO2 ARTERIAL: 85 MMHG (ref 80–100)
POTASSIUM REFLEX MAGNESIUM: 7.1 MMOL/L (ref 3.5–5)
POTASSIUM SERPL-SCNC: 5.2 MMOL/L (ref 3.5–5)
POTASSIUM SERPL-SCNC: 7 MMOL/L (ref 3.5–5)
POTASSIUM, WHOLE BLOOD: 6.8
PROTEIN UA: NEGATIVE MG/DL
RBC # BLD: 5.02 M/UL (ref 4.7–6.1)
RBC # BLD: 5.18 M/UL (ref 4.7–6.1)
SODIUM BLD-SCNC: 132 MMOL/L (ref 136–145)
SODIUM URINE: <20 MMOL/L
SPECIFIC GRAVITY UA: >1.045 (ref 1–1.03)
TOTAL CK: 58 U/L (ref 39–308)
TOTAL PROTEIN: 7.8 G/DL (ref 6.6–8.7)
TROPONIN: 0.03 NG/ML (ref 0–0.03)
TROPONIN: 0.07 NG/ML (ref 0–0.03)
UROBILINOGEN, URINE: 1 E.U./DL
WBC # BLD: 19.3 K/UL (ref 4.8–10.8)
WBC # BLD: 19.5 K/UL (ref 4.8–10.8)

## 2019-09-01 PROCEDURE — 83935 ASSAY OF URINE OSMOLALITY: CPT

## 2019-09-01 PROCEDURE — 51702 INSERT TEMP BLADDER CATH: CPT

## 2019-09-01 PROCEDURE — 83605 ASSAY OF LACTIC ACID: CPT

## 2019-09-01 PROCEDURE — 2000000000 HC ICU R&B

## 2019-09-01 PROCEDURE — 93306 TTE W/DOPPLER COMPLETE: CPT

## 2019-09-01 PROCEDURE — 93975 VASCULAR STUDY: CPT

## 2019-09-01 PROCEDURE — 84300 ASSAY OF URINE SODIUM: CPT

## 2019-09-01 PROCEDURE — 87070 CULTURE OTHR SPECIMN AEROBIC: CPT

## 2019-09-01 PROCEDURE — 6360000002 HC RX W HCPCS: Performed by: HOSPITALIST

## 2019-09-01 PROCEDURE — 05HM33Z INSERTION OF INFUSION DEVICE INTO RIGHT INTERNAL JUGULAR VEIN, PERCUTANEOUS APPROACH: ICD-10-PCS | Performed by: INTERNAL MEDICINE

## 2019-09-01 PROCEDURE — 99255 IP/OBS CONSLTJ NEW/EST HI 80: CPT | Performed by: INTERNAL MEDICINE

## 2019-09-01 PROCEDURE — 84132 ASSAY OF SERUM POTASSIUM: CPT

## 2019-09-01 PROCEDURE — 2580000003 HC RX 258: Performed by: INTERNAL MEDICINE

## 2019-09-01 PROCEDURE — 94660 CPAP INITIATION&MGMT: CPT

## 2019-09-01 PROCEDURE — 6370000000 HC RX 637 (ALT 250 FOR IP): Performed by: INTERNAL MEDICINE

## 2019-09-01 PROCEDURE — 86703 HIV-1/HIV-2 1 RESULT ANTBDY: CPT

## 2019-09-01 PROCEDURE — 71045 X-RAY EXAM CHEST 1 VIEW: CPT

## 2019-09-01 PROCEDURE — 85730 THROMBOPLASTIN TIME PARTIAL: CPT

## 2019-09-01 PROCEDURE — 87086 URINE CULTURE/COLONY COUNT: CPT

## 2019-09-01 PROCEDURE — 85025 COMPLETE CBC W/AUTO DIFF WBC: CPT

## 2019-09-01 PROCEDURE — 36556 INSERT NON-TUNNEL CV CATH: CPT

## 2019-09-01 PROCEDURE — 84484 ASSAY OF TROPONIN QUANT: CPT

## 2019-09-01 PROCEDURE — 82330 ASSAY OF CALCIUM: CPT

## 2019-09-01 PROCEDURE — 82550 ASSAY OF CK (CPK): CPT

## 2019-09-01 PROCEDURE — 83735 ASSAY OF MAGNESIUM: CPT

## 2019-09-01 PROCEDURE — 82803 BLOOD GASES ANY COMBINATION: CPT

## 2019-09-01 PROCEDURE — 87205 SMEAR GRAM STAIN: CPT

## 2019-09-01 PROCEDURE — 80053 COMPREHEN METABOLIC PANEL: CPT

## 2019-09-01 PROCEDURE — 94640 AIRWAY INHALATION TREATMENT: CPT

## 2019-09-01 PROCEDURE — 80307 DRUG TEST PRSMV CHEM ANLYZR: CPT

## 2019-09-01 PROCEDURE — 85027 COMPLETE CBC AUTOMATED: CPT

## 2019-09-01 PROCEDURE — 36415 COLL VENOUS BLD VENIPUNCTURE: CPT

## 2019-09-01 PROCEDURE — 2580000003 HC RX 258: Performed by: HOSPITALIST

## 2019-09-01 PROCEDURE — 2700000000 HC OXYGEN THERAPY PER DAY

## 2019-09-01 PROCEDURE — 81003 URINALYSIS AUTO W/O SCOPE: CPT

## 2019-09-01 PROCEDURE — 6370000000 HC RX 637 (ALT 250 FOR IP): Performed by: HOSPITALIST

## 2019-09-01 PROCEDURE — 93005 ELECTROCARDIOGRAM TRACING: CPT

## 2019-09-01 PROCEDURE — 36592 COLLECT BLOOD FROM PICC: CPT

## 2019-09-01 PROCEDURE — 93970 EXTREMITY STUDY: CPT

## 2019-09-01 PROCEDURE — 82570 ASSAY OF URINE CREATININE: CPT

## 2019-09-01 PROCEDURE — 87040 BLOOD CULTURE FOR BACTERIA: CPT

## 2019-09-01 PROCEDURE — 6360000002 HC RX W HCPCS: Performed by: INTERNAL MEDICINE

## 2019-09-01 RX ORDER — IPRATROPIUM BROMIDE AND ALBUTEROL SULFATE 2.5; .5 MG/3ML; MG/3ML
1 SOLUTION RESPIRATORY (INHALATION) EVERY 4 HOURS PRN
Status: DISCONTINUED | OUTPATIENT
Start: 2019-09-01 | End: 2019-09-07 | Stop reason: HOSPADM

## 2019-09-01 RX ORDER — SODIUM CHLORIDE 0.9 % (FLUSH) 0.9 %
10 SYRINGE (ML) INJECTION PRN
Status: DISCONTINUED | OUTPATIENT
Start: 2019-09-01 | End: 2019-09-07 | Stop reason: HOSPADM

## 2019-09-01 RX ORDER — SODIUM BICARBONATE 650 MG/1
650 TABLET ORAL 2 TIMES DAILY
Status: DISCONTINUED | OUTPATIENT
Start: 2019-09-01 | End: 2019-09-02

## 2019-09-01 RX ORDER — SODIUM POLYSTYRENE SULFONATE 15 G/60ML
30 SUSPENSION ORAL; RECTAL ONCE
Status: COMPLETED | OUTPATIENT
Start: 2019-09-01 | End: 2019-09-01

## 2019-09-01 RX ORDER — DEXTROSE MONOHYDRATE 25 G/50ML
12.5 INJECTION, SOLUTION INTRAVENOUS PRN
Status: DISCONTINUED | OUTPATIENT
Start: 2019-09-01 | End: 2019-09-07 | Stop reason: HOSPADM

## 2019-09-01 RX ORDER — SODIUM POLYSTYRENE SULFONATE 15 G/60ML
15 SUSPENSION ORAL; RECTAL ONCE
Status: COMPLETED | OUTPATIENT
Start: 2019-09-01 | End: 2019-09-01

## 2019-09-01 RX ORDER — SODIUM CHLORIDE 9 MG/ML
INJECTION, SOLUTION INTRAVENOUS CONTINUOUS
Status: DISCONTINUED | OUTPATIENT
Start: 2019-09-01 | End: 2019-09-01

## 2019-09-01 RX ORDER — MAGNESIUM SULFATE 1 G/100ML
1 INJECTION INTRAVENOUS ONCE
Status: COMPLETED | OUTPATIENT
Start: 2019-09-01 | End: 2019-09-01

## 2019-09-01 RX ORDER — IPRATROPIUM BROMIDE AND ALBUTEROL SULFATE 2.5; .5 MG/3ML; MG/3ML
1 SOLUTION RESPIRATORY (INHALATION)
Status: DISCONTINUED | OUTPATIENT
Start: 2019-09-01 | End: 2019-09-01

## 2019-09-01 RX ORDER — NICOTINE 21 MG/24HR
1 PATCH, TRANSDERMAL 24 HOURS TRANSDERMAL DAILY
Status: DISCONTINUED | OUTPATIENT
Start: 2019-09-01 | End: 2019-09-07 | Stop reason: HOSPADM

## 2019-09-01 RX ORDER — LEVOFLOXACIN 5 MG/ML
750 INJECTION, SOLUTION INTRAVENOUS EVERY 24 HOURS
Status: DISCONTINUED | OUTPATIENT
Start: 2019-09-01 | End: 2019-09-01

## 2019-09-01 RX ORDER — HEPARIN SODIUM 10000 [USP'U]/100ML
18 INJECTION, SOLUTION INTRAVENOUS CONTINUOUS
Status: DISCONTINUED | OUTPATIENT
Start: 2019-09-01 | End: 2019-09-03

## 2019-09-01 RX ORDER — HEPARIN SODIUM 1000 [USP'U]/ML
40 INJECTION, SOLUTION INTRAVENOUS; SUBCUTANEOUS PRN
Status: DISCONTINUED | OUTPATIENT
Start: 2019-09-01 | End: 2019-09-03

## 2019-09-01 RX ORDER — HEPARIN SODIUM 1000 [USP'U]/ML
80 INJECTION, SOLUTION INTRAVENOUS; SUBCUTANEOUS ONCE
Status: COMPLETED | OUTPATIENT
Start: 2019-09-01 | End: 2019-09-01

## 2019-09-01 RX ORDER — SODIUM CHLORIDE 0.9 % (FLUSH) 0.9 %
10 SYRINGE (ML) INJECTION EVERY 12 HOURS SCHEDULED
Status: DISCONTINUED | OUTPATIENT
Start: 2019-09-01 | End: 2019-09-07 | Stop reason: HOSPADM

## 2019-09-01 RX ORDER — FUROSEMIDE 10 MG/ML
40 INJECTION INTRAMUSCULAR; INTRAVENOUS DAILY
Status: DISCONTINUED | OUTPATIENT
Start: 2019-09-01 | End: 2019-09-03

## 2019-09-01 RX ORDER — METHYLPREDNISOLONE SODIUM SUCCINATE 40 MG/ML
40 INJECTION, POWDER, LYOPHILIZED, FOR SOLUTION INTRAMUSCULAR; INTRAVENOUS DAILY
Status: DISCONTINUED | OUTPATIENT
Start: 2019-09-01 | End: 2019-09-03

## 2019-09-01 RX ORDER — HEPARIN SODIUM 1000 [USP'U]/ML
80 INJECTION, SOLUTION INTRAVENOUS; SUBCUTANEOUS PRN
Status: DISCONTINUED | OUTPATIENT
Start: 2019-09-01 | End: 2019-09-03

## 2019-09-01 RX ADMIN — Medication 1 G: at 08:48

## 2019-09-01 RX ADMIN — PIPERACILLIN AND TAZOBACTAM 2.25 G: 2; .25 INJECTION, POWDER, FOR SOLUTION INTRAVENOUS at 17:52

## 2019-09-01 RX ADMIN — LEVOFLOXACIN 750 MG: 5 INJECTION, SOLUTION INTRAVENOUS at 06:29

## 2019-09-01 RX ADMIN — INSULIN HUMAN 5 UNITS: 100 INJECTION, SOLUTION PARENTERAL at 10:20

## 2019-09-01 RX ADMIN — VANCOMYCIN HYDROCHLORIDE 1000 MG: 10 INJECTION, POWDER, LYOPHILIZED, FOR SOLUTION INTRAVENOUS at 10:54

## 2019-09-01 RX ADMIN — MORPHINE SULFATE 2 MG: 2 INJECTION, SOLUTION INTRAMUSCULAR; INTRAVENOUS at 23:58

## 2019-09-01 RX ADMIN — Medication 1 G: at 21:37

## 2019-09-01 RX ADMIN — CALCIUM GLUCONATE 1 G: 98 INJECTION, SOLUTION INTRAVENOUS at 10:04

## 2019-09-01 RX ADMIN — PIPERACILLIN AND TAZOBACTAM 2.25 G: 2; .25 INJECTION, POWDER, FOR SOLUTION INTRAVENOUS at 10:07

## 2019-09-01 RX ADMIN — HYDROMORPHONE HYDROCHLORIDE 2 MG: 1 INJECTION, SOLUTION INTRAMUSCULAR; INTRAVENOUS; SUBCUTANEOUS at 02:12

## 2019-09-01 RX ADMIN — SODIUM CHLORIDE: 9 INJECTION, SOLUTION INTRAVENOUS at 02:11

## 2019-09-01 RX ADMIN — HEPARIN SODIUM AND DEXTROSE 16 UNITS/KG/HR: 10000; 5 INJECTION INTRAVENOUS at 19:09

## 2019-09-01 RX ADMIN — ASPIRIN 81 MG: 81 TABLET, COATED ORAL at 10:02

## 2019-09-01 RX ADMIN — IPRATROPIUM BROMIDE AND ALBUTEROL SULFATE 1 AMPULE: .5; 3 SOLUTION RESPIRATORY (INHALATION) at 10:48

## 2019-09-01 RX ADMIN — MAGNESIUM SULFATE HEPTAHYDRATE 1 G: 1 INJECTION, SOLUTION INTRAVENOUS at 08:02

## 2019-09-01 RX ADMIN — HYDROMORPHONE HYDROCHLORIDE 1 MG: 1 INJECTION, SOLUTION INTRAMUSCULAR; INTRAVENOUS; SUBCUTANEOUS at 10:03

## 2019-09-01 RX ADMIN — SODIUM POLYSTYRENE SULFONATE 30 G: 15 SUSPENSION ORAL; RECTAL at 10:54

## 2019-09-01 RX ADMIN — SODIUM BICARBONATE 650 MG: 650 TABLET ORAL at 10:54

## 2019-09-01 RX ADMIN — Medication 15 G: at 08:18

## 2019-09-01 RX ADMIN — SODIUM BICARBONATE 650 MG: 650 TABLET ORAL at 20:47

## 2019-09-01 RX ADMIN — Medication 10 ML: at 21:00

## 2019-09-01 RX ADMIN — Medication 10 ML: at 20:48

## 2019-09-01 RX ADMIN — HEPARIN SODIUM AND DEXTROSE 18 UNITS/KG/HR: 10000; 5 INJECTION INTRAVENOUS at 06:22

## 2019-09-01 RX ADMIN — DOXYCYCLINE HYCLATE 100 MG: 100 CAPSULE, GELATIN COATED ORAL at 07:41

## 2019-09-01 RX ADMIN — HYDROMORPHONE HYDROCHLORIDE 1 MG: 1 INJECTION, SOLUTION INTRAMUSCULAR; INTRAVENOUS; SUBCUTANEOUS at 17:52

## 2019-09-01 RX ADMIN — HYDROMORPHONE HYDROCHLORIDE 1 MG: 1 INJECTION, SOLUTION INTRAMUSCULAR; INTRAVENOUS; SUBCUTANEOUS at 13:06

## 2019-09-01 RX ADMIN — HEPARIN SODIUM 7030 UNITS: 1000 INJECTION, SOLUTION INTRAVENOUS; SUBCUTANEOUS at 06:16

## 2019-09-01 RX ADMIN — FUROSEMIDE 40 MG: 10 INJECTION, SOLUTION INTRAMUSCULAR; INTRAVENOUS at 08:48

## 2019-09-01 RX ADMIN — HYDROMORPHONE HYDROCHLORIDE 1 MG: 1 INJECTION, SOLUTION INTRAMUSCULAR; INTRAVENOUS; SUBCUTANEOUS at 21:35

## 2019-09-01 RX ADMIN — DEXTROSE 50 % IN WATER (D50W) INTRAVENOUS SYRINGE 12.5 G: at 10:19

## 2019-09-01 RX ADMIN — HYDROMORPHONE HYDROCHLORIDE 1 MG: 1 INJECTION, SOLUTION INTRAMUSCULAR; INTRAVENOUS; SUBCUTANEOUS at 07:41

## 2019-09-01 RX ADMIN — Medication 10 ML: at 05:28

## 2019-09-01 RX ADMIN — METHYLPREDNISOLONE SODIUM SUCCINATE 40 MG: 40 INJECTION, POWDER, FOR SOLUTION INTRAMUSCULAR; INTRAVENOUS at 08:48

## 2019-09-01 ASSESSMENT — PAIN SCALES - GENERAL
PAINLEVEL_OUTOF10: 6
PAINLEVEL_OUTOF10: 7
PAINLEVEL_OUTOF10: 10
PAINLEVEL_OUTOF10: 7
PAINLEVEL_OUTOF10: 0
PAINLEVEL_OUTOF10: 2
PAINLEVEL_OUTOF10: 10
PAINLEVEL_OUTOF10: 9
PAINLEVEL_OUTOF10: 10
PAINLEVEL_OUTOF10: 9
PAINLEVEL_OUTOF10: 4
PAINLEVEL_OUTOF10: 7
PAINLEVEL_OUTOF10: 3
PAINLEVEL_OUTOF10: 2
PAINLEVEL_OUTOF10: 2
PAINLEVEL_OUTOF10: 9

## 2019-09-01 ASSESSMENT — ENCOUNTER SYMPTOMS
STRIDOR: 0
RESPIRATORY NEGATIVE: 1
SHORTNESS OF BREATH: 0
COUGH: 0
EYES NEGATIVE: 1
APNEA: 0
ABDOMINAL DISTENTION: 0
NAUSEA: 0
SINUS PAIN: 0
SINUS PRESSURE: 0
SHORTNESS OF BREATH: 1
BACK PAIN: 0
COUGH: 1
VOMITING: 0
DIARRHEA: 0
GASTROINTESTINAL NEGATIVE: 1
PHOTOPHOBIA: 0
TROUBLE SWALLOWING: 0
CHOKING: 0
WHEEZING: 0

## 2019-09-01 ASSESSMENT — PAIN DESCRIPTION - ORIENTATION: ORIENTATION: RIGHT

## 2019-09-01 ASSESSMENT — PAIN DESCRIPTION - PAIN TYPE: TYPE: ACUTE PAIN

## 2019-09-01 ASSESSMENT — PAIN DESCRIPTION - LOCATION: LOCATION: CHEST

## 2019-09-01 NOTE — PROGRESS NOTES
Pharmacy Note  Vancomycin Consult    Georgia Hawkins is a 35 y.o. male started on Vancomycin for RLL PNA and possible sepsis; consult received from Dr. Anabel Lujan to manage therapy. Also receiving the following antibiotics: Zosyn, Rocephin. Patient Active Problem List   Diagnosis    IVDU (intravenous drug user)    Tobacco use    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 ([facility]    Chronic systolic heart failure (HCC)    Acute on chronic systolic (congestive) heart failure (HCC)    Gastroesophageal reflux disease without esophagitis    Methamphetamine abuse ([facility]    Cholecystitis without calculus    RUQ abdominal pain    Elevated liver function tests    Acute on chronic respiratory failure with hypoxia (HCC)    Hypervolemia    Hyponatremia    Cholecystitis    Palliative care patient    Pneumonia due to organism    Acute pulmonary embolism (HCC)    Pulmonary embolus, right (HCC)    Hemoptysis    Nonischemic dilated cardiomyopathy (HCC)    Pulmonary infarct (HCC)       Allergies:  Patient has no known allergies. Temp max: 99.1    Recent Labs     09/01/19  0646 09/01/19  0819   BUN 18 19       Recent Labs     09/01/19  0646 09/01/19  0819   CREATININE 1.8* 2.0*       Recent Labs     09/01/19  0531 09/01/19  0612   WBC 19.3* 19.5*         Intake/Output Summary (Last 24 hours) at 9/1/2019 0913  Last data filed at 9/1/2019 0900  Gross per 24 hour   Intake 270 ml   Output 400 ml   Net -130 ml       Culture Date Source Results   09/01/19 Urine Collected   09/01/19 Blood x 2 Collected   09/01/19 Respiratory Collected       Ht Readings from Last 1 Encounters:   09/01/19 5' 7\" (1.702 m)        Wt Readings from Last 1 Encounters:   09/01/19 193 lb 11.2 oz (87.9 kg)         Body mass index is 30.34 kg/m². Estimated Creatinine Clearance: 56 mL/min (A) (based on SCr of 2 mg/dL (H)). Assessment/Plan:  Will initiate vancomycin pulse dosing.   Timing of trough level will be determined based on culture results, renal function, and clinical response. Thank you for the consult. Will continue to follow.     Electronically signed by Ciara Rouse, [facility] on 9/1/2019 at 9:13 AM

## 2019-09-01 NOTE — CONSULTS
[facility]  Cardiology Consult      Requesting MD:  José Miguel Batista MD   Admit Status:  Inpatient [101]       History obtained from:   [] Patient  [] Other (specify):     Patient:  Kymberly Bruner  [id number]     Chief Complaint:   Chief Complaint   Patient presents with    Hemoptysis     about 2 hours ago.  Flank Pain     right sided. started this morning. HPI: Mr. Perlita Tao is a 35 y.o. male with a history of nonischemic dilated cardiomyopathy previous cardiac catheterization by me October 2018 no significant coronary disease on multiple cardiac medications now presents 8/31/2019 with episode of hemoptysis right flank pain right lower chest pleuritic pain increased dyspnea. CTA pulmonary pulmonary embolism right lower lobe with pleural effusion possible pulmonary infarct. Lactic acid 1.7.  proBNP 5985. Urine drug screen positive for opiates. White blood cell count 13,900. BiPAP mask. Denies chest pain. 3 troponin levels 0.0 2.03 and 0.07 obtained. On intravenous heparin. Review of Systems:  Review of Systems   Constitutional: Negative. Negative for chills, fever and unexpected weight change. HENT: Negative. Eyes: Negative. Respiratory: Negative. Negative for shortness of breath. Cardiovascular: Negative. Negative for chest pain. Gastrointestinal: Negative. Negative for diarrhea, nausea and vomiting. Endocrine: Negative. Genitourinary: Negative. Musculoskeletal: Negative. Skin: Negative. Neurological: Negative. All other systems reviewed and are negative.       Cardiac Specific Data:  Specialty Problems        Cardiology Problems    Chronic systolic heart failure ([address]        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 ([address]        Acute on chronic systolic (congestive) heart failure (HCC)        Acute pulmonary embolism (HCC)        Nonischemic dilated cardiomyopathy (HCC)        * (Principal) Pulmonary embolus, right (HCC)        Pulmonary infarct [facility])              Past Medical History:  Past Medical History:   Diagnosis Date    CHF (congestive heart failure) ([facility]     Hypertension     Palliative care patient 05/08/2019        Past Surgical History:  Past Surgical History:   Procedure Laterality Date    HAND SURGERY         Past Family History:  Family History   Problem Relation Age of Onset   Yusra Mcclellan Cancer Mother     No Known Problems Father     No Known Problems Brother        Past Social History:  Social History     Socioeconomic History    Marital status: Single     Spouse name: Not on file    Number of children: Not on file    Years of education: Not on file    Highest education level: Not on file   Occupational History    Not on file   Social Needs    Financial resource strain: Not on file    Food insecurity:     Worry: Not on file     Inability: Not on file    Transportation needs:     Medical: Not on file     Non-medical: Not on file   Tobacco Use    Smoking status: Current Every Day Smoker     Packs/day: 0.50     Years: 13.00     Pack years: 6.50     Types: Cigarettes    Smokeless tobacco: Never Used   Substance and Sexual Activity    Alcohol use: Yes     Comment: rare    Drug use: Not Currently    Sexual activity: Not on file   Lifestyle    Physical activity:     Days per week: Not on file     Minutes per session: Not on file    Stress: Not on file   Relationships    Social connections:     Talks on phone: Not on file     Gets together: Not on file     Attends Church service: Not on file     Active member of club or organization: Not on file     Attends meetings of clubs or organizations: Not on file     Relationship status: Not on file    Intimate partner violence:     Fear of current or ex partner: Not on file     Emotionally abused: Not on file     Physically abused: Not on file     Forced sexual activity: Not on file   Other Topics Concern    Not on file   Social History Narrative    Not on file 129/81   Pulse: 112   Resp: (!) 31   Temp:    SpO2: 95%       Intake/Output Summary (Last 24 hours) at 9/1/2019 1201  Last data filed at 9/1/2019 1000  Gross per 24 hour   Intake 270 ml   Output 450 ml   Net -180 ml     Estimated body mass index is 30.34 kg/m² as calculated from the following:    Height as of this encounter: 5' 7\" (1.702 m). Weight as of this encounter: 193 lb 11.2 oz (87.9 kg). Physical Exam   Constitutional: He is oriented to person, place, and time. He appears well-developed and well-nourished. No distress. Sitting up in bed appears comfortable BiPAP mask in place   HENT:   Head: Normocephalic and atraumatic. Eyes: Pupils are equal, round, and reactive to light. EOM are normal. No scleral icterus. Neck: Normal range of motion. Neck supple. No JVD present. Carotid bruit is not present. No tracheal deviation present. No thyromegaly present. Cardiovascular: Normal rate, regular rhythm and normal heart sounds. Exam reveals no gallop and no friction rub. No murmur heard. Pulmonary/Chest: Effort normal and breath sounds normal. No stridor. No respiratory distress. He has no wheezes. He has no rales. He exhibits no tenderness. Abdominal: Soft. Bowel sounds are normal. He exhibits no distension and no mass. There is no tenderness. There is no rebound and no guarding. No hernia. Musculoskeletal: He exhibits no edema. Lymphadenopathy:     He has no cervical adenopathy. Neurological: He is alert and oriented to person, place, and time. No cranial nerve deficit or sensory deficit. He exhibits normal muscle tone. Coordination normal.   Skin: Skin is warm and dry. He is not diaphoretic. Psychiatric: He has a normal mood and affect. His behavior is normal. Judgment and thought content normal.   Vitals reviewed.       Labs:  Recent Labs     08/31/19 2040 09/01/19  0531 09/01/19  0612   WBC 13.9* 19.3* 19.5*   HGB 14.3 15.1 14.5    311 310       Recent Labs     08/31/19 2040

## 2019-09-01 NOTE — CONSULTS
friction rub. No murmur heard. Pulmonary/Chest: Effort normal. No accessory muscle usage. No respiratory distress. He has no wheezes. He exhibits no deformity. Abdominal: Soft. He exhibits no distension. There is no tenderness. Musculoskeletal: He exhibits no edema. Neurological: He is alert. Skin: Skin is warm and dry. No erythema. Psychiatric: He has a normal mood and affect. Recent Labs     08/31/19 2040 09/01/19 0531 09/01/19 0612   WBC 13.9* 19.3* 19.5*   RBC 4.89 5.18 5.02   HGB 14.3 15.1 14.5   HCT 43.2 46.6 44.9    311 310   MCV 88.3 90.0 89.4   MCH 29.2 29.2 28.9   MCHC 33.1 32.4* 32.3*   RDW 21.7* 22.0* 21.7*      Recent Labs     09/01/19 0646 09/01/19 0819 09/01/19 0916 09/01/19  1300   * 132*  --  132*   K 7.1* 7.0* 6.8 5.2*   CL 95* 93*  --  93*   CO2 20* 20*  --  24   BUN 18 19  --  22*   CREATININE 1.8* 2.0*  --  1.7*   CALCIUM 8.6 8.7  --  8.9   GLUCOSE 122* 149*  --  179*      Recent Labs     09/01/19 0916   PHART 7.330*   PAM9QQF 42.0   PO2ART 85.0   POO0RLH 22.1   Z9QAFVVI 93.6   BEART -3.7*     Recent Labs     08/31/19 2040 09/01/19 0612 09/01/19  0646 09/01/19  0819 09/01/19  1300   AST 22  --   --   --  24  --    ALT 15  --   --   --  15  --    ALKPHOS 181*  --   --   --  180*  --    BILITOT 1.3*  --   --   --  1.4*  --    MG 1.7  --   --  1.5* 2.2  --    CALCIUM 9.4  --   --  8.6 8.7 8.9   TROPONINI 0.02   < >  --  0.07*  --   --    LACTA  --    < > 2.4*  --   --   --    INR 1.21*  --   --   --   --   --     < > = values in this interval not displayed.      Recent Labs     09/01/19  0825   LABGRAM Few WBC's (Polymorphonuclear) present  Few Epithelial Cells present  Few Hyphal elements present   Mixed bacterial morphotypes suggestive of  Normal respiratory janine       Radiograph: Ct Abdomen Pelvis W Iv Contrast Additional Contrast? None    Result Date: 8/31/2019  CT ABDOMEN PELVIS W IV CONTRAST 8/31/2019 9:29 PM History: Right flank pain and right upper

## 2019-09-01 NOTE — CONSULTS
Nephrology ([facility]) Consult Note      Patient:  Roxana Juarez  YOB: 1985  Date of Service: 9/1/2019  MRN: [id number]   Acct: [de-identified]   Primary Care Physician: No primary care provider on file. Advance Directive: Full Code  Admit Date: 8/31/2019       Hospital Day: 1  Referring Provider: Maribeth Araujo MD    Patient independently seen and examined, Chart, Consults, Notes, Operative notes, Labs, Cardiology, and Radiology studies reviewed as able. Subjective:  Roxana Juarez is a 35 y.o. male  whom we were consulted for acute renal failure. Patient was admitted for evaluation of hemoptysis with right-sided chest pain and diagnosed with a right-sided pulmonary embolism. He was also found to have heart failure with EF of 15% and significant history of IV drug use. He was placed on spironolactone and RAAS blockade and developed acute renal failure with significant hyperkalemia overnight. He recalls no prior history of any renal issues. He denies NSAID usage. He denies other episodes of hemoptysis or hematuria. He is currently requiring BiPAP. Allergies:  Patient has no known allergies.     Medicines:  Current Facility-Administered Medications   Medication Dose Route Frequency Provider Last Rate Last Dose    HYDROmorphone (DILAUDID) injection 1 mg  1 mg Intravenous Q2H PRN Abad Rojas MD   1 mg at 09/01/19 1003    sodium chloride flush 0.9 % injection 10 mL  10 mL Intravenous 2 times per day Abad Rojas MD   Stopped at 09/01/19 0930    sodium chloride flush 0.9 % injection 10 mL  10 mL Intravenous PRN Abad Rojas MD        heparin (porcine) injection 7,030 Units  80 Units/kg Intravenous PRN Abda Rojas MD        heparin (porcine) injection 3,520 Units  40 Units/kg Intravenous PRN Abad Rojas MD        heparin 25,000 units in dextrose 5% 250 mL infusion  18 Units/kg/hr Intravenous Continuous Abad Rojas MD 15.8 mL/hr at magnesium sulfate 1 g in dextrose 5% 100 mL IVPB  1 g Intravenous PRN Hank Stanton MD        acetaminophen (TYLENOL) tablet 650 mg  650 mg Oral Q4H PRN Hank Stanton MD        morphine (PF) injection 2 mg  2 mg Intravenous Q4H PRN Hank Stanton MD           Past Medical History:  Past Medical History:   Diagnosis Date    [facility]est[facility]     Hypertension     Palliative care patient 05/08/2019       Past Surgical History:  Past Surgical History:   Procedure Laterality Date    HAND SURGERY         Family History  Family History   Problem Relation Age of Onset    Cancer Mother     No Known Problems Father     No Known Problems Brother        Social History  Social History     Socioeconomic History    Marital status: Single     Spouse name: Not on file    Number of children: Not on file    Years of education: Not on file    Highest education level: Not on file   Occupational History    Not on file   Social Needs    Financial resource strain: Not on file    Food insecurity:     Worry: Not on file     Inability: Not on file    Transportation needs:     Medical: Not on file     Non-medical: Not on file   Tobacco Use    Smoking status: Current Every Day Smoker     Packs/day: 0.50     Years: 13.00     Pack years: 6.50     Types: Cigarettes    Smokeless tobacco: Never Used   Substance and Sexual Activity    Alcohol use: Yes     Comment: rare    Drug use: Not Currently    Sexual activity: Not on file   Lifestyle    Physical activity:     Days per week: Not on file     Minutes per session: Not on file    Stress: Not on file   Relationships    Social connections:     Talks on phone: Not on file     Gets together: Not on file     Attends Amish service: Not on file     Active member of club or organization: Not on file     Attends meetings of clubs or organizations: Not on file     Relationship status: Not on file    Intimate partner violence:     Fear of current or 08/31/19 2230 117/87 98.1 °F (36.7 °C) Oral 116 28 94 % -- --   08/31/19 2030 (!) 135/95 -- -- -- -- -- -- --   08/31/19 2029 -- -- -- 130 -- -- -- --   08/31/19 2027 -- 97.5 °F (36.4 °C) Oral -- 24 98 % 5' 7\" (1.702 m) 215 lb (97.5 kg)       Intake/Output Summary (Last 24 hours) at 9/1/2019 1126  Last data filed at 9/1/2019 1000  Gross per 24 hour   Intake 270 ml   Output 450 ml   Net -180 ml     General: awake/alert   Chest:  Tachypneic on BiPAP  CVS: regular rate and rhythm  Abdominal: soft, nontender, normal bowel sounds  Extremities: no cyanosis or edema  Skin: warm and dry without rash      Labs:  BMP:   Recent Labs     08/31/19 2040 09/01/19  0646 09/01/19 0819 09/01/19  0916    132* 132*  --    K 4.4 7.1* 7.0* 6.8   CL 98 95* 93*  --    CO2 24 20* 20*  --    BUN 11 18 19  --    CREATININE 0.7 1.8* 2.0*  --    CALCIUM 9.4 8.6 8.7  --      CBC:   Recent Labs     08/31/19 2040 09/01/19  0531 09/01/19  0612   WBC 13.9* 19.3* 19.5*   HGB 14.3 15.1 14.5   HCT 43.2 46.6 44.9   MCV 88.3 90.0 89.4    311 310     LIVER PROFILE:   Recent Labs     08/31/19 2040 09/01/19 0819   AST 22 24   ALT 15 15   LIPASE 19  --    BILITOT 1.3* 1.4*   ALKPHOS 181* 180*     PT/INR:   Recent Labs     08/31/19 2040   PROTIME 14.7*   INR 1.21*     APTT:   Recent Labs     08/31/19 2040 09/01/19  0612   APTT 31.2 35.7     BNP:  No results for input(s): BNP in the last 72 hours. Ionized Calcium:No results for input(s): IONCA in the last 72 hours. Magnesium:  Recent Labs     08/31/19 2040 09/01/19  0646 09/01/19  0819   MG 1.7 1.5* 2.2     Phosphorus:No results for input(s): PHOS in the last 72 hours. HgbA1C: No results for input(s): LABA1C in the last 72 hours.   Hepatic:   Recent Labs     08/31/19 2040 09/01/19  0819   ALKPHOS 181* 180*   ALT 15 15   AST 22 24   PROT 7.5 7.8   BILITOT 1.3* 1.4*   LABALBU 4.0 4.0     Lactic Acid:   Recent Labs     09/01/19  0612   LACTA 2.4*     Troponin:   Recent Labs focal infiltrate, pneumothorax, or definite pleural fluid. 1. Very similar appearance of the chest with no focal acute infiltrate. 2. Cardiomegaly again seen. Signed by Dr Home Navarrete on 8/31/2019 8:49 PM    Cta Pulmonary W Contrast    Result Date: 8/31/2019  CTA PULMONARY W CONTRAST 8/31/2019 9:27 PM History: Chest pain and shortness of breath. In order to have a CT radiation dose as low as reasonably achievable Automated Exposure Control was utilized for adjustment of the mA and/or KV according to patient size. DLP in mGycm= 565. CT angiography protocol. CT imaging with bolus IV contrast injection. Under concurrent supervision axial, sagittal, coronal, and three-dimensional data sets were constructed. Cardiomegaly. Right lower lobe pulmonary emboli. Low clot burden. No right heart enlargement. Small right pleural effusion. Posterior right lower lobe pneumonia. No pneumothorax. 1. Right lower lobe pulmonary emboli. Low clot burden. No right heart enlargement. 2. Small pleural effusion and right lower lobe pneumonia. 3. Cardiomegaly. Signed by Dr Home Navarrete on 8/31/2019 9:28 PM       Assessment   Acute renal failure   Hyperkalemia  Hemoptysis with right lower lobe pulmonary emboli  Acute on chronic systolic congestive heart failure with EF 15%  Polysubstance abuse  Metabolic acidosis    Plan:  Discussed with patient, nursing, vascular, hospitalist  Work-up ordered ongoing with imaging including vascular studies of the renal arteries for evaluation of stenosis or acute occlusion without additional contrast loading  Medically managed potassium as ordered with rechecks pending  Patient agreeable to renal replacement therapy if required if medical management fails or if arrhythmias were to develop      Thank you for the consult, we appreciate the opportunity to provide care to your patients. Feel free to contact me if I can be of any further assistance.       Tk Holder, MD  09/01/19  11:26 AM

## 2019-09-01 NOTE — H&P
[facility] Medicine  History and Physical    Patient:  Adriana Preciado  MRN: [id number]    CHIEF COMPLAINT:  Hemoptysis    History Obtained From: Patient  Family present for interview: None    PCP: No primary care provider on file. HISTORY OF PRESENT ILLNESS:  35 y.o. male who presents with one episode of hemoptysis. Also has right flank pain and right lower chest pleuritic pain. In the emergency department a PE protocol CT scan was positive for pulmonary embolus of the right lower lobe. Radiologist read right lower lobe pneumonia with pleural effusion however I suspect he has a pulmonary infarct. Routine laboratory studies showed a lactic acid 1.7 mmol/L, proBNP 5985 pg/mL urine drug screen positive for opiates, white blood cell count 13,900. 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 The patient is admitted to the intensive care unit for further evaluation and treatment of acute pulmonary embolus, hemoptysis,and sepsis possible pulmonary infarct. REVIEW OF SYSTEMS:  Review of Systems   Constitutional: Positive for activity change, fatigue and fever. HENT: Negative. Respiratory: Positive for cough and shortness of breath. Cardiovascular: Positive for chest pain. Negative for palpitations and leg swelling. Gastrointestinal: Negative. Endocrine: Negative. Genitourinary: Negative. Musculoskeletal: Negative. Skin: Negative. Neurological: Negative. Psychiatric/Behavioral: The patient is nervous/anxious. All other 14 review of systems negative except as noted above.     Past Medical History:      Diagnosis Date    CHF (congestive heart failure) ([facility] [address]     Hypertension     Palliative care patient 05/08/2019       Past Surgical History:      Procedure Laterality Date    HAND SURGERY         Medications Prior to Admission:    Prior

## 2019-09-02 ENCOUNTER — OUTSIDE FACILITY SERVICE (OUTPATIENT)
Dept: PULMONOLOGY | Facility: CLINIC | Age: 34
End: 2019-09-02

## 2019-09-02 ENCOUNTER — APPOINTMENT (OUTPATIENT)
Dept: GENERAL RADIOLOGY | Age: 34
DRG: 871 | End: 2019-09-02
Payer: COMMERCIAL

## 2019-09-02 LAB
ALBUMIN SERPL-MCNC: 3.7 G/DL (ref 3.5–5.2)
ALP BLD-CCNC: 137 U/L (ref 40–130)
ALT SERPL-CCNC: 13 U/L (ref 5–41)
ANION GAP SERPL CALCULATED.3IONS-SCNC: 15 MMOL/L (ref 7–19)
APTT: 47.8 SEC (ref 26–36.2)
APTT: 49.5 SEC (ref 26–36.2)
APTT: 54.7 SEC (ref 26–36.2)
APTT: 79.1 SEC (ref 26–36.2)
AST SERPL-CCNC: 21 U/L (ref 5–40)
BANDED NEUTROPHILS RELATIVE PERCENT: 1 % (ref 0–5)
BASE EXCESS ARTERIAL: 2.2 MMOL/L (ref -2–2)
BASOPHILS ABSOLUTE: 0 K/UL (ref 0–0.2)
BASOPHILS RELATIVE PERCENT: 0 % (ref 0–1)
BILIRUB SERPL-MCNC: 1.1 MG/DL (ref 0.2–1.2)
BUN BLDV-MCNC: 20 MG/DL (ref 6–20)
CALCIUM SERPL-MCNC: 9 MG/DL (ref 8.6–10)
CARBOXYHEMOGLOBIN ARTERIAL: 2.7 % (ref 0–5)
CHLORIDE BLD-SCNC: 90 MMOL/L (ref 98–111)
CO2: 26 MMOL/L (ref 22–29)
CREAT SERPL-MCNC: 1 MG/DL (ref 0.5–1.2)
EOSINOPHILS ABSOLUTE: 0 K/UL (ref 0–0.6)
EOSINOPHILS RELATIVE PERCENT: 0 % (ref 0–5)
GFR NON-AFRICAN AMERICAN: >60
GLUCOSE BLD-MCNC: 160 MG/DL (ref 74–109)
HCO3 ARTERIAL: 27.3 MMOL/L (ref 22–26)
HCT VFR BLD CALC: 40.1 % (ref 42–52)
HEMOGLOBIN, ART, EXTENDED: 13.4 G/DL (ref 14–18)
HEMOGLOBIN: 13.1 G/DL (ref 14–18)
IMMATURE GRANULOCYTES #: 0.3 K/UL
LYMPHOCYTES ABSOLUTE: 2 K/UL (ref 1.1–4.5)
LYMPHOCYTES RELATIVE PERCENT: 8 % (ref 20–40)
MAGNESIUM: 1.8 MG/DL (ref 1.6–2.6)
MCH RBC QN AUTO: 29 PG (ref 27–31)
MCHC RBC AUTO-ENTMCNC: 32.7 G/DL (ref 33–37)
MCV RBC AUTO: 88.7 FL (ref 80–94)
METHEMOGLOBIN ARTERIAL: 1 %
MONOCYTES ABSOLUTE: 1.3 K/UL (ref 0–0.9)
MONOCYTES RELATIVE PERCENT: 5 % (ref 0–10)
NEUTROPHILS ABSOLUTE: 22.1 K/UL (ref 1.5–7.5)
NEUTROPHILS RELATIVE PERCENT: 86 % (ref 50–65)
O2 CONTENT ARTERIAL: 18 ML/DL
O2 SAT, ARTERIAL: 95.1 %
O2 THERAPY: ABNORMAL
PCO2 ARTERIAL: 43 MMHG (ref 35–45)
PDW BLD-RTO: 21.4 % (ref 11.5–14.5)
PH ARTERIAL: 7.41 (ref 7.35–7.45)
PLATELET # BLD: 244 K/UL (ref 130–400)
PLATELET SLIDE REVIEW: ADEQUATE
PMV BLD AUTO: 9.4 FL (ref 9.4–12.4)
PO2 ARTERIAL: 99 MMHG (ref 80–100)
POLYCHROMASIA: ABNORMAL
POTASSIUM SERPL-SCNC: 4.8 MMOL/L (ref 3.5–5)
POTASSIUM, WHOLE BLOOD: 4.3
RBC # BLD: 4.52 M/UL (ref 4.7–6.1)
SODIUM BLD-SCNC: 131 MMOL/L (ref 136–145)
TOTAL CK: 180 U/L (ref 39–308)
TOTAL PROTEIN: 7.7 G/DL (ref 6.6–8.7)
TROPONIN: <0.01 NG/ML (ref 0–0.03)
TROPONIN: <0.01 NG/ML (ref 0–0.03)
VANCOMYCIN RANDOM: <4 UG/ML
WBC # BLD: 25.4 K/UL (ref 4.8–10.8)

## 2019-09-02 PROCEDURE — 82550 ASSAY OF CK (CPK): CPT

## 2019-09-02 PROCEDURE — 85025 COMPLETE CBC W/AUTO DIFF WBC: CPT

## 2019-09-02 PROCEDURE — 80202 ASSAY OF VANCOMYCIN: CPT

## 2019-09-02 PROCEDURE — 6370000000 HC RX 637 (ALT 250 FOR IP): Performed by: INTERNAL MEDICINE

## 2019-09-02 PROCEDURE — 36592 COLLECT BLOOD FROM PICC: CPT

## 2019-09-02 PROCEDURE — 71045 X-RAY EXAM CHEST 1 VIEW: CPT

## 2019-09-02 PROCEDURE — 99233 SBSQ HOSP IP/OBS HIGH 50: CPT | Performed by: INTERNAL MEDICINE

## 2019-09-02 PROCEDURE — 99231 SBSQ HOSP IP/OBS SF/LOW 25: CPT | Performed by: INTERNAL MEDICINE

## 2019-09-02 PROCEDURE — 84132 ASSAY OF SERUM POTASSIUM: CPT

## 2019-09-02 PROCEDURE — 84484 ASSAY OF TROPONIN QUANT: CPT

## 2019-09-02 PROCEDURE — 94660 CPAP INITIATION&MGMT: CPT

## 2019-09-02 PROCEDURE — 6360000002 HC RX W HCPCS: Performed by: INTERNAL MEDICINE

## 2019-09-02 PROCEDURE — 2580000003 HC RX 258: Performed by: HOSPITALIST

## 2019-09-02 PROCEDURE — 85730 THROMBOPLASTIN TIME PARTIAL: CPT

## 2019-09-02 PROCEDURE — 83735 ASSAY OF MAGNESIUM: CPT

## 2019-09-02 PROCEDURE — 80053 COMPREHEN METABOLIC PANEL: CPT

## 2019-09-02 PROCEDURE — 2700000000 HC OXYGEN THERAPY PER DAY

## 2019-09-02 PROCEDURE — 36600 WITHDRAWAL OF ARTERIAL BLOOD: CPT

## 2019-09-02 PROCEDURE — 2000000000 HC ICU R&B

## 2019-09-02 PROCEDURE — 2580000003 HC RX 258: Performed by: INTERNAL MEDICINE

## 2019-09-02 PROCEDURE — 6360000002 HC RX W HCPCS: Performed by: HOSPITALIST

## 2019-09-02 PROCEDURE — 82803 BLOOD GASES ANY COMBINATION: CPT

## 2019-09-02 PROCEDURE — 6370000000 HC RX 637 (ALT 250 FOR IP): Performed by: HOSPITALIST

## 2019-09-02 RX ORDER — FENTANYL 50 UG/H
1 PATCH TRANSDERMAL
Status: DISCONTINUED | OUTPATIENT
Start: 2019-09-02 | End: 2019-09-07 | Stop reason: HOSPADM

## 2019-09-02 RX ORDER — METOPROLOL SUCCINATE 25 MG/1
25 TABLET, EXTENDED RELEASE ORAL 2 TIMES DAILY
Status: DISCONTINUED | OUTPATIENT
Start: 2019-09-02 | End: 2019-09-04

## 2019-09-02 RX ADMIN — SODIUM BICARBONATE 650 MG: 650 TABLET ORAL at 07:57

## 2019-09-02 RX ADMIN — Medication 1 G: at 22:06

## 2019-09-02 RX ADMIN — PIPERACILLIN AND TAZOBACTAM 2.25 G: 2; .25 INJECTION, POWDER, FOR SOLUTION INTRAVENOUS at 01:29

## 2019-09-02 RX ADMIN — MORPHINE SULFATE 2 MG: 2 INJECTION, SOLUTION INTRAMUSCULAR; INTRAVENOUS at 17:38

## 2019-09-02 RX ADMIN — FUROSEMIDE 40 MG: 10 INJECTION, SOLUTION INTRAMUSCULAR; INTRAVENOUS at 07:58

## 2019-09-02 RX ADMIN — PIPERACILLIN AND TAZOBACTAM 2.25 G: 2; .25 INJECTION, POWDER, FOR SOLUTION INTRAVENOUS at 16:22

## 2019-09-02 RX ADMIN — HYDROMORPHONE HYDROCHLORIDE 1 MG: 1 INJECTION, SOLUTION INTRAMUSCULAR; INTRAVENOUS; SUBCUTANEOUS at 01:56

## 2019-09-02 RX ADMIN — HEPARIN SODIUM 3520 UNITS: 1000 INJECTION INTRAVENOUS; SUBCUTANEOUS at 09:25

## 2019-09-02 RX ADMIN — MORPHINE SULFATE 2 MG: 2 INJECTION, SOLUTION INTRAMUSCULAR; INTRAVENOUS at 03:55

## 2019-09-02 RX ADMIN — Medication 1 G: at 09:25

## 2019-09-02 RX ADMIN — METOPROLOL SUCCINATE 25 MG: 25 TABLET, EXTENDED RELEASE ORAL at 10:44

## 2019-09-02 RX ADMIN — METHYLPREDNISOLONE SODIUM SUCCINATE 40 MG: 40 INJECTION, POWDER, FOR SOLUTION INTRAMUSCULAR; INTRAVENOUS at 07:57

## 2019-09-02 RX ADMIN — VANCOMYCIN HYDROCHLORIDE 1250 MG: 10 INJECTION, POWDER, LYOPHILIZED, FOR SOLUTION INTRAVENOUS at 12:13

## 2019-09-02 RX ADMIN — MORPHINE SULFATE 2 MG: 2 INJECTION, SOLUTION INTRAMUSCULAR; INTRAVENOUS at 11:58

## 2019-09-02 RX ADMIN — Medication 10 ML: at 06:19

## 2019-09-02 RX ADMIN — HEPARIN SODIUM AND DEXTROSE 17 UNITS/KG/HR: 10000; 5 INJECTION INTRAVENOUS at 14:19

## 2019-09-02 RX ADMIN — PIPERACILLIN AND TAZOBACTAM 2.25 G: 2; .25 INJECTION, POWDER, FOR SOLUTION INTRAVENOUS at 07:57

## 2019-09-02 RX ADMIN — Medication 10 ML: at 19:40

## 2019-09-02 RX ADMIN — Medication 10 ML: at 19:34

## 2019-09-02 RX ADMIN — HEPARIN SODIUM 3520 UNITS: 1000 INJECTION INTRAVENOUS; SUBCUTANEOUS at 20:52

## 2019-09-02 RX ADMIN — MORPHINE SULFATE 2 MG: 2 INJECTION, SOLUTION INTRAMUSCULAR; INTRAVENOUS at 13:55

## 2019-09-02 RX ADMIN — MORPHINE SULFATE 2 MG: 2 INJECTION, SOLUTION INTRAMUSCULAR; INTRAVENOUS at 22:22

## 2019-09-02 RX ADMIN — METOPROLOL SUCCINATE 25 MG: 25 TABLET, EXTENDED RELEASE ORAL at 19:33

## 2019-09-02 RX ADMIN — HYDROMORPHONE HYDROCHLORIDE 1 MG: 1 INJECTION, SOLUTION INTRAMUSCULAR; INTRAVENOUS; SUBCUTANEOUS at 06:16

## 2019-09-02 RX ADMIN — Medication 10 ML: at 01:57

## 2019-09-02 RX ADMIN — ASPIRIN 81 MG: 81 TABLET, COATED ORAL at 07:57

## 2019-09-02 ASSESSMENT — ENCOUNTER SYMPTOMS
VOMITING: 0
COUGH: 1
BACK PAIN: 0
NAUSEA: 0
SORE THROAT: 0
CONSTIPATION: 0
CHEST TIGHTNESS: 1
DIARRHEA: 0
GASTROINTESTINAL NEGATIVE: 1
SHORTNESS OF BREATH: 1

## 2019-09-02 ASSESSMENT — PAIN SCALES - GENERAL
PAINLEVEL_OUTOF10: 4
PAINLEVEL_OUTOF10: 8
PAINLEVEL_OUTOF10: 3
PAINLEVEL_OUTOF10: 4
PAINLEVEL_OUTOF10: 7
PAINLEVEL_OUTOF10: 8
PAINLEVEL_OUTOF10: 7
PAINLEVEL_OUTOF10: 8
PAINLEVEL_OUTOF10: 8
PAINLEVEL_OUTOF10: 7
PAINLEVEL_OUTOF10: 3
PAINLEVEL_OUTOF10: 7

## 2019-09-02 ASSESSMENT — PAIN DESCRIPTION - PAIN TYPE
TYPE: ACUTE PAIN
TYPE: ACUTE PAIN

## 2019-09-02 ASSESSMENT — PAIN DESCRIPTION - ORIENTATION: ORIENTATION: RIGHT

## 2019-09-02 ASSESSMENT — PAIN DESCRIPTION - LOCATION
LOCATION: CHEST
LOCATION: CHEST

## 2019-09-02 NOTE — PROGRESS NOTES
care patient 05/08/2019       Past Surgical History:  Past Surgical History:   Procedure Laterality Date    HAND SURGERY         Family History  Family History   Problem Relation Age of Onset    Cancer Mother     No Known Problems Father     No Known Problems Brother        Social History  Social History     Socioeconomic History    Marital status: Single     Spouse name: Not on file    Number of children: Not on file    Years of education: Not on file    Highest education level: Not on file   Occupational History    Not on file   Social Needs    Financial resource strain: Not on file    Food insecurity:     Worry: Not on file     Inability: Not on file    Transportation needs:     Medical: Not on file     Non-medical: Not on file   Tobacco Use    Smoking status: Current Every Day Smoker     Packs/day: 0.50     Years: 13.00     Pack years: 6.50     Types: Cigarettes    Smokeless tobacco: Never Used   Substance and Sexual Activity    Alcohol use: Yes     Comment: rare    Drug use: Not Currently    Sexual activity: Not on file   Lifestyle    Physical activity:     Days per week: Not on file     Minutes per session: Not on file    Stress: Not on file   Relationships    Social connections:     Talks on phone: Not on file     Gets together: Not on file     Attends Yarsanism service: Not on file     Active member of club or organization: Not on file     Attends meetings of clubs or organizations: Not on file     Relationship status: Not on file    Intimate partner violence:     Fear of current or ex partner: Not on file     Emotionally abused: Not on file     Physically abused: Not on file     Forced sexual activity: Not on file   Other Topics Concern    Not on file   Social History Narrative    Not on file         Review of Systems:    Review of Systems   Constitutional: Positive for activity change and fatigue. Negative for fever.    Respiratory: Positive for cough, chest tightness and shortness of Recent Labs     08/31/19 2040 09/01/19 0819 09/02/19 0131   AST 22 24 21   ALT 15 15 13   LIPASE 19  --   --    BILITOT 1.3* 1.4* 1.1   ALKPHOS 181* 180* 137*     PT/INR:   Recent Labs     08/31/19 2040   PROTIME 14.7*   INR 1.21*     APTT:   Recent Labs     09/01/19  1930 09/02/19 0131 09/02/19  0808   APTT 60.9* 79.1* 49.5*     BNP:  No results for input(s): BNP in the last 72 hours. Ionized Calcium:No results for input(s): IONCA in the last 72 hours. Magnesium:  Recent Labs     09/01/19 0646 09/01/19 0819 09/02/19 0131   MG 1.5* 2.2 1.8     Phosphorus:No results for input(s): PHOS in the last 72 hours. HgbA1C: No results for input(s): LABA1C in the last 72 hours. Hepatic:   Recent Labs     08/31/19 2040 09/01/19 0819 09/02/19 0131   ALKPHOS 181* 180* 137*   ALT 15 15 13   AST 22 24 21   PROT 7.5 7.8 7.7   BILITOT 1.3* 1.4* 1.1   LABALBU 4.0 4.0 3.7     Lactic Acid:   Recent Labs     09/01/19  0612   LACTA 2.4*     Troponin:   Recent Labs     09/01/19 0646 09/02/19 0131   CKTOTAL 58 180     ABGs: No results for input(s): PH, PCO2, PO2, HCO3, O2SAT in the last 72 hours. CRP:  No results for input(s): CRP in the last 72 hours. Sed Rate:  No results for input(s): SEDRATE in the last 72 hours. Cultures:   No results for input(s): CULTURE in the last 72 hours. Recent Labs     09/01/19  0600 09/01/19  0608   BC No Growth to date. Any change in status will be called. --    BLOODCULT2  --  No Growth to date. Any change in status will be called. No results for input(s): CXSURG in the last 72 hours. Radiology reports as per the Radiologist  Radiology: Ct Abdomen Pelvis W Iv Contrast Additional Contrast? None    Result Date: 8/31/2019  CT ABDOMEN PELVIS W IV CONTRAST 8/31/2019 9:29 PM History: Right flank pain and right upper quadrant pain.  In order to have a CT radiation dose as low as reasonably achievable Automated Exposure Control was utilized for adjustment of the mA and/or KV according to patient size. DLP in mGycm= 1168. Abdomen/pelvis CT with IV contrast injection. Fatty liver. Nonspecific gallbladder wall thickening given the presence of mesenteric edema and a small amount of peritoneal fluid. No significant gallbladder distention. No visible gallstones. No biliary dilation. Normal pancreas and spleen. Normal adrenal glands and kidneys. Moderate fecal material within the colon. No bowel obstruction. No appendicitis. 1. Small amount of peritoneal fluid. 2. Gallbladder wall thickening is a nonspecific finding given that there is mesenteric edema and a small amount of peritoneal fluid. 3. No gallstones or bile duct dilation. 4. The CT reports on this patient were called to Dr. Grace Cantu in the emergency room at 9:30 PM.  Signed by Dr Kvng Monk on 8/31/2019 9:35 PM    Xr Chest Portable    Result Date: 9/1/2019  EXAMINATION: Chest one view 9/1/2019 HISTORY: Central line placement FINDINGS: Upright frontal projection of the chest reveals a deep line has been placed via right internal jugular approach with the tip in the SVC and no complications. There is again evidence of cardiomegaly and vascular redistribution with interstitial pulmonary edema. There is developing right basilar airspace disease for which I would favor developing alveolar edema. No free air is seen beneath the hemidiaphragms. 1.. Right IJ deep line placed with no complications. 2. Worsening pulmonary venous hypertension with developing pulmonary edema. Signed by Dr Adam Jarrell on 9/1/2019 1:28 PM    Xr Chest Portable    Result Date: 8/31/2019  XR CHEST PORTABLE 8/31/2019 8:47 PM History: Chest pain and hemoptysis. Portable chest x-ray compared with 5/8/2019. Magnified heart size. Cardiomegaly is present and not significantly changed. Normal mediastinum. The lungs show interstitial prominence. There is no focal infiltrate, pneumothorax, or definite pleural fluid.     1. Very similar appearance of the chest !          !Yes            !          ! +------------------------------------+----------+---------------+----------+ ! Mid Femoral                         !          !Yes            !          ! +------------------------------------+----------+---------------+----------+ ! Dist Femoral                        !          !Yes            !          ! +------------------------------------+----------+---------------+----------+ ! Popliteal                           !          !Yes            !          ! +------------------------------------+----------+---------------+----------+ ! SSV                                 ! !Yes            !          ! +------------------------------------+----------+---------------+----------+ ! PTV                                 ! !Yes            !          ! +------------------------------------+----------+---------------+----------+ ! GSV                                 ! !Yes            !          ! +------------------------------------+----------+---------------+----------+ ! ATV                                 ! !Yes            !          ! +------------------------------------+----------+---------------+----------+ ! Peroneal                            !          !Yes            !          ! +------------------------------------+----------+---------------+----------+    Cta Pulmonary W Contrast    Result Date: 8/31/2019  CTA PULMONARY W CONTRAST 8/31/2019 9:27 PM History: Chest pain and shortness of breath. In order to have a CT radiation dose as low as reasonably achievable Automated Exposure Control was utilized for adjustment of the mA and/or KV according to patient size. DLP in mGycm= 565. CT angiography protocol. CT imaging with bolus IV contrast injection. Under concurrent supervision axial, sagittal, coronal, and three-dimensional data sets were constructed. Cardiomegaly. Right lower lobe pulmonary emboli. Low clot burden.  No right heart enlargement. Small right pleural effusion. Posterior right lower lobe pneumonia. No pneumothorax. 1. Right lower lobe pulmonary emboli. Low clot burden. No right heart enlargement. 2. Small pleural effusion and right lower lobe pneumonia. 3. Cardiomegaly. Signed by Dr Taco Rocha on 8/31/2019 9:28 PM    Us Renal Arteries Duplex    Result Date: 9/1/2019  EXAMINATION: Renal ultrasound including arterial duplex 9/1/2019 HISTORY: Hypertension. FINDINGS: Sonography is performed of the kidneys in the transverse and longitudinal projections. The right kidney measures 12.0 cm in bjei-fv-eeha length with cortical thickness of 18 and 14 mm within the upper and lower pole respectively. The left kidney measures 11.3 cm in hqcn-wt-ehpk length with cortical thickness of 16 and 21 mm within the upper and lower pole respectively. No evidence of mass or hydronephrosis. Duplex ultrasound was performed of the renal arteries and abdominal aorta using B-mode/gray scale imaging with spectral analysis and color flow images. Peak systolic velocity is 94 cm/s within the abdominal aorta. Peak systolic velocities on the right are 143, 140, 105 and 31 cm/s respectively within the proximal, mid, distal and arcuate right renal artery resulting in a maximum velocity ratio at the origin of the right renal artery of 1.52. On the left peak systolic velocities are 960, 63, 78 and 25 cm/s respectively within the proximal, mid, distal and arcuate left renal artery resulting in a maximum velocity ratio 1.15 at the origin of the left renal artery. 1.. Both kidneys are normal in sonographic appearance with no mass or hydronephrosis. 2. No significant elevation of peak systolic velocity or velocity ratio to suggest a hemodynamically significant renal artery stenosis. Signed by Dr Jaden Glover on 9/1/2019 12:27 PM       Assessment   Acute right-sided pulmonary embolism with possible pulmonary infarct.   Hypoxemic respiratory

## 2019-09-02 NOTE — PROGRESS NOTES
!          !Yes            !          ! +------------------------------------+----------+---------------+----------+ ! SSV                                 ! !Yes            !          ! +------------------------------------+----------+---------------+----------+ ! PTV                                 ! !Yes            !          ! +------------------------------------+----------+---------------+----------+ ! GSV                                 ! !Yes            !          ! +------------------------------------+----------+---------------+----------+ ! ATV                                 ! !Yes            !          ! +------------------------------------+----------+---------------+----------+ ! Peroneal                            !          !Yes            !          ! +------------------------------------+----------+---------------+----------+    Cta Pulmonary W Contrast    Result Date: 8/31/2019  CTA PULMONARY W CONTRAST 8/31/2019 9:27 PM History: Chest pain and shortness of breath. In order to have a CT radiation dose as low as reasonably achievable Automated Exposure Control was utilized for adjustment of the mA and/or KV according to patient size. DLP in mGycm= 565. CT angiography protocol. CT imaging with bolus IV contrast injection. Under concurrent supervision axial, sagittal, coronal, and three-dimensional data sets were constructed. Cardiomegaly. Right lower lobe pulmonary emboli. Low clot burden. No right heart enlargement. Small right pleural effusion. Posterior right lower lobe pneumonia. No pneumothorax. 1. Right lower lobe pulmonary emboli. Low clot burden. No right heart enlargement. 2. Small pleural effusion and right lower lobe pneumonia. 3. Cardiomegaly. Signed by Dr Tanner Spencer on 8/31/2019 9:28 PM    Us Renal Arteries Duplex    Result Date: 9/1/2019  EXAMINATION: Renal ultrasound including arterial duplex 9/1/2019 HISTORY: Hypertension.  FINDINGS: Sonography is performed of the kidneys in the transverse and longitudinal projections. The right kidney measures 12.0 cm in soeq-po-kczt length with cortical thickness of 18 and 14 mm within the upper and lower pole respectively. The left kidney measures 11.3 cm in jruw-ln-bjyl length with cortical thickness of 16 and 21 mm within the upper and lower pole respectively. No evidence of mass or hydronephrosis. Duplex ultrasound was performed of the renal arteries and abdominal aorta using B-mode/gray scale imaging with spectral analysis and color flow images. Peak systolic velocity is 94 cm/s within the abdominal aorta. Peak systolic velocities on the right are 143, 140, 105 and 31 cm/s respectively within the proximal, mid, distal and arcuate right renal artery resulting in a maximum velocity ratio at the origin of the right renal artery of 1.52. On the left peak systolic velocities are 093, 63, 78 and 25 cm/s respectively within the proximal, mid, distal and arcuate left renal artery resulting in a maximum velocity ratio 1.15 at the origin of the left renal artery. 1.. Both kidneys are normal in sonographic appearance with no mass or hydronephrosis. 2. No significant elevation of peak systolic velocity or velocity ratio to suggest a hemodynamically significant renal artery stenosis. Signed by Dr Adonis Bocanegra on 9/1/2019 12:27 PM       Assessment     -Acute kidney injury -better  -Hyperkalemia-resolved  -Hemoptysis with right lower lobe pulmonary emboli  -Acute on chronic systolic congestive heart failure with EF 15%  -Polysubstance abuse  -Metabolic acidosis-resolved  -Hyponatremia      Plan: Will stop sodium bicarbonate, implement fluids restrictions and continue diuretics.

## 2019-09-02 NOTE — PROGRESS NOTES
Pharmacy Vancomycin Consult     Vancomycin Day: 2  Current Dosing: Pulse dosing     Temp max:  97.9    Recent Labs     09/01/19  1300 09/02/19  0131   BUN 22* 20       Recent Labs     09/01/19  1300 09/02/19  0131   CREATININE 1.7* 1.0       Recent Labs     09/01/19  0612 09/02/19  0131   WBC 19.5* 25.4*         Intake/Output Summary (Last 24 hours) at 9/2/2019 1111  Last data filed at 9/2/2019 1044  Gross per 24 hour   Intake 2540.77 ml   Output 3950 ml   Net -1409.23 ml     Culture Date Source Results   09/01/19 Urine No growth   08/31/19 Blood X2 No growth   09/01/19 Blood x 2 No growth   09/01/19 Respiratory X2 Normal janine       Ht Readings from Last 1 Encounters:   09/01/19 5' 7\" (1.702 m)        Wt Readings from Last 1 Encounters:   09/02/19 195 lb 14.4 oz (88.9 kg)         Body mass index is 30.68 kg/m². Estimated Creatinine Clearance: 112 mL/min (based on SCr of 1 mg/dL). Random 24 hr from a 1gm dose: less than 4    Assessment/Plan: Vast improvement in renal function. Start Vancomycin 1250mg IV q12hr. Vancomycin trough on 4th dose.     Electronically signed by Carlie Arenas [address] [place] [address] on 9/2/2019 at 11:11 AM

## 2019-09-02 NOTE — PROGRESS NOTES
(Principal) Pulmonary embolus, right ([address]        Pulmonary infarct [facility])              Subjective:  Mr. Rogelio Nur seen today still on BiPAP seems to be resting more comfortably dyspnea improved heart rate 123 sinus tachycardia blood pressure 149/86 on no drips. Objective:   /89   Pulse 122   Temp 97.9 °F (36.6 °C) (Temporal)   Resp 21   Ht 5' 7\" (1.702 m)   Wt 195 lb 14.4 oz (88.9 kg)   SpO2 96%   BMI 30.68 kg/m²       Intake/Output Summary (Last 24 hours) at 9/2/2019 0855  Last data filed at 9/2/2019 0700  Gross per 24 hour   Intake 2591.99 ml   Output 2950 ml   Net -358.01 ml       Prior to Admission medications    Medication Sig Start Date End Date Taking?  Authorizing Provider   furosemide (LASIX) 40 MG tablet Take 1 tablet by mouth 2 times daily 6/17/19  Yes Shaista Ros, APRN   lisinopril (PRINIVIL;ZESTRIL) 10 MG tablet Take 1 tablet by mouth daily 6/17/19  Yes Shaista Ros, APRN   metoprolol succinate (TOPROL XL) 25 MG extended release tablet Take 1 tablet by mouth every 12 hours 6/17/19  Yes Shaista Ros, APRN   spironolactone (ALDACTONE) 25 MG tablet Take 1 tablet by mouth daily 6/17/19  Yes [place] Ros, APRN   albuterol sulfate HFA (PROVENTIL HFA) 108 (90 Base) MCG/ACT inhaler Inhale 2 puffs into the lungs every 6 hours as needed for Wheezing 3/18/19  Yes Albert Lanza MD   ondansetron (ZOFRAN ODT) 4 MG disintegrating tablet Take 1 tablet by mouth every 8 hours as needed for Nausea or Vomiting 6/12/19   Russ Stevenson MD   pantoprazole (PROTONIX) 40 MG tablet Take 1 tablet by mouth daily 4/28/19   Domenic Morataya MD   sucralfate (CARAFATE) 1 GM tablet Take 1 tablet by mouth 4 times daily 4/28/19   Domenic Morataya MD   aspirin 81 MG EC tablet Take 1 tablet by mouth daily 10/20/18   Karan Chavez MD        sodium chloride flush  10 mL Intravenous 2 times per day    nicotine  1 patch Transdermal Daily    furosemide  40 mg Intravenous Daily    +------------------------------------+----------+---------------+----------+ ! Prox Femoral                        !          !Yes            !          ! +------------------------------------+----------+---------------+----------+ ! Mid Femoral                         !          !Yes            !          ! +------------------------------------+----------+---------------+----------+ ! Dist Femoral                        !          !Yes            !          ! +------------------------------------+----------+---------------+----------+ ! Popliteal                           !          !Yes            !          ! +------------------------------------+----------+---------------+----------+ ! SSV                                 ! !Yes            !          ! +------------------------------------+----------+---------------+----------+ ! PTV                                 ! !Yes            !          ! +------------------------------------+----------+---------------+----------+ ! GSV                                 ! !Yes            !          ! +------------------------------------+----------+---------------+----------+ ! ATV                                 ! !Yes            !          ! +------------------------------------+----------+---------------+----------+ ! Peroneal                            !          !Yes            !          ! +------------------------------------+----------+---------------+----------+ Left Lower Extremities DVT Study Measurements Left 2D Measurements +------------------------------------+----------+---------------+----------+ ! Location                            ! Visualized! Compressibility! Thrombosis! +------------------------------------+----------+---------------+----------+ ! Sapheno Femoral Junction            ! ! Yes            !          ! +------------------------------------+----------+---------------+----------+ ! Common Femoral

## 2019-09-03 ENCOUNTER — OUTSIDE FACILITY SERVICE (OUTPATIENT)
Dept: PULMONOLOGY | Facility: CLINIC | Age: 34
End: 2019-09-03

## 2019-09-03 LAB
ALBUMIN SERPL-MCNC: 3.8 G/DL (ref 3.5–5.2)
ALP BLD-CCNC: 144 U/L (ref 40–130)
ALT SERPL-CCNC: 13 U/L (ref 5–41)
ANION GAP SERPL CALCULATED.3IONS-SCNC: 14 MMOL/L (ref 7–19)
ANISOCYTOSIS: ABNORMAL
APTT: 40 SEC (ref 26–36.2)
APTT: 64.2 SEC (ref 26–36.2)
APTT: >200 SEC (ref 26–36.2)
AST SERPL-CCNC: 22 U/L (ref 5–40)
BASOPHILS ABSOLUTE: 0 K/UL (ref 0–0.2)
BASOPHILS RELATIVE PERCENT: 0 % (ref 0–1)
BILIRUB SERPL-MCNC: 0.9 MG/DL (ref 0.2–1.2)
BUN BLDV-MCNC: 29 MG/DL (ref 6–20)
CALCIUM SERPL-MCNC: 8.9 MG/DL (ref 8.6–10)
CHLORIDE BLD-SCNC: 84 MMOL/L (ref 98–111)
CO2: 27 MMOL/L (ref 22–29)
CREAT SERPL-MCNC: 1.3 MG/DL (ref 0.5–1.2)
EOSINOPHILS ABSOLUTE: 0 K/UL (ref 0–0.6)
EOSINOPHILS RELATIVE PERCENT: 0 % (ref 0–5)
GFR NON-AFRICAN AMERICAN: >60
GLUCOSE BLD-MCNC: 150 MG/DL (ref 74–109)
HCT VFR BLD CALC: 39.6 % (ref 42–52)
HEMOGLOBIN: 12.7 G/DL (ref 14–18)
IMMATURE GRANULOCYTES #: 0.5 K/UL
LACTIC ACID: 1.7 MMOL/L (ref 0.5–1.9)
LYMPHOCYTES ABSOLUTE: 1.3 K/UL (ref 1.1–4.5)
LYMPHOCYTES RELATIVE PERCENT: 6 % (ref 20–40)
MAGNESIUM: 2 MG/DL (ref 1.6–2.6)
MCH RBC QN AUTO: 29 PG (ref 27–31)
MCHC RBC AUTO-ENTMCNC: 32.1 G/DL (ref 33–37)
MCV RBC AUTO: 90.4 FL (ref 80–94)
MONOCYTES ABSOLUTE: 1.3 K/UL (ref 0–0.9)
MONOCYTES RELATIVE PERCENT: 6 % (ref 0–10)
NEUTROPHILS ABSOLUTE: 19.5 K/UL (ref 1.5–7.5)
NEUTROPHILS RELATIVE PERCENT: 88 % (ref 50–65)
PDW BLD-RTO: 21.2 % (ref 11.5–14.5)
PLATELET # BLD: 252 K/UL (ref 130–400)
PLATELET SLIDE REVIEW: ADEQUATE
PMV BLD AUTO: 9.8 FL (ref 9.4–12.4)
POLYCHROMASIA: ABNORMAL
POTASSIUM SERPL-SCNC: 5.3 MMOL/L (ref 3.5–5)
RBC # BLD: 4.38 M/UL (ref 4.7–6.1)
SODIUM BLD-SCNC: 125 MMOL/L (ref 136–145)
TOTAL CK: 101 U/L (ref 39–308)
TOTAL PROTEIN: 7 G/DL (ref 6.6–8.7)
URINE CULTURE, ROUTINE: NORMAL
WBC # BLD: 22.2 K/UL (ref 4.8–10.8)

## 2019-09-03 PROCEDURE — 2700000000 HC OXYGEN THERAPY PER DAY

## 2019-09-03 PROCEDURE — 83735 ASSAY OF MAGNESIUM: CPT

## 2019-09-03 PROCEDURE — 99232 SBSQ HOSP IP/OBS MODERATE 35: CPT | Performed by: INTERNAL MEDICINE

## 2019-09-03 PROCEDURE — 85730 THROMBOPLASTIN TIME PARTIAL: CPT

## 2019-09-03 PROCEDURE — 2580000003 HC RX 258: Performed by: INTERNAL MEDICINE

## 2019-09-03 PROCEDURE — 6370000000 HC RX 637 (ALT 250 FOR IP): Performed by: HOSPITALIST

## 2019-09-03 PROCEDURE — 6360000002 HC RX W HCPCS: Performed by: HOSPITALIST

## 2019-09-03 PROCEDURE — 6370000000 HC RX 637 (ALT 250 FOR IP): Performed by: INTERNAL MEDICINE

## 2019-09-03 PROCEDURE — 2580000003 HC RX 258: Performed by: HOSPITALIST

## 2019-09-03 PROCEDURE — 2000000000 HC ICU R&B

## 2019-09-03 PROCEDURE — 82550 ASSAY OF CK (CPK): CPT

## 2019-09-03 PROCEDURE — 83605 ASSAY OF LACTIC ACID: CPT

## 2019-09-03 PROCEDURE — 85025 COMPLETE CBC W/AUTO DIFF WBC: CPT

## 2019-09-03 PROCEDURE — 6360000002 HC RX W HCPCS: Performed by: INTERNAL MEDICINE

## 2019-09-03 PROCEDURE — 80053 COMPREHEN METABOLIC PANEL: CPT

## 2019-09-03 PROCEDURE — 94660 CPAP INITIATION&MGMT: CPT

## 2019-09-03 PROCEDURE — 36592 COLLECT BLOOD FROM PICC: CPT

## 2019-09-03 RX ORDER — OXYCODONE HYDROCHLORIDE 5 MG/1
5 TABLET ORAL EVERY 4 HOURS PRN
Status: DISCONTINUED | OUTPATIENT
Start: 2019-09-03 | End: 2019-09-07 | Stop reason: HOSPADM

## 2019-09-03 RX ORDER — FUROSEMIDE 10 MG/ML
40 INJECTION INTRAMUSCULAR; INTRAVENOUS 2 TIMES DAILY
Status: DISCONTINUED | OUTPATIENT
Start: 2019-09-03 | End: 2019-09-03

## 2019-09-03 RX ADMIN — MORPHINE SULFATE 2 MG: 2 INJECTION, SOLUTION INTRAMUSCULAR; INTRAVENOUS at 12:43

## 2019-09-03 RX ADMIN — Medication 10 ML: at 20:18

## 2019-09-03 RX ADMIN — PIPERACILLIN AND TAZOBACTAM 2.25 G: 2; .25 INJECTION, POWDER, FOR SOLUTION INTRAVENOUS at 08:30

## 2019-09-03 RX ADMIN — ASPIRIN 81 MG: 81 TABLET, COATED ORAL at 08:17

## 2019-09-03 RX ADMIN — PIPERACILLIN AND TAZOBACTAM 2.25 G: 2; .25 INJECTION, POWDER, FOR SOLUTION INTRAVENOUS at 01:11

## 2019-09-03 RX ADMIN — MORPHINE SULFATE 2 MG: 2 INJECTION, SOLUTION INTRAMUSCULAR; INTRAVENOUS at 21:43

## 2019-09-03 RX ADMIN — Medication 10 ML: at 09:31

## 2019-09-03 RX ADMIN — METOPROLOL SUCCINATE 25 MG: 25 TABLET, EXTENDED RELEASE ORAL at 08:17

## 2019-09-03 RX ADMIN — VANCOMYCIN HYDROCHLORIDE 1250 MG: 10 INJECTION, POWDER, LYOPHILIZED, FOR SOLUTION INTRAVENOUS at 01:09

## 2019-09-03 RX ADMIN — MORPHINE SULFATE 2 MG: 2 INJECTION, SOLUTION INTRAMUSCULAR; INTRAVENOUS at 08:36

## 2019-09-03 RX ADMIN — METOPROLOL SUCCINATE 25 MG: 25 TABLET, EXTENDED RELEASE ORAL at 20:18

## 2019-09-03 RX ADMIN — RIVAROXABAN 15 MG: 15 TABLET, FILM COATED ORAL at 14:33

## 2019-09-03 RX ADMIN — METHYLPREDNISOLONE SODIUM SUCCINATE 40 MG: 40 INJECTION, POWDER, FOR SOLUTION INTRAMUSCULAR; INTRAVENOUS at 08:17

## 2019-09-03 RX ADMIN — Medication 1 G: at 09:30

## 2019-09-03 RX ADMIN — FUROSEMIDE 40 MG: 10 INJECTION, SOLUTION INTRAMUSCULAR; INTRAVENOUS at 08:17

## 2019-09-03 RX ADMIN — HEPARIN SODIUM 3520 UNITS: 1000 INJECTION INTRAVENOUS; SUBCUTANEOUS at 09:28

## 2019-09-03 RX ADMIN — RIVAROXABAN 15 MG: 15 TABLET, FILM COATED ORAL at 20:17

## 2019-09-03 RX ADMIN — MORPHINE SULFATE 2 MG: 2 INJECTION, SOLUTION INTRAMUSCULAR; INTRAVENOUS at 02:24

## 2019-09-03 ASSESSMENT — ENCOUNTER SYMPTOMS
SORE THROAT: 0
CONSTIPATION: 0
BACK PAIN: 0
VOMITING: 0
GASTROINTESTINAL NEGATIVE: 1
NAUSEA: 0
DIARRHEA: 0
SHORTNESS OF BREATH: 1
COUGH: 1

## 2019-09-03 ASSESSMENT — PAIN SCALES - GENERAL
PAINLEVEL_OUTOF10: 6
PAINLEVEL_OUTOF10: 8
PAINLEVEL_OUTOF10: 0
PAINLEVEL_OUTOF10: 8
PAINLEVEL_OUTOF10: 6
PAINLEVEL_OUTOF10: 8

## 2019-09-03 NOTE — PROGRESS NOTES
CALCIUM 9.4   < >  --  8.9 9.0  --   --  8.9   GLUCOSE 120*   < >  --  179* 160*  --   --  150*   PHART  --   --  7.330*  --   --  7.410  --   --    [id number]  --   --  42.0  --   --  43.0  --   --    PO2ART  --   --  85.0  --   --  99.0  --   --    [id number]  --   --  22.1  --   --  27.3*  --   --    [id number]  --   --  93.6  --   --  95.1  --   --    BEART  --   --  -3.7*  --   --  2.2*  --   --    AST 22   < >  --   --  21  --   --  22   ALT 15   < >  --   --  13  --   --  13   ALKPHOS 181*   < >  --   --  137*  --   --  144*   BILITOT 1.3*   < >  --   --  1.1  --   --  0.9   MG 1.7   < >  --   --  1.8  --   --  2.0   TROPONINI 0.02   < >  --   --  <0.01  --  <0.01  --    LACTA  --    < >  --   --   --   --   --  1.7   INR 1.21*  --   --   --   --   --   --   --     < > = values in this interval not displayed. Recent Labs     08/31/19  2140 09/01/19  0600 09/01/19  0825   BC No Growth to date. Any change in status will be called. No Growth to date. Any change in status will be called. --    LABGRAM  --   --  Few WBC's (Polymorphonuclear) present  Few Epithelial Cells present  Few Hyphal elements present   Mixed bacterial morphotypes suggestive of  Normal respiratory janine     CULTRESP  --   --  Normal respiratory janine  Normal respiratory janine       Radiograph:   None today  My radiograph interpretation: No new images     Pulmonary Assessment:    Primary problem: Acute respiratory failure in the setting of pulmonary embolism with hypoxia and metabolic acidosis. Additional problems:   1. History of dilated nonischemic cardiomyopathy, severe, apparently stable but with chronic heart failure, EF 29%  2. Mitral Regurgitation,Mod-severe  3. Tricuspid Regurgitation- RVSP 70 mmHg, Mod dilated RA/RV; group 2 pulmonary hypertension  4. Hyperkalemia in setting of multiple medications and acute renal failure and acidosis  5. Personal history of methamphetamine abuse noted per problem list above  6.  Acute renal

## 2019-09-03 NOTE — PROGRESS NOTES
47.8* >200.0*     BNP:  No results for input(s): BNP in the last 72 hours. Ionized Calcium:No results for input(s): IONCA in the last 72 hours. Magnesium:  Recent Labs     09/01/19  0819 09/02/19 0131 09/03/19 0148   MG 2.2 1.8 2.0     Phosphorus:No results for input(s): PHOS in the last 72 hours. HgbA1C: No results for input(s): LABA1C in the last 72 hours. Hepatic:   Recent Labs     09/01/19  0819 09/02/19 0131 09/03/19 0148   ALKPHOS 180* 137* 144*   ALT 15 13 13   AST 24 21 22   PROT 7.8 7.7 7.0   BILITOT 1.4* 1.1 0.9   LABALBU 4.0 3.7 3.8     Lactic Acid:   Recent Labs     09/03/19 0148   LACTA 1.7     Troponin:   Recent Labs     09/01/19  0646 09/02/19 0131 09/03/19 0148   CKTOTAL 58 180 101     ABGs: No results for input(s): PH, PCO2, PO2, HCO3, O2SAT in the last 72 hours. CRP:  No results for input(s): CRP in the last 72 hours. Sed Rate:  No results for input(s): SEDRATE in the last 72 hours. Cultures:   No results for input(s): CULTURE in the last 72 hours. Radiology reports as per the Radiologist  Radiology: Ct Abdomen Pelvis W Iv Contrast Additional Contrast? None    Result Date: 8/31/2019  CT ABDOMEN PELVIS W IV CONTRAST 8/31/2019 9:29 PM History: Right flank pain and right upper quadrant pain. In order to have a CT radiation dose as low as reasonably achievable Automated Exposure Control was utilized for adjustment of the mA and/or KV according to patient size. DLP in mGycm= 1168. Abdomen/pelvis CT with IV contrast injection. Fatty liver. Nonspecific gallbladder wall thickening given the presence of mesenteric edema and a small amount of peritoneal fluid. No significant gallbladder distention. No visible gallstones. No biliary dilation. Normal pancreas and spleen. Normal adrenal glands and kidneys. Moderate fecal material within the colon. No bowel obstruction. No appendicitis. 1. Small amount of peritoneal fluid.  2. Gallbladder wall thickening is a nonspecific finding given that

## 2019-09-03 NOTE — PROGRESS NOTES
Hospitalist Progress Note    Patient:  Adriana Preciado  YOB: 1985  Date of Service: 9/3/2019  MRN: [id number]   Acct: [de-identified]   Primary Care Physician: No primary care provider on file. Advance Directive: Full Code  Admit Date: 8/31/2019       Hospital Day: 3  Referring Provider: Edgardo Trimble DO    Patient Seen, Chart, Consults, Notes, Labs, Radiology studies reviewed. Subjective:  Adriana Preciado is a 35 y.o. male  whom we are following for pulmonary embolism with possible pulmonary infarction. He is having less chest pain today. He is more comfortable. He remains on BiPAP. He is maintaining his saturation. Hemodynamics are otherwise stable. Allergies:  Patient has no known allergies.     Medicines:  Current Facility-Administered Medications   Medication Dose Route Frequency Provider Last Rate Last Dose    rivaroxaban (XARELTO) tablet 15 mg  15 mg Oral BID Edgardo Atilio, DO        metoprolol succinate (TOPROL XL) extended release tablet 25 mg  25 mg Oral BID Green Factor, DO   25 mg at 09/03/19 0817    fentaNYL (DURAGESIC) 50 MCG/HR 1 patch  1 patch Transdermal Q72H Green FactorDO   1 patch at 09/02/19 1044    sodium chloride flush 0.9 % injection 10 mL  10 mL Intravenous 2 times per day López Tejeda MD   10 mL at 09/03/19 0931    sodium chloride flush 0.9 % injection 10 mL  10 mL Intravenous PRN Lópze Tejeda MD   10 mL at 09/02/19 0619    nicotine (NICODERM CQ) 21 MG/24HR 1 patch  1 patch Transdermal Daily Alice Hayes MD   1 patch at 09/03/19 0818    dextrose 50 % IV solution  12.5 g Intravenous PRN Alice Hayes MD   12.5 g at 09/01/19 1019    ipratropium-albuterol (DUONEB) nebulizer solution 1 ampule  1 ampule Inhalation Q4H PRN Shelby Forrest MD        albuterol sulfate  (90 Base) MCG/ACT inhaler 2 puff  2 puff Inhalation Q6H PRN López Tejeda MD        aspirin EC tablet 81 mg  81 mg Oral Daily López Tejeda MD   81 mg at 09/03/19 0817    magnesium hydroxide (MILK OF MAGNESIA) 400 MG/5ML suspension 30 mL  30 mL Oral Daily PRN Radha Tom MD        ondansetron [facility]) injection 4 mg  4 mg Intravenous Q6H PRN Radha Tmo MD        magnesium sulfate 1 g in dextrose 5% 100 mL IVPB  1 g Intravenous PRN Radha Tom MD        acetaminophen (TYLENOL) tablet 650 mg  650 mg Oral Q4H PRN Radha Tom MD        morphine (PF) injection 2 mg  2 mg Intravenous Q4H PRN Radha Tom MD   2 mg at 09/03/19 8880       Past Medical History:  Past Medical History:   Diagnosis Date    [facility]     Hypertension     Palliative care patient 05/08/2019    Smoker        Past Surgical History:  Past Surgical History:   Procedure Laterality Date    HAND SURGERY         Family History  Family History   Problem Relation Age of Onset    Cancer Mother     No Known Problems Father     No Known Problems Brother        Social History  Social History     Socioeconomic History    Marital status: Single     Spouse name: Not on file    Number of children: Not on file    Years of education: Not on file    Highest education level: Not on file   Occupational History    Not on file   Social Needs    Financial resource strain: Not on file    Food insecurity:     Worry: Not on file     Inability: Not on file    Transportation needs:     Medical: Not on file     Non-medical: Not on file   Tobacco Use    Smoking status: Current Every Day Smoker     Packs/day: 0.50     Years: 13.00     Pack years: 6.50     Types: Cigarettes    Smokeless tobacco: Never Used   Substance and Sexual Activity    Alcohol use: Yes     Comment: rare    Drug use: Not Currently     Types: Methamphetamines     Comment: says he been clean.  He has \"fallen off the wagon in the past\"    Sexual activity: Not on file   Lifestyle    Physical activity:     Days per week: Not on file     Minutes per session: Not on file    Stress: Not on file   Relationships    Social connections:     Talks on phone: Not on file     Gets together: Not on file     Attends Yarsanism service: Not on file     Active member of club or organization: Not on file     Attends meetings of clubs or organizations: Not on file     Relationship status: Not on file    Intimate partner violence:     Fear of current or ex partner: Not on file     Emotionally abused: Not on file     Physically abused: Not on file     Forced sexual activity: Not on file   Other Topics Concern    Not on file   Social History Narrative    Not on file         Review of Systems:    Review of Systems   Constitutional: Negative for activity change and fever. Respiratory: Positive for cough and shortness of breath. Cardiovascular: Positive for chest pain. Negative for leg swelling. Gastrointestinal: Negative for constipation, diarrhea, nausea and vomiting. Genitourinary: Negative for difficulty urinating and dysuria. Musculoskeletal: Negative for back pain and neck pain. Neurological: Negative for dizziness and light-headedness. Objective:  Blood pressure 102/72, pulse 119, temperature 96.2 °F (35.7 °C), resp. rate 13, height 5' 7\" (1.702 m), weight 197 lb 6.4 oz (89.5 kg), SpO2 94 %. Intake/Output Summary (Last 24 hours) at 9/3/2019 1241  Last data filed at 9/3/2019 1000  Gross per 24 hour   Intake 1935.48 ml   Output 1460 ml   Net 475.48 ml       Physical Exam   Constitutional: He is oriented to person, place, and time. He appears well-developed and well-nourished. HENT:   Head: Normocephalic and atraumatic. Mouth/Throat: Oropharynx is clear and moist.   Eyes: Pupils are equal, round, and reactive to light. Conjunctivae are normal.   Cardiovascular: Normal rate, regular rhythm and normal heart sounds. Pulmonary/Chest: He has rales. right   Neurological: He is alert and oriented to person, place, and time. Skin: Skin is warm and dry.    Vitals pulmonary venous hypertension with developing pulmonary edema. Signed by Dr Yudelka Wright on 9/1/2019 1:28 PM    Xr Chest Portable    Result Date: 8/31/2019  XR CHEST PORTABLE 8/31/2019 8:47 PM History: Chest pain and hemoptysis. Portable chest x-ray compared with 5/8/2019. Magnified heart size. Cardiomegaly is present and not significantly changed. Normal mediastinum. The lungs show interstitial prominence. There is no focal infiltrate, pneumothorax, or definite pleural fluid. 1. Very similar appearance of the chest with no focal acute infiltrate. 2. Cardiomegaly again seen. Signed by Dr Nehemias Frederick on 8/31/2019 8:49 PM    Vl Dup Lower Extremity Venous Bilateral    Result Date: 9/2/2019  Vascular Lower Extremities DVT Study Procedure  Demographics   Patient Name     Iker Moseley  Age                    35   Patient Number   [id number]      Gender                 Male   Visit Number     [id number]   Interpreting Physician Ajith Fernandez MD   Date of Birth    1985  [facility] Phys[facility]    [address] [facility]   Accession Number [id number]   Sonographer            Anju Walker RT, RVT  Procedure Type of Study:   Veins:Lower Extremities DVT Study, VL LOWER EXTREMITY BILATERAL VENOUS  DUPLEX . Indications for Study:Pulmonary embolism. Allergies   - No known allergies. Impression   Normal venous duplex study of the bilateral lower extremity(ies). There is  no evidence of deep or superficial venous thrombosis.    Signature   ----------------------------------------------------------------  Electronically signed by Ajith Fernandez MD(Interpreting  physician) on 09/02/2019 06:12 AM  ----------------------------------------------------------------  Velocities are measured in cm/s ; Diameters are measured in mm Right Lower Extremities DVT Study Measurements Right 2D Measurements +------------------------------------+----------+---------------+----------+ ! Location                            ! Visualized! Compressibility! Thrombosis! +------------------------------------+----------+---------------+----------+ ! Sapheno Femoral Junction            ! ! Yes            !          ! +------------------------------------+----------+---------------+----------+ ! Common Femoral                      !          !Yes            !          ! +------------------------------------+----------+---------------+----------+ ! Prox Femoral                        !          !Yes            !          ! +------------------------------------+----------+---------------+----------+ ! Mid Femoral                         !          !Yes            !          ! +------------------------------------+----------+---------------+----------+ ! Dist Femoral                        !          !Yes            !          ! +------------------------------------+----------+---------------+----------+ ! Popliteal                           !          !Yes            !          ! +------------------------------------+----------+---------------+----------+ ! SSV                                 ! !Yes            !          ! +------------------------------------+----------+---------------+----------+ ! PTV                                 ! !Yes            !          ! +------------------------------------+----------+---------------+----------+ ! GSV                                 ! !Yes            !          ! +------------------------------------+----------+---------------+----------+ ! ATV                                 ! !Yes            !          ! +------------------------------------+----------+---------------+----------+ ! Peroneal                            !          !Yes            !          ! +------------------------------------+----------+---------------+----------+ Left Lower Extremities DVT Study Measurements Left 2D Measurements +------------------------------------+----------+---------------+----------+ ! Location                            ! Visualized! Compressibility! Thrombosis! +------------------------------------+----------+---------------+----------+ ! Sapheno Femoral Junction            ! ! Yes            !          ! +------------------------------------+----------+---------------+----------+ ! Common Femoral                      !          !Yes            !          ! +------------------------------------+----------+---------------+----------+ ! Prox Femoral                        !          !Yes            !          ! +------------------------------------+----------+---------------+----------+ ! Mid Femoral                         !          !Yes            !          ! +------------------------------------+----------+---------------+----------+ ! Dist Femoral                        !          !Yes            !          ! +------------------------------------+----------+---------------+----------+ ! Popliteal                           !          !Yes            !          ! +------------------------------------+----------+---------------+----------+ ! SSV                                 ! !Yes            !          ! +------------------------------------+----------+---------------+----------+ ! PTV                                 ! !Yes            !          ! +------------------------------------+----------+---------------+----------+ ! GSV                                 ! !Yes            !          ! +------------------------------------+----------+---------------+----------+ ! ATV                                 ! !Yes            !          ! +------------------------------------+----------+---------------+----------+ ! Peroneal                            !          !Yes            !          !

## 2019-09-04 ENCOUNTER — APPOINTMENT (OUTPATIENT)
Dept: GENERAL RADIOLOGY | Age: 34
DRG: 871 | End: 2019-09-04
Payer: COMMERCIAL

## 2019-09-04 ENCOUNTER — OUTSIDE FACILITY SERVICE (OUTPATIENT)
Dept: PULMONOLOGY | Facility: CLINIC | Age: 34
End: 2019-09-04

## 2019-09-04 LAB
ALBUMIN SERPL-MCNC: 3.4 G/DL (ref 3.5–5.2)
ALP BLD-CCNC: 135 U/L (ref 40–130)
ALT SERPL-CCNC: 11 U/L (ref 5–41)
ANION GAP SERPL CALCULATED.3IONS-SCNC: 13 MMOL/L (ref 7–19)
AST SERPL-CCNC: 16 U/L (ref 5–40)
BASOPHILS ABSOLUTE: 0 K/UL (ref 0–0.2)
BASOPHILS RELATIVE PERCENT: 0.1 % (ref 0–1)
BILIRUB SERPL-MCNC: 0.9 MG/DL (ref 0.2–1.2)
BUN BLDV-MCNC: 36 MG/DL (ref 6–20)
CALCIUM SERPL-MCNC: 9.1 MG/DL (ref 8.6–10)
CHLORIDE BLD-SCNC: 88 MMOL/L (ref 98–111)
CO2: 30 MMOL/L (ref 22–29)
CREAT SERPL-MCNC: 0.9 MG/DL (ref 0.5–1.2)
CULTURE, RESPIRATORY: NORMAL
EOSINOPHILS ABSOLUTE: 0 K/UL (ref 0–0.6)
EOSINOPHILS RELATIVE PERCENT: 0 % (ref 0–5)
GFR NON-AFRICAN AMERICAN: >60
GLUCOSE BLD-MCNC: 142 MG/DL (ref 74–109)
GRAM STAIN RESULT: NORMAL
HCT VFR BLD CALC: 34.9 % (ref 42–52)
HEMOGLOBIN: 11.4 G/DL (ref 14–18)
IMMATURE GRANULOCYTES #: 0.1 K/UL
LYMPHOCYTES ABSOLUTE: 0.7 K/UL (ref 1.1–4.5)
LYMPHOCYTES RELATIVE PERCENT: 4.5 % (ref 20–40)
MAGNESIUM: 2.2 MG/DL (ref 1.6–2.6)
MCH RBC QN AUTO: 28.8 PG (ref 27–31)
MCHC RBC AUTO-ENTMCNC: 32.7 G/DL (ref 33–37)
MCV RBC AUTO: 88.1 FL (ref 80–94)
MONOCYTES ABSOLUTE: 1.3 K/UL (ref 0–0.9)
MONOCYTES RELATIVE PERCENT: 8.4 % (ref 0–10)
NEUTROPHILS ABSOLUTE: 12.9 K/UL (ref 1.5–7.5)
NEUTROPHILS RELATIVE PERCENT: 86.1 % (ref 50–65)
PDW BLD-RTO: 20.5 % (ref 11.5–14.5)
PLATELET # BLD: 246 K/UL (ref 130–400)
PMV BLD AUTO: 10.2 FL (ref 9.4–12.4)
POTASSIUM SERPL-SCNC: 4.7 MMOL/L (ref 3.5–5)
RBC # BLD: 3.96 M/UL (ref 4.7–6.1)
SODIUM BLD-SCNC: 131 MMOL/L (ref 136–145)
TOTAL PROTEIN: 7.2 G/DL (ref 6.6–8.7)
URINE CULTURE, ROUTINE: NORMAL
WBC # BLD: 15 K/UL (ref 4.8–10.8)

## 2019-09-04 PROCEDURE — 6370000000 HC RX 637 (ALT 250 FOR IP): Performed by: INTERNAL MEDICINE

## 2019-09-04 PROCEDURE — 2580000003 HC RX 258: Performed by: HOSPITALIST

## 2019-09-04 PROCEDURE — 6370000000 HC RX 637 (ALT 250 FOR IP): Performed by: HOSPITALIST

## 2019-09-04 PROCEDURE — 2700000000 HC OXYGEN THERAPY PER DAY

## 2019-09-04 PROCEDURE — 99231 SBSQ HOSP IP/OBS SF/LOW 25: CPT | Performed by: INTERNAL MEDICINE

## 2019-09-04 PROCEDURE — 94660 CPAP INITIATION&MGMT: CPT

## 2019-09-04 PROCEDURE — 99232 SBSQ HOSP IP/OBS MODERATE 35: CPT | Performed by: INTERNAL MEDICINE

## 2019-09-04 PROCEDURE — 6360000002 HC RX W HCPCS: Performed by: HOSPITALIST

## 2019-09-04 PROCEDURE — 85025 COMPLETE CBC W/AUTO DIFF WBC: CPT

## 2019-09-04 PROCEDURE — 1210000000 HC MED SURG R&B

## 2019-09-04 PROCEDURE — 83735 ASSAY OF MAGNESIUM: CPT

## 2019-09-04 PROCEDURE — 71045 X-RAY EXAM CHEST 1 VIEW: CPT

## 2019-09-04 PROCEDURE — 80053 COMPREHEN METABOLIC PANEL: CPT

## 2019-09-04 RX ORDER — METOPROLOL SUCCINATE 50 MG/1
50 TABLET, EXTENDED RELEASE ORAL 2 TIMES DAILY
Status: DISCONTINUED | OUTPATIENT
Start: 2019-09-04 | End: 2019-09-07 | Stop reason: HOSPADM

## 2019-09-04 RX ADMIN — Medication 10 ML: at 20:25

## 2019-09-04 RX ADMIN — RIVAROXABAN 15 MG: 15 TABLET, FILM COATED ORAL at 20:23

## 2019-09-04 RX ADMIN — METOPROLOL SUCCINATE 50 MG: 50 TABLET, EXTENDED RELEASE ORAL at 20:23

## 2019-09-04 RX ADMIN — OXYCODONE HYDROCHLORIDE 5 MG: 5 TABLET ORAL at 11:47

## 2019-09-04 RX ADMIN — ASPIRIN 81 MG: 81 TABLET, COATED ORAL at 08:22

## 2019-09-04 RX ADMIN — MORPHINE SULFATE 2 MG: 2 INJECTION, SOLUTION INTRAMUSCULAR; INTRAVENOUS at 05:21

## 2019-09-04 RX ADMIN — MORPHINE SULFATE 2 MG: 2 INJECTION, SOLUTION INTRAMUSCULAR; INTRAVENOUS at 20:23

## 2019-09-04 RX ADMIN — Medication 10 ML: at 08:23

## 2019-09-04 RX ADMIN — METOPROLOL SUCCINATE 25 MG: 25 TABLET, EXTENDED RELEASE ORAL at 08:22

## 2019-09-04 RX ADMIN — RIVAROXABAN 15 MG: 15 TABLET, FILM COATED ORAL at 08:22

## 2019-09-04 ASSESSMENT — PAIN SCALES - GENERAL
PAINLEVEL_OUTOF10: 8
PAINLEVEL_OUTOF10: 7
PAINLEVEL_OUTOF10: 6
PAINLEVEL_OUTOF10: 4
PAINLEVEL_OUTOF10: 0
PAINLEVEL_OUTOF10: 0
PAINLEVEL_OUTOF10: 7
PAINLEVEL_OUTOF10: 0

## 2019-09-04 ASSESSMENT — PAIN - FUNCTIONAL ASSESSMENT
PAIN_FUNCTIONAL_ASSESSMENT: PREVENTS OR INTERFERES WITH ALL ACTIVE AND SOME PASSIVE ACTIVITIES
PAIN_FUNCTIONAL_ASSESSMENT: PREVENTS OR INTERFERES WITH ALL ACTIVE AND SOME PASSIVE ACTIVITIES

## 2019-09-04 ASSESSMENT — PAIN DESCRIPTION - ORIENTATION
ORIENTATION: RIGHT
ORIENTATION: LEFT;MID

## 2019-09-04 ASSESSMENT — PAIN DESCRIPTION - FREQUENCY
FREQUENCY: INTERMITTENT
FREQUENCY: INTERMITTENT

## 2019-09-04 ASSESSMENT — ENCOUNTER SYMPTOMS
NAUSEA: 0
SHORTNESS OF BREATH: 1
BACK PAIN: 0
DIARRHEA: 0
SHORTNESS OF BREATH: 0
VOMITING: 0
SORE THROAT: 0
CONSTIPATION: 0
COUGH: 1
GASTROINTESTINAL NEGATIVE: 1

## 2019-09-04 ASSESSMENT — PAIN DESCRIPTION - PROGRESSION
CLINICAL_PROGRESSION: GRADUALLY IMPROVING
CLINICAL_PROGRESSION: GRADUALLY IMPROVING

## 2019-09-04 ASSESSMENT — PAIN DESCRIPTION - DESCRIPTORS
DESCRIPTORS: SHARP
DESCRIPTORS: SHARP

## 2019-09-04 ASSESSMENT — PAIN DESCRIPTION - PAIN TYPE
TYPE: ACUTE PAIN
TYPE: ACUTE PAIN

## 2019-09-04 ASSESSMENT — PAIN DESCRIPTION - LOCATION
LOCATION: FLANK;CHEST
LOCATION: CHEST

## 2019-09-04 NOTE — PROGRESS NOTES
is seen beneath the hemidiaphragms. 1.. Right IJ deep line placed with no complications. 2. Worsening pulmonary venous hypertension with developing pulmonary edema. Signed by Dr Rezno Negron on 9/1/2019 1:28 PM    Xr Chest Portable    Result Date: 8/31/2019  XR CHEST PORTABLE 8/31/2019 8:47 PM History: Chest pain and hemoptysis. Portable chest x-ray compared with 5/8/2019. Magnified heart size. Cardiomegaly is present and not significantly changed. Normal mediastinum. The lungs show interstitial prominence. There is no focal infiltrate, pneumothorax, or definite pleural fluid. 1. Very similar appearance of the chest with no focal acute infiltrate. 2. Cardiomegaly again seen. Signed by Dr Deann Dandy on 8/31/2019 8:49 PM    Vl Dup Lower Extremity Venous Bilateral    Result Date: 9/2/2019  Vascular Lower Extremities DVT Study Procedure  Demographics   Patient Name     Katherine Fulton  Age                    35   Patient Number   [id number]      Gender                 Male   Visit Number     [id number]   Interpreting Physician Maryhelen Collet MD   Date of Birth    1985  Referring Physician    [address] [facility]   Accession Number [id number]   Sonographer            Sherri Meier RT, RVT  Procedure Type of Study:   Veins:Lower Extremities DVT Study, VL LOWER EXTREMITY BILATERAL VENOUS  DUPLEX . Indications for Study:Pulmonary embolism. Allergies   - No known allergies. Impression   Normal venous duplex study of the bilateral lower extremity(ies). There is  no evidence of deep or superficial venous thrombosis.    Signature   ----------------------------------------------------------------  Electronically signed by Maryhelen Collet MD(Interpreting  physician) on 09/02/2019 06:12 AM  ----------------------------------------------------------------  Velocities are measured in cm/s ; Diameters are measured in mm Right Lower Extremities DVT Study Measurements Right 2D Measurements +------------------------------------+----------+---------------+----------+ ! Location                            ! Visualized! Compressibility! Thrombosis! +------------------------------------+----------+---------------+----------+ ! Sapheno Femoral Junction            ! ! Yes            !          ! +------------------------------------+----------+---------------+----------+ ! Common Femoral                      !          !Yes            !          ! +------------------------------------+----------+---------------+----------+ ! Prox Femoral                        !          !Yes            !          ! +------------------------------------+----------+---------------+----------+ ! Mid Femoral                         !          !Yes            !          ! +------------------------------------+----------+---------------+----------+ ! Dist Femoral                        !          !Yes            !          ! +------------------------------------+----------+---------------+----------+ ! Popliteal                           !          !Yes            !          ! +------------------------------------+----------+---------------+----------+ ! SSV                                 ! !Yes            !          ! +------------------------------------+----------+---------------+----------+ ! PTV                                 ! !Yes            !          ! +------------------------------------+----------+---------------+----------+ ! GSV                                 ! !Yes            !          ! +------------------------------------+----------+---------------+----------+ ! ATV                                 ! !Yes            !          ! +------------------------------------+----------+---------------+----------+ ! Peroneal                            !          !Yes            !          ! !          !Yes            !          ! +------------------------------------+----------+---------------+----------+    Cta Pulmonary W Contrast    Result Date: 8/31/2019  CTA PULMONARY W CONTRAST 8/31/2019 9:27 PM History: Chest pain and shortness of breath. In order to have a CT radiation dose as low as reasonably achievable Automated Exposure Control was utilized for adjustment of the mA and/or KV according to patient size. DLP in mGycm= 565. CT angiography protocol. CT imaging with bolus IV contrast injection. Under concurrent supervision axial, sagittal, coronal, and three-dimensional data sets were constructed. Cardiomegaly. Right lower lobe pulmonary emboli. Low clot burden. No right heart enlargement. Small right pleural effusion. Posterior right lower lobe pneumonia. No pneumothorax. 1. Right lower lobe pulmonary emboli. Low clot burden. No right heart enlargement. 2. Small pleural effusion and right lower lobe pneumonia. 3. Cardiomegaly. Signed by Dr Lisa Muro on 8/31/2019 9:28 PM    Us Renal Arteries Duplex    Result Date: 9/1/2019  EXAMINATION: Renal ultrasound including arterial duplex 9/1/2019 HISTORY: Hypertension. FINDINGS: Sonography is performed of the kidneys in the transverse and longitudinal projections. The right kidney measures 12.0 cm in [id number] length with cortical thickness of 18 and 14 mm within the upper and lower pole respectively. The left kidney measures 11.3 cm in [id number]-sitn length with cortical thickness of 16 and 21 mm within the upper and lower pole respectively. No evidence of mass or hydronephrosis. Duplex ultrasound was performed of the renal arteries and abdominal aorta using B-mode/gray scale imaging with spectral analysis and color flow images. Peak systolic velocity is 94 cm/s within the abdominal aorta.  Peak systolic velocities on the right are 143, 140, 105 and 31 cm/s respectively within the proximal, mid, distal and arcuate right renal artery

## 2019-09-04 NOTE — PROGRESS NOTES
Visited with pt to provide spiritual care. Pt spoke with Palliative Care nurse earlier today and says he is interested in looking over. Provided documents and discussed pt's choices. Pt wants to look over with his family.  to follow up with pt tomorrow. Pt expressed gratitude for spiritual care.       Electronically signed by Flores Huynh on 9/4/2019 at 2:31 PM

## 2019-09-04 NOTE — PROGRESS NOTES
pain and hemoptysis. Portable chest x-ray compared with 5/8/2019. Magnified heart size. Cardiomegaly is present and not significantly changed. Normal mediastinum. The lungs show interstitial prominence. There is no focal infiltrate, pneumothorax, or definite pleural fluid. 1. Very similar appearance of the chest with no focal acute infiltrate. 2. Cardiomegaly again seen. Signed by Dr Heidy Lehman on 8/31/2019 8:49 PM    Vl Dup Lower Extremity Venous Bilateral    Result Date: 9/2/2019  Vascular Lower Extremities DVT Study Procedure  Demographics   Patient Name     Ciro Chavez  Age                    35   Patient Number   [id number]      Gender                 Male   Visit Number     [id number]   Interpreting Physician Kelsie Segal MD   Date of Birth    1985  Referring Physician    [address] [facility]   Accession Number [id number]   Sonographer            Manju Wynn RT, RVT  Procedure Type of Study:   Veins:Lower Extremities DVT Study, VL LOWER EXTREMITY BILATERAL VENOUS  DUPLEX . Indications for Study:Pulmonary embolism. Allergies   - No known allergies. Impression   Normal venous duplex study of the bilateral lower extremity(ies). There is  no evidence of deep or superficial venous thrombosis. Signature   ----------------------------------------------------------------  Electronically signed by Kelsie Segal MD(Interpreting  physician) on 09/02/2019 06:12 AM  ----------------------------------------------------------------  Velocities are measured in cm/s ; Diameters are measured in mm Right Lower Extremities DVT Study Measurements Right 2D Measurements +------------------------------------+----------+---------------+----------+ ! Location                            ! Visualized! Compressibility! Thrombosis! +------------------------------------+----------+---------------+----------+ ! Sapheno Femoral Junction            ! !Sapheno Femoral Junction            ! ! Yes            !          ! +------------------------------------+----------+---------------+----------+ ! Common Femoral                      !          !Yes            !          ! +------------------------------------+----------+---------------+----------+ ! Prox Femoral                        !          !Yes            !          ! +------------------------------------+----------+---------------+----------+ ! Mid Femoral                         !          !Yes            !          ! +------------------------------------+----------+---------------+----------+ ! Dist Femoral                        !          !Yes            !          ! +------------------------------------+----------+---------------+----------+ ! Popliteal                           !          !Yes            !          ! +------------------------------------+----------+---------------+----------+ ! SSV                                 ! !Yes            !          ! +------------------------------------+----------+---------------+----------+ ! PTV                                 ! !Yes            !          ! +------------------------------------+----------+---------------+----------+ ! GSV                                 ! !Yes            !          ! +------------------------------------+----------+---------------+----------+ ! ATV                                 ! !Yes            !          ! +------------------------------------+----------+---------------+----------+ ! Peroneal                            !          !Yes            !          ! +------------------------------------+----------+---------------+----------+    Cta Pulmonary W Contrast    Result Date: 8/31/2019  CTA PULMONARY W CONTRAST 8/31/2019 9:27 PM History: Chest pain and shortness of breath.  In order to have a CT radiation dose as low as reasonably achievable Automated Exposure Control was utilized for or hydronephrosis. 2. No significant elevation of peak systolic velocity or velocity ratio to suggest a hemodynamically significant renal artery stenosis. Signed by Dr Didier Esteves on 9/1/2019 12:27 PM       Assessment     -Acute kidney injury -resolved  -Hyperkalemia- improved  -Hemoptysis with right lower lobe pulmonary emboli  -Acute on chronic systolic congestive heart failure with EF 15%  -Polysubstance abuse  -Metabolic acidosis-resolved  -Hyponatremia-better      Plan:  Continue fluids restrictions and diuretics. Low K diet and follow up labs.
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
## 2019-09-04 NOTE — CARE COORDINATION
Pt lives at home with his father and his daughter. No known needs at present.     Electronically signed by TEE Dia on 9/4/2019 at 3:40 PM

## 2019-09-05 LAB
ALBUMIN SERPL-MCNC: 3.2 G/DL (ref 3.5–5.2)
ALP BLD-CCNC: 163 U/L (ref 40–130)
ALT SERPL-CCNC: 20 U/L (ref 5–41)
ANION GAP SERPL CALCULATED.3IONS-SCNC: 12 MMOL/L (ref 7–19)
AST SERPL-CCNC: 38 U/L (ref 5–40)
BASOPHILS ABSOLUTE: 0 K/UL (ref 0–0.2)
BASOPHILS RELATIVE PERCENT: 0.2 % (ref 0–1)
BILIRUB SERPL-MCNC: 1.4 MG/DL (ref 0.2–1.2)
BUN BLDV-MCNC: 39 MG/DL (ref 6–20)
CALCIUM SERPL-MCNC: 9.5 MG/DL (ref 8.6–10)
CHLORIDE BLD-SCNC: 86 MMOL/L (ref 98–111)
CO2: 26 MMOL/L (ref 22–29)
CREAT SERPL-MCNC: 1 MG/DL (ref 0.5–1.2)
EOSINOPHILS ABSOLUTE: 0 K/UL (ref 0–0.6)
EOSINOPHILS RELATIVE PERCENT: 0.2 % (ref 0–5)
GFR NON-AFRICAN AMERICAN: >60
GLUCOSE BLD-MCNC: 106 MG/DL (ref 74–109)
HCT VFR BLD CALC: 40 % (ref 42–52)
HEMOGLOBIN: 12.9 G/DL (ref 14–18)
IMMATURE GRANULOCYTES #: 0.1 K/UL
LYMPHOCYTES ABSOLUTE: 1.6 K/UL (ref 1.1–4.5)
LYMPHOCYTES RELATIVE PERCENT: 13.5 % (ref 20–40)
MAGNESIUM: 2.2 MG/DL (ref 1.6–2.6)
MCH RBC QN AUTO: 28.6 PG (ref 27–31)
MCHC RBC AUTO-ENTMCNC: 32.3 G/DL (ref 33–37)
MCV RBC AUTO: 88.7 FL (ref 80–94)
MONOCYTES ABSOLUTE: 1.6 K/UL (ref 0–0.9)
MONOCYTES RELATIVE PERCENT: 13.8 % (ref 0–10)
NEUTROPHILS ABSOLUTE: 8.5 K/UL (ref 1.5–7.5)
NEUTROPHILS RELATIVE PERCENT: 71.6 % (ref 50–65)
PDW BLD-RTO: 20.5 % (ref 11.5–14.5)
PLATELET # BLD: 271 K/UL (ref 130–400)
PMV BLD AUTO: 10.4 FL (ref 9.4–12.4)
POTASSIUM SERPL-SCNC: 5.1 MMOL/L (ref 3.5–5)
RBC # BLD: 4.51 M/UL (ref 4.7–6.1)
SODIUM BLD-SCNC: 124 MMOL/L (ref 136–145)
TOTAL PROTEIN: 7.6 G/DL (ref 6.6–8.7)
WBC # BLD: 11.9 K/UL (ref 4.8–10.8)

## 2019-09-05 PROCEDURE — 6370000000 HC RX 637 (ALT 250 FOR IP): Performed by: INTERNAL MEDICINE

## 2019-09-05 PROCEDURE — 2700000000 HC OXYGEN THERAPY PER DAY

## 2019-09-05 PROCEDURE — 2580000003 HC RX 258: Performed by: INTERNAL MEDICINE

## 2019-09-05 PROCEDURE — 80053 COMPREHEN METABOLIC PANEL: CPT

## 2019-09-05 PROCEDURE — 85025 COMPLETE CBC W/AUTO DIFF WBC: CPT

## 2019-09-05 PROCEDURE — 83735 ASSAY OF MAGNESIUM: CPT

## 2019-09-05 PROCEDURE — 6370000000 HC RX 637 (ALT 250 FOR IP): Performed by: FAMILY MEDICINE

## 2019-09-05 PROCEDURE — 1210000000 HC MED SURG R&B

## 2019-09-05 PROCEDURE — 6360000002 HC RX W HCPCS: Performed by: HOSPITALIST

## 2019-09-05 PROCEDURE — 36415 COLL VENOUS BLD VENIPUNCTURE: CPT

## 2019-09-05 RX ORDER — SODIUM CHLORIDE 1000 MG
1 TABLET, SOLUBLE MISCELLANEOUS
Status: DISCONTINUED | OUTPATIENT
Start: 2019-09-05 | End: 2019-09-07 | Stop reason: HOSPADM

## 2019-09-05 RX ORDER — FUROSEMIDE 40 MG/1
40 TABLET ORAL DAILY
Status: DISCONTINUED | OUTPATIENT
Start: 2019-09-05 | End: 2019-09-07

## 2019-09-05 RX ADMIN — METOPROLOL SUCCINATE 50 MG: 50 TABLET, EXTENDED RELEASE ORAL at 08:27

## 2019-09-05 RX ADMIN — Medication 5 MG: at 20:52

## 2019-09-05 RX ADMIN — Medication 1 G: at 08:27

## 2019-09-05 RX ADMIN — Medication 1 G: at 13:15

## 2019-09-05 RX ADMIN — ASPIRIN 81 MG: 81 TABLET, COATED ORAL at 08:27

## 2019-09-05 RX ADMIN — Medication 10 ML: at 08:27

## 2019-09-05 RX ADMIN — METOPROLOL SUCCINATE 50 MG: 50 TABLET, EXTENDED RELEASE ORAL at 20:52

## 2019-09-05 RX ADMIN — Medication 10 ML: at 20:54

## 2019-09-05 RX ADMIN — FUROSEMIDE 40 MG: 40 TABLET ORAL at 08:27

## 2019-09-05 RX ADMIN — MORPHINE SULFATE 2 MG: 2 INJECTION, SOLUTION INTRAMUSCULAR; INTRAVENOUS at 02:44

## 2019-09-05 RX ADMIN — RIVAROXABAN 15 MG: 15 TABLET, FILM COATED ORAL at 08:27

## 2019-09-05 RX ADMIN — Medication 1 G: at 17:41

## 2019-09-05 RX ADMIN — RIVAROXABAN 15 MG: 15 TABLET, FILM COATED ORAL at 20:54

## 2019-09-05 ASSESSMENT — PAIN DESCRIPTION - ONSET: ONSET: ON-GOING

## 2019-09-05 ASSESSMENT — PAIN DESCRIPTION - ORIENTATION: ORIENTATION: LEFT;MID;LOWER

## 2019-09-05 ASSESSMENT — ENCOUNTER SYMPTOMS
TROUBLE SWALLOWING: 0
CONSTIPATION: 0
DIARRHEA: 0
NAUSEA: 0
SHORTNESS OF BREATH: 0
VOMITING: 0
BACK PAIN: 0
SORE THROAT: 0
COUGH: 1

## 2019-09-05 ASSESSMENT — PAIN DESCRIPTION - DESCRIPTORS: DESCRIPTORS: SHARP;RADIATING

## 2019-09-05 ASSESSMENT — PAIN DESCRIPTION - FREQUENCY: FREQUENCY: INTERMITTENT

## 2019-09-05 ASSESSMENT — PAIN SCALES - GENERAL
PAINLEVEL_OUTOF10: 9
PAINLEVEL_OUTOF10: 8
PAINLEVEL_OUTOF10: 0

## 2019-09-05 ASSESSMENT — PAIN DESCRIPTION - LOCATION: LOCATION: CHEST;BACK

## 2019-09-05 ASSESSMENT — PAIN - FUNCTIONAL ASSESSMENT
PAIN_FUNCTIONAL_ASSESSMENT: PREVENTS OR INTERFERES SOME ACTIVE ACTIVITIES AND ADLS
PAIN_FUNCTIONAL_ASSESSMENT: PREVENTS OR INTERFERES SOME ACTIVE ACTIVITIES AND ADLS

## 2019-09-05 ASSESSMENT — PAIN DESCRIPTION - PROGRESSION: CLINICAL_PROGRESSION: NOT CHANGED

## 2019-09-05 ASSESSMENT — PAIN DESCRIPTION - PAIN TYPE: TYPE: ACUTE PAIN

## 2019-09-05 NOTE — PROGRESS NOTES
times per day Liu Curia, DO   10 mL at 09/05/19 0827    sodium chloride flush 0.9 % injection 10 mL  10 mL Intravenous PRN Liu Curia, DO   10 mL at 09/02/19 5325    nicotine (NICODERM CQ) 21 MG/24HR 1 patch  1 patch Transdermal Daily Liu Curia, DO   1 patch at 09/05/19 7681    dextrose 50 % IV solution  12.5 g Intravenous PRN Liu Curia, DO   12.5 g at 09/01/19 1019    ipratropium-albuterol (DUONEB) nebulizer solution 1 ampule  1 ampule Inhalation Q4H PRN Liu Curia, DO        albuterol sulfate  (90 Base) MCG/ACT inhaler 2 puff  2 puff Inhalation Q6H PRN Liu Curia, DO        aspirin EC tablet 81 mg  81 mg Oral Daily Liu Curia, DO   81 mg at 09/05/19 0827    magnesium hydroxide (MILK OF MAGNESIA) 400 MG/5ML suspension 30 mL  30 mL Oral Daily PRN Liu Curia, DO        [facility] injection 4 mg  4 mg Intravenous Q6H PRN Liu Curia, DO        magnesium sulfate 1 g in dextrose 5% 100 mL IVPB  1 g Intravenous PRN Liu Curia, DO        acetaminophen (TYLENOL) tablet 650 mg  650 mg Oral Q4H PRN Liu Curia, DO        morphine (PF) injection 2 mg  2 mg Intravenous Q4H PRN Liu Curia, DO   2 mg at 09/05/19 0244       Past Medical History:  Past Medical History:   Diagnosis Date    CHF (congestive heart failure) ([address]     Hypertension     Palliative care patient 05/08/2019    Smoker        Past Surgical History:  Past Surgical History:   Procedure Laterality Date    HAND SURGERY         Family History  Family History   Problem Relation Age of Onset    Cancer Mother     No Known Problems Father     No Known Problems Brother        Social History  Social History     Socioeconomic History    Marital status: Single     Spouse name: Not on file    Number of children: Not on file    Years of education: Not on file    Highest education level: Not on file   Occupational History    Not on file   Social Needs    Financial resource strain: Not on file    Food insecurity:     Worry: Not on file     Inability: Not on file    Transportation needs:     Medical: Not on file     Non-medical: Not on file   Tobacco Use    Smoking status: Current Every Day Smoker     Packs/day: 0.50     Years: 13.00     Pack years: 6.50     Types: Cigarettes    Smokeless tobacco: Never Used   Substance and Sexual Activity    Alcohol use: Yes     Comment: rare    Drug use: Not Currently     Types: Methamphetamines     Comment: says he been clean. He has \"fallen off the wagon in the past\"    Sexual activity: Not on file   Lifestyle    Physical activity:     Days per week: Not on file     Minutes per session: Not on file    Stress: Not on file   Relationships    Social connections:     Talks on phone: Not on file     Gets together: Not on file     Attends Orthodox service: Not on file     Active member of club or organization: Not on file     Attends meetings of clubs or organizations: Not on file     Relationship status: Not on file    Intimate partner violence:     Fear of current or ex partner: Not on file     Emotionally abused: Not on file     Physically abused: Not on file     Forced sexual activity: Not on file   Other Topics Concern    Not on file   Social History Narrative    Not on file         Review of Systems:  History obtained from chart review and the patient  General ROS: No fever or chills  Respiratory ROS: No cough,no shortness of breath, -wheezing  Cardiovascular ROS: No chest pain or dyspnea on exertion  Gastrointestinal ROS: No abdominal pain or melena  Genito-Urinary ROS: No dysuria or hematuria  Musculoskeletal ROS: No joint pain or swelling         Objective:  Blood pressure 116/74, pulse 113, temperature 98.9 °F (37.2 °C), resp. rate 16, height 5' 7\" (1.702 m), weight 196 lb 14.4 oz (89.3 kg), SpO2 91 %.     Intake/Output Summary (Last 24 hours) at 9/5/2019 0951  Last data filed at hours.    Cultures:   No results for input(s): CULTURE in the last 72 hours. Radiology reports as per the Radiologist  Radiology: Ct Abdomen Pelvis W Iv Contrast Additional Contrast? None    Result Date: 8/31/2019  CT ABDOMEN PELVIS W IV CONTRAST 8/31/2019 9:29 PM History: Right flank pain and right upper quadrant pain. In order to have a CT radiation dose as low as reasonably achievable Automated Exposure Control was utilized for adjustment of the mA and/or KV according to patient size. DLP in mGycm= 1168. Abdomen/pelvis CT with IV contrast injection. Fatty liver. Nonspecific gallbladder wall thickening given the presence of mesenteric edema and a small amount of peritoneal fluid. No significant gallbladder distention. No visible gallstones. No biliary dilation. Normal pancreas and spleen. Normal adrenal glands and kidneys. Moderate fecal material within the colon. No bowel obstruction. No appendicitis. 1. Small amount of peritoneal fluid. 2. Gallbladder wall thickening is a nonspecific finding given that there is mesenteric edema and a small amount of peritoneal fluid. 3. No gallstones or bile duct dilation. 4. The CT reports on this patient were called to Dr. Zeus Gasca in the emergency room at 9:30 PM.  Signed by Dr Regine Perez on 8/31/2019 9:35 PM    Xr Chest Portable    Result Date: 9/1/2019  EXAMINATION: Chest one view 9/1/2019 HISTORY: Central line placement FINDINGS: Upright frontal projection of the chest reveals a deep line has been placed via right internal jugular approach with the tip in the SVC and no complications. There is again evidence of cardiomegaly and vascular redistribution with interstitial pulmonary edema. There is developing right basilar airspace disease for which I would favor developing alveolar edema. No free air is seen beneath the hemidiaphragms. 1.. Right IJ deep line placed with no complications.  2. Worsening pulmonary venous hypertension with developing pulmonary edema. Signed by Dr Keli Hanley on 9/1/2019 1:28 PM    Xr Chest Portable    Result Date: 8/31/2019  XR CHEST PORTABLE 8/31/2019 8:47 PM History: Chest pain and hemoptysis. Portable chest x-ray compared with 5/8/2019. Magnified heart size. Cardiomegaly is present and not significantly changed. Normal mediastinum. The lungs show interstitial prominence. There is no focal infiltrate, pneumothorax, or definite pleural fluid. 1. Very similar appearance of the chest with no focal acute infiltrate. 2. Cardiomegaly again seen. Signed by Dr Gigi Bejarano on 8/31/2019 8:49 PM    Vl Dup Lower Extremity Venous Bilateral    Result Date: 9/2/2019  Vascular Lower Extremities DVT Study Procedure  Demographics   Patient Name     Padmaja David  Age                    35   Patient Number   [id number]      Gender                 Male   Visit Number     [id number]   Interpreting Physician Meghan Lester MD   Date of Birth    1985  Referring Physician    [address] [facility]   Accession Number [id number]   Sonographer            Glenn Abbasi RT, RVT  Procedure Type of Study:   Veins:Lower Extremities DVT Study, VL LOWER EXTREMITY BILATERAL VENOUS  DUPLEX . Indications for Study:Pulmonary embolism. Allergies   - No known allergies. Impression   Normal venous duplex study of the bilateral lower extremity(ies). There is  no evidence of deep or superficial venous thrombosis. Signature   ----------------------------------------------------------------  Electronically signed by Meghan Lester MD(Interpreting  physician) on 09/02/2019 06:12 AM  ----------------------------------------------------------------  Velocities are measured in cm/s ; Diameters are measured in mm Right Lower Extremities DVT Study Measurements Right 2D Measurements +------------------------------------+----------+---------------+----------+ ! Location 25 cm/s respectively within the proximal, mid, distal and arcuate left renal artery resulting in a maximum velocity ratio 1.15 at the origin of the left renal artery. 1.. Both kidneys are normal in sonographic appearance with no mass or hydronephrosis. 2. No significant elevation of peak systolic velocity or velocity ratio to suggest a hemodynamically significant renal artery stenosis. Signed by Dr Sydni Castro on 9/1/2019 12:27 PM       Assessment     -Acute kidney injury -resolved  -Hyperkalemia- improved  -Hemoptysis with right lower lobe pulmonary emboli  -Acute on chronic systolic congestive heart failure with EF 15%  -Polysubstance abuse  -Metabolic acidosis-resolved  -Hyponatremia-better      Plan:  Reinforced fluids restrictions and continue diuretics. Low K diet and will add salt tabs follow up labs.
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
## 2019-09-06 LAB
ALBUMIN SERPL-MCNC: 3.2 G/DL (ref 3.5–5.2)
ALP BLD-CCNC: 160 U/L (ref 40–130)
ALT SERPL-CCNC: 46 U/L (ref 5–41)
ANION GAP SERPL CALCULATED.3IONS-SCNC: 20 MMOL/L (ref 7–19)
ANISOCYTOSIS: ABNORMAL
AST SERPL-CCNC: 81 U/L (ref 5–40)
BASOPHILS ABSOLUTE: 0 K/UL (ref 0–0.2)
BASOPHILS RELATIVE PERCENT: 0 % (ref 0–1)
BILIRUB SERPL-MCNC: 1.6 MG/DL (ref 0.2–1.2)
BLOOD CULTURE, ROUTINE: NORMAL
BLOOD CULTURE, ROUTINE: NORMAL
BUN BLDV-MCNC: 43 MG/DL (ref 6–20)
CALCIUM SERPL-MCNC: 9.4 MG/DL (ref 8.6–10)
CHLORIDE BLD-SCNC: 85 MMOL/L (ref 98–111)
CO2: 23 MMOL/L (ref 22–29)
CREAT SERPL-MCNC: 0.9 MG/DL (ref 0.5–1.2)
CULTURE, BLOOD 2: NORMAL
CULTURE, BLOOD 2: NORMAL
EOSINOPHILS ABSOLUTE: 0.13 K/UL (ref 0–0.6)
EOSINOPHILS RELATIVE PERCENT: 1 % (ref 0–5)
GFR NON-AFRICAN AMERICAN: >60
GLUCOSE BLD-MCNC: 102 MG/DL (ref 74–109)
HCT VFR BLD CALC: 41 % (ref 42–52)
HEMOGLOBIN: 13 G/DL (ref 14–18)
IMMATURE GRANULOCYTES #: 0.1 K/UL
LYMPHOCYTES ABSOLUTE: 3.5 K/UL (ref 1.1–4.5)
LYMPHOCYTES RELATIVE PERCENT: 28 % (ref 20–40)
MAGNESIUM: 2.4 MG/DL (ref 1.6–2.6)
MCH RBC QN AUTO: 28.5 PG (ref 27–31)
MCHC RBC AUTO-ENTMCNC: 31.7 G/DL (ref 33–37)
MCV RBC AUTO: 89.9 FL (ref 80–94)
MONOCYTES ABSOLUTE: 1.9 K/UL (ref 0–0.9)
MONOCYTES RELATIVE PERCENT: 15 % (ref 0–10)
NEUTROPHILS ABSOLUTE: 7.1 K/UL (ref 1.5–7.5)
NEUTROPHILS RELATIVE PERCENT: 56 % (ref 50–65)
PDW BLD-RTO: 20.9 % (ref 11.5–14.5)
PLATELET # BLD: 334 K/UL (ref 130–400)
PLATELET SLIDE REVIEW: ADEQUATE
PMV BLD AUTO: 10 FL (ref 9.4–12.4)
POLYCHROMASIA: ABNORMAL
POTASSIUM SERPL-SCNC: 4.5 MMOL/L (ref 3.5–5)
RBC # BLD: 4.56 M/UL (ref 4.7–6.1)
SODIUM BLD-SCNC: 128 MMOL/L (ref 136–145)
TOTAL PROTEIN: 7.4 G/DL (ref 6.6–8.7)
WBC # BLD: 12.6 K/UL (ref 4.8–10.8)

## 2019-09-06 PROCEDURE — 36415 COLL VENOUS BLD VENIPUNCTURE: CPT

## 2019-09-06 PROCEDURE — 80053 COMPREHEN METABOLIC PANEL: CPT

## 2019-09-06 PROCEDURE — 6370000000 HC RX 637 (ALT 250 FOR IP): Performed by: INTERNAL MEDICINE

## 2019-09-06 PROCEDURE — 6360000002 HC RX W HCPCS: Performed by: HOSPITALIST

## 2019-09-06 PROCEDURE — 83735 ASSAY OF MAGNESIUM: CPT

## 2019-09-06 PROCEDURE — 2700000000 HC OXYGEN THERAPY PER DAY

## 2019-09-06 PROCEDURE — 94660 CPAP INITIATION&MGMT: CPT

## 2019-09-06 PROCEDURE — 6370000000 HC RX 637 (ALT 250 FOR IP): Performed by: FAMILY MEDICINE

## 2019-09-06 PROCEDURE — 2580000003 HC RX 258: Performed by: INTERNAL MEDICINE

## 2019-09-06 PROCEDURE — 1210000000 HC MED SURG R&B

## 2019-09-06 PROCEDURE — 85025 COMPLETE CBC W/AUTO DIFF WBC: CPT

## 2019-09-06 RX ORDER — MORPHINE SULFATE 4 MG/ML
2 INJECTION, SOLUTION INTRAMUSCULAR; INTRAVENOUS EVERY 4 HOURS PRN
Status: DISCONTINUED | OUTPATIENT
Start: 2019-09-06 | End: 2019-09-07 | Stop reason: HOSPADM

## 2019-09-06 RX ADMIN — Medication 1 G: at 17:33

## 2019-09-06 RX ADMIN — RIVAROXABAN 15 MG: 15 TABLET, FILM COATED ORAL at 21:05

## 2019-09-06 RX ADMIN — METOPROLOL SUCCINATE 50 MG: 50 TABLET, EXTENDED RELEASE ORAL at 21:05

## 2019-09-06 RX ADMIN — Medication 1 G: at 12:08

## 2019-09-06 RX ADMIN — MORPHINE SULFATE 2 MG: 4 INJECTION INTRAVENOUS at 21:10

## 2019-09-06 RX ADMIN — Medication 5 MG: at 21:05

## 2019-09-06 RX ADMIN — FUROSEMIDE 40 MG: 40 TABLET ORAL at 08:34

## 2019-09-06 RX ADMIN — RIVAROXABAN 15 MG: 15 TABLET, FILM COATED ORAL at 08:34

## 2019-09-06 RX ADMIN — Medication 1 G: at 08:34

## 2019-09-06 RX ADMIN — Medication 10 ML: at 21:05

## 2019-09-06 RX ADMIN — METOPROLOL SUCCINATE 50 MG: 50 TABLET, EXTENDED RELEASE ORAL at 08:34

## 2019-09-06 RX ADMIN — OXYCODONE HYDROCHLORIDE 5 MG: 5 TABLET ORAL at 08:34

## 2019-09-06 RX ADMIN — Medication 10 ML: at 08:35

## 2019-09-06 RX ADMIN — ASPIRIN 81 MG: 81 TABLET, COATED ORAL at 08:34

## 2019-09-06 ASSESSMENT — PAIN - FUNCTIONAL ASSESSMENT: PAIN_FUNCTIONAL_ASSESSMENT: PREVENTS OR INTERFERES SOME ACTIVE ACTIVITIES AND ADLS

## 2019-09-06 ASSESSMENT — ENCOUNTER SYMPTOMS
COUGH: 1
BACK PAIN: 0
NAUSEA: 0
DIARRHEA: 0
TROUBLE SWALLOWING: 0
SORE THROAT: 0
VOMITING: 0
SHORTNESS OF BREATH: 0
CONSTIPATION: 0

## 2019-09-06 ASSESSMENT — PAIN DESCRIPTION - LOCATION: LOCATION: BACK;RIB CAGE

## 2019-09-06 ASSESSMENT — PAIN SCALES - GENERAL
PAINLEVEL_OUTOF10: 0
PAINLEVEL_OUTOF10: 3
PAINLEVEL_OUTOF10: 5
PAINLEVEL_OUTOF10: 5
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
## 2019-09-06 NOTE — PROGRESS NOTES
hours. Sed Rate:  No results for input(s): SEDRATE in the last 72 hours. Cultures:   No results for input(s): CULTURE in the last 72 hours. Radiology reports as per the Radiologist  Radiology: Ct Abdomen Pelvis W Iv Contrast Additional Contrast? None    Result Date: 8/31/2019  CT ABDOMEN PELVIS W IV CONTRAST 8/31/2019 9:29 PM History: Right flank pain and right upper quadrant pain. In order to have a CT radiation dose as low as reasonably achievable Automated Exposure Control was utilized for adjustment of the mA and/or KV according to patient size. DLP in mGycm= 1168. Abdomen/pelvis CT with IV contrast injection. Fatty liver. Nonspecific gallbladder wall thickening given the presence of mesenteric edema and a small amount of peritoneal fluid. No significant gallbladder distention. No visible gallstones. No biliary dilation. Normal pancreas and spleen. Normal adrenal glands and kidneys. Moderate fecal material within the colon. No bowel obstruction. No appendicitis. 1. Small amount of peritoneal fluid. 2. Gallbladder wall thickening is a nonspecific finding given that there is mesenteric edema and a small amount of peritoneal fluid. 3. No gallstones or bile duct dilation. 4. The CT reports on this patient were called to Dr. Dain Jennings in the emergency room at 9:30 PM.  Signed by Dr Lisa Muro on 8/31/2019 9:35 PM    Xr Chest Portable    Result Date: 9/1/2019  EXAMINATION: Chest one view 9/1/2019 HISTORY: Central line placement FINDINGS: Upright frontal projection of the chest reveals a deep line has been placed via right internal jugular approach with the tip in the SVC and no complications. There is again evidence of cardiomegaly and vascular redistribution with interstitial pulmonary edema. There is developing right basilar airspace disease for which I would favor developing alveolar edema. No free air is seen beneath the hemidiaphragms. 1.. Right IJ deep line placed with no complications.  2.

## 2019-09-07 VITALS
DIASTOLIC BLOOD PRESSURE: 73 MMHG | OXYGEN SATURATION: 98 % | BODY MASS INDEX: 31.5 KG/M2 | WEIGHT: 200.7 LBS | TEMPERATURE: 97.1 F | SYSTOLIC BLOOD PRESSURE: 108 MMHG | HEIGHT: 67 IN | HEART RATE: 89 BPM | RESPIRATION RATE: 20 BRPM

## 2019-09-07 LAB
ALBUMIN SERPL-MCNC: 3.3 G/DL (ref 3.5–5.2)
ALP BLD-CCNC: 184 U/L (ref 40–130)
ALT SERPL-CCNC: 181 U/L (ref 5–41)
ANION GAP SERPL CALCULATED.3IONS-SCNC: 21 MMOL/L (ref 7–19)
AST SERPL-CCNC: 348 U/L (ref 5–40)
BASOPHILS ABSOLUTE: 0 K/UL (ref 0–0.2)
BASOPHILS RELATIVE PERCENT: 0.3 % (ref 0–1)
BILIRUB SERPL-MCNC: 2.6 MG/DL (ref 0.2–1.2)
BUN BLDV-MCNC: 60 MG/DL (ref 6–20)
CALCIUM SERPL-MCNC: 9.2 MG/DL (ref 8.6–10)
CHLORIDE BLD-SCNC: 85 MMOL/L (ref 98–111)
CO2: 23 MMOL/L (ref 22–29)
CREAT SERPL-MCNC: 1.4 MG/DL (ref 0.5–1.2)
EOSINOPHILS ABSOLUTE: 0 K/UL (ref 0–0.6)
EOSINOPHILS RELATIVE PERCENT: 0.1 % (ref 0–5)
GFR NON-AFRICAN AMERICAN: 58
GLUCOSE BLD-MCNC: 96 MG/DL (ref 74–109)
HCT VFR BLD CALC: 37.4 % (ref 42–52)
HEMOGLOBIN: 12.3 G/DL (ref 14–18)
IMMATURE GRANULOCYTES #: 0.3 K/UL
LYMPHOCYTES ABSOLUTE: 1.3 K/UL (ref 1.1–4.5)
LYMPHOCYTES RELATIVE PERCENT: 8.3 % (ref 20–40)
MAGNESIUM: 2 MG/DL (ref 1.6–2.6)
MCH RBC QN AUTO: 28.9 PG (ref 27–31)
MCHC RBC AUTO-ENTMCNC: 32.9 G/DL (ref 33–37)
MCV RBC AUTO: 87.8 FL (ref 80–94)
MONOCYTES ABSOLUTE: 1.8 K/UL (ref 0–0.9)
MONOCYTES RELATIVE PERCENT: 11.7 % (ref 0–10)
NEUTROPHILS ABSOLUTE: 12.1 K/UL (ref 1.5–7.5)
NEUTROPHILS RELATIVE PERCENT: 77.7 % (ref 50–65)
PDW BLD-RTO: 20.9 % (ref 11.5–14.5)
PLATELET # BLD: 370 K/UL (ref 130–400)
PMV BLD AUTO: 10.4 FL (ref 9.4–12.4)
POTASSIUM SERPL-SCNC: 4.8 MMOL/L (ref 3.5–5)
RBC # BLD: 4.26 M/UL (ref 4.7–6.1)
SODIUM BLD-SCNC: 129 MMOL/L (ref 136–145)
TOTAL PROTEIN: 7.4 G/DL (ref 6.6–8.7)
WBC # BLD: 15.6 K/UL (ref 4.8–10.8)

## 2019-09-07 PROCEDURE — 80053 COMPREHEN METABOLIC PANEL: CPT

## 2019-09-07 PROCEDURE — 85025 COMPLETE CBC W/AUTO DIFF WBC: CPT

## 2019-09-07 PROCEDURE — 6370000000 HC RX 637 (ALT 250 FOR IP): Performed by: INTERNAL MEDICINE

## 2019-09-07 PROCEDURE — 2580000003 HC RX 258: Performed by: INTERNAL MEDICINE

## 2019-09-07 PROCEDURE — 83735 ASSAY OF MAGNESIUM: CPT

## 2019-09-07 PROCEDURE — 36415 COLL VENOUS BLD VENIPUNCTURE: CPT

## 2019-09-07 RX ORDER — SODIUM CHLORIDE 1000 MG
1 TABLET, SOLUBLE MISCELLANEOUS
Qty: 21 TABLET | Refills: 0 | Status: SHIPPED | OUTPATIENT
Start: 2019-09-07 | End: 2019-10-04

## 2019-09-07 RX ORDER — FUROSEMIDE 20 MG/1
20 TABLET ORAL DAILY
Status: DISCONTINUED | OUTPATIENT
Start: 2019-09-08 | End: 2019-09-07 | Stop reason: HOSPADM

## 2019-09-07 RX ADMIN — METOPROLOL SUCCINATE 50 MG: 50 TABLET, EXTENDED RELEASE ORAL at 08:58

## 2019-09-07 RX ADMIN — Medication 10 ML: at 08:57

## 2019-09-07 RX ADMIN — ASPIRIN 81 MG: 81 TABLET, COATED ORAL at 08:58

## 2019-09-07 RX ADMIN — FUROSEMIDE 40 MG: 40 TABLET ORAL at 08:57

## 2019-09-07 RX ADMIN — OXYCODONE HYDROCHLORIDE 5 MG: 5 TABLET ORAL at 03:02

## 2019-09-07 RX ADMIN — RIVAROXABAN 15 MG: 15 TABLET, FILM COATED ORAL at 08:58

## 2019-09-07 RX ADMIN — Medication 1 G: at 08:58

## 2019-09-07 ASSESSMENT — PAIN DESCRIPTION - ORIENTATION: ORIENTATION: RIGHT

## 2019-09-07 ASSESSMENT — PAIN SCALES - GENERAL
PAINLEVEL_OUTOF10: 4
PAINLEVEL_OUTOF10: 3

## 2019-09-07 ASSESSMENT — PAIN DESCRIPTION - ONSET: ONSET: ON-GOING

## 2019-09-07 ASSESSMENT — PAIN DESCRIPTION - PAIN TYPE: TYPE: ACUTE PAIN

## 2019-09-07 ASSESSMENT — PAIN DESCRIPTION - PROGRESSION: CLINICAL_PROGRESSION: NOT CHANGED

## 2019-09-07 ASSESSMENT — PAIN DESCRIPTION - FREQUENCY: FREQUENCY: INTERMITTENT

## 2019-09-07 ASSESSMENT — PAIN DESCRIPTION - LOCATION: LOCATION: BACK;RIB CAGE

## 2019-09-07 ASSESSMENT — PAIN - FUNCTIONAL ASSESSMENT: PAIN_FUNCTIONAL_ASSESSMENT: PREVENTS OR INTERFERES SOME ACTIVE ACTIVITIES AND ADLS

## 2019-09-07 ASSESSMENT — PAIN DESCRIPTION - DESCRIPTORS: DESCRIPTORS: STABBING

## 2019-09-07 NOTE — DISCHARGE SUMMARY
Discharge Summary    NAME: Bertha Choe  :  1985  MRN:  [id number]    Admit date:  2019  Discharge date:      Admitting Physician:  Elsie Oneill MD    Advance Directive: Full Code    Consults: cardiology, Nephrology, and pulmonary/intensive care    Primary Care Physician:  No primary care provider on file. Discharge Diagnoses:  Principal Problem:    Pulmonary embolus, right (HCC)  Active Problems:    Chronic systolic heart failure (HCC)    Hemoptysis    Nonischemic dilated cardiomyopathy (HCC)    Pulmonary infarct (HCC)  Resolved Problems:    * No resolved hospital problems. *    Pneumonia was ruled out, evidence of Rt sided pulmonary embolism     Significant Diagnostic Studies:   Ct Abdomen Pelvis W Iv Contrast Additional Contrast? None    Result Date: 2019  CT ABDOMEN PELVIS W IV CONTRAST 2019 9:29 PM History: Right flank pain and right upper quadrant pain. In order to have a CT radiation dose as low as reasonably achievable Automated Exposure Control was utilized for adjustment of the mA and/or KV according to patient size. DLP in mGycm= 1168. Abdomen/pelvis CT with IV contrast injection. Fatty liver. Nonspecific gallbladder wall thickening given the presence of mesenteric edema and a small amount of peritoneal fluid. No significant gallbladder distention. No visible gallstones. No biliary dilation. Normal pancreas and spleen. Normal adrenal glands and kidneys. Moderate fecal material within the colon. No bowel obstruction. No appendicitis. 1. Small amount of peritoneal fluid. 2. Gallbladder wall thickening is a nonspecific finding given that there is mesenteric edema and a small amount of peritoneal fluid. 3. No gallstones or bile duct dilation.  4. The CT reports on this patient were called to Dr. Gris Vega in the emergency room at 9:30 PM.  Signed by Dr Faustino Moore on 2019 9:35 PM    Xr Chest Portable    Result Date: 2019  EXAMINATION: Chest one view 2019 HISTORY: Central line placement FINDINGS: Upright frontal projection of the chest reveals a deep line has been placed via right internal jugular approach with the tip in the SVC and no complications. There is again evidence of cardiomegaly and vascular redistribution with interstitial pulmonary edema. There is developing right basilar airspace disease for which I would favor developing alveolar edema. No free air is seen beneath the hemidiaphragms. 1.. Right IJ deep line placed with no complications. 2. Worsening pulmonary venous hypertension with developing pulmonary edema. Signed by Dr Lizet Tuttle on 9/1/2019 1:28 PM    Xr Chest Portable    Result Date: 8/31/2019  XR CHEST PORTABLE 8/31/2019 8:47 PM History: Chest pain and hemoptysis. Portable chest x-ray compared with 5/8/2019. Magnified heart size. Cardiomegaly is present and not significantly changed. Normal mediastinum. The lungs show interstitial prominence. There is no focal infiltrate, pneumothorax, or definite pleural fluid. 1. Very similar appearance of the chest with no focal acute infiltrate. 2. Cardiomegaly again seen. Signed by Dr Giuliano Bear on 8/31/2019 8:49 PM    Vl Dup Lower Extremity Venous Bilateral    Result Date: 9/2/2019  Vascular Lower Extremities DVT Study Procedure  Demographics   Patient Name     Dae Colón  Age                    35   Patient Number   [id number]      Gender                 Male   Visit Number     [id number]   Interpreting Physician Tu Louis MD   Date of Birth    1985  Referring Physician    [address]Miriam Weinberg [facility]   Accession Number [id number]   Sonographer            Robert Sumner RT, RVT  Procedure Type of Study:   Veins:Lower Extremities DVT Study, VL LOWER EXTREMITY BILATERAL VENOUS  DUPLEX . Indications for Study:Pulmonary embolism. Allergies   - No known allergies.   Impression   Normal venous duplex study of the bilateral lower respirations. Patient was worked up in the emergency department and CT scan of the chest with PE protocol was positive for pulmonary embolism of the right lower lobe. Patient's lactic acid was 1.7 elevation of BNP 5985 and patient's urine drug screen was positive for opioids. 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 Patient was started on Lovenox in the emergency department. And was transitioned to high-dose IV heparin. Vascular surgery was consulted as patient was thought to be a candidate for thrombolysis. During patient's stay troponins were trended to evaluate for right ventricular infarct or heart strain. Echocardiogram was reported with an ejection fraction estimated at 29% moderately enlarged right atrium moderately enlarged right ventricle. Moderate tricuspid regurgitation estimated RVSP of 70 mmHg. Patient was treated for sepsis criteria Ralph which he met on admission with a white blood cell count of 13,900 which did increase to 19,300 CT scan did show possible right lower lobe pneumonia patient was covered with ceftriaxone and Levaquin antibiotics for community-acquired pneumonia. Blood Cultures during the admission which trended and revealed no growth. Patient's respiratory cultures revealed normal respiratory cultures few WBCs. Completion duplex lower extremity venous ultrasounds were completed which revealed no clot. Nephrology was consulted due to acute kidney injury electrolyte abnormalities and hyponatremia. Renal ultrasound revealed that both kidneys were normal.  She was placed on a fluid restriction diet. Cardiology was consulted for continued management of the skin ischemic cardiomyopathy due to IV drug use. Pt admittedly has not made it to congestive heart failure clinic appointments. Patient was transitioned to Xarelto 15 mg twice daily.   Per pulmonology patient is to remain anticoagulated for 3 months. Patient's sodium level has continued to rise appropriately and patient has been taking salt tabs along with the sodium restricted diet. On date of discharge patient is stable sodium continues to raise up rise appropriately patient will be discharged with salt tabs. Patient will also be provided a prescription for 2 months of Xarelto and strongly suggested and encouraged to follow-up with pulmonology in 2 months. BMP order placed for 1 week follow up of Sodium level. Also strongly suggested to follow-up with primary care provider. Recent stable vital signs stable on date of discharge. Physical Exam:  Vital Signs: /73   Pulse 89   Temp 97.1 °F (36.2 °C) (Temporal)   Resp 20   Ht 5' 7\" (1.702 m)   Wt 200 lb 11.2 oz (91 kg)   SpO2 98%   BMI 31.43 kg/m²   General appearance:. Alert and Cooperative, fatigue (Pt states improving)  HEENT: Normocephalic. Chest: clear to auscultation bilaterally without wheezes or rhonchi. Cardiac: Normal heart tones with regular rate and rhythm, S1, S2 normal. No murmurs, gallops, or rubs auscultated. Abdomen:soft, non-tender; normal bowel sounds, no masses, no organomegaly. Extremities: No clubbing or cyanosis. No peripheral edema. Peripheral pulses palpable. Neurologic: Grossly intact.     Discharge Medications:       Bipin, [address] Medication Instructions FXZ:[id number]    Printed on:09/07/19 5010   Medication Information                      albuterol sulfate HFA (PROVENTIL HFA) 108 (90 Base) MCG/ACT inhaler  Inhale 2 puffs into the lungs every 6 hours as needed for Wheezing             aspirin 81 MG EC tablet  Take 1 tablet by mouth daily             furosemide (LASIX) 40 MG tablet  Take 1 tablet by mouth 2 times daily             lisinopril (PRINIVIL;ZESTRIL) 10 MG tablet  Take 1 tablet by mouth daily             metoprolol succinate (TOPROL XL) 25 MG extended release tablet  Take 1 tablet by mouth every

## 2019-09-07 NOTE — PROGRESS NOTES
(91 kg), SpO2 98 %. Intake/Output Summary (Last 24 hours) at 9/7/2019 0906  Last data filed at 9/7/2019 0711  Gross per 24 hour   Intake 120 ml   Output 600 ml   Net -480 ml     General: alert and oriented x3   Chest:  Bilateral air entry, decreased breath sounds at the bases  CVS:+/- regular rate and rhythm  Abdominal: soft, nontender, normal bowel sounds  Extremities: no cyanosis but trace leg edema  Skin: warm and dry without rash    Labs:  BMP:   Recent Labs     09/05/19 0514 09/06/19 0447 09/07/19 0741   * 128* 129*   K 5.1* 4.5 4.8   CL 86* 85* 85*   CO2 26 23 23   BUN 39* 43* 60*   CREATININE 1.0 0.9 1.4*   CALCIUM 9.5 9.4 9.2     CBC:   Recent Labs     09/05/19 0514 09/06/19 0447 09/07/19 0741   WBC 11.9* 12.6* 15.6*   HGB 12.9* 13.0* 12.3*   HCT 40.0* 41.0* 37.4*   MCV 88.7 89.9 87.8    334 370     LIVER PROFILE:   Recent Labs     09/05/19 0514 09/06/19 0447 09/07/19 0741   AST 38 81* 348*   ALT 20 46* 181*   BILITOT 1.4* 1.6* 2.6*   ALKPHOS 163* 160* 184*     PT/INR:   No results for input(s): PROTIME, INR in the last 72 hours. APTT:   No results for input(s): APTT in the last 72 hours. BNP:  No results for input(s): BNP in the last 72 hours. Ionized Calcium:No results for input(s): IONCA in the last 72 hours. Magnesium:  Recent Labs     09/05/19 0514 09/06/19 0447 09/07/19 0741   MG 2.2 2.4 2.0     Phosphorus:No results for input(s): PHOS in the last 72 hours. HgbA1C: No results for input(s): LABA1C in the last 72 hours. Hepatic:   Recent Labs     09/05/19 0514 09/06/19 0447 09/07/19 0741   ALKPHOS 163* 160* 184*   ALT 20 46* 181*   AST 38 81* 348*   PROT 7.6 7.4 7.4   BILITOT 1.4* 1.6* 2.6*   LABALBU 3.2* 3.2* 3.3*     Lactic Acid:   No results for input(s): LACTA in the last 72 hours. Troponin:   No results for input(s): CKTOTAL, CKMB, TROPONINT in the last 72 hours. ABGs: No results for input(s): PH, PCO2, PO2, HCO3, O2SAT in the last 72 hours.   CRP:  No results +------------------------------------+----------+---------------+----------+ ! Location                            ! Visualized! Compressibility! Thrombosis! +------------------------------------+----------+---------------+----------+ ! Sapheno Femoral Junction            ! ! Yes            !          ! +------------------------------------+----------+---------------+----------+ ! Common Femoral                      !          !Yes            !          ! +------------------------------------+----------+---------------+----------+ ! Prox Femoral                        !          !Yes            !          ! +------------------------------------+----------+---------------+----------+ ! Mid Femoral                         !          !Yes            !          ! +------------------------------------+----------+---------------+----------+ ! Dist Femoral                        !          !Yes            !          ! +------------------------------------+----------+---------------+----------+ ! Popliteal                           !          !Yes            !          ! +------------------------------------+----------+---------------+----------+ ! SSV                                 ! !Yes            !          ! +------------------------------------+----------+---------------+----------+ ! PTV                                 ! !Yes            !          ! +------------------------------------+----------+---------------+----------+ ! GSV                                 ! !Yes            !          ! +------------------------------------+----------+---------------+----------+ ! ATV                                 ! !Yes            !          ! +------------------------------------+----------+---------------+----------+ ! Peroneal                            !          !Yes            !          ! +------------------------------------+----------+---------------+----------+ Left Lower Extremities

## 2019-09-12 ENCOUNTER — APPOINTMENT (OUTPATIENT)
Dept: GENERAL RADIOLOGY | Age: 34
End: 2019-09-12
Payer: COMMERCIAL

## 2019-09-12 ENCOUNTER — APPOINTMENT (OUTPATIENT)
Dept: CT IMAGING | Age: 34
End: 2019-09-12
Payer: COMMERCIAL

## 2019-09-12 ENCOUNTER — HOSPITAL ENCOUNTER (EMERGENCY)
Age: 34
Discharge: HOME | End: 2019-09-13
Attending: EMERGENCY MEDICINE
Payer: COMMERCIAL

## 2019-09-12 DIAGNOSIS — I51.7 CARDIOMEGALY: ICD-10-CM

## 2019-09-12 DIAGNOSIS — R00.0 TACHYCARDIA: Primary | ICD-10-CM

## 2019-09-12 DIAGNOSIS — R04.2 HEMOPTYSIS: ICD-10-CM

## 2019-09-12 DIAGNOSIS — I50.9 CONGESTIVE HEART FAILURE, UNSPECIFIED HF CHRONICITY, UNSPECIFIED HEART FAILURE TYPE (HCC): ICD-10-CM

## 2019-09-12 DIAGNOSIS — E87.1 HYPONATREMIA: ICD-10-CM

## 2019-09-12 LAB
ALBUMIN SERPL-MCNC: 3 G/DL (ref 3.5–5.2)
ALP BLD-CCNC: 213 U/L (ref 40–130)
ALT SERPL-CCNC: 143 U/L (ref 5–41)
AMPHETAMINE SCREEN, URINE: NEGATIVE
ANION GAP SERPL CALCULATED.3IONS-SCNC: 10 MMOL/L (ref 7–19)
APTT: 32.6 SEC (ref 26–36.2)
AST SERPL-CCNC: 99 U/L (ref 5–40)
BARBITURATE SCREEN URINE: NEGATIVE
BASOPHILS ABSOLUTE: 0.1 K/UL (ref 0–0.2)
BASOPHILS RELATIVE PERCENT: 0.7 % (ref 0–1)
BENZODIAZEPINE SCREEN, URINE: NEGATIVE
BILIRUB SERPL-MCNC: 0.9 MG/DL (ref 0.2–1.2)
BILIRUBIN URINE: NEGATIVE
BLOOD, URINE: NEGATIVE
BUN BLDV-MCNC: 13 MG/DL (ref 6–20)
CALCIUM SERPL-MCNC: 8.6 MG/DL (ref 8.6–10)
CANNABINOID SCREEN URINE: NEGATIVE
CHLORIDE BLD-SCNC: 97 MMOL/L (ref 98–111)
CLARITY: CLEAR
CO2: 22 MMOL/L (ref 22–29)
COCAINE METABOLITE SCREEN URINE: NEGATIVE
COLOR: YELLOW
CREAT SERPL-MCNC: 0.8 MG/DL (ref 0.5–1.2)
EOSINOPHILS ABSOLUTE: 0.3 K/UL (ref 0–0.6)
EOSINOPHILS RELATIVE PERCENT: 1.9 % (ref 0–5)
GFR NON-AFRICAN AMERICAN: >60
GLUCOSE BLD-MCNC: 113 MG/DL (ref 74–109)
GLUCOSE URINE: NEGATIVE MG/DL
HCT VFR BLD CALC: 39.8 % (ref 42–52)
HEMOGLOBIN: 12.7 G/DL (ref 14–18)
IMMATURE GRANULOCYTES #: 0.3 K/UL
INR BLD: 1.62 (ref 0.88–1.18)
KETONES, URINE: NEGATIVE MG/DL
LEUKOCYTE ESTERASE, URINE: NEGATIVE
LYMPHOCYTES ABSOLUTE: 1.2 K/UL (ref 1.1–4.5)
LYMPHOCYTES RELATIVE PERCENT: 9.1 % (ref 20–40)
Lab: NORMAL
MCH RBC QN AUTO: 29 PG (ref 27–31)
MCHC RBC AUTO-ENTMCNC: 31.9 G/DL (ref 33–37)
MCV RBC AUTO: 90.9 FL (ref 80–94)
MONOCYTES ABSOLUTE: 1 K/UL (ref 0–0.9)
MONOCYTES RELATIVE PERCENT: 7.3 % (ref 0–10)
NEUTROPHILS ABSOLUTE: 10.5 K/UL (ref 1.5–7.5)
NEUTROPHILS RELATIVE PERCENT: 78.5 % (ref 50–65)
NITRITE, URINE: NEGATIVE
OPIATE SCREEN URINE: NEGATIVE
PDW BLD-RTO: 21.6 % (ref 11.5–14.5)
PH UA: 7 (ref 5–8)
PLATELET # BLD: 331 K/UL (ref 130–400)
PMV BLD AUTO: 9.2 FL (ref 9.4–12.4)
POTASSIUM SERPL-SCNC: 4.3 MMOL/L (ref 3.5–5)
PRO-BNP: 4702 PG/ML (ref 0–450)
PROTEIN UA: NEGATIVE MG/DL
PROTHROMBIN TIME: 18.5 SEC (ref 12–14.6)
RBC # BLD: 4.38 M/UL (ref 4.7–6.1)
REASON FOR REJECTION: NORMAL
REJECTED TEST: NORMAL
SODIUM BLD-SCNC: 129 MMOL/L (ref 136–145)
SPECIFIC GRAVITY UA: 1 (ref 1–1.03)
TOTAL PROTEIN: 7.1 G/DL (ref 6.6–8.7)
URINE REFLEX TO CULTURE: NORMAL
UROBILINOGEN, URINE: 1 E.U./DL
WBC # BLD: 13.4 K/UL (ref 4.8–10.8)

## 2019-09-12 PROCEDURE — 71046 X-RAY EXAM CHEST 2 VIEWS: CPT

## 2019-09-12 PROCEDURE — 99285 EMERGENCY DEPT VISIT HI MDM: CPT

## 2019-09-12 PROCEDURE — 80053 COMPREHEN METABOLIC PANEL: CPT

## 2019-09-12 PROCEDURE — 93005 ELECTROCARDIOGRAM TRACING: CPT

## 2019-09-12 PROCEDURE — 80307 DRUG TEST PRSMV CHEM ANLYZR: CPT

## 2019-09-12 PROCEDURE — 85025 COMPLETE CBC W/AUTO DIFF WBC: CPT

## 2019-09-12 PROCEDURE — 81003 URINALYSIS AUTO W/O SCOPE: CPT

## 2019-09-12 PROCEDURE — 6360000004 HC RX CONTRAST MEDICATION: Performed by: EMERGENCY MEDICINE

## 2019-09-12 PROCEDURE — 83880 ASSAY OF NATRIURETIC PEPTIDE: CPT

## 2019-09-12 PROCEDURE — 85610 PROTHROMBIN TIME: CPT

## 2019-09-12 PROCEDURE — 85730 THROMBOPLASTIN TIME PARTIAL: CPT

## 2019-09-12 PROCEDURE — 2580000003 HC RX 258: Performed by: EMERGENCY MEDICINE

## 2019-09-12 PROCEDURE — 36415 COLL VENOUS BLD VENIPUNCTURE: CPT

## 2019-09-12 PROCEDURE — 71275 CT ANGIOGRAPHY CHEST: CPT

## 2019-09-12 RX ORDER — SODIUM CHLORIDE, SODIUM LACTATE, POTASSIUM CHLORIDE, AND CALCIUM CHLORIDE .6; .31; .03; .02 G/100ML; G/100ML; G/100ML; G/100ML
1000 INJECTION, SOLUTION INTRAVENOUS ONCE
Status: COMPLETED | OUTPATIENT
Start: 2019-09-12 | End: 2019-09-13

## 2019-09-12 RX ADMIN — SODIUM CHLORIDE, SODIUM LACTATE, POTASSIUM CHLORIDE, AND CALCIUM CHLORIDE 1000 ML: 600; 310; 30; 20 INJECTION, SOLUTION INTRAVENOUS at 22:45

## 2019-09-12 RX ADMIN — IOPAMIDOL 90 ML: 755 INJECTION, SOLUTION INTRAVENOUS at 23:36

## 2019-09-12 ASSESSMENT — ENCOUNTER SYMPTOMS
TROUBLE SWALLOWING: 0
WHEEZING: 0
NAUSEA: 0
COUGH: 1
DIARRHEA: 0
BACK PAIN: 0
CHEST TIGHTNESS: 0
SINUS PRESSURE: 0
SHORTNESS OF BREATH: 1
VOMITING: 0
COLOR CHANGE: 0
PHOTOPHOBIA: 0
EYE PAIN: 0
CONSTIPATION: 0
ABDOMINAL PAIN: 0

## 2019-09-13 VITALS
OXYGEN SATURATION: 96 % | TEMPERATURE: 98 F | RESPIRATION RATE: 20 BRPM | HEART RATE: 109 BPM | WEIGHT: 200 LBS | SYSTOLIC BLOOD PRESSURE: 123 MMHG | BODY MASS INDEX: 31.39 KG/M2 | DIASTOLIC BLOOD PRESSURE: 84 MMHG | HEIGHT: 67 IN

## 2019-09-13 NOTE — ED PROVIDER NOTES
MEDICALHISTORY     Past Medical History:   Diagnosis Date    CHF (congestive heart failure) ([facility] [address]     Hypertension     Palliative care patient 05/08/2019    Smoker          SURGICAL HISTORY       Past Surgical History:   Procedure Laterality Date    HAND SURGERY           CURRENT MEDICATIONS     Previous Medications    ALBUTEROL SULFATE HFA (PROVENTIL HFA) 108 (90 BASE) MCG/ACT INHALER    Inhale 2 puffs into the lungs every 6 hours as needed for Wheezing    ASPIRIN 81 MG EC TABLET    Take 1 tablet by mouth daily    FUROSEMIDE (LASIX) 40 MG TABLET    Take 1 tablet by mouth 2 times daily    LISINOPRIL (PRINIVIL;ZESTRIL) 10 MG TABLET    Take 1 tablet by mouth daily    METOPROLOL SUCCINATE (TOPROL XL) 25 MG EXTENDED RELEASE TABLET    Take 1 tablet by mouth every 12 hours    ONDANSETRON (ZOFRAN ODT) 4 MG DISINTEGRATING TABLET    Take 1 tablet by mouth every 8 hours as needed for Nausea or Vomiting    PANTOPRAZOLE (PROTONIX) 40 MG TABLET    Take 1 tablet by mouth daily    RIVAROXABAN (XARELTO) 15 MG TABS TABLET    Take 1 tablet by mouth 2 times daily (with meals)    SODIUM CHLORIDE 1 G TABLET    Take 1 tablet by mouth 3 times daily (with meals) for 7 days    SPIRONOLACTONE (ALDACTONE) 25 MG TABLET    Take 1 tablet by mouth daily    SUCRALFATE (CARAFATE) 1 GM TABLET    Take 1 tablet by mouth 4 times daily       ALLERGIES     Patient has no known allergies.     FAMILY HISTORY       Family History   Problem Relation Age of Onset   Hallie Horne Cancer Mother     No Known Problems Father     No Known Problems Brother           SOCIAL HISTORY       Social History     Socioeconomic History    Marital status: Single     Spouse name: Not on file    Number of children: Not on file    Years of education: Not on file    Highest education level: Not on file   Occupational History    Not on file   Social Needs    Financial resource strain: Not on file    Food insecurity:     Worry: Not on file     Inability: Not on file   Hallie Horne well as some mild atelectasis in the left base. I went on and repeated the CT of his chest.  The PE had not worsened and there were no new areas of concern. The CT did show an area of small necrotizing changes as well as some infarctions consolidation in the lung bases. I suspect that this is going to be the source of the hemoptysis. With him being on the anticoagulant this area is most likely a leaky. When he coughs, it irritates the area and he will have some blood production. He has not been coughing up clumps of tissue or clots. He has not had any significant hemoptysis while here in the ED and it tends to be more streaked. I do not believe that at this time he requires a bronchoscopy. His H&H is stable. His tachycardia improved. He is maintained room air oxygen saturation. I do not believe that the slight hemoptysis is life-threatening. I did have a long discussion with him about when to return to the ED if he starts coughing up a significant amount of blood. Patient understands and repeats understanding. I will refer him over to pulmonology to see if we can get him in sooner since he does not know when his next appointment is. At this time all his vital signs are stable. I will discharge him home. He was invited to return if there are any further issues or new complaints. In agreement with this discharge planning       Amount and/or Complexity of Data Reviewed  Decide to obtain previous medical records or to obtain history from someone other than the patient: yes    Patient Progress  Patient progress: stable      Reassessment      CONSULTS:  None    PROCEDURES:  Unless otherwise noted below, none     Procedures    FINAL IMPRESSION      1. Tachycardia    2. Hemoptysis    3. Cardiomegaly    4. Congestive heart failure, unspecified HF chronicity, unspecified heart failure type ([facility] [address]    5.  Hyponatremia          DISPOSITION/PLAN   DISPOSITION Decision To Discharge 09/13/2019 02:21:52

## 2019-09-14 ENCOUNTER — HOSPITAL ENCOUNTER (EMERGENCY)
Age: 34
Discharge: ANOTHER HOSPITAL | End: 2019-09-14
Attending: EMERGENCY MEDICINE
Payer: COMMERCIAL

## 2019-09-14 ENCOUNTER — HOSPITAL ENCOUNTER (INPATIENT)
Facility: HOSPITAL | Age: 34
LOS: 3 days | Discharge: HOME | End: 2019-09-17
Attending: FAMILY MEDICINE | Admitting: OTOLARYNGOLOGY

## 2019-09-14 VITALS
HEART RATE: 124 BPM | TEMPERATURE: 97.6 F | WEIGHT: 200 LBS | BODY MASS INDEX: 31.39 KG/M2 | SYSTOLIC BLOOD PRESSURE: 109 MMHG | OXYGEN SATURATION: 97 % | RESPIRATION RATE: 18 BRPM | HEIGHT: 67 IN | DIASTOLIC BLOOD PRESSURE: 75 MMHG

## 2019-09-14 DIAGNOSIS — R04.0 EPISTAXIS: Primary | ICD-10-CM

## 2019-09-14 DIAGNOSIS — Z79.01 ANTICOAGULATED: ICD-10-CM

## 2019-09-14 PROBLEM — Z86.711 HISTORY OF PULMONARY EMBOLISM: Status: ACTIVE | Noted: 2019-09-14

## 2019-09-14 PROBLEM — I50.22 CHRONIC SYSTOLIC CONGESTIVE HEART FAILURE (HCC): Status: ACTIVE | Noted: 2019-09-14

## 2019-09-14 LAB
ALBUMIN SERPL-MCNC: 3.6 G/DL (ref 3.5–5.2)
ALBUMIN/GLOB SERPL: 0.8 G/DL
ALP SERPL-CCNC: 203 U/L (ref 39–117)
ALT SERPL W P-5'-P-CCNC: 101 U/L (ref 1–41)
ANION GAP SERPL CALCULATED.3IONS-SCNC: 12 MMOL/L (ref 5–15)
APTT: 41.9 SEC (ref 26–36.2)
AST SERPL-CCNC: 46 U/L (ref 1–40)
BASOPHILS # BLD AUTO: 0.06 10*3/MM3 (ref 0–0.2)
BASOPHILS ABSOLUTE: 0.1 K/UL (ref 0–0.2)
BASOPHILS NFR BLD AUTO: 0.5 % (ref 0–1.5)
BASOPHILS RELATIVE PERCENT: 0.5 % (ref 0–1)
BILIRUB SERPL-MCNC: 0.9 MG/DL (ref 0.2–1.2)
BUN BLD-MCNC: 12 MG/DL (ref 6–20)
BUN/CREAT SERPL: 18.2 (ref 7–25)
CALCIUM SPEC-SCNC: 9 MG/DL (ref 8.6–10.5)
CHLORIDE SERPL-SCNC: 99 MMOL/L (ref 98–107)
CO2 SERPL-SCNC: 24 MMOL/L (ref 22–29)
CREAT BLD-MCNC: 0.66 MG/DL (ref 0.76–1.27)
DEPRECATED RDW RBC AUTO: 66.6 FL (ref 37–54)
EOSINOPHIL # BLD AUTO: 0.44 10*3/MM3 (ref 0–0.4)
EOSINOPHIL NFR BLD AUTO: 3.3 % (ref 0.3–6.2)
EOSINOPHILS ABSOLUTE: 0.4 K/UL (ref 0–0.6)
EOSINOPHILS RELATIVE PERCENT: 3.5 % (ref 0–5)
ERYTHROCYTE [DISTWIDTH] IN BLOOD BY AUTOMATED COUNT: 21.1 % (ref 12.3–15.4)
GFR SERPL CREATININE-BSD FRML MDRD: 138 ML/MIN/1.73
GLOBULIN UR ELPH-MCNC: 4.4 GM/DL
GLUCOSE BLD-MCNC: 112 MG/DL (ref 65–99)
HCT VFR BLD AUTO: 40.3 % (ref 37.5–51)
HCT VFR BLD CALC: 44.3 % (ref 42–52)
HEMOGLOBIN: 14.1 G/DL (ref 14–18)
HGB BLD-MCNC: 13.3 G/DL (ref 13–17.7)
IMM GRANULOCYTES # BLD AUTO: 0.17 10*3/MM3 (ref 0–0.05)
IMM GRANULOCYTES NFR BLD AUTO: 1.3 % (ref 0–0.5)
IMMATURE GRANULOCYTES #: 0.2 K/UL
INR BLD: 2.16 (ref 0.88–1.18)
LYMPHOCYTES # BLD AUTO: 1.63 10*3/MM3 (ref 0.7–3.1)
LYMPHOCYTES ABSOLUTE: 1.4 K/UL (ref 1.1–4.5)
LYMPHOCYTES NFR BLD AUTO: 12.4 % (ref 19.6–45.3)
LYMPHOCYTES RELATIVE PERCENT: 12.3 % (ref 20–40)
MCH RBC QN AUTO: 28.7 PG (ref 27–31)
MCH RBC QN AUTO: 28.9 PG (ref 26.6–33)
MCHC RBC AUTO-ENTMCNC: 31.8 G/DL (ref 33–37)
MCHC RBC AUTO-ENTMCNC: 33 G/DL (ref 31.5–35.7)
MCV RBC AUTO: 87.4 FL (ref 79–97)
MCV RBC AUTO: 90 FL (ref 80–94)
MONOCYTES # BLD AUTO: 1.24 10*3/MM3 (ref 0.1–0.9)
MONOCYTES ABSOLUTE: 1.3 K/UL (ref 0–0.9)
MONOCYTES NFR BLD AUTO: 9.4 % (ref 5–12)
MONOCYTES RELATIVE PERCENT: 11.3 % (ref 0–10)
NEUTROPHILS # BLD AUTO: 9.61 10*3/MM3 (ref 1.7–7)
NEUTROPHILS ABSOLUTE: 8.2 K/UL (ref 1.5–7.5)
NEUTROPHILS NFR BLD AUTO: 73.1 % (ref 42.7–76)
NEUTROPHILS RELATIVE PERCENT: 70.4 % (ref 50–65)
NRBC BLD AUTO-RTO: 0 /100 WBC (ref 0–0.2)
PDW BLD-RTO: 21.4 % (ref 11.5–14.5)
PLATELET # BLD AUTO: 384 10*3/MM3 (ref 140–450)
PLATELET # BLD: 394 K/UL (ref 130–400)
PMV BLD AUTO: 9 FL (ref 6–12)
PMV BLD AUTO: 9.9 FL (ref 9.4–12.4)
POTASSIUM BLD-SCNC: 4.7 MMOL/L (ref 3.5–5.2)
PROT SERPL-MCNC: 8 G/DL (ref 6–8.5)
PROTHROMBIN TIME: 23.3 SEC (ref 12–14.6)
RBC # BLD AUTO: 4.61 10*6/MM3 (ref 4.14–5.8)
RBC # BLD: 4.92 M/UL (ref 4.7–6.1)
SODIUM BLD-SCNC: 135 MMOL/L (ref 136–145)
WBC # BLD: 11.6 K/UL (ref 4.8–10.8)
WBC NRBC COR # BLD: 13.15 10*3/MM3 (ref 3.4–10.8)

## 2019-09-14 PROCEDURE — 85730 THROMBOPLASTIN TIME PARTIAL: CPT

## 2019-09-14 PROCEDURE — 30903 CONTROL OF NOSEBLEED: CPT | Performed by: PHYSICIAN ASSISTANT

## 2019-09-14 PROCEDURE — 80053 COMPREHEN METABOLIC PANEL: CPT | Performed by: FAMILY MEDICINE

## 2019-09-14 PROCEDURE — 2Y01X5Z CHANGE NASAL PACKING MATERIAL: ICD-10-PCS | Performed by: OTOLARYNGOLOGY

## 2019-09-14 PROCEDURE — 99253 IP/OBS CNSLTJ NEW/EST LOW 45: CPT | Performed by: PHYSICIAN ASSISTANT

## 2019-09-14 PROCEDURE — 85025 COMPLETE CBC W/AUTO DIFF WBC: CPT | Performed by: FAMILY MEDICINE

## 2019-09-14 PROCEDURE — 94799 UNLISTED PULMONARY SVC/PX: CPT

## 2019-09-14 PROCEDURE — 36415 COLL VENOUS BLD VENIPUNCTURE: CPT

## 2019-09-14 PROCEDURE — 94760 N-INVAS EAR/PLS OXIMETRY 1: CPT

## 2019-09-14 PROCEDURE — 99284 EMERGENCY DEPT VISIT MOD MDM: CPT

## 2019-09-14 PROCEDURE — 85025 COMPLETE CBC W/AUTO DIFF WBC: CPT

## 2019-09-14 PROCEDURE — 85610 PROTHROMBIN TIME: CPT

## 2019-09-14 RX ORDER — BISACODYL 5 MG/1
5 TABLET, DELAYED RELEASE ORAL DAILY PRN
Status: DISCONTINUED | OUTPATIENT
Start: 2019-09-14 | End: 2019-09-17 | Stop reason: HOSPADM

## 2019-09-14 RX ORDER — LANOLIN ALCOHOL/MO/W.PET/CERES
6 CREAM (GRAM) TOPICAL NIGHTLY PRN
Status: DISCONTINUED | OUTPATIENT
Start: 2019-09-14 | End: 2019-09-17 | Stop reason: HOSPADM

## 2019-09-14 RX ORDER — METOPROLOL SUCCINATE 25 MG/1
25 TABLET, EXTENDED RELEASE ORAL DAILY
Status: DISCONTINUED | OUTPATIENT
Start: 2019-09-14 | End: 2019-09-17 | Stop reason: HOSPADM

## 2019-09-14 RX ORDER — OXYMETAZOLINE HYDROCHLORIDE 0.05 G/100ML
2 SPRAY NASAL 2 TIMES DAILY
Status: COMPLETED | OUTPATIENT
Start: 2019-09-14 | End: 2019-09-17

## 2019-09-14 RX ORDER — PANTOPRAZOLE SODIUM 40 MG/1
40 TABLET, DELAYED RELEASE ORAL EVERY MORNING
Status: DISCONTINUED | OUTPATIENT
Start: 2019-09-15 | End: 2019-09-17 | Stop reason: HOSPADM

## 2019-09-14 RX ORDER — TRAMADOL HYDROCHLORIDE 50 MG/1
50 TABLET ORAL EVERY 6 HOURS PRN
Status: DISCONTINUED | OUTPATIENT
Start: 2019-09-14 | End: 2019-09-17 | Stop reason: HOSPADM

## 2019-09-14 RX ORDER — SODIUM CHLORIDE 0.9 % (FLUSH) 0.9 %
10 SYRINGE (ML) INJECTION AS NEEDED
Status: DISCONTINUED | OUTPATIENT
Start: 2019-09-14 | End: 2019-09-17 | Stop reason: HOSPADM

## 2019-09-14 RX ORDER — SODIUM CHLORIDE 0.9 % (FLUSH) 0.9 %
10 SYRINGE (ML) INJECTION EVERY 12 HOURS SCHEDULED
Status: DISCONTINUED | OUTPATIENT
Start: 2019-09-14 | End: 2019-09-17 | Stop reason: HOSPADM

## 2019-09-14 RX ORDER — ACETAMINOPHEN 325 MG/1
650 TABLET ORAL EVERY 4 HOURS PRN
Status: DISCONTINUED | OUTPATIENT
Start: 2019-09-14 | End: 2019-09-17 | Stop reason: HOSPADM

## 2019-09-14 RX ORDER — ALUMINA, MAGNESIA, AND SIMETHICONE 2400; 2400; 240 MG/30ML; MG/30ML; MG/30ML
15 SUSPENSION ORAL EVERY 6 HOURS PRN
Status: DISCONTINUED | OUTPATIENT
Start: 2019-09-14 | End: 2019-09-17 | Stop reason: HOSPADM

## 2019-09-14 RX ORDER — ONDANSETRON 2 MG/ML
4 INJECTION INTRAMUSCULAR; INTRAVENOUS EVERY 6 HOURS PRN
Status: DISCONTINUED | OUTPATIENT
Start: 2019-09-14 | End: 2019-09-17 | Stop reason: HOSPADM

## 2019-09-14 RX ORDER — FUROSEMIDE 40 MG/1
40 TABLET ORAL 2 TIMES DAILY
Status: DISCONTINUED | OUTPATIENT
Start: 2019-09-14 | End: 2019-09-17 | Stop reason: HOSPADM

## 2019-09-14 RX ORDER — ACETAMINOPHEN 650 MG/1
650 SUPPOSITORY RECTAL EVERY 4 HOURS PRN
Status: DISCONTINUED | OUTPATIENT
Start: 2019-09-14 | End: 2019-09-17 | Stop reason: HOSPADM

## 2019-09-14 RX ORDER — LISINOPRIL 10 MG/1
10 TABLET ORAL EVERY 12 HOURS
Status: DISCONTINUED | OUTPATIENT
Start: 2019-09-14 | End: 2019-09-15

## 2019-09-14 RX ORDER — SPIRONOLACTONE 25 MG/1
25 TABLET ORAL DAILY
Status: DISCONTINUED | OUTPATIENT
Start: 2019-09-14 | End: 2019-09-17 | Stop reason: HOSPADM

## 2019-09-14 RX ORDER — ACETAMINOPHEN 160 MG/5ML
650 SOLUTION ORAL EVERY 4 HOURS PRN
Status: DISCONTINUED | OUTPATIENT
Start: 2019-09-14 | End: 2019-09-17 | Stop reason: HOSPADM

## 2019-09-14 RX ADMIN — Medication 2 SPRAY: at 20:41

## 2019-09-14 RX ADMIN — TRAMADOL HYDROCHLORIDE 50 MG: 50 TABLET, FILM COATED ORAL at 20:59

## 2019-09-14 RX ADMIN — SODIUM CHLORIDE, PRESERVATIVE FREE 10 ML: 5 INJECTION INTRAVENOUS at 20:41

## 2019-09-14 RX ADMIN — FUROSEMIDE 40 MG: 40 TABLET ORAL at 20:59

## 2019-09-14 ASSESSMENT — ENCOUNTER SYMPTOMS
COUGH: 0
SINUS PAIN: 0

## 2019-09-14 NOTE — ED PROVIDER NOTES
[facility] EMERGENCY DEPT  eMERGENCY dEPARTMENT eNCOUnter      Pt Name: April Limon  MRN: [id number]  [place] 1985  Date of evaluation: 9/14/2019  Provider: Janna Martinez MD    CHIEF COMPLAINT       Chief Complaint   Patient presents with    Epistaxis         HISTORY OF PRESENT ILLNESS   (Location/Symptom, Timing/Onset,Context/Setting, Quality, Duration, Modifying Factors, Severity)  Note limiting factors. April Limon is a 29 y.o. male who presents to the emergency department      The history is provided by the patient. Epistaxis   Location:  L nare  Severity:  Moderate  Duration:  30 minutes  Timing:  Constant  Progression:  Unchanged  Chronicity:  New  Context: anticoagulants (Xarelto for PE 3 weeks ago)    Relieved by:  None tried  Worsened by:  Nothing  Ineffective treatments:  None tried  Associated symptoms: blood in oropharynx    Associated symptoms: no cough and no sinus pain    Associated symptoms comment:  Did pick at nose before it started      NursingNotes were reviewed. REVIEW OF SYSTEMS    (2-9 systems for level 4, 10 or more for level 5)     Review of Systems   HENT: Positive for nosebleeds. Negative for sinus pain. Respiratory: Negative for cough. All other systems reviewed and are negative. Except as noted above the remainder of the review of systems was reviewed and negative.        PAST MEDICAL HISTORY     Past Medical History:   Diagnosis Date    CHF [facility]congest[facility]) [facility] [address]     Hypertension     Palliative care patient 05/08/2019    Pulmonary embolism ([facility] [address]     Smoker          SURGICALHISTORY       Past Surgical History:   Procedure Laterality Date    HAND SURGERY           CURRENT MEDICATIONS       Discharge Medication List as of 9/14/2019  4:45 PM      CONTINUE these medications which have NOT CHANGED    Details   rivaroxaban (XARELTO) 15 MG TABS tablet Take 1 tablet by mouth 2 times daily (with meals), Disp-60 tablet, R-1Print      sodium chloride 1 g tablet Take 1 tablet by mouth 3 times daily (with meals) for 7 days, Disp-21 tablet, R-0Print      furosemide (LASIX) 40 MG tablet Take 1 tablet by mouth 2 times daily, Disp-60 tablet, R-1Normal      lisinopril (PRINIVIL;ZESTRIL) 10 MG tablet Take 1 tablet by mouth daily, Disp-30 tablet, R-1Normal      metoprolol succinate (TOPROL XL) 25 MG extended release tablet Take 1 tablet by mouth every 12 hours, Disp-60 tablet, R-1Normal      spironolactone (ALDACTONE) 25 MG tablet Take 1 tablet by mouth daily, Disp-30 tablet, R-1Normal      pantoprazole (PROTONIX) 40 MG tablet Take 1 tablet by mouth daily, Disp-30 tablet, R-1Print      sucralfate (CARAFATE) 1 GM tablet Take 1 tablet by mouth 4 times daily, Disp-120 tablet, R-3Print      aspirin 81 MG EC tablet Take 1 tablet by mouth daily, Disp-30 tablet, R-3OTC      ondansetron (ZOFRAN ODT) 4 MG disintegrating tablet Take 1 tablet by mouth every 8 hours as needed for Nausea or Vomiting, Disp-15 tablet, R-0Print      albuterol sulfate HFA (PROVENTIL HFA) 108 (90 Base) MCG/ACT inhaler Inhale 2 puffs into the lungs every 6 hours as needed for Wheezing, Disp-1 Inhaler, R-3Print             ALLERGIES     Patient has no known allergies.     FAMILY HISTORY       Family History   Problem Relation Age of Onset   [facility] Cancer Mother     No Known Problems Father     No Known Problems Brother           SOCIAL HISTORY       Social History     Socioeconomic History    Marital status: Single     Spouse name: Not on file    Number of children: Not on file    Years of education: Not on file    Highest education level: Not on file   Occupational History    Not on file   Social Needs    Financial resource strain: Not on file    Food insecurity:     Worry: Not on file     Inability: Not on file    Transportation needs:     Medical: Not on file     Non-medical: Not on file   Tobacco Use    Smoking status: Current Every Day Smoker     Packs/day: 0.50     Years: 13.00     Pack years: 6.50     Types: vitals reviewed. DIAGNOSTIC RESULTS     EKG: All EKG's are interpreted by the Emergency Department Physician who either signs or Co-signsthis chart in the absence of a cardiologist.        RADIOLOGY:   Vadim Purpura such as CT, Ultrasound and MRI are read by the radiologist. Angle Whitt radiographic images are visualized and preliminarily interpreted by the emergency physician with the below findings:        Interpretation per the Radiologist below, if available at the time ofthis note:    No orders to display         ED BEDSIDE ULTRASOUND:   Performed by ED Physician - none    LABS:  Labs Reviewed   CBC WITH AUTO DIFFERENTIAL - Abnormal; Notable for the following components:       Result Value    WBC 11.6 (*)     MCHC 31.8 (*)     RDW 21.4 (*)     Neutrophils % 70.4 (*)     Lymphocytes % 12.3 (*)     Monocytes % 11.3 (*)     Neutrophils Absolute 8.2 (*)     Monocytes Absolute 1.30 (*)     All other components within normal limits   PROTIME-INR - Abnormal; Notable for the following components:    Protime 23.3 (*)     INR 2.16 (*)     All other components within normal limits   APTT - Abnormal; Notable for the following components:    aPTT 41.9 (*)     All other components within normal limits       All other labs were within normal range or not returned as of this dictation. EMERGENCY DEPARTMENT COURSE and DIFFERENTIAL DIAGNOSIS/MDM:   Vitals:    Vitals:    09/14/19 1242 09/14/19 1537   BP: (!) 143/96 109/75   Pulse: 120 124   Resp: 17 18   Temp: 97.2 °F (36.2 °C) 97.6 °F (36.4 °C)   TempSrc: Temporal    SpO2: 97%    Weight: 200 lb (90.7 kg)    Height: 5' 7\" (1.702 m)            MDM  Number of Diagnoses or Management Options  Anticoagulated:   Epistaxis:   Diagnosis management comments: Attempted 3 nasal tampons with oozing around and into posterior pharynx, even some out of right nares. Did get slowed to almost a stop 1515. Discussed with [facility], Dr. Lan Bowman. Feel best he see ENT being on Xarelto.

## 2019-09-14 NOTE — PLAN OF CARE
Problem: Patient Care Overview  Goal: Plan of Care Review  Outcome: Ongoing (interventions implemented as appropriate)   09/14/19 1719   Coping/Psychosocial   Plan of Care Reviewed With patient   Plan of Care Review   Progress no change   OTHER   Outcome Summary Patient transferred from [facility] for epistaxis. Rhino rocket in L nare. Small cont bleed from right and left nare. HR elevated 120's. Denies nausea and pain.      Goal: Discharge Needs Assessment  Outcome: Ongoing (interventions implemented as appropriate)   09/14/19 1719   Discharge Needs Assessment   Readmission Within the Last 30 Days other (see comments)   Concerns to be Addressed no discharge needs identified   Patient/Family Anticipates Transition to home with family   Patient/Family Anticipated Services at Transition none   Transportation Concerns car, none   Transportation Anticipated family or friend will provide   Anticipated Changes Related to Illness none   Equipment Needed After Discharge none   Disability   Equipment Currently Used at Home none

## 2019-09-14 NOTE — H&P
[facility] Medicine Services  INPATIENT PROGRESS NOTE    Length of Stay: 0  Date of Admission: 9/14/2019  Primary Care Physician: Provider, No Known    Subjective     Chief Complaint:     Nosebleed    HPI     This 34-year-old white male is admitted with a chief complaint of a nosebleed which began at 130 today.  He was unable to stop the bleeding so he presented to the [facility] emergency department for further evaluation.  3 nasal tampons were inserted successively without successful tamponade of the bleeding.  ENT was not available at [facility] so our facility was contacted.  After discussing the patient's condition with the on-call ENT physician,  it was decided that the patient would be better served with transfer and admission to our facility for further treatment and management.    Patient has a history of dilated nonischemic cardiomyopathy for which he takes multiple medications including Xarelto and aspirin.  He has had no bleeding issues previously.  The patient's ejection fraction was last noted to be 30% per transthoracic echocardiogram.  H&H at the outlying facility was 14.1 and 44.3.  White blood cell count was slightly elevated 11,600.  Platelet count was within normal limits.  Both pro time and PTT were prolonged as expected.  The patient has had no further epistaxis since arriving in our facility.    Review of Systems   Constitutional: Negative.    HENT: Positive for nosebleeds.    Eyes: Negative.    Respiratory: Positive for shortness of breath ( With exertion).    Cardiovascular: Positive for leg swelling ( Occasional).   Gastrointestinal: Negative.    Endocrine: Negative.    Genitourinary: Negative.    Musculoskeletal: Negative.    Skin: Negative.    Allergic/Immunologic: Negative.    Neurological: Negative.    Hematological: Negative.    Psychiatric/Behavioral: Negative.           All pertinent negatives and positives are as above. All other systems have been  reviewed and are negative unless otherwise stated.     Objective    Temp:  [97.4 °F (36.3 °C)] 97.4 °F (36.3 °C)  Heart Rate:  [122] 122  Resp:  [16] 16  BP: (113)/(67) 113/67    Lab Results (last 24 hours)     ** No results found for the last 24 hours. **          Imaging Results (last 24 hours)     ** No results found for the last 24 hours. **           No intake or output data in the 24 hours ending 09/14/19 5283    Physical Exam   Constitutional: He is oriented to person, place, and time. He appears well-developed and well-nourished. He is cooperative. No distress.   HENT:   Head: Normocephalic and atraumatic.   Right Ear: External ear normal.   Left Ear: External ear normal.   Mouth/Throat: Oropharynx is clear and moist.   Left nasal tampon present with string taped to the left cheek.  No further bleeding noted.   Eyes: Conjunctivae and EOM are normal. Pupils are equal, round, and reactive to light. No scleral icterus.   Neck: Normal range of motion. Neck supple. No JVD present. No tracheal deviation present.   Cardiovascular: Normal rate, regular rhythm, normal heart sounds and intact distal pulses.   No murmur heard.  Pulmonary/Chest: Effort normal and breath sounds normal. No respiratory distress. He has no wheezes.   Abdominal: Soft. Bowel sounds are normal. He exhibits no distension and no mass. There is no tenderness.   Musculoskeletal: Normal range of motion. He exhibits edema ( Trace BLE). He exhibits no tenderness.   Neurological: He is alert and oriented to person, place, and time. He displays normal reflexes. No cranial nerve deficit. Coordination normal.   Skin: Skin is warm and dry. No rash noted.   Psychiatric: He has a normal mood and affect. His behavior is normal. Judgment and thought content normal.     Results Review:  I have reviewed the labs, radiology results, and diagnostic studies since my last progress note and made treatment changes reflective of the results.   I have reviewed the  current medications.    Assessment/Plan     Active Hospital Problems    Diagnosis   • **Epistaxis   • Chronic systolic congestive heart failure (CMS/HCC)   • History of pulmonary embolism   • Nonischemic cardiomyopathy (CMS/HCC)       PLAN:  Continue the bulk of home medications  Hold Xarelto  Regular diet  N.p.o. at midnight  ENT consultation  CBC, CMP tonight and repeat CBC and BMP in [email or web address].[email or web address] Louis DO   09/14/19   5:43 PM

## 2019-09-15 LAB
ANION GAP SERPL CALCULATED.3IONS-SCNC: 10 MMOL/L (ref 5–15)
BUN BLD-MCNC: 13 MG/DL (ref 6–20)
BUN/CREAT SERPL: 21.7 (ref 7–25)
CALCIUM SPEC-SCNC: 8.8 MG/DL (ref 8.6–10.5)
CHLORIDE SERPL-SCNC: 97 MMOL/L (ref 98–107)
CO2 SERPL-SCNC: 27 MMOL/L (ref 22–29)
CREAT BLD-MCNC: 0.6 MG/DL (ref 0.76–1.27)
DEPRECATED RDW RBC AUTO: 67.5 FL (ref 37–54)
ERYTHROCYTE [DISTWIDTH] IN BLOOD BY AUTOMATED COUNT: 20.8 % (ref 12.3–15.4)
GFR SERPL CREATININE-BSD FRML MDRD: >150 ML/MIN/1.73
GLUCOSE BLD-MCNC: 100 MG/DL (ref 65–99)
HCT VFR BLD AUTO: 38.2 % (ref 37.5–51)
HGB BLD-MCNC: 12.4 G/DL (ref 13–17.7)
MCH RBC QN AUTO: 28.7 PG (ref 26.6–33)
MCHC RBC AUTO-ENTMCNC: 32.5 G/DL (ref 31.5–35.7)
MCV RBC AUTO: 88.4 FL (ref 79–97)
PLATELET # BLD AUTO: 344 10*3/MM3 (ref 140–450)
PMV BLD AUTO: 8.9 FL (ref 6–12)
POTASSIUM BLD-SCNC: 4.5 MMOL/L (ref 3.5–5.2)
RBC # BLD AUTO: 4.32 10*6/MM3 (ref 4.14–5.8)
SODIUM BLD-SCNC: 134 MMOL/L (ref 136–145)
WBC NRBC COR # BLD: 10.89 10*3/MM3 (ref 3.4–10.8)

## 2019-09-15 PROCEDURE — 94799 UNLISTED PULMONARY SVC/PX: CPT

## 2019-09-15 PROCEDURE — 94760 N-INVAS EAR/PLS OXIMETRY 1: CPT

## 2019-09-15 PROCEDURE — 25010000003 CEFAZOLIN 1-4 GM/50ML-% SOLUTION: Performed by: OTOLARYNGOLOGY

## 2019-09-15 PROCEDURE — 99232 SBSQ HOSP IP/OBS MODERATE 35: CPT | Performed by: OTOLARYNGOLOGY

## 2019-09-15 PROCEDURE — 80048 BASIC METABOLIC PNL TOTAL CA: CPT | Performed by: FAMILY MEDICINE

## 2019-09-15 PROCEDURE — 85027 COMPLETE CBC AUTOMATED: CPT | Performed by: FAMILY MEDICINE

## 2019-09-15 RX ORDER — CEFAZOLIN SODIUM 1 G/50ML
1 INJECTION, SOLUTION INTRAVENOUS EVERY 8 HOURS
Status: DISCONTINUED | OUTPATIENT
Start: 2019-09-15 | End: 2019-09-17 | Stop reason: HOSPADM

## 2019-09-15 RX ORDER — SODIUM CHLORIDE 1000 MG
1 TABLET, SOLUBLE MISCELLANEOUS 3 TIMES DAILY
Status: ON HOLD | COMMUNITY
End: 2019-01-01

## 2019-09-15 RX ORDER — SUCRALFATE 1 G/1
1 TABLET ORAL 4 TIMES DAILY
Status: ON HOLD | COMMUNITY
End: 2019-01-01

## 2019-09-15 RX ORDER — LISINOPRIL 10 MG/1
10 TABLET ORAL
Status: DISCONTINUED | OUTPATIENT
Start: 2019-09-16 | End: 2019-09-17 | Stop reason: HOSPADM

## 2019-09-15 RX ORDER — ALBUTEROL SULFATE 90 UG/1
2 AEROSOL, METERED RESPIRATORY (INHALATION) EVERY 6 HOURS PRN
COMMUNITY

## 2019-09-15 RX ORDER — PANTOPRAZOLE SODIUM 40 MG/1
40 TABLET, DELAYED RELEASE ORAL DAILY
Status: ON HOLD | COMMUNITY
End: 2019-01-01

## 2019-09-15 RX ORDER — LISINOPRIL 10 MG/1
10 TABLET ORAL DAILY
COMMUNITY
End: 2019-01-01 | Stop reason: HOSPADM

## 2019-09-15 RX ADMIN — SPIRONOLACTONE 25 MG: 25 TABLET ORAL at 09:18

## 2019-09-15 RX ADMIN — Medication 1 APPLICATION: at 11:51

## 2019-09-15 RX ADMIN — TRAMADOL HYDROCHLORIDE 50 MG: 50 TABLET, FILM COATED ORAL at 20:51

## 2019-09-15 RX ADMIN — CEFAZOLIN SODIUM 1 G: 1 INJECTION, SOLUTION INTRAVENOUS at 18:37

## 2019-09-15 RX ADMIN — SODIUM CHLORIDE, PRESERVATIVE FREE 10 ML: 5 INJECTION INTRAVENOUS at 20:52

## 2019-09-15 RX ADMIN — FUROSEMIDE 40 MG: 40 TABLET ORAL at 09:18

## 2019-09-15 RX ADMIN — FUROSEMIDE 40 MG: 40 TABLET ORAL at 20:51

## 2019-09-15 RX ADMIN — PANTOPRAZOLE SODIUM 40 MG: 40 TABLET, DELAYED RELEASE ORAL at 06:43

## 2019-09-15 RX ADMIN — Medication 2 SPRAY: at 20:52

## 2019-09-15 RX ADMIN — LISINOPRIL 10 MG: 10 TABLET ORAL at 06:43

## 2019-09-15 RX ADMIN — CEFAZOLIN SODIUM 1 G: 1 INJECTION, SOLUTION INTRAVENOUS at 11:51

## 2019-09-15 RX ADMIN — METOPROLOL SUCCINATE 25 MG: 25 TABLET, FILM COATED, EXTENDED RELEASE ORAL at 09:18

## 2019-09-15 RX ADMIN — Medication 2 SPRAY: at 09:19

## 2019-09-15 RX ADMIN — SODIUM CHLORIDE, PRESERVATIVE FREE 10 ML: 5 INJECTION INTRAVENOUS at 09:20

## 2019-09-15 NOTE — PROGRESS NOTES
[facility]  ENT PROGRESS NOTES  9/15/2019      Patient Identification:  Name: Arjun Oropeza  Age: 34 y.o.  Sex: male  :  1985  MRN: [id number]                     Date of Admission: 2019      CC:    Left otorrhea    Subjective   Patient with some subjective feeling of fullness in the left ear with minimal persistent drainage which is still bloody.  She denies pain.  Subjectively she is hearing well.  Sedated MRI scheduled for Tuesday.    HISTORY   HPI, ROS, PMFSHx reviewed:   No changes       Objective     PE:    Temp:  [97.4 °F (36.3 °C)-98 °F (36.7 °C)] 97.7 °F (36.5 °C)  Heart Rate:  [] 100  Resp:  [16] 16  BP: ()/(61-77) 105/70   Body mass index is 29.07 kg/m².     General appearance: alert, well appearing, and in no distress, oriented to person, place, and time and acyanotic, in no respiratory distress.   Ability to Communicate: normal means of communication, clear voice, normal  Hearing   Facial exam: facial movement was normal and symmetrical, nontender, no scars, lesions or masses   Ears -   AS-small amount of granulation with excoriation of the posterior canal wall.  Minimal bloody otorrhea with anterior portion of the TM well visualized and appears intact.  AD-clear and intact TM with well ventilated middle ear space and normal-appearing external auditory canal   Nasal exam - normal and patent, no erythema, discharge or polyps.   Oropharyngeal exam - mucous membranes moist, pharynx normal without lesions.   Neck exam - supple, no significant adenopathy.     CVS exam: No peripheral clubbing   Chest: Normal expansion appreciated.   No lymphadenopathy in the anterior or posterior neck, supraclavicular, axillary or inguinal areas. No hepato-splenomegaly noted.   Neurological exam reveals alert, oriented, normal speech, no focal findings or movement disorder noted, neck supple without rigidity.    DATA      MEDICATIONS   I have reviewed the current MAR.     LABS AND IMAGING       I have reviewed the labs and imaging data since the patient was last seen.       Assessment     ASSESSMENT       Epistaxis    Nonischemic cardiomyopathy (CMS/HCC)    Chronic systolic congestive heart failure (CMS/HCC)    History of pulmonary embolism    Left bloody otorrhea consistent with granulation\lesion of the posterior left external auditory canal      Plan     PLAN     Continue Ciprodex  Microscopic exam in the office in the next several days as inpatient or outpatient to confirm integrity of the left tympanic membrane.          Anibal Keen MD  9/15/2019  9:11 AM

## 2019-09-15 NOTE — CONSULTS
[facility]     OTOLARYNGOLOGY, HEAD AND NECK SURGERY CONSULTATION NOTE   Referring Provider: Lb Louis DO  Reason for Consultation: nosebleed  Location: 392/1  Accompanied by: nurse  Chief complaint: Epistaxis/left     HISTORY OF PRESENT ILLNESS:   I have been asked to consult on Arjun Oropeza who is a 34 y.o. male who complains of epistaxis. The symptoms are localized to the left side. The patient has had moderate symptoms. The symptoms have been relatively constant for the last 1 day. The symptoms are aggravated by  anticoagulation treatment and blood pressure elevation. The symptoms are improved by medications and nasal packing.    Patient with a recent development of a PE about a week ago and was started on Xarelto and Aspirin. Earlier today the patient developed a nosebleed and went to the [facility] emergency room. Left anterior packing was placed and the patient was transferred to [facility] for further care with ENT consult. Patient has continued to bleed around the pack and down the back of the throat. Due to this a dual balloon posterior rhinorocket was placed with excellent hemostasis.     Review of Systems  Review of Systems   Constitutional: Negative for activity change, appetite change, chills, diaphoresis, fatigue, fever and unexpected weight change.   HENT: Positive for nosebleeds. Negative for congestion, ear discharge, ear pain, facial swelling, hearing loss, mouth sores, postnasal drip, rhinorrhea, sinus pressure, sneezing, sore throat, tinnitus, trouble swallowing and voice change.    Eyes: Negative for pain, discharge, redness, itching and visual disturbance.   Respiratory: Negative for apnea, cough, choking, chest tightness, shortness of breath, wheezing and stridor.    Gastrointestinal: Negative for nausea and vomiting.   Endocrine: Negative for cold intolerance and heat intolerance.   Musculoskeletal: Negative for arthralgias, back pain, gait problem, neck pain and  neck stiffness.   Skin: Negative for rash.   Allergic/Immunologic: Negative for environmental allergies and food allergies.   Neurological: Negative for dizziness, tremors, seizures, syncope, facial asymmetry, speech difficulty, weakness, light-headedness, numbness and headaches.   Hematological: Negative for adenopathy. Does not bruise/bleed easily.   Psychiatric/Behavioral: Negative for behavioral problems, sleep disturbance and suicidal ideas. The patient is not nervous/anxious and is not hyperactive.          History  Past Medical History:   Diagnosis Date   • Cardiomyopathy (CMS/HCC)    • CHF (congestive heart failure) (CMS/HCC)    • Chronic respiratory failure (CMS/HCC)    • GERD (gastroesophageal reflux disease)    • Hypertension    • IV drug abuse (CMS/HCC)    • Methamphetamine abuse (CMS/HCC)    • Pulmonary embolism (CMS/HCC)    • Pulmonary infarct (CMS/HCC)    • Tobacco abuse      Past Surgical History:   Procedure Laterality Date   • HAND SURGERY       Family History   Problem Relation Age of Onset   • Cancer Mother    • No Known Problems Father    • No Known Problems Brother      Social History     Tobacco Use   • Smoking status: Current Every Day Smoker     Packs/day: 1.00     Years: 15.00     Pack years: 15.00     Types: Cigarettes   • Smokeless tobacco: Never Used   Substance Use Topics   • Alcohol use: Yes     Comment: occasional   • Drug use: Yes     Types: Methamphetamines     Comment: 1 month ago     Past Medical History:    reviewed intake note  Past Surgical History:    reviewd intake note  Social History:    reviewed intake note  Family History:    reviewed intake note  Tobacco Use:    reviewed intake note    Current Medicines: Reviewed    Current Facility-Administered Medications:   •  acetaminophen (TYLENOL) tablet 650 mg, 650 mg, Oral, Q4H PRN **OR** acetaminophen (TYLENOL) 160 MG/5ML solution 650 mg, 650 mg, Oral, Q4H PRN **OR** acetaminophen (TYLENOL) suppository 650 mg, 650 mg, Rectal,  Q4H PRN, Lb Louis, DO  •  aluminum-magnesium hydroxide-simethicone (MAALOX MAX) 400-400-40 MG/5ML suspension 15 mL, 15 mL, Oral, Q6H PRN, Lb Louis, DO  •  bisacodyl (DULCOLAX) EC tablet 5 mg, 5 mg, Oral, Daily PRN, Lb Louis, DO  •  furosemide (LASIX) tablet 40 mg, 40 mg, Oral, BID, Lb Louis, DO  •  lisinopril (PRINIVIL,ZESTRIL) tablet 10 mg, 10 mg, Oral, Q12H, Lb Louis, DO  •  melatonin tablet 6 mg, 6 mg, Oral, Nightly PRN, Lb Louis, DO  •  metoprolol succinate XL (TOPROL-XL) 24 hr tablet 25 mg, 25 mg, Oral, Daily, Lb Louis, DO  •  ondansetron (ZOFRAN) injection 4 mg, 4 mg, Intravenous, Q6H PRN, Lb Louis, DO  •  oxymetazoline (AFRIN) nasal spray 2 spray, 2 spray, Each Nare, BID, Andrea Brunson PA  •  [START ON 9/15/2019] pantoprazole (PROTONIX) EC tablet 40 mg, 40 mg, Oral, QAM, Lb Louis, DO  •  sodium chloride 0.9 % flush 10 mL, 10 mL, Intravenous, Q12H, Lb Louis,   •  sodium chloride 0.9 % flush 10 mL, 10 mL, Intravenous, PRN, Lb Louis, DO  •  spironolactone (ALDACTONE) tablet 25 mg, 25 mg, Oral, Daily, Lb Louis, DO  •  traMADol (ULTRAM) tablet 50 mg, 50 mg, Oral, Q6H PRN, Lb Louis,     Allergies:  Patient has no known allergies.    Vital Signs   Temp:  [97.4 °F (36.3 °C)-97.7 °F (36.5 °C)] 97.7 °F (36.5 °C)  Heart Rate:  [122] 122  Resp:  [16] 16  BP: ()/(61-67) 96/61    EXAMINATION:   CONSTITIONAL: well nourished, well-developed, alert, oriented, in no acute distress     BODY HABITUS: Normal body habitus     COMMUNICATION: able to communicate normally, normal voice quality    HEAD: Normocephalic, without obvious abnormality, atraumatic    FACE: structure normal, no tenderness present, no lesions/masses, no evidence of trauma    SALIVARY GLANDS: parotid glands with no tenderness, no swelling, no masses, submandibular glands with normal size, nontender      EYE: ocular motility normal, eyelids normal, orbits normal, no proptosis, conjunctiva clear, sclera non-icteric, pupils equal, round, reactive to light and accomodation    HEARING: response to conversational voice normal    EARS: normal pinna with no lesions, Canals normal size and shape, Tympanic membranes normal, Ossicular chain intact, Middle ear clear     NOSE EXTERNAL: APPEARANCE: normal, straight, with good projection, no tenderness, no lesions, no tenderness, good nasal support, patent nares    NOSE INTERNAL:      Left- with anterior pack with bleeding noted around the pack and posteriorly, removed at bedside and replaced with a dual balloon posterior rhinorocket (no further bleeding noted), Right- old clotted blood in right nostril, no active bleeding noted    ORAL CAVITY:   LIPS:      structure normal, no tenderness on palpation, no lesions, no evidence of trauma, normal mobility and oral competence    TEETH:       dentition within normal limits for age    GUMS:      gingivae healthy, no lesions    ORAL MUCOSA:       oral mucosa normal, no mucosal lesions  TONGUE:       lingual mucosa normal without lesions, normal tongue mobility    PALATE:       hard palate with normal mucosa and structure      soft palate with normal mucosa and structure, normal mobility uvula intact    BREATH:      normal    OROPHARYNX: left sided blood draining down from nasopharyngeal area  oropharyngeal mucosa normal, tonsil with normal appearance    NECK: normal appearance, no masses, no lesions, larynx normal mobility, trachea midline    LYMPH NODES : no adenopathy    THYROID: no overt thyromegaly, no tenderness, nodules or mass present on palpation, position midline    CHEST/RESPIRATORY: respiratory effort normal, no rales, rubs or wheezing, no stridor, normal appearance to chest    CARDIOVASCULAR: regular rate and rhythm, no murmurs, gallups, no peripheral edema    NEUROPSYCHIATRIC: oriented appropriately for age, mood  normal, affect appropriate, cranial nerves intact grossly (unless specifically described), gait normal for age    RESULTS REVIEW:    I have reviewed the patients old records in the chart.  I reviewed the patient's new clinical results.    ASSESSMENT:     Patient Active Problem List    Diagnosis Date Noted   • *Epistaxis 09/14/2019   • Chronic systolic congestive heart failure (CMS/HCC) 09/14/2019   • History of pulmonary embolism 09/14/2019   • Nonischemic cardiomyopathy (CMS/HCC) 03/05/2018       PLAN:   Left anterior pack removed and replaced with a dual balloon posterior rhinorocket with excellent hemostasis. Will keep afrin at the bedside to use if bleeding from the right is noted and to spray around the pack if oozing is noted. Will evaluate tomorrow, if no further bleeding through the night will remove air from the pack.     ELVER Ventura  09/14/19  8:15 PM

## 2019-09-15 NOTE — PROGRESS NOTES
[facility] Medicine Services  INPATIENT PROGRESS NOTE    Length of Stay: 1  Date of Admission: 9/14/2019  Primary Care Physician: Provider, No Known    Subjective     Chief Complaint:     Nosebleed    HPI     ENT evaluated the patient yesterday, removed the left nasal packing and placed a dual balloon posterior Rhinorocket with excellent hemostasis achieved.  He has been reevaluated by ENT this a.m. their plan is to consider deflating the balloon tomorrow and removing the pack Monday afternoon or Tuesday morning if no further bleeding was noted.  Ancef and topical mupirocin has been added to his regimen.    Review of Systems     All pertinent negatives and positives are as above. All other systems have been reviewed and are negative unless otherwise stated.     Objective    Temp:  [97.4 °F (36.3 °C)-98 °F (36.7 °C)] 97.7 °F (36.5 °C)  Heart Rate:  [] 100  Resp:  [16] 16  BP: ()/(61-77) 105/70    Lab Results (last 24 hours)     Procedure Component Value Units Date/Time    Basic Metabolic Panel [[id number]]  (Abnormal) Collected:  09/15/19 0527    Specimen:  Blood Updated:  09/15/19 0603     Glucose 100 mg/dL      BUN 13 mg/dL      Creatinine 0.60 mg/dL      Sodium 134 mmol/L      Potassium 4.5 mmol/L      Chloride 97 mmol/L      CO2 27.0 mmol/L      Calcium 8.8 mg/dL      eGFR Non African Amer >150 mL/min/1.73      BUN/Creatinine Ratio 21.7     Anion Gap 10.0 mmol/L     Narrative:       GFR Normal >60  Chronic Kidney Disease <60  Kidney Failure <15    CBC (No Diff) [[id number]]  (Abnormal) Collected:  09/15/19 0527    Specimen:  Blood Updated:  09/15/19 0550     WBC 10.89 10*3/mm3      RBC 4.32 10*6/mm3      Hemoglobin 12.4 g/dL      Hematocrit 38.2 %      MCV 88.4 fL      MCH 28.7 pg      MCHC 32.5 g/dL      RDW 20.8 %      RDW-SD 67.5 fl      MPV 8.9 fL      Platelets 344 10*3/mm3     Comprehensive Metabolic Panel [[id number]]  (Abnormal) Collected:  09/14/19 1812     Specimen:  Blood Updated:  09/14/19 1853     Glucose 112 mg/dL      BUN 12 mg/dL      Creatinine 0.66 mg/dL      Sodium 135 mmol/L      Potassium 4.7 mmol/L      Chloride 99 mmol/L      CO2 24.0 mmol/L      Calcium 9.0 mg/dL      Total Protein 8.0 g/dL      Albumin 3.60 g/dL      ALT (SGPT) 101 U/L      AST (SGOT) 46 U/L      Alkaline Phosphatase 203 U/L      Total Bilirubin 0.9 mg/dL      eGFR Non African Amer 138 mL/min/1.73      Globulin 4.4 gm/dL      A/G Ratio 0.8 g/dL      BUN/Creatinine Ratio 18.2     Anion Gap 12.0 mmol/L     Narrative:       GFR Normal >60  Chronic Kidney Disease <60  Kidney Failure <15    CBC Auto Differential [[id number]]  (Abnormal) Collected:  09/14/19 1812    Specimen:  Blood Updated:  09/14/19 1837     WBC 13.15 10*3/mm3      RBC 4.61 10*6/mm3      Hemoglobin 13.3 g/dL      Hematocrit 40.3 %      MCV 87.4 fL      MCH 28.9 pg      MCHC 33.0 g/dL      RDW 21.1 %      RDW-SD 66.6 fl      MPV 9.0 fL      Platelets 384 10*3/mm3      Neutrophil % 73.1 %      Lymphocyte % 12.4 %      Monocyte % 9.4 %      Eosinophil % 3.3 %      Basophil % 0.5 %      Immature Grans % 1.3 %      Neutrophils, Absolute 9.61 10*3/mm3      Lymphocytes, Absolute 1.63 10*3/mm3      Monocytes, Absolute 1.24 10*3/mm3      Eosinophils, Absolute 0.44 10*3/mm3      Basophils, Absolute 0.06 10*3/mm3      Immature Grans, Absolute 0.17 10*3/mm3      nRBC 0.0 /100 WBC           Imaging Results (last 24 hours)     ** No results found for the last 24 hours. **             Intake/Output Summary (Last 24 hours) at 9/15/2019 1132  Last data filed at 9/15/2019 1010  Gross per 24 hour   Intake --   Output 2300 ml   Net -2300 ml       Physical Exam    Constitutional: He is oriented to person, place, and time. He appears well-developed and well-nourished. He is cooperative. No distress.   HENT:   Head: Normocephalic and atraumatic.   Right Ear: External ear normal.   Left Ear: External ear normal.   Mouth/Throat: Oropharynx is clear  and moist. Left double-balloon Rhino Rocket present with both valves taped to the left cheek.  No further bleeding noted.   Eyes: Conjunctivae and EOM are normal. Pupils are equal, round, and reactive to light. No scleral icterus.   Neck: Normal range of motion. Neck supple. No JVD present. No tracheal deviation present.   Cardiovascular: Normal rate, regular rhythm, normal heart sounds and intact distal pulses.   No murmur heard.  Pulmonary/Chest: Effort normal and breath sounds normal. No respiratory distress. He has no wheezes.   Abdominal: Soft. Bowel sounds are normal. He exhibits no distension and no mass. There is no tenderness.   Musculoskeletal: Normal range of motion. He exhibits edema (trace BLE). He exhibits no tenderness.   Neurological: He is alert and oriented to person, place, and time. He displays normal reflexes. No cranial nerve deficit. Coordination normal.   Skin: Skin is warm and dry. No rash noted.   Psychiatric: He has a normal mood and affect.    Results Review:  I have reviewed the labs, radiology results, and diagnostic studies since my last progress note and made treatment changes reflective of the results.   I have reviewed the current medications.    Assessment/Plan     Active Hospital Problems    Diagnosis   • **Epistaxis   • Chronic systolic congestive heart failure (CMS/HCC)   • History of pulmonary embolism   • Nonischemic cardiomyopathy (CMS/HCC)       PLAN:  Continue regular diet  Continue to hold Xarelto  ENT to reevaluate tomorrow  Repeat CBC in rodriguez Louis DO   09/15/19   11:32 AM

## 2019-09-15 NOTE — PROGRESS NOTES
[facility]  ENT PROGRESS NOTES  9/15/2019      Patient Identification:  Name: Arjun Oropeza  Age: 34 y.o.  Sex: male  :  1985  MRN: [id number]                     Date of Admission: 2019      CC:    Epistaxis associated with anticoagulation  Subjective   He has had no bleeding since the pack was placed yesterday upon arrival at [facility].  He is tolerating his diet well and has no emesis or difficulty breathing.    HISTORY   HPI, ROS, PMFSHx reviewed:   No changes       Objective     PE:    Temp:  [97.4 °F (36.3 °C)-98 °F (36.7 °C)] 97.7 °F (36.5 °C)  Heart Rate:  [] 100  Resp:  [16] 16  BP: ()/(61-77) 105/70   Body mass index is 29.07 kg/m².     General appearance: alert, well appearing, and in no distress, oriented to person, place, and time, normal appearing weight and acyanotic, in no respiratory distress.   Ability to Communicate: normal means of communication, clear voice, normal  Hearing   Facial exam: facial movement was normal and symmetrical, nontender, no craniofacial deformities, no scars, lesions or masses, salivary glands were normal   Ears - bilateral TM's and external ear canals normal.   Nasal exam - normal and patent, no erythema, discharge or polyps, evidence of old blood and packing placed on the left without any evidence of active or recent acute bleeding.   Oropharyngeal exam - mucous membranes moist, pharynx normal without lesions and No posterior bloody drainage- dark or bright red noted.   Neck exam - supple, no significant adenopathy.     CVS exam: normal rate, regular rhythm, normal S1, S2, no murmurs, rubs, clicks or gallops.   Chest: clear to auscultation, no wheezes, rales or rhonchi, symmetric air entry.   No lymphadenopathy in the anterior or posterior neck, supraclavicular, axillary or inguinal areas. No hepato-splenomegaly noted.   Neurological exam reveals alert, oriented, normal speech, no focal findings or movement disorder noted, neck supple  without rigidity, cranial nerves II through XII intact.    DATA      MEDICATIONS   I have reviewed the current MAR.     LABS AND IMAGING      I have reviewed the labs and imaging data since the patient was last seen.  Contains abnormal data CBC (No Diff)   Order: [id number]   Status:  Final result   Visible to patient:  No (Not Released)   Next appt:  None   Component  Ref Range & Units 05:27   WBC  3.40 - 10.80 10*3/mm3 10.89 Abnormally high     RBC  4.14 - 5.80 10*6/mm3 4.32    Hemoglobin  13.0 - 17.7 g/dL 12.4 Abnormally low     Hematocrit  37.5 - 51.0 % 38.2    MCV  79.0 - 97.0 fL 88.4    MCH  26.6 - 33.0 pg 28.7    MCHC  31.5 - 35.7 g/dL 32.5    RDW  12.3 - 15.4 % 20.8 Abnormally high     RDW-SD  37.0 - 54.0 fl 67.5 Abnormally high     MPV  6.0 - 12.0 fL 8.9    Platelets  140 - 450 10*3/mm3 344    Resulting Agency [facility]         Specimen Collected: 09/15/19 05:27 Last Resulted: 09/15/19 05:50 Lab Flowsheet Order Details View Encounter Lab and Collection Details Routing Result History                Assessment     ASSESSMENT       Epistaxis    Nonischemic cardiomyopathy (CMS/HCC)    Chronic systolic congestive heart failure (CMS/HCC)    History of pulmonary embolism            Plan     PLAN     Repeat H&H in a.m.  May consider deflating balloon tomorrow and removing pack Monday afternoon or Tuesday morning if no further bleeding noted  Patient denies affiliation with a primary care physician though he does have a cardiologist.  We will add an antibiotic IV and topical mupirocin          Anibal Keen MD  9/15/2019  9:31 AM

## 2019-09-15 NOTE — PLAN OF CARE
Problem: Patient Care Overview  Goal: Plan of Care Review  Outcome: Ongoing (interventions implemented as appropriate)  IID. Onset of this shift noted bleeding cont- right nare dripping, coughing up blood and some clots noted. Andrea with ent group removed rhino rocket from left nare and replaced it with dual balloon rhino rocket. No further bleeding noted. Po pain med x1 with effective relief. Voiding without difficulty. Consistently has tachycardia. No acute distress noted. Cont to monitor.   09/15/19 0141   Coping/Psychosocial   Plan of Care Reviewed With patient   Plan of Care Review   Progress improving     Goal: Individualization and Mutuality  Outcome: Ongoing (interventions implemented as appropriate)    Goal: Discharge Needs Assessment  Outcome: Ongoing (interventions implemented as appropriate)      Problem: Oral Health/Mucous Membranes Altered (Adult)  Goal: Improved Tissue Integrity/Oral Health  Outcome: Ongoing (interventions implemented as appropriate)    Goal: Adequate Pain Management  Outcome: Ongoing (interventions implemented as appropriate)

## 2019-09-15 NOTE — NURSING NOTE
The patient is experiencing some eye lid swelling on the left side with tearing and burning. The patient denies blurry vision, diplopia, and itching. CONNIE Jaquez RN

## 2019-09-15 NOTE — PROCEDURES
Procedure Note    Pre-operative Diagnosis: Left Epistaxsis    Post-operative Diagnosis: same    Anesthesia: topical oxymetazoline    Proceedure:  Nasal packing    Procedure Details:    Left anterior packing removed from nose. The bilateral nasal cavity was sprayed with decongestant.  After waiting the appropriate time an afrin soaked dual balloon posterior rhinorocket was placed. There was excellent hemostasis at the end of the procedure.     Findings: Left anterior packing in place at beginning of procedure with active bleeding noted posteriorly and around the packing. The packing was removed and mild active bleeding was noted on the left. Rhinorocket was placed with achievement of hemostasis.     Condition:  Stable.  Patient tolerated procedure well.    Complications:  None

## 2019-09-15 NOTE — PLAN OF CARE
Problem: Patient Care Overview  Goal: Plan of Care Review  Outcome: Ongoing (interventions implemented as appropriate)   09/15/19 0141 09/15/19 0915 09/15/19 1453   Coping/Psychosocial   Plan of Care Reviewed With --  patient --    Plan of Care Review   Progress improving --  --    OTHER   Outcome Summary --  --  Patient doing well. Denies pain and nausea. Cont rhino rocket left nare. IV abx, kia diet     Goal: Individualization and Mutuality  Outcome: Ongoing (interventions implemented as appropriate)   09/15/19 0141   Individualization   Patient Specific Goals (Include Timeframe) to stop bleeding and go home     Goal: Discharge Needs Assessment  Outcome: Ongoing (interventions implemented as appropriate)   09/14/19 1719   Discharge Needs Assessment   Readmission Within the Last 30 Days other (see comments)   Concerns to be Addressed no discharge needs identified   Patient/Family Anticipates Transition to home with family   Patient/Family Anticipated Services at Transition none   Transportation Concerns car, none   Transportation Anticipated family or friend will provide   Anticipated Changes Related to Illness none   Equipment Needed After Discharge none   Disability   Equipment Currently Used at Home none       Problem: Oral Health/Mucous Membranes Altered (Adult)  Goal: Identify Related Risk Factors and Signs and Symptoms  Outcome: Ongoing (interventions implemented as appropriate)   09/15/19 1453   Oral Health/Mucous Membranes Altered (Adult)   Related Risk Factors (Altered Oral Mucous Membranes) other (see comments)   Signs and Symptoms (Altered Oral Mucous Membranes) other (see comments)     Goal: Improved Tissue Integrity/Oral Health  Outcome: Ongoing (interventions implemented as appropriate)   09/15/19 1453   Oral Health/Mucous Membranes Altered (Adult)   Improved Tissue Integrity/Oral Health making progress toward outcome     Goal: Adequate Pain Management  Outcome: Ongoing (interventions implemented as  appropriate)   09/15/19 3904   Oral Health/Mucous Membranes Altered (Adult)   Adequate Pain Management making progress toward outcome

## 2019-09-16 LAB
BASOPHILS # BLD AUTO: 0.05 10*3/MM3 (ref 0–0.2)
BASOPHILS NFR BLD AUTO: 0.4 % (ref 0–1.5)
DEPRECATED RDW RBC AUTO: 65.5 FL (ref 37–54)
EKG P AXIS: 57 DEGREES
EKG P-R INTERVAL: 162 MS
EKG Q-T INTERVAL: 320 MS
EKG QRS DURATION: 104 MS
EKG QTC CALCULATION (BAZETT): 425 MS
EKG T AXIS: 43 DEGREES
EOSINOPHIL # BLD AUTO: 0.36 10*3/MM3 (ref 0–0.4)
EOSINOPHIL NFR BLD AUTO: 2.9 % (ref 0.3–6.2)
ERYTHROCYTE [DISTWIDTH] IN BLOOD BY AUTOMATED COUNT: 20.4 % (ref 12.3–15.4)
HCT VFR BLD AUTO: 38.6 % (ref 37.5–51)
HGB BLD-MCNC: 12.7 G/DL (ref 13–17.7)
IMM GRANULOCYTES # BLD AUTO: 0.1 10*3/MM3 (ref 0–0.05)
IMM GRANULOCYTES NFR BLD AUTO: 0.8 % (ref 0–0.5)
LYMPHOCYTES # BLD AUTO: 1.3 10*3/MM3 (ref 0.7–3.1)
LYMPHOCYTES NFR BLD AUTO: 10.5 % (ref 19.6–45.3)
MCH RBC QN AUTO: 29.1 PG (ref 26.6–33)
MCHC RBC AUTO-ENTMCNC: 32.9 G/DL (ref 31.5–35.7)
MCV RBC AUTO: 88.3 FL (ref 79–97)
MONOCYTES # BLD AUTO: 1.19 10*3/MM3 (ref 0.1–0.9)
MONOCYTES NFR BLD AUTO: 9.7 % (ref 5–12)
NEUTROPHILS # BLD AUTO: 9.33 10*3/MM3 (ref 1.7–7)
NEUTROPHILS NFR BLD AUTO: 75.7 % (ref 42.7–76)
NRBC BLD AUTO-RTO: 0 /100 WBC (ref 0–0.2)
PLATELET # BLD AUTO: 363 10*3/MM3 (ref 140–450)
PMV BLD AUTO: 9 FL (ref 6–12)
RBC # BLD AUTO: 4.37 10*6/MM3 (ref 4.14–5.8)
WBC NRBC COR # BLD: 12.33 10*3/MM3 (ref 3.4–10.8)

## 2019-09-16 PROCEDURE — 85025 COMPLETE CBC W/AUTO DIFF WBC: CPT | Performed by: FAMILY MEDICINE

## 2019-09-16 PROCEDURE — 99231 SBSQ HOSP IP/OBS SF/LOW 25: CPT | Performed by: PHYSICIAN ASSISTANT

## 2019-09-16 PROCEDURE — 94760 N-INVAS EAR/PLS OXIMETRY 1: CPT

## 2019-09-16 PROCEDURE — 94799 UNLISTED PULMONARY SVC/PX: CPT

## 2019-09-16 PROCEDURE — 25010000003 CEFAZOLIN 1-4 GM/50ML-% SOLUTION: Performed by: OTOLARYNGOLOGY

## 2019-09-16 RX ADMIN — SODIUM CHLORIDE, PRESERVATIVE FREE 10 ML: 5 INJECTION INTRAVENOUS at 08:27

## 2019-09-16 RX ADMIN — Medication 1 APPLICATION: at 21:08

## 2019-09-16 RX ADMIN — FUROSEMIDE 40 MG: 40 TABLET ORAL at 08:27

## 2019-09-16 RX ADMIN — SPIRONOLACTONE 25 MG: 25 TABLET ORAL at 08:27

## 2019-09-16 RX ADMIN — CEFAZOLIN SODIUM 1 G: 1 INJECTION, SOLUTION INTRAVENOUS at 11:17

## 2019-09-16 RX ADMIN — LISINOPRIL 10 MG: 10 TABLET ORAL at 08:27

## 2019-09-16 RX ADMIN — FUROSEMIDE 40 MG: 40 TABLET ORAL at 21:08

## 2019-09-16 RX ADMIN — Medication 2 SPRAY: at 21:08

## 2019-09-16 RX ADMIN — CEFAZOLIN SODIUM 1 G: 1 INJECTION, SOLUTION INTRAVENOUS at 18:17

## 2019-09-16 RX ADMIN — PANTOPRAZOLE SODIUM 40 MG: 40 TABLET, DELAYED RELEASE ORAL at 06:35

## 2019-09-16 RX ADMIN — SODIUM CHLORIDE, PRESERVATIVE FREE 10 ML: 5 INJECTION INTRAVENOUS at 21:13

## 2019-09-16 RX ADMIN — CEFAZOLIN SODIUM 1 G: 1 INJECTION, SOLUTION INTRAVENOUS at 02:33

## 2019-09-16 RX ADMIN — Medication 2 SPRAY: at 08:27

## 2019-09-16 RX ADMIN — METOPROLOL SUCCINATE 25 MG: 25 TABLET, FILM COATED, EXTENDED RELEASE ORAL at 08:27

## 2019-09-16 RX ADMIN — Medication 6 MG: at 22:36

## 2019-09-16 RX ADMIN — Medication 1 APPLICATION: at 08:27

## 2019-09-16 NOTE — PAYOR COMM NOTE
"Arjun Hernández (34 y.o. Male) [id number]    [facility]   avani phone   Fax      Date of Birth Social Security Number Address Home Phone MRN    1985  [address] [phone] [id number]    Zoroastrianism Marital Status          Other Single       Admission Date Admission Type Admitting Provider Attending Provider Department, Room/Bed    9/14/19 Urgent Lb Louis DO Robinson, Maurice S, DO [facility] 3C, 392/1    Discharge Date Discharge Disposition Discharge Destination                       Attending Provider:  Lb Louis DO    Allergies:  No Known Allergies    Isolation:  None   Infection:  None   Code Status:  CPR    Ht:  170.2 cm (67\")   Wt:  84.2 kg (185 lb 9.6 oz)    Admission Cmt:  None   Principal Problem:  Epistaxis [R04.0]                 Active Insurance as of 9/14/2019     Primary Coverage     Payor Plan Insurance Group Employer/Plan Group    [employer] [id number]     Payor Plan Address Payor Plan Phone Number Payor Plan Fax Number Effective Dates    [address] [phone]  11/1/1997 - None Entered    [facility] [id number]       Subscriber Name Subscriber Birth Date Member ID       ARJUN HERNÁNDEZ 1985 [id number]                 Emergency Contacts      (Rel.) Home Phone Work Phone Mobile Phone    Aurelia Hernández (Father) -- -- [phone]    Bam Hernández (Brother) [phone] -- --               History & Physical      Lb Louis DO at 9/14/2019  5:41 PM              [facility]  INPATIENT PROGRESS NOTE    Length of Stay: 0  Date of Admission: 9/14/2019  Primary Care Physician: Provider, No Known    Subjective     Chief Complaint:     Nosebleed    HPI     This 34-year-old white male is admitted with a chief complaint of a nosebleed which began at 130 today.  He was unable to stop the bleeding so he presented to the " [facility] emergency department for further evaluation.  3 nasal tampons were inserted successively without successful tamponade of the bleeding.  ENT was not available at [facility] so our facility was contacted.  After discussing the patient's condition with the on-call ENT physician,  it was decided that the patient would be better served with transfer and admission to our facility for further treatment and management.    Patient has a history of dilated nonischemic cardiomyopathy for which he takes multiple medications including Xarelto and aspirin.  He has had no bleeding issues previously.  The patient's ejection fraction was last noted to be 30% per transthoracic echocardiogram.  H&H at the outlying facility was 14.1 and 44.3.  White blood cell count was slightly elevated 11,600.  Platelet count was within normal limits.  Both pro time and PTT were prolonged as expected.  The patient has had no further epistaxis since arriving in our facility.    Review of Systems   Constitutional: Negative.    HENT: Positive for nosebleeds.    Eyes: Negative.    Respiratory: Positive for shortness of breath ( With exertion).    Cardiovascular: Positive for leg swelling ( Occasional).   Gastrointestinal: Negative.    Endocrine: Negative.    Genitourinary: Negative.    Musculoskeletal: Negative.    Skin: Negative.    Allergic/Immunologic: Negative.    Neurological: Negative.    Hematological: Negative.    Psychiatric/Behavioral: Negative.           All pertinent negatives and positives are as above. All other systems have been reviewed and are negative unless otherwise stated.     Objective    Temp:  [97.4 °F (36.3 °C)] 97.4 °F (36.3 °C)  Heart Rate:  [122] 122  Resp:  [16] 16  BP: (113)/(67) 113/67    Lab Results (last 24 hours)     ** No results found for the last 24 hours. **          Imaging Results (last 24 hours)     ** No results found for the last 24 hours. **           No intake or output data in the 24 hours ending  09/14/19 1743    Physical Exam   Constitutional: He is oriented to person, place, and time. He appears well-developed and well-nourished. He is cooperative. No distress.   HENT:   Head: Normocephalic and atraumatic.   Right Ear: External ear normal.   Left Ear: External ear normal.   Mouth/Throat: Oropharynx is clear and moist.   Left nasal tampon present with string taped to the left cheek.  No further bleeding noted.   Eyes: Conjunctivae and EOM are normal. Pupils are equal, round, and reactive to light. No scleral icterus.   Neck: Normal range of motion. Neck supple. No JVD present. No tracheal deviation present.   Cardiovascular: Normal rate, regular rhythm, normal heart sounds and intact distal pulses.   No murmur heard.  Pulmonary/Chest: Effort normal and breath sounds normal. No respiratory distress. He has no wheezes.   Abdominal: Soft. Bowel sounds are normal. He exhibits no distension and no mass. There is no tenderness.   Musculoskeletal: Normal range of motion. He exhibits edema ( Trace BLE). He exhibits no tenderness.   Neurological: He is alert and oriented to person, place, and time. He displays normal reflexes. No cranial nerve deficit. Coordination normal.   Skin: Skin is warm and dry. No rash noted.   Psychiatric: He has a normal mood and affect. His behavior is normal. Judgment and thought content normal.     Results Review:  I have reviewed the labs, radiology results, and diagnostic studies since my last progress note and made treatment changes reflective of the results.   I have reviewed the current medications.    Assessment/Plan     Active Hospital Problems    Diagnosis   • **Epistaxis   • Chronic systolic congestive heart failure (CMS/HCC)   • History of pulmonary embolism   • Nonischemic cardiomyopathy (CMS/HCC)       PLAN:  Continue the bulk of home medications  Hold Xarelto  Regular diet  N.p.o. at midnight  ENT consultation  CBC, CMP tonight and repeat CBC and BMP in a.mEssence REED  "DO Heriberto   09/14/19   5:43 PM      Electronically signed by Lb Louis DO at 9/14/2019  5:50 PM       Hospital Medications (active)       Dose Frequency Start End    acetaminophen (TYLENOL) 160 MG/5ML solution 650 mg 650 mg Every 4 Hours PRN 9/14/2019     Sig - Route: Take 20.3 mL by mouth Every 4 (Four) Hours As Needed for Mild Pain . - Oral    Linked Group 1:  \"Or\" Linked Group Details        acetaminophen (TYLENOL) suppository 650 mg 650 mg Every 4 Hours PRN 9/14/2019     Sig - Route: Insert 1 suppository into the rectum Every 4 (Four) Hours As Needed for Mild Pain . - Rectal    Linked Group 1:  \"Or\" Linked Group Details        acetaminophen (TYLENOL) tablet 650 mg 650 mg Every 4 Hours PRN 9/14/2019     Sig - Route: Take 2 tablets by mouth Every 4 (Four) Hours As Needed for Mild Pain . - Oral    Linked Group 1:  \"Or\" Linked Group Details        aluminum-magnesium hydroxide-simethicone (MAALOX MAX) 400-400-40 MG/5ML suspension 15 mL 15 mL Every 6 Hours PRN 9/14/2019     Sig - Route: Take 15 mL by mouth Every 6 (Six) Hours As Needed for Heartburn. - Oral    bisacodyl (DULCOLAX) EC tablet 5 mg 5 mg Daily PRN 9/14/2019     Sig - Route: Take 1 tablet by mouth Daily As Needed for Constipation. - Oral    ceFAZolin (ANCEF) IVPB 1 g 1 g Every 8 Hours 9/15/2019 9/18/2019    Sig - Route: Infuse 50 mL into a venous catheter Every 8 (Eight) Hours. - Intravenous    furosemide (LASIX) tablet 40 mg 40 mg 2 Times Daily 9/14/2019     Sig - Route: Take 1 tablet by mouth 2 (Two) Times a Day. - Oral    lisinopril (PRINIVIL,ZESTRIL) tablet 10 mg 10 mg Every 24 Hours Scheduled 9/16/2019     Sig - Route: Take 1 tablet by mouth Daily. - Oral    melatonin tablet 6 mg 6 mg Nightly PRN 9/14/2019     Sig - Route: Take 2 tablets by mouth At Night As Needed for Sleep. - Oral    metoprolol succinate XL (TOPROL-XL) 24 hr tablet 25 mg 25 mg Daily 9/14/2019     Sig - Route: Take 1 tablet by mouth Daily. - Oral    mupirocin " (BACTROBAN) 2 % nasal ointment  2 Times Daily 9/15/2019 10/6/2019    Sig - Route: by Each Nare route 2 (Two) Times a Day. - Each Nare    ondansetron (ZOFRAN) injection 4 mg 4 mg Every 6 Hours PRN 9/14/2019     Sig - Route: Infuse 2 mL into a venous catheter Every 6 (Six) Hours As Needed for Nausea or Vomiting. - Intravenous    oxymetazoline (AFRIN) nasal spray 2 spray 2 spray 2 Times Daily 9/14/2019 9/17/2019    Sig - Route: 2 sprays by Each Nare route 2 (Two) Times a Day. - Each Nare    pantoprazole (PROTONIX) EC tablet 40 mg 40 mg Every Morning 9/15/2019     Sig - Route: Take 1 tablet by mouth Every Morning. - Oral    sodium chloride 0.9 % flush 10 mL 10 mL Every 12 Hours Scheduled 9/14/2019     Sig - Route: Infuse 10 mL into a venous catheter Every 12 (Twelve) Hours. - Intravenous    sodium chloride 0.9 % flush 10 mL 10 mL As Needed 9/14/2019     Sig - Route: Infuse 10 mL into a venous catheter As Needed for Line Care. - Intravenous    spironolactone (ALDACTONE) tablet 25 mg 25 mg Daily 9/14/2019     Sig - Route: Take 1 tablet by mouth Daily. - Oral    traMADol (ULTRAM) tablet 50 mg 50 mg Every 6 Hours PRN 9/14/2019 9/24/2019    Sig - Route: Take 1 tablet by mouth Every 6 (Six) Hours As Needed for Moderate Pain . - Oral    lisinopril (PRINIVIL,ZESTRIL) tablet 10 mg (Discontinued) 10 mg Every 12 Hours 9/14/2019 9/15/2019    Sig - Route: Take 1 tablet by mouth Every 12 (Twelve) Hours. - Oral             Operative/Procedure Notes (last 72 hours) (Notes from 9/13/2019 10:50 AM through 9/16/2019 10:50 AM)      Andrea Brunson PA at 9/14/2019  8:25 PM      Pre-procedure Diagnoses    1. Epistaxis [R04.0]           Post-procedure Diagnoses    1. Epistaxis [R04.0]           Procedures    1. CONTROL OF EPISTAXIS [ENT8 (Custom)]          Attestation signed by Anibal Keen MD at 9/15/2019  8:56 AM       Anibal Keen MD   I have seen and/or examined Arjun Oropeza and have reviewed the notes, assessments,  and/or procedures and I concur with this documentation.    Anibal Keen MD  9/15/2019  8:56 AM                    Procedure Note    Pre-operative Diagnosis: Left Epistaxsis    Post-operative Diagnosis: same    Anesthesia: topical oxymetazoline    Proceedure:  Nasal packing    Procedure Details:    Left anterior packing removed from nose. The bilateral nasal cavity was sprayed with decongestant.  After waiting the appropriate time an afrin soaked dual balloon posterior rhinorocket was placed. There was excellent hemostasis at the end of the procedure.     Findings: Left anterior packing in place at beginning of procedure with active bleeding noted posteriorly and around the packing. The packing was removed and mild active bleeding was noted on the left. Rhinorocket was placed with achievement of hemostasis.     Condition:  Stable.  Patient tolerated procedure well.    Complications:  None      Electronically signed by Anibal Keen MD at 9/15/2019  8:56 AM          Physician Progress Notes (last 72 hours) (Notes from 2019 10:50 AM through 2019 10:50 AM)      Andrea Brunson PA at 2019  7:48 AM          [facility]  ENT PROGRESS NOTES  2019      Patient Identification:  Name: Arjun Oropeza  Age: 34 y.o.  Sex: male  :  1985  MRN: [id number]                     Date of Admission: 2019      CC:    Epistaxis associated with anticoagulation  Subjective   He has had no bleeding since the pack was placed Saturday evening.  He is tolerating his diet well and has no emesis or difficulty breathing.    HISTORY   HPI, ROS, PMFSHx reviewed:   No changes      Objective     PE:    Temp:  [98.9 °F (37.2 °C)] 98.9 °F (37.2 °C)  Heart Rate:  [104-135] 104  Resp:  [16-18] 16  BP: (118)/(77) 118/77   Body mass index is 29.07 kg/m².     General appearance: alert, well appearing, and in no distress, oriented to person, place, and time, normal appearing weight and acyanotic, in  no respiratory distress.   Ability to Communicate: normal means of communication, clear voice, normal  Hearing   Facial exam: facial movement was normal and symmetrical, nontender, no craniofacial deformities, no scars, lesions or masses, salivary glands were normal   Ears - bilateral TM's and external ear canals normal.   Nasal exam - normal and patent, no erythema, discharge or polyps, evidence of old blood and packing placed on the left without any evidence of active or recent acute bleeding. Balloons deflated.   Oropharyngeal exam - mucous membranes moist, pharynx normal without lesions and No posterior bloody drainage- dark or bright red noted.   Neck exam - supple, no significant adenopathy.     CVS exam: normal rate, regular rhythm, normal S1, S2, no murmurs, rubs, clicks or gallops.   Chest: clear to auscultation, no wheezes, rales or rhonchi, symmetric air entry.   No lymphadenopathy in the anterior or posterior neck, supraclavicular, axillary or inguinal areas. No hepato-splenomegaly noted.   Neurological exam reveals alert, oriented, normal speech, no focal findings or movement disorder noted, neck supple without rigidity, cranial nerves II through XII intact.    DATA      MEDICATIONS   I have reviewed the current MAR.     LABS AND IMAGING      I have reviewed the labs and imaging data since the patient was last seen.  Contains abnormal data CBC (No Diff)   Order: [id number]   Status:  Final result   Visible to patient:  No (Not Released)   Next appt:  None   Component  Ref Range & Units 05:27   WBC  3.40 - 10.80 10*3/mm3 10.89 Abnormally high     RBC  4.14 - 5.80 10*6/mm3 4.32    Hemoglobin  13.0 - 17.7 g/dL 12.4 Abnormally low     Hematocrit  37.5 - 51.0 % 38.2    MCV  79.0 - 97.0 fL 88.4    MCH  26.6 - 33.0 pg 28.7    MCHC  31.5 - 35.7 g/dL 32.5    RDW  12.3 - 15.4 % 20.8 Abnormally high     RDW-SD  37.0 - 54.0 fl 67.5 Abnormally high     MPV  6.0 - 12.0 fL 8.9    Platelets  140 - 450 10*3/mm3 344     Resulting Agency [facility] LAB         Specimen Collected: 09/15/19 05:27 Last Resulted: 09/15/19 05:50 Lab Flowsheet Order Details View Encounter Lab and Collection Details Routing Result History               Assessment     ASSESSMENT       Epistaxis    Nonischemic cardiomyopathy (CMS/HCC)    Chronic systolic congestive heart failure (CMS/HCC)    History of pulmonary embolism           Plan     PLAN     Balloon deflated today, if no further bleeding is noted will remove pack tomorrow morning and discharge on PO antibiotic.  Patient denies affiliation with a primary care physician though he does have a cardiologist.  We will continue IV antibiotic and topical mupirocin         ELVER Ventura  9/16/2019  7:49 AM    Electronically signed by Andrea Brunson PA at 9/16/2019  7:51 AM     Lb Louis DO at 9/15/2019 11:32 AM              [facility] Medicine Services  INPATIENT PROGRESS NOTE    Length of Stay: 1  Date of Admission: 9/14/2019  Primary Care Physician: Provider, No Known    Subjective     Chief Complaint:     Nosebleed    HPI     ENT evaluated the patient yesterday, removed the left nasal packing and placed a dual balloon posterior Rhinorocket with excellent hemostasis achieved.  He has been reevaluated by ENT this a.m. their plan is to consider deflating the balloon tomorrow and removing the pack Monday afternoon or Tuesday morning if no further bleeding was noted.  Ancef and topical mupirocin has been added to his regimen.    Review of Systems     All pertinent negatives and positives are as above. All other systems have been reviewed and are negative unless otherwise stated.     Objective    Temp:  [97.4 °F (36.3 °C)-98 °F (36.7 °C)] 97.7 °F (36.5 °C)  Heart Rate:  [] 100  Resp:  [16] 16  BP: ()/(61-77) 105/70    Lab Results (last 24 hours)     Procedure Component Value Units Date/Time    Basic Metabolic Panel [[id number]]  (Abnormal)  Collected:  09/15/19 0527    Specimen:  Blood Updated:  09/15/19 0603     Glucose 100 mg/dL      BUN 13 mg/dL      Creatinine 0.60 mg/dL      Sodium 134 mmol/L      Potassium 4.5 mmol/L      Chloride 97 mmol/L      CO2 27.0 mmol/L      Calcium 8.8 mg/dL      eGFR Non African Amer >150 mL/min/1.73      BUN/Creatinine Ratio 21.7     Anion Gap 10.0 mmol/L     Narrative:       GFR Normal >60  Chronic Kidney Disease <60  Kidney Failure <15    CBC (No Diff) [[id number]]  (Abnormal) Collected:  09/15/19 0527    Specimen:  Blood Updated:  09/15/19 0550     WBC 10.89 10*3/mm3      RBC 4.32 10*6/mm3      Hemoglobin 12.4 g/dL      Hematocrit 38.2 %      MCV 88.4 fL      MCH 28.7 pg      MCHC 32.5 g/dL      RDW 20.8 %      RDW-SD 67.5 fl      MPV 8.9 fL      Platelets 344 10*3/mm3     Comprehensive Metabolic Panel [[id number]]  (Abnormal) Collected:  09/14/19 1812    Specimen:  Blood Updated:  09/14/19 1853     Glucose 112 mg/dL      BUN 12 mg/dL      Creatinine 0.66 mg/dL      Sodium 135 mmol/L      Potassium 4.7 mmol/L      Chloride 99 mmol/L      CO2 24.0 mmol/L      Calcium 9.0 mg/dL      Total Protein 8.0 g/dL      Albumin 3.60 g/dL      ALT (SGPT) 101 U/L      AST (SGOT) 46 U/L      Alkaline Phosphatase 203 U/L      Total Bilirubin 0.9 mg/dL      eGFR Non African Amer 138 mL/min/1.73      Globulin 4.4 gm/dL      A/G Ratio 0.8 g/dL      BUN/Creatinine Ratio 18.2     Anion Gap 12.0 mmol/L     Narrative:       GFR Normal >60  Chronic Kidney Disease <60  Kidney Failure <15    CBC Auto Differential [[id number]]  (Abnormal) Collected:  09/14/19 1812    Specimen:  Blood Updated:  09/14/19 1837     WBC 13.15 10*3/mm3      RBC 4.61 10*6/mm3      Hemoglobin 13.3 g/dL      Hematocrit 40.3 %      MCV 87.4 fL      MCH 28.9 pg      MCHC 33.0 g/dL      RDW 21.1 %      RDW-SD 66.6 fl      MPV 9.0 fL      Platelets 384 10*3/mm3      Neutrophil % 73.1 %      Lymphocyte % 12.4 %      Monocyte % 9.4 %      Eosinophil % 3.3 %      Basophil % 0.5  %      Immature Grans % 1.3 %      Neutrophils, Absolute 9.61 10*3/mm3      Lymphocytes, Absolute 1.63 10*3/mm3      Monocytes, Absolute 1.24 10*3/mm3      Eosinophils, Absolute 0.44 10*3/mm3      Basophils, Absolute 0.06 10*3/mm3      Immature Grans, Absolute 0.17 10*3/mm3      nRBC 0.0 /100 WBC           Imaging Results (last 24 hours)     ** No results found for the last 24 hours. **             Intake/Output Summary (Last 24 hours) at 9/15/2019 1132  Last data filed at 9/15/2019 1010  Gross per 24 hour   Intake --   Output 2300 ml   Net -2300 ml       Physical Exam    Constitutional: He is oriented to person, place, and time. He appears well-developed and well-nourished. He is cooperative. No distress.   HENT:   Head: Normocephalic and atraumatic.   Right Ear: External ear normal.   Left Ear: External ear normal.   Mouth/Throat: Oropharynx is clear and moist. Left double-balloon Rhino Rocket present with both valves taped to the left cheek.  No further bleeding noted.   Eyes: Conjunctivae and EOM are normal. Pupils are equal, round, and reactive to light. No scleral icterus.   Neck: Normal range of motion. Neck supple. No JVD present. No tracheal deviation present.   Cardiovascular: Normal rate, regular rhythm, normal heart sounds and intact distal pulses.   No murmur heard.  Pulmonary/Chest: Effort normal and breath sounds normal. No respiratory distress. He has no wheezes.   Abdominal: Soft. Bowel sounds are normal. He exhibits no distension and no mass. There is no tenderness.   Musculoskeletal: Normal range of motion. He exhibits edema (trace BLE). He exhibits no tenderness.   Neurological: He is alert and oriented to person, place, and time. He displays normal reflexes. No cranial nerve deficit. Coordination normal.   Skin: Skin is warm and dry. No rash noted.   Psychiatric: He has a normal mood and affect.    Results Review:  I have reviewed the labs, radiology results, and diagnostic studies since my last  progress note and made treatment changes reflective of the results.   I have reviewed the current medications.    Assessment/Plan     Active Hospital Problems    Diagnosis   • **Epistaxis   • Chronic systolic congestive heart failure (CMS/HCC)   • History of pulmonary embolism   • Nonischemic cardiomyopathy (CMS/HCC)       PLAN:  Continue regular diet  Continue to hold Xarelto  ENT to reevaluate tomorrow  Repeat CBC in a.m.    Lb Louis DO   09/15/19   11:32 AM      Electronically signed by Lb Louis DO at 9/15/2019 11:35 AM     Anibal Keen MD at 9/15/2019  9:31 AM          [facility]  ENT PROGRESS NOTES  9/15/2019      Patient Identification:  Name: Arjun Oropeza  Age: 34 y.o.  Sex: male  :  1985  MRN: [id number]                     Date of Admission: 2019      CC:    Epistaxis associated with anticoagulation  Subjective   He has had no bleeding since the pack was placed yesterday upon arrival at [facility].  He is tolerating his diet well and has no emesis or difficulty breathing.    HISTORY   HPI, ROS, PMFSHx reviewed:   No changes      Objective     PE:    Temp:  [97.4 °F (36.3 °C)-98 °F (36.7 °C)] 97.7 °F (36.5 °C)  Heart Rate:  [] 100  Resp:  [16] 16  BP: ()/(61-77) 105/70   Body mass index is 29.07 kg/m².     General appearance: alert, well appearing, and in no distress, oriented to person, place, and time, normal appearing weight and acyanotic, in no respiratory distress.   Ability to Communicate: normal means of communication, clear voice, normal  Hearing   Facial exam: facial movement was normal and symmetrical, nontender, no craniofacial deformities, no scars, lesions or masses, salivary glands were normal   Ears - bilateral TM's and external ear canals normal.   Nasal exam - normal and patent, no erythema, discharge or polyps, evidence of old blood and packing placed on the left without any evidence of active or recent acute  bleeding.   Oropharyngeal exam - mucous membranes moist, pharynx normal without lesions and No posterior bloody drainage- dark or bright red noted.   Neck exam - supple, no significant adenopathy.     CVS exam: normal rate, regular rhythm, normal S1, S2, no murmurs, rubs, clicks or gallops.   Chest: clear to auscultation, no wheezes, rales or rhonchi, symmetric air entry.   No lymphadenopathy in the anterior or posterior neck, supraclavicular, axillary or inguinal areas. No hepato-splenomegaly noted.   Neurological exam reveals alert, oriented, normal speech, no focal findings or movement disorder noted, neck supple without rigidity, cranial nerves II through XII intact.    DATA      MEDICATIONS   I have reviewed the current MAR.     LABS AND IMAGING      I have reviewed the labs and imaging data since the patient was last seen.  Contains abnormal data CBC (No Diff)   Order: [id number]   Status:  Final result   Visible to patient:  No (Not Released)   Next appt:  None   Component  Ref Range & Units 05:27   WBC  3.40 - 10.80 10*3/mm3 10.89 Abnormally high     RBC  4.14 - 5.80 10*6/mm3 4.32    Hemoglobin  13.0 - 17.7 g/dL 12.4 Abnormally low     Hematocrit  37.5 - 51.0 % 38.2    MCV  79.0 - 97.0 fL 88.4    MCH  26.6 - 33.0 pg 28.7    MCHC  31.5 - 35.7 g/dL 32.5    RDW  12.3 - 15.4 % 20.8 Abnormally high     RDW-SD  37.0 - 54.0 fl 67.5 Abnormally high     MPV  6.0 - 12.0 fL 8.9    Platelets  140 - 450 10*3/mm3 344    Resulting Agency [facility] LAB         Specimen Collected: 09/15/19 05:27 Last Resulted: 09/15/19 05:50 Lab Flowsheet Order Details View Encounter Lab and Collection Details Routing Result History               Assessment     ASSESSMENT       Epistaxis    Nonischemic cardiomyopathy (CMS/HCC)    Chronic systolic congestive heart failure (CMS/HCC)    History of pulmonary embolism           Plan     PLAN     Repeat H&H in a.m.  May consider deflating balloon tomorrow and removing pack Monday afternoon or Tuesday  morning if no further bleeding noted  Patient denies affiliation with a primary care physician though he does have a cardiologist.  We will add an antibiotic IV and topical mupirocin         Anibal Keen MD  9/15/2019  9:31 AM      Electronically signed by Anibal Keen MD at 9/15/2019  9:36 AM          Consult Notes (last 72 hours) (Notes from 9/13/2019 10:50 AM through 9/16/2019 10:50 AM)      Andrea Brunson PA at 9/14/2019  8:07 PM      Consult Orders    1. Inpatient ENT Consult [[id number]] ordered by Lb Louis DO at 09/14/19 1732                 ELVER Ventura     OTOLARYNGOLOGY, HEAD AND NECK SURGERY CONSULTATION NOTE   Referring Provider: Lb Louis DO  Reason for Consultation: nosebleed  Location: 392/1  Accompanied by: nurse  Chief complaint: Epistaxis/left     HISTORY OF PRESENT ILLNESS:   I have been asked to consult on Arjun Oropeza who is a 34 y.o. male who complains of epistaxis. The symptoms are localized to the left side. The patient has had moderate symptoms. The symptoms have been relatively constant for the last 1 day. The symptoms are aggravated by  anticoagulation treatment and blood pressure elevation. The symptoms are improved by medications and nasal packing.    Patient with a recent development of a PE about a week ago and was started on Xarelto and Aspirin. Earlier today the patient developed a nosebleed and went to the [facility] emergency room. Left anterior packing was placed and the patient was transferred to [facility] for further care with ENT consult. Patient has continued to bleed around the pack and down the back of the throat. Due to this a dual balloon posterior rhinorocket was placed with excellent hemostasis.     Review of Systems  Review of Systems   Constitutional: Negative for activity change, appetite change, chills, diaphoresis, fatigue, fever and unexpected weight change.   HENT: Positive for nosebleeds. Negative for  congestion, ear discharge, ear pain, facial swelling, hearing loss, mouth sores, postnasal drip, rhinorrhea, sinus pressure, sneezing, sore throat, tinnitus, trouble swallowing and voice change.    Eyes: Negative for pain, discharge, redness, itching and visual disturbance.   Respiratory: Negative for apnea, cough, choking, chest tightness, shortness of breath, wheezing and stridor.    Gastrointestinal: Negative for nausea and vomiting.   Endocrine: Negative for cold intolerance and heat intolerance.   Musculoskeletal: Negative for arthralgias, back pain, gait problem, neck pain and neck stiffness.   Skin: Negative for rash.   Allergic/Immunologic: Negative for environmental allergies and food allergies.   Neurological: Negative for dizziness, tremors, seizures, syncope, facial asymmetry, speech difficulty, weakness, light-headedness, numbness and headaches.   Hematological: Negative for adenopathy. Does not bruise/bleed easily.   Psychiatric/Behavioral: Negative for behavioral problems, sleep disturbance and suicidal ideas. The patient is not nervous/anxious and is not hyperactive.          History  Past Medical History:   Diagnosis Date   • Cardiomyopathy (CMS/HCC)    • CHF (congestive heart failure) (CMS/HCC)    • Chronic respiratory failure (CMS/HCC)    • GERD (gastroesophageal reflux disease)    • Hypertension    • IV drug abuse (CMS/HCC)    • Methamphetamine abuse (CMS/HCC)    • Pulmonary embolism (CMS/HCC)    • Pulmonary infarct (CMS/HCC)    • Tobacco abuse      Past Surgical History:   Procedure Laterality Date   • HAND SURGERY       Family History   Problem Relation Age of Onset   • Cancer Mother    • No Known Problems Father    • No Known Problems Brother      Social History     Tobacco Use   • Smoking status: Current Every Day Smoker     Packs/day: 1.00     Years: 15.00     Pack years: 15.00     Types: Cigarettes   • Smokeless tobacco: Never Used   Substance Use Topics   • Alcohol use: Yes     Comment:  occasional   • Drug use: Yes     Types: Methamphetamines     Comment: 1 month ago     Past Medical History:    reviewed intake note  Past Surgical History:    reviewd intake note  Social History:    reviewed intake note  Family History:    reviewed intake note  Tobacco Use:    reviewed intake note    Current Medicines: Reviewed    Current Facility-Administered Medications:   •  acetaminophen (TYLENOL) tablet 650 mg, 650 mg, Oral, Q4H PRN **OR** acetaminophen (TYLENOL) 160 MG/5ML solution 650 mg, 650 mg, Oral, Q4H PRN **OR** acetaminophen (TYLENOL) suppository 650 mg, 650 mg, Rectal, Q4H PRN, Lb Louis, DO  •  aluminum-magnesium hydroxide-simethicone (MAALOX MAX) 400-400-40 MG/5ML suspension 15 mL, 15 mL, Oral, Q6H PRN, Lb Louis, DO  •  bisacodyl (DULCOLAX) EC tablet 5 mg, 5 mg, Oral, Daily PRN, Lb Louis, DO  •  furosemide (LASIX) tablet 40 mg, 40 mg, Oral, BID, Lb Louis, DO  •  lisinopril (PRINIVIL,ZESTRIL) tablet 10 mg, 10 mg, Oral, Q12H, Lb Louis, DO  •  melatonin tablet 6 mg, 6 mg, Oral, Nightly PRN, Lb Louis, DO  •  metoprolol succinate XL (TOPROL-XL) 24 hr tablet 25 mg, 25 mg, Oral, Daily, Lb Louis, DO  •  ondansetron (ZOFRAN) injection 4 mg, 4 mg, Intravenous, Q6H PRN, Lb Louis, DO  •  oxymetazoline (AFRIN) nasal spray 2 spray, 2 spray, Each Nare, BID, Andrea Brunson PA  •  [START ON 9/15/2019] pantoprazole (PROTONIX) EC tablet 40 mg, 40 mg, Oral, QAM, Lb Louis, DO  •  sodium chloride 0.9 % flush 10 mL, 10 mL, Intravenous, Q12H, Lb Louis, DO  •  sodium chloride 0.9 % flush 10 mL, 10 mL, Intravenous, PRN, Lb Louis, DO  •  spironolactone (ALDACTONE) tablet 25 mg, 25 mg, Oral, Daily, Lb Louis,   •  traMADol (ULTRAM) tablet 50 mg, 50 mg, Oral, Q6H PRN, Lb Louis, DO    Allergies:  Patient has no known allergies.    Vital Signs   Temp:  [97.4 °F (36.3 °C)-97.7 °F  (36.5 °C)] 97.7 °F (36.5 °C)  Heart Rate:  [122] 122  Resp:  [16] 16  BP: ()/(61-67) 96/61    EXAMINATION:   CONSTITIONAL: well nourished, well-developed, alert, oriented, in no acute distress     BODY HABITUS: Normal body habitus     COMMUNICATION: able to communicate normally, normal voice quality    HEAD: Normocephalic, without obvious abnormality, atraumatic    FACE: structure normal, no tenderness present, no lesions/masses, no evidence of trauma    SALIVARY GLANDS: parotid glands with no tenderness, no swelling, no masses, submandibular glands with normal size, nontender     EYE: ocular motility normal, eyelids normal, orbits normal, no proptosis, conjunctiva clear, sclera non-icteric, pupils equal, round, reactive to light and accomodation    HEARING: response to conversational voice normal    EARS: normal pinna with no lesions, Canals normal size and shape, Tympanic membranes normal, Ossicular chain intact, Middle ear clear     NOSE EXTERNAL: APPEARANCE: normal, straight, with good projection, no tenderness, no lesions, no tenderness, good nasal support, patent nares    NOSE INTERNAL:      Left- with anterior pack with bleeding noted around the pack and posteriorly, removed at bedside and replaced with a dual balloon posterior rhinorocket (no further bleeding noted), Right- old clotted blood in right nostril, no active bleeding noted    ORAL CAVITY:   LIPS:      structure normal, no tenderness on palpation, no lesions, no evidence of trauma, normal mobility and oral competence    TEETH:       dentition within normal limits for age    GUMS:      gingivae healthy, no lesions    ORAL MUCOSA:       oral mucosa normal, no mucosal lesions  TONGUE:       lingual mucosa normal without lesions, normal tongue mobility    PALATE:       hard palate with normal mucosa and structure      soft palate with normal mucosa and structure, normal mobility uvula intact    BREATH:      normal    OROPHARYNX: left sided blood  draining down from nasopharyngeal area  oropharyngeal mucosa normal, tonsil with normal appearance    NECK: normal appearance, no masses, no lesions, larynx normal mobility, trachea midline    LYMPH NODES : no adenopathy    THYROID: no overt thyromegaly, no tenderness, nodules or mass present on palpation, position midline    CHEST/RESPIRATORY: respiratory effort normal, no rales, rubs or wheezing, no stridor, normal appearance to chest    CARDIOVASCULAR: regular rate and rhythm, no murmurs, gallups, no peripheral edema    NEUROPSYCHIATRIC: oriented appropriately for age, mood normal, affect appropriate, cranial nerves intact grossly (unless specifically described), gait normal for age    RESULTS REVIEW:    I have reviewed the patients old records in the chart.  I reviewed the patient's new clinical results.    ASSESSMENT:     Patient Active Problem List    Diagnosis Date Noted   • *Epistaxis 09/14/2019   • Chronic systolic congestive heart failure (CMS/HCC) 09/14/2019   • History of pulmonary embolism 09/14/2019   • Nonischemic cardiomyopathy (CMS/HCC) 03/05/2018       PLAN:   Left anterior pack removed and replaced with a dual balloon posterior rhinorocket with excellent hemostasis. Will keep afrin at the bedside to use if bleeding from the right is noted and to spray around the pack if oozing is noted. Will evaluate tomorrow, if no further bleeding through the night will remove air from the pack.     ELVER Ventura  09/14/19  8:15 PM            Electronically signed by Andrea Brunson PA at 9/14/2019  8:25 PM         Patient transferred from [facility] for epistaxis. Rhino rocket in L nare. Small cont bleed from right and left nare. HR elevated 120's    Onset of this shift noted bleeding cont- right nare dripping, coughing up blood and some clots noted. Andrea with ent group removed rhino rocket from left nare and replaced it with dual balloon rhino rocket m  Consistently has tachycardia       The  patient is experiencing some eye lid swelling on the left side with tearing and burning    Iv abx cont. Left dual balloon rhino rocket remains in place. Slight swelling left face- no deficits, pupil reacts well, no visual disturbances. No blood noted draining back of throat

## 2019-09-16 NOTE — PROGRESS NOTES
[facility] Medicine Services  INPATIENT PROGRESS NOTE    Length of Stay: 2  Date of Admission: 9/14/2019  Primary Care Physician: Provider, No Known    Subjective     Chief Complaint:     Nosebleed    HPI     The patient has had no further epistaxis since placement of a nasal pack on Saturday evening.  The balloon has been deflated this morning and he has had no further epistaxis.  Diet has been advanced and he has tolerated that well.  IV antibiotics and topical antibiotic ointment continues.    Review of Systems     All pertinent negatives and positives are as above. All other systems have been reviewed and are negative unless otherwise stated.     Objective    Temp:  [97.7 °F (36.5 °C)-98.9 °F (37.2 °C)] 97.7 °F (36.5 °C)  Heart Rate:  [] 64  Resp:  [16-18] 16  BP: (118)/(77) 118/77    Lab Results (last 24 hours)     Procedure Component Value Units Date/Time    CBC & Differential [[id number]] Collected:  09/16/19 0630    Specimen:  Blood Updated:  09/16/19 0653    Narrative:       The following orders were created for panel order CBC & Differential.  Procedure                               Abnormality         Status                     ---------                               -----------         ------                     CBC Auto Differential[[id number]]        Abnormal            Final result                 Please view results for these tests on the individual orders.    CBC Auto Differential [[id number]]  (Abnormal) Collected:  09/16/19 0630    Specimen:  Blood Updated:  09/16/19 0653     WBC 12.33 10*3/mm3      RBC 4.37 10*6/mm3      Hemoglobin 12.7 g/dL      Hematocrit 38.6 %      MCV 88.3 fL      MCH 29.1 pg      MCHC 32.9 g/dL      RDW 20.4 %      RDW-SD 65.5 fl      MPV 9.0 fL      Platelets 363 10*3/mm3      Neutrophil % 75.7 %      Lymphocyte % 10.5 %      Monocyte % 9.7 %      Eosinophil % 2.9 %      Basophil % 0.4 %      Immature Grans % 0.8 %      Neutrophils,  Absolute 9.33 10*3/mm3      Lymphocytes, Absolute 1.30 10*3/mm3      Monocytes, Absolute 1.19 10*3/mm3      Eosinophils, Absolute 0.36 10*3/mm3      Basophils, Absolute 0.05 10*3/mm3      Immature Grans, Absolute 0.10 10*3/mm3      nRBC 0.0 /100 WBC           Imaging Results (last 24 hours)     ** No results found for the last 24 hours. **             Intake/Output Summary (Last 24 hours) at 9/16/2019 1314  Last data filed at 9/16/2019 1100  Gross per 24 hour   Intake 240 ml   Output 2125 ml   Net -1885 ml       Physical Exam    Constitutional: He is oriented to person, place, and time. He appears well-developed and well-nourished. He is cooperative. No distress.   HENT:   Head: Normocephalic and atraumatic.   Mouth/Throat: Oropharynx is clear and moist. Left double-balloon Rhino Rocket present with both valves taped to the left cheek.  No further bleeding noted.   Eyes: Conjunctivae are normal. . No scleral icterus.   Neck: Normal range of motion. Neck supple. No JVD present.  Cardiovascular: Normal rate, regular rhythm, normal heart sounds and intact distal pulses.   Pulmonary/Chest: Effort normal and breath sounds normal. No respiratory distress. He has no wheezes.   Abdominal: Soft. Bowel sounds are normal.    Musculoskeletal: Normal range of motion. He exhibits edema (trace BLE).   Neurological: He is alert and oriented to person, place, and time. Coordination normal.   Skin: Skin is warm and dry. No rash noted.   Psychiatric: He has a normal mood and affect.    Results Review:  I have reviewed the labs, radiology results, and diagnostic studies since my last progress note and made treatment changes reflective of the results.   I have reviewed the current medications.    Assessment/Plan     Active Hospital Problems    Diagnosis   • **Epistaxis   • Chronic systolic congestive heart failure (CMS/HCC)   • History of pulmonary embolism   • Nonischemic cardiomyopathy (CMS/HCC)       PLAN:  Continue IV antibiotics  per ENT  Possible discharge tomorrow with resumption of anticoagulation at the time of discharge.    Lb Louis DO   09/16/19   1:14 PM

## 2019-09-16 NOTE — PLAN OF CARE
Problem: Patient Care Overview  Goal: Plan of Care Review  Pt safety maintained, rhino rocket in place with no complaints, diet continues to be tolerated, will update with any new information   09/16/19 6209   Coping/Psychosocial   Plan of Care Reviewed With patient

## 2019-09-16 NOTE — PLAN OF CARE
Problem: Patient Care Overview  Goal: Plan of Care Review  Outcome: Ongoing (interventions implemented as appropriate)  Iv abx cont. Left dual balloon rhino rocket remains in place. Slight swelling left face- no deficits, pupil reacts well, no visual disturbances. No blood noted draining back of throat. Po pain med x1 with effective relief. Cont to monitor.   09/16/19 0135   Coping/Psychosocial   Plan of Care Reviewed With patient   Plan of Care Review   Progress no change     Goal: Individualization and Mutuality  Outcome: Ongoing (interventions implemented as appropriate)    Goal: Discharge Needs Assessment  Outcome: Ongoing (interventions implemented as appropriate)      Problem: Oral Health/Mucous Membranes Altered (Adult)  Goal: Improved Tissue Integrity/Oral Health  Outcome: Ongoing (interventions implemented as appropriate)    Goal: Adequate Pain Management  Outcome: Ongoing (interventions implemented as appropriate)

## 2019-09-16 NOTE — PROGRESS NOTES
[facility]  ENT PROGRESS NOTES  2019      Patient Identification:  Name: Arjun Oropeza  Age: 34 y.o.  Sex: male  :  1985  MRN: [id number]                     Date of Admission: 2019      CC:    Epistaxis associated with anticoagulation  Subjective   He has had no bleeding since the pack was placed day evening.  He is tolerating his diet well and has no emesis or difficulty breathing.    HISTORY   HPI, ROS, PMFSHx reviewed:   No changes       Objective     PE:    Temp:  [98.9 °F (37.2 °C)] 98.9 °F (37.2 °C)  Heart Rate:  [104-135] 104  Resp:  [16-18] 16  BP: (118)/(77) 118/77   Body mass index is 29.07 kg/m².     General appearance: alert, well appearing, and in no distress, oriented to person, place, and time, normal appearing weight and acyanotic, in no respiratory distress.   Ability to Communicate: normal means of communication, clear voice, normal  Hearing   Facial exam: facial movement was normal and symmetrical, nontender, no craniofacial deformities, no scars, lesions or masses, salivary glands were normal   Ears - bilateral TM's and external ear canals normal.   Nasal exam - normal and patent, no erythema, discharge or polyps, evidence of old blood and packing placed on the left without any evidence of active or recent acute bleeding. Balloons deflated.   Oropharyngeal exam - mucous membranes moist, pharynx normal without lesions and No posterior bloody drainage- dark or bright red noted.   Neck exam - supple, no significant adenopathy.     CVS exam: normal rate, regular rhythm, normal S1, S2, no murmurs, rubs, clicks or gallops.   Chest: clear to auscultation, no wheezes, rales or rhonchi, symmetric air entry.   No lymphadenopathy in the anterior or posterior neck, supraclavicular, axillary or inguinal areas. No hepato-splenomegaly noted.   Neurological exam reveals alert, oriented, normal speech, no focal findings or movement disorder noted, neck supple without rigidity,  cranial nerves II through XII intact.    DATA      MEDICATIONS   I have reviewed the current MAR.     LABS AND IMAGING      I have reviewed the labs and imaging data since the patient was last seen.  Contains abnormal data CBC (No Diff)   Order: [id number]   Status:  Final result   Visible to patient:  No (Not Released)   Next appt:  None   Component  Ref Range & Units 05:27   WBC  3.40 - 10.80 10*3/mm3 10.89 Abnormally high     RBC  4.14 - 5.80 10*6/mm3 4.32    Hemoglobin  13.0 - 17.7 g/dL 12.4 Abnormally low     Hematocrit  37.5 - 51.0 % 38.2    MCV  79.0 - 97.0 fL 88.4    MCH  26.6 - 33.0 pg 28.7    MCHC  31.5 - 35.7 g/dL 32.5    RDW  12.3 - 15.4 % 20.8 Abnormally high     RDW-SD  37.0 - 54.0 fl 67.5 Abnormally high     MPV  6.0 - 12.0 fL 8.9    Platelets  140 - 450 10*3/mm3 344    Resulting Agency [facility]         Specimen Collected: 09/15/19 05:27 Last Resulted: 09/15/19 05:50 Lab Flowsheet Order Details View Encounter Lab and Collection Details Routing Result History                Assessment     ASSESSMENT       Epistaxis    Nonischemic cardiomyopathy (CMS/HCC)    Chronic systolic congestive heart failure (CMS/HCC)    History of pulmonary embolism            Plan     PLAN     Balloon deflated today, if no further bleeding is noted will remove pack tomorrow morning and discharge on PO antibiotic.  Patient denies affiliation with a primary care physician though he does have a cardiologist.  We will continue IV antibiotic and topical mupirocin          ELVER Ventura  9/16/2019  7:49 AM

## 2019-09-17 VITALS
OXYGEN SATURATION: 99 % | HEIGHT: 67 IN | RESPIRATION RATE: 16 BRPM | SYSTOLIC BLOOD PRESSURE: 101 MMHG | HEART RATE: 124 BPM | DIASTOLIC BLOOD PRESSURE: 69 MMHG | TEMPERATURE: 97.6 F | WEIGHT: 185.6 LBS | BODY MASS INDEX: 29.13 KG/M2

## 2019-09-17 LAB
BASOPHILS # BLD AUTO: 0.1 10*3/MM3 (ref 0–0.2)
BASOPHILS NFR BLD AUTO: 1 % (ref 0–1.5)
DEPRECATED RDW RBC AUTO: 64.2 FL (ref 37–54)
EOSINOPHIL # BLD AUTO: 0.36 10*3/MM3 (ref 0–0.4)
EOSINOPHIL NFR BLD AUTO: 3.6 % (ref 0.3–6.2)
ERYTHROCYTE [DISTWIDTH] IN BLOOD BY AUTOMATED COUNT: 20.2 % (ref 12.3–15.4)
HCT VFR BLD AUTO: 38.2 % (ref 37.5–51)
HGB BLD-MCNC: 12.7 G/DL (ref 13–17.7)
IMM GRANULOCYTES # BLD AUTO: 0.09 10*3/MM3 (ref 0–0.05)
IMM GRANULOCYTES NFR BLD AUTO: 0.9 % (ref 0–0.5)
LYMPHOCYTES # BLD AUTO: 1.16 10*3/MM3 (ref 0.7–3.1)
LYMPHOCYTES NFR BLD AUTO: 11.4 % (ref 19.6–45.3)
MCH RBC QN AUTO: 29.1 PG (ref 26.6–33)
MCHC RBC AUTO-ENTMCNC: 33.2 G/DL (ref 31.5–35.7)
MCV RBC AUTO: 87.6 FL (ref 79–97)
MONOCYTES # BLD AUTO: 1.05 10*3/MM3 (ref 0.1–0.9)
MONOCYTES NFR BLD AUTO: 10.4 % (ref 5–12)
NEUTROPHILS # BLD AUTO: 7.38 10*3/MM3 (ref 1.7–7)
NEUTROPHILS NFR BLD AUTO: 72.7 % (ref 42.7–76)
NRBC BLD AUTO-RTO: 0 /100 WBC (ref 0–0.2)
PLATELET # BLD AUTO: 398 10*3/MM3 (ref 140–450)
PMV BLD AUTO: 9.1 FL (ref 6–12)
RBC # BLD AUTO: 4.36 10*6/MM3 (ref 4.14–5.8)
WBC NRBC COR # BLD: 10.14 10*3/MM3 (ref 3.4–10.8)

## 2019-09-17 PROCEDURE — 94799 UNLISTED PULMONARY SVC/PX: CPT

## 2019-09-17 PROCEDURE — 99231 SBSQ HOSP IP/OBS SF/LOW 25: CPT | Performed by: PHYSICIAN ASSISTANT

## 2019-09-17 PROCEDURE — 85025 COMPLETE CBC W/AUTO DIFF WBC: CPT | Performed by: FAMILY MEDICINE

## 2019-09-17 PROCEDURE — 94760 N-INVAS EAR/PLS OXIMETRY 1: CPT

## 2019-09-17 PROCEDURE — 25010000003 CEFAZOLIN 1-4 GM/50ML-% SOLUTION: Performed by: OTOLARYNGOLOGY

## 2019-09-17 PROCEDURE — 99406 BEHAV CHNG SMOKING 3-10 MIN: CPT

## 2019-09-17 RX ORDER — AMOXICILLIN 500 MG/1
500 CAPSULE ORAL 3 TIMES DAILY
Qty: 30 CAPSULE | Refills: 0 | Status: ON HOLD | OUTPATIENT
Start: 2019-09-17 | End: 2019-01-01

## 2019-09-17 RX ADMIN — SODIUM CHLORIDE, PRESERVATIVE FREE 10 ML: 5 INJECTION INTRAVENOUS at 08:36

## 2019-09-17 RX ADMIN — Medication 1 APPLICATION: at 08:35

## 2019-09-17 RX ADMIN — Medication 2 SPRAY: at 08:36

## 2019-09-17 RX ADMIN — SPIRONOLACTONE 25 MG: 25 TABLET ORAL at 08:35

## 2019-09-17 RX ADMIN — LISINOPRIL 10 MG: 10 TABLET ORAL at 08:35

## 2019-09-17 RX ADMIN — METOPROLOL SUCCINATE 25 MG: 25 TABLET, FILM COATED, EXTENDED RELEASE ORAL at 08:35

## 2019-09-17 RX ADMIN — FUROSEMIDE 40 MG: 40 TABLET ORAL at 08:35

## 2019-09-17 RX ADMIN — CEFAZOLIN SODIUM 1 G: 1 INJECTION, SOLUTION INTRAVENOUS at 02:38

## 2019-09-17 RX ADMIN — PANTOPRAZOLE SODIUM 40 MG: 40 TABLET, DELAYED RELEASE ORAL at 06:44

## 2019-09-17 NOTE — DISCHARGE SUMMARY
[facility]  DISCHARGE SUMMARY       Date of Admission: 9/14/2019  Date of Discharge:  9/17/2019  Primary Care Physician: Provider, No Known    Discharge Diagnoses:  Patient Active Problem List   Diagnosis   • Nonischemic cardiomyopathy (CMS/HCC)   • Epistaxis   • Chronic systolic congestive heart failure (CMS/HCC)   • History of pulmonary embolism         Presenting Problem/History of Present Illness:  Epistaxis [R04.0]     Chief Complaint on Day of Discharge:   No complaint    History of Present Illness on Day of Discharge:   Patient has had no further epistaxis since balloon deflation.  ENT has seen the patient this morning, blood count is stable and the patient is appropriate for discharge home on oral antibiotics and topical mupirocin.  He is to resume aspirin and Xarelto as prescribed on an outpatient basis prior to his admission.    Hospital Course   This 34-year-old white male is admitted with a chief complaint of a nosebleed which began at 130 today.  He was unable to stop the bleeding so he presented to the [facility] emergency department for further evaluation.  3 nasal tampons were inserted successively without successful tamponade of the bleeding.  ENT was not available at [facility] so our facility was contacted.  After discussing the patient's condition with the on-call ENT physician,  it was decided that the patient would be better served with transfer and admission to our facility for further treatment and management.     Patient has a history of dilated nonischemic cardiomyopathy for which he takes multiple medications including Xarelto and aspirin.  He has had no bleeding issues previously.  The patient's ejection fraction was last noted to be 30% per transthoracic echocardiogram.  H&H at the outlying facility was 14.1 and 44.3.  White blood cell count was slightly elevated 11,600.  Platelet count was within normal limits.  Both pro time and PTT were  prolonged as expected.  The patient has had no further epistaxis since arriving in our facility.    PLAN:  Continue the bulk of home medications  Hold Xarelto  Regular diet  N.p.o. at midnight  ENT consultation  CBC, CMP tonight and repeat CBC and BMP in a.m.    ENT evaluated the patient, remove the left nasal packing and placed a dual balloon posterior Rhino Rocket with excellent hemostasis.  The balloon remained in place and inflated for 2 days and was deflated.  Patient remained hospitalized for an additional day to assess for possible bleeding recurrence.  There was no bleeding recurrence and the patient was discharged on the following day with oral antibiotics and topical mupirocin.    Procedures Performed:   Removal of left anterior packing  Insertion of Afrin-soaked double-balloon posterior Rhino Rocket    Consults:   ENT:  ASSESSMENT:           Patient Active Problem List     Diagnosis Date Noted   • *Epistaxis 09/14/2019   • Chronic systolic congestive heart failure (CMS/HCC) 09/14/2019   • History of pulmonary embolism 09/14/2019   • Nonischemic cardiomyopathy (CMS/[facility]) 03/05/2018         PLAN:   Left anterior pack removed and replaced with a dual balloon posterior rhinorocket with excellent hemostasis. Will keep afrin at the bedside to use if bleeding from the right is noted and to spray around the pack if oozing is noted. Will evaluate tomorrow, if no further bleeding through the night will remove air from the pack.      ELVER Ventura      Pertinent Test Results:   Lab Results (last 7 days)     Procedure Component Value Units Date/Time    CBC & Differential [[id number]] Collected:  09/17/19 0606    Specimen:  Blood Updated:  09/17/19 0636    Narrative:       The following orders were created for panel order CBC & Differential.  Procedure                               Abnormality         Status                     ---------                               -----------         ------                      CBC Auto Differential[[id number]]        Abnormal            Final result                 Please view results for these tests on the individual orders.    CBC Auto Differential [[id number]]  (Abnormal) Collected:  09/17/19 0606    Specimen:  Blood Updated:  09/17/19 0636     WBC 10.14 10*3/mm3      RBC 4.36 10*6/mm3      Hemoglobin 12.7 g/dL      Hematocrit 38.2 %      MCV 87.6 fL      MCH 29.1 pg      MCHC 33.2 g/dL      RDW 20.2 %      RDW-SD 64.2 fl      MPV 9.1 fL      Platelets 398 10*3/mm3      Neutrophil % 72.7 %      Lymphocyte % 11.4 %      Monocyte % 10.4 %      Eosinophil % 3.6 %      Basophil % 1.0 %      Immature Grans % 0.9 %      Neutrophils, Absolute 7.38 10*3/mm3      Lymphocytes, Absolute 1.16 10*3/mm3      Monocytes, Absolute 1.05 10*3/mm3      Eosinophils, Absolute 0.36 10*3/mm3      Basophils, Absolute 0.10 10*3/mm3      Immature Grans, Absolute 0.09 10*3/mm3      nRBC 0.0 /100 WBC     CBC & Differential [[id number]] Collected:  09/16/19 0630    Specimen:  Blood Updated:  09/16/19 0653    Narrative:       The following orders were created for panel order CBC & Differential.  Procedure                               Abnormality         Status                     ---------                               -----------         ------                     CBC Auto Differential[[id number]]        Abnormal            Final result                 Please view results for these tests on the individual orders.    CBC Auto Differential [[id number]]  (Abnormal) Collected:  09/16/19 0630    Specimen:  Blood Updated:  09/16/19 0653     WBC 12.33 10*3/mm3      RBC 4.37 10*6/mm3      Hemoglobin 12.7 g/dL      Hematocrit 38.6 %      MCV 88.3 fL      MCH 29.1 pg      MCHC 32.9 g/dL      RDW 20.4 %      RDW-SD 65.5 fl      MPV 9.0 fL      Platelets 363 10*3/mm3      Neutrophil % 75.7 %      Lymphocyte % 10.5 %      Monocyte % 9.7 %      Eosinophil % 2.9 %      Basophil % 0.4 %      Immature Grans % 0.8 %       Neutrophils, Absolute 9.33 10*3/mm3      Lymphocytes, Absolute 1.30 10*3/mm3      Monocytes, Absolute 1.19 10*3/mm3      Eosinophils, Absolute 0.36 10*3/mm3      Basophils, Absolute 0.05 10*3/mm3      Immature Grans, Absolute 0.10 10*3/mm3      nRBC 0.0 /100 WBC     Basic Metabolic Panel [[id number]]  (Abnormal) Collected:  09/15/19 0527    Specimen:  Blood Updated:  09/15/19 0603     Glucose 100 mg/dL      BUN 13 mg/dL      Creatinine 0.60 mg/dL      Sodium 134 mmol/L      Potassium 4.5 mmol/L      Chloride 97 mmol/L      CO2 27.0 mmol/L      Calcium 8.8 mg/dL      eGFR Non African Amer >150 mL/min/1.73      BUN/Creatinine Ratio 21.7     Anion Gap 10.0 mmol/L     Narrative:       GFR Normal >60  Chronic Kidney Disease <60  Kidney Failure <15    CBC (No Diff) [[id number]]  (Abnormal) Collected:  09/15/19 0527    Specimen:  Blood Updated:  09/15/19 0550     WBC 10.89 10*3/mm3      RBC 4.32 10*6/mm3      Hemoglobin 12.4 g/dL      Hematocrit 38.2 %      MCV 88.4 fL      MCH 28.7 pg      MCHC 32.5 g/dL      RDW 20.8 %      RDW-SD 67.5 fl      MPV 8.9 fL      Platelets 344 10*3/mm3     Comprehensive Metabolic Panel [[id number]]  (Abnormal) Collected:  09/14/19 1812    Specimen:  Blood Updated:  09/14/19 1853     Glucose 112 mg/dL      BUN 12 mg/dL      Creatinine 0.66 mg/dL      Sodium 135 mmol/L      Potassium 4.7 mmol/L      Chloride 99 mmol/L      CO2 24.0 mmol/L      Calcium 9.0 mg/dL      Total Protein 8.0 g/dL      Albumin 3.60 g/dL      ALT (SGPT) 101 U/L      AST (SGOT) 46 U/L      Alkaline Phosphatase 203 U/L      Total Bilirubin 0.9 mg/dL      eGFR Non African Amer 138 mL/min/1.73      Globulin 4.4 gm/dL      A/G Ratio 0.8 g/dL      BUN/Creatinine Ratio 18.2     Anion Gap 12.0 mmol/L     Narrative:       GFR Normal >60  Chronic Kidney Disease <60  Kidney Failure <15    CBC Auto Differential [[id number]]  (Abnormal) Collected:  09/14/19 1812    Specimen:  Blood Updated:  09/14/19 1837     WBC 13.15 10*3/mm3      RBC  "4.61 10*6/mm3      Hemoglobin 13.3 g/dL      Hematocrit 40.3 %      MCV 87.4 fL      MCH 28.9 pg      MCHC 33.0 g/dL      RDW 21.1 %      RDW-SD 66.6 fl      MPV 9.0 fL      Platelets 384 10*3/mm3      Neutrophil % 73.1 %      Lymphocyte % 12.4 %      Monocyte % 9.4 %      Eosinophil % 3.3 %      Basophil % 0.5 %      Immature Grans % 1.3 %      Neutrophils, Absolute 9.61 10*3/mm3      Lymphocytes, Absolute 1.63 10*3/mm3      Monocytes, Absolute 1.24 10*3/mm3      Eosinophils, Absolute 0.44 10*3/mm3      Basophils, Absolute 0.06 10*3/mm3      Immature Grans, Absolute 0.17 10*3/mm3      nRBC 0.0 /100 WBC           Condition on Discharge:    Stable with no further epistaxis    Physical Exam on Discharge:  /69 (BP Location: Right arm, Patient Position: Sitting)   Pulse (!) 124   Temp 97.6 °F (36.4 °C)   Resp 16   Ht 170.2 cm (67\")   Wt 84.2 kg (185 lb 9.6 oz)   SpO2 99%   BMI 29.07 kg/m²   Physical Exam  Constitutional: He is oriented to person, place, and time. He appears well-developed and well-nourished. He is cooperative. No distress.   HENT:   Head: Normocephalic and atraumatic.   Mouth/Throat: Oropharynx is clear and moist. Left double-balloon Rhino Rocket present with both valves taped to the left cheek.  No further bleeding noted.   Eyes: Conjunctivae are normal. . No scleral icterus.   Neck: Normal range of motion. Neck supple. No JVD present.  Cardiovascular: Normal rate, regular rhythm, normal heart sounds and intact distal pulses.   Pulmonary/Chest: Effort normal and breath sounds normal. No respiratory distress. He has no wheezes.   Abdominal: Soft. Bowel sounds are normal.    Musculoskeletal: Normal range of motion. He exhibits edema (trace BLE).   Neurological: He is alert and oriented to person, place, and time. Coordination normal.   Skin: Skin is warm and dry. No rash noted.   Psychiatric: He has a normal mood and affect.    Discharge Disposition:  Home or Self Care    Discharge " Medications:     Discharge Medications      New Medications      Instructions Start Date   amoxicillin 500 MG capsule  Commonly known as:  AMOXIL   500 mg, Oral, 3 Times Daily      mupirocin 2 % ointment  Commonly known as:  BACTROBAN   Topical, 3 Times Daily      rivaroxaban 15 MG tablet  Commonly known as:  XARELTO   15 mg, Oral, 2 Times Daily With Meals         Continue These Medications      Instructions Start Date   albuterol sulfate  (90 Base) MCG/ACT inhaler  Commonly known as:  PROVENTIL HFA;VENTOLIN HFA;PROAIR HFA   2 puffs, Inhalation, Every 6 Hours PRN      aspirin 81 MG EC tablet   81 mg, Oral, Daily      furosemide 40 MG tablet  Commonly known as:  LASIX   40 mg, Oral, 2 Times Daily      lisinopril 10 MG tablet  Commonly known as:  PRINIVIL,ZESTRIL   10 mg, Oral, Daily      metoprolol succinate XL 25 MG 24 hr tablet  Commonly known as:  TOPROL-XL   25 mg, Oral, Daily      pantoprazole 40 MG EC tablet  Commonly known as:  PROTONIX   40 mg, Oral, Daily      sodium chloride 1 g tablet   1 g, Oral, 3 Times Daily      spironolactone 25 MG tablet  Commonly known as:  ALDACTONE   25 mg, Oral, Daily      sucralfate 1 g tablet  Commonly known as:  CARAFATE   1 g, Oral, 4 Times Daily             Discharge Diet:   Diet Instructions     Diet: Regular      Discharge Diet:  Regular          Discharge Care Plan / Instructions:   Discharge home    Activity at Discharge:   Activity Instructions     Activity as Tolerated            Follow-up Appointments:  Follow-up with Dr. Keen office in 3 to 4 weeks  Follow-up with Dr. Wagner next week to establish primary care       Lb Louis DO  09/17/19  9:26 AM    Time: Discharge Less than 30 min    Please note that portions of this note may have been completed with a voice recognition program. Efforts were made to edit the dictations, but occasionally words are mistranscribed.

## 2019-09-17 NOTE — PAYOR COMM NOTE
"Arjun Hernández (34 y.o. Male) [id number]    D/C  Notification    [facility]   avani phone   Fax       Date of Birth Social Security Number Address Home Phone MRN    1985  [id number] [facility] [address] [phone] [id number]    Confucianism Marital Status          Other Single       Admission Date Admission Type Admitting Provider Attending Provider Department, Room/Bed    9/14/19 Urgent Lb Louis DO  [facility] 3C, 392/1    Discharge Date Discharge Disposition Discharge Destination        9/17/2019 Home or Self Care              Attending Provider:  (none)   Allergies:  No Known Allergies    Isolation:  None   Infection:  None   Code Status:  CPR    Ht:  170.2 cm (67\")   Wt:  84.2 kg (185 lb 9.6 oz)    Admission Cmt:  None   Principal Problem:  Epistaxis [R04.0]                 Active Insurance as of 9/14/2019     Primary Coverage     Payor Plan Insurance Group Employer/Plan Group    [employer] [id number]     Payor Plan Address Payor Plan Phone Number Payor Plan Fax Number Effective Dates    [address] [phone]  11/1/1997 - None Entered    [facility] [address]       Subscriber Name Subscriber Birth Date Member ID       ARJUN HERNÁNDEZ 1985 [id number]                 Emergency Contacts      (Rel.) Home Phone Work Phone Mobile Phone    Aurelia Hernández (Father) -- -- [phone]    Bam Hernández (Brother) [phone] -- --               Discharge Summary      Lb Louis DO at 09/17/19 0850              [facility] Medicine Services  DISCHARGE SUMMARY       Date of Admission: 9/14/2019  Date of Discharge:  9/17/2019  Primary Care Physician: Provider, No Known    Discharge Diagnoses:  Patient Active Problem List   Diagnosis   • Nonischemic cardiomyopathy (CMS/HCC)   • Epistaxis   • Chronic systolic congestive heart failure (CMS/HCC)   • History of pulmonary embolism "         Presenting Problem/History of Present Illness:  Epistaxis [R04.0]     Chief Complaint on Day of Discharge:   No complaint    History of Present Illness on Day of Discharge:   Patient has had no further epistaxis since balloon deflation.  ENT has seen the patient this morning, blood count is stable and the patient is appropriate for discharge home on oral antibiotics and topical mupirocin.  He is to resume aspirin and Xarelto as prescribed on an outpatient basis prior to his admission.    Hospital Course   This 34-year-old white male is admitted with a chief complaint of a nosebleed which began at 130 today.  He was unable to stop the bleeding so he presented to the [facility] emergency department for further evaluation.  3 nasal tampons were inserted successively without successful tamponade of the bleeding.  ENT was not available at [facility] so our facility was contacted.  After discussing the patient's condition with the on-call ENT physician,  it was decided that the patient would be better served with transfer and admission to our facility for further treatment and management.     Patient has a history of dilated nonischemic cardiomyopathy for which he takes multiple medications including Xarelto and aspirin.  He has had no bleeding issues previously.  The patient's ejection fraction was last noted to be 30% per transthoracic echocardiogram.  H&H at the outlying facility was 14.1 and 44.3.  White blood cell count was slightly elevated 11,600.  Platelet count was within normal limits.  Both pro time and PTT were prolonged as expected.  The patient has had no further epistaxis since arriving in our facility.    PLAN:  Continue the bulk of home medications  Hold Xarelto  Regular diet  N.p.o. at midnight  ENT consultation  CBC, CMP tonight and repeat CBC and BMP in a.m.    ENT evaluated the patient, remove the left nasal packing and placed a dual balloon posterior Rhino Rocket with excellent hemostasis.  The  balloon remained in place and inflated for 2 days and was deflated.  Patient remained hospitalized for an additional day to assess for possible bleeding recurrence.  There was no bleeding recurrence and the patient was discharged on the following day with oral antibiotics and topical mupirocin.    Procedures Performed:   Removal of left anterior packing  Insertion of Afrin-soaked double-balloon posterior Rhino Rocket    Consults:   ENT:  ASSESSMENT:           Patient Active Problem List     Diagnosis Date Noted   • *Epistaxis 09/14/2019   • Chronic systolic congestive heart failure (CMS/HCC) 09/14/2019   • History of pulmonary embolism 09/14/2019   • Nonischemic cardiomyopathy (CMS/[facility]) 03/05/2018         PLAN:   Left anterior pack removed and replaced with a dual balloon posterior rhinorocket with excellent hemostasis. Will keep afrin at the bedside to use if bleeding from the right is noted and to spray around the pack if oozing is noted. Will evaluate tomorrow, if no further bleeding through the night will remove air from the pack.      ELVER Ventura      Pertinent Test Results:   Lab Results (last 7 days)     Procedure Component Value Units Date/Time    CBC & Differential [[id number]] Collected:  09/17/19 0606    Specimen:  Blood Updated:  09/17/19 0636    Narrative:       The following orders were created for panel order CBC & Differential.  Procedure                               Abnormality         Status                     ---------                               -----------         ------                     CBC Auto Differential[[id number]]        Abnormal            Final result                 Please view results for these tests on the individual orders.    CBC Auto Differential [[id number]]  (Abnormal) Collected:  09/17/19 0606    Specimen:  Blood Updated:  09/17/19 0636     WBC 10.14 10*3/mm3      RBC 4.36 10*6/mm3      Hemoglobin 12.7 g/dL      Hematocrit 38.2 %      MCV 87.6 fL      MCH 29.1  pg      MCHC 33.2 g/dL      RDW 20.2 %      RDW-SD 64.2 fl      MPV 9.1 fL      Platelets 398 10*3/mm3      Neutrophil % 72.7 %      Lymphocyte % 11.4 %      Monocyte % 10.4 %      Eosinophil % 3.6 %      Basophil % 1.0 %      Immature Grans % 0.9 %      Neutrophils, Absolute 7.38 10*3/mm3      Lymphocytes, Absolute 1.16 10*3/mm3      Monocytes, Absolute 1.05 10*3/mm3      Eosinophils, Absolute 0.36 10*3/mm3      Basophils, Absolute 0.10 10*3/mm3      Immature Grans, Absolute 0.09 10*3/mm3      nRBC 0.0 /100 WBC     CBC & Differential [[id number]] Collected:  09/16/19 0630    Specimen:  Blood Updated:  09/16/19 0653    Narrative:       The following orders were created for panel order CBC & Differential.  Procedure                               Abnormality         Status                     ---------                               -----------         ------                     CBC Auto Differential[[id number]]        Abnormal            Final result                 Please view results for these tests on the individual orders.    CBC Auto Differential [[id number]]  (Abnormal) Collected:  09/16/19 0630    Specimen:  Blood Updated:  09/16/19 0653     WBC 12.33 10*3/mm3      RBC 4.37 10*6/mm3      Hemoglobin 12.7 g/dL      Hematocrit 38.6 %      MCV 88.3 fL      MCH 29.1 pg      MCHC 32.9 g/dL      RDW 20.4 %      RDW-SD 65.5 fl      MPV 9.0 fL      Platelets 363 10*3/mm3      Neutrophil % 75.7 %      Lymphocyte % 10.5 %      Monocyte % 9.7 %      Eosinophil % 2.9 %      Basophil % 0.4 %      Immature Grans % 0.8 %      Neutrophils, Absolute 9.33 10*3/mm3      Lymphocytes, Absolute 1.30 10*3/mm3      Monocytes, Absolute 1.19 10*3/mm3      Eosinophils, Absolute 0.36 10*3/mm3      Basophils, Absolute 0.05 10*3/mm3      Immature Grans, Absolute 0.10 10*3/mm3      nRBC 0.0 /100 WBC     Basic Metabolic Panel [[id number]]  (Abnormal) Collected:  09/15/19 0527    Specimen:  Blood Updated:  09/15/19 0603     Glucose 100 mg/dL       BUN 13 mg/dL      Creatinine 0.60 mg/dL      Sodium 134 mmol/L      Potassium 4.5 mmol/L      Chloride 97 mmol/L      CO2 27.0 mmol/L      Calcium 8.8 mg/dL      eGFR Non African Amer >150 mL/min/1.73      BUN/Creatinine Ratio 21.7     Anion Gap 10.0 mmol/L     Narrative:       GFR Normal >60  Chronic Kidney Disease <60  Kidney Failure <15    CBC (No Diff) [[id number]]  (Abnormal) Collected:  09/15/19 0527    Specimen:  Blood Updated:  09/15/19 0550     WBC 10.89 10*3/mm3      RBC 4.32 10*6/mm3      Hemoglobin 12.4 g/dL      Hematocrit 38.2 %      MCV 88.4 fL      MCH 28.7 pg      MCHC 32.5 g/dL      RDW 20.8 %      RDW-SD 67.5 fl      MPV 8.9 fL      Platelets 344 10*3/mm3     Comprehensive Metabolic Panel [[id number]]  (Abnormal) Collected:  09/14/19 1812    Specimen:  Blood Updated:  09/14/19 1853     Glucose 112 mg/dL      BUN 12 mg/dL      Creatinine 0.66 mg/dL      Sodium 135 mmol/L      Potassium 4.7 mmol/L      Chloride 99 mmol/L      CO2 24.0 mmol/L      Calcium 9.0 mg/dL      Total Protein 8.0 g/dL      Albumin 3.60 g/dL      ALT (SGPT) 101 U/L      AST (SGOT) 46 U/L      Alkaline Phosphatase 203 U/L      Total Bilirubin 0.9 mg/dL      eGFR Non African Amer 138 mL/min/1.73      Globulin 4.4 gm/dL      A/G Ratio 0.8 g/dL      BUN/Creatinine Ratio 18.2     Anion Gap 12.0 mmol/L     Narrative:       GFR Normal >60  Chronic Kidney Disease <60  Kidney Failure <15    CBC Auto Differential [[id number]]  (Abnormal) Collected:  09/14/19 1812    Specimen:  Blood Updated:  09/14/19 1837     WBC 13.15 10*3/mm3      RBC 4.61 10*6/mm3      Hemoglobin 13.3 g/dL      Hematocrit 40.3 %      MCV 87.4 fL      MCH 28.9 pg      MCHC 33.0 g/dL      RDW 21.1 %      RDW-SD 66.6 fl      MPV 9.0 fL      Platelets 384 10*3/mm3      Neutrophil % 73.1 %      Lymphocyte % 12.4 %      Monocyte % 9.4 %      Eosinophil % 3.3 %      Basophil % 0.5 %      Immature Grans % 1.3 %      Neutrophils, Absolute 9.61 10*3/mm3      Lymphocytes,  "Absolute 1.63 10*3/mm3      Monocytes, Absolute 1.24 10*3/mm3      Eosinophils, Absolute 0.44 10*3/mm3      Basophils, Absolute 0.06 10*3/mm3      Immature Grans, Absolute 0.17 10*3/mm3      nRBC 0.0 /100 WBC           Condition on Discharge:    Stable with no further epistaxis    Physical Exam on Discharge:  /69 (BP Location: Right arm, Patient Position: Sitting)   Pulse (!) 124   Temp 97.6 °F (36.4 °C)   Resp 16   Ht 170.2 cm (67\")   Wt 84.2 kg (185 lb 9.6 oz)   SpO2 99%   BMI 29.07 kg/m²    Physical Exam  Constitutional: He is oriented to person, place, and time. He appears well-developed and well-nourished. He is cooperative. No distress.   HENT:   Head: Normocephalic and atraumatic.   Mouth/Throat: Oropharynx is clear and moist. Left double-balloon Rhino Rocket present with both valves taped to the left cheek.  No further bleeding noted.   Eyes: Conjunctivae are normal. . No scleral icterus.   Neck: Normal range of motion. Neck supple. No JVD present.  Cardiovascular: Normal rate, regular rhythm, normal heart sounds and intact distal pulses.   Pulmonary/Chest: Effort normal and breath sounds normal. No respiratory distress. He has no wheezes.   Abdominal: Soft. Bowel sounds are normal.    Musculoskeletal: Normal range of motion. He exhibits edema (trace BLE).   Neurological: He is alert and oriented to person, place, and time. Coordination normal.   Skin: Skin is warm and dry. No rash noted.   Psychiatric: He has a normal mood and affect.    Discharge Disposition:  Home or Self Care    Discharge Medications:     Discharge Medications      New Medications      Instructions Start Date   amoxicillin 500 MG capsule  Commonly known as:  AMOXIL   500 mg, Oral, 3 Times Daily      mupirocin 2 % ointment  Commonly known as:  BACTROBAN   Topical, 3 Times Daily      rivaroxaban 15 MG tablet  Commonly known as:  XARELTO   15 mg, Oral, 2 Times Daily With Meals         Continue These Medications      " Instructions Start Date   albuterol sulfate  (90 Base) MCG/ACT inhaler  Commonly known as:  PROVENTIL HFA;VENTOLIN HFA;PROAIR HFA   2 puffs, Inhalation, Every 6 Hours PRN      aspirin 81 MG EC tablet   81 mg, Oral, Daily      furosemide 40 MG tablet  Commonly known as:  LASIX   40 mg, Oral, 2 Times Daily      lisinopril 10 MG tablet  Commonly known as:  PRINIVIL,ZESTRIL   10 mg, Oral, Daily      metoprolol succinate XL 25 MG 24 hr tablet  Commonly known as:  TOPROL-XL   25 mg, Oral, Daily      pantoprazole 40 MG EC tablet  Commonly known as:  PROTONIX   40 mg, Oral, Daily      sodium chloride 1 g tablet   1 g, Oral, 3 Times Daily      spironolactone 25 MG tablet  Commonly known as:  ALDACTONE   25 mg, Oral, Daily      sucralfate 1 g tablet  Commonly known as:  CARAFATE   1 g, Oral, 4 Times Daily             Discharge Diet:   Diet Instructions     Diet: Regular      Discharge Diet:  Regular          Discharge Care Plan / Instructions:   Discharge home    Activity at Discharge:   Activity Instructions     Activity as Tolerated            Follow-up Appointments:  Follow-up with Dr. Keen office in 3 to 4 weeks  Follow-up with Dr. Wagner next week to establish primary care       Lb Louis DO  09/17/19  9:26 AM    Time: Discharge Less than 30 min    Please note that portions of this note may have been completed with a voice recognition program. Efforts were made to edit the dictations, but occasionally words are mistranscribed.            Electronically signed by Lb Louis DO at 09/17/19 0926       Discharge Order (From admission, onward)    Start     Ordered    09/17/19 0841  Discharge patient  Once     Expected Discharge Date:  09/17/19    Discharge Disposition:  Home or Self Care    Physician of Record for Attribution - Please select from Treatment Team:  MIKE KEEN [[id number]]    Review needed by CMO to determine Physician of Record:  No       Question Answer Comment   Physician of  Record for Attribution - Please select from Treatment Team MIKE GUERRA    Review needed by CMO to determine Physician of Record No        09/17/19 0860

## 2019-09-17 NOTE — PROGRESS NOTES
[facility]  ENT PROGRESS NOTES  2019      Patient Identification:  Name: Arjun Oropeza  Age: 34 y.o.  Sex: male  :  1985  MRN: [id number]                     Date of Admission: 2019      CC:    Epistaxis associated with anticoagulation  Subjective   He has had no bleeding since the pack was placed day evening.  Air was removed from balloons yesterday and the patient denies any bleeding afterwards. He is tolerating his diet well and has no emesis or difficulty breathing.    HISTORY   HPI, ROS, PMFSHx reviewed:   No changes       Objective     PE:    Temp:  [97.4 °F (36.3 °C)-98.5 °F (36.9 °C)] 97.4 °F (36.3 °C)  Heart Rate:  [] 119  Resp:  [16] 16  BP: (102-118)/(61-77) 104/72   Body mass index is 29.07 kg/m².     General appearance: alert, well appearing, and in no distress, oriented to person, place, and time, normal appearing weight and acyanotic, in no respiratory distress.   Ability to Communicate: normal means of communication, clear voice, normal  Hearing   Facial exam: facial movement was normal and symmetrical, nontender, no craniofacial deformities, no scars, lesions or masses, salivary glands were normal   Ears - bilateral TM's and external ear canals normal.   Nasal exam - normal and patent, no erythema, discharge or polyps, evidence of old blood on the left without any evidence of active or recent acute bleeding. Balloons deflated. Packing removed at the bed side. No active bleeding noted   Oropharyngeal exam - mucous membranes moist, pharynx normal without lesions and No posterior bloody drainage- dark or bright red noted.   Neck exam - supple, no significant adenopathy.   CVS exam: normal rate, regular rhythm, normal S1, S2, no murmurs, rubs, clicks or gallops.   Chest: clear to auscultation, no wheezes, rales or rhonchi, symmetric air entry.   No lymphadenopathy in the anterior or posterior neck, supraclavicular, axillary or inguinal areas. No hepato-splenomegaly  noted.   Neurological exam reveals alert, oriented, normal speech, no focal findings or movement disorder noted, neck supple without rigidity, cranial nerves II through XII intact.    DATA      MEDICATIONS   I have reviewed the current MAR.     LABS AND IMAGING      I have reviewed the labs and imaging data since the patient was last seen.  Contains abnormal data CBC (No Diff)   Order: [id number]   Status:  Final result   Visible to patient:  No (Not Released)   Next appt:  None   Component  Ref Range & Units 05:27   WBC  3.40 - 10.80 10*3/mm3 10.89 Abnormally high     RBC  4.14 - 5.80 10*6/mm3 4.32    Hemoglobin  13.0 - 17.7 g/dL 12.4 Abnormally low     Hematocrit  37.5 - 51.0 % 38.2    MCV  79.0 - 97.0 fL 88.4    MCH  26.6 - 33.0 pg 28.7    MCHC  31.5 - 35.7 g/dL 32.5    RDW  12.3 - 15.4 % 20.8 Abnormally high     RDW-SD  37.0 - 54.0 fl 67.5 Abnormally high     MPV  6.0 - 12.0 fL 8.9    Platelets  140 - 450 10*3/mm3 344    Resulting Agency [facility]         Specimen Collected: 09/15/19 05:27 Last Resulted: 09/15/19 05:50 Lab Flowsheet Order Details View Encounter Lab and Collection Details Routing Result History                Assessment     ASSESSMENT       Epistaxis    Nonischemic cardiomyopathy (CMS/HCC)    Chronic systolic congestive heart failure (CMS/HCC)    History of pulmonary embolism            Plan     PLAN     Nasal pack removed, no bleeding noted, ok to discharge on PO antibiotic (amoxil), continue topical bactroban in the nose 2-3 times a day, saline spray as needed, afrin only if bleeding is noted.     Follow-up in 3-4 weeks.  Nosebleed Fact Sheet    Nosebleeds are a common problem that we see in our clinic and can occur in any age group.  They can range from spotty bleeding to episodes of uncontrolled profuse bleeding.  Multiple medical conditions can contribute to nosebleeds and fortunately most of these can be controlled to limit and/or eliminate these bleeding episodes.    Most commonly bleeding  occurs in the anterior or front part of the nose on the septum, or center wall of the nose.  This is because this area has several blood vessels vulnerable to both the drying effect of breathing, and to finger trauma of nose picking.  When this area become dry, the lining of the nose in this area can crack and cause the blood vessels underneath to bleed.    The following condition can contribute to and cause nosebleeds:  1. High Blood Pressure - When the blood pressure is high, the increased pressure can cause blood to be more easily pushed through a damaged blood vessel.  If your blood pressure is uncontrolled over 140 systolic, you may need to consult your primary-care physician to help limit your nosebleeds.  2. Low Platelets and Blood Clotting Factors - Certain medical conditions such as cancer and bleeding disorders can interfere with the body’s ability to form blood clots.  This interferes with the body’s own ability to stop nosebleeds.  3. Medications - Aspirin and aspirin type products such as nonsteroidal anti-inflammatory medications can interfere with body’s ability to form blood clots.  Nonsteroidal anti-inflammatory medications include medicines like ibuprofen (Motrin), naprosyn (Aleve), and Goody’s and BC powder.  Several cold and flu remedies also include aspirin.  If there’s a question, please ask you doctor or pharmacist.  Recently, it has been shown that some herbal medications, such as high doses of Vitamin E, can also interfere with the body’s ability to form clots.  Often times, these types of medications need to be discontinued to help with nosebleed prevention.  4. Dryness - The nose needs to stay nice and moist to try to prevent any kind of cracking or injury to the nasal lining and underlying blood vessels.  The worst time of year for nosebleeds is in the wintertime when the humidity is low.  Occasionally, a nasal deviation can cause abnormal airflow that dries out an area on the septum and  cause a nosebleed.  5. Trauma -One of the most common reasons for nosebleeds is trauma caused by nose picking or external injury.  If an area in your nose is irritated, scratching or picking it can cause it to easily bleed.  Recommendations for Preventing Nosebleeds:  1. Keep well hydrated by drinking at least six to eight glasses of water a day.  2. Increase the moisture of the nose by placing Vaseline in the front of the nose twice a day and irrigating the nose with nasal saline spray often (every 1-2 hrs).  3. Hold on taking aspirin or aspirin type products.  4. If you have high blood pressure, make sure this is well controlled.  5. Avoid nose picking and other forms of nasal trauma.  What to Do if Your Nose Bleeds:  1. Spray Afrin or a similar 12 hr nasal decongestant into the side that is bleeding.  2. With your thumb and forefinger, grasp the fleshy part of the nose and hold firm pressure.  If the bleeding is on the front part of the nose, it should make it stop.  Hold this pressure for 5 minutes on the clock then release.  If the nose is still bleeding, repeat.  If the nose is still bleeding after trying this 2-3 times, you may need to go to the emergency room for evaluation.  3. Call your doctor or go to the emergency room if you cannot get the bleeding to stop or if you feel dizzy or lightheaded after a large bleed.                ELVER Ventura  9/17/2019  7:53 AM

## 2019-09-18 ENCOUNTER — READMISSION MANAGEMENT (OUTPATIENT)
Dept: CALL CENTER | Facility: HOSPITAL | Age: 34
End: 2019-09-18

## 2019-09-18 NOTE — PAYOR COMM NOTE
"DC HOME 9-17-19  [id number]    Arjun Hernández (34 y.o. Male)     Date of Birth Social Security Number Address Home Phone MRN    1985  [address] [facility] [address] [phone] [id number]    Zoroastrianism Marital Status          Other Single       Admission Date Admission Type Admitting Provider Attending Provider Department, Room/Bed    9/14/19 Urgent Lb Louis DO  [facility] 3C, 392/1    Discharge Date Discharge Disposition Discharge Destination        9/17/2019 Home or Self Care              Attending Provider:  (none)   Allergies:  No Known Allergies    Isolation:  None   Infection:  None   Code Status:  Prior    Ht:  170.2 cm (67\")   Wt:  84.2 kg (185 lb 9.6 oz)    Admission Cmt:  None   Principal Problem:  Epistaxis [R04.0]                 Active Insurance as of 9/14/2019     Primary Coverage     Payor Plan Insurance Group Employer/Plan Group    [id number]     Payor Plan Address Payor Plan Phone Number Payor Plan Fax Number Effective Dates    [address] [phone]  11/1/1997 - None Entered    [facility] [address]       Subscriber Name Subscriber Birth Date Member ID       ARJUN HERNÁNDEZ 1985 [id number]                 Emergency Contacts      (Rel.) Home Phone Work Phone Mobile Phone    Aurelia Hernández (Father) -- -- [phone]    Bam Hernández (Brother) [phone] -- --               Discharge Summary      Lb Louis DO at 09/17/19 0850              [facility]  DISCHARGE SUMMARY       Date of Admission: 9/14/2019  Date of Discharge:  9/17/2019  Primary Care Physician: Provider, No Known    Discharge Diagnoses:  Patient Active Problem List   Diagnosis   • Nonischemic cardiomyopathy (CMS/HCC)   • Epistaxis   • Chronic systolic congestive heart failure (CMS/HCC)   • History of pulmonary embolism         Presenting Problem/History of Present Illness:  Epistaxis " [R04.0]     Chief Complaint on Day of Discharge:   No complaint    History of Present Illness on Day of Discharge:   Patient has had no further epistaxis since balloon deflation.  ENT has seen the patient this morning, blood count is stable and the patient is appropriate for discharge home on oral antibiotics and topical mupirocin.  He is to resume aspirin and Xarelto as prescribed on an outpatient basis prior to his admission.    Hospital Course   This 34-year-old white male is admitted with a chief complaint of a nosebleed which began at 130 today.  He was unable to stop the bleeding so he presented to the [facility] emergency department for further evaluation.  3 nasal tampons were inserted successively without successful tamponade of the bleeding.  ENT was not available at [facility] so our facility was contacted.  After discussing the patient's condition with the on-call ENT physician,  it was decided that the patient would be better served with transfer and admission to our facility for further treatment and management.     Patient has a history of dilated nonischemic cardiomyopathy for which he takes multiple medications including Xarelto and aspirin.  He has had no bleeding issues previously.  The patient's ejection fraction was last noted to be 30% per transthoracic echocardiogram.  H&H at the outlying facility was 14.1 and 44.3.  White blood cell count was slightly elevated 11,600.  Platelet count was within normal limits.  Both pro time and PTT were prolonged as expected.  The patient has had no further epistaxis since arriving in our facility.    PLAN:  Continue the bulk of home medications  Hold Xarelto  Regular diet  N.p.o. at midnight  ENT consultation  CBC, CMP tonight and repeat CBC and BMP in a.m.    ENT evaluated the patient, remove the left nasal packing and placed a dual balloon posterior Rhino Rocket with excellent hemostasis.  The balloon remained in place and inflated for 2 days and was deflated.   Patient remained hospitalized for an additional day to assess for possible bleeding recurrence.  There was no bleeding recurrence and the patient was discharged on the following day with oral antibiotics and topical mupirocin.    Procedures Performed:   Removal of left anterior packing  Insertion of Afrin-soaked double-balloon posterior Rhino Rocket    Consults:   ENT:  ASSESSMENT:           Patient Active Problem List     Diagnosis Date Noted   • *Epistaxis 09/14/2019   • Chronic systolic congestive heart failure (CMS/HCC) 09/14/2019   • History of pulmonary embolism 09/14/2019   • Nonischemic cardiomyopathy (CMS/[facility]) 03/05/2018         PLAN:   Left anterior pack removed and replaced with a dual balloon posterior rhinorocket with excellent hemostasis. Will keep afrin at the bedside to use if bleeding from the right is noted and to spray around the pack if oozing is noted. Will evaluate tomorrow, if no further bleeding through the night will remove air from the pack.      ELVER Ventura      Pertinent Test Results:   Lab Results (last 7 days)     Procedure Component Value Units Date/Time    CBC & Differential [[id number]] Collected:  09/17/19 0606    Specimen:  Blood Updated:  09/17/19 0636    Narrative:       The following orders were created for panel order CBC & Differential.  Procedure                               Abnormality         Status                     ---------                               -----------         ------                     CBC Auto Differential[[id number]]        Abnormal            Final result                 Please view results for these tests on the individual orders.    CBC Auto Differential [[id number]]  (Abnormal) Collected:  09/17/19 0606    Specimen:  Blood Updated:  09/17/19 0636     WBC 10.14 10*3/mm3      RBC 4.36 10*6/mm3      Hemoglobin 12.7 g/dL      Hematocrit 38.2 %      MCV 87.6 fL      MCH 29.1 pg      MCHC 33.2 g/dL      RDW 20.2 %      RDW-SD 64.2 fl      MPV  9.1 fL      Platelets 398 10*3/mm3      Neutrophil % 72.7 %      Lymphocyte % 11.4 %      Monocyte % 10.4 %      Eosinophil % 3.6 %      Basophil % 1.0 %      Immature Grans % 0.9 %      Neutrophils, Absolute 7.38 10*3/mm3      Lymphocytes, Absolute 1.16 10*3/mm3      Monocytes, Absolute 1.05 10*3/mm3      Eosinophils, Absolute 0.36 10*3/mm3      Basophils, Absolute 0.10 10*3/mm3      Immature Grans, Absolute 0.09 10*3/mm3      nRBC 0.0 /100 WBC     CBC & Differential [[id number]] Collected:  09/16/19 0630    Specimen:  Blood Updated:  09/16/19 0653    Narrative:       The following orders were created for panel order CBC & Differential.  Procedure                               Abnormality         Status                     ---------                               -----------         ------                     CBC Auto Differential[[id number]]        Abnormal            Final result                 Please view results for these tests on the individual orders.    CBC Auto Differential [[id number]]  (Abnormal) Collected:  09/16/19 0630    Specimen:  Blood Updated:  09/16/19 0653     WBC 12.33 10*3/mm3      RBC 4.37 10*6/mm3      Hemoglobin 12.7 g/dL      Hematocrit 38.6 %      MCV 88.3 fL      MCH 29.1 pg      MCHC 32.9 g/dL      RDW 20.4 %      RDW-SD 65.5 fl      MPV 9.0 fL      Platelets 363 10*3/mm3      Neutrophil % 75.7 %      Lymphocyte % 10.5 %      Monocyte % 9.7 %      Eosinophil % 2.9 %      Basophil % 0.4 %      Immature Grans % 0.8 %      Neutrophils, Absolute 9.33 10*3/mm3      Lymphocytes, Absolute 1.30 10*3/mm3      Monocytes, Absolute 1.19 10*3/mm3      Eosinophils, Absolute 0.36 10*3/mm3      Basophils, Absolute 0.05 10*3/mm3      Immature Grans, Absolute 0.10 10*3/mm3      nRBC 0.0 /100 WBC     Basic Metabolic Panel [[id number]]  (Abnormal) Collected:  09/15/19 0527    Specimen:  Blood Updated:  09/15/19 0603     Glucose 100 mg/dL      BUN 13 mg/dL      Creatinine 0.60 mg/dL      Sodium 134 mmol/L       Potassium 4.5 mmol/L      Chloride 97 mmol/L      CO2 27.0 mmol/L      Calcium 8.8 mg/dL      eGFR Non African Amer >150 mL/min/1.73      BUN/Creatinine Ratio 21.7     Anion Gap 10.0 mmol/L     Narrative:       GFR Normal >60  Chronic Kidney Disease <60  Kidney Failure <15    CBC (No Diff) [[id number]]  (Abnormal) Collected:  09/15/19 0527    Specimen:  Blood Updated:  09/15/19 0550     WBC 10.89 10*3/mm3      RBC 4.32 10*6/mm3      Hemoglobin 12.4 g/dL      Hematocrit 38.2 %      MCV 88.4 fL      MCH 28.7 pg      MCHC 32.5 g/dL      RDW 20.8 %      RDW-SD 67.5 fl      MPV 8.9 fL      Platelets 344 10*3/mm3     Comprehensive Metabolic Panel [[id number]]  (Abnormal) Collected:  09/14/19 1812    Specimen:  Blood Updated:  09/14/19 1853     Glucose 112 mg/dL      BUN 12 mg/dL      Creatinine 0.66 mg/dL      Sodium 135 mmol/L      Potassium 4.7 mmol/L      Chloride 99 mmol/L      CO2 24.0 mmol/L      Calcium 9.0 mg/dL      Total Protein 8.0 g/dL      Albumin 3.60 g/dL      ALT (SGPT) 101 U/L      AST (SGOT) 46 U/L      Alkaline Phosphatase 203 U/L      Total Bilirubin 0.9 mg/dL      eGFR Non African Amer 138 mL/min/1.73      Globulin 4.4 gm/dL      A/G Ratio 0.8 g/dL      BUN/Creatinine Ratio 18.2     Anion Gap 12.0 mmol/L     Narrative:       GFR Normal >60  Chronic Kidney Disease <60  Kidney Failure <15    CBC Auto Differential [[id number]]  (Abnormal) Collected:  09/14/19 1812    Specimen:  Blood Updated:  09/14/19 1837     WBC 13.15 10*3/mm3      RBC 4.61 10*6/mm3      Hemoglobin 13.3 g/dL      Hematocrit 40.3 %      MCV 87.4 fL      MCH 28.9 pg      MCHC 33.0 g/dL      RDW 21.1 %      RDW-SD 66.6 fl      MPV 9.0 fL      Platelets 384 10*3/mm3      Neutrophil % 73.1 %      Lymphocyte % 12.4 %      Monocyte % 9.4 %      Eosinophil % 3.3 %      Basophil % 0.5 %      Immature Grans % 1.3 %      Neutrophils, Absolute 9.61 10*3/mm3      Lymphocytes, Absolute 1.63 10*3/mm3      Monocytes, Absolute 1.24 10*3/mm3       "Eosinophils, Absolute 0.44 10*3/mm3      Basophils, Absolute 0.06 10*3/mm3      Immature Grans, Absolute 0.17 10*3/mm3      nRBC 0.0 /100 WBC           Condition on Discharge:    Stable with no further epistaxis    Physical Exam on Discharge:  /69 (BP Location: Right arm, Patient Position: Sitting)   Pulse (!) 124   Temp 97.6 °F (36.4 °C)   Resp 16   Ht 170.2 cm (67\")   Wt 84.2 kg (185 lb 9.6 oz)   SpO2 99%   BMI 29.07 kg/m²    Physical Exam  Constitutional: He is oriented to person, place, and time. He appears well-developed and well-nourished. He is cooperative. No distress.   HENT:   Head: Normocephalic and atraumatic.   Mouth/Throat: Oropharynx is clear and moist. Left double-balloon Rhino Rocket present with both valves taped to the left cheek.  No further bleeding noted.   Eyes: Conjunctivae are normal. . No scleral icterus.   Neck: Normal range of motion. Neck supple. No JVD present.  Cardiovascular: Normal rate, regular rhythm, normal heart sounds and intact distal pulses.   Pulmonary/Chest: Effort normal and breath sounds normal. No respiratory distress. He has no wheezes.   Abdominal: Soft. Bowel sounds are normal.    Musculoskeletal: Normal range of motion. He exhibits edema (trace BLE).   Neurological: He is alert and oriented to person, place, and time. Coordination normal.   Skin: Skin is warm and dry. No rash noted.   Psychiatric: He has a normal mood and affect.    Discharge Disposition:  Home or Self Care    Discharge Medications:     Discharge Medications      New Medications      Instructions Start Date   amoxicillin 500 MG capsule  Commonly known as:  AMOXIL   500 mg, Oral, 3 Times Daily      mupirocin 2 % ointment  Commonly known as:  BACTROBAN   Topical, 3 Times Daily      rivaroxaban 15 MG tablet  Commonly known as:  XARELTO   15 mg, Oral, 2 Times Daily With Meals         Continue These Medications      Instructions Start Date   albuterol sulfate  (90 Base) MCG/ACT " inhaler  Commonly known as:  PROVENTIL HFA;VENTOLIN HFA;PROAIR HFA   2 puffs, Inhalation, Every 6 Hours PRN      aspirin 81 MG EC tablet   81 mg, Oral, Daily      furosemide 40 MG tablet  Commonly known as:  LASIX   40 mg, Oral, 2 Times Daily      lisinopril 10 MG tablet  Commonly known as:  PRINIVIL,ZESTRIL   10 mg, Oral, Daily      metoprolol succinate XL 25 MG 24 hr tablet  Commonly known as:  TOPROL-XL   25 mg, Oral, Daily      pantoprazole 40 MG EC tablet  Commonly known as:  PROTONIX   40 mg, Oral, Daily      sodium chloride 1 g tablet   1 g, Oral, 3 Times Daily      spironolactone 25 MG tablet  Commonly known as:  ALDACTONE   25 mg, Oral, Daily      sucralfate 1 g tablet  Commonly known as:  CARAFATE   1 g, Oral, 4 Times Daily             Discharge Diet:   Diet Instructions     Diet: Regular      Discharge Diet:  Regular          Discharge Care Plan / Instructions:   Discharge home    Activity at Discharge:   Activity Instructions     Activity as Tolerated            Follow-up Appointments:  Follow-up with Dr. Keen office in 3 to 4 weeks  Follow-up with Dr. Wagner next week to establish primary care       Lb Louis DO  09/17/19  9:26 AM    Time: Discharge Less than 30 min    Please note that portions of this note may have been completed with a voice recognition program. Efforts were made to edit the dictations, but occasionally words are mistranscribed.            Electronically signed by Lb Louis DO at 09/17/19 3089

## 2019-09-20 ENCOUNTER — READMISSION MANAGEMENT (OUTPATIENT)
Dept: CALL CENTER | Facility: HOSPITAL | Age: 34
End: 2019-09-20

## 2019-09-20 NOTE — OUTREACH NOTE
Medical Week 1 Survey      Responses   Facility patient discharged from?  [facility] [place]   Does the patient have one of the following disease processes/diagnoses(primary or secondary)?  Other   Is there a successful TCM telephone encounter documented?  No   Week 1 attempt successful?  No   Rescheduled  Revoked   Revoke  Decline to participate [Patient does not have personal phone. This is his father's number. Father is at work. Father aware of Nurse [facility].  ]          Rosalia Sherman RN

## 2019-09-22 ENCOUNTER — APPOINTMENT (OUTPATIENT)
Dept: CT IMAGING | Age: 34
DRG: 167 | End: 2019-09-22
Payer: COMMERCIAL

## 2019-09-22 ENCOUNTER — HOSPITAL ENCOUNTER (INPATIENT)
Age: 34
LOS: 8 days | Discharge: HOME | DRG: 167 | End: 2019-09-30
Attending: EMERGENCY MEDICINE | Admitting: HOSPITALIST
Payer: COMMERCIAL

## 2019-09-22 DIAGNOSIS — I26.09 OTHER ACUTE PULMONARY EMBOLISM WITH ACUTE COR PULMONALE (HCC): Primary | ICD-10-CM

## 2019-09-22 PROBLEM — I26.99 ACUTE MASSIVE PULMONARY EMBOLISM (HCC): Status: ACTIVE | Noted: 2019-09-22

## 2019-09-22 LAB
ALBUMIN SERPL-MCNC: 3.7 G/DL (ref 3.5–5.2)
ALP BLD-CCNC: 189 U/L (ref 40–130)
ALT SERPL-CCNC: 20 U/L (ref 5–41)
AMPHETAMINE SCREEN, URINE: NEGATIVE
ANION GAP SERPL CALCULATED.3IONS-SCNC: 16 MMOL/L (ref 7–19)
APTT: 33.2 SEC (ref 26–36.2)
AST SERPL-CCNC: 18 U/L (ref 5–40)
BARBITURATE SCREEN URINE: NEGATIVE
BASOPHILS ABSOLUTE: 0.1 K/UL (ref 0–0.2)
BASOPHILS RELATIVE PERCENT: 0.7 % (ref 0–1)
BENZODIAZEPINE SCREEN, URINE: NEGATIVE
BILIRUB SERPL-MCNC: 0.8 MG/DL (ref 0.2–1.2)
BUN BLDV-MCNC: 13 MG/DL (ref 6–20)
CALCIUM SERPL-MCNC: 9.5 MG/DL (ref 8.6–10)
CANNABINOID SCREEN URINE: NEGATIVE
CHLORIDE BLD-SCNC: 92 MMOL/L (ref 98–111)
CO2: 21 MMOL/L (ref 22–29)
COCAINE METABOLITE SCREEN URINE: NEGATIVE
CREAT SERPL-MCNC: 0.7 MG/DL (ref 0.5–1.2)
EOSINOPHILS ABSOLUTE: 0.2 K/UL (ref 0–0.6)
EOSINOPHILS RELATIVE PERCENT: 1.1 % (ref 0–5)
GFR NON-AFRICAN AMERICAN: >60
GLUCOSE BLD-MCNC: 138 MG/DL (ref 74–109)
HCT VFR BLD CALC: 37.6 % (ref 42–52)
HEMOGLOBIN: 12.4 G/DL (ref 14–18)
IMMATURE GRANULOCYTES #: 0.1 K/UL
INR BLD: 1.26 (ref 0.88–1.18)
LYMPHOCYTES ABSOLUTE: 1.5 K/UL (ref 1.1–4.5)
LYMPHOCYTES RELATIVE PERCENT: 10.5 % (ref 20–40)
Lab: ABNORMAL
MCH RBC QN AUTO: 28.8 PG (ref 27–31)
MCHC RBC AUTO-ENTMCNC: 33 G/DL (ref 33–37)
MCV RBC AUTO: 87.4 FL (ref 80–94)
MONOCYTES ABSOLUTE: 1.3 K/UL (ref 0–0.9)
MONOCYTES RELATIVE PERCENT: 9.2 % (ref 0–10)
NEUTROPHILS ABSOLUTE: 10.8 K/UL (ref 1.5–7.5)
NEUTROPHILS RELATIVE PERCENT: 78.1 % (ref 50–65)
OPIATE SCREEN URINE: POSITIVE
PDW BLD-RTO: 18.6 % (ref 11.5–14.5)
PLATELET # BLD: 405 K/UL (ref 130–400)
PMV BLD AUTO: 9.2 FL (ref 9.4–12.4)
POTASSIUM SERPL-SCNC: 4.6 MMOL/L (ref 3.5–5)
PRO-BNP: 6222 PG/ML (ref 0–450)
PROTHROMBIN TIME: 15.2 SEC (ref 12–14.6)
RBC # BLD: 4.3 M/UL (ref 4.7–6.1)
SODIUM BLD-SCNC: 129 MMOL/L (ref 136–145)
TOTAL PROTEIN: 8.2 G/DL (ref 6.6–8.7)
TROPONIN: <0.01 NG/ML (ref 0–0.03)
WBC # BLD: 13.8 K/UL (ref 4.8–10.8)

## 2019-09-22 PROCEDURE — 85025 COMPLETE CBC W/AUTO DIFF WBC: CPT

## 2019-09-22 PROCEDURE — 84484 ASSAY OF TROPONIN QUANT: CPT

## 2019-09-22 PROCEDURE — 6360000004 HC RX CONTRAST MEDICATION: Performed by: EMERGENCY MEDICINE

## 2019-09-22 PROCEDURE — 6360000002 HC RX W HCPCS: Performed by: HOSPITALIST

## 2019-09-22 PROCEDURE — 2100000000 HC CCU R&B

## 2019-09-22 PROCEDURE — 96375 TX/PRO/DX INJ NEW DRUG ADDON: CPT

## 2019-09-22 PROCEDURE — 6370000000 HC RX 637 (ALT 250 FOR IP): Performed by: PHYSICIAN ASSISTANT

## 2019-09-22 PROCEDURE — 71275 CT ANGIOGRAPHY CHEST: CPT

## 2019-09-22 PROCEDURE — 85730 THROMBOPLASTIN TIME PARTIAL: CPT

## 2019-09-22 PROCEDURE — 6360000002 HC RX W HCPCS: Performed by: EMERGENCY MEDICINE

## 2019-09-22 PROCEDURE — 96374 THER/PROPH/DIAG INJ IV PUSH: CPT

## 2019-09-22 PROCEDURE — 99291 CRITICAL CARE FIRST HOUR: CPT

## 2019-09-22 PROCEDURE — 83880 ASSAY OF NATRIURETIC PEPTIDE: CPT

## 2019-09-22 PROCEDURE — 2580000003 HC RX 258: Performed by: EMERGENCY MEDICINE

## 2019-09-22 PROCEDURE — 6360000002 HC RX W HCPCS: Performed by: PHYSICIAN ASSISTANT

## 2019-09-22 PROCEDURE — 93005 ELECTROCARDIOGRAM TRACING: CPT | Performed by: PHYSICIAN ASSISTANT

## 2019-09-22 PROCEDURE — 85610 PROTHROMBIN TIME: CPT

## 2019-09-22 PROCEDURE — 2580000003 HC RX 258: Performed by: PHYSICIAN ASSISTANT

## 2019-09-22 PROCEDURE — 80307 DRUG TEST PRSMV CHEM ANLYZR: CPT

## 2019-09-22 PROCEDURE — 80053 COMPREHEN METABOLIC PANEL: CPT

## 2019-09-22 PROCEDURE — 6370000000 HC RX 637 (ALT 250 FOR IP): Performed by: HOSPITALIST

## 2019-09-22 PROCEDURE — 36415 COLL VENOUS BLD VENIPUNCTURE: CPT

## 2019-09-22 RX ORDER — LORAZEPAM 2 MG/ML
1 INJECTION INTRAMUSCULAR EVERY 4 HOURS PRN
Status: DISCONTINUED | OUTPATIENT
Start: 2019-09-22 | End: 2019-09-28

## 2019-09-22 RX ORDER — ONDANSETRON 4 MG/1
4 TABLET, ORALLY DISINTEGRATING ORAL EVERY 8 HOURS PRN
Status: DISCONTINUED | OUTPATIENT
Start: 2019-09-22 | End: 2019-09-30 | Stop reason: HOSPADM

## 2019-09-22 RX ORDER — SODIUM CHLORIDE 0.9 % (FLUSH) 0.9 %
10 SYRINGE (ML) INJECTION PRN
Status: DISCONTINUED | OUTPATIENT
Start: 2019-09-22 | End: 2019-09-30 | Stop reason: HOSPADM

## 2019-09-22 RX ORDER — POTASSIUM CHLORIDE 20 MEQ/1
40 TABLET, EXTENDED RELEASE ORAL PRN
Status: DISCONTINUED | OUTPATIENT
Start: 2019-09-22 | End: 2019-09-30 | Stop reason: HOSPADM

## 2019-09-22 RX ORDER — METOPROLOL SUCCINATE 25 MG/1
25 TABLET, EXTENDED RELEASE ORAL EVERY 12 HOURS
Status: DISCONTINUED | OUTPATIENT
Start: 2019-09-23 | End: 2019-09-23

## 2019-09-22 RX ORDER — SUCRALFATE 1 G/1
1 TABLET ORAL 4 TIMES DAILY
Status: DISCONTINUED | OUTPATIENT
Start: 2019-09-23 | End: 2019-09-30 | Stop reason: HOSPADM

## 2019-09-22 RX ORDER — SPIRONOLACTONE 25 MG/1
25 TABLET ORAL DAILY
Status: DISCONTINUED | OUTPATIENT
Start: 2019-09-23 | End: 2019-09-23

## 2019-09-22 RX ORDER — ALPRAZOLAM 0.25 MG/1
0.25 TABLET ORAL ONCE
Status: COMPLETED | OUTPATIENT
Start: 2019-09-22 | End: 2019-09-22

## 2019-09-22 RX ORDER — POTASSIUM CHLORIDE 7.45 MG/ML
10 INJECTION INTRAVENOUS PRN
Status: DISCONTINUED | OUTPATIENT
Start: 2019-09-22 | End: 2019-09-30 | Stop reason: HOSPADM

## 2019-09-22 RX ORDER — HEPARIN SODIUM 1000 [USP'U]/ML
40 INJECTION, SOLUTION INTRAVENOUS; SUBCUTANEOUS PRN
Status: DISCONTINUED | OUTPATIENT
Start: 2019-09-22 | End: 2019-09-26

## 2019-09-22 RX ORDER — HEPARIN SODIUM 10000 [USP'U]/100ML
18 INJECTION, SOLUTION INTRAVENOUS CONTINUOUS
Status: DISCONTINUED | OUTPATIENT
Start: 2019-09-22 | End: 2019-09-26

## 2019-09-22 RX ORDER — FUROSEMIDE 40 MG/1
40 TABLET ORAL 2 TIMES DAILY
Status: DISCONTINUED | OUTPATIENT
Start: 2019-09-23 | End: 2019-09-22 | Stop reason: ALTCHOICE

## 2019-09-22 RX ORDER — MAGNESIUM SULFATE 1 G/100ML
1 INJECTION INTRAVENOUS PRN
Status: DISCONTINUED | OUTPATIENT
Start: 2019-09-22 | End: 2019-09-30 | Stop reason: HOSPADM

## 2019-09-22 RX ORDER — SODIUM CHLORIDE 0.9 % (FLUSH) 0.9 %
10 SYRINGE (ML) INJECTION EVERY 12 HOURS SCHEDULED
Status: DISCONTINUED | OUTPATIENT
Start: 2019-09-23 | End: 2019-09-30 | Stop reason: HOSPADM

## 2019-09-22 RX ORDER — LORAZEPAM 2 MG/ML
2 INJECTION INTRAMUSCULAR ONCE
Status: COMPLETED | OUTPATIENT
Start: 2019-09-22 | End: 2019-09-22

## 2019-09-22 RX ORDER — PANTOPRAZOLE SODIUM 40 MG/1
40 TABLET, DELAYED RELEASE ORAL DAILY
Status: DISCONTINUED | OUTPATIENT
Start: 2019-09-23 | End: 2019-09-30 | Stop reason: HOSPADM

## 2019-09-22 RX ORDER — 0.9 % SODIUM CHLORIDE 0.9 %
1000 INTRAVENOUS SOLUTION INTRAVENOUS ONCE
Status: DISCONTINUED | OUTPATIENT
Start: 2019-09-22 | End: 2019-09-22

## 2019-09-22 RX ORDER — 0.9 % SODIUM CHLORIDE 0.9 %
500 INTRAVENOUS SOLUTION INTRAVENOUS ONCE
Status: COMPLETED | OUTPATIENT
Start: 2019-09-22 | End: 2019-09-22

## 2019-09-22 RX ORDER — FUROSEMIDE 10 MG/ML
20 INJECTION INTRAMUSCULAR; INTRAVENOUS DAILY
Status: DISCONTINUED | OUTPATIENT
Start: 2019-09-23 | End: 2019-09-24

## 2019-09-22 RX ORDER — AMOXICILLIN 500 MG/1
500 CAPSULE ORAL 3 TIMES DAILY
Status: ON HOLD | COMMUNITY
End: 2019-09-23

## 2019-09-22 RX ORDER — METHYLPREDNISOLONE SODIUM SUCCINATE 40 MG/ML
40 INJECTION, POWDER, LYOPHILIZED, FOR SOLUTION INTRAMUSCULAR; INTRAVENOUS EVERY 24 HOURS
Status: DISCONTINUED | OUTPATIENT
Start: 2019-09-23 | End: 2019-09-25

## 2019-09-22 RX ORDER — ONDANSETRON 2 MG/ML
4 INJECTION INTRAMUSCULAR; INTRAVENOUS EVERY 6 HOURS PRN
Status: DISCONTINUED | OUTPATIENT
Start: 2019-09-22 | End: 2019-09-30 | Stop reason: HOSPADM

## 2019-09-22 RX ORDER — ALBUTEROL SULFATE 90 UG/1
2 AEROSOL, METERED RESPIRATORY (INHALATION) EVERY 6 HOURS PRN
Status: DISCONTINUED | OUTPATIENT
Start: 2019-09-22 | End: 2019-09-30 | Stop reason: HOSPADM

## 2019-09-22 RX ORDER — HEPARIN SODIUM 5000 [USP'U]/ML
5000 INJECTION, SOLUTION INTRAVENOUS; SUBCUTANEOUS ONCE
Status: COMPLETED | OUTPATIENT
Start: 2019-09-22 | End: 2019-09-22

## 2019-09-22 RX ORDER — LISINOPRIL 10 MG/1
10 TABLET ORAL DAILY
Status: DISCONTINUED | OUTPATIENT
Start: 2019-09-23 | End: 2019-09-23

## 2019-09-22 RX ORDER — LEVOFLOXACIN 5 MG/ML
500 INJECTION, SOLUTION INTRAVENOUS EVERY 24 HOURS
Status: DISCONTINUED | OUTPATIENT
Start: 2019-09-23 | End: 2019-09-25

## 2019-09-22 RX ORDER — HEPARIN SODIUM 1000 [USP'U]/ML
80 INJECTION, SOLUTION INTRAVENOUS; SUBCUTANEOUS PRN
Status: DISCONTINUED | OUTPATIENT
Start: 2019-09-22 | End: 2019-09-26

## 2019-09-22 RX ORDER — IPRATROPIUM BROMIDE AND ALBUTEROL SULFATE 2.5; .5 MG/3ML; MG/3ML
1 SOLUTION RESPIRATORY (INHALATION)
Status: DISCONTINUED | OUTPATIENT
Start: 2019-09-23 | End: 2019-09-30 | Stop reason: HOSPADM

## 2019-09-22 RX ORDER — MORPHINE SULFATE 4 MG/ML
4 INJECTION, SOLUTION INTRAMUSCULAR; INTRAVENOUS ONCE
Status: COMPLETED | OUTPATIENT
Start: 2019-09-22 | End: 2019-09-22

## 2019-09-22 RX ORDER — ACETAMINOPHEN 325 MG/1
650 TABLET ORAL EVERY 4 HOURS PRN
Status: DISCONTINUED | OUTPATIENT
Start: 2019-09-22 | End: 2019-09-26

## 2019-09-22 RX ADMIN — MORPHINE SULFATE 4 MG: 4 INJECTION INTRAVENOUS at 19:49

## 2019-09-22 RX ADMIN — LORAZEPAM 2 MG: 2 INJECTION INTRAMUSCULAR; INTRAVENOUS at 20:25

## 2019-09-22 RX ADMIN — METOPROLOL SUCCINATE 25 MG: 25 TABLET, EXTENDED RELEASE ORAL at 23:52

## 2019-09-22 RX ADMIN — SODIUM CHLORIDE 500 ML: 9 INJECTION, SOLUTION INTRAVENOUS at 20:05

## 2019-09-22 RX ADMIN — ALPRAZOLAM 0.25 MG: 0.25 TABLET ORAL at 20:05

## 2019-09-22 RX ADMIN — SODIUM CHLORIDE 500 ML: 9 INJECTION, SOLUTION INTRAVENOUS at 20:26

## 2019-09-22 RX ADMIN — HEPARIN SODIUM 18 UNITS/KG/HR: 10000 INJECTION, SOLUTION INTRAVENOUS at 21:30

## 2019-09-22 RX ADMIN — HEPARIN SODIUM 5000 UNITS: 5000 INJECTION, SOLUTION INTRAVENOUS; SUBCUTANEOUS at 21:29

## 2019-09-22 RX ADMIN — IOPAMIDOL 90 ML: 755 INJECTION, SOLUTION INTRAVENOUS at 20:15

## 2019-09-22 RX ADMIN — METHYLPREDNISOLONE SODIUM SUCCINATE 40 MG: 40 INJECTION, POWDER, FOR SOLUTION INTRAMUSCULAR; INTRAVENOUS at 23:52

## 2019-09-22 ASSESSMENT — PAIN SCALES - GENERAL
PAINLEVEL_OUTOF10: 7
PAINLEVEL_OUTOF10: 4
PAINLEVEL_OUTOF10: 7

## 2019-09-22 ASSESSMENT — ENCOUNTER SYMPTOMS
SHORTNESS OF BREATH: 1
COUGH: 1
APNEA: 0
EYE DISCHARGE: 0
EYE ITCHING: 0
COLOR CHANGE: 0
BACK PAIN: 0
PHOTOPHOBIA: 0

## 2019-09-23 ENCOUNTER — OUTSIDE FACILITY SERVICE (OUTPATIENT)
Dept: PULMONOLOGY | Facility: CLINIC | Age: 34
End: 2019-09-23

## 2019-09-23 LAB
ALBUMIN SERPL-MCNC: 3.2 G/DL (ref 3.5–5.2)
ALP BLD-CCNC: 161 U/L (ref 40–130)
ALT SERPL-CCNC: 18 U/L (ref 5–41)
ANION GAP SERPL CALCULATED.3IONS-SCNC: 14 MMOL/L (ref 7–19)
ANION GAP SERPL CALCULATED.3IONS-SCNC: 15 MMOL/L (ref 7–19)
APTT: 111.8 SEC (ref 26–36.2)
APTT: 35.1 SEC (ref 26–36.2)
APTT: 46 SEC (ref 26–36.2)
APTT: 83.4 SEC (ref 26–36.2)
AST SERPL-CCNC: 19 U/L (ref 5–40)
BILIRUB SERPL-MCNC: 1 MG/DL (ref 0.2–1.2)
BUN BLDV-MCNC: 19 MG/DL (ref 6–20)
BUN BLDV-MCNC: 21 MG/DL (ref 6–20)
CALCIUM SERPL-MCNC: 9.4 MG/DL (ref 8.6–10)
CALCIUM SERPL-MCNC: 9.4 MG/DL (ref 8.6–10)
CHLORIDE BLD-SCNC: 95 MMOL/L (ref 98–111)
CHLORIDE BLD-SCNC: 95 MMOL/L (ref 98–111)
CO2: 17 MMOL/L (ref 22–29)
CO2: 20 MMOL/L (ref 22–29)
CREAT SERPL-MCNC: 0.7 MG/DL (ref 0.5–1.2)
CREAT SERPL-MCNC: 0.9 MG/DL (ref 0.5–1.2)
GFR NON-AFRICAN AMERICAN: >60
GFR NON-AFRICAN AMERICAN: >60
GLUCOSE BLD-MCNC: 160 MG/DL (ref 74–109)
GLUCOSE BLD-MCNC: 170 MG/DL (ref 74–109)
HCT VFR BLD CALC: 37.6 % (ref 42–52)
HEMOGLOBIN: 11.7 G/DL (ref 14–18)
MCH RBC QN AUTO: 28.9 PG (ref 27–31)
MCHC RBC AUTO-ENTMCNC: 31.1 G/DL (ref 33–37)
MCV RBC AUTO: 92.8 FL (ref 80–94)
PDW BLD-RTO: 18.7 % (ref 11.5–14.5)
PLATELET # BLD: 374 K/UL (ref 130–400)
PMV BLD AUTO: 9.4 FL (ref 9.4–12.4)
POTASSIUM REFLEX MAGNESIUM: 5.9 MMOL/L (ref 3.5–5)
POTASSIUM SERPL-SCNC: 5.5 MMOL/L (ref 3.5–5)
RBC # BLD: 4.05 M/UL (ref 4.7–6.1)
SODIUM BLD-SCNC: 127 MMOL/L (ref 136–145)
SODIUM BLD-SCNC: 129 MMOL/L (ref 136–145)
TOTAL PROTEIN: 7.9 G/DL (ref 6.6–8.7)
TROPONIN: <0.01 NG/ML (ref 0–0.03)
WBC # BLD: 11.3 K/UL (ref 4.8–10.8)

## 2019-09-23 PROCEDURE — 36415 COLL VENOUS BLD VENIPUNCTURE: CPT

## 2019-09-23 PROCEDURE — 99252 IP/OBS CONSLTJ NEW/EST SF 35: CPT | Performed by: NURSE PRACTITIONER

## 2019-09-23 PROCEDURE — 6360000002 HC RX W HCPCS: Performed by: EMERGENCY MEDICINE

## 2019-09-23 PROCEDURE — 99255 IP/OBS CONSLTJ NEW/EST HI 80: CPT | Performed by: INTERNAL MEDICINE

## 2019-09-23 PROCEDURE — 6370000000 HC RX 637 (ALT 250 FOR IP): Performed by: HOSPITALIST

## 2019-09-23 PROCEDURE — 80053 COMPREHEN METABOLIC PANEL: CPT

## 2019-09-23 PROCEDURE — 6360000002 HC RX W HCPCS: Performed by: HOSPITALIST

## 2019-09-23 PROCEDURE — 94640 AIRWAY INHALATION TREATMENT: CPT

## 2019-09-23 PROCEDURE — 85730 THROMBOPLASTIN TIME PARTIAL: CPT

## 2019-09-23 PROCEDURE — 2100000000 HC CCU R&B

## 2019-09-23 PROCEDURE — 6370000000 HC RX 637 (ALT 250 FOR IP): Performed by: INTERNAL MEDICINE

## 2019-09-23 PROCEDURE — 84484 ASSAY OF TROPONIN QUANT: CPT

## 2019-09-23 PROCEDURE — 2700000000 HC OXYGEN THERAPY PER DAY

## 2019-09-23 PROCEDURE — 85027 COMPLETE CBC AUTOMATED: CPT

## 2019-09-23 PROCEDURE — 99254 IP/OBS CNSLTJ NEW/EST MOD 60: CPT | Performed by: INTERNAL MEDICINE

## 2019-09-23 PROCEDURE — 2580000003 HC RX 258: Performed by: HOSPITALIST

## 2019-09-23 RX ORDER — METOPROLOL SUCCINATE 25 MG/1
12.5 TABLET, EXTENDED RELEASE ORAL EVERY 12 HOURS
Status: DISCONTINUED | OUTPATIENT
Start: 2019-09-24 | End: 2019-09-24

## 2019-09-23 RX ORDER — LISINOPRIL 5 MG/1
5 TABLET ORAL DAILY
Status: DISCONTINUED | OUTPATIENT
Start: 2019-09-24 | End: 2019-09-24

## 2019-09-23 RX ORDER — FUROSEMIDE 40 MG/1
40 TABLET ORAL DAILY
Status: DISCONTINUED | OUTPATIENT
Start: 2019-09-24 | End: 2019-09-30 | Stop reason: HOSPADM

## 2019-09-23 RX ORDER — MORPHINE SULFATE 4 MG/ML
2 INJECTION, SOLUTION INTRAMUSCULAR; INTRAVENOUS EVERY 4 HOURS PRN
Status: DISCONTINUED | OUTPATIENT
Start: 2019-09-23 | End: 2019-09-23

## 2019-09-23 RX ADMIN — METOPROLOL SUCCINATE 25 MG: 25 TABLET, EXTENDED RELEASE ORAL at 12:18

## 2019-09-23 RX ADMIN — METHYLPREDNISOLONE SODIUM SUCCINATE 40 MG: 40 INJECTION, POWDER, FOR SOLUTION INTRAMUSCULAR; INTRAVENOUS at 23:04

## 2019-09-23 RX ADMIN — SPIRONOLACTONE 25 MG: 25 TABLET ORAL at 08:34

## 2019-09-23 RX ADMIN — IPRATROPIUM BROMIDE AND ALBUTEROL SULFATE 1 AMPULE: 2.5; .5 SOLUTION RESPIRATORY (INHALATION) at 10:32

## 2019-09-23 RX ADMIN — Medication 10 ML: at 23:04

## 2019-09-23 RX ADMIN — HEPARIN SODIUM 17 UNITS/KG/HR: 10000 INJECTION, SOLUTION INTRAVENOUS at 15:55

## 2019-09-23 RX ADMIN — HEPARIN SODIUM 3450 UNITS: 1000 INJECTION INTRAVENOUS; SUBCUTANEOUS at 21:03

## 2019-09-23 RX ADMIN — SUCRALFATE 1 G: 1 TABLET ORAL at 08:34

## 2019-09-23 RX ADMIN — FUROSEMIDE 20 MG: 10 INJECTION, SOLUTION INTRAMUSCULAR; INTRAVENOUS at 00:18

## 2019-09-23 RX ADMIN — LORAZEPAM 1 MG: 2 INJECTION INTRAMUSCULAR; INTRAVENOUS at 04:43

## 2019-09-23 RX ADMIN — LISINOPRIL 10 MG: 10 TABLET ORAL at 08:34

## 2019-09-23 RX ADMIN — LEVOFLOXACIN 500 MG: 5 INJECTION, SOLUTION INTRAVENOUS at 00:24

## 2019-09-23 RX ADMIN — LORAZEPAM 1 MG: 2 INJECTION INTRAMUSCULAR; INTRAVENOUS at 00:17

## 2019-09-23 RX ADMIN — MORPHINE SULFATE 2 MG: 4 INJECTION INTRAVENOUS at 01:29

## 2019-09-23 RX ADMIN — METOPROLOL SUCCINATE 12.5 MG: 25 TABLET, EXTENDED RELEASE ORAL at 23:05

## 2019-09-23 RX ADMIN — LEVOFLOXACIN 500 MG: 5 INJECTION, SOLUTION INTRAVENOUS at 23:05

## 2019-09-23 RX ADMIN — SUCRALFATE 1 G: 1 TABLET ORAL at 20:28

## 2019-09-23 RX ADMIN — IPRATROPIUM BROMIDE AND ALBUTEROL SULFATE 1 AMPULE: 2.5; .5 SOLUTION RESPIRATORY (INHALATION) at 14:27

## 2019-09-23 RX ADMIN — PANTOPRAZOLE SODIUM 40 MG: 40 TABLET, DELAYED RELEASE ORAL at 08:34

## 2019-09-23 RX ADMIN — LORAZEPAM 1 MG: 2 INJECTION INTRAMUSCULAR; INTRAVENOUS at 13:13

## 2019-09-23 RX ADMIN — LORAZEPAM 1 MG: 2 INJECTION INTRAMUSCULAR; INTRAVENOUS at 22:26

## 2019-09-23 RX ADMIN — HEPARIN SODIUM 6900 UNITS: 1000 INJECTION, SOLUTION INTRAVENOUS; SUBCUTANEOUS at 08:49

## 2019-09-23 RX ADMIN — IPRATROPIUM BROMIDE AND ALBUTEROL SULFATE 1 AMPULE: 2.5; .5 SOLUTION RESPIRATORY (INHALATION) at 06:39

## 2019-09-23 ASSESSMENT — ENCOUNTER SYMPTOMS
NAUSEA: 0
DIARRHEA: 0
WHEEZING: 0
COLOR CHANGE: 0
PHOTOPHOBIA: 0
TROUBLE SWALLOWING: 0
BACK PAIN: 0
SHORTNESS OF BREATH: 1
COUGH: 0
APNEA: 0
ABDOMINAL DISTENTION: 0
STRIDOR: 0

## 2019-09-23 ASSESSMENT — PAIN SCALES - GENERAL
PAINLEVEL_OUTOF10: 4
PAINLEVEL_OUTOF10: 0
PAINLEVEL_OUTOF10: 7
PAINLEVEL_OUTOF10: 5
PAINLEVEL_OUTOF10: 4
PAINLEVEL_OUTOF10: 8
PAINLEVEL_OUTOF10: 0

## 2019-09-23 ASSESSMENT — PAIN DESCRIPTION - PAIN TYPE
TYPE: ACUTE PAIN

## 2019-09-23 ASSESSMENT — PAIN DESCRIPTION - LOCATION
LOCATION: FLANK;BACK
LOCATION: RIB CAGE
LOCATION: RIB CAGE

## 2019-09-23 ASSESSMENT — PAIN DESCRIPTION - ORIENTATION: ORIENTATION: RIGHT

## 2019-09-24 ENCOUNTER — OUTSIDE FACILITY SERVICE (OUTPATIENT)
Dept: PULMONOLOGY | Facility: CLINIC | Age: 34
End: 2019-09-24

## 2019-09-24 ENCOUNTER — APPOINTMENT (OUTPATIENT)
Dept: INTERVENTIONAL RADIOLOGY/VASCULAR | Age: 34
DRG: 167 | End: 2019-09-24
Payer: COMMERCIAL

## 2019-09-24 LAB
ALBUMIN SERPL-MCNC: 3.1 G/DL (ref 3.5–5.2)
ALBUMIN SERPL-MCNC: 3.1 G/DL (ref 3.5–5.2)
ALP BLD-CCNC: 160 U/L (ref 40–130)
ALP BLD-CCNC: 173 U/L (ref 40–130)
ALT SERPL-CCNC: 16 U/L (ref 5–41)
ALT SERPL-CCNC: 28 U/L (ref 5–41)
ANION GAP SERPL CALCULATED.3IONS-SCNC: 14 MMOL/L (ref 7–19)
ANION GAP SERPL CALCULATED.3IONS-SCNC: 17 MMOL/L (ref 7–19)
APTT: 41.7 SEC (ref 26–36.2)
APTT: 47.9 SEC (ref 26–36.2)
APTT: 63.8 SEC (ref 26–36.2)
APTT: 74.1 SEC (ref 26–36.2)
AST SERPL-CCNC: 17 U/L (ref 5–40)
AST SERPL-CCNC: 37 U/L (ref 5–40)
BASOPHILS ABSOLUTE: 0 K/UL (ref 0–0.2)
BASOPHILS RELATIVE PERCENT: 0.1 % (ref 0–1)
BILIRUB SERPL-MCNC: 0.8 MG/DL (ref 0.2–1.2)
BILIRUB SERPL-MCNC: 0.9 MG/DL (ref 0.2–1.2)
BUN BLDV-MCNC: 23 MG/DL (ref 6–20)
BUN BLDV-MCNC: 32 MG/DL (ref 6–20)
CALCIUM SERPL-MCNC: 9.1 MG/DL (ref 8.6–10)
CALCIUM SERPL-MCNC: 9.3 MG/DL (ref 8.6–10)
CHLORIDE BLD-SCNC: 89 MMOL/L (ref 98–111)
CHLORIDE BLD-SCNC: 91 MMOL/L (ref 98–111)
CO2: 17 MMOL/L (ref 22–29)
CO2: 19 MMOL/L (ref 22–29)
CREAT SERPL-MCNC: 0.8 MG/DL (ref 0.5–1.2)
CREAT SERPL-MCNC: 0.8 MG/DL (ref 0.5–1.2)
EKG P AXIS: NORMAL DEGREES
EKG P-R INTERVAL: NORMAL MS
EKG Q-T INTERVAL: 324 MS
EKG QRS DURATION: 100 MS
EKG QTC CALCULATION (BAZETT): 471 MS
EKG T AXIS: 59 DEGREES
EOSINOPHILS ABSOLUTE: 0 K/UL (ref 0–0.6)
EOSINOPHILS RELATIVE PERCENT: 0.1 % (ref 0–5)
GFR NON-AFRICAN AMERICAN: >60
GFR NON-AFRICAN AMERICAN: >60
GLUCOSE BLD-MCNC: 178 MG/DL (ref 74–109)
GLUCOSE BLD-MCNC: 226 MG/DL (ref 74–109)
HCT VFR BLD CALC: 35.8 % (ref 42–52)
HEMOGLOBIN: 11.4 G/DL (ref 14–18)
IMMATURE GRANULOCYTES #: 0.1 K/UL
LYMPHOCYTES ABSOLUTE: 0.9 K/UL (ref 1.1–4.5)
LYMPHOCYTES RELATIVE PERCENT: 6.1 % (ref 20–40)
MAGNESIUM: 2 MG/DL (ref 1.6–2.6)
MCH RBC QN AUTO: 29 PG (ref 27–31)
MCHC RBC AUTO-ENTMCNC: 31.8 G/DL (ref 33–37)
MCV RBC AUTO: 91.1 FL (ref 80–94)
MONOCYTES ABSOLUTE: 1.3 K/UL (ref 0–0.9)
MONOCYTES RELATIVE PERCENT: 9.3 % (ref 0–10)
NEUTROPHILS ABSOLUTE: 11.9 K/UL (ref 1.5–7.5)
NEUTROPHILS RELATIVE PERCENT: 83.5 % (ref 50–65)
PDW BLD-RTO: 18.6 % (ref 11.5–14.5)
PLATELET # BLD: 370 K/UL (ref 130–400)
PMV BLD AUTO: 10.4 FL (ref 9.4–12.4)
POTASSIUM SERPL-SCNC: 5.7 MMOL/L (ref 3.5–5)
POTASSIUM SERPL-SCNC: 6 MMOL/L (ref 3.5–5)
RBC # BLD: 3.93 M/UL (ref 4.7–6.1)
SODIUM BLD-SCNC: 123 MMOL/L (ref 136–145)
SODIUM BLD-SCNC: 124 MMOL/L (ref 136–145)
TOTAL PROTEIN: 7.8 G/DL (ref 6.6–8.7)
TOTAL PROTEIN: 7.9 G/DL (ref 6.6–8.7)
WBC # BLD: 14.3 K/UL (ref 4.8–10.8)

## 2019-09-24 PROCEDURE — 6370000000 HC RX 637 (ALT 250 FOR IP): Performed by: INTERNAL MEDICINE

## 2019-09-24 PROCEDURE — 6360000004 HC RX CONTRAST MEDICATION: Performed by: SURGERY

## 2019-09-24 PROCEDURE — 93010 ELECTROCARDIOGRAM REPORT: CPT | Performed by: INTERNAL MEDICINE

## 2019-09-24 PROCEDURE — 6370000000 HC RX 637 (ALT 250 FOR IP): Performed by: SURGERY

## 2019-09-24 PROCEDURE — 36415 COLL VENOUS BLD VENIPUNCTURE: CPT

## 2019-09-24 PROCEDURE — 6360000002 HC RX W HCPCS: Performed by: INTERNAL MEDICINE

## 2019-09-24 PROCEDURE — 94640 AIRWAY INHALATION TREATMENT: CPT

## 2019-09-24 PROCEDURE — 6360000002 HC RX W HCPCS: Performed by: SURGERY

## 2019-09-24 PROCEDURE — 2700000000 HC OXYGEN THERAPY PER DAY

## 2019-09-24 PROCEDURE — 80053 COMPREHEN METABOLIC PANEL: CPT

## 2019-09-24 PROCEDURE — 85025 COMPLETE CBC W/AUTO DIFF WBC: CPT

## 2019-09-24 PROCEDURE — 06H03DZ INSERTION OF INTRALUMINAL DEVICE INTO INFERIOR VENA CAVA, PERCUTANEOUS APPROACH: ICD-10-PCS | Performed by: SURGERY

## 2019-09-24 PROCEDURE — B5191ZZ FLUOROSCOPY OF INFERIOR VENA CAVA USING LOW OSMOLAR CONTRAST: ICD-10-PCS | Performed by: SURGERY

## 2019-09-24 PROCEDURE — 99232 SBSQ HOSP IP/OBS MODERATE 35: CPT | Performed by: INTERNAL MEDICINE

## 2019-09-24 PROCEDURE — 37191 INS ENDOVAS VENA CAVA FILTR: CPT | Performed by: SURGERY

## 2019-09-24 PROCEDURE — 83735 ASSAY OF MAGNESIUM: CPT

## 2019-09-24 PROCEDURE — 2140000000 HC CCU INTERMEDIATE R&B

## 2019-09-24 PROCEDURE — 2500000003 HC RX 250 WO HCPCS: Performed by: SURGERY

## 2019-09-24 PROCEDURE — C1769 GUIDE WIRE: HCPCS

## 2019-09-24 PROCEDURE — 37191 INS ENDOVAS VENA CAVA FILTR: CPT

## 2019-09-24 PROCEDURE — 6370000000 HC RX 637 (ALT 250 FOR IP): Performed by: HOSPITALIST

## 2019-09-24 PROCEDURE — 85730 THROMBOPLASTIN TIME PARTIAL: CPT

## 2019-09-24 RX ORDER — SODIUM POLYSTYRENE SULFONATE 15 G/60ML
30 SUSPENSION ORAL; RECTAL ONCE
Status: COMPLETED | OUTPATIENT
Start: 2019-09-24 | End: 2019-09-24

## 2019-09-24 RX ORDER — LISINOPRIL 5 MG/1
5 TABLET ORAL 2 TIMES DAILY
Status: DISCONTINUED | OUTPATIENT
Start: 2019-09-24 | End: 2019-09-24

## 2019-09-24 RX ORDER — HEPARIN SODIUM 5000 [USP'U]/ML
INJECTION, SOLUTION INTRAVENOUS; SUBCUTANEOUS
Status: COMPLETED | OUTPATIENT
Start: 2019-09-24 | End: 2019-09-24

## 2019-09-24 RX ORDER — IODIXANOL 320 MG/ML
INJECTION, SOLUTION INTRAVASCULAR
Status: COMPLETED | OUTPATIENT
Start: 2019-09-24 | End: 2019-09-24

## 2019-09-24 RX ORDER — LIDOCAINE HYDROCHLORIDE 20 MG/ML
INJECTION, SOLUTION INFILTRATION; PERINEURAL
Status: COMPLETED | OUTPATIENT
Start: 2019-09-24 | End: 2019-09-24

## 2019-09-24 RX ORDER — METOPROLOL SUCCINATE 25 MG/1
25 TABLET, EXTENDED RELEASE ORAL EVERY 12 HOURS
Status: DISCONTINUED | OUTPATIENT
Start: 2019-09-25 | End: 2019-09-30 | Stop reason: HOSPADM

## 2019-09-24 RX ADMIN — HEPARIN SODIUM 3450 UNITS: 1000 INJECTION INTRAVENOUS; SUBCUTANEOUS at 02:37

## 2019-09-24 RX ADMIN — IPRATROPIUM BROMIDE AND ALBUTEROL SULFATE 1 AMPULE: 2.5; .5 SOLUTION RESPIRATORY (INHALATION) at 14:34

## 2019-09-24 RX ADMIN — FUROSEMIDE 20 MG: 10 INJECTION, SOLUTION INTRAMUSCULAR; INTRAVENOUS at 13:35

## 2019-09-24 RX ADMIN — HEPARIN SODIUM 19 UNITS/KG/HR: 10000 INJECTION, SOLUTION INTRAVENOUS at 09:23

## 2019-09-24 RX ADMIN — LORAZEPAM 1 MG: 2 INJECTION INTRAMUSCULAR; INTRAVENOUS at 03:02

## 2019-09-24 RX ADMIN — HEPARIN SODIUM 3450 UNITS: 1000 INJECTION INTRAVENOUS; SUBCUTANEOUS at 15:23

## 2019-09-24 RX ADMIN — IPRATROPIUM BROMIDE AND ALBUTEROL SULFATE 1 AMPULE: 2.5; .5 SOLUTION RESPIRATORY (INHALATION) at 07:03

## 2019-09-24 RX ADMIN — SUCRALFATE 1 G: 1 TABLET ORAL at 17:40

## 2019-09-24 RX ADMIN — LIDOCAINE HYDROCHLORIDE 10 ML: 20 INJECTION, SOLUTION INFILTRATION; PERINEURAL at 11:16

## 2019-09-24 RX ADMIN — METOPROLOL SUCCINATE 25 MG: 25 TABLET, EXTENDED RELEASE ORAL at 23:15

## 2019-09-24 RX ADMIN — FUROSEMIDE 40 MG: 40 TABLET ORAL at 13:35

## 2019-09-24 RX ADMIN — LORAZEPAM 1 MG: 2 INJECTION INTRAMUSCULAR; INTRAVENOUS at 23:18

## 2019-09-24 RX ADMIN — ACETAMINOPHEN 650 MG: 325 TABLET ORAL at 23:15

## 2019-09-24 RX ADMIN — HEPARIN SODIUM 5000 UNITS: 5000 INJECTION, SOLUTION INTRAVENOUS; SUBCUTANEOUS at 11:15

## 2019-09-24 RX ADMIN — SUCRALFATE 1 G: 1 TABLET ORAL at 13:32

## 2019-09-24 RX ADMIN — IPRATROPIUM BROMIDE AND ALBUTEROL SULFATE 1 AMPULE: 2.5; .5 SOLUTION RESPIRATORY (INHALATION) at 18:13

## 2019-09-24 RX ADMIN — PANTOPRAZOLE SODIUM 40 MG: 40 TABLET, DELAYED RELEASE ORAL at 13:35

## 2019-09-24 RX ADMIN — LEVOFLOXACIN 500 MG: 5 INJECTION, SOLUTION INTRAVENOUS at 23:30

## 2019-09-24 RX ADMIN — ACETAMINOPHEN 650 MG: 325 TABLET ORAL at 03:02

## 2019-09-24 RX ADMIN — HEPARIN SODIUM 19 UNITS/KG/HR: 10000 INJECTION, SOLUTION INTRAVENOUS at 02:38

## 2019-09-24 RX ADMIN — IODIXANOL 40 ML: 320 INJECTION, SOLUTION INTRAVASCULAR at 11:40

## 2019-09-24 RX ADMIN — IPRATROPIUM BROMIDE AND ALBUTEROL SULFATE 1 AMPULE: 2.5; .5 SOLUTION RESPIRATORY (INHALATION) at 10:02

## 2019-09-24 RX ADMIN — METHYLPREDNISOLONE SODIUM SUCCINATE 40 MG: 40 INJECTION, POWDER, FOR SOLUTION INTRAMUSCULAR; INTRAVENOUS at 23:15

## 2019-09-24 RX ADMIN — SUCRALFATE 1 G: 1 TABLET ORAL at 20:29

## 2019-09-24 RX ADMIN — SODIUM POLYSTYRENE SULFONATE 30 G: 15 SUSPENSION ORAL; RECTAL at 20:29

## 2019-09-24 ASSESSMENT — PAIN SCALES - GENERAL
PAINLEVEL_OUTOF10: 5
PAINLEVEL_OUTOF10: 6
PAINLEVEL_OUTOF10: 0

## 2019-09-24 ASSESSMENT — ENCOUNTER SYMPTOMS
NAUSEA: 0
SHORTNESS OF BREATH: 0
VOMITING: 0
DIARRHEA: 0

## 2019-09-25 LAB
ALBUMIN SERPL-MCNC: 3.2 G/DL (ref 3.5–5.2)
ALP BLD-CCNC: 171 U/L (ref 40–130)
ALT SERPL-CCNC: 28 U/L (ref 5–41)
ANION GAP SERPL CALCULATED.3IONS-SCNC: 15 MMOL/L (ref 7–19)
ANION GAP SERPL CALCULATED.3IONS-SCNC: 16 MMOL/L (ref 7–19)
APTT: 45.1 SEC (ref 26–36.2)
APTT: 52 SEC (ref 26–36.2)
APTT: 52.1 SEC (ref 26–36.2)
APTT: 72.6 SEC (ref 26–36.2)
AST SERPL-CCNC: 25 U/L (ref 5–40)
BASOPHILS ABSOLUTE: 0 K/UL (ref 0–0.2)
BASOPHILS RELATIVE PERCENT: 0.1 % (ref 0–1)
BILIRUB SERPL-MCNC: 0.7 MG/DL (ref 0.2–1.2)
BUN BLDV-MCNC: 28 MG/DL (ref 6–20)
BUN BLDV-MCNC: 30 MG/DL (ref 6–20)
CALCIUM SERPL-MCNC: 9 MG/DL (ref 8.6–10)
CALCIUM SERPL-MCNC: 9.3 MG/DL (ref 8.6–10)
CHLORIDE BLD-SCNC: 89 MMOL/L (ref 98–111)
CHLORIDE BLD-SCNC: 90 MMOL/L (ref 98–111)
CO2: 21 MMOL/L (ref 22–29)
CO2: 22 MMOL/L (ref 22–29)
CREAT SERPL-MCNC: 0.7 MG/DL (ref 0.5–1.2)
CREAT SERPL-MCNC: 0.8 MG/DL (ref 0.5–1.2)
EOSINOPHILS ABSOLUTE: 0 K/UL (ref 0–0.6)
EOSINOPHILS RELATIVE PERCENT: 0 % (ref 0–5)
GFR NON-AFRICAN AMERICAN: >60
GFR NON-AFRICAN AMERICAN: >60
GLUCOSE BLD-MCNC: 222 MG/DL (ref 70–99)
GLUCOSE BLD-MCNC: 232 MG/DL (ref 74–109)
GLUCOSE BLD-MCNC: 233 MG/DL (ref 70–99)
GLUCOSE BLD-MCNC: 238 MG/DL (ref 74–109)
HCT VFR BLD CALC: 32.8 % (ref 42–52)
HEMOGLOBIN: 10.5 G/DL (ref 14–18)
IMMATURE GRANULOCYTES #: 0.1 K/UL
LYMPHOCYTES ABSOLUTE: 0.5 K/UL (ref 1.1–4.5)
LYMPHOCYTES RELATIVE PERCENT: 5 % (ref 20–40)
MAGNESIUM: 1.8 MG/DL (ref 1.6–2.6)
MCH RBC QN AUTO: 28.7 PG (ref 27–31)
MCHC RBC AUTO-ENTMCNC: 32 G/DL (ref 33–37)
MCV RBC AUTO: 89.6 FL (ref 80–94)
MONOCYTES ABSOLUTE: 0.7 K/UL (ref 0–0.9)
MONOCYTES RELATIVE PERCENT: 6.8 % (ref 0–10)
NEUTROPHILS ABSOLUTE: 9.2 K/UL (ref 1.5–7.5)
NEUTROPHILS RELATIVE PERCENT: 87.3 % (ref 50–65)
PDW BLD-RTO: 18.4 % (ref 11.5–14.5)
PERFORMED ON: ABNORMAL
PERFORMED ON: ABNORMAL
PLATELET # BLD: 345 K/UL (ref 130–400)
PMV BLD AUTO: 10.1 FL (ref 9.4–12.4)
POTASSIUM REFLEX MAGNESIUM: 4.9 MMOL/L (ref 3.5–5)
POTASSIUM SERPL-SCNC: 4.9 MMOL/L (ref 3.5–5)
RBC # BLD: 3.66 M/UL (ref 4.7–6.1)
SODIUM BLD-SCNC: 126 MMOL/L (ref 136–145)
SODIUM BLD-SCNC: 127 MMOL/L (ref 136–145)
TOTAL PROTEIN: 7.4 G/DL (ref 6.6–8.7)
WBC # BLD: 10.5 K/UL (ref 4.8–10.8)

## 2019-09-25 PROCEDURE — 6370000000 HC RX 637 (ALT 250 FOR IP): Performed by: SURGERY

## 2019-09-25 PROCEDURE — 2700000000 HC OXYGEN THERAPY PER DAY

## 2019-09-25 PROCEDURE — 6360000002 HC RX W HCPCS: Performed by: SURGERY

## 2019-09-25 PROCEDURE — 85730 THROMBOPLASTIN TIME PARTIAL: CPT

## 2019-09-25 PROCEDURE — 6370000000 HC RX 637 (ALT 250 FOR IP): Performed by: INTERNAL MEDICINE

## 2019-09-25 PROCEDURE — 82948 REAGENT STRIP/BLOOD GLUCOSE: CPT

## 2019-09-25 PROCEDURE — 36415 COLL VENOUS BLD VENIPUNCTURE: CPT

## 2019-09-25 PROCEDURE — 85025 COMPLETE CBC W/AUTO DIFF WBC: CPT

## 2019-09-25 PROCEDURE — 6370000000 HC RX 637 (ALT 250 FOR IP): Performed by: HOSPITALIST

## 2019-09-25 PROCEDURE — 2140000000 HC CCU INTERMEDIATE R&B

## 2019-09-25 PROCEDURE — 83735 ASSAY OF MAGNESIUM: CPT

## 2019-09-25 PROCEDURE — 80053 COMPREHEN METABOLIC PANEL: CPT

## 2019-09-25 PROCEDURE — 2580000003 HC RX 258: Performed by: SURGERY

## 2019-09-25 PROCEDURE — 94640 AIRWAY INHALATION TREATMENT: CPT

## 2019-09-25 RX ORDER — PREDNISONE 20 MG/1
20 TABLET ORAL DAILY
Status: COMPLETED | OUTPATIENT
Start: 2019-09-25 | End: 2019-09-27

## 2019-09-25 RX ORDER — NICOTINE 21 MG/24HR
1 PATCH, TRANSDERMAL 24 HOURS TRANSDERMAL DAILY
Status: DISCONTINUED | OUTPATIENT
Start: 2019-09-25 | End: 2019-09-30 | Stop reason: HOSPADM

## 2019-09-25 RX ORDER — LEVOFLOXACIN 500 MG/1
500 TABLET, FILM COATED ORAL DAILY
Status: COMPLETED | OUTPATIENT
Start: 2019-09-25 | End: 2019-09-27

## 2019-09-25 RX ORDER — DEXTROSE MONOHYDRATE 50 MG/ML
100 INJECTION, SOLUTION INTRAVENOUS PRN
Status: DISCONTINUED | OUTPATIENT
Start: 2019-09-25 | End: 2019-09-30 | Stop reason: HOSPADM

## 2019-09-25 RX ORDER — NICOTINE POLACRILEX 4 MG
15 LOZENGE BUCCAL PRN
Status: DISCONTINUED | OUTPATIENT
Start: 2019-09-25 | End: 2019-09-30 | Stop reason: HOSPADM

## 2019-09-25 RX ORDER — DEXTROSE MONOHYDRATE 25 G/50ML
12.5 INJECTION, SOLUTION INTRAVENOUS PRN
Status: DISCONTINUED | OUTPATIENT
Start: 2019-09-25 | End: 2019-09-30 | Stop reason: HOSPADM

## 2019-09-25 RX ADMIN — INSULIN LISPRO 1 UNITS: 100 INJECTION, SOLUTION INTRAVENOUS; SUBCUTANEOUS at 19:49

## 2019-09-25 RX ADMIN — PANTOPRAZOLE SODIUM 40 MG: 40 TABLET, DELAYED RELEASE ORAL at 08:48

## 2019-09-25 RX ADMIN — INSULIN LISPRO 2 UNITS: 100 INJECTION, SOLUTION INTRAVENOUS; SUBCUTANEOUS at 17:21

## 2019-09-25 RX ADMIN — HEPARIN SODIUM 21 UNITS/KG/HR: 10000 INJECTION, SOLUTION INTRAVENOUS at 08:48

## 2019-09-25 RX ADMIN — METOPROLOL SUCCINATE 25 MG: 25 TABLET, EXTENDED RELEASE ORAL at 12:39

## 2019-09-25 RX ADMIN — PREDNISONE 20 MG: 20 TABLET ORAL at 08:48

## 2019-09-25 RX ADMIN — SUCRALFATE 1 G: 1 TABLET ORAL at 19:45

## 2019-09-25 RX ADMIN — IPRATROPIUM BROMIDE AND ALBUTEROL SULFATE 1 AMPULE: 2.5; .5 SOLUTION RESPIRATORY (INHALATION) at 14:40

## 2019-09-25 RX ADMIN — SUCRALFATE 1 G: 1 TABLET ORAL at 17:21

## 2019-09-25 RX ADMIN — HEPARIN SODIUM 3450 UNITS: 1000 INJECTION INTRAVENOUS; SUBCUTANEOUS at 08:49

## 2019-09-25 RX ADMIN — SUCRALFATE 1 G: 1 TABLET ORAL at 08:48

## 2019-09-25 RX ADMIN — IPRATROPIUM BROMIDE AND ALBUTEROL SULFATE 1 AMPULE: 2.5; .5 SOLUTION RESPIRATORY (INHALATION) at 10:37

## 2019-09-25 RX ADMIN — IPRATROPIUM BROMIDE AND ALBUTEROL SULFATE 1 AMPULE: 2.5; .5 SOLUTION RESPIRATORY (INHALATION) at 07:02

## 2019-09-25 RX ADMIN — SUCRALFATE 1 G: 1 TABLET ORAL at 12:39

## 2019-09-25 RX ADMIN — HEPARIN SODIUM 20 UNITS/KG/HR: 10000 INJECTION, SOLUTION INTRAVENOUS at 01:53

## 2019-09-25 RX ADMIN — LORAZEPAM 1 MG: 2 INJECTION INTRAMUSCULAR; INTRAVENOUS at 19:45

## 2019-09-25 RX ADMIN — FUROSEMIDE 40 MG: 40 TABLET ORAL at 08:48

## 2019-09-25 RX ADMIN — ACETAMINOPHEN 650 MG: 325 TABLET ORAL at 19:45

## 2019-09-25 RX ADMIN — IPRATROPIUM BROMIDE AND ALBUTEROL SULFATE 1 AMPULE: 2.5; .5 SOLUTION RESPIRATORY (INHALATION) at 19:01

## 2019-09-25 RX ADMIN — HEPARIN SODIUM 21 UNITS/KG/HR: 10000 INJECTION, SOLUTION INTRAVENOUS at 17:25

## 2019-09-25 RX ADMIN — METOPROLOL SUCCINATE 25 MG: 25 TABLET, EXTENDED RELEASE ORAL at 23:16

## 2019-09-25 RX ADMIN — LEVOFLOXACIN 500 MG: 500 TABLET, FILM COATED ORAL at 08:48

## 2019-09-25 RX ADMIN — Medication 10 ML: at 08:47

## 2019-09-25 RX ADMIN — LORAZEPAM 1 MG: 2 INJECTION INTRAMUSCULAR; INTRAVENOUS at 12:39

## 2019-09-25 ASSESSMENT — PAIN SCALES - GENERAL
PAINLEVEL_OUTOF10: 0
PAINLEVEL_OUTOF10: 5
PAINLEVEL_OUTOF10: 4

## 2019-09-25 ASSESSMENT — PAIN SCALES - WONG BAKER: WONGBAKER_NUMERICALRESPONSE: 0

## 2019-09-26 LAB
ALBUMIN SERPL-MCNC: 3.3 G/DL (ref 3.5–5.2)
ALP BLD-CCNC: 183 U/L (ref 40–130)
ALT SERPL-CCNC: 28 U/L (ref 5–41)
ANION GAP SERPL CALCULATED.3IONS-SCNC: 14 MMOL/L (ref 7–19)
ANION GAP SERPL CALCULATED.3IONS-SCNC: 14 MMOL/L (ref 7–19)
APTT: 48 SEC (ref 26–36.2)
APTT: 86.1 SEC (ref 26–36.2)
AST SERPL-CCNC: 22 U/L (ref 5–40)
BASOPHILS ABSOLUTE: 0 K/UL (ref 0–0.2)
BASOPHILS RELATIVE PERCENT: 0.1 % (ref 0–1)
BILIRUB SERPL-MCNC: 0.7 MG/DL (ref 0.2–1.2)
BUN BLDV-MCNC: 32 MG/DL (ref 6–20)
BUN BLDV-MCNC: 34 MG/DL (ref 6–20)
CALCIUM SERPL-MCNC: 9.4 MG/DL (ref 8.6–10)
CALCIUM SERPL-MCNC: 9.5 MG/DL (ref 8.6–10)
CHLORIDE BLD-SCNC: 92 MMOL/L (ref 98–111)
CHLORIDE BLD-SCNC: 93 MMOL/L (ref 98–111)
CO2: 22 MMOL/L (ref 22–29)
CO2: 22 MMOL/L (ref 22–29)
CREAT SERPL-MCNC: 0.8 MG/DL (ref 0.5–1.2)
CREAT SERPL-MCNC: 0.9 MG/DL (ref 0.5–1.2)
EOSINOPHILS ABSOLUTE: 0 K/UL (ref 0–0.6)
EOSINOPHILS RELATIVE PERCENT: 0.1 % (ref 0–5)
GFR NON-AFRICAN AMERICAN: >60
GFR NON-AFRICAN AMERICAN: >60
GLUCOSE BLD-MCNC: 139 MG/DL (ref 70–99)
GLUCOSE BLD-MCNC: 143 MG/DL (ref 74–109)
GLUCOSE BLD-MCNC: 146 MG/DL (ref 70–99)
GLUCOSE BLD-MCNC: 165 MG/DL (ref 70–99)
GLUCOSE BLD-MCNC: 169 MG/DL (ref 70–99)
GLUCOSE BLD-MCNC: 170 MG/DL (ref 74–109)
HCT VFR BLD CALC: 34.2 % (ref 42–52)
HEMOGLOBIN: 10.8 G/DL (ref 14–18)
IMMATURE GRANULOCYTES #: 0.1 K/UL
INR BLD: 1.51 (ref 0.88–1.18)
LYMPHOCYTES ABSOLUTE: 1.1 K/UL (ref 1.1–4.5)
LYMPHOCYTES RELATIVE PERCENT: 9.8 % (ref 20–40)
MAGNESIUM: 2 MG/DL (ref 1.6–2.6)
MCH RBC QN AUTO: 29 PG (ref 27–31)
MCHC RBC AUTO-ENTMCNC: 31.6 G/DL (ref 33–37)
MCV RBC AUTO: 91.9 FL (ref 80–94)
MONOCYTES ABSOLUTE: 1.1 K/UL (ref 0–0.9)
MONOCYTES RELATIVE PERCENT: 10 % (ref 0–10)
NEUTROPHILS ABSOLUTE: 8.5 K/UL (ref 1.5–7.5)
NEUTROPHILS RELATIVE PERCENT: 79.3 % (ref 50–65)
PDW BLD-RTO: 18.7 % (ref 11.5–14.5)
PERFORMED ON: ABNORMAL
PLATELET # BLD: 288 K/UL (ref 130–400)
PMV BLD AUTO: 11.9 FL (ref 9.4–12.4)
POTASSIUM SERPL-SCNC: 4.5 MMOL/L (ref 3.5–5)
POTASSIUM SERPL-SCNC: 4.5 MMOL/L (ref 3.5–5)
PROTHROMBIN TIME: 17.5 SEC (ref 12–14.6)
RBC # BLD: 3.72 M/UL (ref 4.7–6.1)
REASON FOR REJECTION: NORMAL
REASON FOR REJECTION: NORMAL
REJECTED TEST: NORMAL
REJECTED TEST: NORMAL
SODIUM BLD-SCNC: 128 MMOL/L (ref 136–145)
SODIUM BLD-SCNC: 129 MMOL/L (ref 136–145)
TOTAL PROTEIN: 7.6 G/DL (ref 6.6–8.7)
WBC # BLD: 10.8 K/UL (ref 4.8–10.8)

## 2019-09-26 PROCEDURE — 6370000000 HC RX 637 (ALT 250 FOR IP): Performed by: INTERNAL MEDICINE

## 2019-09-26 PROCEDURE — 6370000000 HC RX 637 (ALT 250 FOR IP): Performed by: SURGERY

## 2019-09-26 PROCEDURE — 36415 COLL VENOUS BLD VENIPUNCTURE: CPT

## 2019-09-26 PROCEDURE — 85730 THROMBOPLASTIN TIME PARTIAL: CPT

## 2019-09-26 PROCEDURE — 6360000002 HC RX W HCPCS: Performed by: SURGERY

## 2019-09-26 PROCEDURE — 6370000000 HC RX 637 (ALT 250 FOR IP): Performed by: HOSPITALIST

## 2019-09-26 PROCEDURE — 6360000002 HC RX W HCPCS: Performed by: INTERNAL MEDICINE

## 2019-09-26 PROCEDURE — 83735 ASSAY OF MAGNESIUM: CPT

## 2019-09-26 PROCEDURE — 82948 REAGENT STRIP/BLOOD GLUCOSE: CPT

## 2019-09-26 PROCEDURE — 85610 PROTHROMBIN TIME: CPT

## 2019-09-26 PROCEDURE — 2700000000 HC OXYGEN THERAPY PER DAY

## 2019-09-26 PROCEDURE — 2580000003 HC RX 258: Performed by: SURGERY

## 2019-09-26 PROCEDURE — 85025 COMPLETE CBC W/AUTO DIFF WBC: CPT

## 2019-09-26 PROCEDURE — 2140000000 HC CCU INTERMEDIATE R&B

## 2019-09-26 PROCEDURE — 80053 COMPREHEN METABOLIC PANEL: CPT

## 2019-09-26 PROCEDURE — 94640 AIRWAY INHALATION TREATMENT: CPT

## 2019-09-26 RX ORDER — GABAPENTIN 100 MG/1
100 CAPSULE ORAL 3 TIMES DAILY
Status: DISCONTINUED | OUTPATIENT
Start: 2019-09-26 | End: 2019-09-30 | Stop reason: HOSPADM

## 2019-09-26 RX ORDER — WARFARIN SODIUM 5 MG/1
5 TABLET ORAL
Status: COMPLETED | OUTPATIENT
Start: 2019-09-26 | End: 2019-09-26

## 2019-09-26 RX ORDER — ACETAMINOPHEN 325 MG/1
650 TABLET ORAL EVERY 6 HOURS PRN
Status: DISCONTINUED | OUTPATIENT
Start: 2019-09-26 | End: 2019-09-30 | Stop reason: HOSPADM

## 2019-09-26 RX ADMIN — ENOXAPARIN SODIUM 90 MG: 100 INJECTION SUBCUTANEOUS at 19:30

## 2019-09-26 RX ADMIN — IPRATROPIUM BROMIDE AND ALBUTEROL SULFATE 1 AMPULE: 2.5; .5 SOLUTION RESPIRATORY (INHALATION) at 14:35

## 2019-09-26 RX ADMIN — INSULIN LISPRO 1 UNITS: 100 INJECTION, SOLUTION INTRAVENOUS; SUBCUTANEOUS at 21:07

## 2019-09-26 RX ADMIN — LEVOFLOXACIN 500 MG: 500 TABLET, FILM COATED ORAL at 08:39

## 2019-09-26 RX ADMIN — ENOXAPARIN SODIUM 90 MG: 100 INJECTION SUBCUTANEOUS at 10:17

## 2019-09-26 RX ADMIN — IPRATROPIUM BROMIDE AND ALBUTEROL SULFATE 1 AMPULE: 2.5; .5 SOLUTION RESPIRATORY (INHALATION) at 06:54

## 2019-09-26 RX ADMIN — Medication 10 ML: at 08:39

## 2019-09-26 RX ADMIN — HEPARIN SODIUM 3450 UNITS: 1000 INJECTION INTRAVENOUS; SUBCUTANEOUS at 02:48

## 2019-09-26 RX ADMIN — HEPARIN SODIUM 22 UNITS/KG/HR: 10000 INJECTION, SOLUTION INTRAVENOUS at 07:07

## 2019-09-26 RX ADMIN — SUCRALFATE 1 G: 1 TABLET ORAL at 12:33

## 2019-09-26 RX ADMIN — LORAZEPAM 1 MG: 2 INJECTION INTRAMUSCULAR; INTRAVENOUS at 02:54

## 2019-09-26 RX ADMIN — METOPROLOL SUCCINATE 25 MG: 25 TABLET, EXTENDED RELEASE ORAL at 12:33

## 2019-09-26 RX ADMIN — LORAZEPAM 1 MG: 2 INJECTION INTRAMUSCULAR; INTRAVENOUS at 21:10

## 2019-09-26 RX ADMIN — GABAPENTIN 100 MG: 100 CAPSULE ORAL at 14:48

## 2019-09-26 RX ADMIN — IPRATROPIUM BROMIDE AND ALBUTEROL SULFATE 1 AMPULE: 2.5; .5 SOLUTION RESPIRATORY (INHALATION) at 10:42

## 2019-09-26 RX ADMIN — ACETAMINOPHEN 650 MG: 325 TABLET ORAL at 02:48

## 2019-09-26 RX ADMIN — INSULIN LISPRO 1 UNITS: 100 INJECTION, SOLUTION INTRAVENOUS; SUBCUTANEOUS at 08:43

## 2019-09-26 RX ADMIN — PREDNISONE 20 MG: 20 TABLET ORAL at 08:39

## 2019-09-26 RX ADMIN — METOPROLOL SUCCINATE 25 MG: 25 TABLET, EXTENDED RELEASE ORAL at 21:10

## 2019-09-26 RX ADMIN — INSULIN LISPRO 1 UNITS: 100 INJECTION, SOLUTION INTRAVENOUS; SUBCUTANEOUS at 16:48

## 2019-09-26 RX ADMIN — PANTOPRAZOLE SODIUM 40 MG: 40 TABLET, DELAYED RELEASE ORAL at 08:39

## 2019-09-26 RX ADMIN — Medication 10 ML: at 19:31

## 2019-09-26 RX ADMIN — FUROSEMIDE 40 MG: 40 TABLET ORAL at 08:39

## 2019-09-26 RX ADMIN — HEPARIN SODIUM 22 UNITS/KG/HR: 10000 INJECTION, SOLUTION INTRAVENOUS at 02:48

## 2019-09-26 RX ADMIN — GABAPENTIN 100 MG: 100 CAPSULE ORAL at 19:31

## 2019-09-26 RX ADMIN — GABAPENTIN 100 MG: 100 CAPSULE ORAL at 10:18

## 2019-09-26 RX ADMIN — SUCRALFATE 1 G: 1 TABLET ORAL at 16:46

## 2019-09-26 RX ADMIN — ACETAMINOPHEN 650 MG: 325 TABLET ORAL at 10:18

## 2019-09-26 RX ADMIN — SUCRALFATE 1 G: 1 TABLET ORAL at 19:31

## 2019-09-26 RX ADMIN — SUCRALFATE 1 G: 1 TABLET ORAL at 08:39

## 2019-09-26 RX ADMIN — WARFARIN SODIUM 5 MG: 5 TABLET ORAL at 16:46

## 2019-09-26 RX ADMIN — IPRATROPIUM BROMIDE AND ALBUTEROL SULFATE 1 AMPULE: 2.5; .5 SOLUTION RESPIRATORY (INHALATION) at 18:32

## 2019-09-26 ASSESSMENT — PAIN DESCRIPTION - ONSET: ONSET: ON-GOING

## 2019-09-26 ASSESSMENT — PAIN DESCRIPTION - PROGRESSION: CLINICAL_PROGRESSION: GRADUALLY IMPROVING

## 2019-09-26 ASSESSMENT — PAIN DESCRIPTION - FREQUENCY: FREQUENCY: INTERMITTENT

## 2019-09-26 ASSESSMENT — PAIN DESCRIPTION - LOCATION: LOCATION: GENERALIZED

## 2019-09-26 ASSESSMENT — PAIN SCALES - GENERAL
PAINLEVEL_OUTOF10: 5
PAINLEVEL_OUTOF10: 3
PAINLEVEL_OUTOF10: 7
PAINLEVEL_OUTOF10: 3

## 2019-09-26 ASSESSMENT — PAIN SCALES - WONG BAKER: WONGBAKER_NUMERICALRESPONSE: 4

## 2019-09-26 ASSESSMENT — PAIN DESCRIPTION - DESCRIPTORS: DESCRIPTORS: ACHING

## 2019-09-26 ASSESSMENT — PAIN DESCRIPTION - PAIN TYPE: TYPE: ACUTE PAIN

## 2019-09-27 ENCOUNTER — APPOINTMENT (OUTPATIENT)
Dept: GENERAL RADIOLOGY | Age: 34
DRG: 167 | End: 2019-09-27
Payer: COMMERCIAL

## 2019-09-27 LAB
ALBUMIN SERPL-MCNC: 3.6 G/DL (ref 3.5–5.2)
ALP BLD-CCNC: 217 U/L (ref 40–130)
ALT SERPL-CCNC: 32 U/L (ref 5–41)
ANION GAP SERPL CALCULATED.3IONS-SCNC: 19 MMOL/L (ref 7–19)
AST SERPL-CCNC: 28 U/L (ref 5–40)
BASOPHILS ABSOLUTE: 0 K/UL (ref 0–0.2)
BASOPHILS RELATIVE PERCENT: 0.1 % (ref 0–1)
BILIRUB SERPL-MCNC: 0.7 MG/DL (ref 0.2–1.2)
BUN BLDV-MCNC: 34 MG/DL (ref 6–20)
CALCIUM SERPL-MCNC: 9.7 MG/DL (ref 8.6–10)
CHLORIDE BLD-SCNC: 89 MMOL/L (ref 98–111)
CO2: 22 MMOL/L (ref 22–29)
CREAT SERPL-MCNC: 0.9 MG/DL (ref 0.5–1.2)
EOSINOPHILS ABSOLUTE: 0.1 K/UL (ref 0–0.6)
EOSINOPHILS RELATIVE PERCENT: 0.6 % (ref 0–5)
GFR NON-AFRICAN AMERICAN: >60
GLUCOSE BLD-MCNC: 123 MG/DL (ref 70–99)
GLUCOSE BLD-MCNC: 139 MG/DL (ref 70–99)
GLUCOSE BLD-MCNC: 146 MG/DL (ref 74–109)
GLUCOSE BLD-MCNC: 205 MG/DL (ref 70–99)
GLUCOSE BLD-MCNC: 226 MG/DL (ref 70–99)
HCT VFR BLD CALC: 35 % (ref 42–52)
HEMOGLOBIN: 11.3 G/DL (ref 14–18)
IMMATURE GRANULOCYTES #: 0.1 K/UL
INR BLD: 1.46 (ref 0.88–1.18)
LYMPHOCYTES ABSOLUTE: 2.1 K/UL (ref 1.1–4.5)
LYMPHOCYTES RELATIVE PERCENT: 20 % (ref 20–40)
MAGNESIUM: 1.9 MG/DL (ref 1.6–2.6)
MCH RBC QN AUTO: 28.8 PG (ref 27–31)
MCHC RBC AUTO-ENTMCNC: 32.3 G/DL (ref 33–37)
MCV RBC AUTO: 89.1 FL (ref 80–94)
MONOCYTES ABSOLUTE: 0.9 K/UL (ref 0–0.9)
MONOCYTES RELATIVE PERCENT: 8.7 % (ref 0–10)
NEUTROPHILS ABSOLUTE: 7.2 K/UL (ref 1.5–7.5)
NEUTROPHILS RELATIVE PERCENT: 69.2 % (ref 50–65)
PDW BLD-RTO: 18.7 % (ref 11.5–14.5)
PERFORMED ON: ABNORMAL
PLATELET # BLD: 381 K/UL (ref 130–400)
PMV BLD AUTO: 10.2 FL (ref 9.4–12.4)
POTASSIUM SERPL-SCNC: 4.7 MMOL/L (ref 3.5–5)
PROTHROMBIN TIME: 17.1 SEC (ref 12–14.6)
RBC # BLD: 3.93 M/UL (ref 4.7–6.1)
SODIUM BLD-SCNC: 130 MMOL/L (ref 136–145)
TOTAL PROTEIN: 7.2 G/DL (ref 6.6–8.7)
WBC # BLD: 10.3 K/UL (ref 4.8–10.8)

## 2019-09-27 PROCEDURE — 85025 COMPLETE CBC W/AUTO DIFF WBC: CPT

## 2019-09-27 PROCEDURE — 6370000000 HC RX 637 (ALT 250 FOR IP): Performed by: HOSPITALIST

## 2019-09-27 PROCEDURE — 94640 AIRWAY INHALATION TREATMENT: CPT

## 2019-09-27 PROCEDURE — 85610 PROTHROMBIN TIME: CPT

## 2019-09-27 PROCEDURE — 83735 ASSAY OF MAGNESIUM: CPT

## 2019-09-27 PROCEDURE — 6370000000 HC RX 637 (ALT 250 FOR IP): Performed by: SURGERY

## 2019-09-27 PROCEDURE — 6370000000 HC RX 637 (ALT 250 FOR IP): Performed by: INTERNAL MEDICINE

## 2019-09-27 PROCEDURE — 82948 REAGENT STRIP/BLOOD GLUCOSE: CPT

## 2019-09-27 PROCEDURE — 2140000000 HC CCU INTERMEDIATE R&B

## 2019-09-27 PROCEDURE — 94761 N-INVAS EAR/PLS OXIMETRY MLT: CPT

## 2019-09-27 PROCEDURE — 80053 COMPREHEN METABOLIC PANEL: CPT

## 2019-09-27 PROCEDURE — 36415 COLL VENOUS BLD VENIPUNCTURE: CPT

## 2019-09-27 PROCEDURE — 71045 X-RAY EXAM CHEST 1 VIEW: CPT

## 2019-09-27 PROCEDURE — 6360000002 HC RX W HCPCS: Performed by: INTERNAL MEDICINE

## 2019-09-27 PROCEDURE — 2700000000 HC OXYGEN THERAPY PER DAY

## 2019-09-27 PROCEDURE — 2580000003 HC RX 258: Performed by: SURGERY

## 2019-09-27 RX ORDER — FUROSEMIDE 10 MG/ML
20 INJECTION INTRAMUSCULAR; INTRAVENOUS ONCE
Status: COMPLETED | OUTPATIENT
Start: 2019-09-27 | End: 2019-09-27

## 2019-09-27 RX ORDER — WARFARIN SODIUM 5 MG/1
5 TABLET ORAL DAILY
Qty: 30 TABLET | Refills: 3 | Status: SHIPPED | OUTPATIENT
Start: 2019-09-27 | End: 2019-10-29 | Stop reason: ALTCHOICE

## 2019-09-27 RX ORDER — WARFARIN SODIUM 5 MG/1
5 TABLET ORAL
Status: COMPLETED | OUTPATIENT
Start: 2019-09-27 | End: 2019-09-27

## 2019-09-27 RX ADMIN — IPRATROPIUM BROMIDE AND ALBUTEROL SULFATE 1 AMPULE: 2.5; .5 SOLUTION RESPIRATORY (INHALATION) at 09:57

## 2019-09-27 RX ADMIN — GABAPENTIN 100 MG: 100 CAPSULE ORAL at 22:25

## 2019-09-27 RX ADMIN — METOPROLOL SUCCINATE 25 MG: 25 TABLET, EXTENDED RELEASE ORAL at 13:26

## 2019-09-27 RX ADMIN — ENOXAPARIN SODIUM 90 MG: 100 INJECTION SUBCUTANEOUS at 22:26

## 2019-09-27 RX ADMIN — SUCRALFATE 1 G: 1 TABLET ORAL at 13:26

## 2019-09-27 RX ADMIN — Medication 10 ML: at 22:26

## 2019-09-27 RX ADMIN — IPRATROPIUM BROMIDE AND ALBUTEROL SULFATE 1 AMPULE: 2.5; .5 SOLUTION RESPIRATORY (INHALATION) at 06:32

## 2019-09-27 RX ADMIN — FUROSEMIDE 20 MG: 10 INJECTION, SOLUTION INTRAVENOUS at 09:04

## 2019-09-27 RX ADMIN — SUCRALFATE 1 G: 1 TABLET ORAL at 17:20

## 2019-09-27 RX ADMIN — GABAPENTIN 100 MG: 100 CAPSULE ORAL at 08:50

## 2019-09-27 RX ADMIN — PANTOPRAZOLE SODIUM 40 MG: 40 TABLET, DELAYED RELEASE ORAL at 08:50

## 2019-09-27 RX ADMIN — Medication 10 ML: at 08:50

## 2019-09-27 RX ADMIN — IPRATROPIUM BROMIDE AND ALBUTEROL SULFATE 1 AMPULE: 2.5; .5 SOLUTION RESPIRATORY (INHALATION) at 18:14

## 2019-09-27 RX ADMIN — INSULIN LISPRO 2 UNITS: 100 INJECTION, SOLUTION INTRAVENOUS; SUBCUTANEOUS at 09:05

## 2019-09-27 RX ADMIN — LEVOFLOXACIN 500 MG: 500 TABLET, FILM COATED ORAL at 08:50

## 2019-09-27 RX ADMIN — PREDNISONE 20 MG: 20 TABLET ORAL at 08:50

## 2019-09-27 RX ADMIN — SUCRALFATE 1 G: 1 TABLET ORAL at 08:50

## 2019-09-27 RX ADMIN — WARFARIN SODIUM 5 MG: 5 TABLET ORAL at 17:20

## 2019-09-27 RX ADMIN — INSULIN LISPRO 2 UNITS: 100 INJECTION, SOLUTION INTRAVENOUS; SUBCUTANEOUS at 17:20

## 2019-09-27 RX ADMIN — SUCRALFATE 1 G: 1 TABLET ORAL at 22:25

## 2019-09-27 RX ADMIN — GABAPENTIN 100 MG: 100 CAPSULE ORAL at 13:26

## 2019-09-27 RX ADMIN — FUROSEMIDE 40 MG: 40 TABLET ORAL at 08:50

## 2019-09-27 RX ADMIN — IPRATROPIUM BROMIDE AND ALBUTEROL SULFATE 1 AMPULE: 2.5; .5 SOLUTION RESPIRATORY (INHALATION) at 14:17

## 2019-09-27 RX ADMIN — ENOXAPARIN SODIUM 90 MG: 100 INJECTION SUBCUTANEOUS at 08:50

## 2019-09-27 ASSESSMENT — PAIN SCALES - GENERAL
PAINLEVEL_OUTOF10: 0
PAINLEVEL_OUTOF10: 0

## 2019-09-28 LAB
ALBUMIN SERPL-MCNC: 3.5 G/DL (ref 3.5–5.2)
ALP BLD-CCNC: 209 U/L (ref 40–130)
ALT SERPL-CCNC: 51 U/L (ref 5–41)
ANION GAP SERPL CALCULATED.3IONS-SCNC: 18 MMOL/L (ref 7–19)
AST SERPL-CCNC: 53 U/L (ref 5–40)
BASOPHILS ABSOLUTE: 0 K/UL (ref 0–0.2)
BASOPHILS RELATIVE PERCENT: 0.2 % (ref 0–1)
BILIRUB SERPL-MCNC: 0.8 MG/DL (ref 0.2–1.2)
BUN BLDV-MCNC: 30 MG/DL (ref 6–20)
CALCIUM SERPL-MCNC: 9.2 MG/DL (ref 8.6–10)
CHLORIDE BLD-SCNC: 91 MMOL/L (ref 98–111)
CO2: 22 MMOL/L (ref 22–29)
CREAT SERPL-MCNC: 0.8 MG/DL (ref 0.5–1.2)
EOSINOPHILS ABSOLUTE: 0.2 K/UL (ref 0–0.6)
EOSINOPHILS RELATIVE PERCENT: 1.4 % (ref 0–5)
GFR NON-AFRICAN AMERICAN: >60
GLUCOSE BLD-MCNC: 118 MG/DL (ref 74–109)
GLUCOSE BLD-MCNC: 154 MG/DL (ref 70–99)
GLUCOSE BLD-MCNC: 156 MG/DL (ref 70–99)
GLUCOSE BLD-MCNC: 173 MG/DL (ref 70–99)
GLUCOSE BLD-MCNC: 218 MG/DL (ref 70–99)
HCT VFR BLD CALC: 35 % (ref 42–52)
HEMOGLOBIN: 11.5 G/DL (ref 14–18)
IMMATURE GRANULOCYTES #: 0.2 K/UL
INR BLD: 1.45 (ref 0.88–1.18)
LYMPHOCYTES ABSOLUTE: 2.3 K/UL (ref 1.1–4.5)
LYMPHOCYTES RELATIVE PERCENT: 21.3 % (ref 20–40)
MAGNESIUM: 1.9 MG/DL (ref 1.6–2.6)
MCH RBC QN AUTO: 28.9 PG (ref 27–31)
MCHC RBC AUTO-ENTMCNC: 32.9 G/DL (ref 33–37)
MCV RBC AUTO: 87.9 FL (ref 80–94)
MONOCYTES ABSOLUTE: 1 K/UL (ref 0–0.9)
MONOCYTES RELATIVE PERCENT: 9.7 % (ref 0–10)
NEUTROPHILS ABSOLUTE: 6.9 K/UL (ref 1.5–7.5)
NEUTROPHILS RELATIVE PERCENT: 65.3 % (ref 50–65)
PDW BLD-RTO: 18.4 % (ref 11.5–14.5)
PERFORMED ON: ABNORMAL
PLATELET # BLD: 377 K/UL (ref 130–400)
PMV BLD AUTO: 10 FL (ref 9.4–12.4)
POTASSIUM SERPL-SCNC: 4.1 MMOL/L (ref 3.5–5)
PROTHROMBIN TIME: 17 SEC (ref 12–14.6)
RBC # BLD: 3.98 M/UL (ref 4.7–6.1)
SODIUM BLD-SCNC: 131 MMOL/L (ref 136–145)
TOTAL PROTEIN: 7.6 G/DL (ref 6.6–8.7)
WBC # BLD: 10.6 K/UL (ref 4.8–10.8)

## 2019-09-28 PROCEDURE — 6370000000 HC RX 637 (ALT 250 FOR IP): Performed by: SURGERY

## 2019-09-28 PROCEDURE — 36415 COLL VENOUS BLD VENIPUNCTURE: CPT

## 2019-09-28 PROCEDURE — 82948 REAGENT STRIP/BLOOD GLUCOSE: CPT

## 2019-09-28 PROCEDURE — 2700000000 HC OXYGEN THERAPY PER DAY

## 2019-09-28 PROCEDURE — 2580000003 HC RX 258: Performed by: SURGERY

## 2019-09-28 PROCEDURE — 85610 PROTHROMBIN TIME: CPT

## 2019-09-28 PROCEDURE — 83735 ASSAY OF MAGNESIUM: CPT

## 2019-09-28 PROCEDURE — 6360000002 HC RX W HCPCS: Performed by: SURGERY

## 2019-09-28 PROCEDURE — 6370000000 HC RX 637 (ALT 250 FOR IP): Performed by: INTERNAL MEDICINE

## 2019-09-28 PROCEDURE — 85025 COMPLETE CBC W/AUTO DIFF WBC: CPT

## 2019-09-28 PROCEDURE — 80053 COMPREHEN METABOLIC PANEL: CPT

## 2019-09-28 PROCEDURE — 2140000000 HC CCU INTERMEDIATE R&B

## 2019-09-28 PROCEDURE — 6370000000 HC RX 637 (ALT 250 FOR IP): Performed by: HOSPITALIST

## 2019-09-28 PROCEDURE — 94640 AIRWAY INHALATION TREATMENT: CPT

## 2019-09-28 PROCEDURE — 6360000002 HC RX W HCPCS: Performed by: INTERNAL MEDICINE

## 2019-09-28 PROCEDURE — 94760 N-INVAS EAR/PLS OXIMETRY 1: CPT

## 2019-09-28 RX ORDER — WARFARIN SODIUM 5 MG/1
10 TABLET ORAL
Status: COMPLETED | OUTPATIENT
Start: 2019-09-28 | End: 2019-09-28

## 2019-09-28 RX ORDER — LORAZEPAM 1 MG/1
1 TABLET ORAL EVERY 4 HOURS PRN
Status: DISCONTINUED | OUTPATIENT
Start: 2019-09-28 | End: 2019-09-29

## 2019-09-28 RX ADMIN — GABAPENTIN 100 MG: 100 CAPSULE ORAL at 09:16

## 2019-09-28 RX ADMIN — FUROSEMIDE 40 MG: 40 TABLET ORAL at 09:16

## 2019-09-28 RX ADMIN — INSULIN LISPRO 2 UNITS: 100 INJECTION, SOLUTION INTRAVENOUS; SUBCUTANEOUS at 16:53

## 2019-09-28 RX ADMIN — METOPROLOL SUCCINATE 25 MG: 25 TABLET, EXTENDED RELEASE ORAL at 12:00

## 2019-09-28 RX ADMIN — GABAPENTIN 100 MG: 100 CAPSULE ORAL at 14:12

## 2019-09-28 RX ADMIN — LORAZEPAM 1 MG: 2 INJECTION INTRAMUSCULAR; INTRAVENOUS at 08:06

## 2019-09-28 RX ADMIN — ENOXAPARIN SODIUM 90 MG: 100 INJECTION SUBCUTANEOUS at 20:54

## 2019-09-28 RX ADMIN — ONDANSETRON 4 MG: 4 TABLET, ORALLY DISINTEGRATING ORAL at 07:44

## 2019-09-28 RX ADMIN — INSULIN LISPRO 1 UNITS: 100 INJECTION, SOLUTION INTRAVENOUS; SUBCUTANEOUS at 11:50

## 2019-09-28 RX ADMIN — GABAPENTIN 100 MG: 100 CAPSULE ORAL at 20:54

## 2019-09-28 RX ADMIN — PANTOPRAZOLE SODIUM 40 MG: 40 TABLET, DELAYED RELEASE ORAL at 09:16

## 2019-09-28 RX ADMIN — IPRATROPIUM BROMIDE AND ALBUTEROL SULFATE 1 AMPULE: 2.5; .5 SOLUTION RESPIRATORY (INHALATION) at 13:59

## 2019-09-28 RX ADMIN — IPRATROPIUM BROMIDE AND ALBUTEROL SULFATE 1 AMPULE: 2.5; .5 SOLUTION RESPIRATORY (INHALATION) at 10:02

## 2019-09-28 RX ADMIN — IPRATROPIUM BROMIDE AND ALBUTEROL SULFATE 1 AMPULE: 2.5; .5 SOLUTION RESPIRATORY (INHALATION) at 06:18

## 2019-09-28 RX ADMIN — SUCRALFATE 1 G: 1 TABLET ORAL at 16:53

## 2019-09-28 RX ADMIN — IPRATROPIUM BROMIDE AND ALBUTEROL SULFATE 1 AMPULE: 2.5; .5 SOLUTION RESPIRATORY (INHALATION) at 18:12

## 2019-09-28 RX ADMIN — LORAZEPAM 1 MG: 2 INJECTION INTRAMUSCULAR; INTRAVENOUS at 21:09

## 2019-09-28 RX ADMIN — METOPROLOL SUCCINATE 25 MG: 25 TABLET, EXTENDED RELEASE ORAL at 00:25

## 2019-09-28 RX ADMIN — ONDANSETRON 4 MG: 2 INJECTION INTRAMUSCULAR; INTRAVENOUS at 14:12

## 2019-09-28 RX ADMIN — WARFARIN SODIUM 10 MG: 5 TABLET ORAL at 16:52

## 2019-09-28 RX ADMIN — INSULIN LISPRO 1 UNITS: 100 INJECTION, SOLUTION INTRAVENOUS; SUBCUTANEOUS at 21:09

## 2019-09-28 RX ADMIN — SUCRALFATE 1 G: 1 TABLET ORAL at 20:54

## 2019-09-28 RX ADMIN — ENOXAPARIN SODIUM 90 MG: 100 INJECTION SUBCUTANEOUS at 09:17

## 2019-09-28 RX ADMIN — SUCRALFATE 1 G: 1 TABLET ORAL at 12:00

## 2019-09-28 RX ADMIN — Medication 10 ML: at 20:54

## 2019-09-28 RX ADMIN — Medication 10 ML: at 09:17

## 2019-09-28 RX ADMIN — SUCRALFATE 1 G: 1 TABLET ORAL at 09:16

## 2019-09-28 ASSESSMENT — PAIN SCALES - GENERAL
PAINLEVEL_OUTOF10: 2
PAINLEVEL_OUTOF10: 0
PAINLEVEL_OUTOF10: 2

## 2019-09-29 LAB
ALBUMIN SERPL-MCNC: 3.1 G/DL (ref 3.5–5.2)
ALP BLD-CCNC: 222 U/L (ref 40–130)
ALT SERPL-CCNC: 69 U/L (ref 5–41)
ANION GAP SERPL CALCULATED.3IONS-SCNC: 18 MMOL/L (ref 7–19)
AST SERPL-CCNC: 64 U/L (ref 5–40)
BASOPHILS ABSOLUTE: 0 K/UL (ref 0–0.2)
BASOPHILS RELATIVE PERCENT: 0.4 % (ref 0–1)
BILIRUB SERPL-MCNC: 0.7 MG/DL (ref 0.2–1.2)
BUN BLDV-MCNC: 30 MG/DL (ref 6–20)
CALCIUM SERPL-MCNC: 8.7 MG/DL (ref 8.6–10)
CHLORIDE BLD-SCNC: 90 MMOL/L (ref 98–111)
CO2: 20 MMOL/L (ref 22–29)
CREAT SERPL-MCNC: 0.8 MG/DL (ref 0.5–1.2)
EOSINOPHILS ABSOLUTE: 0.3 K/UL (ref 0–0.6)
EOSINOPHILS RELATIVE PERCENT: 2.6 % (ref 0–5)
GFR NON-AFRICAN AMERICAN: >60
GLUCOSE BLD-MCNC: 131 MG/DL (ref 70–99)
GLUCOSE BLD-MCNC: 138 MG/DL (ref 70–99)
GLUCOSE BLD-MCNC: 143 MG/DL (ref 70–99)
GLUCOSE BLD-MCNC: 149 MG/DL (ref 74–109)
GLUCOSE BLD-MCNC: 154 MG/DL (ref 70–99)
GLUCOSE BLD-MCNC: 199 MG/DL (ref 70–99)
HCT VFR BLD CALC: 35.1 % (ref 42–52)
HEMOGLOBIN: 11.4 G/DL (ref 14–18)
IMMATURE GRANULOCYTES #: 0.3 K/UL
INR BLD: 1.66 (ref 0.88–1.18)
LYMPHOCYTES ABSOLUTE: 2.2 K/UL (ref 1.1–4.5)
LYMPHOCYTES RELATIVE PERCENT: 21.4 % (ref 20–40)
MAGNESIUM: 1.9 MG/DL (ref 1.6–2.6)
MCH RBC QN AUTO: 28.8 PG (ref 27–31)
MCHC RBC AUTO-ENTMCNC: 32.5 G/DL (ref 33–37)
MCV RBC AUTO: 88.6 FL (ref 80–94)
MONOCYTES ABSOLUTE: 1.1 K/UL (ref 0–0.9)
MONOCYTES RELATIVE PERCENT: 10.5 % (ref 0–10)
NEUTROPHILS ABSOLUTE: 6.4 K/UL (ref 1.5–7.5)
NEUTROPHILS RELATIVE PERCENT: 62.7 % (ref 50–65)
PDW BLD-RTO: 18.6 % (ref 11.5–14.5)
PERFORMED ON: ABNORMAL
PLATELET # BLD: 348 K/UL (ref 130–400)
PMV BLD AUTO: 9.7 FL (ref 9.4–12.4)
POTASSIUM SERPL-SCNC: 3.9 MMOL/L (ref 3.5–5)
PROTHROMBIN TIME: 18.9 SEC (ref 12–14.6)
RBC # BLD: 3.96 M/UL (ref 4.7–6.1)
SODIUM BLD-SCNC: 128 MMOL/L (ref 136–145)
TOTAL PROTEIN: 7.2 G/DL (ref 6.6–8.7)
WBC # BLD: 10.3 K/UL (ref 4.8–10.8)

## 2019-09-29 PROCEDURE — 6370000000 HC RX 637 (ALT 250 FOR IP): Performed by: SURGERY

## 2019-09-29 PROCEDURE — 36415 COLL VENOUS BLD VENIPUNCTURE: CPT

## 2019-09-29 PROCEDURE — 80053 COMPREHEN METABOLIC PANEL: CPT

## 2019-09-29 PROCEDURE — 6370000000 HC RX 637 (ALT 250 FOR IP): Performed by: INTERNAL MEDICINE

## 2019-09-29 PROCEDURE — 2140000000 HC CCU INTERMEDIATE R&B

## 2019-09-29 PROCEDURE — 85610 PROTHROMBIN TIME: CPT

## 2019-09-29 PROCEDURE — 2700000000 HC OXYGEN THERAPY PER DAY

## 2019-09-29 PROCEDURE — 82948 REAGENT STRIP/BLOOD GLUCOSE: CPT

## 2019-09-29 PROCEDURE — 6370000000 HC RX 637 (ALT 250 FOR IP): Performed by: HOSPITALIST

## 2019-09-29 PROCEDURE — 2580000003 HC RX 258: Performed by: SURGERY

## 2019-09-29 PROCEDURE — 6360000002 HC RX W HCPCS: Performed by: INTERNAL MEDICINE

## 2019-09-29 PROCEDURE — 94640 AIRWAY INHALATION TREATMENT: CPT

## 2019-09-29 PROCEDURE — 83735 ASSAY OF MAGNESIUM: CPT

## 2019-09-29 PROCEDURE — 85025 COMPLETE CBC W/AUTO DIFF WBC: CPT

## 2019-09-29 RX ORDER — WARFARIN SODIUM 7.5 MG/1
7.5 TABLET ORAL
Status: COMPLETED | OUTPATIENT
Start: 2019-09-29 | End: 2019-09-29

## 2019-09-29 RX ORDER — LORAZEPAM 1 MG/1
1 TABLET ORAL EVERY 6 HOURS PRN
Status: DISCONTINUED | OUTPATIENT
Start: 2019-09-29 | End: 2019-09-30 | Stop reason: HOSPADM

## 2019-09-29 RX ADMIN — Medication 10 ML: at 20:51

## 2019-09-29 RX ADMIN — PANTOPRAZOLE SODIUM 40 MG: 40 TABLET, DELAYED RELEASE ORAL at 05:41

## 2019-09-29 RX ADMIN — IPRATROPIUM BROMIDE AND ALBUTEROL SULFATE 1 AMPULE: 2.5; .5 SOLUTION RESPIRATORY (INHALATION) at 10:16

## 2019-09-29 RX ADMIN — GABAPENTIN 100 MG: 100 CAPSULE ORAL at 14:27

## 2019-09-29 RX ADMIN — IPRATROPIUM BROMIDE AND ALBUTEROL SULFATE 1 AMPULE: 2.5; .5 SOLUTION RESPIRATORY (INHALATION) at 06:50

## 2019-09-29 RX ADMIN — LORAZEPAM 1 MG: 1 TABLET ORAL at 01:31

## 2019-09-29 RX ADMIN — IPRATROPIUM BROMIDE AND ALBUTEROL SULFATE 1 AMPULE: 2.5; .5 SOLUTION RESPIRATORY (INHALATION) at 18:10

## 2019-09-29 RX ADMIN — GABAPENTIN 100 MG: 100 CAPSULE ORAL at 08:43

## 2019-09-29 RX ADMIN — ENOXAPARIN SODIUM 90 MG: 100 INJECTION SUBCUTANEOUS at 08:42

## 2019-09-29 RX ADMIN — SUCRALFATE 1 G: 1 TABLET ORAL at 17:06

## 2019-09-29 RX ADMIN — METOPROLOL SUCCINATE 25 MG: 25 TABLET, EXTENDED RELEASE ORAL at 12:25

## 2019-09-29 RX ADMIN — WARFARIN SODIUM 7.5 MG: 7.5 TABLET ORAL at 17:06

## 2019-09-29 RX ADMIN — INSULIN LISPRO 1 UNITS: 100 INJECTION, SOLUTION INTRAVENOUS; SUBCUTANEOUS at 08:48

## 2019-09-29 RX ADMIN — GABAPENTIN 100 MG: 100 CAPSULE ORAL at 20:51

## 2019-09-29 RX ADMIN — SUCRALFATE 1 G: 1 TABLET ORAL at 20:50

## 2019-09-29 RX ADMIN — LORAZEPAM 1 MG: 1 TABLET ORAL at 05:46

## 2019-09-29 RX ADMIN — SUCRALFATE 1 G: 1 TABLET ORAL at 05:41

## 2019-09-29 RX ADMIN — SUCRALFATE 1 G: 1 TABLET ORAL at 12:25

## 2019-09-29 RX ADMIN — LORAZEPAM 1 MG: 1 TABLET ORAL at 20:51

## 2019-09-29 RX ADMIN — FUROSEMIDE 40 MG: 40 TABLET ORAL at 08:43

## 2019-09-29 RX ADMIN — IPRATROPIUM BROMIDE AND ALBUTEROL SULFATE 1 AMPULE: 2.5; .5 SOLUTION RESPIRATORY (INHALATION) at 14:40

## 2019-09-29 RX ADMIN — METOPROLOL SUCCINATE 25 MG: 25 TABLET, EXTENDED RELEASE ORAL at 00:00

## 2019-09-29 RX ADMIN — ENOXAPARIN SODIUM 90 MG: 100 INJECTION SUBCUTANEOUS at 20:50

## 2019-09-29 RX ADMIN — Medication 10 ML: at 08:42

## 2019-09-29 ASSESSMENT — PAIN SCALES - GENERAL
PAINLEVEL_OUTOF10: 0

## 2019-09-30 VITALS
HEIGHT: 67 IN | BODY MASS INDEX: 31.27 KG/M2 | RESPIRATION RATE: 20 BRPM | OXYGEN SATURATION: 98 % | HEART RATE: 102 BPM | TEMPERATURE: 96.9 F | WEIGHT: 199.2 LBS | DIASTOLIC BLOOD PRESSURE: 81 MMHG | SYSTOLIC BLOOD PRESSURE: 112 MMHG

## 2019-09-30 LAB
ALBUMIN SERPL-MCNC: 3.2 G/DL (ref 3.5–5.2)
ALP BLD-CCNC: 261 U/L (ref 40–130)
ALT SERPL-CCNC: 78 U/L (ref 5–41)
ANION GAP SERPL CALCULATED.3IONS-SCNC: 15 MMOL/L (ref 7–19)
AST SERPL-CCNC: 66 U/L (ref 5–40)
BASOPHILS ABSOLUTE: 0 K/UL (ref 0–0.2)
BASOPHILS RELATIVE PERCENT: 0.3 % (ref 0–1)
BILIRUB SERPL-MCNC: 0.6 MG/DL (ref 0.2–1.2)
BUN BLDV-MCNC: 23 MG/DL (ref 6–20)
CALCIUM SERPL-MCNC: 8.6 MG/DL (ref 8.6–10)
CHLORIDE BLD-SCNC: 92 MMOL/L (ref 98–111)
CO2: 23 MMOL/L (ref 22–29)
CREAT SERPL-MCNC: 0.8 MG/DL (ref 0.5–1.2)
EOSINOPHILS ABSOLUTE: 0.3 K/UL (ref 0–0.6)
EOSINOPHILS RELATIVE PERCENT: 2.6 % (ref 0–5)
GFR NON-AFRICAN AMERICAN: >60
GLUCOSE BLD-MCNC: 105 MG/DL (ref 74–109)
GLUCOSE BLD-MCNC: 133 MG/DL (ref 70–99)
GLUCOSE BLD-MCNC: 149 MG/DL (ref 70–99)
HCT VFR BLD CALC: 34 % (ref 42–52)
HEMOGLOBIN: 11 G/DL (ref 14–18)
IMMATURE GRANULOCYTES #: 0.4 K/UL
INR BLD: 2.11 (ref 0.88–1.18)
LYMPHOCYTES ABSOLUTE: 2 K/UL (ref 1.1–4.5)
LYMPHOCYTES RELATIVE PERCENT: 19.8 % (ref 20–40)
MAGNESIUM: 2 MG/DL (ref 1.6–2.6)
MCH RBC QN AUTO: 28.7 PG (ref 27–31)
MCHC RBC AUTO-ENTMCNC: 32.4 G/DL (ref 33–37)
MCV RBC AUTO: 88.8 FL (ref 80–94)
MONOCYTES ABSOLUTE: 1.2 K/UL (ref 0–0.9)
MONOCYTES RELATIVE PERCENT: 12.2 % (ref 0–10)
NEUTROPHILS ABSOLUTE: 6.1 K/UL (ref 1.5–7.5)
NEUTROPHILS RELATIVE PERCENT: 61.5 % (ref 50–65)
PDW BLD-RTO: 18.6 % (ref 11.5–14.5)
PERFORMED ON: ABNORMAL
PERFORMED ON: ABNORMAL
PLATELET # BLD: 326 K/UL (ref 130–400)
PMV BLD AUTO: 9.8 FL (ref 9.4–12.4)
POTASSIUM SERPL-SCNC: 4.3 MMOL/L (ref 3.5–5)
PROTHROMBIN TIME: 22.9 SEC (ref 12–14.6)
RBC # BLD: 3.83 M/UL (ref 4.7–6.1)
SODIUM BLD-SCNC: 130 MMOL/L (ref 136–145)
TOTAL PROTEIN: 6.7 G/DL (ref 6.6–8.7)
WBC # BLD: 9.9 K/UL (ref 4.8–10.8)

## 2019-09-30 PROCEDURE — 94640 AIRWAY INHALATION TREATMENT: CPT

## 2019-09-30 PROCEDURE — 6370000000 HC RX 637 (ALT 250 FOR IP): Performed by: INTERNAL MEDICINE

## 2019-09-30 PROCEDURE — 6370000000 HC RX 637 (ALT 250 FOR IP): Performed by: SURGERY

## 2019-09-30 PROCEDURE — 36415 COLL VENOUS BLD VENIPUNCTURE: CPT

## 2019-09-30 PROCEDURE — 85025 COMPLETE CBC W/AUTO DIFF WBC: CPT

## 2019-09-30 PROCEDURE — 94761 N-INVAS EAR/PLS OXIMETRY MLT: CPT

## 2019-09-30 PROCEDURE — 83735 ASSAY OF MAGNESIUM: CPT

## 2019-09-30 PROCEDURE — 2700000000 HC OXYGEN THERAPY PER DAY

## 2019-09-30 PROCEDURE — 82948 REAGENT STRIP/BLOOD GLUCOSE: CPT

## 2019-09-30 PROCEDURE — 85610 PROTHROMBIN TIME: CPT

## 2019-09-30 PROCEDURE — 80053 COMPREHEN METABOLIC PANEL: CPT

## 2019-09-30 PROCEDURE — 6370000000 HC RX 637 (ALT 250 FOR IP): Performed by: HOSPITALIST

## 2019-09-30 RX ADMIN — LORAZEPAM 1 MG: 1 TABLET ORAL at 02:23

## 2019-09-30 RX ADMIN — IPRATROPIUM BROMIDE AND ALBUTEROL SULFATE 1 AMPULE: 2.5; .5 SOLUTION RESPIRATORY (INHALATION) at 06:53

## 2019-09-30 RX ADMIN — METOPROLOL SUCCINATE 25 MG: 25 TABLET, EXTENDED RELEASE ORAL at 00:00

## 2019-09-30 RX ADMIN — GABAPENTIN 100 MG: 100 CAPSULE ORAL at 10:30

## 2019-09-30 RX ADMIN — PANTOPRAZOLE SODIUM 40 MG: 40 TABLET, DELAYED RELEASE ORAL at 10:30

## 2019-09-30 RX ADMIN — FUROSEMIDE 40 MG: 40 TABLET ORAL at 10:30

## 2019-09-30 RX ADMIN — IPRATROPIUM BROMIDE AND ALBUTEROL SULFATE 1 AMPULE: 2.5; .5 SOLUTION RESPIRATORY (INHALATION) at 10:37

## 2019-09-30 RX ADMIN — SUCRALFATE 1 G: 1 TABLET ORAL at 10:30

## 2019-09-30 ASSESSMENT — PAIN SCALES - GENERAL
PAINLEVEL_OUTOF10: 0
PAINLEVEL_OUTOF10: 0

## 2019-10-02 ENCOUNTER — HOSPITAL ENCOUNTER (OUTPATIENT)
Dept: GENERAL RADIOLOGY | Age: 34
Discharge: HOME | End: 2019-10-02
Payer: COMMERCIAL

## 2019-10-02 ENCOUNTER — OFFICE VISIT (OUTPATIENT)
Dept: INTERNAL MEDICINE | Age: 34
End: 2019-10-02
Payer: COMMERCIAL

## 2019-10-02 VITALS
DIASTOLIC BLOOD PRESSURE: 85 MMHG | HEART RATE: 84 BPM | HEIGHT: 67 IN | WEIGHT: 199 LBS | BODY MASS INDEX: 31.23 KG/M2 | SYSTOLIC BLOOD PRESSURE: 132 MMHG | OXYGEN SATURATION: 98 %

## 2019-10-02 DIAGNOSIS — I42.7 CARDIOMYOPATHY DUE TO DRUG AND EXTERNAL AGENT (HCC): Chronic | ICD-10-CM

## 2019-10-02 DIAGNOSIS — I26.99 OTHER ACUTE PULMONARY EMBOLISM, UNSPECIFIED WHETHER ACUTE COR PULMONALE PRESENT (HCC): Primary | ICD-10-CM

## 2019-10-02 DIAGNOSIS — Z78.9 HEALTH MAINTENANCE ALTERATION: ICD-10-CM

## 2019-10-02 DIAGNOSIS — I50.22 CHRONIC SYSTOLIC HEART FAILURE (HCC): ICD-10-CM

## 2019-10-02 DIAGNOSIS — I26.99 OTHER ACUTE PULMONARY EMBOLISM, UNSPECIFIED WHETHER ACUTE COR PULMONALE PRESENT (HCC): ICD-10-CM

## 2019-10-02 LAB
ALBUMIN SERPL-MCNC: 3.4 G/DL (ref 3.5–5.2)
ALP BLD-CCNC: 239 U/L (ref 40–130)
ALT SERPL-CCNC: 63 U/L (ref 5–41)
ANION GAP SERPL CALCULATED.3IONS-SCNC: 15 MMOL/L (ref 7–19)
AST SERPL-CCNC: 42 U/L (ref 5–40)
BASOPHILS ABSOLUTE: 0 K/UL (ref 0–0.2)
BASOPHILS RELATIVE PERCENT: 0.5 % (ref 0–1)
BILIRUB SERPL-MCNC: 0.5 MG/DL (ref 0.2–1.2)
BILIRUBIN URINE: NEGATIVE
BLOOD, URINE: NEGATIVE
BUN BLDV-MCNC: 15 MG/DL (ref 6–20)
CALCIUM SERPL-MCNC: 8.8 MG/DL (ref 8.6–10)
CHLORIDE BLD-SCNC: 92 MMOL/L (ref 98–111)
CLARITY: CLEAR
CO2: 26 MMOL/L (ref 22–29)
COLOR: YELLOW
CREAT SERPL-MCNC: 0.8 MG/DL (ref 0.5–1.2)
EOSINOPHILS ABSOLUTE: 0.2 K/UL (ref 0–0.6)
EOSINOPHILS RELATIVE PERCENT: 1.8 % (ref 0–5)
GFR NON-AFRICAN AMERICAN: >60
GLUCOSE BLD-MCNC: 166 MG/DL (ref 74–109)
GLUCOSE URINE: NEGATIVE MG/DL
HCT VFR BLD CALC: 35.4 % (ref 42–52)
HEMOGLOBIN: 11.1 G/DL (ref 14–18)
HEPATITIS C ANTIBODY INTERPRETATION: NORMAL
IMMATURE GRANULOCYTES #: 0.2 K/UL
KETONES, URINE: NEGATIVE MG/DL
LEUKOCYTE ESTERASE, URINE: NEGATIVE
LYMPHOCYTES ABSOLUTE: 1.1 K/UL (ref 1.1–4.5)
LYMPHOCYTES RELATIVE PERCENT: 13.1 % (ref 20–40)
MCH RBC QN AUTO: 28.1 PG (ref 27–31)
MCHC RBC AUTO-ENTMCNC: 31.4 G/DL (ref 33–37)
MCV RBC AUTO: 89.6 FL (ref 80–94)
MONOCYTES ABSOLUTE: 0.8 K/UL (ref 0–0.9)
MONOCYTES RELATIVE PERCENT: 9.4 % (ref 0–10)
NEUTROPHILS ABSOLUTE: 6.4 K/UL (ref 1.5–7.5)
NEUTROPHILS RELATIVE PERCENT: 73 % (ref 50–65)
NITRITE, URINE: NEGATIVE
PDW BLD-RTO: 18.8 % (ref 11.5–14.5)
PH UA: 6 (ref 5–8)
PLATELET # BLD: 296 K/UL (ref 130–400)
PMV BLD AUTO: 9.9 FL (ref 9.4–12.4)
POTASSIUM SERPL-SCNC: 4.1 MMOL/L (ref 3.5–5)
PROTEIN UA: NEGATIVE MG/DL
RBC # BLD: 3.95 M/UL (ref 4.7–6.1)
SODIUM BLD-SCNC: 133 MMOL/L (ref 136–145)
SPECIFIC GRAVITY UA: 1.02 (ref 1–1.03)
TOTAL PROTEIN: 7.3 G/DL (ref 6.6–8.7)
TSH SERPL DL<=0.05 MIU/L-ACNC: 2.99 UIU/ML (ref 0.27–4.2)
URINE REFLEX TO CULTURE: NORMAL
UROBILINOGEN, URINE: 1 E.U./DL
VITAMIN D 25-HYDROXY: 44.6 NG/ML
WBC # BLD: 8.7 K/UL (ref 4.8–10.8)

## 2019-10-02 PROCEDURE — 71046 X-RAY EXAM CHEST 2 VIEWS: CPT

## 2019-10-02 PROCEDURE — 99215 OFFICE O/P EST HI 40 MIN: CPT | Performed by: NURSE PRACTITIONER

## 2019-10-02 PROCEDURE — 1111F DSCHRG MED/CURRENT MED MERGE: CPT | Performed by: NURSE PRACTITIONER

## 2019-10-02 ASSESSMENT — PATIENT HEALTH QUESTIONNAIRE - PHQ9
SUM OF ALL RESPONSES TO PHQ9 QUESTIONS 1 & 2: 0
SUM OF ALL RESPONSES TO PHQ QUESTIONS 1-9: 0
1. LITTLE INTEREST OR PLEASURE IN DOING THINGS: 0
2. FEELING DOWN, DEPRESSED OR HOPELESS: 0
SUM OF ALL RESPONSES TO PHQ QUESTIONS 1-9: 0

## 2019-10-04 ENCOUNTER — OFFICE VISIT (OUTPATIENT)
Dept: CARDIOLOGY | Age: 34
End: 2019-10-04
Payer: COMMERCIAL

## 2019-10-04 VITALS
BODY MASS INDEX: 32.65 KG/M2 | HEART RATE: 108 BPM | DIASTOLIC BLOOD PRESSURE: 80 MMHG | SYSTOLIC BLOOD PRESSURE: 110 MMHG | WEIGHT: 208 LBS | HEIGHT: 67 IN

## 2019-10-04 DIAGNOSIS — I26.99 PULMONARY INFARCT (HCC): ICD-10-CM

## 2019-10-04 DIAGNOSIS — I42.7 CARDIOMYOPATHY DUE TO DRUG AND EXTERNAL AGENT (HCC): ICD-10-CM

## 2019-10-04 DIAGNOSIS — I50.22 CHRONIC SYSTOLIC HEART FAILURE (HCC): ICD-10-CM

## 2019-10-04 DIAGNOSIS — I26.94 MULTIPLE SUBSEGMENTAL PULMONARY EMBOLI WITHOUT ACUTE COR PULMONALE (HCC): ICD-10-CM

## 2019-10-04 DIAGNOSIS — Z72.0 TOBACCO USE: ICD-10-CM

## 2019-10-04 DIAGNOSIS — I50.22 CHRONIC SYSTOLIC HEART FAILURE (HCC): Primary | ICD-10-CM

## 2019-10-04 DIAGNOSIS — I42.0 NONISCHEMIC DILATED CARDIOMYOPATHY (HCC): ICD-10-CM

## 2019-10-04 LAB
HIV 1,2 COMBO ANTIGEN/ANTIBODY: NEGATIVE
PRO-BNP: 3743 PG/ML (ref 0–450)

## 2019-10-04 PROCEDURE — 99214 OFFICE O/P EST MOD 30 MIN: CPT | Performed by: CLINICAL NURSE SPECIALIST

## 2019-10-07 ENCOUNTER — NURSE ONLY (OUTPATIENT)
Dept: INTERNAL MEDICINE | Age: 34
End: 2019-10-07
Payer: COMMERCIAL

## 2019-10-07 DIAGNOSIS — I50.22 CHRONIC SYSTOLIC HEART FAILURE (HCC): Primary | ICD-10-CM

## 2019-10-07 DIAGNOSIS — I26.99 OTHER ACUTE PULMONARY EMBOLISM, UNSPECIFIED WHETHER ACUTE COR PULMONALE PRESENT (HCC): ICD-10-CM

## 2019-10-07 LAB
INTERNATIONAL NORMALIZATION RATIO, POC: 1.8
PROTHROMBIN TIME, POC: NORMAL

## 2019-10-07 PROCEDURE — 93793 ANTICOAG MGMT PT WARFARIN: CPT | Performed by: NURSE PRACTITIONER

## 2019-10-07 PROCEDURE — 85610 PROTHROMBIN TIME: CPT | Performed by: NURSE PRACTITIONER

## 2019-10-11 LAB — HIV 1,2 COMBO ANTIGEN/ANTIBODY: NEGATIVE

## 2019-10-13 ENCOUNTER — HOSPITAL ENCOUNTER (EMERGENCY)
Age: 34
Discharge: HOME | End: 2019-10-13
Attending: EMERGENCY MEDICINE
Payer: COMMERCIAL

## 2019-10-13 ENCOUNTER — APPOINTMENT (OUTPATIENT)
Dept: GENERAL RADIOLOGY | Age: 34
End: 2019-10-13
Payer: COMMERCIAL

## 2019-10-13 VITALS
RESPIRATION RATE: 18 BRPM | SYSTOLIC BLOOD PRESSURE: 125 MMHG | OXYGEN SATURATION: 95 % | HEIGHT: 67 IN | HEART RATE: 118 BPM | DIASTOLIC BLOOD PRESSURE: 99 MMHG | TEMPERATURE: 98.1 F | WEIGHT: 205 LBS | BODY MASS INDEX: 32.18 KG/M2

## 2019-10-13 DIAGNOSIS — S60.212A CONTUSION OF LEFT WRIST, INITIAL ENCOUNTER: Primary | ICD-10-CM

## 2019-10-13 LAB
INR BLD: 2.52 (ref 0.88–1.18)
PROTHROMBIN TIME: 26.4 SEC (ref 12–14.6)

## 2019-10-13 PROCEDURE — 85610 PROTHROMBIN TIME: CPT

## 2019-10-13 PROCEDURE — 36415 COLL VENOUS BLD VENIPUNCTURE: CPT

## 2019-10-13 PROCEDURE — 99283 EMERGENCY DEPT VISIT LOW MDM: CPT

## 2019-10-13 PROCEDURE — 73110 X-RAY EXAM OF WRIST: CPT

## 2019-10-13 ASSESSMENT — ENCOUNTER SYMPTOMS
SHORTNESS OF BREATH: 0
COLOR CHANGE: 1
ABDOMINAL PAIN: 0

## 2019-10-13 ASSESSMENT — PAIN SCALES - GENERAL: PAINLEVEL_OUTOF10: 0

## 2019-10-15 RX ORDER — FUROSEMIDE 40 MG/1
40 TABLET ORAL 2 TIMES DAILY
Qty: 60 TABLET | Refills: 1 | Status: SHIPPED | OUTPATIENT
Start: 2019-10-15 | End: 2019-11-27

## 2019-10-16 ENCOUNTER — APPOINTMENT (OUTPATIENT)
Dept: GENERAL RADIOLOGY | Age: 34
DRG: 292 | End: 2019-10-16
Payer: COMMERCIAL

## 2019-10-16 ENCOUNTER — HOSPITAL ENCOUNTER (INPATIENT)
Age: 34
LOS: 1 days | Discharge: HOME | DRG: 292 | End: 2019-10-17
Attending: EMERGENCY MEDICINE | Admitting: FAMILY MEDICINE
Payer: COMMERCIAL

## 2019-10-16 ENCOUNTER — OFFICE VISIT (OUTPATIENT)
Dept: INTERNAL MEDICINE | Age: 34
End: 2019-10-16
Payer: COMMERCIAL

## 2019-10-16 VITALS
DIASTOLIC BLOOD PRESSURE: 78 MMHG | SYSTOLIC BLOOD PRESSURE: 103 MMHG | HEART RATE: 104 BPM | OXYGEN SATURATION: 98 % | WEIGHT: 205 LBS | HEIGHT: 67 IN | BODY MASS INDEX: 32.18 KG/M2

## 2019-10-16 DIAGNOSIS — I50.9 CONGESTIVE HEART FAILURE, UNSPECIFIED HF CHRONICITY, UNSPECIFIED HEART FAILURE TYPE (HCC): Primary | ICD-10-CM

## 2019-10-16 DIAGNOSIS — I42.7 CARDIOMYOPATHY DUE TO DRUG AND EXTERNAL AGENT (HCC): Chronic | ICD-10-CM

## 2019-10-16 DIAGNOSIS — J96.21 ACUTE ON CHRONIC RESPIRATORY FAILURE WITH HYPOXIA (HCC): ICD-10-CM

## 2019-10-16 DIAGNOSIS — I26.99 ACUTE MASSIVE PULMONARY EMBOLISM (HCC): ICD-10-CM

## 2019-10-16 DIAGNOSIS — I50.23 ACUTE ON CHRONIC SYSTOLIC (CONGESTIVE) HEART FAILURE (HCC): Primary | ICD-10-CM

## 2019-10-16 PROBLEM — Z86.711 HISTORY OF PULMONARY EMBOLISM: Status: ACTIVE | Noted: 2019-10-16

## 2019-10-16 PROBLEM — I50.1 PULMONARY EDEMA CARDIAC CAUSE (HCC): Status: ACTIVE | Noted: 2019-10-16

## 2019-10-16 LAB
ALBUMIN SERPL-MCNC: 3.8 G/DL (ref 3.5–5.2)
ALP BLD-CCNC: 222 U/L (ref 40–130)
ALT SERPL-CCNC: 46 U/L (ref 5–41)
ANION GAP SERPL CALCULATED.3IONS-SCNC: 14 MMOL/L (ref 7–19)
ANISOCYTOSIS: ABNORMAL
AST SERPL-CCNC: 62 U/L (ref 5–40)
BASOPHILS ABSOLUTE: 0 K/UL (ref 0–0.2)
BASOPHILS RELATIVE PERCENT: 0 % (ref 0–1)
BILIRUB SERPL-MCNC: 1 MG/DL (ref 0.2–1.2)
BUN BLDV-MCNC: 19 MG/DL (ref 6–20)
CALCIUM SERPL-MCNC: 9.2 MG/DL (ref 8.6–10)
CHLORIDE BLD-SCNC: 89 MMOL/L (ref 98–111)
CO2: 22 MMOL/L (ref 22–29)
CREAT SERPL-MCNC: 0.8 MG/DL (ref 0.5–1.2)
EOSINOPHILS ABSOLUTE: 0.14 K/UL (ref 0–0.6)
EOSINOPHILS RELATIVE PERCENT: 2 % (ref 0–5)
GFR NON-AFRICAN AMERICAN: >60
GLUCOSE BLD-MCNC: 103 MG/DL (ref 74–109)
HCT VFR BLD CALC: 39.6 % (ref 42–52)
HEMOGLOBIN: 12.8 G/DL (ref 14–18)
HYPOCHROMIA: ABNORMAL
IMMATURE GRANULOCYTES #: 0 K/UL
INR BLD: 2.9 (ref 0.88–1.18)
LYMPHOCYTES ABSOLUTE: 1.4 K/UL (ref 1.1–4.5)
LYMPHOCYTES RELATIVE PERCENT: 16 % (ref 20–40)
MCH RBC QN AUTO: 28.3 PG (ref 27–31)
MCHC RBC AUTO-ENTMCNC: 32.3 G/DL (ref 33–37)
MCV RBC AUTO: 87.6 FL (ref 80–94)
MONOCYTES ABSOLUTE: 0.6 K/UL (ref 0–0.9)
MONOCYTES RELATIVE PERCENT: 9 % (ref 0–10)
NEUTROPHILS ABSOLUTE: 4.8 K/UL (ref 1.5–7.5)
NEUTROPHILS RELATIVE PERCENT: 69 % (ref 50–65)
OSMOLALITY: 262 MOSM/KG (ref 275–300)
PDW BLD-RTO: 18.6 % (ref 11.5–14.5)
PLATELET # BLD: 306 K/UL (ref 130–400)
PLATELET SLIDE REVIEW: ADEQUATE
PMV BLD AUTO: 9.3 FL (ref 9.4–12.4)
POIKILOCYTES: ABNORMAL
POLYCHROMASIA: ABNORMAL
POTASSIUM SERPL-SCNC: 4.7 MMOL/L (ref 3.5–5)
PRO-BNP: 3436 PG/ML (ref 0–450)
PROTHROMBIN TIME: 29.5 SEC (ref 12–14.6)
RBC # BLD: 4.52 M/UL (ref 4.7–6.1)
SODIUM BLD-SCNC: 125 MMOL/L (ref 136–145)
TARGET CELLS: ABNORMAL
TOTAL PROTEIN: 8.2 G/DL (ref 6.6–8.7)
TROPONIN: 0.01 NG/ML (ref 0–0.03)
WBC # BLD: 6.9 K/UL (ref 4.8–10.8)

## 2019-10-16 PROCEDURE — 6360000002 HC RX W HCPCS: Performed by: EMERGENCY MEDICINE

## 2019-10-16 PROCEDURE — 85610 PROTHROMBIN TIME: CPT

## 2019-10-16 PROCEDURE — 99214 OFFICE O/P EST MOD 30 MIN: CPT | Performed by: NURSE PRACTITIONER

## 2019-10-16 PROCEDURE — 83880 ASSAY OF NATRIURETIC PEPTIDE: CPT

## 2019-10-16 PROCEDURE — 96374 THER/PROPH/DIAG INJ IV PUSH: CPT

## 2019-10-16 PROCEDURE — 6370000000 HC RX 637 (ALT 250 FOR IP): Performed by: EMERGENCY MEDICINE

## 2019-10-16 PROCEDURE — 6360000002 HC RX W HCPCS: Performed by: FAMILY MEDICINE

## 2019-10-16 PROCEDURE — 99285 EMERGENCY DEPT VISIT HI MDM: CPT

## 2019-10-16 PROCEDURE — 36415 COLL VENOUS BLD VENIPUNCTURE: CPT

## 2019-10-16 PROCEDURE — 93005 ELECTROCARDIOGRAM TRACING: CPT | Performed by: EMERGENCY MEDICINE

## 2019-10-16 PROCEDURE — 1210000000 HC MED SURG R&B

## 2019-10-16 PROCEDURE — 83930 ASSAY OF BLOOD OSMOLALITY: CPT

## 2019-10-16 PROCEDURE — 6360000002 HC RX W HCPCS: Performed by: HOSPITALIST

## 2019-10-16 PROCEDURE — 2580000003 HC RX 258: Performed by: FAMILY MEDICINE

## 2019-10-16 PROCEDURE — 71045 X-RAY EXAM CHEST 1 VIEW: CPT

## 2019-10-16 PROCEDURE — 80053 COMPREHEN METABOLIC PANEL: CPT

## 2019-10-16 PROCEDURE — 84484 ASSAY OF TROPONIN QUANT: CPT

## 2019-10-16 PROCEDURE — 85025 COMPLETE CBC W/AUTO DIFF WBC: CPT

## 2019-10-16 PROCEDURE — 6370000000 HC RX 637 (ALT 250 FOR IP): Performed by: FAMILY MEDICINE

## 2019-10-16 PROCEDURE — 2500000003 HC RX 250 WO HCPCS: Performed by: FAMILY MEDICINE

## 2019-10-16 RX ORDER — ONDANSETRON 2 MG/ML
4 INJECTION INTRAMUSCULAR; INTRAVENOUS EVERY 6 HOURS PRN
Status: DISCONTINUED | OUTPATIENT
Start: 2019-10-16 | End: 2019-10-17 | Stop reason: HOSPADM

## 2019-10-16 RX ORDER — ALBUTEROL SULFATE 90 UG/1
2 AEROSOL, METERED RESPIRATORY (INHALATION) EVERY 6 HOURS PRN
Status: DISCONTINUED | OUTPATIENT
Start: 2019-10-16 | End: 2019-10-17 | Stop reason: HOSPADM

## 2019-10-16 RX ORDER — METOPROLOL SUCCINATE 25 MG/1
25 TABLET, EXTENDED RELEASE ORAL 2 TIMES DAILY
Status: DISCONTINUED | OUTPATIENT
Start: 2019-10-16 | End: 2019-10-17 | Stop reason: HOSPADM

## 2019-10-16 RX ORDER — METOCLOPRAMIDE HYDROCHLORIDE 5 MG/ML
10 INJECTION INTRAMUSCULAR; INTRAVENOUS ONCE
Status: COMPLETED | OUTPATIENT
Start: 2019-10-16 | End: 2019-10-16

## 2019-10-16 RX ORDER — NICOTINE 21 MG/24HR
1 PATCH, TRANSDERMAL 24 HOURS TRANSDERMAL DAILY
Status: DISCONTINUED | OUTPATIENT
Start: 2019-10-16 | End: 2019-10-17 | Stop reason: HOSPADM

## 2019-10-16 RX ORDER — LISINOPRIL 10 MG/1
10 TABLET ORAL DAILY
Status: DISCONTINUED | OUTPATIENT
Start: 2019-10-17 | End: 2019-10-17 | Stop reason: HOSPADM

## 2019-10-16 RX ORDER — POTASSIUM CHLORIDE 7.45 MG/ML
10 INJECTION INTRAVENOUS PRN
Status: DISCONTINUED | OUTPATIENT
Start: 2019-10-16 | End: 2019-10-17 | Stop reason: HOSPADM

## 2019-10-16 RX ORDER — NALOXONE HYDROCHLORIDE 0.4 MG/ML
0.4 INJECTION, SOLUTION INTRAMUSCULAR; INTRAVENOUS; SUBCUTANEOUS PRN
Status: DISCONTINUED | OUTPATIENT
Start: 2019-10-16 | End: 2019-10-17 | Stop reason: HOSPADM

## 2019-10-16 RX ORDER — FUROSEMIDE 10 MG/ML
40 INJECTION INTRAMUSCULAR; INTRAVENOUS ONCE
Status: COMPLETED | OUTPATIENT
Start: 2019-10-16 | End: 2019-10-16

## 2019-10-16 RX ORDER — LANOLIN ALCOHOL/MO/W.PET/CERES
3 CREAM (GRAM) TOPICAL NIGHTLY PRN
Status: DISCONTINUED | OUTPATIENT
Start: 2019-10-16 | End: 2019-10-17 | Stop reason: HOSPADM

## 2019-10-16 RX ORDER — WARFARIN SODIUM 5 MG/1
5 TABLET ORAL DAILY
Status: DISCONTINUED | OUTPATIENT
Start: 2019-10-17 | End: 2019-10-17 | Stop reason: HOSPADM

## 2019-10-16 RX ORDER — SODIUM CHLORIDE 0.9 % (FLUSH) 0.9 %
10 SYRINGE (ML) INJECTION EVERY 12 HOURS SCHEDULED
Status: DISCONTINUED | OUTPATIENT
Start: 2019-10-16 | End: 2019-10-17 | Stop reason: HOSPADM

## 2019-10-16 RX ORDER — POTASSIUM CHLORIDE 20 MEQ/1
40 TABLET, EXTENDED RELEASE ORAL PRN
Status: DISCONTINUED | OUTPATIENT
Start: 2019-10-16 | End: 2019-10-17 | Stop reason: HOSPADM

## 2019-10-16 RX ORDER — KETOROLAC TROMETHAMINE 30 MG/ML
15 INJECTION, SOLUTION INTRAMUSCULAR; INTRAVENOUS ONCE
Status: COMPLETED | OUTPATIENT
Start: 2019-10-17 | End: 2019-10-16

## 2019-10-16 RX ORDER — SODIUM CHLORIDE 0.9 % (FLUSH) 0.9 %
10 SYRINGE (ML) INJECTION PRN
Status: DISCONTINUED | OUTPATIENT
Start: 2019-10-16 | End: 2019-10-17 | Stop reason: HOSPADM

## 2019-10-16 RX ORDER — FUROSEMIDE 10 MG/ML
40 INJECTION INTRAMUSCULAR; INTRAVENOUS 2 TIMES DAILY
Status: DISCONTINUED | OUTPATIENT
Start: 2019-10-16 | End: 2019-10-17 | Stop reason: HOSPADM

## 2019-10-16 RX ORDER — SPIRONOLACTONE 25 MG/1
25 TABLET ORAL DAILY
Status: DISCONTINUED | OUTPATIENT
Start: 2019-10-17 | End: 2019-10-17 | Stop reason: HOSPADM

## 2019-10-16 RX ORDER — HYDROCODONE BITARTRATE AND ACETAMINOPHEN 5; 325 MG/1; MG/1
1 TABLET ORAL ONCE
Status: COMPLETED | OUTPATIENT
Start: 2019-10-16 | End: 2019-10-16

## 2019-10-16 RX ADMIN — AZITHROMYCIN DIHYDRATE 500 MG: 500 INJECTION, POWDER, LYOPHILIZED, FOR SOLUTION INTRAVENOUS at 21:26

## 2019-10-16 RX ADMIN — METOPROLOL SUCCINATE 25 MG: 25 TABLET, EXTENDED RELEASE ORAL at 21:25

## 2019-10-16 RX ADMIN — HYDROCODONE BITARTRATE AND ACETAMINOPHEN 1 TABLET: 5; 325 TABLET ORAL at 17:36

## 2019-10-16 RX ADMIN — Medication 10 ML: at 21:32

## 2019-10-16 RX ADMIN — KETOROLAC TROMETHAMINE 15 MG: 30 INJECTION, SOLUTION INTRAMUSCULAR at 23:57

## 2019-10-16 RX ADMIN — FUROSEMIDE 40 MG: 10 INJECTION, SOLUTION INTRAMUSCULAR; INTRAVENOUS at 16:41

## 2019-10-16 RX ADMIN — METOCLOPRAMIDE 10 MG: 5 INJECTION, SOLUTION INTRAMUSCULAR; INTRAVENOUS at 23:29

## 2019-10-16 RX ADMIN — FUROSEMIDE 40 MG: 10 INJECTION, SOLUTION INTRAMUSCULAR; INTRAVENOUS at 23:35

## 2019-10-16 RX ADMIN — Medication 1 G: at 21:26

## 2019-10-16 RX ADMIN — FAMOTIDINE 20 MG: 10 INJECTION INTRAVENOUS at 21:26

## 2019-10-16 ASSESSMENT — ENCOUNTER SYMPTOMS
SHORTNESS OF BREATH: 1
SORE THROAT: 0
SORE THROAT: 0
COUGH: 1
CONSTIPATION: 0
WHEEZING: 0
EYE REDNESS: 0
TROUBLE SWALLOWING: 0
EYE ITCHING: 0
COLOR CHANGE: 0
RHINORRHEA: 0
VOMITING: 0
ABDOMINAL PAIN: 0
DIARRHEA: 0
NAUSEA: 0
NAUSEA: 0
WHEEZING: 0
SHORTNESS OF BREATH: 1
STRIDOR: 0
ABDOMINAL PAIN: 0
CONSTIPATION: 0
CHOKING: 0
VOMITING: 0
ABDOMINAL DISTENTION: 1
BLOOD IN STOOL: 0
EYE DISCHARGE: 0
BACK PAIN: 0
TROUBLE SWALLOWING: 0
COUGH: 1
DIARRHEA: 0

## 2019-10-16 ASSESSMENT — PAIN DESCRIPTION - DESCRIPTORS: DESCRIPTORS: STABBING

## 2019-10-16 ASSESSMENT — PAIN SCALES - GENERAL
PAINLEVEL_OUTOF10: 6
PAINLEVEL_OUTOF10: 6
PAINLEVEL_OUTOF10: 9

## 2019-10-16 ASSESSMENT — PAIN DESCRIPTION - FREQUENCY: FREQUENCY: INTERMITTENT

## 2019-10-16 ASSESSMENT — PAIN DESCRIPTION - LOCATION: LOCATION: ABDOMEN

## 2019-10-16 ASSESSMENT — PAIN DESCRIPTION - ORIENTATION: ORIENTATION: UPPER

## 2019-10-17 VITALS
RESPIRATION RATE: 16 BRPM | HEART RATE: 101 BPM | DIASTOLIC BLOOD PRESSURE: 74 MMHG | OXYGEN SATURATION: 100 % | BODY MASS INDEX: 31.48 KG/M2 | WEIGHT: 200.6 LBS | SYSTOLIC BLOOD PRESSURE: 105 MMHG | TEMPERATURE: 97.5 F | HEIGHT: 67 IN

## 2019-10-17 LAB
ANION GAP SERPL CALCULATED.3IONS-SCNC: 17 MMOL/L (ref 7–19)
BUN BLDV-MCNC: 20 MG/DL (ref 6–20)
CALCIUM SERPL-MCNC: 9.1 MG/DL (ref 8.6–10)
CHLORIDE BLD-SCNC: 89 MMOL/L (ref 98–111)
CO2: 23 MMOL/L (ref 22–29)
CREAT SERPL-MCNC: 1.1 MG/DL (ref 0.5–1.2)
EKG P AXIS: 48 DEGREES
EKG P-R INTERVAL: 174 MS
EKG Q-T INTERVAL: 342 MS
EKG QRS DURATION: 112 MS
EKG QTC CALCULATION (BAZETT): 415 MS
EKG T AXIS: 45 DEGREES
GFR NON-AFRICAN AMERICAN: >60
GLUCOSE BLD-MCNC: 82 MG/DL (ref 74–109)
HCT VFR BLD CALC: 36.6 % (ref 42–52)
HEMOGLOBIN: 11.6 G/DL (ref 14–18)
MAGNESIUM: 1.8 MG/DL (ref 1.6–2.6)
MCH RBC QN AUTO: 27.5 PG (ref 27–31)
MCHC RBC AUTO-ENTMCNC: 31.7 G/DL (ref 33–37)
MCV RBC AUTO: 86.7 FL (ref 80–94)
PDW BLD-RTO: 18.6 % (ref 11.5–14.5)
PLATELET # BLD: 339 K/UL (ref 130–400)
PMV BLD AUTO: 9.6 FL (ref 9.4–12.4)
POTASSIUM SERPL-SCNC: 4.6 MMOL/L (ref 3.5–5)
RBC # BLD: 4.22 M/UL (ref 4.7–6.1)
SODIUM BLD-SCNC: 129 MMOL/L (ref 136–145)
WBC # BLD: 7.9 K/UL (ref 4.8–10.8)

## 2019-10-17 PROCEDURE — 83735 ASSAY OF MAGNESIUM: CPT

## 2019-10-17 PROCEDURE — G0008 ADMIN INFLUENZA VIRUS VAC: HCPCS | Performed by: FAMILY MEDICINE

## 2019-10-17 PROCEDURE — 2500000003 HC RX 250 WO HCPCS: Performed by: FAMILY MEDICINE

## 2019-10-17 PROCEDURE — 2700000000 HC OXYGEN THERAPY PER DAY

## 2019-10-17 PROCEDURE — 6360000002 HC RX W HCPCS: Performed by: FAMILY MEDICINE

## 2019-10-17 PROCEDURE — 90686 IIV4 VACC NO PRSV 0.5 ML IM: CPT | Performed by: FAMILY MEDICINE

## 2019-10-17 PROCEDURE — 36415 COLL VENOUS BLD VENIPUNCTURE: CPT

## 2019-10-17 PROCEDURE — 80048 BASIC METABOLIC PNL TOTAL CA: CPT

## 2019-10-17 PROCEDURE — 6370000000 HC RX 637 (ALT 250 FOR IP): Performed by: FAMILY MEDICINE

## 2019-10-17 PROCEDURE — 2580000003 HC RX 258: Performed by: FAMILY MEDICINE

## 2019-10-17 PROCEDURE — 87040 BLOOD CULTURE FOR BACTERIA: CPT

## 2019-10-17 PROCEDURE — 85027 COMPLETE CBC AUTOMATED: CPT

## 2019-10-17 RX ORDER — AZITHROMYCIN 250 MG/1
250 TABLET, FILM COATED ORAL DAILY
Qty: 5 TABLET | Refills: 0 | Status: SHIPPED | OUTPATIENT
Start: 2019-10-17 | End: 2019-10-22

## 2019-10-17 RX ADMIN — SPIRONOLACTONE 25 MG: 25 TABLET ORAL at 07:59

## 2019-10-17 RX ADMIN — METOPROLOL SUCCINATE 25 MG: 25 TABLET, EXTENDED RELEASE ORAL at 07:59

## 2019-10-17 RX ADMIN — FUROSEMIDE 40 MG: 10 INJECTION, SOLUTION INTRAMUSCULAR; INTRAVENOUS at 07:59

## 2019-10-17 RX ADMIN — FAMOTIDINE 20 MG: 10 INJECTION INTRAVENOUS at 07:59

## 2019-10-17 RX ADMIN — Medication 10 ML: at 08:00

## 2019-10-17 RX ADMIN — LISINOPRIL 10 MG: 10 TABLET ORAL at 07:59

## 2019-10-17 RX ADMIN — INFLUENZA VIRUS VACCINE 0.5 ML: 15; 15; 15; 15 SUSPENSION INTRAMUSCULAR at 13:28

## 2019-10-18 ENCOUNTER — TELEPHONE (OUTPATIENT)
Dept: INTERNAL MEDICINE | Age: 34
End: 2019-10-18

## 2019-10-19 PROBLEM — J18.9 PNEUMONIA OF RIGHT MIDDLE LOBE DUE TO INFECTIOUS ORGANISM: Status: ACTIVE | Noted: 2019-01-01

## 2019-10-19 NOTE — H&P
[facility] Medicine Services  HISTORY AND PHYSICAL    Date of Admission: 10/19/2019  Primary Care Physician: Shey Elliott APRN    Subjective     Chief Complaint: SOA    History of Present Illness  Mr. Oropeza is a 34 year old gentleman with a history of Cardiomyopathy and IV drug abuse.  He presents endorsing SOA and hemoptysis.  He has been SOA for 2-3 days now.  He started coughing up blood today and that really concerned him.      He denies fevers or chills.  He initially denies cough, but coughs frequently during our interview.    His legs are much more swollen than usual.  He has orthopnea and has had weight gain. `         Review of Systems   Constitutional: Positive for unexpected weight change. Negative for fatigue and fever.   HENT: Negative for congestion and ear pain.    Eyes: Negative for redness and visual disturbance.   Respiratory: Positive for cough and shortness of breath. Negative for wheezing.    Cardiovascular: Positive for leg swelling. Negative for chest pain and palpitations.   Gastrointestinal: Negative for abdominal pain, diarrhea, nausea and vomiting.   Endocrine: Negative for cold intolerance and heat intolerance.   Genitourinary: Negative for dysuria and frequency.   Musculoskeletal: Negative for arthralgias and back pain.   Skin: Negative for rash and wound.   Neurological: Negative for dizziness and headaches.   Psychiatric/Behavioral: Negative for confusion. The patient is not nervous/anxious.         Otherwise complete ROS reviewed and negative except as mentioned in the HPI.    Past Medical History:   Past Medical History:   Diagnosis Date   • Cardiomyopathy (CMS/HCC)    • CHF (congestive heart failure) (CMS/HCC)    • Chronic respiratory failure (CMS/HCC)    • GERD (gastroesophageal reflux disease)    • Hypertension    • IV drug abuse (CMS/HCC)    • Methamphetamine abuse (CMS/HCC)    • Pulmonary embolism (CMS/HCC)    • Pulmonary infarct  (CMS/[facility])    • Tobacco abuse      Past Surgical History:  Past Surgical History:   Procedure Laterality Date   • HAND SURGERY       Social History:  reports that he has been smoking cigarettes.  He has a 15.00 pack-year smoking history. He has never used smokeless tobacco. He reports that he drinks alcohol. He reports that he uses drugs. Drug: Methamphetamines.    Family History: family history includes Cancer in his mother; No Known Problems in his brother and father.       Allergies:  No Known Allergies  Medications:  Prior to Admission medications    Medication Sig Start Date End Date Taking? Authorizing Provider   albuterol sulfate  (90 Base) MCG/ACT inhaler Inhale 2 puffs Every 6 (Six) Hours As Needed for Wheezing.   Yes Umang Jean MD   aspirin 81 MG EC tablet Take 1 tablet by mouth Daily. 12/20/18  Yes Chastity Roberts APRN   AZITHROMYCIN PO Take  by mouth.   Yes Umang Jean MD   furosemide (LASIX) 40 MG tablet Take 1 tablet by mouth 2 (Two) Times a Day.  Patient taking differently: Take 80 mg by mouth 2 (Two) Times a Day. 80mg in the morning 40mg at night 12/20/18  Yes Chastity Roberts APRN   lisinopril (PRINIVIL,ZESTRIL) 10 MG tablet Take 10 mg by mouth Daily.   Yes Umang Jean MD   metoprolol succinate XL (TOPROL-XL) 25 MG 24 hr tablet Take 1 tablet by mouth Daily. 12/20/18  Yes Chastity Roberts APRN   mupirocin (BACTROBAN) 2 % ointment Apply  topically to the appropriate area as directed 3 (Three) Times a Day. 9/17/19  Yes Andrea Brunson PA   sodium chloride 1 g tablet Take 1 g by mouth 3 (Three) Times a Day.   Yes Umang Jean MD   spironolactone (ALDACTONE) 25 MG tablet Take 1 tablet by mouth Daily. 12/20/18  Yes Chastity Roberts APRN   sucralfate (CARAFATE) 1 g tablet Take 1 g by mouth 4 (Four) Times a Day.   Yes Umang Jean MD   warfarin (COUMADIN) 5 MG tablet Take 5 mg by mouth Daily.   Yes Miranda  "MD Umang   amoxicillin (AMOXIL) 500 MG capsule Take 1 capsule by mouth 3 (Three) Times a Day. 9/17/19   Andrea Brunson PA   pantoprazole (PROTONIX) 40 MG EC tablet Take 40 mg by mouth Daily.    Provider, MD Umang   rivaroxaban (XARELTO) 15 MG tablet Take 1 tablet by mouth 2 (Two) Times a Day With Meals. 9/17/19   Lb Louis DO     Objective     Vital Signs: /75   Pulse (!) 124   Temp 97.5 °F (36.4 °C) (Oral)   Resp (!) 34   Ht 170.2 cm (67\")   Wt 89.9 kg (198 lb 1.6 oz)   SpO2 98%   BMI 31.03 kg/m²   Physical Exam   Constitutional: He is oriented to person, place, and time. He appears well-developed and well-nourished.   HENT:   Head: Normocephalic and atraumatic.   Right Ear: External ear normal.   Left Ear: External ear normal.   Nose: Nose normal.   Mouth/Throat: Oropharynx is clear and moist.   Eyes: Conjunctivae and EOM are normal. Pupils are equal, round, and reactive to light. Right eye exhibits no discharge. Left eye exhibits no discharge. No scleral icterus.   Neck: Normal range of motion. Neck supple. No tracheal deviation present. No thyromegaly present.   Cardiovascular: Normal rate, regular rhythm, normal heart sounds and intact distal pulses. Exam reveals no gallop and no friction rub.   No murmur heard.  Pulmonary/Chest: Effort normal. No stridor. No respiratory distress. He has decreased breath sounds. He has no wheezes. He has rhonchi. He has no rales. He exhibits no tenderness.   Abdominal: Soft. Bowel sounds are normal. He exhibits no distension and no mass. There is no tenderness. There is no rebound and no guarding. No hernia.   Musculoskeletal: Normal range of motion. He exhibits no edema or deformity.   Lymphadenopathy:     He has no cervical adenopathy.   Neurological: He is alert and oriented to person, place, and time. He has normal reflexes. He displays normal reflexes. No cranial nerve deficit. He exhibits normal muscle tone. Coordination normal. "   Skin: Skin is warm and dry. No rash noted. No erythema. No pallor.   Psychiatric: He has a normal mood and affect. His behavior is normal. Judgment and thought content normal.   Vitals reviewed.          Results Reviewed:  Lab Results (last 24 hours)     Procedure Component Value Units Date/Time    Legionella Antigen, Urine - Urine, Urine, Clean Catch [[id number]]  (Normal) Collected:  10/19/19 1507    Specimen:  Urine, Clean Catch Updated:  10/19/19 1544     LEGIONELLA ANTIGEN, URINE Negative    S. Pneumo Ag Urine or CSF - Urine, Urine, Clean Catch [[id number]]  (Normal) Collected:  10/19/19 1507    Specimen:  Urine, Clean Catch Updated:  10/19/19 1543     Strep Pneumo Ag Negative    Mycoplasma Pneumoniae PCR - Swab, Nasopharynx [[id number]] Collected:  10/19/19 1511    Specimen:  Swab from Nasopharynx Updated:  10/19/19 1518    Lactic Acid, Plasma [[id number]]  (Normal) Collected:  10/19/19 1154    Specimen:  Blood Updated:  10/19/19 1219     Lactate 1.1 mmol/L     Blood Culture - Blood, Hand, Right [[id number]] Collected:  10/19/19 1154    Specimen:  Blood from Hand, Right Updated:  10/19/19 1201    Blood Culture - Blood, Hand, Right [[id number]] Collected:  10/19/19 1155    Specimen:  Blood from Hand, Right Updated:  10/19/19 1200    [place] Draw [[id number]] Collected:  10/19/19 0953    Specimen:  Blood Updated:  10/19/19 1100    Narrative:       The following orders were created for panel order [place] Draw.  Procedure                               Abnormality         Status                     ---------                               -----------         ------                     Light Blue Top[[id number]]                                   Final result               Green Top (Gel)[[id number]]                                  Final result               Lavender Top[[id number]]                                     Final result               Red Top[[id number]]                                          In process                    Please view results for these tests on the individual orders.    Light Blue Top [[id number]] Collected:  10/19/19 0953    Specimen:  Blood Updated:  10/19/19 1100     Extra Tube hold for add-on     Comment: Auto resulted       Green Top (Gel) [[id number]] Collected:  10/19/19 0953    Specimen:  Blood Updated:  10/19/19 1100     Extra Tube Hold for add-ons.     Comment: Auto resulted.       Lavender Top [[id number]] Collected:  10/19/19 0953    Specimen:  Blood Updated:  10/19/19 1100     Extra Tube hold for add-on     Comment: Auto resulted       Comprehensive Metabolic Panel [[id number]]  (Abnormal) Collected:  10/19/19 0953    Specimen:  Blood Updated:  10/19/19 1024     Glucose 111 mg/dL      BUN 14 mg/dL      Creatinine 0.78 mg/dL      Sodium 129 mmol/L      Potassium 4.4 mmol/L      Chloride 89 mmol/L      CO2 25.0 mmol/L      Calcium 9.0 mg/dL      Total Protein 7.8 g/dL      Albumin 3.60 g/dL      ALT (SGPT) 26 U/L      AST (SGOT) 26 U/L      Alkaline Phosphatase 176 U/L      Total Bilirubin 1.7 mg/dL      eGFR Non African Amer 114 mL/min/1.73      Globulin 4.2 gm/dL      A/G Ratio 0.9 g/dL      BUN/Creatinine Ratio 17.9     Anion Gap 15.0 mmol/L     Narrative:       GFR Normal >60  Chronic Kidney Disease <60  Kidney Failure <15    BNP [[id number]]  (Abnormal) Collected:  10/19/19 0953    Specimen:  Blood Updated:  10/19/19 1024     proBNP 5,195.0 pg/mL     Narrative:       Among patients with dyspnea, NT-proBNP is highly sensitive for the detection of acute congestive heart failure. In addition NT-proBNP of <300 pg/ml effectively rules out acute congestive heart failure with 99% negative predictive value.    Troponin [[id number]]  (Normal) Collected:  10/19/19 0953    Specimen:  Blood Updated:  10/19/19 1024     Troponin T <0.010 ng/mL     Narrative:       Troponin T Reference Range:  <= 0.03 ng/mL-   Negative for AMI  >0.03 ng/mL-     Abnormal for myocardial necrosis.  Clinicians would have to utilize  clinical acumen, EKG, Troponin and serial changes to determine if it is an Acute Myocardial Infarction or myocardial injury due to an underlying chronic condition.     CBC & Differential [[id number]] Collected:  10/19/19 0953    Specimen:  Blood Updated:  10/19/19 1018    Narrative:       The following orders were created for panel order CBC & Differential.  Procedure                               Abnormality         Status                     ---------                               -----------         ------                     CBC Auto Differential[[id number]]        Abnormal            Final result                 Please view results for these tests on the individual orders.    CBC Auto Differential [[id number]]  (Abnormal) Collected:  10/19/19 0953    Specimen:  Blood Updated:  10/19/19 1018     WBC 12.58 10*3/mm3      RBC 4.30 10*6/mm3      Hemoglobin 11.8 g/dL      Hematocrit 35.3 %      MCV 82.1 fL      MCH 27.4 pg      MCHC 33.4 g/dL      RDW 18.2 %      RDW-SD 53.6 fl      MPV 9.4 fL      Platelets 351 10*3/mm3      Neutrophil % 75.4 %      Lymphocyte % 9.1 %      Monocyte % 14.7 %      Eosinophil % 0.2 %      Basophil % 0.2 %      Immature Grans % 0.4 %      Neutrophils, Absolute 9.49 10*3/mm3      Lymphocytes, Absolute 1.14 10*3/mm3      Monocytes, Absolute 1.85 10*3/mm3      Eosinophils, Absolute 0.02 10*3/mm3      Basophils, Absolute 0.03 10*3/mm3      Immature Grans, Absolute 0.05 10*3/mm3      nRBC 0.0 /100 WBC     Protime-INR [[id number]]  (Abnormal) Collected:  10/19/19 0953    Specimen:  Blood Updated:  10/19/19 1015     Protime 22.4 Seconds      INR 1.89    Red Top [[id number]] Collected:  10/19/19 0953    Specimen:  Blood Updated:  10/19/19 1000        Imaging Results (last 24 hours)     Procedure Component Value Units Date/Time    CT Angiogram Chest [[id number]] Collected:  10/19/19 1111     Updated:  10/19/19 1121    Narrative:       CT ANGIOGRAM CHEST- 10/19/2019 10:58 AM CDT      HISTORY: Chest  pain, acute, PE suspected, high pretest prob      COMPARISON: 07/29/2018.      DOSE LENGTH PRODUCT: 477 mGy cm. Automated exposure control was also  utilized to decrease patient radiation dose.     TECHNIQUE: Helical tomographic images of the chest were obtained after  the administration of intravenous contrast following angiogram protocol.  Additionally, 3D and multiplanar reformatted images were provided.        FINDINGS:    Pulmonary arteries: There is adequate enhancement of the pulmonary  arteries to evaluate for central and segmental pulmonary emboli. There  are no filling defects within the main, lobar, segmental or visualized  subsegmental pulmonary arteries. The pulmonary arteries are within  normal limits for size.      Aorta and great vessels: The aorta is well opacified and demonstrates no  dissection or aneurysm. The great vessels are normal in appearance.     Visualized neck base: The imaged portion of the base of the neck appears  unremarkable.      Lungs: There is air space filling infiltrate posterior segment of the  right upper lobe. Associated infiltrate is also noted in the right lower  lobe. Posterior right pleural effusion is present. Small infiltrates  also present in the left lower lobe. These bilateral infiltrates may  represent pneumonia. However pulmonary infarction cannot be excluded..  The trachea and bronchial tree are patent.      Heart: Cardiac silhouettes markedly enlarged.. There is no pericardial  effusion.      Mediastinum and lymph nodes: No enlarged mediastinal, hilar, or axillary  lymph nodes are present.      Skeletal and soft tissues: The osseous structures of the thorax and  surrounding soft tissues demonstrate no acute process.     Upper abdomen: The imaged portion of the upper abdomen demonstrates no  acute process.        Impression:       1. There is no evidence of embolic disease.  2. Infiltrate in the right upper lobe and right lower lobe is noted  small right pleural  effusions present. This may be an inflammatory  process.        3. Small infiltrate left lower lobe.        4. Marked cardiomegaly.        This report was finalized on 10/19/2019 11:18 by Dr. Sonny Murphy MD.    XR Chest 1 View [[id number]] Collected:  10/19/19 1040     Updated:  10/19/19 1044    Narrative:       Frontal upright radiograph of the chest 10/19/2019 10:30 AM CDT     COMPARISON: October 16 from [facility] and 04/26/2019 from [facility].     FINDINGS:   Opacification of the right upper chest is noted. An infiltrate in the  right upper lobe may be present. The right diaphragms indistinct there  may be infiltrate or pleural complex in the right chest. The left lung  is clear.. Marked cardiomegaly is present..      The osseous structures and surrounding soft tissues demonstrate no acute  abnormality.       Impression:       1. Marked cardiomegaly unchanged from April.        2. There is a new right upper lobe infiltrate which is a change from  October 16. Differential considerations include an inflammatory process  or possibly pulmonary infarction.  3. Possible posterior right pleural complex.        This report was finalized on 10/19/2019 10:41 by Dr. Sonny Murphy MD.        I have personally reviewed and interpreted the radiology studies and ECG obtained at time of admission.     Assessment / Plan     Assessment:   Active Hospital Problems    Diagnosis   • Pneumonia of right middle lobe due to infectious organism (CMS/HCC)       1.  Sepsis  -IV abx    2.  PNA  -IV abx  -nebs    3.  Acute on Chronic Systolic CHF  -IV Lasix    4.   Hyponatremia  -IV lasix    5.  H/o IV drug abuse  -tells me he has stopped, counseled on the importance of continued abstinence     Hold AC given hemoptysis           Code Status: full     I discussed the patient's findings and my recommendations with the patient    Estimated length of stay several days    Romain Graham MD   10/19/19   6:28 PM

## 2019-10-19 NOTE — PLAN OF CARE
Problem: Patient Care Overview  Goal: Plan of Care Review  Outcome: Ongoing (interventions implemented as appropriate)   10/19/19 6726   Coping/Psychosocial   Plan of Care Reviewed With patient   Plan of Care Review   Progress no change   OTHER   Outcome Summary Pt admitted from ER to 4B with pneumonia. Pt c/o R chest wall pain from coughing/breathing. PRN medication given with relief. IV ABX cont. Pt edematous in BLE. Cont pulse ox on. Sats WNL on 2L. Admission completed. Safety maintained, cont to monitor.

## 2019-10-19 NOTE — ED PROVIDER NOTES
Subjective   Patient presents with a complaint of increased shortness of breath and pain in his right chest wall.  He says he is coughing up some blood.  He and his father said this is been a problem off and on for several months now.  He has known PE and also has known congestive heart failure.  He just got out of hospital a few days ago at [facility] where he was treated with diuretics and seemed to get much better but has gone home and come back again with more fluid retention and pain in his right chest.        History provided by:  Patient   used: No    Shortness of Breath   Severity:  Severe  Onset quality:  Gradual  Duration:  2 days  Timing:  Constant  Progression:  Worsening  Chronicity:  Recurrent  Context: not activity, not animal exposure, not emotional upset, not fumes, not known allergens, not occupational exposure, not pollens, not smoke exposure, not strong odors, not URI and not weather changes    Relieved by:  Nothing  Worsened by:  Nothing  Ineffective treatments:  Diuretics and position changes  Associated symptoms: chest pain, cough and hemoptysis    Associated symptoms: no abdominal pain, no claudication, no diaphoresis, no ear pain, no fever, no headaches, no neck pain, no PND, no rash, no sore throat, no sputum production, no syncope, no swollen glands, no vomiting and no wheezing    Risk factors: no recent alcohol use, no family hx of DVT, no hx of cancer, no hx of PE/DVT, no obesity, no oral contraceptive use, no prolonged immobilization, no recent surgery and no tobacco use        Review of Systems   Constitutional: Negative.  Negative for diaphoresis and fever.   HENT: Negative.  Negative for ear pain and sore throat.    Respiratory: Positive for cough, hemoptysis and shortness of breath. Negative for sputum production and wheezing.    Cardiovascular: Positive for chest pain. Negative for claudication, syncope and PND.   Gastrointestinal: Negative.  Negative for abdominal  pain and vomiting.   Genitourinary: Negative.    Musculoskeletal: Negative.  Negative for neck pain.   Skin: Negative.  Negative for rash.   Neurological: Negative.  Negative for headaches.   Hematological: Negative.    Psychiatric/Behavioral: Negative.    All other systems reviewed and are negative.      Past Medical History:   Diagnosis Date   • Cardiomyopathy (CMS/HCC)    • CHF (congestive heart failure) (CMS/HCC)    • Chronic respiratory failure (CMS/HCC)    • GERD (gastroesophageal reflux disease)    • Hypertension    • IV drug abuse (CMS/HCC)    • Methamphetamine abuse (CMS/HCC)    • Pulmonary embolism (CMS/HCC)    • Pulmonary infarct (CMS/HCC)    • Tobacco abuse        No Known Allergies    Past Surgical History:   Procedure Laterality Date   • HAND SURGERY         Family History   Problem Relation Age of Onset   • Cancer Mother    • No Known Problems Father    • No Known Problems Brother        Social History     Socioeconomic History   • Marital status: Single     Spouse name: Not on file   • Number of children: Not on file   • Years of education: Not on file   • Highest education level: Not on file   Tobacco Use   • Smoking status: Current Every Day Smoker     Packs/day: 1.00     Years: 15.00     Pack years: 15.00     Types: Cigarettes   • Smokeless tobacco: Never Used   Substance and Sexual Activity   • Alcohol use: Yes     Comment: occasional   • Drug use: Yes     Types: Methamphetamines     Comment: 1 month ago   • Sexual activity: Defer       Prior to Admission medications    Medication Sig Start Date End Date Taking? Authorizing Provider   albuterol sulfate  (90 Base) MCG/ACT inhaler Inhale 2 puffs Every 6 (Six) Hours As Needed for Wheezing.   Yes Umang Jean MD   aspirin 81 MG EC tablet Take 1 tablet by mouth Daily. 12/20/18  Yes Chastity Roberts APRN   AZITHROMYCIN PO Take  by mouth.   Yes Umang Jean MD   furosemide (LASIX) 40 MG tablet Take 1 tablet by mouth 2  (Two) Times a Day.  Patient taking differently: Take 80 mg by mouth 2 (Two) Times a Day. 80mg in the morning 40mg at night 12/20/18  Yes Chastity Roberts APRN   lisinopril (PRINIVIL,ZESTRIL) 10 MG tablet Take 10 mg by mouth Daily.   Yes ProviderUmang MD   metoprolol succinate XL (TOPROL-XL) 25 MG 24 hr tablet Take 1 tablet by mouth Daily. 12/20/18  Yes Chastity Roberts APRN   mupirocin (BACTROBAN) 2 % ointment Apply  topically to the appropriate area as directed 3 (Three) Times a Day. 9/17/19  Yes Andrea Brunson PA   pantoprazole (PROTONIX) 40 MG EC tablet Take 40 mg by mouth Daily.   Yes ProviderUmang MD   sodium chloride 1 g tablet Take 1 g by mouth 3 (Three) Times a Day.   Yes ProviderUmang MD   spironolactone (ALDACTONE) 25 MG tablet Take 1 tablet by mouth Daily. 12/20/18  Yes Chastity Roberts APRN   sucralfate (CARAFATE) 1 g tablet Take 1 g by mouth 4 (Four) Times a Day.   Yes ProviderUmang MD   warfarin (COUMADIN) 5 MG tablet Take 5 mg by mouth Daily.   Yes Provider, MD Umang   amoxicillin (AMOXIL) 500 MG capsule Take 1 capsule by mouth 3 (Three) Times a Day. 9/17/19   Andrea Brunson PA   rivaroxaban (XARELTO) 15 MG tablet Take 1 tablet by mouth 2 (Two) Times a Day With Meals. 9/17/19   Lb Louis S, DO       Medications   sodium chloride 0.9 % flush 10 mL (not administered)   cefTRIAXone (ROCEPHIN) 1 g/100 mL 0.9% NS (MBP) (1 g Intravenous New Bag 10/19/19 1202)   morphine injection 4 mg (4 mg Intravenous Given 10/19/19 1006)   ondansetron (ZOFRAN) injection 4 mg (4 mg Intravenous Given 10/19/19 1007)   furosemide (LASIX) injection 40 mg (40 mg Intravenous Given 10/19/19 1006)   iopamidol (ISOVUE-370) 76 % injection 100 mL (100 mL Intravenous Given 10/19/19 1100)       Vitals:    10/19/19 1032   BP: 121/90   Pulse: (!) 133   Resp:    Temp:    SpO2: 94%         Objective   Physical Exam   Constitutional: He is oriented to  person, place, and time. He appears well-developed and well-nourished.   HENT:   Head: Normocephalic and atraumatic.   Neck: Normal range of motion. Neck supple.   Cardiovascular: Regular rhythm.   Patient is significantly tachycardic.   Pulmonary/Chest: He has rales in the right lower field and the left lower field.   Patient is tachypneic and moderately labored with his respiration.  He has scattered rales bilaterally sporadically in the bases on both sides.   Abdominal: Soft. Bowel sounds are normal.   Musculoskeletal: Normal range of motion.        Right lower leg: He exhibits edema.        Left lower leg: He exhibits edema.   Neurological: He is alert and oriented to person, place, and time.   Skin: Capillary refill takes less than 2 seconds.   Psychiatric: He has a normal mood and affect. His behavior is normal.   Nursing note and vitals reviewed.      Procedures         Lab Results (last 24 hours)     Procedure Component Value Units Date/Time    CBC & Differential [[id number]] Collected:  10/19/19 0953    Specimen:  Blood Updated:  10/19/19 1018    Narrative:       The following orders were created for panel order CBC & Differential.  Procedure                               Abnormality         Status                     ---------                               -----------         ------                     CBC Auto Differential[[id number]]        Abnormal            Final result                 Please view results for these tests on the individual orders.    Comprehensive Metabolic Panel [[id number]]  (Abnormal) Collected:  10/19/19 0953    Specimen:  Blood Updated:  10/19/19 1024     Glucose 111 mg/dL      BUN 14 mg/dL      Creatinine 0.78 mg/dL      Sodium 129 mmol/L      Potassium 4.4 mmol/L      Chloride 89 mmol/L      CO2 25.0 mmol/L      Calcium 9.0 mg/dL      Total Protein 7.8 g/dL      Albumin 3.60 g/dL      ALT (SGPT) 26 U/L      AST (SGOT) 26 U/L      Alkaline Phosphatase 176 U/L      Total Bilirubin  1.7 mg/dL      eGFR Non African Amer 114 mL/min/1.73      Globulin 4.2 gm/dL      A/G Ratio 0.9 g/dL      BUN/Creatinine Ratio 17.9     Anion Gap 15.0 mmol/L     Narrative:       GFR Normal >60  Chronic Kidney Disease <60  Kidney Failure <15    BNP [[id number]]  (Abnormal) Collected:  10/19/19 0953    Specimen:  Blood Updated:  10/19/19 1024     proBNP 5,195.0 pg/mL     Narrative:       Among patients with dyspnea, NT-proBNP is highly sensitive for the detection of acute congestive heart failure. In addition NT-proBNP of <300 pg/ml effectively rules out acute congestive heart failure with 99% negative predictive value.    Protime-INR [[id number]]  (Abnormal) Collected:  10/19/19 0953    Specimen:  Blood Updated:  10/19/19 1015     Protime 22.4 Seconds      INR 1.89    Troponin [[id number]]  (Normal) Collected:  10/19/19 0953    Specimen:  Blood Updated:  10/19/19 1024     Troponin T <0.010 ng/mL     Narrative:       Troponin T Reference Range:  <= 0.03 ng/mL-   Negative for AMI  >0.03 ng/mL-     Abnormal for myocardial necrosis.  Clinicians would have to utilize clinical acumen, EKG, Troponin and serial changes to determine if it is an Acute Myocardial Infarction or myocardial injury due to an underlying chronic condition.     CBC Auto Differential [[id number]]  (Abnormal) Collected:  10/19/19 0953    Specimen:  Blood Updated:  10/19/19 1018     WBC 12.58 10*3/mm3      RBC 4.30 10*6/mm3      Hemoglobin 11.8 g/dL      Hematocrit 35.3 %      MCV 82.1 fL      MCH 27.4 pg      MCHC 33.4 g/dL      RDW 18.2 %      RDW-SD 53.6 fl      MPV 9.4 fL      Platelets 351 10*3/mm3      Neutrophil % 75.4 %      Lymphocyte % 9.1 %      Monocyte % 14.7 %      Eosinophil % 0.2 %      Basophil % 0.2 %      Immature Grans % 0.4 %      Neutrophils, Absolute 9.49 10*3/mm3      Lymphocytes, Absolute 1.14 10*3/mm3      Monocytes, Absolute 1.85 10*3/mm3      Eosinophils, Absolute 0.02 10*3/mm3      Basophils, Absolute 0.03 10*3/mm3      Immature  Grans, Absolute 0.05 10*3/mm3      nRBC 0.0 /100 WBC     Blood Culture - Blood, Hand, Right [[id number]] Collected:  10/19/19 1154    Specimen:  Blood from Hand, Right Updated:  10/19/19 1201    Lactic Acid, Plasma [[id number]] Collected:  10/19/19 1154    Specimen:  Blood Updated:  10/19/19 1158    Blood Culture - Blood, Hand, Right [[id number]] Collected:  10/19/19 1155    Specimen:  Blood from Hand, Right Updated:  10/19/19 1200          CT Angiogram Chest   Final Result   1. There is no evidence of embolic disease.   2. Infiltrate in the right upper lobe and right lower lobe is noted   small right pleural effusions present. This may be an inflammatory   process.           3. Small infiltrate left lower lobe.           4. Marked cardiomegaly.           This report was finalized on 10/19/2019 11:18 by Dr. Sonny Murphy MD.      XR Chest 1 View   Final Result   1. Marked cardiomegaly unchanged from April.           2. There is a new right upper lobe infiltrate which is a change from   October 16. Differential considerations include an inflammatory process   or possibly pulmonary infarction.   3. Possible posterior right pleural complex.           This report was finalized on 10/19/2019 10:41 by Dr. Sonny Murphy MD.          ED Course  ED Course as of Oct 19 1205   Sat Oct 19, 2019   1137 Patient has new density in the right lung compared to reports from [facility].  We performed a CTA and radiology has read this is probable pneumonia.  We did consider pulmonary infarct but he does not see a new filling defect at the present time.  I have spoke with Dr. Adkins and we will admit this patient for further care.  [TR]      ED Course User Index  [TR] Harish Flores Jr., MD          MDM  Number of Diagnoses or Management Options  Pneumonia of right middle lobe due to infectious organism (CMS/HCC): new and requires workup     Amount and/or Complexity of Data Reviewed  Clinical lab tests: ordered and reviewed  Tests in  the radiology section of CPT®: ordered and reviewed  Tests in the medicine section of CPT®: ordered and reviewed  Discuss the patient with other providers: yes    Risk of Complications, Morbidity, and/or Mortality  Presenting problems: moderate  Diagnostic procedures: moderate  Management options: moderate    Patient Progress  Patient progress: stable      Final diagnoses:   Pneumonia of right middle lobe due to infectious organism (CMS/[facility])          Harish Flores Jr., MD  10/19/19 8988

## 2019-10-20 PROBLEM — I50.42 CHRONIC COMBINED SYSTOLIC AND DIASTOLIC CONGESTIVE HEART FAILURE (HCC): Status: ACTIVE | Noted: 2019-09-14

## 2019-10-20 PROBLEM — I50.810 RIGHT-SIDED HEART FAILURE (HCC): Status: ACTIVE | Noted: 2019-01-01

## 2019-10-20 PROBLEM — I34.0 NONRHEUMATIC MITRAL VALVE REGURGITATION: Status: ACTIVE | Noted: 2019-01-01

## 2019-10-20 NOTE — PROGRESS NOTES
[facility] Medicine Services  INPATIENT PROGRESS NOTE    Patient Name: Arjun Oropeza  Date of Admission: 10/19/2019  Today's Date: 10/20/19  Length of Stay: 1  Primary Care Physician: Shey Elliott APRN    Subjective   Chief Complaint:SOA  HPI   Feels bad.  Still SOA  Afebrile  Still coughing up scant blood  Has pleuritic CP        Review of Systems   Constitutional: Positive for fatigue. Negative for fever.   HENT: Negative for congestion and ear pain.    Eyes: Negative for redness and visual disturbance.   Respiratory: Positive for cough and shortness of breath. Negative for wheezing.    Cardiovascular: Negative for chest pain and palpitations.   Gastrointestinal: Negative for abdominal pain, diarrhea, nausea and vomiting.   Endocrine: Negative for cold intolerance and heat intolerance.   Genitourinary: Negative for dysuria and frequency.   Musculoskeletal: Negative for arthralgias and back pain.   Skin: Negative for rash and wound.   Neurological: Negative for dizziness and headaches.   Psychiatric/Behavioral: Negative for confusion. The patient is not nervous/anxious.         All pertinent negatives and positives are as above. All other systems have been reviewed and are negative unless otherwise stated.     Objective    Temp:  [97.5 °F (36.4 °C)-99.4 °F (37.4 °C)] 98.3 °F (36.8 °C)  Heart Rate:  [117-139] 137  Resp:  [22-40] 26  BP: (101-126)/(74-97) 106/95  Physical Exam   Constitutional: He is oriented to person, place, and time. He appears well-developed and well-nourished.   HENT:   Head: Normocephalic and atraumatic.   Right Ear: External ear normal.   Left Ear: External ear normal.   Nose: Nose normal.   Mouth/Throat: Oropharynx is clear and moist.   Eyes: Conjunctivae and EOM are normal. Pupils are equal, round, and reactive to light. Right eye exhibits no discharge. Left eye exhibits no discharge. No scleral icterus.   Neck: Normal range of motion. Neck supple.  No tracheal deviation present. No thyromegaly present.   Cardiovascular: Regular rhythm, normal heart sounds and intact distal pulses. Tachycardia present. Exam reveals no gallop and no friction rub.   No murmur heard.  Pulmonary/Chest: Effort normal. No stridor. Tachypnea noted. No respiratory distress. He has decreased breath sounds. He has no wheezes. He has rhonchi. He has no rales. He exhibits no tenderness.   Abdominal: Soft. Bowel sounds are normal. He exhibits no distension and no mass. There is no tenderness. There is no rebound and no guarding. No hernia.   Musculoskeletal: Normal range of motion. He exhibits no edema or deformity.   Lymphadenopathy:     He has no cervical adenopathy.   Neurological: He is alert and oriented to person, place, and time. He has normal reflexes. He displays normal reflexes. No cranial nerve deficit. He exhibits normal muscle tone. Coordination normal.   Skin: Skin is warm and dry. No rash noted. No erythema. No pallor.   Psychiatric: He has a normal mood and affect. His behavior is normal. Judgment and thought content normal.   Vitals reviewed.          Results Review:  I have reviewed the labs, radiology results, and diagnostic studies.    Laboratory Data:   Results from last 7 days   Lab Units 10/20/19  0659 10/19/19  0953 10/17/19  0331   WBC 10*3/mm3 12.37* 12.58* 7.9   HEMOGLOBIN g/dL 10.4* 11.8* 11.6*   HEMATOCRIT % 31.4* 35.3* 36.6*   PLATELETS 10*3/mm3 292 351 339        Results from last 7 days   Lab Units 10/20/19  0659 10/19/19  0953 10/17/19  0331 10/16/19  1609   SODIUM mmol/L 129* 129* 129* 125*   POTASSIUM mmol/L 4.4 4.4 4.6 4.7   CHLORIDE mmol/L 88* 89* 89* 89*   TOTAL CO2 mmol/L  --   --  23 22   CO2 mmol/L 25.0 25.0  --   --    BUN mg/dL 15 14 20 19   CREATININE mg/dL 0.74* 0.78 1.1 0.8   CALCIUM mg/dL 8.5* 9.0 9.1 9.2   BILIRUBIN mg/dL 1.6* 1.7*  --  1.0   ALK PHOS U/L 147* 176*  --  222*   ALT (SGPT) U/L 19 26  --  46*   AST (SGOT) U/L 19 26  --  62*    GLUCOSE mg/dL 120* 111* 82 103       Culture Data:   Blood Culture   Date Value Ref Range Status   10/19/2019 No growth at 24 hours  Preliminary   10/19/2019 No growth at 24 hours  Preliminary       Radiology Data:   Imaging Results (last 24 hours)     ** No results found for the last 24 hours. **          I have reviewed the patient's current medications.     Assessment/Plan     Active Hospital Problems    Diagnosis   • Right-sided heart failure (CMS/HCC)   • Nonrheumatic mitral valve regurgitation   • Pneumonia of right middle lobe due to infectious organism (CMS/HCC)   • Chronic combined systolic and diastolic congestive heart failure (CMS/HCC)      1.  Sepsis  -IV abx     2.  PNA  -IV abx  -nebs  -Pulm consulted     3.  Acute on Chronic Systolic CHF  -IV Lasix  -Cardiology consulted     4.   Hyponatremia  -IV lasix     5.  H/o IV drug abuse  -tells me he has stopped, counseled on the importance of continued abstinence     6.  PE  -lovenox    7.  Severe mitral regurgitation  -consult Cardiology    8.  Hemoptysis  -monitor H&H        Discharge Planning: continue in unit    Romain Graham MD   10/20/19   2:36 PM

## 2019-10-20 NOTE — PLAN OF CARE
"Problem: Pneumonia (Adult)  Goal: Signs and Symptoms of Listed Potential Problems Will be Absent, Minimized or Managed (Pneumonia)  Outcome: Ongoing (interventions implemented as appropriate)      Problem: Breathing Pattern Ineffective (Adult)  Goal: Identify Related Risk Factors and Signs and Symptoms  Outcome: Ongoing (interventions implemented as appropriate)    Goal: Effective Oxygenation/Ventilation  Outcome: Ongoing (interventions implemented as appropriate)    Goal: Anxiety/Fear Reduction  Outcome: Ongoing (interventions implemented as appropriate)      Problem: Patient Care Overview  Goal: Plan of Care Review  Outcome: Ongoing (interventions implemented as appropriate)   10/20/19 0501   Coping/Psychosocial   Plan of Care Reviewed With patient   Plan of Care Review   Progress no change   OTHER   Outcome Summary c/o pain in right chest, medicated with prn medication but he states \"does not really help\", increased heart rate and respiratory rate, around 1 liter urine output, moderately productive cough, continue to monitor        Problem: Fall Risk (Adult)  Goal: Identify Related Risk Factors and Signs and Symptoms  Outcome: Ongoing (interventions implemented as appropriate)    Goal: Absence of Fall  Outcome: Ongoing (interventions implemented as appropriate)        "

## 2019-10-20 NOTE — CONSULTS
Pulmonary Consultation     Patient Name: Arjun Oropeza  Age/Sex: 34 y.o. male  : 1985  MRN: [id number]    Date of Admission: 10/19/2019  Date of Encounter Visit: 10/20/19  Encounter Provider: Anne Bello MD  Referring Provider: Romain Graham MD  Place of Service: [facility]  Patient Care Team:  Shey Elliott APRN as PCP - General (Nurse Practitioner)      Subjective:     Consulted for: Pneumonia and hemoptysis    Chief Complaint: Aggressive worsening shortness of breath and right-sided chest pain    History of Present Illness:  Arjun Oropeza is a 34 y.o. male with history of combined systolic and diastolic congestive heart failure, History of IV drug abuse, history of pulmonary embolism and pulmonary infarct in the past, severe mitral valve regurgitation with cor pulmonale with mild reduced right ventricular systolic function and RVSP of more than 55 mmHg.  Patient presented to the emergency room on 10/19/2019 with 2 days of progressive worsening shortness of triggered by minimal activities and did not get better with rest, diuretics, this was also associated with pleuritic chest pain and with hemoptysis.  No recent sick exposure or exposure to any specific irritant.  Patient was recently discharged from the hospital at [facility] where he was treated with diuretics and better at the time until few days post discharge.  Work-up in the ER included CT of the chest that showed evidence of right-sided pleural effusion with some complex features, right upper lung dense pneumonia and right lower lung pneumonia versus atelectasis with minimal involvement of the left lower lobe atelectasis with linear atelectatic changes.The patient is currently afebrile, his labs revealed normal renal function with mild hyponatremia, his white count showed mild leukocytosis with white count of 12.58 with mild left shift.  Patient is on anticoagulation, INR was slightly subtherapeutic, his proBNP was high at 5000  and the ABG showed no significant respiratory respiratory acidosis with adequate oxygenation on oxygen supplement.  Lactic acid was negative.  Patient was given Rocephin and doxycycline and was admitted to the ICU for further care and pulmonary service was consulted for further management.  Records from [facility] were reviewed including a CAT scan from 9/22/2019 that did show wedgelike in appearance in the right lung base suggestive of pulmonary infarct with no evidence of involvement of the right upper lobe, that showed also a thrombus in the right lower lobe pulmonary artery branch.  The pulmonary artery filling defect were seen in other subsegmental branches of the pulmonary artery on the left and the right as well.  His white blood cell count at the time of discharge from [facility] was 7.9 with normal differential.  And his INR was 2.9.    Patient does report positive orthopnea making his dyspnea worse, positive dyspnea on exertion, partial relief from the rest in the oxygen supplementation, the pleurisy is also adding to the shortness of breath because he is unable to take a big deep breath.  He did have some chills but denies any fever.  The pleurisy is located mainly on the right side of the chest right where the infiltrate is noted on the chest x-ray.  Patient denies any similar previous episodes.  Patient reported that he is still clean with no IV drug abuse or alcohol intake or smoking for the last 3 months  Patient denies skipping any of his anticoagulation medication doses or any of his diuretics.    Pulmonary Functions Testing Results:    No results found for: FEV1, FVC, XMK7ORH, TLC, DLCO    Review of Systems:   Review of Systems   Constitutional: Negative.  Negative for diaphoresis and fever.   HENT: Negative.  Negative for ear pain and sore throat.    Respiratory: Positive for cough, hemoptysis and shortness of breath. Negative for sputum production and wheezing.    Cardiovascular: Positive for  chest pain. Negative for claudication, syncope and PND.   Gastrointestinal: Negative.  Negative for abdominal pain and vomiting.   Genitourinary: Negative.    Musculoskeletal: Negative.  Negative for neck pain.   Skin: Negative.  Negative for rash.   Neurological: Negative.  Negative for headaches.   Hematological: Negative.    Psychiatric/Behavioral: Negative.    All other systems reviewed and are negative.  Past Medical History:  Past Medical History:   Diagnosis Date   • Cardiomyopathy (CMS/HCC)    • CHF (congestive heart failure) (CMS/HCC)    • Chronic respiratory failure (CMS/HCC)    • GERD (gastroesophageal reflux disease)    • Hypertension    • IV drug abuse (CMS/HCC)    • Methamphetamine abuse (CMS/HCC)    • Pulmonary embolism (CMS/HCC)    • Pulmonary infarct (CMS/HCC)    • Tobacco abuse        Past Surgical History:   Procedure Laterality Date   • HAND SURGERY         Home Medications:   Medications Prior to Admission   Medication Sig Dispense Refill Last Dose   • albuterol sulfate  (90 Base) MCG/ACT inhaler Inhale 2 puffs Every 6 (Six) Hours As Needed for Wheezing.   Past Week at Unknown time   • aspirin 81 MG EC tablet Take 1 tablet by mouth Daily. 30 tablet 0 Past Week at Unknown time   • AZITHROMYCIN PO Take  by mouth.   10/18/2019 at Unknown time   • furosemide (LASIX) 40 MG tablet Take 1 tablet by mouth 2 (Two) Times a Day. (Patient taking differently: Take 80 mg by mouth 2 (Two) Times a Day. 80mg in the morning 40mg at night) 60 tablet 0 10/18/2019 at Unknown time   • lisinopril (PRINIVIL,ZESTRIL) 10 MG tablet Take 10 mg by mouth Daily.   10/18/2019 at Unknown time   • metoprolol succinate XL (TOPROL-XL) 25 MG 24 hr tablet Take 1 tablet by mouth Daily. 30 tablet 0 10/18/2019 at Unknown time   • mupirocin (BACTROBAN) 2 % ointment Apply  topically to the appropriate area as directed 3 (Three) Times a Day. 22 g 0    • sodium chloride 1 g tablet Take 1 g by mouth 3 (Three) Times a Day.   9/13/2019  at Unknown time   • spironolactone (ALDACTONE) 25 MG tablet Take 1 tablet by mouth Daily. 30 tablet 0 10/18/2019 at Unknown time   • sucralfate (CARAFATE) 1 g tablet Take 1 g by mouth 4 (Four) Times a Day.   10/18/2019 at Unknown time   • warfarin (COUMADIN) 5 MG tablet Take 5 mg by mouth Daily.   10/18/2019 at Unknown time   • amoxicillin (AMOXIL) 500 MG capsule Take 1 capsule by mouth 3 (Three) Times a Day. 30 capsule 0    • pantoprazole (PROTONIX) 40 MG EC tablet Take 40 mg by mouth Daily.   Unknown at Unknown time   • rivaroxaban (XARELTO) 15 MG tablet Take 1 tablet by mouth 2 (Two) Times a Day With Meals. 42 tablet         Inpatient Medications:  Scheduled Meds:  aspirin 81 mg Oral Daily   ceftriaxone 1 g Intravenous Q24H   doxycycline 100 mg Intravenous Q12H   enoxaparin 1 mg/kg Subcutaneous Q12H   furosemide 20 mg Intravenous Q12H   ipratropium 0.5 mg Nebulization Q6H - RT   levalbuterol 1.25 mg Nebulization Q6H - RT   pantoprazole 40 mg Oral Daily   sodium chloride 1 g Oral TID   sucralfate 1 g Oral 4x Daily AC & at Bedtime     Continuous Infusions:   PRN Meds:.•  acetaminophen  •  HYDROcodone-acetaminophen  •  ondansetron  •  [COMPLETED] Insert peripheral IV **AND** sodium chloride    Allergies:  No Known Allergies    Past Social History:  Social History     Socioeconomic History   • Marital status: Single     Spouse name: Not on file   • Number of children: Not on file   • Years of education: Not on file   • Highest education level: Not on file   Tobacco Use   • Smoking status: Current Every Day Smoker     Packs/day: 1.00     Years: 15.00     Pack years: 15.00     Types: Cigarettes   • Smokeless tobacco: Never Used   Substance and Sexual Activity   • Alcohol use: Yes     Comment: occasional   • Drug use: Yes     Types: Methamphetamines     Comment: 1 month ago   • Sexual activity: Defer       Past Family History:  Family History   Problem Relation Age of Onset   • Cancer Mother    • No Known Problems  Father    • No Known Problems Brother            Objective:   Temp:  [97.5 °F (36.4 °C)-99.4 °F (37.4 °C)] 98.9 °F (37.2 °C)  Heart Rate:  [117-139] 133  Resp:  [22-40] 32  BP: (101-135)/(45-97) 109/80  SpO2:  [86 %-98 %] 94 %  on  Flow (L/min):  [2] 2 Device (Oxygen Therapy): nasal cannula     Intake/Output Summary (Last 24 hours) at 10/20/2019 0913  Last data filed at 10/20/2019 0353  Gross per 24 hour   Intake --   Output 1350 ml   Net -1350 ml     Body mass index is 31.76 kg/m².      10/19/19  0939 10/19/19  1339 10/19/19  1840   Weight: 92.5 kg (204 lb) 89.9 kg (198 lb 1.6 oz) 92 kg (202 lb 12.8 oz)     Weight change:     Physical Exam:   Physical Exam   General:    No acute distress, alert and oriented x4, pleasant, on nasal cannula oxygen                   Head:    Normocephalic, atraumatic.   Eyes:          Conjunctivae and sclerae normal, no icterus, PERRLA external ears and nose are normal   Throat:   No oral lesions, no thrush, oral mucosa moist.    Neck:   Supple, trachea midline.  No JVD, no cervical or supraclavicular lymphadenopathy   Lungs:     Normal chest on inspection, patient definitely have noticeable pain upon deep breathing due to the pleurisy, he has audible crackles on the right side, decreased breath sounds over the bases, no wheezes. Respirations regular, even and unlabored.      Heart:    Regular rhythm and normal rate.  Positive for systolic murmur , no gallops, or rubs noted.   Abdomen:     Soft, non-tender, non-distended, positive bowel sounds.    Extremities:   No clubbing, cyanosis, 1+ bilateral lower extremity pitting edema slightly asymmetrical.     Pulses:   Pulses palpable and equal bilaterally.    Skin:   No bleeding or rash.  Multiple tattoos, good turgor pressure, no evidence of dehydration, no skin nodules or other skin lesions.   Neuro:   Non-focal.  Moves all extremities well.  Strength 5/5 and symmetrical, normal sensation   Psychiatric:   Normal mood and affect.     Lab  Review:   Results from last 7 days   Lab Units 10/20/19  0659 10/19/19  0953 10/17/19  0331 10/16/19  1609   SODIUM mmol/L 129* 129* 129* 125*   POTASSIUM mmol/L 4.4 4.4 4.6 4.7   CHLORIDE mmol/L 88* 89* 89* 89*   TOTAL CO2 mmol/L  --   --  23 22   CO2 mmol/L 25.0 25.0  --   --    BUN mg/dL 15 14 20 19   CREATININE mg/dL 0.74* 0.78 1.1 0.8   GLUCOSE mg/dL 120* 111* 82 103   CALCIUM mg/dL 8.5* 9.0 9.1 9.2   AST (SGOT) U/L 19 26  --  62*   ALT (SGPT) U/L 19 26  --  46*   ALBUMIN g/dL 3.30* 3.60  --  3.8     Results from last 7 days   Lab Units 10/19/19  0953 10/16/19  1609   TROPONIN I ng/mL  --  0.01   TROPONIN T ng/mL <0.010  --      Results from last 7 days   Lab Units 10/20/19  0659 10/19/19  0953  10/17/19  0331   WBC 10*3/mm3 12.37* 12.58*  --  7.9   HEMOGLOBIN g/dL 10.4* 11.8*  --  11.6*   HEMATOCRIT % 31.4* 35.3*  --  36.6*   PLATELETS 10*3/mm3 292 351  --  339   MCV fL 82.8 82.1   < > 86.7   MCH pg 27.4 27.4   < > 27.5   MCHC g/dL 33.1 33.4   < > 31.7*   RDW % 18.3* 18.2*   < > 18.6*   RDW-SD fl 54.4* 53.6   < >  --    MPV fL 9.4 9.4   < > 9.6   NEUTROPHIL % % 78.6* 75.4   < >  --    LYMPHOCYTE % % 6.7* 9.1*   < >  --    MONOCYTES % % 13.7* 14.7*   < >  --    EOSINOPHIL % % 0.2* 0.2*   < >  --    BASOPHIL % % 0.2 0.2   < >  --    IMM GRAN % % 0.6* 0.4   < >  --    NEUTROS ABS 10*3/mm3 9.71* 9.49*   < >  --    LYMPHS ABS 10*3/mm3 0.83 1.14   < >  --    MONOS ABS 10*3/mm3 1.69* 1.85*   < >  --    EOS ABS 10*3/mm3 0.03 0.02   < >  --    BASOS ABS 10*3/mm3 0.03 0.03   < >  --    IMMATURE GRANS (ABS) 10*3/mm3 0.08* 0.05   < >  --    NRBC /100 WBC 0.0 0.0   < >  --     < > = values in this interval not displayed.     Results from last 7 days   Lab Units 10/19/19  0953 10/16/19  1609   INR  1.89* 2.90*     Results from last 7 days   Lab Units 10/17/19  0331   MAGNESIUM mg/dL 1.8           Invalid input(s): LDLCALC  Results from last 7 days   Lab Units 10/19/19  0953 10/16/19  1609   PROBNP pg/mL 5,195.0* 3,436*          Results from last 7 days   Lab Units 10/19/19  1910   PH, ARTERIAL pH units 7.477*   PCO2, ARTERIAL mm Hg 34.6*   PO2 ART mm Hg 108.0   HCO3 ART mmol/L 25.6     Results from last 7 days   Lab Units 10/19/19  1154   LACTATE mmol/L 1.1             Results from last 7 days   Lab Units 10/19/19  1507   STREP PNEUMO AG  Negative   L. PNEUMOPHILA SEROGP 1 UR AG  Negative                     Echocardiogram 12/18/2018:   · Left ventricular systolic function is moderately decreased. LVEF 36-40%.  · The left ventricular cavity is severely dilated (LVIDd 7.4 cm).  · Left ventricular diastolic dysfunction (grade III) consistent with reversible restrictive pattern.  · Moderate-to-severe mitral valve regurgitation is present  · Left atrial cavity size is severely dilated.  · Right ventricular cavity is moderate-to-severely dilated.  · Mildly reduced right ventricular systolic function noted.  · Estimated right ventricular systolic pressure from tricuspid regurgitation is markedly elevated (>55 mmHg).  Imaging:  Imaging Results (most recent)     Procedure Component Value Units Date/Time    CT Angiogram Chest [[id number]] Collected:  10/19/19 1111     Updated:  10/19/19 1121    Narrative:       CT ANGIOGRAM CHEST- 10/19/2019 10:58 AM CDT      HISTORY: Chest pain, acute, PE suspected, high pretest prob      COMPARISON: 07/29/2018.      DOSE LENGTH PRODUCT: 477 mGy cm. Automated exposure control was also  utilized to decrease patient radiation dose.     TECHNIQUE: Helical tomographic images of the chest were obtained after  the administration of intravenous contrast following angiogram protocol.  Additionally, 3D and multiplanar reformatted images were provided.        FINDINGS:    Pulmonary arteries: There is adequate enhancement of the pulmonary  arteries to evaluate for central and segmental pulmonary emboli. There  are no filling defects within the main, lobar, segmental or visualized  subsegmental pulmonary arteries. The  pulmonary arteries are within  normal limits for size.      Aorta and great vessels: The aorta is well opacified and demonstrates no  dissection or aneurysm. The great vessels are normal in appearance.     Visualized neck base: The imaged portion of the base of the neck appears  unremarkable.      Lungs: There is air space filling infiltrate posterior segment of the  right upper lobe. Associated infiltrate is also noted in the right lower  lobe. Posterior right pleural effusion is present. Small infiltrates  also present in the left lower lobe. These bilateral infiltrates may  represent pneumonia. However pulmonary infarction cannot be excluded..  The trachea and bronchial tree are patent.      Heart: Cardiac silhouettes markedly enlarged.. There is no pericardial  effusion.      Mediastinum and lymph nodes: No enlarged mediastinal, hilar, or axillary  lymph nodes are present.      Skeletal and soft tissues: The osseous structures of the thorax and  surrounding soft tissues demonstrate no acute process.     Upper abdomen: The imaged portion of the upper abdomen demonstrates no  acute process.        Impression:       1. There is no evidence of embolic disease.  2. Infiltrate in the right upper lobe and right lower lobe is noted  small right pleural effusions present. This may be an inflammatory  process.        3. Small infiltrate left lower lobe.        4. Marked cardiomegaly.        This report was finalized on 10/19/2019 11:18 by Dr. Sonny Murphy MD.    XR Chest 1 View [[id number]] Collected:  10/19/19 1040     Updated:  10/19/19 1044    Narrative:       Frontal upright radiograph of the chest 10/19/2019 10:30 AM CDT     COMPARISON: October 16 from [facility] and [facility]/2019 [facility] [place].     FINDINGS:   Opacification of the right upper chest is noted. An infiltrate in the  right upper lobe may be present. The right diaphragms indistinct there  may be infiltrate or pleural complex in the right  chest. The left lung  is clear.. Marked cardiomegaly is present..      The osseous structures and surrounding soft tissues demonstrate no acute  abnormality.       Impression:       1. Marked cardiomegaly unchanged from April.        2. There is a new right upper lobe infiltrate which is a change from  October 16. Differential considerations include an inflammatory process  or possibly pulmonary infarction.  3. Possible posterior right pleural complex.        This report was finalized on 10/19/2019 10:41 by Dr. Sonny Murphy MD.                      I personally viewed and interpreted the patient's imaging studies: The chest x-ray showed evidence of mild cardiomegaly, with right upper lung and right lower lobe pneumonic infiltrate that is relatively dense.  On the CAT scan the patient has dense pneumonia and the posterior subsegment of the right upper lobe with atelectasis and pneumonia of the right lower lobe and some minimal linear atelectasis of the left lower lobe.  Patient also has right-sided pleural effusion with evidence of fluid in the major fissure on the right side.  The right-sided pleural effusion not did not have the smooth free-floating fluid with some irregular edges and it may be a complex effusion.    Assessment:   1. Lobar pneumonia of the right upper lobe with evidence of bibasilar atelectasis right more than left and possible pneumonia over the right lower lobe as well, this is a hospital-acquired infection  2. History of IV drug abuse, patient claims has been clean for more than 3 months  3. Severe mitral valve regurgitation  4. Combined systolic and diastolic congestive heart failure, last EF 36% with acute on chronic decompensation  5. History of pulmonary embolism recently, on anticoagulation with slightly subtherapeutic INR  6. Moderate to severe mitral valve regurgitation  7. Cor pulmonale with pulmonary hypertension, estimated RVSP 58.3 mmHg  8. Mild hyponatremia  9. Hemoptysis secondary  to the pneumonia and being on anticoagulation, no evidence of recurrent PE on the CT angios  10. Pleurisy, likely from the pleural inflammation from the right upper lung pneumonia       Plan:     Patient was recently discharged from [facility] and need to be covered for hospital-acquired infection and will add the vancomycin, will check a nasal swab for MRSA and follow-up on the sputum culture and de-escalate on the antibiotic as needed  Patient is to be followed by cardiology, defer to the cardiac team when the patient can be a surgical candidate for his valvular heart disease contributing to the worsening heart failure  His pulmonary hypertension is WHO-II caused by his moderate-to-severe mitral valve regurg  Patient is already on diuretics  Adjust anticoagulation for a therapeutic INR, will defer to admitting team  For the treatment of the pleurisy, that usually responds very well to anti-inflammatory medication rather than narcotics even though narcotics can be helpful for the breakthrough pain.  We will start the patient on indomethacin while adding PPI to reduce the risk of any GI bleed from the nonsteroidal anti-inflammatory medication.  Patient will be monitored in the CCU   Advised patient to restrict the free water intake especially while on diuresis to reduce the chance of worsening hyponatremia.  No need to discontinue the blood thinner despite the hemoptysis at this point unless it becomes worse        Thank you for allowing me to participate in the care of Arjun Oropeza. Feel free to contact me directly with any further questions or concerns.    Anne Bello MD  [facility]   10/20/19  9:13 AM    Dictated utilizing Dragon dictation

## 2019-10-20 NOTE — PROGRESS NOTES
"Pharmacy Dosing Service  Pharmacokinetics  Vancomycin Initial Evaluation    Assessment/Action/Plan:  Initiated Vancomycin 1000 mg IVPB every 8 hours. Vancomycin levels not ordered at this time. . Current vancomycin end date: 10/30/19. Pharmacy will monitor renal function and adjust dose accordingly.     Subjective:  Arjun Oropeza is a 34 y.o. male with a Vancomycin \"Pharmacy to Dose\" consult for the treatment of Pneumonia (HAP) .    Objective:  Ht: 170.2 cm (67\"); Wt: 92 kg (202 lb 12.8 oz)  Estimated Creatinine Clearance: 152.2 mL/min (A) (by C-G formula based on SCr of 0.74 mg/dL (L)).   Lab Results   Component Value Date    CREATININE 0.74 (L) 10/20/2019    CREATININE 0.78 10/19/2019    CREATININE 1.1 10/17/2019    CREATININE 0.8 10/16/2019    CREATININE 0.8 10/02/2019      Lab Results   Component Value Date    WBC 12.37 (H) 10/20/2019    WBC 12.58 (H) 10/19/2019    WBC 7.9 10/17/2019      Baseline culture results:  Microbiology Results (last 10 days)       Procedure Component Value - Date/Time    Respiratory Culture - Sputum, Cough [[id number]] Collected:  10/20/19 0034    Lab Status:  Preliminary result Specimen:  Sputum from Cough Updated:  10/20/19 0640     Gram Stain Greater than 25 WBCs per low power field      Moderate (3+) Mixed gram positive erika      Moderate (3+) Epithelial cells seen    Legionella Antigen, Urine - Urine, Urine, Clean Catch [[id number]]  (Normal) Collected:  10/19/19 1507    Lab Status:  Final result Specimen:  Urine, Clean Catch Updated:  10/19/19 1544     LEGIONELLA ANTIGEN, URINE Negative    S. Pneumo Ag Urine or CSF - Urine, Urine, Clean Catch [[id number]]  (Normal) Collected:  10/19/19 1507    Lab Status:  Final result Specimen:  Urine, Clean Catch Updated:  10/19/19 1543     Strep Pneumo Ag Negative          Antimicrobials:  Anti-Infectives (From admission, onward)      Ordered     Dose/Rate Route Frequency Start Stop    10/19/19 1352  cefTRIAXone (ROCEPHIN) 1 g/100 mL 0.9% NS " (MBP)     Ordering Provider:  Romain Graham MD    1 g  over 30 Minutes Intravenous Every 24 Hours 10/20/19 1200 10/27/19 1159    10/20/19 1016  vancomycin 1 g/250 mL 0.9% NS (vial-mate)     Ordering Provider:  Anne Bello MD    1,000 mg Intravenous Every 8 Hours 10/20/19 1100 10/30/19 1114    10/20/19 0950  Pharmacy to dose vancomycin     Ordering Provider:  Anne Bello MD     Does not apply Continuous PRN 10/20/19 0948 10/30/19 0947    10/19/19 1352  doxycycline (VIBRAMYCIN) 100 mg/100 mL 0.9% NS MBP     Ordering Provider:  Romain Graham MD    100 mg  over 60 Minutes Intravenous Every 12 Hours 10/19/19 1500 10/28/19 1459    10/19/19 1137  cefTRIAXone (ROCEPHIN) 1 g/100 mL 0.9% NS (MBP)     Ordering Provider:  Harish Flores Jr., MD    1 g  over 30 Minutes Intravenous Once 10/19/19 1139 10/19/19 1251            Clif Carson, PharmD  10/20/19 10:17 AM

## 2019-10-20 NOTE — PLAN OF CARE
Problem: Pneumonia (Adult)  Goal: Signs and Symptoms of Listed Potential Problems Will be Absent, Minimized or Managed (Pneumonia)  Outcome: Ongoing (interventions implemented as appropriate)      Problem: Breathing Pattern Ineffective (Adult)  Goal: Identify Related Risk Factors and Signs and Symptoms  Outcome: Ongoing (interventions implemented as appropriate)      Problem: Patient Care Overview  Goal: Individualization and Mutuality  Outcome: Ongoing (interventions implemented as appropriate)      Problem: Fall Risk (Adult)  Goal: Identify Related Risk Factors and Signs and Symptoms  Outcome: Ongoing (interventions implemented as appropriate)

## 2019-10-20 NOTE — CONSULTS
[facility] HEART GROUP CONSULT NOTE    Referring Provider: Romain Graham MD    Reason for Consultation: CHF    Chief complaint:   Chief Complaint   Patient presents with   • Shortness of Breath   • Cough       Subjective .     History of present illness:  Arjun Oropeza is a 34 y.o. male with history of non-ischemic cardiomyopathy with last known EF 29%, chronic combined congestive heart failure, moderate-severe mitral regurgitation and IV drug use. He was diagnosed in '16 with NICOM at which time his EF was 15-20%. Most recent echo results are below. He currently follows with [facility] cardiology and was last seen 10/4. He was volume overloaded at that time and his lasix was increased. He was to follow up with his PCP. His last hospitalization was 9/22/19 at [facility] for PE. He was started on anticoagulation and developed hemoptysis. Repeat CTA noted new additional PEs. He was started on Coumadin and was to f/u with his PCP to obtain and INR.     He presented to the ER yesterday with complaints of increased shortness of breath over the past 3 days and hemoptysis. He notes when he saw his cardiologist at [facility] his HF symptoms improved. He denies weight gain. He notes his swelling is at baseline. He complains of shortness of breath and pleuritic chest pain located in the right upper chest.     History  Past Medical History:   Diagnosis Date   • Cardiomyopathy (CMS/HCC)    • CHF (congestive heart failure) (CMS/HCC)    • Chronic respiratory failure (CMS/HCC)    • GERD (gastroesophageal reflux disease)    • Hypertension    • IV drug abuse (CMS/HCC)    • Methamphetamine abuse (CMS/HCC)    • Pulmonary embolism (CMS/HCC)    • Pulmonary infarct (CMS/HCC)    • Tobacco abuse    ,   Past Surgical History:   Procedure Laterality Date   • HAND SURGERY     ,   Family History   Problem Relation Age of Onset   • Cancer Mother    • No Known Problems Father    • No Known Problems Brother    ,   Social History      Tobacco Use   • Smoking status: Current Every Day Smoker     Packs/day: 1.00     Years: 15.00     Pack years: 15.00     Types: Cigarettes   • Smokeless tobacco: Never Used   Substance Use Topics   • Alcohol use: Yes     Comment: occasional   • Drug use: Yes     Types: Methamphetamines     Comment: 1 month ago   ,     Medications    Prior to Admission medications    Medication Sig Start Date End Date Taking? Authorizing Provider   albuterol sulfate  (90 Base) MCG/ACT inhaler Inhale 2 puffs Every 6 (Six) Hours As Needed for Wheezing.   Yes Umang Jean MD   aspirin 81 MG EC tablet Take 1 tablet by mouth Daily. 12/20/18  Yes Chastity Roberts APRN   AZITHROMYCIN PO Take  by mouth.   Yes Umang Jean MD   furosemide (LASIX) 40 MG tablet Take 1 tablet by mouth 2 (Two) Times a Day.  Patient taking differently: Take 80 mg by mouth 2 (Two) Times a Day. 80mg in the morning 40mg at night 12/20/18  Yes Chastity Roberts APRN   lisinopril (PRINIVIL,ZESTRIL) 10 MG tablet Take 10 mg by mouth Daily.   Yes Umang Jean MD   metoprolol succinate XL (TOPROL-XL) 25 MG 24 hr tablet Take 1 tablet by mouth Daily. 12/20/18  Yes Chastity Roberts APRN   mupirocin (BACTROBAN) 2 % ointment Apply  topically to the appropriate area as directed 3 (Three) Times a Day. 9/17/19  Yes Andrea Brunson PA   sodium chloride 1 g tablet Take 1 g by mouth 3 (Three) Times a Day.   Yes Umang Jean MD   spironolactone (ALDACTONE) 25 MG tablet Take 1 tablet by mouth Daily. 12/20/18  Yes Chastity Roberts APRN   sucralfate (CARAFATE) 1 g tablet Take 1 g by mouth 4 (Four) Times a Day.   Yes Umang Jean MD   warfarin (COUMADIN) 5 MG tablet Take 5 mg by mouth Daily.   Yes Umang Jean MD   amoxicillin (AMOXIL) 500 MG capsule Take 1 capsule by mouth 3 (Three) Times a Day. 9/17/19   Andrea Brunson PA   pantoprazole (PROTONIX) 40 MG EC tablet Take 40 mg by  mouth Daily.    Provider, MD Umang   rivaroxaban (XARELTO) 15 MG tablet Take 1 tablet by mouth 2 (Two) Times a Day With Meals. 9/17/19   Lb Louis DO       Current Facility-Administered Medications   Medication Dose Route Frequency Provider Last Rate Last Dose   • acetaminophen (TYLENOL) tablet 650 mg  650 mg Oral Q6H PRN Romain Graham MD       • aspirin EC tablet 81 mg  81 mg Oral Daily Romain Graham MD   81 mg at 10/20/19 0901   • cefTRIAXone (ROCEPHIN) 1 g/100 mL 0.9% NS (MBP)  1 g Intravenous Q24H Romain Graham MD   1 g at 10/20/19 1233   • doxycycline (VIBRAMYCIN) 100 mg/100 mL 0.9% NS MBP  100 mg Intravenous Q12H Romain Graham MD   100 mg at 10/20/19 0213   • enoxaparin (LOVENOX) syringe 90 mg  1 mg/kg Subcutaneous Q12H Romain Graham MD   90 mg at 10/20/19 1116   • furosemide (LASIX) injection 20 mg  20 mg Intravenous Q12H Romain Graham MD   20 mg at 10/20/19 0620   • HYDROcodone-acetaminophen (NORCO)  MG per tablet 1 tablet  1 tablet Oral Q4H PRN Romain Graham MD       • indomethacin SR (INDOCIN SR) CR capsule 75 mg  75 mg Oral BID PRN Anne Bello MD       • ipratropium (ATROVENT) nebulizer solution 0.5 mg  0.5 mg Nebulization Q6H - RT Darwin Eldridge MD   0.5 mg at 10/20/19 1319   • levalbuterol (XOPENEX) nebulizer solution 1.25 mg  1.25 mg Nebulization Q6H - RT Darwin Eldridge MD   1.25 mg at 10/20/19 1319   • lisinopril (PRINIVIL,ZESTRIL) tablet 10 mg  10 mg Oral Daily Анна Alfaro APRN       • metoprolol succinate XL (TOPROL-XL) 24 hr tablet 25 mg  25 mg Oral Daily Анна Alfaro APRN       • ondansetron (ZOFRAN) injection 4 mg  4 mg Intravenous Q6H PRN Romain Graham MD       • pantoprazole (PROTONIX) EC tablet 40 mg  40 mg Oral Daily Romain Graham MD   40 mg at 10/20/19 0901   • sodium chloride 0.9 % flush 10 mL  10 mL Intravenous PRN Harish Flores Jr., MD       • sodium chloride  tablet 1 g  1 g Oral TID Romain Graham MD   1 g at 10/20/19 0901   • sucralfate (CARAFATE) tablet 1 g  1 g Oral 4x Daily AC & at Bedtime Romain Graham MD   1 g at 10/20/19 1233   • vancomycin (VANCOCIN) in iso-osmotic dextrose IVPB 1 g (premix) 200 mL  1,000 mg Intravenous Q8H Anne Bello MD   1,000 mg at 10/20/19 1110       Allergies:  Patient has no known allergies.    Review of Systems  Review of Systems   Constitutional: Negative for chills, diaphoresis, fatigue and unexpected weight change.   HENT: Negative for nosebleeds.    Respiratory: Positive for cough and shortness of breath. Negative for chest tightness and wheezing.    Cardiovascular: Positive for chest pain and leg swelling (is at baseline). Negative for palpitations.   Gastrointestinal: Negative for anal bleeding, blood in stool, diarrhea and nausea.   Neurological: Negative for dizziness, syncope and light-headedness.       Objective     Physical Exam:  Tele: Sinus tach 140   Patient Vitals for the past 24 hrs:   BP Temp Temp src Pulse Resp SpO2 Height Weight   10/20/19 1328 -- -- -- (!) 137 -- -- -- --   10/20/19 1319 -- -- -- (!) 132 26 93 % -- --   10/20/19 1200 106/95 98.3 °F (36.8 °C) Axillary (!) 139 28 93 % -- --   10/20/19 1100 114/90 -- -- (!) 136 28 94 % -- --   10/20/19 1000 110/74 -- -- (!) 134 26 94 % -- --   10/20/19 0900 109/80 -- -- (!) 133 (!) 32 94 % -- --   10/20/19 0800 116/84 98.9 °F (37.2 °C) Axillary (!) 139 (!) 29 (!) 86 % -- --   10/20/19 0739 -- -- -- (!) 126 -- -- -- --   10/20/19 0730 -- -- -- (!) 127 (!) 30 94 % -- --   10/20/19 0700 113/80 -- -- (!) 127 28 94 % -- --   10/20/19 0600 116/81 -- -- (!) 129 -- 94 % -- --   10/20/19 0500 113/81 -- -- (!) 135 -- 95 % -- --   10/20/19 0400 115/85 -- -- (!) 132 -- 94 % -- --   10/20/19 0352 -- 99.4 °F (37.4 °C) Axillary -- -- -- -- --   10/20/19 0300 106/75 -- -- (!) 126 -- 94 % -- --   10/20/19 0200 101/78 -- -- (!) 127 (!) 32 94 % -- --   10/20/19 0100  "125/80 -- -- (!) 130 -- 96 % -- --   10/20/19 0027 -- -- -- (!) 134 22 98 % -- --   10/20/19 0017 -- -- -- (!) 131 22 95 % -- --   10/20/19 0000 113/84 -- -- (!) 130 -- 95 % -- --   10/19/19 2300 110/79 -- -- (!) 121 (!) 33 95 % -- --   10/19/19 2259 -- 98.3 °F (36.8 °C) Oral -- -- -- -- --   10/19/19 2200 111/76 -- -- (!) 126 28 95 % -- --   10/19/19 2100 115/87 -- -- 118 -- 96 % -- --   10/19/19 2000 115/83 -- -- (!) 126 -- 94 % -- --   10/19/19 1900 106/81 -- -- 117 (!) 33 95 % -- --   10/19/19 1840 118/97 98.2 °F (36.8 °C) Axillary (!) 129 (!) 33 95 % 170.2 cm (67\") 92 kg (202 lb 12.8 oz)   10/19/19 1832 126/86 97.5 °F (36.4 °C) -- (!) 132 (!) 40 95 % -- --     Physical Exam   Constitutional: He is oriented to person, place, and time. He appears well-developed and well-nourished. No distress.   HENT:   Head: Normocephalic.   Mouth/Throat: No oropharyngeal exudate.   Eyes: Conjunctivae and EOM are normal. Pupils are equal, round, and reactive to light. No scleral icterus.   Neck: Normal range of motion. Neck supple.   Cardiovascular: Regular rhythm and intact distal pulses. Tachycardia present.   Murmur heard.  High-pitched blowing holosystolic murmur is present with a grade of 4/6 at the apex.  Pulmonary/Chest: Effort normal. No respiratory distress. He has no wheezes. He has rales in the right lower field and the left lower field. He exhibits no tenderness.   Abdominal: Soft. Bowel sounds are normal. He exhibits no distension. There is no tenderness.   Musculoskeletal: Normal range of motion. He exhibits no edema.   Neurological: He is alert and oriented to person, place, and time. He has normal reflexes.   Skin: Skin is warm and dry. No rash noted. He is not diaphoretic. No erythema. No pallor.   Psychiatric: He has a normal mood and affect. His behavior is normal.   Vitals reviewed.      Results Review:   I reviewed the patient's new clinical results.    Lab Results (last 72 hours)     Procedure Component " Value Units Date/Time    Blood Culture - Blood, Hand, Right [[id number]] Collected:  10/19/19 1155    Specimen:  Blood from Hand, Right Updated:  10/20/19 1215     Blood Culture No growth at 24 hours    Blood Culture - Blood, Hand, Right [[id number]] Collected:  10/19/19 1154    Specimen:  Blood from Hand, Right Updated:  10/20/19 1215     Blood Culture No growth at 24 hours    Comprehensive Metabolic Panel [[id number]]  (Abnormal) Collected:  10/20/19 0659    Specimen:  Blood Updated:  10/20/19 0737     Glucose 120 mg/dL      BUN 15 mg/dL      Creatinine 0.74 mg/dL      Sodium 129 mmol/L      Potassium 4.4 mmol/L      Chloride 88 mmol/L      CO2 25.0 mmol/L      Calcium 8.5 mg/dL      Total Protein 7.1 g/dL      Albumin 3.30 g/dL      ALT (SGPT) 19 U/L      AST (SGOT) 19 U/L      Alkaline Phosphatase 147 U/L      Total Bilirubin 1.6 mg/dL      eGFR Non African Amer 121 mL/min/1.73      Globulin 3.8 gm/dL      A/G Ratio 0.9 g/dL      BUN/Creatinine Ratio 20.3     Anion Gap 16.0 mmol/L     Narrative:       GFR Normal >60  Chronic Kidney Disease <60  Kidney Failure <15    CBC & Differential [[id number]] Collected:  10/20/19 0659    Specimen:  Blood Updated:  10/20/19 0719    Narrative:       The following orders were created for panel order CBC & Differential.  Procedure                               Abnormality         Status                     ---------                               -----------         ------                     CBC Auto Differential[[id number]]        Abnormal            Final result                 Please view results for these tests on the individual orders.    CBC Auto Differential [[id number]]  (Abnormal) Collected:  10/20/19 0659    Specimen:  Blood Updated:  10/20/19 0719     WBC 12.37 10*3/mm3      RBC 3.79 10*6/mm3      Hemoglobin 10.4 g/dL      Hematocrit 31.4 %      MCV 82.8 fL      MCH 27.4 pg      MCHC 33.1 g/dL      RDW 18.3 %      RDW-SD 54.4 fl      MPV 9.4 fL      Platelets 292 10*3/mm3       Neutrophil % 78.6 %      Lymphocyte % 6.7 %      Monocyte % 13.7 %      Eosinophil % 0.2 %      Basophil % 0.2 %      Immature Grans % 0.6 %      Neutrophils, Absolute 9.71 10*3/mm3      Lymphocytes, Absolute 0.83 10*3/mm3      Monocytes, Absolute 1.69 10*3/mm3      Eosinophils, Absolute 0.03 10*3/mm3      Basophils, Absolute 0.03 10*3/mm3      Immature Grans, Absolute 0.08 10*3/mm3      nRBC 0.0 /100 WBC     Respiratory Culture - Sputum, Cough [[id number]] Collected:  10/20/19 0034    Specimen:  Sputum from Cough Updated:  10/20/19 0640     Gram Stain Greater than 25 WBCs per low power field      Moderate (3+) Mixed gram positive erika      Moderate (3+) Epithelial cells seen    Blood Gas, Arterial [[id number]]  (Abnormal) Collected:  10/19/19 1910    Specimen:  Arterial Blood Updated:  10/19/19 1918     Site Right Radial     Jed's Test Positive     pH, Arterial 7.477 pH units      Comment: 83 Value above reference range        pCO2, Arterial 34.6 mm Hg      Comment: 84 Value below reference range        pO2, Arterial 108.0 mm Hg      Comment: 83 Value above reference range        HCO3, Arterial 25.6 mmol/L      Base Excess, Arterial 2.2 mmol/L      Comment: 83 Value above reference range        O2 Saturation, Arterial 98.8 %      Temperature 37.0 C      Barometric Pressure for Blood Gas 744 mmHg      Modality Nasal Cannula     Flow Rate 2.0 lpm      Ventilator Mode NA     Collected by [id number]     Comment: Meter: [id number]     :  [id number]       Legionella Antigen, Urine - Urine, Urine, Clean Catch [[id number]]  (Normal) Collected:  10/19/19 1507    Specimen:  Urine, Clean Catch Updated:  10/19/19 1544     LEGIONELLA ANTIGEN, URINE Negative    S. Pneumo Ag Urine or CSF - Urine, Urine, Clean Catch [[id number]]  (Normal) Collected:  10/19/19 1507    Specimen:  Urine, Clean Catch Updated:  10/19/19 1543     Strep Pneumo Ag Negative    Mycoplasma Pneumoniae PCR - Swab, Nasopharynx [[id number]] Collected:   10/19/19 1511    Specimen:  Swab from Nasopharynx Updated:  10/19/19 1518    Lactic Acid, Plasma [[id number]]  (Normal) Collected:  10/19/19 1154    Specimen:  Blood Updated:  10/19/19 1219     Lactate 1.1 mmol/L     [place] Draw [[id number]] Collected:  10/19/19 0953    Specimen:  Blood Updated:  10/19/19 1100    Narrative:       The following orders were created for panel order [place] Draw.  Procedure                               Abnormality         Status                     ---------                               -----------         ------                     Light Blue Top[[id number]]                                   Final result               Green Top (Gel)[[id number]]                                  Final result               Lavender Top[[id number]]                                     Final result               Red Top[[id number]]                                          In process                   Please view results for these tests on the individual orders.    Light Blue Top [[id number]] Collected:  10/19/19 0953    Specimen:  Blood Updated:  10/19/19 1100     Extra Tube hold for add-on     Comment: Auto resulted       Green Top (Gel) [[id number]] Collected:  10/19/19 0953    Specimen:  Blood Updated:  10/19/19 1100     Extra Tube Hold for add-ons.     Comment: Auto resulted.       Lavender Top [[id number]] Collected:  10/19/19 0953    Specimen:  Blood Updated:  10/19/19 1100     Extra Tube hold for add-on     Comment: Auto resulted       Comprehensive Metabolic Panel [[id number]]  (Abnormal) Collected:  10/19/19 0953    Specimen:  Blood Updated:  10/19/19 1024     Glucose 111 mg/dL      BUN 14 mg/dL      Creatinine 0.78 mg/dL      Sodium 129 mmol/L      Potassium 4.4 mmol/L      Chloride 89 mmol/L      CO2 25.0 mmol/L      Calcium 9.0 mg/dL      Total Protein 7.8 g/dL      Albumin 3.60 g/dL      ALT (SGPT) 26 U/L      AST (SGOT) 26 U/L      Alkaline Phosphatase 176 U/L      Total Bilirubin 1.7 mg/dL      eGFR  Non  Amer 114 mL/min/1.73      Globulin 4.2 gm/dL      A/G Ratio 0.9 g/dL      BUN/Creatinine Ratio 17.9     Anion Gap 15.0 mmol/L     Narrative:       GFR Normal >60  Chronic Kidney Disease <60  Kidney Failure <15    BNP [[id number]]  (Abnormal) Collected:  10/19/19 0953    Specimen:  Blood Updated:  10/19/19 1024     proBNP 5,195.0 pg/mL     Narrative:       Among patients with dyspnea, NT-proBNP is highly sensitive for the detection of acute congestive heart failure. In addition NT-proBNP of <300 pg/ml effectively rules out acute congestive heart failure with 99% negative predictive value.    Troponin [[id number]]  (Normal) Collected:  10/19/19 0953    Specimen:  Blood Updated:  10/19/19 1024     Troponin T <0.010 ng/mL     Narrative:       Troponin T Reference Range:  <= 0.03 ng/mL-   Negative for AMI  >0.03 ng/mL-     Abnormal for myocardial necrosis.  Clinicians would have to utilize clinical acumen, EKG, Troponin and serial changes to determine if it is an Acute Myocardial Infarction or myocardial injury due to an underlying chronic condition.     CBC & Differential [[id number]] Collected:  10/19/19 0953    Specimen:  Blood Updated:  10/19/19 1018    Narrative:       The following orders were created for panel order CBC & Differential.  Procedure                               Abnormality         Status                     ---------                               -----------         ------                     CBC Auto Differential[[id number]]        Abnormal            Final result                 Please view results for these tests on the individual orders.    CBC Auto Differential [[id number]]  (Abnormal) Collected:  10/19/19 0953    Specimen:  Blood Updated:  10/19/19 1018     WBC 12.58 10*3/mm3      RBC 4.30 10*6/mm3      Hemoglobin 11.8 g/dL      Hematocrit 35.3 %      MCV 82.1 fL      MCH 27.4 pg      MCHC 33.4 g/dL      RDW 18.2 %      RDW-SD 53.6 fl      MPV 9.4 fL      Platelets 351 10*3/mm3       Neutrophil % 75.4 %      Lymphocyte % 9.1 %      Monocyte % 14.7 %      Eosinophil % 0.2 %      Basophil % 0.2 %      Immature Grans % 0.4 %      Neutrophils, Absolute 9.49 10*3/mm3      Lymphocytes, Absolute 1.14 10*3/mm3      Monocytes, Absolute 1.85 10*3/mm3      Eosinophils, Absolute 0.02 10*3/mm3      Basophils, Absolute 0.03 10*3/mm3      Immature Grans, Absolute 0.05 10*3/mm3      nRBC 0.0 /100 WBC     Protime-INR [[id number]]  (Abnormal) Collected:  10/19/19 0953    Specimen:  Blood Updated:  10/19/19 1015     Protime 22.4 Seconds      INR 1.89    Red Top [[id number]] Collected:  10/19/19 0953    Specimen:  Blood Updated:  10/19/19 1000        2D echo 9/1/2019  Mitral valve leaflets are mildly thickened with preserved leaflet   mobility.   Moderately severe mitral regurgitation is present.   Eccentric jet   Mildly thickened aortic valve leaflets with preserved leaflet mobility.   Aortic valve appears to be tricuspid.   No evidence of aortic stenosis or aortic regurgitation.   Moderate tricuspid regurgitation with estimated RVSP of 70 mm Hg.   Severely dilated left atrium.   Left ventricular size is severely increased .   Normal left ventricular wall thickness.   Moderately decreased ejection fraction.   Left ventricular ejection fraction is estimated at 29%.   Moderately enlarged right atrium size.   Moderately enlarged right ventricle cavity.   Aortic root dimension within normal limits.   IVC dilated.      Imaging Results (last 72 hours)     Procedure Component Value Units Date/Time    CT Angiogram Chest [[id number]] Collected:  10/19/19 1111     Updated:  10/19/19 1121    Narrative:       CT ANGIOGRAM CHEST- 10/19/2019 10:58 AM CDT      HISTORY: Chest pain, acute, PE suspected, high pretest prob      COMPARISON: 07/29/2018.      DOSE LENGTH PRODUCT: 477 mGy cm. Automated exposure control was also  utilized to decrease patient radiation dose.     TECHNIQUE: Helical tomographic images of the chest were  obtained after  the administration of intravenous contrast following angiogram protocol.  Additionally, 3D and multiplanar reformatted images were provided.        FINDINGS:    Pulmonary arteries: There is adequate enhancement of the pulmonary  arteries to evaluate for central and segmental pulmonary emboli. There  are no filling defects within the main, lobar, segmental or visualized  subsegmental pulmonary arteries. The pulmonary arteries are within  normal limits for size.      Aorta and great vessels: The aorta is well opacified and demonstrates no  dissection or aneurysm. The great vessels are normal in appearance.     Visualized neck base: The imaged portion of the base of the neck appears  unremarkable.      Lungs: There is air space filling infiltrate posterior segment of the  right upper lobe. Associated infiltrate is also noted in the right lower  lobe. Posterior right pleural effusion is present. Small infiltrates  also present in the left lower lobe. These bilateral infiltrates may  represent pneumonia. However pulmonary infarction cannot be excluded..  The trachea and bronchial tree are patent.      Heart: Cardiac silhouettes markedly enlarged.. There is no pericardial  effusion.      Mediastinum and lymph nodes: No enlarged mediastinal, hilar, or axillary  lymph nodes are present.      Skeletal and soft tissues: The osseous structures of the thorax and  surrounding soft tissues demonstrate no acute process.     Upper abdomen: The imaged portion of the upper abdomen demonstrates no  acute process.        Impression:       1. There is no evidence of embolic disease.  2. Infiltrate in the right upper lobe and right lower lobe is noted  small right pleural effusions present. This may be an inflammatory  process.        3. Small infiltrate left lower lobe.        4. Marked cardiomegaly.        This report was finalized on 10/19/2019 11:18 by Dr. Sonny Murphy MD.    XR Chest 1 View [[id number]] Collected:   10/19/19 1040     Updated:  10/19/19 1044    Narrative:       Frontal upright radiograph of the chest 10/19/2019 10:30 AM CDT     COMPARISON: October 16 from [facility] and 04/26/2019 from [facility].     FINDINGS:   Opacification of the right upper chest is noted. An infiltrate in the  right upper lobe may be present. The right diaphragms indistinct there  may be infiltrate or pleural complex in the right chest. The left lung  is clear.. Marked cardiomegaly is present..      The osseous structures and surrounding soft tissues demonstrate no acute  abnormality.       Impression:       1. Marked cardiomegaly unchanged from April.        2. There is a new right upper lobe infiltrate which is a change from  October 16. Differential considerations include an inflammatory process  or possibly pulmonary infarction.  3. Possible posterior right pleural complex.        This report was finalized on 10/19/2019 10:41 by Dr. Sonny Murphy MD.          Assessment/Plan       Chronic combined systolic and diastolic congestive heart failure (CMS/HCC)    Pneumonia of right middle lobe due to infectious organism (CMS/HCC)    Right-sided heart failure (CMS/HCC)    Nonrheumatic mitral valve regurgitation      Plan:  -resume Toprolol and lisinopril.   -patient does not appear to be severely decompensated heart failure. He has swelling that is at baseline. He notes his shortness of breath is related to his chest pain that is pleuritic. Recommend continuing current diuretic therapy.   -he continues to refuse an AICD/lifevest  -infection treatment per primary.   -monitor tele, daily bmp, daily weights,      Further orders per Dr. Alcocer    Thank you for asking us to follow this patient with you.     DIOGENES Ray  10/20/19  2:20 PM    Please note this cardiology consultation note is the result of a face to face consultation with the patient, in addition to reviewing medical records at length by myself, DIOGENES De Dios       Time: approximately 50 minutes

## 2019-10-21 NOTE — PROGRESS NOTES
[facility] Medicine Services  INPATIENT PROGRESS NOTE    Patient Name: Arjun Oropeza  Date of Admission: 10/19/2019  Today's Date: 10/21/19  Length of Stay: 2  Primary Care Physician: Shey Elliott APRN    Subjective   Chief Complaint: feeling better  HPI   Sitting up in bed eating  Afebrile, no SOA  Feels much better today  He denies any further hemoptysis.          Review of Systems   Constitutional: Negative for fatigue and fever.   HENT: Negative for congestion and ear pain.    Eyes: Negative for redness and visual disturbance.   Respiratory: Positive for cough. Negative for shortness of breath and wheezing.    Cardiovascular: Negative for chest pain and palpitations.   Gastrointestinal: Negative for abdominal pain, diarrhea, nausea and vomiting.   Endocrine: Negative for cold intolerance and heat intolerance.   Genitourinary: Negative for dysuria and frequency.   Musculoskeletal: Negative for arthralgias and back pain.   Skin: Negative for rash and wound.   Neurological: Negative for dizziness and headaches.   Psychiatric/Behavioral: Negative for confusion. The patient is not nervous/anxious.         All pertinent negatives and positives are as above. All other systems have been reviewed and are negative unless otherwise stated.     Objective    Temp:  [97.5 °F (36.4 °C)-98.4 °F (36.9 °C)] 98.2 °F (36.8 °C)  Heart Rate:  [] 105  Resp:  [12-28] 22  BP: ()/(59-98) 116/88  Physical Exam   Constitutional: He is oriented to person, place, and time. He appears well-developed and well-nourished.   HENT:   Head: Normocephalic and atraumatic.   Right Ear: External ear normal.   Left Ear: External ear normal.   Nose: Nose normal.   Mouth/Throat: Oropharynx is clear and moist.   Eyes: Conjunctivae and EOM are normal. Pupils are equal, round, and reactive to light. Right eye exhibits no discharge. Left eye exhibits no discharge. No scleral icterus.   Neck: Normal range  of motion. Neck supple. No tracheal deviation present. No thyromegaly present.   Cardiovascular: Normal rate, regular rhythm, normal heart sounds and intact distal pulses. Exam reveals no gallop and no friction rub.   No murmur heard.  Pulmonary/Chest: Effort normal and breath sounds normal. No stridor. No respiratory distress. He has no wheezes. He has no rales. He exhibits no tenderness.   Abdominal: Soft. Bowel sounds are normal. He exhibits no distension and no mass. There is no tenderness. There is no rebound and no guarding. No hernia.   Musculoskeletal: Normal range of motion. He exhibits no edema or deformity.   Lymphadenopathy:     He has no cervical adenopathy.   Neurological: He is alert and oriented to person, place, and time. He has normal reflexes. He displays normal reflexes. No cranial nerve deficit. He exhibits normal muscle tone. Coordination normal.   Skin: Skin is warm and dry. No rash noted. No erythema. No pallor.   Psychiatric: He has a normal mood and affect. His behavior is normal. Judgment and thought content normal.   Vitals reviewed.          Results Review:  I have reviewed the labs, radiology results, and diagnostic studies.    Laboratory Data:   Results from last 7 days   Lab Units 10/21/19  0249 10/20/19  0659 10/19/19  0953   WBC 10*3/mm3 10.68 12.37* 12.58*   HEMOGLOBIN g/dL 10.0* 10.4* 11.8*   HEMATOCRIT % 30.2* 31.4* 35.3*   PLATELETS 10*3/mm3 286 292 351        Results from last 7 days   Lab Units 10/21/19  0249 10/20/19  0659 10/19/19  0953   SODIUM mmol/L 127* 129* 129*   POTASSIUM mmol/L 4.3 4.4 4.4   CHLORIDE mmol/L 87* 88* 89*   CO2 mmol/L 26.0 25.0 25.0   BUN mg/dL 21* 15 14   CREATININE mg/dL 1.14 0.74* 0.78   CALCIUM mg/dL 8.8 8.5* 9.0   BILIRUBIN mg/dL 1.6* 1.6* 1.7*   ALK PHOS U/L 146* 147* 176*   ALT (SGPT) U/L 14 19 26   AST (SGOT) U/L 22 19 26   GLUCOSE mg/dL 174* 120* 111*       Culture Data:   Blood Culture   Date Value Ref Range Status   10/19/2019 No growth at 24  hours  Preliminary   10/19/2019 No growth at 24 hours  Preliminary     Respiratory Culture   Date Value Ref Range Status   10/20/2019 Scant growth (1+) Normal Respiratory Kristal  Preliminary       Radiology Data:   Imaging Results (last 24 hours)     ** No results found for the last 24 hours. **          I have reviewed the patient's current medications.     Assessment/Plan     Active Hospital Problems    Diagnosis   • Right-sided heart failure (CMS/HCC)   • Nonrheumatic mitral valve regurgitation   • Pneumonia of right middle lobe due to infectious organism (CMS/HCC)   • Chronic combined systolic and diastolic congestive heart failure (CMS/HCC)     1.  Sepsis  -IV abx     2.  PNA  -IV abx  -nebs  -Pulm consulted, following     3.  Acute on Chronic Systolic CHF  -IV Lasix  -Cardiology consulted, following     4.   Hyponatremia  -slight worse  -IV lasix     5.  H/o IV drug abuse  -tells me he has stopped, counseled on the importance of continued abstinence      6.  PE  -lovenox     7.  Severe mitral regurgitation  -consult Cardiology     8.  Hemoptysis  -monitor H&H              Discharge Planning: I expect the patient to be discharged to home in ? days    Romain Graham MD   10/21/19   9:40 AM

## 2019-10-21 NOTE — PROGRESS NOTES
"  PROGRESS NOTE  Patient Name: Arjun Oropeza  Age/Sex: 34 y.o. male  : 1985  MRN: [id number]    Date of Admission: 10/19/2019  Date of Encounter Visit: 10/21/19   LOS: 2 days   Patient Care Team:  Shey Elliott APRN as PCP - General (Nurse Practitioner)    Chief Complaint: Chest pain    Hospital course: History of recent PE, history of nonrheumatic valvular heart disease f, history of IV drug abuse, dilated cardiomyopathy, with recent discharge from [facility], came with chest pain and evidence of pneumonia on the right upper lobe, is on antibiotic    Interval History: The chest pain is doing much better with the Indocin, the creatinine slightly higher however.  The patient denies any shortness of breath at this point or pleurisy, he is afebrile on Rocephin and vancomycin.  His INR was subtherapeutic and patient is currently on Lovenox    REVIEW OF SYSTEMS:   CONSTIT patient defervesced no chills  Chest pain is doing much better on the nonsteroidal anti-inflammatory, edema is trace  Urine output is borderline  No nausea or vomiting or GI complaint  No  complaints    Ventilator/Non-Invasive Ventilation Settings (From admission, onward)    None            Vital Signs  Temp:  [97.5 °F (36.4 °C)-98.9 °F (37.2 °C)] 97.5 °F (36.4 °C)  Heart Rate:  [] 90  Resp:  [22-32] 27  BP: ()/(59-98) 86/71  SpO2:  [86 %-98 %] 98 %  on  Flow (L/min):  [2] 2 Device (Oxygen Therapy): nasal cannula    Intake/Output Summary (Last 24 hours) at 10/21/2019 0716  Last data filed at 10/21/2019 0333  Gross per 24 hour   Intake 1720 ml   Output 1025 ml   Net 695 ml     Flowsheet Rows      First Filed Value   Admission Height  170.2 cm (67\") Documented at 10/19/2019 0939   Admission Weight  92.5 kg (204 lb) Documented at 10/19/2019 0939        Body mass index is 32.33 kg/m².      10/19/19  1339 10/19/19  1840 10/21/19  0005   Weight: 89.9 kg (198 lb 1.6 oz) 92 kg (202 lb 12.8 oz) 93.6 kg (206 lb 6.4 oz) "       Physical Exam:  GEN:  No acute distress, alert, cooperative, well developed, on nasal cannula oxygen at 2 L/min  EYES:   Sclera clear. No icterus. PERRL. Normal EOM  ENT:   External ears/nose normal, no oral lesions, no thrush, mucous membranes moist  NECK:  Supple, midline trachea, no JVD  LUNGS: Normal chest on inspection, patient has minimal crackles heard mainly posteriorly on the right side, minimal rhonchi and no wheezing les. Respirations regular, even and unlabored.   CV:  Regular rhythm and rate. Normal S1/S2.  Positive murmurs, gallops, or rubs noted.  ABD:  Soft, non-tender and non-distended. Normal bowel sounds. No guarding  EXT:  Moves all extremities well. No cyanosis. No redness.  Trace edema.   Skin: dry, intact, no bleeding    Results Review:      Results from last 7 days   Lab Units 10/21/19  0249 10/20/19  0659 10/19/19  0953 10/17/19  0331 10/16/19  1609   SODIUM mmol/L 127* 129* 129* 129* 125*   POTASSIUM mmol/L 4.3 4.4 4.4 4.6 4.7   CHLORIDE mmol/L 87* 88* 89* 89* 89*   TOTAL CO2 mmol/L  --   --   --  23 22   CO2 mmol/L 26.0 25.0 25.0  --   --    BUN mg/dL 21* 15 14 20 19   CREATININE mg/dL 1.14 0.74* 0.78 1.1 0.8   CALCIUM mg/dL 8.8 8.5* 9.0 9.1 9.2   AST (SGOT) U/L 22 19 26  --  62*   ALT (SGPT) U/L 14 19 26  --  46*   ANION GAP mmol/L 14.0 16.0* 15.0 17 14   ALBUMIN g/dL 3.00* 3.30* 3.60  --  3.8     Results from last 7 days   Lab Units 10/19/19  0953 10/16/19  1609   TROPONIN I ng/mL  --  0.01   TROPONIN T ng/mL <0.010  --          Results from last 7 days   Lab Units 10/19/19  0953 10/16/19  1609   PROBNP pg/mL 5,195.0* 3,436*     Results from last 7 days   Lab Units 10/21/19  0249 10/20/19  0659 10/19/19  0953 10/17/19  0331   WBC 10*3/mm3 10.68 12.37* 12.58* 7.9   HEMOGLOBIN g/dL 10.0* 10.4* 11.8* 11.6*   HEMATOCRIT % 30.2* 31.4* 35.3* 36.6*   PLATELETS 10*3/mm3 286 292 351 339   MCV fL 83.4 82.8 82.1 86.7   NEUTROPHIL % % 77.8* 78.6* 75.4  --    LYMPHOCYTE % % 8.8* 6.7* 9.1*  --     MONOCYTES % % 12.1* 13.7* 14.7*  --    EOSINOPHIL % % 0.4 0.2* 0.2*  --    BASOPHIL % % 0.2 0.2 0.2  --    IMM GRAN % % 0.7* 0.6* 0.4  --      Results from last 7 days   Lab Units 10/19/19  0953 10/16/19  1609   INR  1.89* 2.90*     Results from last 7 days   Lab Units 10/17/19  0331   MAGNESIUM mg/dL 1.8           Invalid input(s): LDLCALC  Results from last 7 days   Lab Units 10/19/19  1910   PH, ARTERIAL pH units 7.477*   PCO2, ARTERIAL mm Hg 34.6*   PO2 ART mm Hg 108.0   HCO3 ART mmol/L 25.6         No results found for: POCGLU  Results from last 7 days   Lab Units 10/19/19  1154   LACTATE mmol/L 1.1     Results from last 7 days   Lab Units 10/19/19  1507 10/19/19  1155 10/19/19  1154   BLOODCX   --  No growth at 24 hours No growth at 24 hours   STREP PNEUMO AG  Negative  --   --    L. PNEUMOPHILA SEROGP 1 UR AG  Negative  --   --                    Imaging:   Imaging Results (all)     Procedure Component Value Units Date/Time                       No new films to review        Medication Review:     aspirin 81 mg Oral Daily   ceftriaxone 1 g Intravenous Q24H   enoxaparin 1 mg/kg Subcutaneous Q12H   furosemide 20 mg Intravenous Q12H   ipratropium 0.5 mg Nebulization Q6H - RT   levalbuterol 1.25 mg Nebulization Q6H - RT   metoprolol succinate XL 25 mg Oral Daily   vancomycin 1,000 mg Intravenous Q8H            ASSESSMENT:   1. Lobar pneumonia of the right upper lobe with evidence of bibasilar atelectasis right more than left and possible pneumonia over the right lower lobe as well, this is a hospital-acquired infection  2. History of IV drug abuse, patient claims has been clean for more than 3 months  3. Severe mitral valve regurgitation  4. Combined systolic and diastolic congestive heart failure, last EF 36% with acute on chronic decompensation  5. History of pulmonary embolism recently, on anticoagulation with slightly subtherapeutic INR  6. Moderate to severe mitral valve regurgitation  7. Cor pulmonale  with pulmonary hypertension, estimated RVSP 58.3 mmHg  8. Mild hyponatremia  9. Hemoptysis secondary to the pneumonia and being on anticoagulation, no evidence of recurrent PE on the CT angios  10. Pleurisy, likely from the pleural inflammation from the right upper lung pneumonia   11. Hyponatremia     PLAN:  MRSA screen is pending, will follow up on it and stop the VANC if no longer needed  continue AC for the recent PE, INR was subtherapeutic  Diuretics per the primary team  His creatinine is up, the nonsteroidal anti-inflammatory are working well for his pleurisy however need to keep it as needed instead of scheduled while keeping a close eye on the renal function and consider discontinuing if the creatinine keep on getting higher.  Alternative option would be to use steroids instead  Discussed with patient and nursing staff    Disposition: Per primary team    Anne Bello MD  10/21/19  7:16 AM           Dictated utilizing Dragon dictation

## 2019-10-21 NOTE — PAYOR COMM NOTE
"10/21/19 [facility] [phone]  [phone]      PATIENT ADMITTED ON 10/19/19/ ER ADMISSION.    SUBSCRIBER ID [id number]              Arjun Hernández (34 y.o. Male)     Date of Birth Social Security Number Address Home Phone MRN    1985  [address] [place] [address] [phone] [id number]    Mu-ism Marital Status          Other Single       Admission Date Admission Type Admitting Provider Attending Provider Department, Room/Bed    10/19/19 Emergency Romain Graham MD Moore, Jonathan Scott, MD [facility] CARDIAC CARE, C005/1    Discharge Date Discharge Disposition Discharge Destination                       Attending Provider:  Romain Graham MD    Allergies:  No Known Allergies    Isolation:  None   Infection:  None   Code Status:  CPR    Ht:  170.2 cm (67\")   Wt:  93.6 kg (206 lb 6.4 oz)    Admission Cmt:  None   Principal Problem:  None                Active Insurance as of 10/19/2019     Primary Coverage     Payor Plan Insurance Group Employer/Plan Group    [employer] [id number]     Payor Plan Address Payor Plan Phone Number Payor Plan Fax Number Effective Dates    [address] [phone]  11/1/1997 - None Entered    [facility] [id number]       Subscriber Name Subscriber Birth Date Member ID       ARJUN HERNÁNDEZ 1985 [id number]                 Emergency Contacts      (Rel.) Home Phone Work Phone Mobile Phone    Aurelia Hernández (Father) -- -- [phone]    Bam Hernández (Brother) [phone] -- --        H&P   Signed   Date of Service:  10/19/19 1828   Creation Time:  10/19/19 1828            Signed        Expand All Collapse All      Show:Clear all  [x]Manual[x]Template[]Copied    Added by:  [x]Romain Graham MD    []Sara for details       [facility]  HISTORY AND PHYSICAL     Date of Admission: 10/19/2019  Primary Care Physician: Shey Elliott, " DIOGENES     Subjective      Chief Complaint: SOA     History of Present Illness  Mr. Oropeza is a 34 year old gentleman with a history of Cardiomyopathy and IV drug abuse.  He presents endorsing SOA and hemoptysis.  He has been SOA for 2-3 days now.  He started coughing up blood today and that really concerned him.       He denies fevers or chills.  He initially denies cough, but coughs frequently during our interview.     His legs are much more swollen than usual.  He has orthopnea and has had weight gain. `            Review of Systems   Constitutional: Positive for unexpected weight change. Negative for fatigue and fever.   HENT: Negative for congestion and ear pain.    Eyes: Negative for redness and visual disturbance.   Respiratory: Positive for cough and shortness of breath. Negative for wheezing.    Cardiovascular: Positive for leg swelling. Negative for chest pain and palpitations.   Gastrointestinal: Negative for abdominal pain, diarrhea, nausea and vomiting.   Endocrine: Negative for cold intolerance and heat intolerance.   Genitourinary: Negative for dysuria and frequency.   Musculoskeletal: Negative for arthralgias and back pain.   Skin: Negative for rash and wound.   Neurological: Negative for dizziness and headaches.   Psychiatric/Behavioral: Negative for confusion. The patient is not nervous/anxious.          Otherwise complete ROS reviewed and negative except as mentioned in the HPI.     Past Medical History:   Medical History        Past Medical History:   Diagnosis Date   • Cardiomyopathy (CMS/HCC)     • CHF (congestive heart failure) (CMS/HCC)     • Chronic respiratory failure (CMS/HCC)     • GERD (gastroesophageal reflux disease)     • Hypertension     • IV drug abuse (CMS/HCC)     • Methamphetamine abuse (CMS/HCC)     • Pulmonary embolism (CMS/HCC)     • Pulmonary infarct (CMS/HCC)     • Tobacco abuse           Past Surgical History:  Surgical History         Past Surgical History:   Procedure  Laterality Date   • HAND SURGERY             Social History:  reports that he has been smoking cigarettes.  He has a 15.00 pack-year smoking history. He has never used smokeless tobacco. He reports that he drinks alcohol. He reports that he uses drugs. Drug: Methamphetamines.     Family History: family history includes Cancer in his mother; No Known Problems in his brother and father.        Allergies:  No Known Allergies  Medications:          Prior to Admission medications    Medication Sig Start Date End Date Taking? Authorizing Provider   albuterol sulfate  (90 Base) MCG/ACT inhaler Inhale 2 puffs Every 6 (Six) Hours As Needed for Wheezing.     Yes Umang Jean MD   aspirin 81 MG EC tablet Take 1 tablet by mouth Daily. 12/20/18   Yes Chastity Roberts APRN   AZITHROMYCIN PO Take  by mouth.     Yes Umang Jean MD   furosemide (LASIX) 40 MG tablet Take 1 tablet by mouth 2 (Two) Times a Day.  Patient taking differently: Take 80 mg by mouth 2 (Two) Times a Day. 80mg in the morning 40mg at night 12/20/18   Yes Chastity Roberts APRN   lisinopril (PRINIVIL,ZESTRIL) 10 MG tablet Take 10 mg by mouth Daily.     Yes Umang Jean MD   metoprolol succinate XL (TOPROL-XL) 25 MG 24 hr tablet Take 1 tablet by mouth Daily. 12/20/18   Yes Chastity Roberts APRN   mupirocin (BACTROBAN) 2 % ointment Apply  topically to the appropriate area as directed 3 (Three) Times a Day. 9/17/19   Yes Andrea Brunson PA   sodium chloride 1 g tablet Take 1 g by mouth 3 (Three) Times a Day.     Yes Umang Jean MD   spironolactone (ALDACTONE) 25 MG tablet Take 1 tablet by mouth Daily. 12/20/18   Yes Chastity Roberts APRN   sucralfate (CARAFATE) 1 g tablet Take 1 g by mouth 4 (Four) Times a Day.     Yes Umang Jean MD   warfarin (COUMADIN) 5 MG tablet Take 5 mg by mouth Daily.     Yes Umang Jean MD   amoxicillin (AMOXIL) 500 MG capsule Take 1  "capsule by mouth 3 (Three) Times a Day. 9/17/19     Andrea Brunson PA   pantoprazole (PROTONIX) 40 MG EC tablet Take 40 mg by mouth Daily.       Provider, MD Umang   rivaroxaban (XARELTO) 15 MG tablet Take 1 tablet by mouth 2 (Two) Times a Day With Meals. 9/17/19     Lb Louis DO      Objective      Vital Signs: /75   Pulse (!) 124   Temp 97.5 °F (36.4 °C) (Oral)   Resp (!) 34   Ht 170.2 cm (67\")   Wt 89.9 kg (198 lb 1.6 oz)   SpO2 98%   BMI 31.03 kg/m²   Physical Exam   Constitutional: He is oriented to person, place, and time. He appears well-developed and well-nourished.   HENT:   Head: Normocephalic               Head: Normocephalic and atraumatic.   Right Ear: External ear normal.   Left Ear: External ear normal.   Nose: Nose normal.   Mouth/Throat: Oropharynx is clear and moist.   Eyes: Conjunctivae and EOM are normal. Pupils are equal, round, and reactive to light. Right eye exhibits no discharge. Left eye exhibits no discharge. No scleral icterus.   Neck: Normal range of motion. Neck supple. No tracheal deviation present. No thyromegaly present.   Cardiovascular: Normal rate, regular rhythm, normal heart sounds and intact distal pulses. Exam reveals no gallop and no friction rub.   No murmur heard.  Pulmonary/Chest: Effort normal. No stridor. No respiratory distress. He has decreased breath sounds. He has no wheezes. He has rhonchi. He has no rales. He exhibits no tenderness.   Abdominal: Soft. Bowel sounds are normal. He exhibits no distension and no mass. There is no tenderness. There is no rebound and no guarding. No hernia.   Musculoskeletal: Normal range of motion. He exhibits no edema or deformity.   Lymphadenopathy:     He has no cervical adenopathy.   Neurological: He is alert and oriented to person, place, and time. He has normal reflexes. He displays normal reflexes. No cranial nerve deficit. He exhibits normal muscle tone. Coordination normal.   Skin: Skin is " warm and dry. No rash noted. No erythema. No pallor.   Psychiatric: He has a normal mood and affect. His behavior is normal. Judgment and thought content normal.   Vitals reviewed.              Results Reviewed:           Lab Results (last 24 hours)      Procedure Component Value Units Date/Time     Legionella Antigen, Urine - Urine, Urine, Clean Catch [[id number]]  (Normal) Collected:  10/19/19 1507     Specimen:  Urine, Clean Catch Updated:  10/19/19 1544       LEGIONELLA ANTIGEN, URINE Negative     S. Pneumo Ag Urine or CSF - Urine, Urine, Clean Catch [[id number]]  (Normal) Collected:  10/19/19 1507     Specimen:  Urine, Clean Catch Updated:  10/19/19 1543       Strep Pneumo Ag Negative     Mycoplasma Pneumoniae PCR - Swab, Nasopharynx [[id number]] Collected:  10/19/19 1511     Specimen:  Swab from Nasopharynx Updated:  10/19/19 1518     Lactic Acid, Plasma [[id number]]  (Normal) Collected:  10/19/19 1154     Specimen:  Blood Updated:  10/19/19 1219       Lactate 1.1 mmol/L       Blood Culture - Blood, Hand, Right [[id number]] Collected:  10/19/19 1154     Specimen:  Blood from Hand, Right Updated:  10/19/19 1201     Blood Culture - Blood, Hand, Right [[id number]] Collected:  10/19/19 1155     Specimen:  Blood from Hand, Right Updated:  10/19/19 1200     [place] Draw [[id number]] Collected:  10/19/19 0953     Specimen:  Blood Updated:  10/19/19 1100     Narrative:        The following orders were created for panel order [place] Draw.  Procedure                               Abnormality         Status                     ---------                               -----------         ------                     Light Blue Top[[id number]]                                   Final result               Green Top (Gel)[[id number]]                                  Final result               Lavender Top[[id number]]                                     Final result               Red Top[[id number]]                                           In process                    Please view results for these tests on the individual orders.     Light Blue Top [[id number]] Collected:  10/19/19 0953     Specimen:  Blood Updated:  10/19/19 1100       Extra Tube hold for add-on       Comment: Auto resulted        Green Top (Gel) [[id number]] Collected:  10/19/19 0953     Specimen:  Blood Updated:  10/19/19 1100       Extra Tube Hold for add-ons.       Comment: Auto resulted.        Lavender Top [[id number]] Collected:  10/19/19 0953     Specimen:  Blood Updated:  10/19/19 1100       Extra Tube hold for add-on       Comment: Auto resulted        Comprehensive Metabolic Panel [[id number]]  (Abnormal) Collected:  10/19/19 0953     Specimen:  Blood Updated:  10/19/19 1024       Glucose 111 mg/dL         BUN 14 mg/dL         Creatinine 0.78 mg/dL         Sodium 129 mmol/L         Potassium 4.4 mmol/L         Chloride 89 mmol/L         CO2 25.0 mmol/L         Calcium 9.0 mg/dL         Total Protein 7.8 g/dL         Albumin 3.60 g/dL         ALT (SGPT) 26 U/L         AST (SGOT) 26 U/L         Alkaline Phosphatase 176 U/L         Total Bilirubin 1.7 mg/dL         eGFR Non African Amer 114 mL/min/1.73         Globulin 4.2 gm/dL         A/G Ratio 0.9 g/dL         BUN/Creatinine Ratio 17.9       Anion Gap 15.0 mmol/L       Narrative:        GFR Normal >60  Chronic Kidney Disease <60     [id number]]  (Abnormal) Collected:  10/19/19 0953      Specimen:  Blood Updated:  10/19/19 1024       proBNP 5,195.0 pg/mL       Narrative:        Among patients with dyspnea, NT-proBNP is highly sensitive for the detection of acute congestive heart failure. In addition NT-proBNP of <300 pg/ml effectively rules out acute congestive heart failure with 99% negative predictive value.     Troponin [[id number]]  (Normal) Collected:  10/19/19 0953     Specimen:  Blood Updated:  10/19/19 1024       Troponin T <0.010 ng/mL       Narrative:        Troponin T Reference Range:  <= 0.03 ng/mL-   Negative for  AMI  >0.03 ng/mL-     Abnormal for myocardial necrosis.  Clinicians would have to utilize clinical acumen, EKG, Troponin and serial changes to determine if it is an Acute Myocardial Infarction or myocardial injury due to an underlying chronic condition.      CBC & Differential [[id number]] Collected:  10/19/19 0953     Specimen:  Blood Updated:  10/19/19 1018     Narrative:        The following orders were created for panel order CBC & Differential.  Procedure                               Abnormality         Status                     ---------                               -----------         ------                     CBC Auto Differential[[id number]]        Abnormal            Final result                  Please view results for these tests on the individual orders.     CBC Auto Differential [[id number]]  (Abnormal) Collected:  10/19/19 0953     Specimen:  Blood Updated:  10/19/19 1018       WBC 12.58 10*3/mm3         RBC 4.30 10*6/mm3         Hemoglobin 11.8 g/dL         Hematocrit 35.3 %         MCV 82.1 fL         MCH 27.4 pg         MCHC 33.4 g/dL         RDW 18.2 %         RDW-SD 53.6 fl         MPV 9.4 fL         Platelets 351 10*3/mm3         Neutrophil % 75.4 %         Lymphocyte % 9.1 %         Monocyte % 14.7 %         Eosinophil % 0.2 %         Basophil % 0.2 %         Immature Grans % 0.4 %         Neutrophils, Absolute 9.49 10*3/mm3         Lymphocytes, Absolute 1.14 10*3/mm3         Monocytes, Absolute 1.85 10*3/mm3         Eosinophils, Absolute 0.02 10*3/mm3         Basophils, Absolute 0.03 10*3/mm3         Immature Grans, Absolute 0.05 10*3/mm3         nRBC 0.0 /100 WBC       Protime-INR [[id number]]  (Abnormal) Collected:  10/19/19 0953     Specimen:  Blood Updated:  10/19/19 1015       Protime 22.4 Seconds         INR 1.89     Red Top [[id number]] Collected:  10/19/19 0953     Specimen:  Blood Updated:  10/19/19 1000                   Imaging Results (last 24 hours)      Procedure Component Value  Units Date/Time     CT Angiogram Chest [[id number]] Collected:  10/19/19 1111       Updated:  10/19/19 1121     Narrative:        CT ANGIOGRAM CHEST- 10/19/2019 10:58 AM CDT      HISTORY: Chest pain, acute, PE suspected, high pretest prob      COMPARISON: 07/29/2018.      DOSE LENGTH PRODUCT: 477 mGy cm. Automated exposure control was also  utilized to decrease patient radiation dose.     TECHNIQUE: Helical tomographic images of the chest were obtained after  the administration of intravenous contrast following angiogram protocol.  Additionally, 3D and multiplanar reformatted images were provided.        FINDINGS:    Pulmonary arteries: There is adequate enhancement of the pulmonary  arteries to evaluate for central and segmental pulmonary emboli. There  are no filling defects within the main, lobar, segmental or visualized  subsegmental pulmonary arteries. The pulmonary arteries are within  normal limits for size.      Aorta and great vessels: The aorta is well opacified and demonstrates no  dissection or aneurysm. The great vessels are normal in appearance.     Visualized neck base: The imaged portion of the base of the neck appears  unremarkable.      Lungs: There is air space filling infiltrate posterior segment of the  right upper lobe. Associated infiltrate is also noted in the right lower  lobe. Posterior right pleural effusion is present. Small infiltrates  also present in the left lower lobe. These bilateral infiltrates may  represent pneumonia. However pulmonary infarction cannot be excluded..  The trachea and bronchial tree are patent.      Heart: Cardiac silhouettes markedly enlarged.. There is no pericardial  effusion.      Mediastinum and lymph nodes: No enlarged mediastinal, hilar, or axillary  lymph nodes are present.      Skeletal and soft tissues: The osseous structures of the thorax and  surrounding soft tissues demonstrate no acute process.     Upper abdomen: The imaged portion of the upper  abdomen demonstrates no  acute process.         Impression:        1. There is no evidence of embolic disease.  2. Infiltrate in the right upper lobe and right lower lobe is noted  small right pleural effusions present. This may be an inflammatory  process.        3. Small infiltrate left lower lobe.        4. Marked cardiomegaly.        This report was finalized on 10/19/2019 11:18 by Dr. Sonny Murphy MD.     XR Chest 1 View [[id number]] Collected:  10/19/19 1040       Updated:  10/19/19 1044     Narrative:        Frontal upright radiograph of the chest 10/19/2019 10:30 AM CDT     COMPARISON: October 16 from [facility] and 04/26/2019 from [facility].        Narrative:         Frontal upright radiograph of the chest 10/19/2019 10:30 AM CDT     COMPARISON: October 16 from [facility] and 04/26/2019 from [facility].     FINDINGS:   Opacification of the right upper chest is noted. An infiltrate in the  right upper lobe may be present. The right diaphragms indistinct there  may be infiltrate or pleural complex in the right chest. The left lung  is clear.. Marked cardiomegaly is present..      The osseous structures and surrounding soft tissues demonstrate no acute  abnormality.        Impression:        1. Marked cardiomegaly unchanged from April.        2. There is a new right upper lobe infiltrate which is a change from  October 16. Differential considerations include an inflammatory process  or possibly pulmonary infarction.  3. Possible posterior right pleural complex.        This report was finalized on 10/19/2019 10:41 by Dr. Sonny Murphy MD.       Assessment:       Active Hospital Problems     Diagnosis   • Pneumonia of right middle lobe due to infectious organism (CMS/[facility])         1.  Sepsis  -IV abx     2.  PNA  -IV abx  -nebs     3.  Acute on Chronic Systolic CHF  -IV Lasix     4.   Hyponatremia  -IV lasix     5.  H/o IV drug abuse  -tells me he has stopped, counseled on the importance  of continued abstinence      Hold AC given hemoptysis         Code Status: full     I discussed the patient's findings and my recommendations with the patient     Estimated length of stay several days     Romain Graham MD   10/19/19   6:28 PM        Anne PADILLA MD   Physician   Pulmonology   Consults   Addendum   Date of Service: 10/20/19 0913   Creation Time: 10/20/19 0913          Expand All Collapse All       Anne Bello MD   Physician   Pulmonology   Consults   Addendum   Date of Service:  10/20/19 0913   Creation Time:  10/20/19 0913         Consult Orders   Inpatient Pulmonology Consult [[id number]] ordered by Romain Graham MD at 10/20/19 0849          Expand All Collapse All      Pulmonary Consultation      Patient Name: Arjun Oropeza  Age/Sex: 34 y.o. male  : 1985  MRN: [id number]     Date of Admission: 10/19/2019  Date of Encounter Visit: 10/20/19  Encounter Provider: Anne Bello MD  Referring Provider: Romain Graham MD  Place of Service: [facility]  Patient Care Team:  Shey Elliott APRN as PCP - General (Nurse Practitioner)        Subjective:      Consulted for: Pneumonia and hemoptysis     Chief Complaint: Aggressive worsening shortness of breath and right-sided chest pain     History of Present Illness:  Arjun Oropeza is a 34 y.o. male with history of combined systolic and diastolic congestive heart failure, History of IV drug abuse, history of pulmonary embolism and pulmonary infarct in the past, severe mitral valve regurgitation with cor pulmonale with mild reduced right ventricular systolic function and RVSP of more than 55 mmHg.  Patient presented to the emergency room on 10/19/2019 with 2 days of progressive worsening shortness of triggered by minimal activities and did not get better with rest, diuretics, this was also associated with pleuritic chest pain and with hemoptysis.  No recent sick exposure or exposure to any specific irritant.  Patient was recently  discharged from the hospital at [facility] where he was treated with diuretics and better at the time until few days post discharge.  Work-up in the ER included CT of the chest that showed evidence of right-sided pleural effusion with some complex features, right upper lung dense pneumonia and right lower lung pneumonia versus atelectasis with minimal involvement of the left lower lobe atelectasis with linear atelectatic changes.The patient is currently afebrile, his labs revealed normal renal function with mild hyponatremia, his white count showed mild leukocytosis with white count of 12.58 with mild left shift.  Patient is on anticoagulation, INR was slightly subtherapeutic, his proBNP was high at 5000 and the ABG showed no significant respiratory respiratory acidosis with adequate oxygenation on oxygen supplement.  Lactic acid was negative.  Patient was given Rocephin and doxycycline and was admitted to the ICU for further care and pulmonary service was consulted for further management.  Records from [facility] were reviewed including a CAT scan from 9/22/2019 that did show wedgelike in appearance in the right lung base suggestive of pulmonary infarct with no evidence of involvement of the right upper lobe, that showed also a thrombus in the right lower lobe pulmonary artery branch.  The pulmonary artery filling defect were seen in other subsegmental branches of the pulmonary artery on the left and the right as well.  His white blood cell count at the time of discharge from [facility] was 7.9 with normal differential.  And his INR was 2.9.     Patient does report positive orthopnea making his dyspnea worse, positive dyspnea on exertion, partial relief from the rest in the oxygen supplementation, the pleurisy is also adding to the shortness of breath because he is unable to take a big deep breath.  He did have some chills but denies any fever.  The pleurisy is located mainly on the right side of the chest right  where the infiltrate is noted on the chest x-ray.  Patient denies any similar previous episodes.  Patient reported that he is still clean with no IV drug abuse or alcohol intake or smoking for the last 3 months  Patient denies skipping any of his anticoagulation medication doses or any of his diuretics.     Pulmonary Functions Testing Results:     No results found for: FEV1, FVC, PPF6YYJ, TLC, DLCO     Review of Systems:   Review of Systems   Constitutional: Negative.  Negative for diaphoresis and fever.   HENT: Negative.  Negative for ear pain and sore throat.    Respiratory: Positive for cough, hemoptysis and shortness of breath. Negative for sputum production and      Patient does report positive orthopnea making his dyspnea worse, positive dyspnea on exertion, partial relief from the rest in the oxygen supplementation, the pleurisy is also adding to the shortness of breath because he is unable to take a big deep breath.  He did have some chills but denies any fever.  The pleurisy is located mainly on the right side of the chest right where the infiltrate is noted on the chest x-ray.  Patient denies any similar previous episodes.  Patient reported that he is still clean with no IV drug abuse or alcohol intake or smoking for the last 3 months  Patient denies skipping any of his anticoagulation medication doses or any of his diuretics.     Pulmonary Functions Testing Results:     No results found for: FEV1, FVC, XHH8VUJ, TLC, DLCO     Review of Systems:   Review of Systems   Constitutional: Negative.  Negative for diaphoresis and fever.   HENT: Negative.  Negative for ear pain and sore throat.    Respiratory: Positive for cough, hemoptysis and shortness of breath. Negative for sputum production and wheezing.    Cardiovascular: Positive for chest pain. Negative for claudication, syncope and PND.   Gastrointestinal: Negative.  Negative for abdominal pain and vomiting.   Genitourinary: Negative.     Musculoskeletal: Negative.  Negative for neck pain.   Skin: Negative.  Negative for rash.   Neurological: Negative.  Negative for headaches.   Hematological: Negative.    Psychiatric/Behavioral: Negative.    All other systems reviewed and are negative.  Past Medical History:  Medical History        Past Medical History:   Diagnosis Date   • Cardiomyopathy (CMS/HCC)     • CHF (congestive heart failure) (CMS/[facility])     • Chronic respiratory failure (CMS/[facility])     • GERD (gastroesophageal reflux disease)     • Hypertension     • IV drug abuse (CMS/[facility])     • Methamphetamine abuse (CMS/[facility])     • Pulmonary embolism (CMS/[facility])     • Pulmonary infarct (CMS/[facility])     • Tobacco abuse              Surgical History         Past Surgical History:   Procedure Laterality Date   • HAND SURGERY                Home Medications:   Prescriptions Prior to Admission           Medications Prior to Admission   Medication Sig Dispense Refill Last Dose   • albuterol sulfate  (90 Base) MCG/ACT inhaler Inhale 2 puffs Every 6 (Six) Hours As Needed for Wheezing.     Past Week at Unknown time   • aspirin 81 MG EC tablet Take 1 tablet by mouth Daily. 30 tablet 0 Past Week at Unknown time   • AZITHROMYCIN PO Take  by mouth.     10/18/2019 at Unknown time   • furosemide (LASIX) 40 MG tablet Take 1 tablet by mouth 2 (Two) Times a Day. (Patient taking differently: Take 80 mg by mouth 2 (Two) Times a Day. 80mg in the morning 40mg at night) 60 tablet 0 10/18/2019 at Unknown time   • lisinopril (PRINIVIL,ZESTRIL) 10 MG tablet Take 10 mg by mouth Daily.     10/18/2019 at Unknown time   • metoprolol succinate XL (TOPROL-XL) 25 MG 24 hr tablet Take 1 tablet by mouth Daily. 30 tablet 0 10/18/2019 at Unknown time   • mupirocin (BACTROBAN) 2 % ointment Apply  topically to the appropriate area as directed 3 (Three) Times a Day. 22 g 0     • sodium chloride 1 g tablet Take 1 g by mouth 3 (Three) Times a Day.     9/13/2019 at Unknown time   •  spironolactone (ALDACTONE) 25 MG tablet Take 1 tablet by mouth Daily. 30 tablet 0 10/18/2019 at Unknown time   • sucralfate (CARAFATE) 1 g tablet Take 1 g by mouth 4 (Four) Times a Day.     10/18/2019 at Unknown time   • warfarin (COUMADIN) 5 MG tablet Take 5 mg by mouth Daily.     10/18/2019 at Unknown time   • amoxicillin (AMOXIL) 500 MG capsule Take 1 capsule by mouth 3 (Three) Times a Day. 30 capsule 0     • pantoprazole (PROTONIX) 40 MG EC tablet Take 40 mg by mouth Daily.     Unknown at Unknown time   • rivaroxaban (XARELTO) 15 MG tablet Take 1 tablet by mouth 2 (Two) Times a Day With Meals. 42 tablet                Inpatient Medications:  Scheduled Meds:  aspirin 81 mg Oral Daily   ceftriaxone 1 g Intravenous Q24H   doxycycline 100 mg Intravenous Q12H   enoxaparin 1 mg/kg Subcutaneous Q12H   furosemide 20 mg Intravenous Q12H   ipratropium 0.5 mg Nebulization Q6H - RT   levalbuterol 1.25 mg Nebulization Q6H - RT   pantoprazole 40 mg Oral Daily   sodium chloride 1 g Oral TID   sucralfate 1 g Oral 4x Daily AC & at Bedtime      Continuous Infusions:   PRN Meds:.•  acetaminophen  •  HYDROcodone-acetaminophen  •  ondansetron  •  [COMPLETED] Insert peripheral IV **AND** sodium chloride     Allergies:  No Known Allergies       Objective:   Temp:  [97.5 °F (36.4 °C)-99.4 °F (37.4 °C)] 98.9 °F (37.2 °C)  Heart Rate:  [117-139] 133  Resp:  [22-40] 32  BP: (101-135)/(45-97) 109/80  SpO2:  [86 %-98 %] 94 %  on  Flow (L/min):  [2] 2 Device (Oxygen Therapy): nasal cannula               Intake/Output Summary (Last 24 hours) at 10/20/2019 0913  Last data filed at 10/20/2019 0353  Gross per 24 hour   Intake --   Output 1350 ml   Net -1350 ml      Body mass index is 31.76 kg/m².  Vitals               10/19/19  0939 10/19/19  1339 10/19/19  1840   Weight: 92.5 kg (204 lb) 89.9 kg (198 lb 1.6 oz) 92 kg (202 lb 12.8 oz)         Weight change:      Physical Exam:   Physical Exam   General:    No acute distress, alert and oriented  x4, pleasant, on nasal cannula oxygen                   Head:    Normocephalic, atraumatic.   Eyes:          Conjunctivae and sclerae normal, no icterus, PERRLA external ears and nose are normal   Throat:   No oral lesions, no thrush, oral mucosa moist.    Neck:   Supple, trachea midline.  No JVD, no cervical or supraclavicular lymphadenopathy   Lungs:     Normal chest on inspection, patient definitely have noticeable pain upon deep breathing due to the pleurisy, he has audible crackles on the right side, decreased breath sounds over the bases, no wheezes. Respirations regular, even and unlabored.      Heart:    Regular rhythm and normal rate.  Positive for systolic murmur , no gallops, or rubs noted.   Abdomen:     Soft, non-tender, non-distended, positive bowel sounds.    Extremities:   No clubbing, cyanosis, 1+ bilateral lower extremity pitting edema slightly asymmetrical.     Pulses      Lab Review:           Results from last 7 days   Lab Units 10/20/19  0659 10/19/19  0953 10/17/19  0331 10/16/19  1609   SODIUM mmol/L 129* 129* 129* 125*   POTASSIUM mmol/L 4.4 4.4 4.6 4.7   CHLORIDE mmol/L 88* 89* 89* 89*   TOTAL CO2 mmol/L  --   --  23 22   CO2 mmol/L 25.0 25.0  --   --    BUN mg/dL 15 14 20 19   CREATININE mg/dL 0.74* 0.78 1.1 0.8   GLUCOSE mg/dL 120* 111* 82 103   CALCIUM mg/dL 8.5* 9.0 9.1 9.2   AST (SGOT) U/L 19 26  --  62*   ALT (SGPT) U/L 19 26  --  46*   ALBUMIN g/dL 3.30* 3.60  --  3.8            Results from last 7 days   Lab Units 10/19/19  0953 10/16/19  1609   TROPONIN I ng/mL  --  0.01   TROPONIN T ng/mL <0.010  --               Results from last 7 days   Lab Units 10/20/19  0659 10/19/19  0953   10/17/19  0331   WBC 10*3/mm3 12.37* 12.58*  --  7.9   HEMOGLOBIN g/dL 10.4* 11.8*  --  11.6*   HEMATOCRIT % 31.4* 35.3*  --  36.6*   PLATELETS 10*3/mm3 292 351  --  339   MCV fL 82.8 82.1   < > 86.7   MCH pg 27.4 27.4   < > 27.5   MCHC g/dL 33.1 33.4   < > 31.7*   RDW % 18.3* 18.2*   < > 18.6*   RDW-SD  fl 54.4* 53.6   < >  --    MPV fL 9.4 9.4   < > 9.6   NEUTROPHIL % % 78.6* 75.4   < >  --    LYMPHOCYTE % % 6.7* 9.1*   < >  --    MONOCYTES % % 13.7* 14.7*   < >  --    EOSINOPHIL % % 0.2* 0.2*   < >  --    BASOPHIL % % 0.2 0.2   < >  --    IMM GRAN % % 0.6* 0.4   < >  --    NEUTROS ABS 10*3/mm3 9.71* 9.49*   < >  --    LYMPHS ABS 10*3/mm3 0.83 1.14   < >  --    MONOS ABS 10*3/mm3 1.69* 1.85*   < >  --    EOS ABS 10*3/mm3 0.03 0.02   < >  --    BASOS ABS 10*3/mm3 0.03 0.03   < >  --    IMMATURE GRANS (ABS) 10*3/mm3 0.08* 0.05   < >  --    NRBC /100 WBC 0.0 0.0   < >  --     < > = values in this interval not displayed.            Results from last 7 days   Lab Units 10/19/19  0953 10/16/19  1609   INR   1.89* 2.90*      I personally viewed and interpreted the patient's imaging studies: The chest x-ray showed evidence of mild cardiomegaly, with right upper lung and right lower lobe pneumonic infiltrate that is relatively dense.  On the CAT scan the patient has dense pneumonia and the posterior subsegment of the right upper lobe with atelectasis and pneumonia of the right lower lobe and some minimal linear atelectasis of the left lower lobe.  Patient also has right-sided pleural effusion with evidence of fluid in the major fissure on the right side.  The right-sided pleural effusion not did not have the smooth free-floating fluid with some irregular edges and it may be a complex effusion.     Assessment:   1. Lobar pneumonia of the right upper lobe with evidence of bibasilar atelectasis right more than left and possible pneumonia over the right lower lobe as well, this is a hospital-acquired infection  2. History of IV drug abuse, patient claims has been clean for more than 3 months  3. Severe mitral valve regurgitation  4. Combined systolic and diastolic congestive heart failure, last EF 36% with acute on chronic decompensation  5. History of pulmonary embolism recently, on anticoagulation with slightly  subtherapeutic INR  6. Moderate to severe mitral valve regurgitation  7. Cor pulmonale with pulmonary hypertension, estimated RVSP 58.3 mmHg  8. Mild hyponatremia  9. Hemoptysis secondary to the pneumonia and being on anticoagulation, no evidence of recurrent PE on the CT angios  10. 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  We will start the patient on indomethacin while adding PPI to reduce the risk of any GI bleed from the nonsteroidal anti-inflammatory medication.  Patient will be monitored in the CCU   Advised patient to restrict the free water intake especially while on diuresis to reduce the chance of worsening hyponatremia.  No need to discontinue the blood thinner despite the hemoptysis at this point unless it becomes worse           Thank you for allowing me to participate in the care of Arjun Oropeza. Feel free to contact me directly with any further questions or concerns.     Anne Bello MD  [facility]   10/20/19  9:13 AM     Dictated   Анна Alfaro APRN   Nurse Practitioner   Cardiology   Consults   Attested   Date of Service: 10/20/19  1329   Creation Time: 10/20/19 1329          11. Lobar pneumonia of the right upper lobe with evidence of bibasilar atelectasis right more than left and possible pneumonia over the right lower lobe as well, this is a hospital-acquired infection  12. History of IV drug abuse, patient claims has been clean for more than 3 months  13. Severe mitral valve regurgitation  14. Combined systolic and diastolic congestive heart failure, last EF 36% with acute on chronic decompensation  15. History of pulmonary embolism recently, on anticoagulation with slightly subtherapeutic INR  16. Moderate to severe mitral valve regurgitation  17. Cor pulmonale with pulmonary hypertension, estimated RVSP 58.3 mmHg  18. Mild hyponatremia  19. Hemoptysis secondary to the pneumonia and being on anticoagulation, no evidence of recurrent PE on the CT angios  20. Pleurisy, likely from the pleural inflammation from the right upper lung pneumonia   21. Hyponatremia      PLAN:  MRSA screen is pending, will follow up on it and stop the VANC if no longer needed  continue AC for the recent PE, INR was subtherapeutic  Diuretics per the primary team  His creatinine is up, the nonsteroidal anti-inflammatory are working well for his pleurisy however need to keep it as needed instead of scheduled while keeping a close eye on the renal function and consider discontinuing if the creatinine keep on getting higher.  Alternative option would be to use steroids instead  Discussed with patient and nursing staff     Disposition: Per primary team     Anne Bello MD  10/21/19  7:16 AM          Hospital Medications (active)       Dose Frequency Start End    acetaminophen (TYLENOL) tablet 650 mg 650 mg Every 6 Hours PRN 10/19/2019     Sig - Route: Take 2 tablets by mouth Every 6 (Six) Hours As Needed for Mild Pain . - Oral    aspirin EC tablet 81 mg 81 mg Daily 10/19/2019     Sig - Route: Take 1 tablet by mouth Daily. - Oral    cefTRIAXone (ROCEPHIN) 1 g/100 mL  "0.9% NS (MBP) 1 g Every 24 Hours 10/20/2019 10/27/2019    Sig - Route: Infuse 100 mL into a venous catheter Daily. - Intravenous    enoxaparin (LOVENOX) syringe 90 mg 1 mg/kg × 92 kg Every 12 Hours 10/20/2019     Sig - Route: Inject 0.9 mL under the skin into the appropriate area as directed Every 12 (Twelve) Hours. - Subcutaneous    furosemide (LASIX) injection 20 mg 20 mg Every 12 Hours 10/19/2019     Sig - Route: Infuse 2 mL into a venous catheter Every 12 (Twelve) Hours. - Intravenous    HYDROcodone-acetaminophen (NORCO)  MG per tablet 1 tablet 1 tablet Every 4 Hours PRN 10/20/2019 10/30/2019    Sig - Route: Take 1 tablet by mouth Every 4 (Four) Hours As Needed for Moderate Pain . - Oral    indomethacin SR (INDOCIN SR) CR capsule 75 mg 75 mg 2 Times Daily PRN 10/20/2019     Sig - Route: Take 1 capsule by mouth 2 (Two) Times a Day As Needed (For pleuritic chest pain). - Oral    ipratropium (ATROVENT) nebulizer solution 0.5 mg 0.5 mg Every 6 Hours - RT 10/20/2019     Sig - Route: Take 2.5 mL by nebulization Every 6 (Six) Hours. - Nebulization    levalbuterol (XOPENEX) nebulizer solution 1.25 mg 1.25 mg Every 6 Hours - RT 10/20/2019     Sig - Route: Take 3 mL by nebulization Every 6 (Six) Hours. - Nebulization    metoprolol succinate XL (TOPROL-XL) 24 hr tablet 25 mg 25 mg Daily 10/20/2019     Sig - Route: Take 1 tablet by mouth Daily. - Oral    ondansetron (ZOFRAN) injection 4 mg 4 mg Every 6 Hours PRN 10/19/2019     Sig - Route: Infuse 2 mL into a venous catheter Every 6 (Six) Hours As Needed for Nausea or Vomiting. - Intravenous    sodium chloride 0.9 % flush 10 mL 10 mL As Needed 10/19/2019     Sig - Route: Infuse 10 mL into a venous catheter As Needed for Line Care. - Intravenous    Linked Group 1:  \"And\" Linked Group Details        vancomycin (VANCOCIN) in iso-osmotic dextrose IVPB 1 g (premix) 200 mL 1,000 mg Every 8 Hours 10/20/2019 10/30/2019    Sig - Route: Infuse 200 mL into a venous catheter Every " 8 (Eight) Hours. - Intravenous    doxycycline (VIBRAMYCIN) 100 mg/100 mL 0.9% NS MBP (Discontinued) 100 mg Every 12 Hours 10/19/2019 10/20/2019    Sig - Route: Infuse 100 mL into a venous catheter Every 12 (Twelve) Hours. - Intravenous    lisinopril (PRINIVIL,ZESTRIL) tablet 10 mg (Discontinued) 10 mg Daily 10/20/2019 10/20/2019    Sig - Route: Take 1 tablet by mouth Daily. - Oral    pantoprazole (PROTONIX) EC tablet 40 mg (Discontinued) 40 mg Daily 10/19/2019 10/20/2019    Sig - Route: Take 1 tablet by mouth Daily. - Oral    Reason for Discontinue: *Error    sodium chloride tablet 1 g (Discontinued) 1 g 3 Times Daily 10/19/2019 10/20/2019    Sig - Route: Take 1 tablet by mouth 3 (Three) Times a Day. - Oral    Reason for Discontinue: *Error    sucralfate (CARAFATE) tablet 1 g (Discontinued) 1 g 4 Times Daily Before Meals & Nightly 10/19/2019 10/20/2019    Sig - Route: Take 1 tablet by mouth 4 (Four) Times a Day Before Meals & at Bedtime. - Oral    Reason for Discontinue: *Error

## 2019-10-21 NOTE — PROGRESS NOTES
"    RE:  Arjun Oropeza  :  1985         Subjective: Patient feels his breathing is getting better today.  His heart rate has decreased.  Remains in sinus rhythm on telemetry.  He has no new issues or complaints.    ROS: Pertinent positives and negatives listed above.  All other systems reviewed and negative.    Objective:   /79   Pulse 104   Temp 97.9 °F (36.6 °C) (Oral)   Resp 22   Ht 170.2 cm (67\")   Wt 93.6 kg (206 lb 6.4 oz)   SpO2 99%   BMI 32.33 kg/m²   Temp:  [97.5 °F (36.4 °C)-98.4 °F (36.9 °C)] 97.9 °F (36.6 °C)  Heart Rate:  [] 104  Resp:  [12-28] 22  BP: ()/(59-98) 109/79    Intake/Output Summary (Last 24 hours) at 10/21/2019 1232  Last data filed at 10/21/2019 1206  Gross per 24 hour   Intake 2220 ml   Output 775 ml   Net 1445 ml       Current Facility-Administered Medications:   •  acetaminophen (TYLENOL) tablet 650 mg, 650 mg, Oral, Q6H PRN, Romain Graham MD  •  aspirin EC tablet 81 mg, 81 mg, Oral, Daily, Romain Graham MD, 81 mg at 10/21/19 0843  •  cefTRIAXone (ROCEPHIN) 1 g/100 mL 0.9% NS (MBP), 1 g, Intravenous, Q24H, Romain Graham MD, 1 g at 10/21/19 1206  •  enoxaparin (LOVENOX) syringe 90 mg, 1 mg/kg, Subcutaneous, Q12H, Romain Graham MD, 90 mg at 10/21/19 1054  •  furosemide (LASIX) injection 20 mg, 20 mg, Intravenous, Q12H, Romain Graham MD, 20 mg at 10/21/19 0605  •  HYDROcodone-acetaminophen (NORCO)  MG per tablet 1 tablet, 1 tablet, Oral, Q4H PRN, Romain Graham MD  •  indomethacin SR (INDOCIN SR) CR capsule 75 mg, 75 mg, Oral, BID PRN, Anne Bello MD, 75 mg at 10/20/19 2116  •  ipratropium (ATROVENT) nebulizer solution 0.5 mg, 0.5 mg, Nebulization, Q6H - RT, Darwin Eldridge MD, 0.5 mg at 10/21/19 1133  •  levalbuterol (XOPENEX) nebulizer solution 1.25 mg, 1.25 mg, Nebulization, Q6H - RT, Darwin Eldridge MD, 1.25 mg at 10/21/19 1133  •  metoprolol succinate XL (TOPROL-XL) 24 hr tablet 25 mg, 25 " mg, Oral, Daily, Анна Alfaro APRN, 25 mg at 10/21/19 0843  •  ondansetron (ZOFRAN) injection 4 mg, 4 mg, Intravenous, Q6H PRN, Romain Graham MD  •  [COMPLETED] Insert peripheral IV, , , Once **AND** sodium chloride 0.9 % flush 10 mL, 10 mL, Intravenous, PRN, Harish Flores Jr., MD  •  vancomycin (VANCOCIN) in iso-osmotic dextrose IVPB 1 g (premix) 200 mL, 1,000 mg, Intravenous, Q8H, Anne Bello MD, 1,000 mg at 10/21/19 1054    Physical Exam:   Gen: Alert and oriented x3, no acute distress  Heart: Regular rhythm, mild tachycardia, no murmurs, rubs, or gallops  Chest: Lungs clear to auscultation bilaterally, normal respiratory effort  Abd: Soft, non tender, bowel sounds are positive  Ext: No clubbing, cyanosis, or edema      Lab Results (last 24 hours)     Procedure Component Value Units Date/Time    Blood Culture - Blood, Hand, Right [[id number]] Collected:  10/19/19 1155    Specimen:  Blood from Hand, Right Updated:  10/21/19 1215     Blood Culture No growth at 2 days    Blood Culture - Blood, Hand, Right [[id number]] Collected:  10/19/19 1154    Specimen:  Blood from Hand, Right Updated:  10/21/19 1215     Blood Culture No growth at 2 days    Respiratory Culture - Sputum, Cough [[id number]] Collected:  10/20/19 0034    Specimen:  Sputum from Cough Updated:  10/21/19 0755     Respiratory Culture Scant growth (1+) Normal Respiratory Erika     Gram Stain Greater than 25 WBCs per low power field      Moderate (3+) Mixed gram positive erika      Moderate (3+) Epithelial cells seen    Comprehensive Metabolic Panel [[id number]]  (Abnormal) Collected:  10/21/19 0249    Specimen:  Blood Updated:  10/21/19 0336     Glucose 174 mg/dL      BUN 21 mg/dL      Creatinine 1.14 mg/dL      Sodium 127 mmol/L      Potassium 4.3 mmol/L      Chloride 87 mmol/L      CO2 26.0 mmol/L      Calcium 8.8 mg/dL      Total Protein 6.8 g/dL      Albumin 3.00 g/dL      ALT (SGPT) 14 U/L      AST (SGOT) 22 U/L      Alkaline  Phosphatase 146 U/L      Total Bilirubin 1.6 mg/dL      eGFR Non African Amer 74 mL/min/1.73      Globulin 3.8 gm/dL      A/G Ratio 0.8 g/dL      BUN/Creatinine Ratio 18.4     Anion Gap 14.0 mmol/L     Narrative:       GFR Normal >60  Chronic Kidney Disease <60  Kidney Failure <15    CBC & Differential [[id number]] Collected:  10/21/19 0249    Specimen:  Blood Updated:  10/21/19 0304    Narrative:       The following orders were created for panel order CBC & Differential.  Procedure                               Abnormality         Status                     ---------                               -----------         ------                     CBC Auto Differential[[id number]]        Abnormal            Final result                 Please view results for these tests on the individual orders.    CBC Auto Differential [[id number]]  (Abnormal) Collected:  10/21/19 0249    Specimen:  Blood Updated:  10/21/19 0304     WBC 10.68 10*3/mm3      RBC 3.62 10*6/mm3      Hemoglobin 10.0 g/dL      Hematocrit 30.2 %      MCV 83.4 fL      MCH 27.6 pg      MCHC 33.1 g/dL      RDW 18.0 %      RDW-SD 54.4 fl      MPV 9.4 fL      Platelets 286 10*3/mm3      Neutrophil % 77.8 %      Lymphocyte % 8.8 %      Monocyte % 12.1 %      Eosinophil % 0.4 %      Basophil % 0.2 %      Immature Grans % 0.7 %      Neutrophils, Absolute 8.32 10*3/mm3      Lymphocytes, Absolute 0.94 10*3/mm3      Monocytes, Absolute 1.29 10*3/mm3      Eosinophils, Absolute 0.04 10*3/mm3      Basophils, Absolute 0.02 10*3/mm3      Immature Grans, Absolute 0.07 10*3/mm3      nRBC 0.0 /100 WBC     MRSA Screen Culture - Swab, Nares [[id number]] Collected:  10/20/19 1605    Specimen:  Swab from Nares Updated:  10/20/19 1612        Imaging Results (last 24 hours)     ** No results found for the last 24 hours. **      -Echo (9/1/2019) EF 29%, severe LV dilation, severe LA dilation, moderately severe MR, moderate TR with RVSP 70 mmHg moderate RV and RA enlargement, dilated  IVC      Assessment:   1.  Lobar pneumonia  2.  Recent pulmonary embolism: CT scan during admission did not demonstrate any evidence of current PE.  3.  Hemoptysis  4.  Chronic systolic congestive heart failure: Current respiratory issues appear to be more secondary to his acute infection.  No evidence of any significant acute decompensation.  5.  Mitral regurgitation: Moderately severe on recent echo from 9/2019.  Functional secondary to his systolic CHF.  Has been stable since diagnosis.  6.  Pulmonary hypertension: Likely multifactorial secondary to recent PE, CHF, valvular heart disease.  7.  Hyponatremia: sodium down to 127 today  8.  Leukocytosis: Improving  9.  Sepsis  10.  History of IV drug abuse: Patient reports he has been drug-free for at least 3 months.  11.  Pleuritic chest pain: Secondary to acute infection      Plan:   -Continue metoprolol succinate  -Will restart lisinopril in the near future  -Continue to optimize failure medication as tolerated.  -Continue antibiotics and treatment of respiratory issues per primary/pulmonology

## 2019-10-21 NOTE — PLAN OF CARE
Problem: Pneumonia (Adult)  Goal: Signs and Symptoms of Listed Potential Problems Will be Absent, Minimized or Managed (Pneumonia)  Outcome: Ongoing (interventions implemented as appropriate)      Problem: Breathing Pattern Ineffective (Adult)  Goal: Identify Related Risk Factors and Signs and Symptoms  Outcome: Outcome(s) achieved Date Met: 10/21/19    Goal: Effective Oxygenation/Ventilation  Outcome: Ongoing (interventions implemented as appropriate)    Goal: Anxiety/Fear Reduction  Outcome: Ongoing (interventions implemented as appropriate)      Problem: Patient Care Overview  Goal: Plan of Care Review  Outcome: Ongoing (interventions implemented as appropriate)   10/21/19 0520   Coping/Psychosocial   Plan of Care Reviewed With patient   Plan of Care Review   Progress no change   OTHER   Outcome Summary c/o pain right chest wall but the indocin does help, resp rate much slower, hr now 90s, very minimal urine output, would like something to help him sleep continue to monitor        Problem: Fall Risk (Adult)  Goal: Identify Related Risk Factors and Signs and Symptoms  Outcome: Ongoing (interventions implemented as appropriate)    Goal: Absence of Fall  Outcome: Ongoing (interventions implemented as appropriate)

## 2019-10-21 NOTE — PLAN OF CARE
Problem: Breathing Pattern Ineffective (Adult)  Goal: Effective Oxygenation/Ventilation  Outcome: Outcome(s) achieved Date Met: 10/21/19    Goal: Anxiety/Fear Reduction  Outcome: Ongoing (interventions implemented as appropriate)      Problem: Fall Risk (Adult)  Goal: Identify Related Risk Factors and Signs and Symptoms  Outcome: Ongoing (interventions implemented as appropriate)

## 2019-10-22 LAB
BLOOD CULTURE, ROUTINE: NORMAL
CULTURE, BLOOD 2: NORMAL

## 2019-10-22 NOTE — PLAN OF CARE
Problem: Pneumonia (Adult)  Goal: Signs and Symptoms of Listed Potential Problems Will be Absent, Minimized or Managed (Pneumonia)  Outcome: Ongoing (interventions implemented as appropriate)      Problem: Patient Care Overview  Goal: Plan of Care Review  Outcome: Ongoing (interventions implemented as appropriate)

## 2019-10-22 NOTE — PROGRESS NOTES
PULMONARY AND CRITICAL CARE PROGRESS NOTE - [facility]    Patient: Arjun Oropeza  1985   MR# [id number]   Acct# [id number]  10/22/19   2:06 PM  Referring Provider: Romain Graham MD    Chief Complaint: Pneumonia of the right upper lobe  Interval history: The patient states that he is doing much better.  He is up in the room walking around on room air.  No family at bedside. No other aggravating or alleviating factors.     Meds:    aspirin 81 mg Oral Daily   cefdinir 300 mg Oral Q12H   enoxaparin 1 mg/kg Subcutaneous Q12H   furosemide 20 mg Intravenous Q12H   metoprolol succinate XL 25 mg Oral Daily        Review of Systems:   Review of Systems   Constitutional: Negative for chills and fever.   Respiratory: Positive for cough (improving ) and shortness of breath (improving).    Cardiovascular: Negative for chest pain.   Gastrointestinal: Negative for diarrhea, nausea and vomiting.     Physical Exam:  SpO2 Percentage    10/22/19 0643 10/22/19 0754 10/22/19 1146   SpO2: 98% 98% 98%     Temp:  [97.5 °F (36.4 °C)-98.9 °F (37.2 °C)] 98.9 °F (37.2 °C)  Heart Rate:  [] 123  Resp:  [16-20] 18  BP: (100-109)/(56-71) 103/71    Intake/Output Summary (Last 24 hours) at 10/22/2019 1406  Last data filed at 10/22/2019 1146  Gross per 24 hour   Intake 1080 ml   Output 1075 ml   Net 5 ml     Physical Exam   Constitutional: He is oriented to person, place, and time. He appears well-developed and well-nourished. No distress.   HENT:   Head: Normocephalic and atraumatic.   Eyes: Conjunctivae and EOM are normal. Pupils are equal, round, and reactive to light. No scleral icterus.   Neck: Normal range of motion. Neck supple.   Cardiovascular: Normal rate, regular rhythm and normal heart sounds. Exam reveals no friction rub.   No murmur heard.  Pulmonary/Chest: Effort normal. No respiratory distress. He has decreased breath sounds. He has no wheezes. He has no rales.   Abdominal: Soft. Bowel sounds are  normal. He exhibits no distension. There is no tenderness.   Musculoskeletal: Normal range of motion. He exhibits no edema.   Neurological: He is alert and oriented to person, place, and time.   Skin: Skin is warm and dry.   Psychiatric: He has a normal mood and affect. His behavior is normal. Judgment and thought content normal.   Nursing note and vitals reviewed.    Laboratory Data:  Results from last 7 days   Lab Units 10/22/19  1156 10/21/19  0249 10/20/19  0659   WBC 10*3/mm3 8.27 10.68 12.37*   HEMOGLOBIN g/dL 10.2* 10.0* 10.4*   PLATELETS 10*3/mm3 242 286 292     Results from last 7 days   Lab Units 10/22/19  1156 10/21/19  0249 10/20/19  0659 10/19/19  0953  10/16/19  1609   SODIUM mmol/L 128* 127* 129* 129*   < > 125*   POTASSIUM mmol/L 4.6 4.3 4.4 4.4   < > 4.7   BUN mg/dL 26* 21* 15 14   < > 19   CREATININE mg/dL 1.12 1.14 0.74* 0.78   < > 0.8   INR   --   --   --  1.89*  --  2.90*    < > = values in this interval not displayed.     Results from last 7 days   Lab Units 10/19/19  1910   PH, ARTERIAL pH units 7.477*   PCO2, ARTERIAL mm Hg 34.6*   PO2 ART mm Hg 108.0     Blood Culture   Date Value Ref Range Status   10/19/2019 No growth at 3 days  Preliminary   10/19/2019 No growth at 3 days  Preliminary     MRSA SCREEN CX   Date Value Ref Range Status   10/20/2019   Final    No Methicillin Resistant Staphylococcus aureus isolated     Respiratory Culture   Date Value Ref Range Status   10/20/2019 Scant growth (1+) Normal Respiratory Kristal  Final     Recent films:  No radiology results for the last day  Films reviewed personally by me.  My interpretation: None to review today  Pulmonary Assessment:  1. Lobar pneumonia of the right upper lobe with evidence of bibasilar atelectasis right more than left and possible pneumonia over the right lower lobe as well, this is a hospital-acquired infection  2. History of IV drug abuse, patient claims has been clean for more than 3 months  3. Severe mitral valve  regurgitation  4. Combined systolic and diastolic congestive heart failure, last EF 36% with acute on chronic decompensation  5. History of pulmonary embolism recently, on anticoagulation with slightly subtherapeutic INR  6. Moderate to severe mitral valve regurgitation  7. Cor pulmonale with pulmonary hypertension, estimated RVSP 58.3 mmHg  8. Mild hyponatremia  9. Hemoptysis secondary to the pneumonia and being on anticoagulation, no evidence of recurrent PE on the CT angios -hemoptysis has resolved  10. Pleurisy, likely from the pleural inflammation from the right upper lung pneumonia   11. Hyponatremia     Recommend:   · The patient has been transitioned to oral antibiotics  · The patient has been weaned from supplemental oxygen  · Recommend continuing diuresis per cardiology  · Recommend follow-up with PCP after discharge regarding pneumonia  · Pulmonary will sign off, call as needed.  No office follow-up needed.    Electronically signed by DIOGENES Bermeo, 10/22/19, 2:06 PM     Physician substantive portion:  Pt feels better.  Has been off oral anticoagulants and tolerating lovenox since admission.  No hemoptysis currently.  He ultimately needs to be on oral anticoagulant.  Recommend resumption of oral anticoagulants after discharge.  Complete 8 days of antibiotics altogether.  Call if needed.    I have seen and examined patient personally, performing a face-to-face diagnostic evaluation with plan of care reviewed and developed with APRN and nursing staff. I have addended and/or modified the above history of present illness, physical examination, and assessment and plan to reflect my findings and impressions. Essential elements of the care plan were discussed with APRN above.  Agree with findings and assessment/plan as documented above.    Electronically signed by Sd Saravia MD, on 10/22/2019, 8:51 PM

## 2019-10-22 NOTE — PROGRESS NOTES
[facility] Medicine Services  INPATIENT PROGRESS NOTE    Patient Name: Arjun Oropeza  Date of Admission: 10/19/2019  Today's Date: 10/22/19  Length of Stay: 3  Primary Care Physician: Shey Elliott APRN    Subjective   Chief Complaint: SOA  HPI   Feels better.  No SOA today.  Legs less swollen.  He is breathing much more comfortably.  He is not coughing up any blood.  He is afebrile        Review of Systems   Constitutional: Negative for fatigue and fever.   HENT: Negative for congestion and ear pain.    Eyes: Negative for redness and visual disturbance.   Respiratory: Positive for cough. Negative for wheezing.    Cardiovascular: Positive for leg swelling. Negative for chest pain and palpitations.   Gastrointestinal: Negative for abdominal pain, diarrhea, nausea and vomiting.   Endocrine: Negative for cold intolerance and heat intolerance.   Genitourinary: Negative for dysuria and frequency.   Musculoskeletal: Negative for arthralgias and back pain.   Skin: Negative for rash and wound.   Neurological: Negative for dizziness and headaches.   Psychiatric/Behavioral: Negative for confusion. The patient is not nervous/anxious.         All pertinent negatives and positives are as above. All other systems have been reviewed and are negative unless otherwise stated.     Objective    Temp:  [97.5 °F (36.4 °C)-98.9 °F (37.2 °C)] 98.9 °F (37.2 °C)  Heart Rate:  [101-123] 123  Resp:  [16-20] 18  BP: (100-109)/(56-71) 103/71  Physical Exam   Constitutional: He is oriented to person, place, and time. He appears well-developed and well-nourished.   HENT:   Head: Normocephalic and atraumatic.   Right Ear: External ear normal.   Left Ear: External ear normal.   Nose: Nose normal.   Mouth/Throat: Oropharynx is clear and moist.   Eyes: Conjunctivae and EOM are normal. Pupils are equal, round, and reactive to light. Right eye exhibits no discharge. Left eye exhibits no discharge. No scleral  icterus.   Neck: Normal range of motion. Neck supple. No tracheal deviation present. No thyromegaly present.   Cardiovascular: Regular rhythm, normal heart sounds and intact distal pulses. Tachycardia present. Exam reveals no gallop and no friction rub.   No murmur heard.  Pulmonary/Chest: Effort normal. No stridor. No respiratory distress. He has decreased breath sounds. He has no wheezes. He has no rales. He exhibits no tenderness.   Abdominal: Soft. Bowel sounds are normal. He exhibits no distension and no mass. There is no tenderness. There is no rebound and no guarding. No hernia.   Musculoskeletal: Normal range of motion. He exhibits no edema or deformity.   Lymphadenopathy:     He has no cervical adenopathy.   Neurological: He is alert and oriented to person, place, and time. He has normal reflexes. He displays normal reflexes. No cranial nerve deficit. He exhibits normal muscle tone. Coordination normal.   Skin: Skin is warm and dry. No rash noted. No erythema. No pallor.   Psychiatric: He has a normal mood and affect. His behavior is normal. Judgment and thought content normal.   Vitals reviewed.        Results Review:  I have reviewed the labs, radiology results, and diagnostic studies.    Laboratory Data:   Results from last 7 days   Lab Units 10/22/19  1156 10/21/19  0249 10/20/19  0659   WBC 10*3/mm3 8.27 10.68 12.37*   HEMOGLOBIN g/dL 10.2* 10.0* 10.4*   HEMATOCRIT % 30.2* 30.2* 31.4*   PLATELETS 10*3/mm3 242 286 292        Results from last 7 days   Lab Units 10/22/19  1156 10/21/19  0249 10/20/19  0659   SODIUM mmol/L 128* 127* 129*   POTASSIUM mmol/L 4.6 4.3 4.4   CHLORIDE mmol/L 88* 87* 88*   CO2 mmol/L 27.0 26.0 25.0   BUN mg/dL 26* 21* 15   CREATININE mg/dL 1.12 1.14 0.74*   CALCIUM mg/dL 8.7 8.8 8.5*   BILIRUBIN mg/dL 1.0 1.6* 1.6*   ALK PHOS U/L 189* 146* 147*   ALT (SGPT) U/L 21 14 19   AST (SGOT) U/L 30 22 19   GLUCOSE mg/dL 107* 174* 120*       Culture Data:   Blood Culture   Date Value Ref  Range Status   10/19/2019 No growth at 3 days  Preliminary   10/19/2019 No growth at 3 days  Preliminary     MRSA SCREEN CX   Date Value Ref Range Status   10/20/2019   Final    No Methicillin Resistant Staphylococcus aureus isolated     Respiratory Culture   Date Value Ref Range Status   10/20/2019 Scant growth (1+) Normal Respiratory Kristal  Final       Radiology Data:   Imaging Results (last 24 hours)     ** No results found for the last 24 hours. **          I have reviewed the patient's current medications.     Assessment/Plan     Active Hospital Problems    Diagnosis   • Right-sided heart failure (CMS/HCC)   • Nonrheumatic mitral valve regurgitation   • Pneumonia of right middle lobe due to infectious organism (CMS/HCC)   • Chronic combined systolic and diastolic congestive heart failure (CMS/HCC)     1.  Sepsis  -IV abx to PO     2.  PNA  -IV abx to PO  -nebs  -Pulm consulted, following     3.  Acute on Chronic Systolic CHF  -IV Lasix  -Cardiology consulted, following     4.   Hyponatremia  -improving  -IV lasix     5.  H/o IV drug abuse  -tells me he has stopped, counseled on the importance of continued abstinence      6.  PE  -lovenox     7.  Severe mitral regurgitation  -consult Cardiology     8.  Hemoptysis  -monitor H&H    Will change abx to PO  Change Lasix to PO when ok with Cardiology            Discharge Planning: I expect the patient to be discharged to home in 1-2 days    Romain Graham MD   10/22/19   3:27 PM

## 2019-10-22 NOTE — PROGRESS NOTES
Discharge Planning Assessment  [facility]     Patient Name: Arjun Oropeza  MRN: [id number]  Today's Date: 10/22/2019    Admit Date: 10/19/2019    Discharge Needs Assessment     Row Name 10/22/19 1054       Living Environment    Lives With  parent(s)    Name(s) of Who Lives With Patient  Father:  Aurelia    Current Living Arrangements  home/apartment/condo    Primary Care Provided by  self    Provides Primary Care For  no one    Family Caregiver if Needed  parent(s)    Quality of Family Relationships  helpful;involved;supportive    Able to Return to Prior Arrangements  yes       Resource/Environmental Concerns    Resource/Environmental Concerns  none       Transition Planning    Patient/Family Anticipates Transition to  home with family    Patient/Family Anticipated Services at Transition  none    Transportation Anticipated  family or friend will provide       Discharge Needs Assessment    Readmission Within the Last 30 Days  no previous admission in last 30 days    Concerns to be Addressed  no discharge needs identified    Equipment Currently Used at Home  none    Anticipated Changes Related to Illness  none    Equipment Needed After Discharge  none    Discharge Coordination/Progress  Pt has PCP and RX coverage.  Pt thinks he can afford medications.  Pt denies any dc needs and does not want PDHD.          Discharge Plan    No documentation.       Destination      No service coordination in this encounter.      Durable Medical Equipment      No service coordination in this encounter.      Dialysis/Infusion      No service coordination in this encounter.      Home Medical Care      No service coordination in this encounter.      Therapy      No service coordination in this encounter.      Community Resources      No service coordination in this encounter.          Demographic Summary    No documentation.       Functional Status    No documentation.       Psychosocial    No documentation.       Abuse/Neglect    No  documentation.       Legal    No documentation.       Substance Abuse    No documentation.       Patient Forms    No documentation.           VANDA DanielW

## 2019-10-22 NOTE — PROGRESS NOTES
[facility] -  Progress Note     LOS: 3 days   Patient Care Team:  Shey Elliott APRN as PCP - General (Nurse Practitioner)    Chief Complaint:Chest pain with breathing, swelling is worse    Subjective     Interval History:   HR has been elevated today. He notes pain with breathing. He states his breathing is improved but his swelling is worse and has gone up in his thighs.     Review of Systems:   Review of Systems   Constitutional: Negative for chills, diaphoresis, fatigue and unexpected weight change.   HENT: Negative for nosebleeds.    Respiratory: Negative for cough, chest tightness, shortness of breath and wheezing.    Cardiovascular: Positive for chest pain and leg swelling. Negative for palpitations.   Gastrointestinal: Negative for anal bleeding, blood in stool, diarrhea and nausea.   Neurological: Negative for dizziness, syncope and light-headedness.       Objective     Vital Sign Min/Max for last 24 hours  Temp  Min: 97.5 °F (36.4 °C)  Max: 98.9 °F (37.2 °C)   BP  Min: 100/69  Max: 109/56   Pulse  Min: 96  Max: 123   Resp  Min: 16  Max: 20   SpO2  Min: 96 %  Max: 100 %   No Data Recorded   Weight  Min: 96.5 kg (212 lb 12.8 oz)  Max: 96.5 kg (212 lb 12.8 oz)   Tele: -132      10/21/19  1422   Weight: 96.5 kg (212 lb 12.8 oz)         Intake/Output Summary (Last 24 hours) at 10/22/2019 1356  Last data filed at 10/22/2019 1146  Gross per 24 hour   Intake 1080 ml   Output 1075 ml   Net 5 ml         Physical Exam:  Physical Exam   Constitutional: He is oriented to person, place, and time. He appears well-developed and well-nourished. He appears ill. No distress.   HENT:   Head: Normocephalic.   Mouth/Throat: No oropharyngeal exudate.   Eyes: Conjunctivae and EOM are normal. Pupils are equal, round, and reactive to light. No scleral icterus.   Neck: Normal range of motion. Neck supple.   Cardiovascular: Normal rate, regular rhythm and intact distal pulses.   Murmur  heard.  Pulmonary/Chest: Effort normal. No respiratory distress. He has no wheezes. He has rales. He exhibits no tenderness.   Abdominal: Soft. Bowel sounds are normal. He exhibits distension. There is no tenderness.   Musculoskeletal: Normal range of motion. He exhibits no edema.   Neurological: He is alert and oriented to person, place, and time. He has normal reflexes.   Skin: Skin is warm and dry. No rash noted. He is not diaphoretic. No erythema. No pallor.   Psychiatric: He has a normal mood and affect. His behavior is normal.   Vitals reviewed.       Results Review:   Lab Results (last 72 hours)     Procedure Component Value Units Date/Time    Comprehensive Metabolic Panel [[id number]]  (Abnormal) Collected:  10/22/19 1156    Specimen:  Blood Updated:  10/22/19 1240     Glucose 107 mg/dL      BUN 26 mg/dL      Creatinine 1.12 mg/dL      Sodium 128 mmol/L      Potassium 4.6 mmol/L      Chloride 88 mmol/L      CO2 27.0 mmol/L      Calcium 8.7 mg/dL      Total Protein 7.1 g/dL      Albumin 3.00 g/dL      ALT (SGPT) 21 U/L      AST (SGOT) 30 U/L      Alkaline Phosphatase 189 U/L      Total Bilirubin 1.0 mg/dL      eGFR Non African Amer 75 mL/min/1.73      Globulin 4.1 gm/dL      A/G Ratio 0.7 g/dL      BUN/Creatinine Ratio 23.2     Anion Gap 13.0 mmol/L     Narrative:       GFR Normal >60  Chronic Kidney Disease <60  Kidney Failure <15    CBC & Differential [[id number]] Collected:  10/22/19 1156    Specimen:  Blood Updated:  10/22/19 1219    Narrative:       The following orders were created for panel order CBC & Differential.  Procedure                               Abnormality         Status                     ---------                               -----------         ------                     CBC Auto Differential[[id number]]        Abnormal            Final result                 Please view results for these tests on the individual orders.    CBC Auto Differential [[id number]]  (Abnormal) Collected:  10/22/19  1156    Specimen:  Blood Updated:  10/22/19 1219     WBC 8.27 10*3/mm3      RBC 3.70 10*6/mm3      Hemoglobin 10.2 g/dL      Hematocrit 30.2 %      MCV 81.6 fL      MCH 27.6 pg      MCHC 33.8 g/dL      RDW 18.1 %      RDW-SD 53.4 fl      MPV 10.0 fL      Platelets 242 10*3/mm3      Neutrophil % 77.9 %      Lymphocyte % 8.1 %      Monocyte % 11.2 %      Eosinophil % 1.7 %      Basophil % 0.6 %      Immature Grans % 0.5 %      Neutrophils, Absolute 6.44 10*3/mm3      Lymphocytes, Absolute 0.67 10*3/mm3      Monocytes, Absolute 0.93 10*3/mm3      Eosinophils, Absolute 0.14 10*3/mm3      Basophils, Absolute 0.05 10*3/mm3      Immature Grans, Absolute 0.04 10*3/mm3      nRBC 0.0 /100 WBC     Blood Culture - Blood, Hand, Right [[id number]] Collected:  10/19/19 1155    Specimen:  Blood from Hand, Right Updated:  10/22/19 1215     Blood Culture No growth at 3 days    Blood Culture - Blood, Hand, Right [[id number]] Collected:  10/19/19 1154    Specimen:  Blood from Hand, Right Updated:  10/22/19 1215     Blood Culture No growth at 3 days    Respiratory Culture - Sputum, Cough [[id number]] Collected:  10/20/19 0034    Specimen:  Sputum from Cough Updated:  10/22/19 1045     Respiratory Culture Scant growth (1+) Normal Respiratory Erika     Gram Stain Greater than 25 WBCs per low power field      Moderate (3+) Mixed gram positive erika      Moderate (3+) Epithelial cells seen    MRSA Screen Culture - Swab, Nares [[id number]]  (Normal) Collected:  10/20/19 1605    Specimen:  Swab from Nares Updated:  10/21/19 1807     MRSA SCREEN CX No Methicillin Resistant Staphylococcus aureus isolated    Comprehensive Metabolic Panel [[id number]]  (Abnormal) Collected:  10/21/19 0249    Specimen:  Blood Updated:  10/21/19 0336     Glucose 174 mg/dL      BUN 21 mg/dL      Creatinine 1.14 mg/dL      Sodium 127 mmol/L      Potassium 4.3 mmol/L      Chloride 87 mmol/L      CO2 26.0 mmol/L      Calcium 8.8 mg/dL      Total Protein 6.8 g/dL       Albumin 3.00 g/dL      ALT (SGPT) 14 U/L      AST (SGOT) 22 U/L      Alkaline Phosphatase 146 U/L      Total Bilirubin 1.6 mg/dL      eGFR Non African Amer 74 mL/min/1.73      Globulin 3.8 gm/dL      A/G Ratio 0.8 g/dL      BUN/Creatinine Ratio 18.4     Anion Gap 14.0 mmol/L     Narrative:       GFR Normal >60  Chronic Kidney Disease <60  Kidney Failure <15    CBC & Differential [[id number]] Collected:  10/21/19 0249    Specimen:  Blood Updated:  10/21/19 0304    Narrative:       The following orders were created for panel order CBC & Differential.  Procedure                               Abnormality         Status                     ---------                               -----------         ------                     CBC Auto Differential[[id number]]        Abnormal            Final result                 Please view results for these tests on the individual orders.    CBC Auto Differential [[id number]]  (Abnormal) Collected:  10/21/19 0249    Specimen:  Blood Updated:  10/21/19 0304     WBC 10.68 10*3/mm3      RBC 3.62 10*6/mm3      Hemoglobin 10.0 g/dL      Hematocrit 30.2 %      MCV 83.4 fL      MCH 27.6 pg      MCHC 33.1 g/dL      RDW 18.0 %      RDW-SD 54.4 fl      MPV 9.4 fL      Platelets 286 10*3/mm3      Neutrophil % 77.8 %      Lymphocyte % 8.8 %      Monocyte % 12.1 %      Eosinophil % 0.4 %      Basophil % 0.2 %      Immature Grans % 0.7 %      Neutrophils, Absolute 8.32 10*3/mm3      Lymphocytes, Absolute 0.94 10*3/mm3      Monocytes, Absolute 1.29 10*3/mm3      Eosinophils, Absolute 0.04 10*3/mm3      Basophils, Absolute 0.02 10*3/mm3      Immature Grans, Absolute 0.07 10*3/mm3      nRBC 0.0 /100 WBC     Comprehensive Metabolic Panel [[id number]]  (Abnormal) Collected:  10/20/19 0659    Specimen:  Blood Updated:  10/20/19 0737     Glucose 120 mg/dL      BUN 15 mg/dL      Creatinine 0.74 mg/dL      Sodium 129 mmol/L      Potassium 4.4 mmol/L      Chloride 88 mmol/L      CO2 25.0 mmol/L      Calcium 8.5  mg/dL      Total Protein 7.1 g/dL      Albumin 3.30 g/dL      ALT (SGPT) 19 U/L      AST (SGOT) 19 U/L      Alkaline Phosphatase 147 U/L      Total Bilirubin 1.6 mg/dL      eGFR Non African Amer 121 mL/min/1.73      Globulin 3.8 gm/dL      A/G Ratio 0.9 g/dL      BUN/Creatinine Ratio 20.3     Anion Gap 16.0 mmol/L     Narrative:       GFR Normal >60  Chronic Kidney Disease <60  Kidney Failure <15    CBC & Differential [[id number]] Collected:  10/20/19 0659    Specimen:  Blood Updated:  10/20/19 0719    Narrative:       The following orders were created for panel order CBC & Differential.  Procedure                               Abnormality         Status                     ---------                               -----------         ------                     CBC Auto Differential[[id number]]        Abnormal            Final result                 Please view results for these tests on the individual orders.    CBC Auto Differential [[id number]]  (Abnormal) Collected:  10/20/19 0659    Specimen:  Blood Updated:  10/20/19 0719     WBC 12.37 10*3/mm3      RBC 3.79 10*6/mm3      Hemoglobin 10.4 g/dL      Hematocrit 31.4 %      MCV 82.8 fL      MCH 27.4 pg      MCHC 33.1 g/dL      RDW 18.3 %      RDW-SD 54.4 fl      MPV 9.4 fL      Platelets 292 10*3/mm3      Neutrophil % 78.6 %      Lymphocyte % 6.7 %      Monocyte % 13.7 %      Eosinophil % 0.2 %      Basophil % 0.2 %      Immature Grans % 0.6 %      Neutrophils, Absolute 9.71 10*3/mm3      Lymphocytes, Absolute 0.83 10*3/mm3      Monocytes, Absolute 1.69 10*3/mm3      Eosinophils, Absolute 0.03 10*3/mm3      Basophils, Absolute 0.03 10*3/mm3      Immature Grans, Absolute 0.08 10*3/mm3      nRBC 0.0 /100 WBC     Blood Gas, Arterial [[id number]]  (Abnormal) Collected:  10/19/19 1910    Specimen:  Arterial Blood Updated:  10/19/19 1918     Site Right Radial     Jed's Test Positive     pH, Arterial 7.477 pH units      Comment: 83 Value above reference range        pCO2,  Arterial 34.6 mm Hg      Comment: 84 Value below reference range        pO2, Arterial 108.0 mm Hg      Comment: 83 Value above reference range        HCO3, Arterial 25.6 mmol/L      Base Excess, Arterial 2.2 mmol/L      Comment: 83 Value above reference range        O2 Saturation, Arterial 98.8 %      Temperature 37.0 C      Barometric Pressure for Blood Gas 744 mmHg      Modality Nasal Cannula     Flow Rate 2.0 lpm      Ventilator Mode NA     Collected by [id number]     Comment: Meter: [id number]     :  [id number]       Legionella Antigen, Urine - Urine, Urine, Clean Catch [[id number]]  (Normal) Collected:  10/19/19 1507    Specimen:  Urine, Clean Catch Updated:  10/19/19 1544     LEGIONELLA ANTIGEN, URINE Negative    S. Pneumo Ag Urine or CSF - Urine, Urine, Clean Catch [[id number]]  (Normal) Collected:  10/19/19 1507    Specimen:  Urine, Clean Catch Updated:  10/19/19 1543     Strep Pneumo Ag Negative    Mycoplasma Pneumoniae PCR - Swab, Nasopharynx [[id number]] Collected:  10/19/19 1511    Specimen:  Swab from Nasopharynx Updated:  10/19/19 1518              Echo EF Estimated  No results found for: ECHOEFEST      Cath Ejection Fraction Quantitative  No results found for: CATHEF        Medication Review: yes  Current Facility-Administered Medications   Medication Dose Route Frequency Provider Last Rate Last Dose   • acetaminophen (TYLENOL) tablet 650 mg  650 mg Oral Q6H PRN Romain Graham MD       • aspirin EC tablet 81 mg  81 mg Oral Daily Romain Graham MD   81 mg at 10/22/19 0849   • cefdinir (OMNICEF) capsule 300 mg  300 mg Oral Q12H Romain Graham MD   300 mg at 10/22/19 1228   • enoxaparin (LOVENOX) syringe 90 mg  1 mg/kg Subcutaneous Q12H Romain Graham MD   90 mg at 10/22/19 0849   • furosemide (LASIX) injection 20 mg  20 mg Intravenous Q12H Romain Graham MD   20 mg at 10/22/19 0638   • HYDROcodone-acetaminophen (NORCO)  MG per tablet 1 tablet  1 tablet  Oral Q4H PRN Romain Graham MD       • indomethacin SR (INDOCIN SR) CR capsule 75 mg  75 mg Oral BID PRN Anne Bello MD   75 mg at 10/20/19 2116   • metoprolol succinate XL (TOPROL-XL) 24 hr tablet 25 mg  25 mg Oral Daily Анна Alfaro APRN   25 mg at 10/22/19 0849   • ondansetron (ZOFRAN) injection 4 mg  4 mg Intravenous Q6H PRN Romain Graham MD       • sodium chloride 0.9 % flush 10 mL  10 mL Intravenous PRN Harish Flores Jr., MD         Assessment/Plan       Chronic combined systolic and diastolic congestive heart failure (CMS/HCC)    Pneumonia of right middle lobe due to infectious organism (CMS/HCC)    Right-sided heart failure (CMS/HCC)    Nonrheumatic mitral valve regurgitation    Plan:   -continue BB. Will add ACEI back in near future once BP improves.   -remains volume overloaded. Will give 20mg IV lasix x1 dose.  -continue abx per primary  -daily standing weights, strict I&O, daily BMP.   -monitor tele    Further recommendations per DIOGENES Dunbar  10/22/19  1:56 PM

## 2019-10-22 NOTE — PLAN OF CARE
Problem: Patient Care Overview  Goal: Plan of Care Review  Outcome: Ongoing (interventions implemented as appropriate)   10/21/19 0520 10/21/19 2030 10/22/19 0538   Coping/Psychosocial   Plan of Care Reviewed With --  patient --    Plan of Care Review   Progress no change --  --    OTHER   Outcome Summary --  --  No c/o pain this shift. VSS. Safety maintained. Will continue to monitor.       Problem: Fall Risk (Adult)  Goal: Identify Related Risk Factors and Signs and Symptoms  Outcome: Ongoing (interventions implemented as appropriate)    Goal: Absence of Fall  Outcome: Ongoing (interventions implemented as appropriate)

## 2019-10-22 NOTE — PROGRESS NOTES
Continued Stay Note  [facility]     Patient Name: Arjun Oropeza  MRN: [id number]  Today's Date: 10/22/2019    Admit Date: 10/19/2019    Discharge Plan    No documentation.       Discharge Codes    No documentation.             VANDA DanielW

## 2019-10-23 NOTE — PROGRESS NOTES
"    RE:  Arjun Oropeza  :  1985         Subjective: Patient feels like his breathing is slightly improved today.  Also notes his swelling is getting better.  Urine output has increased.  BMP is pending.    ROS: Pertinent positives and negatives listed above.  All other systems reviewed and negative.    Objective:   /71 (BP Location: Left arm, Patient Position: Lying)   Pulse 115   Temp 98.4 °F (36.9 °C) (Oral)   Resp 16   Ht 170.2 cm (67\")   Wt 94.3 kg (208 lb)   SpO2 97%   BMI 32.58 kg/m²   Temp:  [97.5 °F (36.4 °C)-98.9 °F (37.2 °C)] 98.4 °F (36.9 °C)  Heart Rate:  [108-123] 115  Resp:  [16-20] 16  BP: (100-113)/(59-71) 101/71    Intake/Output Summary (Last 24 hours) at 10/23/2019 0734  Last data filed at 10/23/2019 0422  Gross per 24 hour   Intake 840 ml   Output 1955 ml   Net -1115 ml       Current Facility-Administered Medications:   •  acetaminophen (TYLENOL) tablet 650 mg, 650 mg, Oral, Q6H PRN, Romain Graham MD  •  aspirin EC tablet 81 mg, 81 mg, Oral, Daily, Romain Graham MD, 81 mg at 10/22/19 0849  •  cefdinir (OMNICEF) capsule 300 mg, 300 mg, Oral, Q12H, Romain Graham MD, 300 mg at 10/22/19 2055  •  enoxaparin (LOVENOX) syringe 90 mg, 1 mg/kg, Subcutaneous, Q12H, Romain Graham MD, 90 mg at 10/22/19 2054  •  furosemide (LASIX) injection 40 mg, 40 mg, Intravenous, Q12H, Herbie Alcocer MD, 40 mg at 10/23/19 0609  •  HYDROcodone-acetaminophen (NORCO)  MG per tablet 1 tablet, 1 tablet, Oral, Q4H PRN, Romain Graham MD  •  lisinopril (PRINIVIL,ZESTRIL) tablet 2.5 mg, 2.5 mg, Oral, Q24H, Herbie Alcocer MD, 2.5 mg at 10/22/19 1536  •  metoprolol succinate XL (TOPROL-XL) 24 hr tablet 50 mg, 50 mg, Oral, Daily, Herbie Alcocer MD  •  ondansetron (ZOFRAN) injection 4 mg, 4 mg, Intravenous, Q6H PRN, Romain Graham MD  •  [COMPLETED] Insert peripheral IV, , , Once **AND** sodium chloride 0.9 % flush 10 mL, 10 mL, Intravenous, PRN, Mark, " Harish ROB Jr., MD    Physical Exam:   Gen: Alert and oriented x3, no acute distress  Heart: Regular rate and rhythm, grade 2-3 systolic murmur at the apex  Chest: Crackles in right lung field bilaterally, normal respiratory effort  Abd: Soft, non tender, bowel sounds are positive  Ext: Mild lower extremity edema bilaterally      Lab Results (last 24 hours)     Procedure Component Value Units Date/Time    BNP [[id number]]  (Abnormal) Collected:  10/22/19 1156    Specimen:  Blood Updated:  10/22/19 1445     proBNP 4,910.0 pg/mL     Narrative:       Among patients with dyspnea, NT-proBNP is highly sensitive for the detection of acute congestive heart failure. In addition NT-proBNP of <300 pg/ml effectively rules out acute congestive heart failure with 99% negative predictive value.    Comprehensive Metabolic Panel [[id number]]  (Abnormal) Collected:  10/22/19 1156    Specimen:  Blood Updated:  10/22/19 1240     Glucose 107 mg/dL      BUN 26 mg/dL      Creatinine 1.12 mg/dL      Sodium 128 mmol/L      Potassium 4.6 mmol/L      Chloride 88 mmol/L      CO2 27.0 mmol/L      Calcium 8.7 mg/dL      Total Protein 7.1 g/dL      Albumin 3.00 g/dL      ALT (SGPT) 21 U/L      AST (SGOT) 30 U/L      Alkaline Phosphatase 189 U/L      Total Bilirubin 1.0 mg/dL      eGFR Non African Amer 75 mL/min/1.73      Globulin 4.1 gm/dL      A/G Ratio 0.7 g/dL      BUN/Creatinine Ratio 23.2     Anion Gap 13.0 mmol/L     Narrative:       GFR Normal >60  Chronic Kidney Disease <60  Kidney Failure <15    CBC & Differential [[id number]] Collected:  10/22/19 1156    Specimen:  Blood Updated:  10/22/19 1219    Narrative:       The following orders were created for panel order CBC & Differential.  Procedure                               Abnormality         Status                     ---------                               -----------         ------                     CBC Auto Differential[[id number]]        Abnormal            Final result                  Please view results for these tests on the individual orders.    CBC Auto Differential [[id number]]  (Abnormal) Collected:  10/22/19 1156    Specimen:  Blood Updated:  10/22/19 1219     WBC 8.27 10*3/mm3      RBC 3.70 10*6/mm3      Hemoglobin 10.2 g/dL      Hematocrit 30.2 %      MCV 81.6 fL      MCH 27.6 pg      MCHC 33.8 g/dL      RDW 18.1 %      RDW-SD 53.4 fl      MPV 10.0 fL      Platelets 242 10*3/mm3      Neutrophil % 77.9 %      Lymphocyte % 8.1 %      Monocyte % 11.2 %      Eosinophil % 1.7 %      Basophil % 0.6 %      Immature Grans % 0.5 %      Neutrophils, Absolute 6.44 10*3/mm3      Lymphocytes, Absolute 0.67 10*3/mm3      Monocytes, Absolute 0.93 10*3/mm3      Eosinophils, Absolute 0.14 10*3/mm3      Basophils, Absolute 0.05 10*3/mm3      Immature Grans, Absolute 0.04 10*3/mm3      nRBC 0.0 /100 WBC     Blood Culture - Blood, Hand, Right [[id number]] Collected:  10/19/19 1155    Specimen:  Blood from Hand, Right Updated:  10/22/19 1215     Blood Culture No growth at 3 days    Blood Culture - Blood, Hand, Right [[id number]] Collected:  10/19/19 1154    Specimen:  Blood from Hand, Right Updated:  10/22/19 1215     Blood Culture No growth at 3 days    Respiratory Culture - Sputum, Cough [[id number]] Collected:  10/20/19 0034    Specimen:  Sputum from Cough Updated:  10/22/19 1045     Respiratory Culture Scant growth (1+) Normal Respiratory Erika     Gram Stain Greater than 25 WBCs per low power field      Moderate (3+) Mixed gram positive erika      Moderate (3+) Epithelial cells seen        Imaging Results (last 24 hours)     ** No results found for the last 24 hours. **      -Echo (9/1/2019) EF 29%, severe LV dilation, severe LA dilation, moderately severe MR, moderate TR with RVSP 70 mmHg moderate RV and RA enlargement, dilated IVC      Assessment:   1.  Lobar pneumonia  2.  Recent pulmonary embolism: CT scan during admission did not demonstrate any evidence of current PE.  3.  Hemoptysis  4.  Acute on  chronic systolic congestive heart failure: Current respiratory issues appear to be secondary to pneumonia.  He is having worsening lower extremity edema.  5.  Mitral regurgitation: Moderately severe on recent echo from 9/2019.  Functional secondary to his systolic CHF.  Has been stable since diagnosis.  6.  Pulmonary hypertension: Likely multifactorial secondary to recent PE, CHF, valvular heart disease.  7.  Hyponatremia: sodium up to 128 today  8.  Leukocytosis: Improving  9.  Sepsis  10.  History of IV drug abuse: Patient reports he has been drug-free for at least 3 months.  11.  Pleuritic chest pain: Secondary to acute infection      Plan:   -Continue IV diuresis  -Titrate up metoprolol to 50 mg  -Continue lisinopril  -BMP pending, continue to monitor electrolytes and renal function closely  -Antibiotics per primary/pulmonology

## 2019-10-23 NOTE — PLAN OF CARE
Problem: Pneumonia (Adult)  Goal: Signs and Symptoms of Listed Potential Problems Will be Absent, Minimized or Managed (Pneumonia)  Outcome: Ongoing (interventions implemented as appropriate)      Problem: Breathing Pattern Ineffective (Adult)  Goal: Anxiety/Fear Reduction  Outcome: Ongoing (interventions implemented as appropriate)      Problem: Patient Care Overview  Goal: Plan of Care Review  Outcome: Ongoing (interventions implemented as appropriate)   10/23/19 1612   Coping/Psychosocial   Plan of Care Reviewed With patient   Plan of Care Review   Progress no change   OTHER   Outcome Summary No C/O pain noted this shift. VSS. PO ABX maintained. Safety Maintained. Will continue to monitor.

## 2019-10-23 NOTE — PLAN OF CARE
Problem: Pneumonia (Adult)  Goal: Signs and Symptoms of Listed Potential Problems Will be Absent, Minimized or Managed (Pneumonia)  Outcome: Ongoing (interventions implemented as appropriate)   10/23/19 0318   Goal/Outcome Evaluation   Problems Assessed (Pneumonia) all   Problems Present (Pneumonia) none       Problem: Breathing Pattern Ineffective (Adult)  Goal: Anxiety/Fear Reduction  Outcome: Ongoing (interventions implemented as appropriate)   10/23/19 0318   Breathing Pattern Ineffective (Adult)   Anxiety/Fear Reduction making progress toward outcome       Problem: Patient Care Overview  Goal: Plan of Care Review  Outcome: Ongoing (interventions implemented as appropriate)   10/23/19 0318   Coping/Psychosocial   Plan of Care Reviewed With patient   Plan of Care Review   Progress no change   OTHER   Outcome Summary No c/o pain this shift. VSS. PO ABX given. Safety maintained. Continue to monitor.

## 2019-10-23 NOTE — PROGRESS NOTES
[facility] Medicine Services  INPATIENT PROGRESS NOTE    Patient Name: Arjun Oropeza  Date of Admission: 10/19/2019  Today's Date: 10/23/19  Length of Stay: 4  Primary Care Physician: Shey Elliott APRN    Subjective   Chief Complaint: Leg swelling  HPI   Doing ok.  Legs less swollen.  No SOA.  Tolerating RA  Afebrile        Review of Systems   Constitutional: Negative for fatigue and fever.   HENT: Negative for congestion and ear pain.    Eyes: Negative for redness and visual disturbance.   Respiratory: Negative for cough, shortness of breath and wheezing.    Cardiovascular: Positive for leg swelling. Negative for chest pain and palpitations.   Gastrointestinal: Negative for abdominal pain, diarrhea, nausea and vomiting.   Endocrine: Negative for cold intolerance and heat intolerance.   Genitourinary: Negative for dysuria and frequency.   Musculoskeletal: Negative for arthralgias and back pain.   Skin: Negative for rash and wound.   Neurological: Negative for dizziness and headaches.   Psychiatric/Behavioral: Negative for confusion. The patient is not nervous/anxious.         All pertinent negatives and positives are as above. All other systems have been reviewed and are negative unless otherwise stated.     Objective    Temp:  [97.5 °F (36.4 °C)-98.6 °F (37 °C)] 97.9 °F (36.6 °C)  Heart Rate:  [] 63  Resp:  [16-20] 16  BP: (101-116)/(59-78) 112/76  Physical Exam   Constitutional: He is oriented to person, place, and time. He appears well-developed and well-nourished.   HENT:   Head: Normocephalic and atraumatic.   Right Ear: External ear normal.   Left Ear: External ear normal.   Nose: Nose normal.   Mouth/Throat: Oropharynx is clear and moist.   Eyes: Conjunctivae and EOM are normal. Pupils are equal, round, and reactive to light. Right eye exhibits no discharge. Left eye exhibits no discharge. No scleral icterus.   Neck: Normal range of motion. Neck supple. No  tracheal deviation present. No thyromegaly present.   Cardiovascular: Normal rate, regular rhythm, normal heart sounds and intact distal pulses. Exam reveals no gallop and no friction rub.   No murmur heard.  BLE edema improved   Pulmonary/Chest: Effort normal and breath sounds normal. No stridor. No respiratory distress. He has no wheezes. He has no rales. He exhibits no tenderness.   Abdominal: Soft. Bowel sounds are normal. He exhibits no distension and no mass. There is no tenderness. There is no rebound and no guarding. No hernia.   Musculoskeletal: Normal range of motion. He exhibits no edema or deformity.   Lymphadenopathy:     He has no cervical adenopathy.   Neurological: He is alert and oriented to person, place, and time. He has normal reflexes. He displays normal reflexes. No cranial nerve deficit. He exhibits normal muscle tone. Coordination normal.   Skin: Skin is warm and dry. No rash noted. No erythema. No pallor.   Psychiatric: He has a normal mood and affect. His behavior is normal. Judgment and thought content normal.   Vitals reviewed.           Results Review:  I have reviewed the labs, radiology results, and diagnostic studies.    Laboratory Data:   Results from last 7 days   Lab Units 10/22/19  1156 10/21/19  0249 10/20/19  0659   WBC 10*3/mm3 8.27 10.68 12.37*   HEMOGLOBIN g/dL 10.2* 10.0* 10.4*   HEMATOCRIT % 30.2* 30.2* 31.4*   PLATELETS 10*3/mm3 242 286 292        Results from last 7 days   Lab Units 10/23/19  0756 10/22/19  1156 10/21/19  0249 10/20/19  0659   SODIUM mmol/L 127* 128* 127* 129*   POTASSIUM mmol/L 4.3 4.6 4.3 4.4   CHLORIDE mmol/L 89* 88* 87* 88*   CO2 mmol/L 20.0* 27.0 26.0 25.0   BUN mg/dL 23* 26* 21* 15   CREATININE mg/dL 0.91 1.12 1.14 0.74*   CALCIUM mg/dL 8.6 8.7 8.8 8.5*   BILIRUBIN mg/dL  --  1.0 1.6* 1.6*   ALK PHOS U/L  --  189* 146* 147*   ALT (SGPT) U/L  --  21 14 19   AST (SGOT) U/L  --  30 22 19   GLUCOSE mg/dL 87 107* 174* 120*       Culture Data:   Blood  Culture   Date Value Ref Range Status   10/19/2019 No growth at 4 days  Preliminary   10/19/2019 No growth at 4 days  Preliminary     MRSA SCREEN CX   Date Value Ref Range Status   10/20/2019   Final    No Methicillin Resistant Staphylococcus aureus isolated     Respiratory Culture   Date Value Ref Range Status   10/20/2019 Scant growth (1+) Normal Respiratory Kristal  Final       Radiology Data:   Imaging Results (last 24 hours)     ** No results found for the last 24 hours. **          I have reviewed the patient's current medications.     Assessment/Plan     Active Hospital Problems    Diagnosis   • Right-sided heart failure (CMS/HCC)   • Nonrheumatic mitral valve regurgitation   • Pneumonia of right middle lobe due to infectious organism (CMS/HCC)   • Chronic combined systolic and diastolic congestive heart failure (CMS/HCC)        1.  Sepsis  -IV abx to PO     2.  PNA  -IV abx to PO  -nebs  -Pulm consulted, following     3.  Acute on Chronic Systolic CHF  -IV Lasix  -Cardiology consulted, following     4.   Hyponatremia  -improving  -IV lasix     5.  H/o IV drug abuse  -tells me he has stopped, counseled on the importance of continued abstinence      6.  PE  -lovenox     7.  Severe mitral regurgitation  -consult Cardiology     8.  Hemoptysis  -monitor H&H                   Discharge Planning: I expect the patient to be discharged to home in 1-2 days    Romain Graham MD   10/23/19   2:30 PM

## 2019-10-24 NOTE — PROGRESS NOTES
"    RE:  Arjun Oropeza  :  1985         Subjective: Patient feels his breathing is a little bit better today but does not think there is much change in his legs.  Feels his urine output has decreased.  He was 2.2 L net negative over the last 24 hours.    ROS: Pertinent positives and negatives listed above.  All other systems reviewed and negative.    Objective:   /79 (BP Location: Left arm, Patient Position: Sitting)   Pulse 111   Temp 98 °F (36.7 °C) (Oral)   Resp 18   Ht 170.2 cm (67\")   Wt 92.7 kg (204 lb 6 oz)   SpO2 96%   BMI 32.01 kg/m²   Temp:  [97.7 °F (36.5 °C)-98.1 °F (36.7 °C)] 98 °F (36.7 °C)  Heart Rate:  [] 111  Resp:  [16-18] 18  BP: ()/(68-81) 120/79    Intake/Output Summary (Last 24 hours) at 10/24/2019 0748  Last data filed at 10/24/2019 0733  Gross per 24 hour   Intake 960 ml   Output 3175 ml   Net -2215 ml       Current Facility-Administered Medications:   •  acetaminophen (TYLENOL) tablet 650 mg, 650 mg, Oral, Q6H PRN, Romain Graham MD  •  aspirin EC tablet 81 mg, 81 mg, Oral, Daily, Romain Graham MD, 81 mg at 10/23/19 0826  •  cefdinir (OMNICEF) capsule 300 mg, 300 mg, Oral, Q12H, Romain Graham MD, 300 mg at 10/23/19 2029  •  enoxaparin (LOVENOX) syringe 90 mg, 1 mg/kg, Subcutaneous, Q12H, Romain Graham MD, 90 mg at 10/23/19 2029  •  furosemide (LASIX) injection 40 mg, 40 mg, Intravenous, Q12H, Herbie Alcocer MD, 40 mg at 10/24/19 0605  •  HYDROcodone-acetaminophen (NORCO)  MG per tablet 1 tablet, 1 tablet, Oral, Q4H PRN, Romain Graham MD  •  lisinopril (PRINIVIL,ZESTRIL) tablet 2.5 mg, 2.5 mg, Oral, Q24H, Herbie Alcocer MD, 2.5 mg at 10/23/19 0827  •  metoprolol succinate XL (TOPROL-XL) 24 hr tablet 50 mg, 50 mg, Oral, Daily, Herbie Alcocer MD, 50 mg at 10/23/19 0827  •  ondansetron (ZOFRAN) injection 4 mg, 4 mg, Intravenous, Q6H PRN, Romain Graham MD  •  [COMPLETED] Insert peripheral IV, , , Once " **AND** sodium chloride 0.9 % flush 10 mL, 10 mL, Intravenous, PRN, Harish Flores Jr., MD, 10 mL at 10/23/19 0826    Physical Exam:   Gen: Alert and oriented x3, no acute distress  Heart: Regular rate and rhythm, no murmurs, rubs, or gallops  Chest: Lungs clear to auscultation bilaterally, normal respiratory effort  Abd: Soft, non tender, bowel sounds are positive  Ext: No clubbing, cyanosis, or edema     Lab Results (last 24 hours)     Procedure Component Value Units Date/Time    Basic Metabolic Panel [[id number]] Collected:  10/24/19 0613    Specimen:  Blood Updated:  10/24/19 0704    Mycoplasma Pneumoniae PCR - Swab, Nasopharynx [[id number]] Collected:  10/19/19 1511    Specimen:  Swab from Nasopharynx Updated:  10/23/19 2106     Mycoplasma pneumo by PCR Negative     Comment: No Mycoplasma pneumoniae DNA detected.  This test was developed and its performance characteristics determined  by [facility].  It has not been cleared or approved by the Food and Drug  Administration.  The FDA has determined that such clearance or  approval is not necessary.       Narrative:       Performed at:  [address]  [id number]  : James Aguero MD, Phone:  [phone]    Blood Culture - Blood, Hand, Right [[id number]] Collected:  10/19/19 1155    Specimen:  Blood from Hand, Right Updated:  10/23/19 1215     Blood Culture No growth at 4 days    Blood Culture - Blood, Hand, Right [[id number]] Collected:  10/19/19 1154    Specimen:  Blood from Hand, Right Updated:  10/23/19 1215     Blood Culture No growth at 4 days    Basic Metabolic Panel [[id number]]  (Abnormal) Collected:  10/23/19 0756    Specimen:  Blood Updated:  10/23/19 0834     Glucose 87 mg/dL      BUN 23 mg/dL      Creatinine 0.91 mg/dL      Sodium 127 mmol/L      Potassium 4.3 mmol/L      Chloride 89 mmol/L      CO2 20.0 mmol/L      Calcium 8.6 mg/dL      eGFR Non African Amer 95 mL/min/1.73      BUN/Creatinine Ratio  25.3     Anion Gap 18.0 mmol/L     Narrative:       GFR Normal >60  Chronic Kidney Disease <60  Kidney Failure <15        Imaging Results (last 24 hours)     ** No results found for the last 24 hours. **           -Echo (9/1/2019) EF 29%, severe LV dilation, severe LA dilation, moderately severe MR, moderate TR with RVSP 70 mmHg moderate RV and RA enlargement, dilated IVC      Assessment:   1.  Lobar pneumonia  2.  Recent pulmonary embolism: CT scan during admission did not demonstrate any evidence of current PE.  3.  Hemoptysis  4.  Acute on chronic systolic congestive heart failure: Current respiratory issues appear to be secondary to pneumonia.  He is having worsening lower extremity edema.  5.  Mitral regurgitation: Moderately severe on recent echo from 9/2019.  Functional secondary to his systolic CHF.  Has been stable since diagnosis.  6.  Pulmonary hypertension: Likely multifactorial secondary to recent PE, CHF, valvular heart disease.  7.  Hyponatremia: sodium up to 128 today  8.  Leukocytosis: Improving  9.  Sepsis  10.  History of IV drug abuse: Patient reports he has been drug-free for at least 3 months.  11.  Pleuritic chest pain: Secondary to acute infection         Plan:   -Continue IV Lasix with close monitoring of renal function electrolytes.  -Continue metoprolol succinate  -Titrate up lisinopril to 5 mg  -BMP pending today  -Continue fluid restriction  -Continue antibiotics

## 2019-10-24 NOTE — PLAN OF CARE
Problem: Pneumonia (Adult)  Goal: Signs and Symptoms of Listed Potential Problems Will be Absent, Minimized or Managed (Pneumonia)  Outcome: Ongoing (interventions implemented as appropriate)      Problem: Breathing Pattern Ineffective (Adult)  Goal: Anxiety/Fear Reduction  Outcome: Ongoing (interventions implemented as appropriate)      Problem: Patient Care Overview  Goal: Plan of Care Review  Outcome: Ongoing (interventions implemented as appropriate)   10/24/19 6512   Coping/Psychosocial   Plan of Care Reviewed With patient   Plan of Care Review   Progress no change   OTHER   Outcome Summary No C/O pain noted this shift. VSS. PO ABX maintained. IV Lasix/fluid restriction continued. VSS. Safety Maintained. Will continue to monitor.

## 2019-10-24 NOTE — PLAN OF CARE
Problem: Pneumonia (Adult)  Goal: Signs and Symptoms of Listed Potential Problems Will be Absent, Minimized or Managed (Pneumonia)  Outcome: Ongoing (interventions implemented as appropriate)   10/24/19 0311   Goal/Outcome Evaluation   Problems Assessed (Pneumonia) all   Problems Present (Pneumonia) none       Problem: Breathing Pattern Ineffective (Adult)  Goal: Anxiety/Fear Reduction  Outcome: Ongoing (interventions implemented as appropriate)   10/24/19 0311   Breathing Pattern Ineffective (Adult)   Anxiety/Fear Reduction making progress toward outcome       Problem: Patient Care Overview  Goal: Plan of Care Review  Outcome: Ongoing (interventions implemented as appropriate)   10/24/19 0311   Coping/Psychosocial   Plan of Care Reviewed With patient   Plan of Care Review   Progress no change   OTHER   Outcome Summary No c/o pain this shift. VSS. IV Lasix continues, voids per urinal. Safety maintained. Continue to monitor.

## 2019-10-24 NOTE — PROGRESS NOTES
[facility] Medicine Services  INPATIENT PROGRESS NOTE    Patient Name: Arjun Oropeza  Date of Admission: 10/19/2019  Today's Date: 10/24/19  Length of Stay: 5  Primary Care Physician: Shey Elliott APRN    Subjective   Chief Complaint: leg swelling  HPI   Doing ok.  No CP or SOA  Leg swelling better  Having a lot more coughing today        Review of Systems   Constitutional: Negative for fatigue and fever.   HENT: Negative for congestion and ear pain.    Eyes: Negative for redness and visual disturbance.   Respiratory: Positive for cough. Negative for shortness of breath and wheezing.    Cardiovascular: Positive for leg swelling. Negative for chest pain and palpitations.   Gastrointestinal: Negative for abdominal pain, diarrhea, nausea and vomiting.   Endocrine: Negative for cold intolerance and heat intolerance.   Genitourinary: Negative for dysuria and frequency.   Musculoskeletal: Negative for arthralgias and back pain.   Skin: Negative for rash and wound.   Neurological: Negative for dizziness and headaches.   Psychiatric/Behavioral: Negative for confusion. The patient is not nervous/anxious.         All pertinent negatives and positives are as above. All other systems have been reviewed and are negative unless otherwise stated.     Objective    Temp:  [97.7 °F (36.5 °C)-98.4 °F (36.9 °C)] 97.9 °F (36.6 °C)  Heart Rate:  [108-116] 108  Resp:  [18] 18  BP: (106-135)/(68-85) 114/72  Physical Exam   Constitutional: He is oriented to person, place, and time. He appears well-developed and well-nourished.   HENT:   Head: Normocephalic and atraumatic.   Right Ear: External ear normal.   Left Ear: External ear normal.   Nose: Nose normal.   Mouth/Throat: Oropharynx is clear and moist.   Eyes: Conjunctivae and EOM are normal. Pupils are equal, round, and reactive to light. Right eye exhibits no discharge. Left eye exhibits no discharge. No scleral icterus.   Neck: Normal range  of motion. Neck supple. No tracheal deviation present. No thyromegaly present.   Cardiovascular: Normal rate, regular rhythm, normal heart sounds and intact distal pulses. Exam reveals no gallop and no friction rub.   No murmur heard.  BLE edema   Pulmonary/Chest: Effort normal and breath sounds normal. No stridor. No respiratory distress. He has no wheezes. He has no rales. He exhibits no tenderness.   Abdominal: Soft. Bowel sounds are normal. He exhibits no distension and no mass. There is no tenderness. There is no rebound and no guarding. No hernia.   Musculoskeletal: Normal range of motion. He exhibits no edema or deformity.   Lymphadenopathy:     He has no cervical adenopathy.   Neurological: He is alert and oriented to person, place, and time. He has normal reflexes. He displays normal reflexes. No cranial nerve deficit. He exhibits normal muscle tone. Coordination normal.   Skin: Skin is warm and dry. No rash noted. No erythema. No pallor.   Psychiatric: He has a normal mood and affect. His behavior is normal. Judgment and thought content normal.   Vitals reviewed.          Results Review:  I have reviewed the labs, radiology results, and diagnostic studies.    Laboratory Data:   Results from last 7 days   Lab Units 10/22/19  1156 10/21/19  0249 10/20/19  0659   WBC 10*3/mm3 8.27 10.68 12.37*   HEMOGLOBIN g/dL 10.2* 10.0* 10.4*   HEMATOCRIT % 30.2* 30.2* 31.4*   PLATELETS 10*3/mm3 242 286 292        Results from last 7 days   Lab Units 10/24/19  0613 10/23/19  0756 10/22/19  1156 10/21/19  0249 10/20/19  0659   SODIUM mmol/L 129* 127* 128* 127* 129*   POTASSIUM mmol/L 4.2 4.3 4.6 4.3 4.4   CHLORIDE mmol/L 88* 89* 88* 87* 88*   CO2 mmol/L 27.0 20.0* 27.0 26.0 25.0   BUN mg/dL 27* 23* 26* 21* 15   CREATININE mg/dL 1.09 0.91 1.12 1.14 0.74*   CALCIUM mg/dL 8.6 8.6 8.7 8.8 8.5*   BILIRUBIN mg/dL  --   --  1.0 1.6* 1.6*   ALK PHOS U/L  --   --  189* 146* 147*   ALT (SGPT) U/L  --   --  21 14 19   AST (SGOT) U/L   --   --  30 22 19   GLUCOSE mg/dL 111* 87 107* 174* 120*       Culture Data:   Blood Culture   Date Value Ref Range Status   10/19/2019 No growth at 5 days  Final   10/19/2019 No growth at 5 days  Final     MRSA SCREEN CX   Date Value Ref Range Status   10/20/2019   Final    No Methicillin Resistant Staphylococcus aureus isolated     Respiratory Culture   Date Value Ref Range Status   10/20/2019 Scant growth (1+) Normal Respiratory Kristal  Final       Radiology Data:   Imaging Results (last 24 hours)     ** No results found for the last 24 hours. **          I have reviewed the patient's current medications.     Assessment/Plan     Active Hospital Problems    Diagnosis   • Right-sided heart failure (CMS/HCC)   • Nonrheumatic mitral valve regurgitation   • Pneumonia of right middle lobe due to infectious organism (CMS/HCC)   • Chronic combined systolic and diastolic congestive heart failure (CMS/HCC)       1.  Sepsis  -IV abx to PO     2.  PNA  -IV abx to PO  -nebs  -Pulm consulted, following     3.  Acute on Chronic Systolic CHF  -IV Lasix  -Lisinopril  -Metoprolol  -Cardiology consulted, following     4.   Hyponatremia  -improving  -IV lasix     5.  H/o IV drug abuse  -tells me he has stopped, counseled on the importance of continued abstinence      6.  PE  -lovenox     7.  Severe mitral regurgitation  -consult Cardiology     8.  Hemoptysis  -monitor H&H            Discharge Planning: I expect the patient to be discharged to home in 1 day    Romain Graham MD   10/24/19   6:49 PM

## 2019-10-25 NOTE — PROGRESS NOTES
Continued Stay Note   [facility]     Patient Name: Arjun Oropeza  MRN: [id number]  Today's Date: 10/25/2019    Admit Date: 10/19/2019    Discharge Plan     Row Name 10/25/19 0913       Plan    Plan Comments  Chart reviewed-- Possible discharge home today. Pt plans on discharging home with his father and denies any needs or concerns at this time. SW will follow and assist with any discharge needs that may arise.     Final Discharge Disposition Code  01 - home or self-care        Discharge Codes    No documentation.             Alicia Kong

## 2019-10-25 NOTE — PROGRESS NOTES
"    RE:  Arjun Oropeza  :  1985         Subjective: Patient reports she is feeling better today.  Breathing is improved.  Still has lower extremity edema.  Has no new issues or complaints.  Very adamant about going home today.    ROS: Pertinent positives and negatives listed above.  All other systems reviewed and negative.    Objective:   /76 (BP Location: Right arm, Patient Position: Lying)   Pulse 110   Temp 98 °F (36.7 °C) (Oral)   Resp 18   Ht 170.2 cm (67\")   Wt 93.1 kg (205 lb 4.8 oz)   SpO2 99%   BMI 32.15 kg/m²   Temp:  [97.6 °F (36.4 °C)-98.4 °F (36.9 °C)] 98 °F (36.7 °C)  Heart Rate:  [108-110] 110  Resp:  [18] 18  BP: (105-135)/(72-85) 127/76    Intake/Output Summary (Last 24 hours) at 10/25/2019 0835  Last data filed at 10/24/2019 1940  Gross per 24 hour   Intake 840 ml   Output 850 ml   Net -10 ml       Current Facility-Administered Medications:   •  acetaminophen (TYLENOL) tablet 650 mg, 650 mg, Oral, Q6H PRN, Romain Graham MD  •  aspirin EC tablet 81 mg, 81 mg, Oral, Daily, Romain Graham MD, 81 mg at 10/24/19 1002  •  cefdinir (OMNICEF) capsule 300 mg, 300 mg, Oral, Q12H, Romain Graham MD, 300 mg at 10/24/19 2107  •  enoxaparin (LOVENOX) syringe 90 mg, 1 mg/kg, Subcutaneous, Q12H, Romain Graham MD, 90 mg at 10/24/19 2107  •  furosemide (LASIX) injection 40 mg, 40 mg, Intravenous, Q12H, Herbie Alcocer MD, 40 mg at 10/25/19 0623  •  HYDROcodone-acetaminophen (NORCO)  MG per tablet 1 tablet, 1 tablet, Oral, Q4H PRN, Romain Graham MD  •  lisinopril (PRINIVIL,ZESTRIL) tablet 5 mg, 5 mg, Oral, Q24H, Herbie Alcocer MD  •  metoprolol succinate XL (TOPROL-XL) 24 hr tablet 100 mg, 100 mg, Oral, Daily, Herbie Alcocer MD  •  ondansetron (ZOFRAN) injection 4 mg, 4 mg, Intravenous, Q6H PRN, Romain Graham MD  •  [COMPLETED] Insert peripheral IV, , , Once **AND** sodium chloride 0.9 % flush 10 mL, 10 mL, Intravenous, PRN, Mark, " Harish ROB Jr., MD, 10 mL at 10/23/19 0826    Physical Exam:   Gen: Alert and oriented x3, no acute distress  Heart: Regular rate and rhythm, no murmurs, rubs, or gallops  Chest: Lungs clear to auscultation bilaterally, normal respiratory effort  Abd: Soft, non tender, bowel sounds are positive  Ext: Mild to moderate lower extreme edema bilaterally    Lab Results (last 24 hours)     Procedure Component Value Units Date/Time    Basic Metabolic Panel [[id number]]  (Abnormal) Collected:  10/25/19 0622    Specimen:  Blood Updated:  10/25/19 0705     Glucose 104 mg/dL      BUN 27 mg/dL      Creatinine 0.96 mg/dL      Sodium 131 mmol/L      Potassium 3.6 mmol/L      Chloride 89 mmol/L      CO2 27.0 mmol/L      Calcium 8.8 mg/dL      eGFR Non African Amer 90 mL/min/1.73      BUN/Creatinine Ratio 28.1     Anion Gap 15.0 mmol/L     Narrative:       GFR Normal >60  Chronic Kidney Disease <60  Kidney Failure <15    Blood Culture - Blood, Hand, Right [[id number]] Collected:  10/19/19 1155    Specimen:  Blood from Hand, Right Updated:  10/24/19 1215     Blood Culture No growth at 5 days    Blood Culture - Blood, Hand, Right [[id number]] Collected:  10/19/19 1154    Specimen:  Blood from Hand, Right Updated:  10/24/19 1215     Blood Culture No growth at 5 days        Imaging Results (last 24 hours)     ** No results found for the last 24 hours. **      -Echo (9/1/2019) EF 29%, severe LV dilation, severe LA dilation, moderately severe MR, moderate TR with RVSP 70 mmHg moderate RV and RA enlargement, dilated IVC      Assessment:   1.  Lobar pneumonia  2.  Recent pulmonary embolism: CT scan during admission did not demonstrate any evidence of current PE.  3.  Hemoptysis  4.  Acute on chronic systolic congestive heart failure: Current respiratory issues appear to be secondary to pneumonia.  He is having worsening lower extremity edema.  5.  Mitral regurgitation: Moderately severe on recent echo from 9/2019.  Functional secondary to his  systolic CHF.  Has been stable since diagnosis.  6.  Pulmonary hypertension: Likely multifactorial secondary to recent PE, CHF, valvular heart disease.  7.  Hyponatremia: sodium up to 128 today  8.  Leukocytosis: Improving  9.  Sepsis  10.  History of IV drug abuse: Patient reports he has been drug-free for at least 3 months.  11.  Pleuritic chest pain: Secondary to acute infection      Plan:   -Patient planning for discharge home today.  -Currently stable from a cardiac standpoint.  -Titrate up metoprolol succinate to 100 mg and lisinopril to 5 mg.  -Change Lasix to oral furosemide at 40 mg twice daily  -Continue fluid restriction on discharge.  -Counseled patient on low-sodium diet.  -Close follow-up with his regular cardiologist at [facility].

## 2019-10-25 NOTE — PLAN OF CARE
Problem: Pneumonia (Adult)  Goal: Signs and Symptoms of Listed Potential Problems Will be Absent, Minimized or Managed (Pneumonia)  Outcome: Ongoing (interventions implemented as appropriate)   10/25/19 0346   Goal/Outcome Evaluation   Problems Assessed (Pneumonia) all   Problems Present (Pneumonia) none       Problem: Breathing Pattern Ineffective (Adult)  Goal: Anxiety/Fear Reduction  Outcome: Ongoing (interventions implemented as appropriate)   10/25/19 0346   Breathing Pattern Ineffective (Adult)   Anxiety/Fear Reduction making progress toward outcome       Problem: Patient Care Overview  Goal: Plan of Care Review  Outcome: Ongoing (interventions implemented as appropriate)   10/25/19 0346   Coping/Psychosocial   Plan of Care Reviewed With patient   Plan of Care Review   Progress no change   OTHER   Outcome Summary No c/o pain this shift. VSS. PO ABX continue. May go home today. Safety maintained. Continue to monitor.

## 2019-10-25 NOTE — DISCHARGE SUMMARY
"    [facility] Medicine Services  DISCHARGE SUMMARY       Date of Admission: 10/19/2019  Date of Discharge:  10/25/2019  Primary Care Physician: Shey Elliott APRN    Presenting Problem/History of Present Illness:  Pneumonia of right middle lobe due to infectious organism (CMS/HCC) [J18.1]     Final Discharge Diagnoses:  Active Hospital Problems    Diagnosis   • Right-sided heart failure (CMS/HCC)   • Nonrheumatic mitral valve regurgitation   • Pneumonia of right middle lobe due to infectious organism (CMS/HCC)   • Chronic combined systolic and diastolic congestive heart failure (CMS/HCC)   1.  Sepsis  -IV abx to PO     2.  PNA  -IV abx to PO  -nebs  -Pulm consulted, following     3.  Acute on Chronic Systolic CHF  -IV Lasix  -Lisinopril  -Metoprolol  -Cardiology consulted, following     4.   Hyponatremia  -improving  -IV lasix     5.  H/o IV drug abuse  -tells me he has stopped, counseled on the importance of continued abstinence      6.  PE  -lovenox     7.  Severe mitral regurgitation  -consult Cardiology     8.  Hemoptysis  -monitor H&H       Consults: Pulmonology, Cardiology    Procedures Performed: NA    Pertinent Test Results: NA    Chief Complaint on Day of Discharge: \"I want to go home.\"    History of Present Illness on Day of Discharge:   Doing ok.  Leg swelling better, no SOA.  Tolerating room air.  Not coughing up any blood    Hospital Course:  The patient is a 34 y.o. male who presented to [facility] with shortness of breath and hemoptysis.  He was found to have pneumonia.  He is also anticoagulated for a pulmonary embolism.  We initially held his Warfarin due to bleeding and decided switch him Lovenox.      Pulmonology was consulted and felt his hemoptysis was due to pneumonia.      Cardiology was consulted and helped adjust his CHF medications.  He was treated with IV lasix.      He is currently afebrile.  He is no longer coughing up blood.  He is " "tolerating room air.  He continues to improve on oral antibiotics.    He is comfortable with being discharged and with the discharge plan.  He was given the chance to ask questions and all questions were answered to his satisfaction.      His BP has run low at times and thus we have held his Aldactone.  If his BP will tolerate this at follow up, we ask that his PCP consider restarting it.      Condition on Discharge:  Stable    Physical Exam on Discharge:  /76 (BP Location: Right arm, Patient Position: Lying)   Pulse 110   Temp 98 °F (36.7 °C) (Oral)   Resp 18   Ht 170.2 cm (67\")   Wt 93.1 kg (205 lb 4.8 oz)   SpO2 99%   BMI 32.15 kg/m²   Physical Exam   Constitutional: He is oriented to person, place, and time. He appears well-developed and well-nourished.   HENT:   Head: Normocephalic and atraumatic.   Right Ear: External ear normal.   Left Ear: External ear normal.   Nose: Nose normal.   Mouth/Throat: Oropharynx is clear and moist.   Eyes: Conjunctivae and EOM are normal. Pupils are equal, round, and reactive to light. Right eye exhibits no discharge. Left eye exhibits no discharge. No scleral icterus.   Neck: Normal range of motion. Neck supple. No tracheal deviation present. No thyromegaly present.   Cardiovascular: Normal rate, regular rhythm, normal heart sounds and intact distal pulses. Exam reveals no gallop and no friction rub.   No murmur heard.  BLE edema improved   Pulmonary/Chest: Effort normal and breath sounds normal. No stridor. No respiratory distress. He has no wheezes. He has no rales. He exhibits no tenderness.   Abdominal: Soft. Bowel sounds are normal. He exhibits no distension and no mass. There is no tenderness. There is no rebound and no guarding. No hernia.   Musculoskeletal: Normal range of motion. He exhibits no edema or deformity.   Lymphadenopathy:     He has no cervical adenopathy.   Neurological: He is alert and oriented to person, place, and time. He has normal " reflexes. He displays normal reflexes. No cranial nerve deficit. He exhibits normal muscle tone. Coordination normal.   Skin: Skin is warm and dry. No rash noted. No erythema. No pallor.   Psychiatric: He has a normal mood and affect. His behavior is normal. Judgment and thought content normal.   Vitals reviewed.        Discharge Disposition:  Home or Self Care    Discharge Medications:     Discharge Medications      New Medications      Instructions Start Date   cefdinir 300 MG capsule  Commonly known as:  OMNICEF   300 mg, Oral, Every 12 Hours Scheduled      Rivaroxaban tablet therapy pack starter pack  Commonly known as:  XARELTO STARTER PACK   Take as directed         Changes to Medications      Instructions Start Date   lisinopril 5 MG tablet  Commonly known as:  PRINIVILZESTRIL  What changed:    · medication strength  · how much to take  · when to take this   5 mg, Oral, Every 24 Hours Scheduled   Start Date:  10/26/2019     metoprolol succinate  MG 24 hr tablet  Commonly known as:  TOPROL-XL  What changed:    · medication strength  · how much to take   100 mg, Oral, Daily   Start Date:  10/26/2019        Continue These Medications      Instructions Start Date   albuterol sulfate  (90 Base) MCG/ACT inhaler  Commonly known as:  PROVENTIL HFA;VENTOLIN HFA;PROAIR HFA   2 puffs, Inhalation, Every 6 Hours PRN      furosemide 40 MG tablet  Commonly known as:  LASIX   40 mg, Oral, 2 Times Daily         Stop These Medications    spironolactone 25 MG tablet  Commonly known as:  ALDACTONE     warfarin 5 MG tablet  Commonly known as:  COUMADIN     ZITHROMAX Z-JERROD 250 MG tablet  Generic drug:  azithromycin            Discharge Diet: Low sodium    Activity at Discharge: as tolerated    Discharge Care Plan/Instructions: Go to ER for fever or shortness of breath.  Go to ER for vomiting or coughing up blood.  Go to ER for black or bloody stools    Follow-up Appointments:   No future appointments.    Test Results  Pending at Discharge: LIZZY Graham MD  10/25/19  10:05 AM    Time: 45 minutes

## 2019-10-26 NOTE — OUTREACH NOTE
Prep Survey      Responses   Facility patient discharged from?  [facility]   Is patient eligible?  Yes   Discharge diagnosis  PNA   Does the patient have one of the following disease processes/diagnoses(primary or secondary)?  COPD/Pneumonia   Does the patient have Home health ordered?  No   Is there a DME ordered?  No   Prep survey completed?  Yes          Fanta Fonseca RN

## 2019-10-28 ENCOUNTER — TELEPHONE (OUTPATIENT)
Dept: INTERNAL MEDICINE | Age: 34
End: 2019-10-28

## 2019-10-28 NOTE — OUTREACH NOTE
COPD/PN Week 1 Survey      Responses   Facility patient discharged from?  [facility]   Does the patient have one of the following disease processes/diagnoses(primary or secondary)?  COPD/Pneumonia   Is there a successful TCM telephone encounter documented?  No   Was the primary reason for admission:  Pneumonia   Week 1 attempt successful?  Yes   Call start time  1523   Call end time  1526   Discharge diagnosis  PNA   Is patient permission given to speak with other caregiver?  Yes   List who call center can speak with  - Aurelia   Person spoke with today (if not patient) and relationship  - Aurelia   Meds reviewed with patient/caregiver?  Yes   Is the patient having any side effects they believe may be caused by any medication additions or changes?  No   Does the patient have all medications ordered at discharge?  Yes   Is the patient taking all medications as directed (includes completed medication regime)?  Yes   Does the patient have a primary care provider?   No   Comments regarding PCP  Per father, pt has no PCP but goes to coumadin clinic.   Comments  will be going to coumadin clinic tomorrow   Has home health visited the patient within 72 hours of discharge?  N/A   Psychosocial issues?  No   Did the patient receive a copy of their discharge instructions?  Yes   What is the patient's perception of their health status since discharge?  Same   Additional teach back comments  Per father, pts legs are swollen, they are going to coumadin clinic tomorrow.   Week 1 call completed?  Yes          Susan Keen RN

## 2019-10-29 ENCOUNTER — ANTI-COAG VISIT (OUTPATIENT)
Dept: INTERNAL MEDICINE | Age: 34
End: 2019-10-29

## 2019-10-29 ENCOUNTER — OFFICE VISIT (OUTPATIENT)
Dept: INTERNAL MEDICINE | Age: 34
End: 2019-10-29
Payer: COMMERCIAL

## 2019-10-29 VITALS
HEIGHT: 67 IN | SYSTOLIC BLOOD PRESSURE: 116 MMHG | HEART RATE: 106 BPM | BODY MASS INDEX: 33.12 KG/M2 | WEIGHT: 211 LBS | OXYGEN SATURATION: 96 % | DIASTOLIC BLOOD PRESSURE: 78 MMHG

## 2019-10-29 DIAGNOSIS — I26.99 ACUTE PULMONARY EMBOLISM, UNSPECIFIED PULMONARY EMBOLISM TYPE, UNSPECIFIED WHETHER ACUTE COR PULMONALE PRESENT (HCC): ICD-10-CM

## 2019-10-29 DIAGNOSIS — I42.7 CARDIOMYOPATHY DUE TO DRUG AND EXTERNAL AGENT (HCC): Primary | Chronic | ICD-10-CM

## 2019-10-29 DIAGNOSIS — I50.23 ACUTE ON CHRONIC SYSTOLIC (CONGESTIVE) HEART FAILURE (HCC): ICD-10-CM

## 2019-10-29 DIAGNOSIS — F51.01 PRIMARY INSOMNIA: ICD-10-CM

## 2019-10-29 DIAGNOSIS — I26.99 ACUTE MASSIVE PULMONARY EMBOLISM (HCC): ICD-10-CM

## 2019-10-29 PROBLEM — G47.00 INSOMNIA: Status: ACTIVE | Noted: 2019-10-29

## 2019-10-29 PROCEDURE — 99215 OFFICE O/P EST HI 40 MIN: CPT | Performed by: NURSE PRACTITIONER

## 2019-10-29 PROCEDURE — 1111F DSCHRG MED/CURRENT MED MERGE: CPT | Performed by: NURSE PRACTITIONER

## 2019-10-29 RX ORDER — TRAZODONE HYDROCHLORIDE 50 MG/1
50 TABLET ORAL NIGHTLY
Qty: 90 TABLET | Refills: 1 | Status: SHIPPED | OUTPATIENT
Start: 2019-10-29 | End: 2019-10-29 | Stop reason: SDUPTHER

## 2019-10-29 RX ORDER — TRAZODONE HYDROCHLORIDE 50 MG/1
50 TABLET ORAL NIGHTLY
Qty: 90 TABLET | Refills: 1 | Status: SHIPPED | OUTPATIENT
Start: 2019-10-29 | End: 2019-11-27

## 2019-11-08 ENCOUNTER — APPOINTMENT (OUTPATIENT)
Dept: GENERAL RADIOLOGY | Age: 34
DRG: 175 | End: 2019-11-08
Payer: COMMERCIAL

## 2019-11-08 ENCOUNTER — HOSPITAL ENCOUNTER (INPATIENT)
Age: 34
LOS: 6 days | Discharge: HOME | DRG: 175 | End: 2019-11-14
Attending: INTERNAL MEDICINE | Admitting: INTERNAL MEDICINE
Payer: COMMERCIAL

## 2019-11-08 ENCOUNTER — APPOINTMENT (OUTPATIENT)
Dept: CT IMAGING | Age: 34
DRG: 175 | End: 2019-11-08
Payer: COMMERCIAL

## 2019-11-08 DIAGNOSIS — Z86.711 HISTORY OF PULMONARY EMBOLUS (PE): ICD-10-CM

## 2019-11-08 DIAGNOSIS — I42.7 CARDIOMYOPATHY SECONDARY TO DRUG (HCC): ICD-10-CM

## 2019-11-08 DIAGNOSIS — J18.9 PNEUMONIA DUE TO ORGANISM: Primary | ICD-10-CM

## 2019-11-08 PROBLEM — R00.0 TACHYCARDIA: Status: ACTIVE | Noted: 2019-11-08

## 2019-11-08 LAB
ALBUMIN SERPL-MCNC: 3.2 G/DL (ref 3.5–5.2)
ALP BLD-CCNC: 183 U/L (ref 40–130)
ALT SERPL-CCNC: 19 U/L (ref 5–41)
AMPHETAMINE SCREEN, URINE: NEGATIVE
ANION GAP SERPL CALCULATED.3IONS-SCNC: 12 MMOL/L (ref 7–19)
APTT: 161.1 SEC (ref 26–36.2)
AST SERPL-CCNC: 31 U/L (ref 5–40)
BARBITURATE SCREEN URINE: NEGATIVE
BASE EXCESS ARTERIAL: 2.8 MMOL/L (ref -2–2)
BASE EXCESS ARTERIAL: 5.9 MMOL/L (ref -2–2)
BASOPHILS ABSOLUTE: 0.1 K/UL (ref 0–0.2)
BASOPHILS RELATIVE PERCENT: 1.1 % (ref 0–1)
BENZODIAZEPINE SCREEN, URINE: POSITIVE
BILIRUB SERPL-MCNC: 0.8 MG/DL (ref 0.2–1.2)
BILIRUBIN URINE: NEGATIVE
BLOOD, URINE: NEGATIVE
BUN BLDV-MCNC: 15 MG/DL (ref 6–20)
CALCIUM SERPL-MCNC: 8.9 MG/DL (ref 8.6–10)
CANNABINOID SCREEN URINE: NEGATIVE
CARBOXYHEMOGLOBIN ARTERIAL: 2.9 % (ref 0–5)
CARBOXYHEMOGLOBIN ARTERIAL: 3.2 % (ref 0–5)
CHLORIDE BLD-SCNC: 96 MMOL/L (ref 98–111)
CLARITY: CLEAR
CO2: 25 MMOL/L (ref 22–29)
COCAINE METABOLITE SCREEN URINE: NEGATIVE
COLOR: YELLOW
CREAT SERPL-MCNC: 0.9 MG/DL (ref 0.5–1.2)
EOSINOPHILS ABSOLUTE: 0.1 K/UL (ref 0–0.6)
EOSINOPHILS RELATIVE PERCENT: 1.3 % (ref 0–5)
GFR NON-AFRICAN AMERICAN: >60
GLUCOSE BLD-MCNC: 110 MG/DL (ref 74–109)
GLUCOSE URINE: NEGATIVE MG/DL
HCO3 ARTERIAL: 25.5 MMOL/L (ref 22–26)
HCO3 ARTERIAL: 29.7 MMOL/L (ref 22–26)
HCT VFR BLD CALC: 33.5 % (ref 42–52)
HEMOGLOBIN, ART, EXTENDED: 10.7 G/DL (ref 14–18)
HEMOGLOBIN, ART, EXTENDED: 11.7 G/DL (ref 14–18)
HEMOGLOBIN: 10.5 G/DL (ref 14–18)
IMMATURE GRANULOCYTES #: 0 K/UL
KETONES, URINE: NEGATIVE MG/DL
LACTIC ACID: 1.3 MMOL/L (ref 0.5–1.9)
LEUKOCYTE ESTERASE, URINE: NEGATIVE
LV EF: 15 %
LVEF MODALITY: NORMAL
LYMPHOCYTES ABSOLUTE: 1.2 K/UL (ref 1.1–4.5)
LYMPHOCYTES RELATIVE PERCENT: 17.4 % (ref 20–40)
Lab: ABNORMAL
MCH RBC QN AUTO: 27.1 PG (ref 27–31)
MCHC RBC AUTO-ENTMCNC: 31.3 G/DL (ref 33–37)
MCV RBC AUTO: 86.6 FL (ref 80–94)
METHEMOGLOBIN ARTERIAL: 1 %
METHEMOGLOBIN ARTERIAL: 1.1 %
MONOCYTES ABSOLUTE: 0.8 K/UL (ref 0–0.9)
MONOCYTES RELATIVE PERCENT: 11.1 % (ref 0–10)
NEUTROPHILS ABSOLUTE: 4.8 K/UL (ref 1.5–7.5)
NEUTROPHILS RELATIVE PERCENT: 68.5 % (ref 50–65)
NITRITE, URINE: NEGATIVE
O2 CONTENT ARTERIAL: 13.9 ML/DL
O2 CONTENT ARTERIAL: 15.7 ML/DL
O2 SAT, ARTERIAL: 91.8 %
O2 SAT, ARTERIAL: 94.8 %
O2 THERAPY: ABNORMAL
O2 THERAPY: ABNORMAL
OPIATE SCREEN URINE: NEGATIVE
PCO2 ARTERIAL: 32 MMHG (ref 35–45)
PCO2 ARTERIAL: 39 MMHG (ref 35–45)
PDW BLD-RTO: 19.9 % (ref 11.5–14.5)
PH ARTERIAL: 7.49 (ref 7.35–7.45)
PH ARTERIAL: 7.51 (ref 7.35–7.45)
PH UA: 7 (ref 5–8)
PLATELET # BLD: 285 K/UL (ref 130–400)
PMV BLD AUTO: 9.6 FL (ref 9.4–12.4)
PO2 ARTERIAL: 64 MMHG (ref 80–100)
PO2 ARTERIAL: 89 MMHG (ref 80–100)
POTASSIUM REFLEX MAGNESIUM: 4.1 MMOL/L (ref 3.5–5)
POTASSIUM, WHOLE BLOOD: 3.9
POTASSIUM, WHOLE BLOOD: 4.2
PRO-BNP: 3119 PG/ML (ref 0–450)
PROTEIN UA: ABNORMAL MG/DL
RBC # BLD: 3.87 M/UL (ref 4.7–6.1)
SODIUM BLD-SCNC: 133 MMOL/L (ref 136–145)
SPECIFIC GRAVITY UA: 1.01 (ref 1–1.03)
TOTAL PROTEIN: 7.2 G/DL (ref 6.6–8.7)
TROPONIN: 0.01 NG/ML (ref 0–0.03)
TROPONIN: 0.01 NG/ML (ref 0–0.03)
TROPONIN: <0.01 NG/ML (ref 0–0.03)
URINE REFLEX TO CULTURE: ABNORMAL
UROBILINOGEN, URINE: 1 E.U./DL
WBC # BLD: 7 K/UL (ref 4.8–10.8)

## 2019-11-08 PROCEDURE — 99285 EMERGENCY DEPT VISIT HI MDM: CPT

## 2019-11-08 PROCEDURE — 6370000000 HC RX 637 (ALT 250 FOR IP): Performed by: INTERNAL MEDICINE

## 2019-11-08 PROCEDURE — 6360000004 HC RX CONTRAST MEDICATION: Performed by: NURSE PRACTITIONER

## 2019-11-08 PROCEDURE — 96375 TX/PRO/DX INJ NEW DRUG ADDON: CPT

## 2019-11-08 PROCEDURE — 2500000003 HC RX 250 WO HCPCS: Performed by: PHYSICIAN ASSISTANT

## 2019-11-08 PROCEDURE — 81003 URINALYSIS AUTO W/O SCOPE: CPT

## 2019-11-08 PROCEDURE — 83880 ASSAY OF NATRIURETIC PEPTIDE: CPT

## 2019-11-08 PROCEDURE — 6360000002 HC RX W HCPCS: Performed by: INTERNAL MEDICINE

## 2019-11-08 PROCEDURE — 94640 AIRWAY INHALATION TREATMENT: CPT

## 2019-11-08 PROCEDURE — 87040 BLOOD CULTURE FOR BACTERIA: CPT

## 2019-11-08 PROCEDURE — 71275 CT ANGIOGRAPHY CHEST: CPT

## 2019-11-08 PROCEDURE — 6370000000 HC RX 637 (ALT 250 FOR IP): Performed by: PHYSICIAN ASSISTANT

## 2019-11-08 PROCEDURE — 2580000003 HC RX 258: Performed by: NURSE PRACTITIONER

## 2019-11-08 PROCEDURE — 96365 THER/PROPH/DIAG IV INF INIT: CPT

## 2019-11-08 PROCEDURE — 84132 ASSAY OF SERUM POTASSIUM: CPT

## 2019-11-08 PROCEDURE — 83605 ASSAY OF LACTIC ACID: CPT

## 2019-11-08 PROCEDURE — 80053 COMPREHEN METABOLIC PANEL: CPT

## 2019-11-08 PROCEDURE — 80307 DRUG TEST PRSMV CHEM ANLYZR: CPT

## 2019-11-08 PROCEDURE — 36415 COLL VENOUS BLD VENIPUNCTURE: CPT

## 2019-11-08 PROCEDURE — 6360000002 HC RX W HCPCS: Performed by: NURSE PRACTITIONER

## 2019-11-08 PROCEDURE — 71046 X-RAY EXAM CHEST 2 VIEWS: CPT

## 2019-11-08 PROCEDURE — 93306 TTE W/DOPPLER COMPLETE: CPT

## 2019-11-08 PROCEDURE — 6360000002 HC RX W HCPCS: Performed by: PHYSICIAN ASSISTANT

## 2019-11-08 PROCEDURE — 2580000003 HC RX 258: Performed by: INTERNAL MEDICINE

## 2019-11-08 PROCEDURE — 85025 COMPLETE CBC W/AUTO DIFF WBC: CPT

## 2019-11-08 PROCEDURE — 36600 WITHDRAWAL OF ARTERIAL BLOOD: CPT

## 2019-11-08 PROCEDURE — 6370000000 HC RX 637 (ALT 250 FOR IP): Performed by: NURSE PRACTITIONER

## 2019-11-08 PROCEDURE — 82803 BLOOD GASES ANY COMBINATION: CPT

## 2019-11-08 PROCEDURE — 84484 ASSAY OF TROPONIN QUANT: CPT

## 2019-11-08 PROCEDURE — 85730 THROMBOPLASTIN TIME PARTIAL: CPT

## 2019-11-08 PROCEDURE — 93005 ELECTROCARDIOGRAM TRACING: CPT

## 2019-11-08 PROCEDURE — 2100000000 HC CCU R&B

## 2019-11-08 RX ORDER — HEPARIN SODIUM 10000 [USP'U]/100ML
18 INJECTION, SOLUTION INTRAVENOUS CONTINUOUS
Status: DISCONTINUED | OUTPATIENT
Start: 2019-11-08 | End: 2019-11-12

## 2019-11-08 RX ORDER — METOPROLOL SUCCINATE 50 MG/1
100 TABLET, EXTENDED RELEASE ORAL 2 TIMES DAILY
Status: DISCONTINUED | OUTPATIENT
Start: 2019-11-08 | End: 2019-11-09

## 2019-11-08 RX ORDER — ASPIRIN 81 MG/1
81 TABLET, CHEWABLE ORAL DAILY
Status: DISCONTINUED | OUTPATIENT
Start: 2019-11-08 | End: 2019-11-14 | Stop reason: HOSPADM

## 2019-11-08 RX ORDER — HEPARIN SODIUM 1000 [USP'U]/ML
80 INJECTION, SOLUTION INTRAVENOUS; SUBCUTANEOUS ONCE
Status: COMPLETED | OUTPATIENT
Start: 2019-11-08 | End: 2019-11-08

## 2019-11-08 RX ORDER — FUROSEMIDE 10 MG/ML
40 INJECTION INTRAMUSCULAR; INTRAVENOUS 2 TIMES DAILY
Status: DISCONTINUED | OUTPATIENT
Start: 2019-11-08 | End: 2019-11-09

## 2019-11-08 RX ORDER — HEPARIN SODIUM 1000 [USP'U]/ML
80 INJECTION, SOLUTION INTRAVENOUS; SUBCUTANEOUS PRN
Status: DISCONTINUED | OUTPATIENT
Start: 2019-11-08 | End: 2019-11-12

## 2019-11-08 RX ORDER — HEPARIN SODIUM 1000 [USP'U]/ML
40 INJECTION, SOLUTION INTRAVENOUS; SUBCUTANEOUS PRN
Status: DISCONTINUED | OUTPATIENT
Start: 2019-11-08 | End: 2019-11-12

## 2019-11-08 RX ORDER — SODIUM CHLORIDE 0.9 % (FLUSH) 0.9 %
10 SYRINGE (ML) INJECTION EVERY 12 HOURS SCHEDULED
Status: DISCONTINUED | OUTPATIENT
Start: 2019-11-08 | End: 2019-11-14 | Stop reason: HOSPADM

## 2019-11-08 RX ORDER — ONDANSETRON 2 MG/ML
4 INJECTION INTRAMUSCULAR; INTRAVENOUS EVERY 6 HOURS PRN
Status: DISCONTINUED | OUTPATIENT
Start: 2019-11-08 | End: 2019-11-14 | Stop reason: HOSPADM

## 2019-11-08 RX ORDER — IPRATROPIUM BROMIDE AND ALBUTEROL SULFATE 2.5; .5 MG/3ML; MG/3ML
1 SOLUTION RESPIRATORY (INHALATION) ONCE
Status: COMPLETED | OUTPATIENT
Start: 2019-11-08 | End: 2019-11-08

## 2019-11-08 RX ORDER — CEFEPIME HYDROCHLORIDE 2 G/50ML
2 INJECTION, SOLUTION INTRAVENOUS EVERY 8 HOURS
Status: DISCONTINUED | OUTPATIENT
Start: 2019-11-08 | End: 2019-11-08 | Stop reason: SDUPTHER

## 2019-11-08 RX ORDER — ACETAMINOPHEN 325 MG/1
650 TABLET ORAL EVERY 4 HOURS PRN
Status: DISCONTINUED | OUTPATIENT
Start: 2019-11-08 | End: 2019-11-14 | Stop reason: HOSPADM

## 2019-11-08 RX ORDER — METOPROLOL TARTRATE 5 MG/5ML
5 INJECTION INTRAVENOUS ONCE
Status: COMPLETED | OUTPATIENT
Start: 2019-11-08 | End: 2019-11-08

## 2019-11-08 RX ORDER — SODIUM CHLORIDE 0.9 % (FLUSH) 0.9 %
10 SYRINGE (ML) INJECTION PRN
Status: DISCONTINUED | OUTPATIENT
Start: 2019-11-08 | End: 2019-11-14 | Stop reason: HOSPADM

## 2019-11-08 RX ORDER — IPRATROPIUM BROMIDE AND ALBUTEROL SULFATE 2.5; .5 MG/3ML; MG/3ML
1 SOLUTION RESPIRATORY (INHALATION)
Status: DISCONTINUED | OUTPATIENT
Start: 2019-11-08 | End: 2019-11-14 | Stop reason: HOSPADM

## 2019-11-08 RX ORDER — LISINOPRIL 5 MG/1
5 TABLET ORAL DAILY
Status: DISCONTINUED | OUTPATIENT
Start: 2019-11-08 | End: 2019-11-09

## 2019-11-08 RX ADMIN — FUROSEMIDE 40 MG: 10 INJECTION, SOLUTION INTRAMUSCULAR; INTRAVENOUS at 20:55

## 2019-11-08 RX ADMIN — Medication 2 G: at 13:36

## 2019-11-08 RX ADMIN — IPRATROPIUM BROMIDE AND ALBUTEROL SULFATE 1 AMPULE: .5; 3 SOLUTION RESPIRATORY (INHALATION) at 10:41

## 2019-11-08 RX ADMIN — ASPIRIN 81 MG 81 MG: 81 TABLET ORAL at 16:40

## 2019-11-08 RX ADMIN — IPRATROPIUM BROMIDE AND ALBUTEROL SULFATE 1 AMPULE: .5; 3 SOLUTION RESPIRATORY (INHALATION) at 19:16

## 2019-11-08 RX ADMIN — Medication 10 ML: at 20:55

## 2019-11-08 RX ADMIN — Medication 2 G: at 20:54

## 2019-11-08 RX ADMIN — IOPAMIDOL 90 ML: 755 INJECTION, SOLUTION INTRAVENOUS at 13:20

## 2019-11-08 RX ADMIN — VANCOMYCIN HYDROCHLORIDE 1500 MG: 10 INJECTION, POWDER, LYOPHILIZED, FOR SOLUTION INTRAVENOUS at 17:17

## 2019-11-08 RX ADMIN — HEPARIN SODIUM 7440 UNITS: 1000 INJECTION INTRAVENOUS; SUBCUTANEOUS at 16:34

## 2019-11-08 RX ADMIN — LISINOPRIL 5 MG: 5 TABLET ORAL at 16:40

## 2019-11-08 RX ADMIN — METOPROLOL SUCCINATE 100 MG: 50 TABLET, EXTENDED RELEASE ORAL at 20:55

## 2019-11-08 RX ADMIN — METOPROLOL SUCCINATE 100 MG: 50 TABLET, EXTENDED RELEASE ORAL at 16:40

## 2019-11-08 RX ADMIN — METOPROLOL TARTRATE 5 MG: 5 INJECTION, SOLUTION INTRAVENOUS at 14:50

## 2019-11-08 RX ADMIN — Medication 500 MG: at 12:50

## 2019-11-08 RX ADMIN — HEPARIN SODIUM 18 UNITS/KG/HR: 10000 INJECTION, SOLUTION INTRAVENOUS at 16:34

## 2019-11-08 ASSESSMENT — ENCOUNTER SYMPTOMS
EYE REDNESS: 0
ABDOMINAL PAIN: 1
ABDOMINAL PAIN: 0
TROUBLE SWALLOWING: 0
EYE ITCHING: 0
EYE DISCHARGE: 0
SHORTNESS OF BREATH: 1
VOMITING: 0
COUGH: 1
NAUSEA: 0
GASTROINTESTINAL NEGATIVE: 1
WHEEZING: 1
DIARRHEA: 0
CONSTIPATION: 0
PHOTOPHOBIA: 0
SORE THROAT: 0

## 2019-11-09 ENCOUNTER — APPOINTMENT (OUTPATIENT)
Dept: CT IMAGING | Age: 34
DRG: 175 | End: 2019-11-09
Payer: COMMERCIAL

## 2019-11-09 LAB
ANION GAP SERPL CALCULATED.3IONS-SCNC: 14 MMOL/L (ref 7–19)
APTT: 59.8 SEC (ref 26–36.2)
APTT: 62.5 SEC (ref 26–36.2)
APTT: 66.9 SEC (ref 26–36.2)
APTT: 74 SEC (ref 26–36.2)
AT-III ACTIVITY: 65 % ACTIVITY (ref 83–121)
BASOPHILS ABSOLUTE: 0.1 K/UL (ref 0–0.2)
BASOPHILS RELATIVE PERCENT: 1.6 % (ref 0–1)
BUN BLDV-MCNC: 13 MG/DL (ref 6–20)
CALCIUM SERPL-MCNC: 8.8 MG/DL (ref 8.6–10)
CHLORIDE BLD-SCNC: 98 MMOL/L (ref 98–111)
CO2: 25 MMOL/L (ref 22–29)
CREAT SERPL-MCNC: 0.8 MG/DL (ref 0.5–1.2)
EOSINOPHILS ABSOLUTE: 0.2 K/UL (ref 0–0.6)
EOSINOPHILS RELATIVE PERCENT: 2.3 % (ref 0–5)
GFR NON-AFRICAN AMERICAN: >60
GLUCOSE BLD-MCNC: 148 MG/DL (ref 74–109)
HCT VFR BLD CALC: 33.2 % (ref 42–52)
HEMOGLOBIN: 10.2 G/DL (ref 14–18)
IMMATURE GRANULOCYTES #: 0 K/UL
LYMPHOCYTES ABSOLUTE: 1.4 K/UL (ref 1.1–4.5)
LYMPHOCYTES RELATIVE PERCENT: 20.7 % (ref 20–40)
MCH RBC QN AUTO: 26.5 PG (ref 27–31)
MCHC RBC AUTO-ENTMCNC: 30.7 G/DL (ref 33–37)
MCV RBC AUTO: 86.2 FL (ref 80–94)
MONOCYTES ABSOLUTE: 0.8 K/UL (ref 0–0.9)
MONOCYTES RELATIVE PERCENT: 11.5 % (ref 0–10)
NEUTROPHILS ABSOLUTE: 4.4 K/UL (ref 1.5–7.5)
NEUTROPHILS RELATIVE PERCENT: 63.5 % (ref 50–65)
PDW BLD-RTO: 19.9 % (ref 11.5–14.5)
PLATELET # BLD: 292 K/UL (ref 130–400)
PMV BLD AUTO: 10 FL (ref 9.4–12.4)
POTASSIUM REFLEX MAGNESIUM: 3.9 MMOL/L (ref 3.5–5)
PRO-BNP: 4129 PG/ML (ref 0–450)
RBC # BLD: 3.85 M/UL (ref 4.7–6.1)
SODIUM BLD-SCNC: 137 MMOL/L (ref 136–145)
WBC # BLD: 6.9 K/UL (ref 4.8–10.8)

## 2019-11-09 PROCEDURE — 81241 F5 GENE: CPT

## 2019-11-09 PROCEDURE — 2100000000 HC CCU R&B

## 2019-11-09 PROCEDURE — 99255 IP/OBS CONSLTJ NEW/EST HI 80: CPT | Performed by: INTERNAL MEDICINE

## 2019-11-09 PROCEDURE — 80048 BASIC METABOLIC PNL TOTAL CA: CPT

## 2019-11-09 PROCEDURE — 6360000002 HC RX W HCPCS: Performed by: INTERNAL MEDICINE

## 2019-11-09 PROCEDURE — 2700000000 HC OXYGEN THERAPY PER DAY

## 2019-11-09 PROCEDURE — 2580000003 HC RX 258: Performed by: INTERNAL MEDICINE

## 2019-11-09 PROCEDURE — 6370000000 HC RX 637 (ALT 250 FOR IP): Performed by: INTERNAL MEDICINE

## 2019-11-09 PROCEDURE — 86146 BETA-2 GLYCOPROTEIN ANTIBODY: CPT

## 2019-11-09 PROCEDURE — 81291 MTHFR GENE: CPT

## 2019-11-09 PROCEDURE — 83880 ASSAY OF NATRIURETIC PEPTIDE: CPT

## 2019-11-09 PROCEDURE — 85302 CLOT INHIBIT PROT C ANTIGEN: CPT

## 2019-11-09 PROCEDURE — 2500000003 HC RX 250 WO HCPCS: Performed by: INTERNAL MEDICINE

## 2019-11-09 PROCEDURE — 85300 ANTITHROMBIN III ACTIVITY: CPT

## 2019-11-09 PROCEDURE — 99291 CRITICAL CARE FIRST HOUR: CPT | Performed by: INTERNAL MEDICINE

## 2019-11-09 PROCEDURE — 81240 F2 GENE: CPT

## 2019-11-09 PROCEDURE — 83516 IMMUNOASSAY NONANTIBODY: CPT

## 2019-11-09 PROCEDURE — 86147 CARDIOLIPIN ANTIBODY EA IG: CPT

## 2019-11-09 PROCEDURE — 85730 THROMBOPLASTIN TIME PARTIAL: CPT

## 2019-11-09 PROCEDURE — 85306 CLOT INHIBIT PROT S FREE: CPT

## 2019-11-09 PROCEDURE — 85025 COMPLETE CBC W/AUTO DIFF WBC: CPT

## 2019-11-09 PROCEDURE — 94640 AIRWAY INHALATION TREATMENT: CPT

## 2019-11-09 PROCEDURE — 36415 COLL VENOUS BLD VENIPUNCTURE: CPT

## 2019-11-09 PROCEDURE — 74176 CT ABD & PELVIS W/O CONTRAST: CPT

## 2019-11-09 RX ORDER — SODIUM CHLORIDE 0.9 % (FLUSH) 0.9 %
10 SYRINGE (ML) INJECTION EVERY 12 HOURS SCHEDULED
Status: CANCELLED | OUTPATIENT
Start: 2019-11-09

## 2019-11-09 RX ORDER — CARVEDILOL 12.5 MG/1
12.5 TABLET ORAL 2 TIMES DAILY WITH MEALS
Status: DISCONTINUED | OUTPATIENT
Start: 2019-11-10 | End: 2019-11-14 | Stop reason: HOSPADM

## 2019-11-09 RX ORDER — UREA 10 %
1 LOTION (ML) TOPICAL NIGHTLY PRN
Status: DISCONTINUED | OUTPATIENT
Start: 2019-11-09 | End: 2019-11-14 | Stop reason: HOSPADM

## 2019-11-09 RX ORDER — SODIUM CHLORIDE 0.9 % (FLUSH) 0.9 %
10 SYRINGE (ML) INJECTION PRN
Status: CANCELLED | OUTPATIENT
Start: 2019-11-09

## 2019-11-09 RX ORDER — DEXTROMETHORPHAN POLISTIREX 30 MG/5ML
30 SUSPENSION ORAL EVERY 12 HOURS SCHEDULED
Status: DISCONTINUED | OUTPATIENT
Start: 2019-11-09 | End: 2019-11-14 | Stop reason: HOSPADM

## 2019-11-09 RX ORDER — MILRINONE LACTATE 0.2 MG/ML
0.5 INJECTION, SOLUTION INTRAVENOUS CONTINUOUS
Status: DISCONTINUED | OUTPATIENT
Start: 2019-11-09 | End: 2019-11-12

## 2019-11-09 RX ORDER — MILRINONE LACTATE 1 MG/ML
50 INJECTION, SOLUTION INTRAVENOUS ONCE
Status: COMPLETED | OUTPATIENT
Start: 2019-11-09 | End: 2019-11-09

## 2019-11-09 RX ORDER — SPIRONOLACTONE 25 MG/1
25 TABLET ORAL DAILY
Status: DISCONTINUED | OUTPATIENT
Start: 2019-11-10 | End: 2019-11-14 | Stop reason: HOSPADM

## 2019-11-09 RX ADMIN — ASPIRIN 81 MG 81 MG: 81 TABLET ORAL at 08:53

## 2019-11-09 RX ADMIN — BUMETANIDE 0.5 MG/HR: 0.25 INJECTION INTRAMUSCULAR; INTRAVENOUS at 23:00

## 2019-11-09 RX ADMIN — Medication 10 ML: at 20:48

## 2019-11-09 RX ADMIN — VANCOMYCIN HYDROCHLORIDE 1500 MG: 10 INJECTION, POWDER, LYOPHILIZED, FOR SOLUTION INTRAVENOUS at 03:50

## 2019-11-09 RX ADMIN — FUROSEMIDE 40 MG: 10 INJECTION, SOLUTION INTRAMUSCULAR; INTRAVENOUS at 08:53

## 2019-11-09 RX ADMIN — VANCOMYCIN HYDROCHLORIDE 1500 MG: 10 INJECTION, POWDER, LYOPHILIZED, FOR SOLUTION INTRAVENOUS at 17:06

## 2019-11-09 RX ADMIN — LISINOPRIL 5 MG: 5 TABLET ORAL at 08:53

## 2019-11-09 RX ADMIN — Medication 2 G: at 13:57

## 2019-11-09 RX ADMIN — IPRATROPIUM BROMIDE AND ALBUTEROL SULFATE 1 AMPULE: .5; 3 SOLUTION RESPIRATORY (INHALATION) at 10:35

## 2019-11-09 RX ADMIN — IPRATROPIUM BROMIDE AND ALBUTEROL SULFATE 1 AMPULE: .5; 3 SOLUTION RESPIRATORY (INHALATION) at 14:34

## 2019-11-09 RX ADMIN — IPRATROPIUM BROMIDE AND ALBUTEROL SULFATE 1 AMPULE: .5; 3 SOLUTION RESPIRATORY (INHALATION) at 07:02

## 2019-11-09 RX ADMIN — Medication 10 ML: at 08:53

## 2019-11-09 RX ADMIN — METOPROLOL SUCCINATE 100 MG: 50 TABLET, EXTENDED RELEASE ORAL at 08:53

## 2019-11-09 RX ADMIN — Medication 1 MG: at 20:47

## 2019-11-09 RX ADMIN — Medication 30 MG: at 20:46

## 2019-11-09 RX ADMIN — MILRINONE LACTATE IN DEXTROSE 0.5 MCG/KG/MIN: 200 INJECTION, SOLUTION INTRAVENOUS at 23:02

## 2019-11-09 RX ADMIN — HEPARIN SODIUM 14 UNITS/KG/HR: 10000 INJECTION, SOLUTION INTRAVENOUS at 11:58

## 2019-11-09 RX ADMIN — MILRINONE LACTATE 4800 MCG: 1 INJECTION, SOLUTION INTRAVENOUS at 22:53

## 2019-11-09 RX ADMIN — ACETAMINOPHEN 650 MG: 325 TABLET ORAL at 20:46

## 2019-11-09 RX ADMIN — IPRATROPIUM BROMIDE AND ALBUTEROL SULFATE 1 AMPULE: .5; 3 SOLUTION RESPIRATORY (INHALATION) at 18:21

## 2019-11-09 RX ADMIN — FUROSEMIDE 40 MG: 10 INJECTION, SOLUTION INTRAMUSCULAR; INTRAVENOUS at 17:06

## 2019-11-09 RX ADMIN — METOPROLOL SUCCINATE 100 MG: 50 TABLET, EXTENDED RELEASE ORAL at 20:46

## 2019-11-09 RX ADMIN — ONDANSETRON 4 MG: 2 INJECTION INTRAMUSCULAR; INTRAVENOUS at 17:07

## 2019-11-09 RX ADMIN — AZITHROMYCIN DIHYDRATE 500 MG: 500 INJECTION, POWDER, LYOPHILIZED, FOR SOLUTION INTRAVENOUS at 13:54

## 2019-11-09 RX ADMIN — Medication 2 G: at 20:46

## 2019-11-09 RX ADMIN — Medication 2 G: at 06:10

## 2019-11-09 ASSESSMENT — ENCOUNTER SYMPTOMS
EYE REDNESS: 0
PHOTOPHOBIA: 0
SHORTNESS OF BREATH: 1
CONSTIPATION: 0
VOMITING: 0
EYE DISCHARGE: 0
NAUSEA: 0
EYE ITCHING: 0
TROUBLE SWALLOWING: 0
DIARRHEA: 0
ABDOMINAL PAIN: 1
SORE THROAT: 0
COUGH: 1

## 2019-11-09 ASSESSMENT — PAIN DESCRIPTION - PAIN TYPE: TYPE: ACUTE PAIN

## 2019-11-09 ASSESSMENT — PAIN SCALES - GENERAL
PAINLEVEL_OUTOF10: 0
PAINLEVEL_OUTOF10: 3
PAINLEVEL_OUTOF10: 0
PAINLEVEL_OUTOF10: 4

## 2019-11-09 ASSESSMENT — PAIN DESCRIPTION - FREQUENCY: FREQUENCY: OTHER (COMMENT)

## 2019-11-09 ASSESSMENT — PAIN DESCRIPTION - LOCATION: LOCATION: THROAT

## 2019-11-10 LAB
ANION GAP SERPL CALCULATED.3IONS-SCNC: 16 MMOL/L (ref 7–19)
APTT: 51.3 SEC (ref 26–36.2)
APTT: 57.2 SEC (ref 26–36.2)
APTT: 60.9 SEC (ref 26–36.2)
BUN BLDV-MCNC: 12 MG/DL (ref 6–20)
CALCIUM SERPL-MCNC: 8.4 MG/DL (ref 8.6–10)
CHLORIDE BLD-SCNC: 94 MMOL/L (ref 98–111)
CO2: 25 MMOL/L (ref 22–29)
CREAT SERPL-MCNC: 0.8 MG/DL (ref 0.5–1.2)
GFR NON-AFRICAN AMERICAN: >60
GLUCOSE BLD-MCNC: 90 MG/DL (ref 74–109)
PLATELET # BLD: 284 K/UL (ref 130–400)
POTASSIUM SERPL-SCNC: 3.8 MMOL/L (ref 3.5–5)
SODIUM BLD-SCNC: 135 MMOL/L (ref 136–145)
VANCOMYCIN TROUGH: 23.7 UG/ML (ref 10–20)

## 2019-11-10 PROCEDURE — 2100000000 HC CCU R&B

## 2019-11-10 PROCEDURE — 2500000003 HC RX 250 WO HCPCS: Performed by: INTERNAL MEDICINE

## 2019-11-10 PROCEDURE — 85049 AUTOMATED PLATELET COUNT: CPT

## 2019-11-10 PROCEDURE — 6360000002 HC RX W HCPCS: Performed by: INTERNAL MEDICINE

## 2019-11-10 PROCEDURE — 2580000003 HC RX 258: Performed by: INTERNAL MEDICINE

## 2019-11-10 PROCEDURE — 36415 COLL VENOUS BLD VENIPUNCTURE: CPT

## 2019-11-10 PROCEDURE — 6370000000 HC RX 637 (ALT 250 FOR IP): Performed by: INTERNAL MEDICINE

## 2019-11-10 PROCEDURE — 85730 THROMBOPLASTIN TIME PARTIAL: CPT

## 2019-11-10 PROCEDURE — 94640 AIRWAY INHALATION TREATMENT: CPT

## 2019-11-10 PROCEDURE — 99233 SBSQ HOSP IP/OBS HIGH 50: CPT | Performed by: INTERNAL MEDICINE

## 2019-11-10 PROCEDURE — 99291 CRITICAL CARE FIRST HOUR: CPT | Performed by: INTERNAL MEDICINE

## 2019-11-10 PROCEDURE — 2700000000 HC OXYGEN THERAPY PER DAY

## 2019-11-10 PROCEDURE — 80202 ASSAY OF VANCOMYCIN: CPT

## 2019-11-10 PROCEDURE — 80048 BASIC METABOLIC PNL TOTAL CA: CPT

## 2019-11-10 RX ORDER — NICOTINE 21 MG/24HR
1 PATCH, TRANSDERMAL 24 HOURS TRANSDERMAL DAILY
Status: DISCONTINUED | OUTPATIENT
Start: 2019-11-10 | End: 2019-11-14 | Stop reason: HOSPADM

## 2019-11-10 RX ADMIN — ASPIRIN 81 MG 81 MG: 81 TABLET ORAL at 08:14

## 2019-11-10 RX ADMIN — Medication 30 MG: at 20:22

## 2019-11-10 RX ADMIN — CARVEDILOL 12.5 MG: 12.5 TABLET, FILM COATED ORAL at 16:28

## 2019-11-10 RX ADMIN — MILRINONE LACTATE IN DEXTROSE 0.5 MCG/KG/MIN: 200 INJECTION, SOLUTION INTRAVENOUS at 20:24

## 2019-11-10 RX ADMIN — Medication 1 MG: at 21:23

## 2019-11-10 RX ADMIN — MILRINONE LACTATE IN DEXTROSE 0.5 MCG/KG/MIN: 200 INJECTION, SOLUTION INTRAVENOUS at 12:43

## 2019-11-10 RX ADMIN — Medication 2 G: at 06:15

## 2019-11-10 RX ADMIN — VANCOMYCIN HYDROCHLORIDE 1250 MG: 10 INJECTION, POWDER, LYOPHILIZED, FOR SOLUTION INTRAVENOUS at 08:14

## 2019-11-10 RX ADMIN — SPIRONOLACTONE 25 MG: 25 TABLET ORAL at 08:14

## 2019-11-10 RX ADMIN — Medication 2 G: at 14:20

## 2019-11-10 RX ADMIN — Medication 30 MG: at 08:14

## 2019-11-10 RX ADMIN — SACUBITRIL AND VALSARTAN 1 TABLET: 24; 26 TABLET, FILM COATED ORAL at 20:22

## 2019-11-10 RX ADMIN — IPRATROPIUM BROMIDE AND ALBUTEROL SULFATE 1 AMPULE: .5; 3 SOLUTION RESPIRATORY (INHALATION) at 10:39

## 2019-11-10 RX ADMIN — CARVEDILOL 12.5 MG: 12.5 TABLET, FILM COATED ORAL at 08:14

## 2019-11-10 RX ADMIN — AZITHROMYCIN DIHYDRATE 500 MG: 500 INJECTION, POWDER, LYOPHILIZED, FOR SOLUTION INTRAVENOUS at 12:43

## 2019-11-10 RX ADMIN — IPRATROPIUM BROMIDE AND ALBUTEROL SULFATE 1 AMPULE: .5; 3 SOLUTION RESPIRATORY (INHALATION) at 06:56

## 2019-11-10 RX ADMIN — BUMETANIDE 0.5 MG/HR: 0.25 INJECTION INTRAMUSCULAR; INTRAVENOUS at 18:22

## 2019-11-10 RX ADMIN — Medication 10 ML: at 08:21

## 2019-11-10 RX ADMIN — IPRATROPIUM BROMIDE AND ALBUTEROL SULFATE 1 AMPULE: .5; 3 SOLUTION RESPIRATORY (INHALATION) at 18:42

## 2019-11-10 RX ADMIN — Medication 10 ML: at 20:23

## 2019-11-10 RX ADMIN — VANCOMYCIN HYDROCHLORIDE 1250 MG: 10 INJECTION, POWDER, LYOPHILIZED, FOR SOLUTION INTRAVENOUS at 20:23

## 2019-11-10 RX ADMIN — HEPARIN SODIUM 14 UNITS/KG/HR: 10000 INJECTION, SOLUTION INTRAVENOUS at 03:49

## 2019-11-10 RX ADMIN — HEPARIN SODIUM 14 UNITS/KG/HR: 10000 INJECTION, SOLUTION INTRAVENOUS at 23:33

## 2019-11-10 RX ADMIN — Medication 2 G: at 20:20

## 2019-11-10 RX ADMIN — MILRINONE LACTATE IN DEXTROSE 0.5 MCG/KG/MIN: 200 INJECTION, SOLUTION INTRAVENOUS at 03:49

## 2019-11-10 RX ADMIN — IPRATROPIUM BROMIDE AND ALBUTEROL SULFATE 1 AMPULE: .5; 3 SOLUTION RESPIRATORY (INHALATION) at 15:47

## 2019-11-10 ASSESSMENT — ENCOUNTER SYMPTOMS
CONSTIPATION: 0
EYE ITCHING: 0
COUGH: 1
VOMITING: 0
ABDOMINAL PAIN: 1
PHOTOPHOBIA: 0
SHORTNESS OF BREATH: 1
TROUBLE SWALLOWING: 0
EYE DISCHARGE: 0
DIARRHEA: 0
EYE REDNESS: 0
SORE THROAT: 0
NAUSEA: 0

## 2019-11-10 ASSESSMENT — PAIN SCALES - GENERAL
PAINLEVEL_OUTOF10: 0
PAINLEVEL_OUTOF10: 0

## 2019-11-11 LAB
ANION GAP SERPL CALCULATED.3IONS-SCNC: 14 MMOL/L (ref 7–19)
APTT: 44.2 SEC (ref 26–36.2)
APTT: 59.8 SEC (ref 26–36.2)
APTT: 60.1 SEC (ref 26–36.2)
APTT: 60.3 SEC (ref 26–36.2)
BUN BLDV-MCNC: 12 MG/DL (ref 6–20)
CALCIUM SERPL-MCNC: 8.3 MG/DL (ref 8.6–10)
CHLORIDE BLD-SCNC: 93 MMOL/L (ref 98–111)
CO2: 29 MMOL/L (ref 22–29)
CREAT SERPL-MCNC: 0.9 MG/DL (ref 0.5–1.2)
GFR NON-AFRICAN AMERICAN: >60
GLUCOSE BLD-MCNC: 94 MG/DL (ref 74–109)
MAGNESIUM: 1.5 MG/DL (ref 1.6–2.6)
POTASSIUM REFLEX MAGNESIUM: 3.1 MMOL/L (ref 3.5–5)
PRO-BNP: 1074 PG/ML (ref 0–450)
PRO-BNP: 1681 PG/ML (ref 0–450)
SODIUM BLD-SCNC: 136 MMOL/L (ref 136–145)
VANCOMYCIN TROUGH: 21 UG/ML (ref 10–20)

## 2019-11-11 PROCEDURE — 94640 AIRWAY INHALATION TREATMENT: CPT

## 2019-11-11 PROCEDURE — 83735 ASSAY OF MAGNESIUM: CPT

## 2019-11-11 PROCEDURE — 2500000003 HC RX 250 WO HCPCS: Performed by: INTERNAL MEDICINE

## 2019-11-11 PROCEDURE — 6370000000 HC RX 637 (ALT 250 FOR IP): Performed by: INTERNAL MEDICINE

## 2019-11-11 PROCEDURE — 6360000002 HC RX W HCPCS: Performed by: INTERNAL MEDICINE

## 2019-11-11 PROCEDURE — 80202 ASSAY OF VANCOMYCIN: CPT

## 2019-11-11 PROCEDURE — 80048 BASIC METABOLIC PNL TOTAL CA: CPT

## 2019-11-11 PROCEDURE — 85730 THROMBOPLASTIN TIME PARTIAL: CPT

## 2019-11-11 PROCEDURE — 93970 EXTREMITY STUDY: CPT

## 2019-11-11 PROCEDURE — 2580000003 HC RX 258: Performed by: INTERNAL MEDICINE

## 2019-11-11 PROCEDURE — 99232 SBSQ HOSP IP/OBS MODERATE 35: CPT | Performed by: INTERNAL MEDICINE

## 2019-11-11 PROCEDURE — 83880 ASSAY OF NATRIURETIC PEPTIDE: CPT

## 2019-11-11 PROCEDURE — 2100000000 HC CCU R&B

## 2019-11-11 PROCEDURE — 36415 COLL VENOUS BLD VENIPUNCTURE: CPT

## 2019-11-11 RX ORDER — MAGNESIUM SULFATE 1 G/100ML
1 INJECTION INTRAVENOUS PRN
Status: DISCONTINUED | OUTPATIENT
Start: 2019-11-11 | End: 2019-11-14 | Stop reason: HOSPADM

## 2019-11-11 RX ORDER — POTASSIUM CHLORIDE 7.45 MG/ML
10 INJECTION INTRAVENOUS PRN
Status: DISCONTINUED | OUTPATIENT
Start: 2019-11-11 | End: 2019-11-14 | Stop reason: HOSPADM

## 2019-11-11 RX ORDER — POTASSIUM CHLORIDE 20 MEQ/1
40 TABLET, EXTENDED RELEASE ORAL PRN
Status: DISCONTINUED | OUTPATIENT
Start: 2019-11-11 | End: 2019-11-14 | Stop reason: HOSPADM

## 2019-11-11 RX ADMIN — MAGNESIUM SULFATE HEPTAHYDRATE 1 G: 1 INJECTION, SOLUTION INTRAVENOUS at 04:23

## 2019-11-11 RX ADMIN — Medication 30 MG: at 20:11

## 2019-11-11 RX ADMIN — POTASSIUM CHLORIDE 40 MEQ: 20 TABLET, EXTENDED RELEASE ORAL at 04:23

## 2019-11-11 RX ADMIN — Medication 1 MG: at 20:11

## 2019-11-11 RX ADMIN — AZITHROMYCIN DIHYDRATE 500 MG: 500 INJECTION, POWDER, LYOPHILIZED, FOR SOLUTION INTRAVENOUS at 13:17

## 2019-11-11 RX ADMIN — MAGNESIUM SULFATE HEPTAHYDRATE 1 G: 1 INJECTION, SOLUTION INTRAVENOUS at 05:24

## 2019-11-11 RX ADMIN — Medication 2 G: at 20:11

## 2019-11-11 RX ADMIN — CARVEDILOL 12.5 MG: 12.5 TABLET, FILM COATED ORAL at 18:11

## 2019-11-11 RX ADMIN — Medication 10 ML: at 20:12

## 2019-11-11 RX ADMIN — HEPARIN SODIUM 14 UNITS/KG/HR: 10000 INJECTION, SOLUTION INTRAVENOUS at 22:39

## 2019-11-11 RX ADMIN — Medication 2 G: at 05:21

## 2019-11-11 RX ADMIN — Medication 1000 MG: at 11:41

## 2019-11-11 RX ADMIN — Medication 10 ML: at 08:02

## 2019-11-11 RX ADMIN — MILRINONE LACTATE IN DEXTROSE 0.5 MCG/KG/MIN: 200 INJECTION, SOLUTION INTRAVENOUS at 04:01

## 2019-11-11 RX ADMIN — BUMETANIDE 0.5 MG/HR: 0.25 INJECTION INTRAMUSCULAR; INTRAVENOUS at 19:08

## 2019-11-11 RX ADMIN — IPRATROPIUM BROMIDE AND ALBUTEROL SULFATE 1 AMPULE: .5; 3 SOLUTION RESPIRATORY (INHALATION) at 18:29

## 2019-11-11 RX ADMIN — MILRINONE LACTATE IN DEXTROSE 0.5 MCG/KG/MIN: 200 INJECTION, SOLUTION INTRAVENOUS at 19:03

## 2019-11-11 RX ADMIN — IPRATROPIUM BROMIDE AND ALBUTEROL SULFATE 1 AMPULE: .5; 3 SOLUTION RESPIRATORY (INHALATION) at 06:49

## 2019-11-11 RX ADMIN — IPRATROPIUM BROMIDE AND ALBUTEROL SULFATE 1 AMPULE: .5; 3 SOLUTION RESPIRATORY (INHALATION) at 10:36

## 2019-11-11 RX ADMIN — Medication 2 G: at 14:24

## 2019-11-11 RX ADMIN — ASPIRIN 81 MG 81 MG: 81 TABLET ORAL at 08:01

## 2019-11-11 RX ADMIN — SPIRONOLACTONE 25 MG: 25 TABLET ORAL at 08:01

## 2019-11-11 RX ADMIN — IPRATROPIUM BROMIDE AND ALBUTEROL SULFATE 1 AMPULE: .5; 3 SOLUTION RESPIRATORY (INHALATION) at 14:34

## 2019-11-11 RX ADMIN — Medication 30 MG: at 08:01

## 2019-11-12 LAB
ANION GAP SERPL CALCULATED.3IONS-SCNC: 13 MMOL/L (ref 7–19)
ANTICARDIOLIPIN IGG ANTIBODY: 14 GPL (ref 0–14)
APTT: 43.5 SEC (ref 26–36.2)
APTT: 83.5 SEC (ref 26–36.2)
BASOPHILS ABSOLUTE: 0.1 K/UL (ref 0–0.2)
BASOPHILS RELATIVE PERCENT: 1.6 % (ref 0–1)
BETA-2 GLYCOPROTEIN 1 IGG ANTIBODY: 2 SGU (ref 0–20)
BETA-2 GLYCOPROTEIN 1 IGM ANTIBODY: 2 SMU (ref 0–20)
BUN BLDV-MCNC: 13 MG/DL (ref 6–20)
CALCIUM SERPL-MCNC: 8.6 MG/DL (ref 8.6–10)
CARDIOLIPIN AB IGM: 9 MPL (ref 0–12)
CHLORIDE BLD-SCNC: 92 MMOL/L (ref 98–111)
CO2: 29 MMOL/L (ref 22–29)
CREAT SERPL-MCNC: 1 MG/DL (ref 0.5–1.2)
EKG P AXIS: 76 DEGREES
EKG P-R INTERVAL: 174 MS
EKG Q-T INTERVAL: 358 MS
EKG QRS DURATION: 114 MS
EKG QTC CALCULATION (BAZETT): 428 MS
EKG T AXIS: 90 DEGREES
EOSINOPHILS ABSOLUTE: 0.3 K/UL (ref 0–0.6)
EOSINOPHILS RELATIVE PERCENT: 5.3 % (ref 0–5)
GFR NON-AFRICAN AMERICAN: >60
GLUCOSE BLD-MCNC: 114 MG/DL (ref 74–109)
HCT VFR BLD CALC: 39.5 % (ref 42–52)
HEMOGLOBIN: 12.4 G/DL (ref 14–18)
IMMATURE GRANULOCYTES #: 0 K/UL
LYMPHOCYTES ABSOLUTE: 1.1 K/UL (ref 1.1–4.5)
LYMPHOCYTES RELATIVE PERCENT: 17.5 % (ref 20–40)
MAGNESIUM: 1.8 MG/DL (ref 1.6–2.6)
MCH RBC QN AUTO: 26.5 PG (ref 27–31)
MCHC RBC AUTO-ENTMCNC: 31.4 G/DL (ref 33–37)
MCV RBC AUTO: 84.4 FL (ref 80–94)
MONOCYTES ABSOLUTE: 0.8 K/UL (ref 0–0.9)
MONOCYTES RELATIVE PERCENT: 13.8 % (ref 0–10)
NEUTROPHILS ABSOLUTE: 3.7 K/UL (ref 1.5–7.5)
NEUTROPHILS RELATIVE PERCENT: 61.3 % (ref 50–65)
PDW BLD-RTO: 19.9 % (ref 11.5–14.5)
PLATELET # BLD: 305 K/UL (ref 130–400)
PLATELET # BLD: 314 K/UL (ref 130–400)
PMV BLD AUTO: 9.4 FL (ref 9.4–12.4)
POTASSIUM SERPL-SCNC: 3.4 MMOL/L (ref 3.5–5)
PROTEIN S ANTIGEN, FREE: 149 % (ref 74–147)
RBC # BLD: 4.68 M/UL (ref 4.7–6.1)
SODIUM BLD-SCNC: 134 MMOL/L (ref 136–145)
VANCOMYCIN TROUGH: 16 UG/ML (ref 10–20)
WBC # BLD: 6.1 K/UL (ref 4.8–10.8)

## 2019-11-12 PROCEDURE — 6360000002 HC RX W HCPCS: Performed by: INTERNAL MEDICINE

## 2019-11-12 PROCEDURE — 2500000003 HC RX 250 WO HCPCS: Performed by: INTERNAL MEDICINE

## 2019-11-12 PROCEDURE — 6360000002 HC RX W HCPCS: Performed by: NURSE PRACTITIONER

## 2019-11-12 PROCEDURE — 85025 COMPLETE CBC W/AUTO DIFF WBC: CPT

## 2019-11-12 PROCEDURE — 2100000000 HC CCU R&B

## 2019-11-12 PROCEDURE — 2580000003 HC RX 258: Performed by: INTERNAL MEDICINE

## 2019-11-12 PROCEDURE — 80202 ASSAY OF VANCOMYCIN: CPT

## 2019-11-12 PROCEDURE — 6370000000 HC RX 637 (ALT 250 FOR IP): Performed by: INTERNAL MEDICINE

## 2019-11-12 PROCEDURE — 99232 SBSQ HOSP IP/OBS MODERATE 35: CPT | Performed by: INTERNAL MEDICINE

## 2019-11-12 PROCEDURE — 83735 ASSAY OF MAGNESIUM: CPT

## 2019-11-12 PROCEDURE — 85730 THROMBOPLASTIN TIME PARTIAL: CPT

## 2019-11-12 PROCEDURE — 80048 BASIC METABOLIC PNL TOTAL CA: CPT

## 2019-11-12 PROCEDURE — 85049 AUTOMATED PLATELET COUNT: CPT

## 2019-11-12 PROCEDURE — 94640 AIRWAY INHALATION TREATMENT: CPT

## 2019-11-12 PROCEDURE — 36415 COLL VENOUS BLD VENIPUNCTURE: CPT

## 2019-11-12 RX ADMIN — Medication 1000 MG: at 12:35

## 2019-11-12 RX ADMIN — IPRATROPIUM BROMIDE AND ALBUTEROL SULFATE 1 AMPULE: .5; 3 SOLUTION RESPIRATORY (INHALATION) at 14:37

## 2019-11-12 RX ADMIN — ASPIRIN 81 MG 81 MG: 81 TABLET ORAL at 08:00

## 2019-11-12 RX ADMIN — Medication 30 MG: at 21:00

## 2019-11-12 RX ADMIN — SACUBITRIL AND VALSARTAN 1 TABLET: 24; 26 TABLET, FILM COATED ORAL at 07:59

## 2019-11-12 RX ADMIN — Medication 1000 MG: at 23:21

## 2019-11-12 RX ADMIN — AZITHROMYCIN DIHYDRATE 500 MG: 500 INJECTION, POWDER, LYOPHILIZED, FOR SOLUTION INTRAVENOUS at 14:10

## 2019-11-12 RX ADMIN — Medication 2 G: at 21:01

## 2019-11-12 RX ADMIN — CARVEDILOL 12.5 MG: 12.5 TABLET, FILM COATED ORAL at 08:00

## 2019-11-12 RX ADMIN — SACUBITRIL AND VALSARTAN 1 TABLET: 24; 26 TABLET, FILM COATED ORAL at 21:02

## 2019-11-12 RX ADMIN — IPRATROPIUM BROMIDE AND ALBUTEROL SULFATE 1 AMPULE: .5; 3 SOLUTION RESPIRATORY (INHALATION) at 10:23

## 2019-11-12 RX ADMIN — Medication 1000 MG: at 00:03

## 2019-11-12 RX ADMIN — Medication 1 MG: at 21:02

## 2019-11-12 RX ADMIN — POTASSIUM CHLORIDE 40 MEQ: 20 TABLET, EXTENDED RELEASE ORAL at 04:47

## 2019-11-12 RX ADMIN — MILRINONE LACTATE IN DEXTROSE 0.5 MCG/KG/MIN: 200 INJECTION, SOLUTION INTRAVENOUS at 01:19

## 2019-11-12 RX ADMIN — IPRATROPIUM BROMIDE AND ALBUTEROL SULFATE 1 AMPULE: .5; 3 SOLUTION RESPIRATORY (INHALATION) at 19:02

## 2019-11-12 RX ADMIN — Medication 30 MG: at 07:59

## 2019-11-12 RX ADMIN — BUMETANIDE 0.03 MG/HR: 0.25 INJECTION INTRAMUSCULAR; INTRAVENOUS at 20:57

## 2019-11-12 RX ADMIN — ENOXAPARIN SODIUM 90 MG: 100 INJECTION SUBCUTANEOUS at 21:03

## 2019-11-12 RX ADMIN — SPIRONOLACTONE 25 MG: 25 TABLET ORAL at 07:59

## 2019-11-12 RX ADMIN — HEPARIN SODIUM 3720 UNITS: 1000 INJECTION INTRAVENOUS; SUBCUTANEOUS at 00:15

## 2019-11-12 RX ADMIN — Medication 2 G: at 04:47

## 2019-11-12 RX ADMIN — ENOXAPARIN SODIUM 90 MG: 100 INJECTION SUBCUTANEOUS at 14:51

## 2019-11-12 RX ADMIN — IPRATROPIUM BROMIDE AND ALBUTEROL SULFATE 1 AMPULE: .5; 3 SOLUTION RESPIRATORY (INHALATION) at 06:47

## 2019-11-12 RX ADMIN — Medication 2 G: at 14:55

## 2019-11-12 RX ADMIN — Medication 10 ML: at 21:03

## 2019-11-12 RX ADMIN — CARVEDILOL 12.5 MG: 12.5 TABLET, FILM COATED ORAL at 17:18

## 2019-11-12 RX ADMIN — HEPARIN SODIUM 15 UNITS/KG/HR: 10000 INJECTION, SOLUTION INTRAVENOUS at 00:15

## 2019-11-12 ASSESSMENT — ENCOUNTER SYMPTOMS
ABDOMINAL PAIN: 1
EYE DISCHARGE: 0
CONSTIPATION: 0
VOMITING: 0
EYE REDNESS: 0
EYE ITCHING: 0
PHOTOPHOBIA: 0
SORE THROAT: 0
NAUSEA: 0
TROUBLE SWALLOWING: 0
COUGH: 1
SHORTNESS OF BREATH: 1
DIARRHEA: 0

## 2019-11-13 ENCOUNTER — APPOINTMENT (OUTPATIENT)
Dept: GENERAL RADIOLOGY | Age: 34
DRG: 175 | End: 2019-11-13
Payer: COMMERCIAL

## 2019-11-13 LAB
ANION GAP SERPL CALCULATED.3IONS-SCNC: 10 MMOL/L (ref 7–19)
APTT: 45.5 SEC (ref 26–36.2)
BLOOD CULTURE, ROUTINE: NORMAL
BUN BLDV-MCNC: 21 MG/DL (ref 6–20)
CALCIUM SERPL-MCNC: 8.9 MG/DL (ref 8.6–10)
CHLORIDE BLD-SCNC: 94 MMOL/L (ref 98–111)
CO2: 29 MMOL/L (ref 22–29)
CREAT SERPL-MCNC: 1 MG/DL (ref 0.5–1.2)
CULTURE, BLOOD 2: NORMAL
GFR NON-AFRICAN AMERICAN: >60
GLUCOSE BLD-MCNC: 90 MG/DL (ref 74–109)
MAGNESIUM: 1.9 MG/DL (ref 1.6–2.6)
POTASSIUM SERPL-SCNC: 4 MMOL/L (ref 3.5–5)
PRO-BNP: 1150 PG/ML (ref 0–450)
PRO-BNP: 1255 PG/ML (ref 0–450)
PROTEIN C ANTIGEN: 59 % (ref 63–153)
SODIUM BLD-SCNC: 133 MMOL/L (ref 136–145)

## 2019-11-13 PROCEDURE — 6370000000 HC RX 637 (ALT 250 FOR IP): Performed by: INTERNAL MEDICINE

## 2019-11-13 PROCEDURE — 2100000000 HC CCU R&B

## 2019-11-13 PROCEDURE — 2580000003 HC RX 258: Performed by: INTERNAL MEDICINE

## 2019-11-13 PROCEDURE — 71046 X-RAY EXAM CHEST 2 VIEWS: CPT

## 2019-11-13 PROCEDURE — 99231 SBSQ HOSP IP/OBS SF/LOW 25: CPT | Performed by: INTERNAL MEDICINE

## 2019-11-13 PROCEDURE — 80048 BASIC METABOLIC PNL TOTAL CA: CPT

## 2019-11-13 PROCEDURE — 94640 AIRWAY INHALATION TREATMENT: CPT

## 2019-11-13 PROCEDURE — 6360000002 HC RX W HCPCS: Performed by: NURSE PRACTITIONER

## 2019-11-13 PROCEDURE — 6360000002 HC RX W HCPCS: Performed by: INTERNAL MEDICINE

## 2019-11-13 PROCEDURE — 85730 THROMBOPLASTIN TIME PARTIAL: CPT

## 2019-11-13 PROCEDURE — 83735 ASSAY OF MAGNESIUM: CPT

## 2019-11-13 PROCEDURE — 36415 COLL VENOUS BLD VENIPUNCTURE: CPT

## 2019-11-13 PROCEDURE — 83880 ASSAY OF NATRIURETIC PEPTIDE: CPT

## 2019-11-13 RX ORDER — FUROSEMIDE 40 MG/1
40 TABLET ORAL DAILY
Status: DISCONTINUED | OUTPATIENT
Start: 2019-11-13 | End: 2019-11-14 | Stop reason: HOSPADM

## 2019-11-13 RX ADMIN — SACUBITRIL AND VALSARTAN 1 TABLET: 24; 26 TABLET, FILM COATED ORAL at 09:16

## 2019-11-13 RX ADMIN — Medication 2 G: at 05:10

## 2019-11-13 RX ADMIN — IPRATROPIUM BROMIDE AND ALBUTEROL SULFATE 1 AMPULE: .5; 3 SOLUTION RESPIRATORY (INHALATION) at 19:17

## 2019-11-13 RX ADMIN — CARVEDILOL 12.5 MG: 12.5 TABLET, FILM COATED ORAL at 17:10

## 2019-11-13 RX ADMIN — Medication 1 MG: at 20:36

## 2019-11-13 RX ADMIN — ASPIRIN 81 MG 81 MG: 81 TABLET ORAL at 09:18

## 2019-11-13 RX ADMIN — IPRATROPIUM BROMIDE AND ALBUTEROL SULFATE 1 AMPULE: .5; 3 SOLUTION RESPIRATORY (INHALATION) at 14:13

## 2019-11-13 RX ADMIN — Medication 10 ML: at 09:20

## 2019-11-13 RX ADMIN — Medication 30 MG: at 20:32

## 2019-11-13 RX ADMIN — SPIRONOLACTONE 25 MG: 25 TABLET ORAL at 09:16

## 2019-11-13 RX ADMIN — Medication 1000 MG: at 11:42

## 2019-11-13 RX ADMIN — Medication 30 MG: at 09:18

## 2019-11-13 RX ADMIN — IPRATROPIUM BROMIDE AND ALBUTEROL SULFATE 1 AMPULE: .5; 3 SOLUTION RESPIRATORY (INHALATION) at 10:05

## 2019-11-13 RX ADMIN — IPRATROPIUM BROMIDE AND ALBUTEROL SULFATE 1 AMPULE: .5; 3 SOLUTION RESPIRATORY (INHALATION) at 06:05

## 2019-11-13 RX ADMIN — ENOXAPARIN SODIUM 90 MG: 100 INJECTION SUBCUTANEOUS at 09:19

## 2019-11-13 RX ADMIN — ENOXAPARIN SODIUM 90 MG: 100 INJECTION SUBCUTANEOUS at 20:32

## 2019-11-13 RX ADMIN — CARVEDILOL 12.5 MG: 12.5 TABLET, FILM COATED ORAL at 09:16

## 2019-11-13 RX ADMIN — SACUBITRIL AND VALSARTAN 1 TABLET: 24; 26 TABLET, FILM COATED ORAL at 20:32

## 2019-11-13 RX ADMIN — FUROSEMIDE 40 MG: 40 TABLET ORAL at 12:08

## 2019-11-13 RX ADMIN — Medication 10 ML: at 20:32

## 2019-11-13 ASSESSMENT — PAIN SCALES - GENERAL
PAINLEVEL_OUTOF10: 0

## 2019-11-13 ASSESSMENT — ENCOUNTER SYMPTOMS
COUGH: 1
CONSTIPATION: 0
TROUBLE SWALLOWING: 0
EYE REDNESS: 0
VOMITING: 0
DIARRHEA: 0
EYE ITCHING: 0
EYE DISCHARGE: 0
SORE THROAT: 0
NAUSEA: 0
PHOTOPHOBIA: 0
SHORTNESS OF BREATH: 1

## 2019-11-13 NOTE — OUTREACH NOTE
COPD/PN Week 3 Survey      Responses   Facility patient discharged from?  [place]   Does the patient have one of the following disease processes/diagnoses(primary or secondary)?  COPD/Pneumonia   Was the primary reason for admission:  Pneumonia   Week 3 attempt successful?  Yes   Call start time  1612   Revoke  Readmitted   Call end time  1613   General alerts for this patient  This number is the Father's phone not the patient, just FYI   Discharge diagnosis  PNA          Yola Miller RN

## 2019-11-14 ENCOUNTER — CLINICAL DOCUMENTATION (OUTPATIENT)
Dept: HEMATOLOGY | Age: 34
End: 2019-11-14

## 2019-11-14 ENCOUNTER — TELEPHONE (OUTPATIENT)
Dept: CARDIOLOGY | Age: 34
End: 2019-11-14

## 2019-11-14 VITALS
DIASTOLIC BLOOD PRESSURE: 65 MMHG | OXYGEN SATURATION: 93 % | TEMPERATURE: 97.4 F | HEART RATE: 104 BPM | HEIGHT: 67 IN | BODY MASS INDEX: 29.76 KG/M2 | WEIGHT: 189.6 LBS | RESPIRATION RATE: 22 BRPM | SYSTOLIC BLOOD PRESSURE: 98 MMHG

## 2019-11-14 LAB
ANION GAP SERPL CALCULATED.3IONS-SCNC: 13 MMOL/L (ref 7–19)
ANTIPHOSPHOLIPID AB IGA: 8.3 APU
ANTIPHOSPHOLIPID AB IGG: 18.1 GPU
ANTIPHOSPHOLIPID AB IGM: 5.6 MPU
APTT: 37.1 SEC (ref 26–36.2)
BUN BLDV-MCNC: 22 MG/DL (ref 6–20)
CALCIUM SERPL-MCNC: 9.4 MG/DL (ref 8.6–10)
CHLORIDE BLD-SCNC: 95 MMOL/L (ref 98–111)
CO2: 26 MMOL/L (ref 22–29)
CREAT SERPL-MCNC: 0.8 MG/DL (ref 0.5–1.2)
FACTOR V LEIDEN: NEGATIVE
GFR NON-AFRICAN AMERICAN: >60
GLUCOSE BLD-MCNC: 97 MG/DL (ref 74–109)
MAGNESIUM: 2.2 MG/DL (ref 1.6–2.6)
PLATELET # BLD: 320 K/UL (ref 130–400)
POTASSIUM SERPL-SCNC: 4.6 MMOL/L (ref 3.5–5)
PRO-BNP: 965 PG/ML (ref 0–450)
SODIUM BLD-SCNC: 134 MMOL/L (ref 136–145)
SPECIMEN: NORMAL

## 2019-11-14 PROCEDURE — 99231 SBSQ HOSP IP/OBS SF/LOW 25: CPT | Performed by: INTERNAL MEDICINE

## 2019-11-14 PROCEDURE — 6370000000 HC RX 637 (ALT 250 FOR IP): Performed by: INTERNAL MEDICINE

## 2019-11-14 PROCEDURE — 94640 AIRWAY INHALATION TREATMENT: CPT

## 2019-11-14 PROCEDURE — 36415 COLL VENOUS BLD VENIPUNCTURE: CPT

## 2019-11-14 PROCEDURE — 2580000003 HC RX 258: Performed by: INTERNAL MEDICINE

## 2019-11-14 PROCEDURE — 85049 AUTOMATED PLATELET COUNT: CPT

## 2019-11-14 PROCEDURE — 83735 ASSAY OF MAGNESIUM: CPT

## 2019-11-14 PROCEDURE — 80048 BASIC METABOLIC PNL TOTAL CA: CPT

## 2019-11-14 PROCEDURE — 85730 THROMBOPLASTIN TIME PARTIAL: CPT

## 2019-11-14 PROCEDURE — 83880 ASSAY OF NATRIURETIC PEPTIDE: CPT

## 2019-11-14 RX ORDER — NICOTINE 21 MG/24HR
1 PATCH, TRANSDERMAL 24 HOURS TRANSDERMAL DAILY
Qty: 30 PATCH | Refills: 3 | Status: SHIPPED | OUTPATIENT
Start: 2019-11-15 | End: 2019-11-27

## 2019-11-14 RX ORDER — ASPIRIN 81 MG/1
81 TABLET, CHEWABLE ORAL DAILY
Qty: 30 TABLET | Refills: 3 | Status: SHIPPED | OUTPATIENT
Start: 2019-11-15

## 2019-11-14 RX ORDER — CARVEDILOL 12.5 MG/1
12.5 TABLET ORAL 2 TIMES DAILY WITH MEALS
Qty: 60 TABLET | Refills: 0 | Status: SHIPPED | OUTPATIENT
Start: 2019-11-14 | End: 2019-12-11 | Stop reason: SDUPTHER

## 2019-11-14 RX ORDER — SPIRONOLACTONE 25 MG/1
25 TABLET ORAL DAILY
Qty: 30 TABLET | Refills: 0 | Status: SHIPPED | OUTPATIENT
Start: 2019-11-15 | End: 2019-12-23 | Stop reason: SDUPTHER

## 2019-11-14 RX ADMIN — ASPIRIN 81 MG 81 MG: 81 TABLET ORAL at 09:48

## 2019-11-14 RX ADMIN — SACUBITRIL AND VALSARTAN 1 TABLET: 24; 26 TABLET, FILM COATED ORAL at 09:48

## 2019-11-14 RX ADMIN — IPRATROPIUM BROMIDE AND ALBUTEROL SULFATE 1 AMPULE: .5; 3 SOLUTION RESPIRATORY (INHALATION) at 10:30

## 2019-11-14 RX ADMIN — CARVEDILOL 12.5 MG: 12.5 TABLET, FILM COATED ORAL at 09:54

## 2019-11-14 RX ADMIN — Medication 10 ML: at 12:07

## 2019-11-14 RX ADMIN — Medication 30 MG: at 09:49

## 2019-11-14 RX ADMIN — SPIRONOLACTONE 25 MG: 25 TABLET ORAL at 09:48

## 2019-11-14 RX ADMIN — FUROSEMIDE 40 MG: 40 TABLET ORAL at 09:48

## 2019-11-14 RX ADMIN — IPRATROPIUM BROMIDE AND ALBUTEROL SULFATE 1 AMPULE: .5; 3 SOLUTION RESPIRATORY (INHALATION) at 06:38

## 2019-11-14 RX ADMIN — APIXABAN 5 MG: 5 TABLET, FILM COATED ORAL at 09:48

## 2019-11-14 ASSESSMENT — ENCOUNTER SYMPTOMS
VOMITING: 0
DIARRHEA: 0
SORE THROAT: 0
NAUSEA: 0
SHORTNESS OF BREATH: 1
EYE ITCHING: 0
EYE DISCHARGE: 0
EYE REDNESS: 0
CONSTIPATION: 0
COUGH: 1
TROUBLE SWALLOWING: 0
PHOTOPHOBIA: 0

## 2019-11-14 ASSESSMENT — PAIN SCALES - GENERAL
PAINLEVEL_OUTOF10: 0

## 2019-11-15 ENCOUNTER — TELEPHONE (OUTPATIENT)
Dept: INTERNAL MEDICINE | Age: 34
End: 2019-11-15

## 2019-11-25 ENCOUNTER — HOSPITAL ENCOUNTER (OUTPATIENT)
Dept: CT IMAGING | Age: 34
Discharge: HOME | End: 2019-11-25
Payer: COMMERCIAL

## 2019-11-25 DIAGNOSIS — I26.99 ACUTE MASSIVE PULMONARY EMBOLISM (HCC): ICD-10-CM

## 2019-11-25 DIAGNOSIS — I50.23 ACUTE ON CHRONIC SYSTOLIC (CONGESTIVE) HEART FAILURE (HCC): ICD-10-CM

## 2019-11-25 DIAGNOSIS — I42.7 CARDIOMYOPATHY DUE TO DRUG AND EXTERNAL AGENT (HCC): Chronic | ICD-10-CM

## 2019-11-25 PROCEDURE — 6360000004 HC RX CONTRAST MEDICATION: Performed by: NURSE PRACTITIONER

## 2019-11-25 PROCEDURE — 71270 CT THORAX DX C-/C+: CPT

## 2019-11-25 RX ADMIN — IOPAMIDOL 90 ML: 755 INJECTION, SOLUTION INTRAVENOUS at 10:26

## 2019-11-27 ENCOUNTER — OFFICE VISIT (OUTPATIENT)
Dept: INTERNAL MEDICINE | Age: 34
End: 2019-11-27
Payer: COMMERCIAL

## 2019-11-27 VITALS
SYSTOLIC BLOOD PRESSURE: 129 MMHG | BODY MASS INDEX: 30.76 KG/M2 | HEIGHT: 67 IN | DIASTOLIC BLOOD PRESSURE: 93 MMHG | WEIGHT: 196 LBS | HEART RATE: 120 BPM | OXYGEN SATURATION: 99 %

## 2019-11-27 DIAGNOSIS — R06.02 SOB (SHORTNESS OF BREATH): Primary | ICD-10-CM

## 2019-11-27 DIAGNOSIS — I42.7 CARDIOMYOPATHY DUE TO DRUG AND EXTERNAL AGENT (HCC): Chronic | ICD-10-CM

## 2019-11-27 DIAGNOSIS — I26.99 ACUTE MASSIVE PULMONARY EMBOLISM (HCC): ICD-10-CM

## 2019-11-27 DIAGNOSIS — I42.0 NONISCHEMIC DILATED CARDIOMYOPATHY (HCC): ICD-10-CM

## 2019-11-27 DIAGNOSIS — I50.23 ACUTE ON CHRONIC SYSTOLIC CHF (CONGESTIVE HEART FAILURE) (HCC): ICD-10-CM

## 2019-11-27 PROCEDURE — 1111F DSCHRG MED/CURRENT MED MERGE: CPT | Performed by: NURSE PRACTITIONER

## 2019-11-27 PROCEDURE — 99215 OFFICE O/P EST HI 40 MIN: CPT | Performed by: NURSE PRACTITIONER

## 2019-11-27 RX ORDER — BUMETANIDE 0.5 MG/1
0.5 TABLET ORAL DAILY
Qty: 30 TABLET | Refills: 5 | Status: SHIPPED | OUTPATIENT
Start: 2019-11-27 | End: 2019-12-04

## 2019-11-27 RX ORDER — CEFDINIR 300 MG/1
300 CAPSULE ORAL 2 TIMES DAILY
Qty: 14 CAPSULE | Refills: 0 | Status: SHIPPED | OUTPATIENT
Start: 2019-11-27 | End: 2019-12-04

## 2019-12-03 RX ORDER — ALBUTEROL SULFATE 90 UG/1
2 AEROSOL, METERED RESPIRATORY (INHALATION) EVERY 6 HOURS PRN
Qty: 1 INHALER | Refills: 3 | Status: SHIPPED | OUTPATIENT
Start: 2019-12-03

## 2019-12-04 ENCOUNTER — TELEPHONE (OUTPATIENT)
Dept: CARDIOLOGY | Age: 34
End: 2019-12-04

## 2019-12-04 ENCOUNTER — OFFICE VISIT (OUTPATIENT)
Dept: HEMATOLOGY | Age: 34
End: 2019-12-04
Payer: COMMERCIAL

## 2019-12-04 ENCOUNTER — OFFICE VISIT (OUTPATIENT)
Dept: INTERNAL MEDICINE | Age: 34
End: 2019-12-04
Payer: COMMERCIAL

## 2019-12-04 ENCOUNTER — HOSPITAL ENCOUNTER (OUTPATIENT)
Dept: INFUSION THERAPY | Age: 34
Discharge: HOME | End: 2019-12-04
Payer: COMMERCIAL

## 2019-12-04 VITALS
DIASTOLIC BLOOD PRESSURE: 78 MMHG | WEIGHT: 198 LBS | SYSTOLIC BLOOD PRESSURE: 102 MMHG | BODY MASS INDEX: 31.08 KG/M2 | HEIGHT: 67 IN | OXYGEN SATURATION: 98 % | HEART RATE: 107 BPM

## 2019-12-04 VITALS
DIASTOLIC BLOOD PRESSURE: 66 MMHG | HEART RATE: 79 BPM | SYSTOLIC BLOOD PRESSURE: 118 MMHG | OXYGEN SATURATION: 97 % | BODY MASS INDEX: 30.93 KG/M2 | WEIGHT: 197.1 LBS | HEIGHT: 67 IN

## 2019-12-04 DIAGNOSIS — J40 BRONCHITIS: ICD-10-CM

## 2019-12-04 DIAGNOSIS — D64.9 NORMOCYTIC ANEMIA: ICD-10-CM

## 2019-12-04 DIAGNOSIS — I26.99 PULMONARY EMBOLUS, RIGHT (HCC): Primary | ICD-10-CM

## 2019-12-04 DIAGNOSIS — I50.22 CHRONIC SYSTOLIC HEART FAILURE (HCC): ICD-10-CM

## 2019-12-04 DIAGNOSIS — I26.99 PULMONARY EMBOLUS, RIGHT (HCC): ICD-10-CM

## 2019-12-04 DIAGNOSIS — I42.7 CARDIOMYOPATHY DUE TO DRUG AND EXTERNAL AGENT (HCC): Primary | Chronic | ICD-10-CM

## 2019-12-04 DIAGNOSIS — I42.0 NONISCHEMIC DILATED CARDIOMYOPATHY (HCC): ICD-10-CM

## 2019-12-04 DIAGNOSIS — I42.7 CARDIOMYOPATHY DUE TO DRUG AND EXTERNAL AGENT (HCC): Chronic | ICD-10-CM

## 2019-12-04 DIAGNOSIS — I26.99 ACUTE MASSIVE PULMONARY EMBOLISM (HCC): ICD-10-CM

## 2019-12-04 DIAGNOSIS — I26.99 PULMONARY INFARCT (HCC): ICD-10-CM

## 2019-12-04 DIAGNOSIS — J18.9 PNEUMONIA DUE TO ORGANISM: ICD-10-CM

## 2019-12-04 PROBLEM — R04.2 HEMOPTYSIS: Status: RESOLVED | Noted: 2019-08-31 | Resolved: 2019-12-04

## 2019-12-04 PROCEDURE — 99211 OFF/OP EST MAY X REQ PHY/QHP: CPT

## 2019-12-04 PROCEDURE — 99214 OFFICE O/P EST MOD 30 MIN: CPT | Performed by: NURSE PRACTITIONER

## 2019-12-04 PROCEDURE — 85025 COMPLETE CBC W/AUTO DIFF WBC: CPT

## 2019-12-04 RX ORDER — TORSEMIDE 100 MG/1
100 TABLET ORAL DAILY
Qty: 30 TABLET | Refills: 3 | Status: SHIPPED | OUTPATIENT
Start: 2019-12-04 | End: 2020-06-23 | Stop reason: SDUPTHER

## 2019-12-04 ASSESSMENT — ENCOUNTER SYMPTOMS
VOMITING: 0
ABDOMINAL PAIN: 0
ABDOMINAL DISTENTION: 0
SORE THROAT: 0
CHOKING: 0
CONSTIPATION: 0
RHINORRHEA: 1
COUGH: 0
TROUBLE SWALLOWING: 0
NAUSEA: 0
CONSTIPATION: 0
NAUSEA: 0
EYE ITCHING: 0
TROUBLE SWALLOWING: 0
PHOTOPHOBIA: 0
SHORTNESS OF BREATH: 1
EYE DISCHARGE: 0
WHEEZING: 0
COLOR CHANGE: 0
VOMITING: 0
DIARRHEA: 0
DIARRHEA: 0
STRIDOR: 0
WHEEZING: 0
COUGH: 1
ABDOMINAL PAIN: 0
EYE DISCHARGE: 0
BLOOD IN STOOL: 0
SORE THROAT: 0
EYE ITCHING: 0
SHORTNESS OF BREATH: 1
EYE REDNESS: 0

## 2019-12-06 ENCOUNTER — TELEPHONE (OUTPATIENT)
Dept: CARDIOLOGY | Age: 34
End: 2019-12-06

## 2019-12-10 DIAGNOSIS — E87.1 HYPONATREMIA: ICD-10-CM

## 2019-12-10 LAB
ANION GAP SERPL CALCULATED.3IONS-SCNC: 14 MMOL/L (ref 7–19)
BUN BLDV-MCNC: 18 MG/DL (ref 6–20)
CALCIUM SERPL-MCNC: 9.6 MG/DL (ref 8.6–10)
CHLORIDE BLD-SCNC: 95 MMOL/L (ref 98–111)
CO2: 26 MMOL/L (ref 22–29)
CREAT SERPL-MCNC: 0.9 MG/DL (ref 0.5–1.2)
GFR NON-AFRICAN AMERICAN: >60
GLUCOSE BLD-MCNC: 66 MG/DL (ref 74–109)
POTASSIUM SERPL-SCNC: 4.2 MMOL/L (ref 3.5–5)
SODIUM BLD-SCNC: 135 MMOL/L (ref 136–145)

## 2019-12-11 ENCOUNTER — OFFICE VISIT (OUTPATIENT)
Dept: INTERNAL MEDICINE | Age: 34
End: 2019-12-11
Payer: COMMERCIAL

## 2019-12-11 VITALS
DIASTOLIC BLOOD PRESSURE: 74 MMHG | SYSTOLIC BLOOD PRESSURE: 96 MMHG | HEART RATE: 103 BPM | WEIGHT: 192 LBS | BODY MASS INDEX: 30.13 KG/M2 | RESPIRATION RATE: 20 BRPM | HEIGHT: 67 IN | OXYGEN SATURATION: 98 %

## 2019-12-11 DIAGNOSIS — I42.7 CARDIOMYOPATHY DUE TO DRUG AND EXTERNAL AGENT (HCC): Primary | Chronic | ICD-10-CM

## 2019-12-11 DIAGNOSIS — I26.99 ACUTE MASSIVE PULMONARY EMBOLISM (HCC): ICD-10-CM

## 2019-12-11 DIAGNOSIS — I50.23 ACUTE ON CHRONIC SYSTOLIC CHF (CONGESTIVE HEART FAILURE) (HCC): ICD-10-CM

## 2019-12-11 PROCEDURE — 99213 OFFICE O/P EST LOW 20 MIN: CPT | Performed by: NURSE PRACTITIONER

## 2019-12-11 RX ORDER — CARVEDILOL 12.5 MG/1
TABLET ORAL
Qty: 75 TABLET | Refills: 5 | Status: SHIPPED | OUTPATIENT
Start: 2019-12-11 | End: 2020-08-18

## 2019-12-11 ASSESSMENT — ENCOUNTER SYMPTOMS
COLOR CHANGE: 0
EYE DISCHARGE: 0
SORE THROAT: 0
ABDOMINAL DISTENTION: 0
WHEEZING: 0
COUGH: 0
VOMITING: 0
STRIDOR: 0
TROUBLE SWALLOWING: 0
SHORTNESS OF BREATH: 1
EYE ITCHING: 0
ABDOMINAL PAIN: 0
DIARRHEA: 0
CHOKING: 0
BLOOD IN STOOL: 0
CONSTIPATION: 0
NAUSEA: 0

## 2019-12-23 ENCOUNTER — TELEPHONE (OUTPATIENT)
Dept: CARDIOLOGY | Age: 34
End: 2019-12-23

## 2019-12-23 ENCOUNTER — OFFICE VISIT (OUTPATIENT)
Dept: INTERNAL MEDICINE | Age: 34
End: 2019-12-23
Payer: COMMERCIAL

## 2019-12-23 VITALS
BODY MASS INDEX: 28.88 KG/M2 | HEIGHT: 67 IN | SYSTOLIC BLOOD PRESSURE: 119 MMHG | HEART RATE: 114 BPM | WEIGHT: 184 LBS | OXYGEN SATURATION: 98 % | DIASTOLIC BLOOD PRESSURE: 90 MMHG

## 2019-12-23 DIAGNOSIS — I42.7 CARDIOMYOPATHY DUE TO DRUG AND EXTERNAL AGENT (HCC): Primary | Chronic | ICD-10-CM

## 2019-12-23 DIAGNOSIS — I50.22 CHRONIC SYSTOLIC HEART FAILURE (HCC): ICD-10-CM

## 2019-12-23 DIAGNOSIS — Z72.0 TOBACCO USE: Chronic | ICD-10-CM

## 2019-12-23 PROBLEM — I50.23 ACUTE ON CHRONIC SYSTOLIC CHF (CONGESTIVE HEART FAILURE) (HCC): Status: RESOLVED | Noted: 2018-10-15 | Resolved: 2019-12-23

## 2019-12-23 PROCEDURE — 99214 OFFICE O/P EST MOD 30 MIN: CPT | Performed by: NURSE PRACTITIONER

## 2019-12-23 RX ORDER — SPIRONOLACTONE 25 MG/1
25 TABLET ORAL DAILY
Qty: 30 TABLET | Refills: 0 | Status: SHIPPED | OUTPATIENT
Start: 2019-12-23 | End: 2020-07-13 | Stop reason: SDUPTHER

## 2019-12-23 ASSESSMENT — ENCOUNTER SYMPTOMS
EYE DISCHARGE: 0
DIARRHEA: 0
EYE ITCHING: 0
COLOR CHANGE: 0
CHOKING: 0
ABDOMINAL DISTENTION: 0
ABDOMINAL PAIN: 0
NAUSEA: 0
STRIDOR: 0
TROUBLE SWALLOWING: 0
SORE THROAT: 0
VOMITING: 0
CONSTIPATION: 0
WHEEZING: 0
BLOOD IN STOOL: 0
COUGH: 0

## 2020-01-01 ENCOUNTER — HOSPITAL ENCOUNTER (EMERGENCY)
Facility: HOSPITAL | Age: 35
Discharge: HOME | End: 2020-08-21
Admitting: EMERGENCY MEDICINE

## 2020-01-01 VITALS
SYSTOLIC BLOOD PRESSURE: 105 MMHG | BODY MASS INDEX: 27.62 KG/M2 | DIASTOLIC BLOOD PRESSURE: 68 MMHG | WEIGHT: 176 LBS | OXYGEN SATURATION: 99 % | HEART RATE: 114 BPM | RESPIRATION RATE: 18 BRPM | TEMPERATURE: 98.2 F | HEIGHT: 67 IN

## 2020-01-01 DIAGNOSIS — L27.0 RASH, DRUG: Primary | ICD-10-CM

## 2020-01-01 PROCEDURE — 99282 EMERGENCY DEPT VISIT SF MDM: CPT

## 2020-01-01 PROCEDURE — 99281 EMR DPT VST MAYX REQ PHY/QHP: CPT

## 2020-01-01 RX ORDER — TRIAMCINOLONE ACETONIDE 1 MG/G
CREAM TOPICAL 3 TIMES DAILY
Qty: 80 G | Refills: 0 | Status: SHIPPED | OUTPATIENT
Start: 2020-01-01

## 2020-01-01 RX ORDER — DOXYCYCLINE HYCLATE 100 MG/1
100 TABLET, DELAYED RELEASE ORAL 2 TIMES DAILY
Qty: 14 TABLET | Refills: 0 | Status: SHIPPED | OUTPATIENT
Start: 2020-01-01

## 2020-01-02 LAB
MTHFR BY PCR SPECIMEN: NORMAL
MTHFR INTERPRETATION: NORMAL
MTHFR MUTATION A1298C: NEGATIVE
MTHFR MUTATION C677T: NORMAL
PROTHROMBIN G20210A MUTATION: NEGATIVE
PT PCR SPECIMEN: NORMAL

## 2020-01-27 ENCOUNTER — OFFICE VISIT (OUTPATIENT)
Dept: CARDIOLOGY | Age: 35
End: 2020-01-27
Payer: COMMERCIAL

## 2020-01-27 VITALS
DIASTOLIC BLOOD PRESSURE: 70 MMHG | WEIGHT: 186 LBS | HEIGHT: 67 IN | BODY MASS INDEX: 29.19 KG/M2 | HEART RATE: 90 BPM | SYSTOLIC BLOOD PRESSURE: 102 MMHG

## 2020-01-27 PROCEDURE — 93000 ELECTROCARDIOGRAM COMPLETE: CPT | Performed by: INTERNAL MEDICINE

## 2020-01-27 PROCEDURE — 99214 OFFICE O/P EST MOD 30 MIN: CPT | Performed by: INTERNAL MEDICINE

## 2020-01-27 ASSESSMENT — ENCOUNTER SYMPTOMS
RESPIRATORY NEGATIVE: 1
GASTROINTESTINAL NEGATIVE: 1
NAUSEA: 0
DIARRHEA: 0
VOMITING: 0
SHORTNESS OF BREATH: 0
EYES NEGATIVE: 1

## 2020-01-27 NOTE — PROGRESS NOTES
[facility] Progress Note                            Date:  1/27/2020  Patient: Anju Cespedes  Age:  29 y.o., 1985      Reason for evaluation:         SUBJECTIVE:    Returns today follow-up assessment overall doing well denies chest pain denies dyspnea no new complaints. Apparently he has been out of Entresto for about a month I was not aware of this. Something to do with insurance limitations. Still smoking occasional alcohol denies drug usage. Review of Systems   Constitutional: Negative. Negative for chills, fever and unexpected weight change. HENT: Negative. Eyes: Negative. Respiratory: Negative. Negative for shortness of breath. Cardiovascular: Negative. Negative for chest pain. Gastrointestinal: Negative. Negative for diarrhea, nausea and vomiting. Endocrine: Negative. Genitourinary: Negative. Musculoskeletal: Negative. Skin: Negative. Neurological: Negative. All other systems reviewed and are negative. OBJECTIVE:     /70   Pulse 90   Ht 5' 7\" (1.702 m)   Wt 186 lb (84.4 kg)   BMI 29.13 kg/m²     Labs:   CBC: No results for input(s): WBC, HGB, HCT, PLT in the last 72 hours. BMP:No results for input(s): NA, K, CO2, BUN, CREATININE, LABGLOM, GLUCOSE in the last 72 hours. BNP: No results for input(s): BNP in the last 72 hours. PT/INR: No results for input(s): PROTIME, INR in the last 72 hours. APTT:No results for input(s): APTT in the last 72 hours. CARDIAC ENZYMES:No results for input(s): CKTOTAL, CKMB, CKMBINDEX, TROPONINI in the last 72 hours. FASTING LIPID PANEL:  Lab Results   Component Value Date    HDL 22 05/07/2019    LDLCALC 142 05/07/2019    TRIG 102 05/07/2019     LIVER PROFILE:No results for input(s): AST, ALT, LABALBU in the last 72 hours.         Past Medical History:   Diagnosis Date    Cardiomyopathy [facility])     CHF [facility]congestive heart failure) [facility] [address]     Hemoptysis 8/31/2019    Hx of blood clots     Hypertension    

## 2020-02-11 ENCOUNTER — TELEPHONE (OUTPATIENT)
Dept: CARDIOLOGY | Age: 35
End: 2020-02-11

## 2020-03-03 PROBLEM — Z79.01 ANTICOAGULATED: Status: ACTIVE | Noted: 2020-03-03

## 2020-03-04 ENCOUNTER — TELEPHONE (OUTPATIENT)
Dept: INTERNAL MEDICINE | Age: 35
End: 2020-03-04

## 2020-03-04 NOTE — TELEPHONE ENCOUNTER
Patient is due for a follow up with Devika Augustin. Message left with patient's dad for him to call the office to schedule an appointment.

## 2020-04-24 ENCOUNTER — TELEPHONE (OUTPATIENT)
Dept: CARDIOLOGY | Age: 35
End: 2020-04-24

## 2020-05-23 ENCOUNTER — HOSPITAL ENCOUNTER (INPATIENT)
Age: 35
LOS: 2 days | Discharge: HOME | DRG: 293 | End: 2020-05-25
Attending: EMERGENCY MEDICINE | Admitting: INTERNAL MEDICINE
Payer: COMMERCIAL

## 2020-05-23 ENCOUNTER — APPOINTMENT (OUTPATIENT)
Dept: GENERAL RADIOLOGY | Age: 35
DRG: 293 | End: 2020-05-23
Payer: COMMERCIAL

## 2020-05-23 PROBLEM — I21.4 NSTEMI (NON-ST ELEVATED MYOCARDIAL INFARCTION) (HCC): Status: ACTIVE | Noted: 2020-05-23

## 2020-05-23 PROBLEM — I50.43 ACUTE ON CHRONIC COMBINED SYSTOLIC (CONGESTIVE) AND DIASTOLIC (CONGESTIVE) HEART FAILURE (HCC): Status: ACTIVE | Noted: 2018-10-15

## 2020-05-23 LAB
ALBUMIN SERPL-MCNC: 3.4 G/DL (ref 3.5–5.2)
ALP BLD-CCNC: 191 U/L (ref 40–130)
ALT SERPL-CCNC: 34 U/L (ref 5–41)
AMPHETAMINE SCREEN, URINE: NEGATIVE
ANION GAP SERPL CALCULATED.3IONS-SCNC: 13 MMOL/L (ref 7–19)
APTT: 32.1 SEC (ref 26–36.2)
AST SERPL-CCNC: 34 U/L (ref 5–40)
BARBITURATE SCREEN URINE: NEGATIVE
BASOPHILS ABSOLUTE: 0.1 K/UL (ref 0–0.2)
BASOPHILS RELATIVE PERCENT: 1.4 % (ref 0–1)
BENZODIAZEPINE SCREEN, URINE: NEGATIVE
BILIRUB SERPL-MCNC: 0.6 MG/DL (ref 0.2–1.2)
BUN BLDV-MCNC: 31 MG/DL (ref 6–20)
CALCIUM SERPL-MCNC: 8.5 MG/DL (ref 8.6–10)
CANNABINOID SCREEN URINE: NEGATIVE
CHLORIDE BLD-SCNC: 94 MMOL/L (ref 98–111)
CO2: 27 MMOL/L (ref 22–29)
COCAINE METABOLITE SCREEN URINE: NEGATIVE
CREAT SERPL-MCNC: 1.2 MG/DL (ref 0.5–1.2)
EKG P AXIS: 57 DEGREES
EKG P-R INTERVAL: 172 MS
EKG Q-T INTERVAL: 362 MS
EKG QRS DURATION: 112 MS
EKG QTC CALCULATION (BAZETT): 425 MS
EKG T AXIS: 47 DEGREES
EOSINOPHILS ABSOLUTE: 0.3 K/UL (ref 0–0.6)
EOSINOPHILS RELATIVE PERCENT: 3.8 % (ref 0–5)
GFR NON-AFRICAN AMERICAN: >60
GLUCOSE BLD-MCNC: 112 MG/DL (ref 74–109)
HCT VFR BLD CALC: 37.3 % (ref 42–52)
HEMOGLOBIN: 12.4 G/DL (ref 14–18)
IMMATURE GRANULOCYTES #: 0 K/UL
INR BLD: 1.27 (ref 0.88–1.18)
LYMPHOCYTES ABSOLUTE: 1.6 K/UL (ref 1.1–4.5)
LYMPHOCYTES RELATIVE PERCENT: 22.4 % (ref 20–40)
Lab: NORMAL
MCH RBC QN AUTO: 30.3 PG (ref 27–31)
MCHC RBC AUTO-ENTMCNC: 33.2 G/DL (ref 33–37)
MCV RBC AUTO: 91.2 FL (ref 80–94)
MONOCYTES ABSOLUTE: 0.7 K/UL (ref 0–0.9)
MONOCYTES RELATIVE PERCENT: 9.3 % (ref 0–10)
NEUTROPHILS ABSOLUTE: 4.5 K/UL (ref 1.5–7.5)
NEUTROPHILS RELATIVE PERCENT: 62.8 % (ref 50–65)
OPIATE SCREEN URINE: NEGATIVE
PDW BLD-RTO: 16.7 % (ref 11.5–14.5)
PLATELET # BLD: 287 K/UL (ref 130–400)
PMV BLD AUTO: 9.8 FL (ref 9.4–12.4)
POTASSIUM SERPL-SCNC: 4.2 MMOL/L (ref 3.5–5)
PRO-BNP: 3748 PG/ML (ref 0–450)
PROTHROMBIN TIME: 15.9 SEC (ref 12–14.6)
RBC # BLD: 4.09 M/UL (ref 4.7–6.1)
REASON FOR REJECTION: NORMAL
REJECTED TEST: NORMAL
SODIUM BLD-SCNC: 134 MMOL/L (ref 136–145)
TOTAL PROTEIN: 6.6 G/DL (ref 6.6–8.7)
TROPONIN: 0.12 NG/ML (ref 0–0.03)
TROPONIN: 0.12 NG/ML (ref 0–0.03)
TROPONIN: 0.16 NG/ML (ref 0–0.03)
WBC # BLD: 7.2 K/UL (ref 4.8–10.8)

## 2020-05-23 PROCEDURE — 93005 ELECTROCARDIOGRAM TRACING: CPT | Performed by: EMERGENCY MEDICINE

## 2020-05-23 PROCEDURE — 6370000000 HC RX 637 (ALT 250 FOR IP): Performed by: EMERGENCY MEDICINE

## 2020-05-23 PROCEDURE — 2140000000 HC CCU INTERMEDIATE R&B

## 2020-05-23 PROCEDURE — 6360000002 HC RX W HCPCS: Performed by: EMERGENCY MEDICINE

## 2020-05-23 PROCEDURE — 2580000003 HC RX 258: Performed by: INTERNAL MEDICINE

## 2020-05-23 PROCEDURE — 83880 ASSAY OF NATRIURETIC PEPTIDE: CPT

## 2020-05-23 PROCEDURE — 85025 COMPLETE CBC W/AUTO DIFF WBC: CPT

## 2020-05-23 PROCEDURE — 71045 X-RAY EXAM CHEST 1 VIEW: CPT

## 2020-05-23 PROCEDURE — 36415 COLL VENOUS BLD VENIPUNCTURE: CPT

## 2020-05-23 PROCEDURE — 6370000000 HC RX 637 (ALT 250 FOR IP): Performed by: INTERNAL MEDICINE

## 2020-05-23 PROCEDURE — 6360000002 HC RX W HCPCS: Performed by: INTERNAL MEDICINE

## 2020-05-23 PROCEDURE — 93010 ELECTROCARDIOGRAM REPORT: CPT | Performed by: INTERNAL MEDICINE

## 2020-05-23 PROCEDURE — 85730 THROMBOPLASTIN TIME PARTIAL: CPT

## 2020-05-23 PROCEDURE — 80307 DRUG TEST PRSMV CHEM ANLYZR: CPT

## 2020-05-23 PROCEDURE — 96374 THER/PROPH/DIAG INJ IV PUSH: CPT

## 2020-05-23 PROCEDURE — 84484 ASSAY OF TROPONIN QUANT: CPT

## 2020-05-23 PROCEDURE — 99223 1ST HOSP IP/OBS HIGH 75: CPT | Performed by: INTERNAL MEDICINE

## 2020-05-23 PROCEDURE — 85610 PROTHROMBIN TIME: CPT

## 2020-05-23 PROCEDURE — 99285 EMERGENCY DEPT VISIT HI MDM: CPT

## 2020-05-23 PROCEDURE — 80053 COMPREHEN METABOLIC PANEL: CPT

## 2020-05-23 RX ORDER — ONDANSETRON 2 MG/ML
4 INJECTION INTRAMUSCULAR; INTRAVENOUS EVERY 6 HOURS PRN
Status: DISCONTINUED | OUTPATIENT
Start: 2020-05-23 | End: 2020-05-25 | Stop reason: HOSPADM

## 2020-05-23 RX ORDER — MAGNESIUM SULFATE IN WATER 40 MG/ML
2 INJECTION, SOLUTION INTRAVENOUS PRN
Status: DISCONTINUED | OUTPATIENT
Start: 2020-05-23 | End: 2020-05-25 | Stop reason: HOSPADM

## 2020-05-23 RX ORDER — ASPIRIN 81 MG/1
81 TABLET ORAL DAILY
Status: DISCONTINUED | OUTPATIENT
Start: 2020-05-24 | End: 2020-05-25 | Stop reason: HOSPADM

## 2020-05-23 RX ORDER — SODIUM CHLORIDE 0.9 % (FLUSH) 0.9 %
10 SYRINGE (ML) INJECTION EVERY 12 HOURS SCHEDULED
Status: DISCONTINUED | OUTPATIENT
Start: 2020-05-23 | End: 2020-05-25 | Stop reason: HOSPADM

## 2020-05-23 RX ORDER — ASPIRIN 325 MG
325 TABLET ORAL ONCE
Status: COMPLETED | OUTPATIENT
Start: 2020-05-23 | End: 2020-05-23

## 2020-05-23 RX ORDER — SODIUM CHLORIDE 0.9 % (FLUSH) 0.9 %
10 SYRINGE (ML) INJECTION PRN
Status: DISCONTINUED | OUTPATIENT
Start: 2020-05-23 | End: 2020-05-25 | Stop reason: HOSPADM

## 2020-05-23 RX ORDER — POLYETHYLENE GLYCOL 3350 17 G/17G
17 POWDER, FOR SOLUTION ORAL DAILY PRN
Status: DISCONTINUED | OUTPATIENT
Start: 2020-05-23 | End: 2020-05-25 | Stop reason: HOSPADM

## 2020-05-23 RX ORDER — HEPARIN SODIUM 5000 [USP'U]/ML
5000 INJECTION, SOLUTION INTRAVENOUS; SUBCUTANEOUS EVERY 8 HOURS SCHEDULED
Status: DISCONTINUED | OUTPATIENT
Start: 2020-05-23 | End: 2020-05-23

## 2020-05-23 RX ORDER — ATORVASTATIN CALCIUM 40 MG/1
40 TABLET, FILM COATED ORAL NIGHTLY
Status: DISCONTINUED | OUTPATIENT
Start: 2020-05-23 | End: 2020-05-25 | Stop reason: HOSPADM

## 2020-05-23 RX ORDER — FUROSEMIDE 10 MG/ML
40 INJECTION INTRAMUSCULAR; INTRAVENOUS 2 TIMES DAILY
Status: DISCONTINUED | OUTPATIENT
Start: 2020-05-23 | End: 2020-05-25 | Stop reason: HOSPADM

## 2020-05-23 RX ORDER — POTASSIUM CHLORIDE 20 MEQ/1
40 TABLET, EXTENDED RELEASE ORAL PRN
Status: DISCONTINUED | OUTPATIENT
Start: 2020-05-23 | End: 2020-05-25 | Stop reason: HOSPADM

## 2020-05-23 RX ORDER — PROMETHAZINE HYDROCHLORIDE 12.5 MG/1
12.5 TABLET ORAL EVERY 6 HOURS PRN
Status: DISCONTINUED | OUTPATIENT
Start: 2020-05-23 | End: 2020-05-25 | Stop reason: HOSPADM

## 2020-05-23 RX ORDER — ACETAMINOPHEN 325 MG/1
650 TABLET ORAL EVERY 6 HOURS PRN
Status: DISCONTINUED | OUTPATIENT
Start: 2020-05-23 | End: 2020-05-25 | Stop reason: HOSPADM

## 2020-05-23 RX ORDER — ACETAMINOPHEN 650 MG/1
650 SUPPOSITORY RECTAL EVERY 6 HOURS PRN
Status: DISCONTINUED | OUTPATIENT
Start: 2020-05-23 | End: 2020-05-25 | Stop reason: HOSPADM

## 2020-05-23 RX ORDER — POTASSIUM CHLORIDE 7.45 MG/ML
10 INJECTION INTRAVENOUS PRN
Status: DISCONTINUED | OUTPATIENT
Start: 2020-05-23 | End: 2020-05-25 | Stop reason: HOSPADM

## 2020-05-23 RX ORDER — FUROSEMIDE 10 MG/ML
40 INJECTION INTRAMUSCULAR; INTRAVENOUS ONCE
Status: COMPLETED | OUTPATIENT
Start: 2020-05-23 | End: 2020-05-23

## 2020-05-23 RX ADMIN — ATORVASTATIN CALCIUM 40 MG: 40 TABLET, FILM COATED ORAL at 20:49

## 2020-05-23 RX ADMIN — SODIUM CHLORIDE, PRESERVATIVE FREE 10 ML: 5 INJECTION INTRAVENOUS at 20:49

## 2020-05-23 RX ADMIN — FUROSEMIDE 40 MG: 10 INJECTION, SOLUTION INTRAMUSCULAR; INTRAVENOUS at 10:16

## 2020-05-23 RX ADMIN — SACUBITRIL AND VALSARTAN 1 TABLET: 24; 26 TABLET, FILM COATED ORAL at 20:49

## 2020-05-23 RX ADMIN — FUROSEMIDE 40 MG: 10 INJECTION, SOLUTION INTRAMUSCULAR; INTRAVENOUS at 17:31

## 2020-05-23 RX ADMIN — CARVEDILOL 18.75 MG: 12.5 TABLET, FILM COATED ORAL at 17:31

## 2020-05-23 RX ADMIN — ASPIRIN 325 MG: 325 TABLET, FILM COATED ORAL at 11:15

## 2020-05-23 RX ADMIN — APIXABAN 5 MG: 5 TABLET, FILM COATED ORAL at 20:49

## 2020-05-23 SDOH — HEALTH STABILITY: MENTAL HEALTH: HOW OFTEN DO YOU HAVE A DRINK CONTAINING ALCOHOL?: NEVER

## 2020-05-23 ASSESSMENT — ENCOUNTER SYMPTOMS
VOMITING: 0
SORE THROAT: 0
SHORTNESS OF BREATH: 0
EYES NEGATIVE: 1
RESPIRATORY NEGATIVE: 1
ABDOMINAL PAIN: 0
RHINORRHEA: 0
GASTROINTESTINAL NEGATIVE: 1
COUGH: 0
NAUSEA: 0
BACK PAIN: 0
DIARRHEA: 0
SHORTNESS OF BREATH: 1

## 2020-05-23 ASSESSMENT — PAIN SCALES - GENERAL
PAINLEVEL_OUTOF10: 0
PAINLEVEL_OUTOF10: 0
PAINLEVEL_OUTOF10: 6

## 2020-05-23 ASSESSMENT — PAIN DESCRIPTION - LOCATION: LOCATION: GROIN

## 2020-05-23 NOTE — ED PROVIDER NOTES
states clean for 1.5 months    Sexual activity: None   Lifestyle    Physical activity     Days per week: None     Minutes per session: None    Stress: None   Relationships    Social connections     Talks on phone: None     Gets together: None     Attends Rastafarian service: None     Active member of club or organization: None     Attends meetings of clubs or organizations: None     Relationship status: None    Intimate partner violence     Fear of current or ex partner: None     Emotionally abused: None     Physically abused: None     Forced sexual activity: None   Other Topics Concern    None   Social History Narrative    None       SCREENINGS             PHYSICAL EXAM    (up to 7 for level 4, 8 or more for level 5)     ED Triage Vitals [05/23/20 0953]   BP Temp Temp Source Pulse Resp SpO2 Height Weight   103/80 97.8 °F (36.6 °C) Oral 104 25 95 % 5' 7\" (1.702 m) 190 lb (86.2 kg)       Physical Exam  Vitals signs and nursing note reviewed. Constitutional:       General: He is not in acute distress. Appearance: He is well-developed. He is not toxic-appearing or diaphoretic. HENT:      Head: Normocephalic and atraumatic. Nose: Nose normal.      Mouth/Throat:      Mouth: Mucous membranes are moist.   Eyes:      Conjunctiva/sclera: Conjunctivae normal.   Neck:      Musculoskeletal: Normal range of motion and neck supple. Trachea: No tracheal deviation. Cardiovascular:      Rate and Rhythm: Normal rate and regular rhythm. Pulses: Normal pulses. Heart sounds: Normal heart sounds. No murmur. Pulmonary:      Breath sounds: Examination of the right-middle field reveals rales. Examination of the left-middle field reveals rales. Examination of the right-lower field reveals rales. Examination of the left-lower field reveals rales. Rales present. No wheezing. Abdominal:      Palpations: Abdomen is soft. There is no mass. Tenderness: There is no abdominal tenderness.    Musculoskeletal:

## 2020-05-23 NOTE — CONSULTS
[id number] [facility]  Cardiology Consult      Requesting MD:  Ursula Rod MD   Admit Status:  Inpatient [101]       History obtained from:   [] Patient  [] Other (specify):     Patient:  Samson Todd  [id number]     Chief Complaint:   Chief Complaint   Patient presents with    Swelling     swelling x 5 days getting worse in abdomen legs and testicles. HPI: Mr. Parul Lim is a 29 y.o. male with a history of cardiomyopathy congestive heart failure intravenous drug usage in the past that I been following now for some time having been seen last in the clinic on 1/27/2020 doing well at that time. He had been out of Entresto for about a month at that time. Still smoking occasionally denied drug usage. Presently denies chest pain. He apparently ran out of his Lasix or could not get it filled for 2 to 3 days last week and resumed it about 2 days ago. He has been more dyspneic presented to the emergency department. Urine drug screen negative troponin 0 0. 16. BNP 3748. Chest x-ray showed residual airspace disease left upper lobe may represent sequela of previous infection pneumonia or less likely new ischemic changes left basilar atelectasis and cardiomegaly. Review of Systems:  Review of Systems   Constitutional: Negative. Negative for chills, fever and unexpected weight change. HENT: Negative. Eyes: Negative. Respiratory: Negative. Negative for shortness of breath. Cardiovascular: Negative. Negative for chest pain. Gastrointestinal: Negative. Negative for diarrhea, nausea and vomiting. Endocrine: Negative. Genitourinary: Negative. Musculoskeletal: Negative. Skin: Negative. Neurological: Negative. All other systems reviewed and are negative.       Cardiac Specific Data:  Specialty Problems        Cardiology Problems    Chronic systolic heart failure (HCC)        Nonischemic cardiomyopathy (HCC)        Acute on chronic combined systolic (congestive) and diastolic (congestive) heart failure (HCC)        Acute pulmonary embolism (HCC)        Nonischemic dilated cardiomyopathy (HCC)        Pulmonary embolus, right (HCC)        Pulmonary infarct (HCC)        Acute massive pulmonary embolism (HCC)        Pulmonary edema cardiac cause (HCC)        NSTEMI (non-ST elevated myocardial infarction) [facility])              Past Medical History:  Past Medical History:   Diagnosis Date    Cardiomyopathy ([facility] [address]     CHF (congestive heart failure) ([facility] [address]     Hemoptysis 8/31/2019    Hx of blood clots     Hypertension     Hypervolemia     Hyponatremia     Palliative care patient 05/08/2019    Pulmonary embolism ([facility] [address]     Smoker         Past Surgical History:  Past Surgical History:   Procedure Laterality Date    HAND SURGERY      IVC FILTER INSERTION         Past Family History:  Family History   Problem Relation Age of Onset    Cancer Mother     No Known Problems Father     No Known Problems Brother        Past Social History:  Social History     Socioeconomic History    Marital status: Single     Spouse name: Not on file    Number of children: Not on file    Years of education: Not on file    Highest education level: Not on file   Occupational History    Not on file   Social Needs    Financial resource strain: Not on file    Food insecurity     Worry: Not on file     Inability: Not on file    Transportation needs     Medical: Not on file     Non-medical: Not on file   Tobacco Use    Smoking status: Current Every Day Smoker     Packs/day: 0.50     Years: 13.00     Pack years: 6.50     Types: Cigarettes    Smokeless tobacco: Never Used   Substance and Sexual Activity    Alcohol use: Never     Frequency: Never    Drug use: Not Currently     Types: Methamphetamines     Comment: pt states clean for 1.5 months    Sexual activity: Not on file   Lifestyle    Physical activity     Days per week: Not on file     Minutes per session: Not on file    Stress: Not on file Relationships    Social connections     Talks on phone: Not on file     Gets together: Not on file     Attends Anglican service: Not on file     Active member of club or organization: Not on file     Attends meetings of clubs or organizations: Not on file     Relationship status: Not on file    Intimate partner violence     Fear of current or ex partner: Not on file     Emotionally abused: Not on file     Physically abused: Not on file     Forced sexual activity: Not on file   Other Topics Concern    Not on file   Social History Narrative    Not on file       Allergies:  No Known Allergies    Home Meds:  Prior to Admission medications    Medication Sig Start Date End Date Taking?  Authorizing Provider   sacubitril-valsartan (ENTRESTO) 24-26 MG per tablet Take 1 tablet by mouth 2 times daily 2/10/20   Sherren Reason, APRN - CNP   apixaban (ELIQUIS) 5 MG TABS tablet Take 1 tablet by mouth 2 times daily 1/27/20   Ondina Manrique MD   spironolactone (ALDACTONE) 25 MG tablet Take 1 tablet by mouth daily 12/23/19 1/27/20  KAYLEEN Valencia   carvedilol (COREG) 12.5 MG tablet Take 1 1/2 in am and 1 at night  Patient taking differently: Take 1 1/2 in am and 1 1/2 at night 12/11/19   KAYLEEN Valencia   torsemide [facility]) 100 MG tablet Take 1 tablet by mouth daily 12/4/19   KAYLEEN Valencia   albuterol sulfate HFA (PROVENTIL HFA) 108 (90 Base) MCG/ACT inhaler Inhale 2 puffs into the lungs every 6 hours as needed for Wheezing 12/3/19   KAYLEEN Valencia   aspirin 81 MG chewable tablet Take 1 tablet by mouth daily 11/15/19   Kit Galvan MD       Current Meds:   sodium chloride flush  10 mL Intravenous 2 times per day    furosemide  40 mg Intravenous BID    apixaban  5 mg Oral BID    sacubitril-valsartan  1 tablet Oral BID    carvedilol  18.75 mg Oral BID WC    [START ON 5/24/2020] aspirin  81 mg Oral Daily    atorvastatin  40 mg Oral Nightly       Current Infused Meds:      Physical usage  4. Medically noncompliant  5. Tobacco abuse  6. History of pulmonary embolism  7. Gastroesophageal reflux disease  8. Elevated liver function test  9. History of pneumonia  10. CT of the chest 11/25/2019 reidentified multifocal subpleural consolidations right lower lobe also 1 each right upper lobe left lower lobe right consolidation suggest internal cavitation no worsening infiltrates or consolidations noted four-chamber cardiomegaly evidence of pulmonary hypertension  11. CT abdomen pelvis 11/9/2019 anasarca diffuse body wall edema small volume ascites partially imaged small pleural effusions bilateral lower lobe consolidative nodules/masses 1 of which is cavitary in the right lower lobe four-chamber cardiomegaly  12. CTA pulmonary 11/8/2019 residual recurrent right upper lobe pulmonary emboli no central pulmonary emboli identified previous left lower lobe emboli have been resorbed  13. Echocardiogram 11/8/2019 severe mitral gravitation severely dilated left ventricle global hypokinesis ejection fraction 15% restrictive grade 3 pattern on mitral valve inflow velocities consistent with grade 3 diastolic dysfunction  14. Elevated troponin 0 0.16 do not believe this represents an acute coronary syndrome  15. Cardiac catheterization 10/15/2018 mild nonobstructive coronary disease severe left ventricular systolic dysfunction consistent with dilated ischemic cardiomyopathy      Recommendations:  1. Intravenous diuresis  2. Repeat echocardiogram  3. Further comments to follow reassess in the a.m.

## 2020-05-23 NOTE — H&P
[address] - History & Physical    [id number]  PCP: KAYLEEN Conn  Date of Admission: 5/23/2020   Date of Service: Pt seen/examined on5/23/2020 and Admitted to Inpatient with expected LOS greater than two midnights due to medical therapy. Chief Complaint: Dyspnea    History Of Present Illness: The patient is a 29 y.o. male who presented to the ED complaining of progressively worsening dyspnea for the last 4 days after he ran out of diuretics at home (his pharmacy didn't have them available when he tried to pick them up as per patient). Patient denies chest pain. 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 Patient currently denies alcohol or drug abuse but is still smoking cigarettes. Patient otherwise denies nausea, vomiting, diarrhea, constipation, fevers, chills, sweats, cough, dysuria, sick contacts, or recent travel. Patient was found to be acutely overloaded in the emergency department with elevated BNP and elevated troponin. Past Medical History:        Diagnosis Date    Cardiomyopathy [facility])     [facility]cong[facility])     Hemoptysis 8/31/2019    Hx of blood clots     Hypertension     Hypervolemia     Hyponatremia     Palliative care patient 05/08/2019    Pulmonary embolism ([facility] [address]     Smoker        Past Surgical History:        Procedure Laterality Date    HAND SURGERY      IVC FILTER INSERTION         Home Medications:  Prior to Admission medications    Medication Sig Start Date End Date Taking?  Authorizing Provider   sacubitril-valsartan (ENTRESTO) 24-26 MG per tablet Take 1 tablet by mouth 2 times daily 2/10/20   Everardo Bob, APRN - CNP   apixaban (ELIQUIS) 5 MG TABS tablet Take 1 tablet by mouth 2 times daily 1/27/20   Jose Roberto Matthews MD   spironolactone (ALDACTONE) 25 MG tablet Take 1 tablet by mouth daily 12/23/19 1/27/20  KAYLEEN Pete   carvedilol (COREG) 12.5 MG tablet Take 1 1/2 in am and 1 at night  Patient taking differently: Take 1 1/2 in am and 1 1/2 at night 12/11/19   KAYLEEN Pete   torsemide [facility]) 100 MG tablet Take 1 tablet by mouth daily 12/4/19   KAYLEEN Pete   albuterol sulfate HFA (PROVENTIL HFA) 108 (90 Base) MCG/ACT inhaler Inhale 2 puffs into the lungs every 6 hours as needed for Wheezing 12/3/19   KAYLEEN Pete   aspirin 81 MG chewable tablet Take 1 tablet by mouth daily 11/15/19   Tasia Sanchez MD       Allergies:    Patient has no known allergies. Social History:    Tobacco:   reports that he has been smoking cigarettes. He has a 6.50 pack-year smoking history. He has never used smokeless tobacco.  Alcohol:   reports no history of alcohol use. Illicit Drugs: Past methamphetamines    Family History:      Problem Relation Age of Onset    Cancer Mother     No Known Problems Father     No Known Problems Brother        Review of Systems:   14 point review of systems negative except as noted above        Physical Examination:  /73   Pulse 102   Temp 96.8 °F (36 °C) (Temporal)   Resp 18   Ht 5' 7\" (1.702 m)   Wt 190 lb (86.2 kg)   SpO2 95%   BMI 29.76 kg/m²      General appearance: NAD.  AAOx3  Head: NC/AT  Eyes: conjunctivae/corneas clear   Ears: normal external ears  Neck: Supple  Lungs: BLAE, crackles+, no wheezing appreciated   Heart: tachycardic, regular rhythm   Abdomen: soft, NT, ND, BS+  Extremities: 2+ edema  Skin: warm  Neurologic: AAOx3, gross motor and sensory function intact  Psychiatric:  Mood appropriate        Diagnostic Data:     CBC:  Recent Labs     05/23/20  0959   WBC 7.2   HGB 12.4*   HCT 37.3*        BMP:  Recent Labs     05/23/20  1033   *   K 4.2   CL 94*   CO2 27   BUN 31*   CREATININE 1.2   CALCIUM 8.5*     Recent Labs     05/23/20  1033   AST 34   ALT 34   BILITOT 0.6   ALKPHOS 191* Coag Panel:   Recent Labs     05/23/20  0959   INR 1.27*   PROTIME 15.9*   APTT 32.1     Cardiac Enzymes:   Recent Labs     05/23/20  1033   TROPONINI 0.16*     ABGs:  Lab Results   Component Value Date    PHART 7.510 11/08/2019    PO2ART 89.0 11/08/2019    UVK7QFY 32.0 11/08/2019     Urinalysis:  Lab Results   Component Value Date    NITRU Negative 11/08/2019    WBCUA 1 06/12/2019    BACTERIA NEGATIVE 06/12/2019    RBCUA 0 06/12/2019    BLOODU Negative 11/08/2019    SPECGRAV 1.013 11/08/2019    GLUCOSEU Negative 11/08/2019     RAD:     XR CHEST PORTABLE [[id number]] Resulted: 05/23/20 1010      Order Status: Completed Updated: 05/23/20 1012     Narrative:       XR CHEST PORTABLE 5/23/2020 9:00 AM  HISTORY: CHF  COMPARISON: 11/13/2019  CXR: A single frontal view of the chest is performed. Findings: There are residual primarily linear right upper lobe opacities of the  site of the previous consolidation which may represent  postinflammatory scarring. Cardiac silhouette remains enlarged. Central pulmonary vessels are prominent. Medial right basilar  opacities. Questionable small pleural effusions. No pneumothorax. No  acute bony pathology.     Impression:       1. Cardiomegaly with pulmonary venous congestion. Medial right basilar  opacities may relate to atelectasis versus pneumonia. Right upper lobe  opacities are favorable for postinflammatory scarring at the site of  the previous right upper lobe consolidation present on 11/13/2019. .  Signed by Dr Dandre Wayne on 5/23/2020 10:10 AM     ECHO:    Type of Study      TTE procedure:ECHO NO CONTRAST WITH DOP/COLR. Study Location: Echo Lab  Technical Quality: Adequate visualization     Patient Status: Inpatient     Indications:Congestive heart failure and Pulmonary embolus.      Conclusions      Summary   Severe mitral regurgitation with tenting of mitral valve leaflets. Severely dilated left ventricle cavity size.    Global hypokinesis with severe impairment of systolic function. Left ventricular ejection fraction is estimated at 15%. Restricted filling pattern consistent with Grade III diastolic   dysfunction. Signature      ----------------------------------------------------------------   Electronically signed by Ana Rosa Ling MD(Interpreting   physician) on 11/08/2019 04:22 PM    Active Hospital Problems    Diagnosis Date Noted    NSTEMI (non-ST elevated myocardial infarction) ([facility] [I21.4] 05/23/2020    History of pulmonary embolism [Z86.711] 10/16/2019    Acute on chronic combined systolic (congestive) and diastolic (congestive) heart failure ([facility] [I50.43] 10/15/2018       MPRESSION / PLAN:  Active Problems:    Acute on chronic combined systolic (congestive) and diastolic (congestive) heart failure (HCC)    History of pulmonary embolism    NSTEMI (non-ST elevated myocardial infarction) ([facility]  Resolved Problems:    * No resolved hospital problems. *    Acute on chronic combined systolic and diastolic CHF: Diuresis. Monitor I's and O's. Beta-blocker. Entresto. Elevated troponin: NSTEMI vs demand ischemia. Previous [facility] showing mild non-obstructive CAD. Trend troponins. Monitor on telemetry. Cardiology consulted. Already anticoagulated with eliquis for previous PE. Aspirin/Statin.     H/o PE: Pato Cuadra MD  5/23/2020

## 2020-05-23 NOTE — PROGRESS NOTES
4 Eyes Skin Assessment    Brigida Wright is being assessed upon: Admission    I agree that I, Christy Alas, along with *** (either 2 RN's or 1 LPN and 1 RN) have performed a thorough Head to Toe Skin Assessment on the patient. ALL assessment sites listed below have been assessed. Areas assessed by both nurses:     [x]   Head, Face, and Ears   [x]   Shoulders, Back, and Chest  [x]   Arms, Elbows, and Hands   [x]   Coccyx, Sacrum, and Ischium  [x]   Legs, Feet, and Heels    Does the Patient have Skin Breakdown?  No    [place] Prevention initiated: No  Wound Care Orders initiated: No    WOC nurse consulted for Pressure Injury (Stage 3,4, Unstageable, DTI, NWPT, and Complex wounds) and New or Established Ostomies: No        Primary Nurse eSignature: Christy Alas RN on 5/23/2020 at 1:54 PM      Co-Signer eSignature: {Esignature:[id number]}

## 2020-05-24 LAB
ALBUMIN SERPL-MCNC: 3.3 G/DL (ref 3.5–5.2)
ALP BLD-CCNC: 189 U/L (ref 40–130)
ALT SERPL-CCNC: 29 U/L (ref 5–41)
ANION GAP SERPL CALCULATED.3IONS-SCNC: 16 MMOL/L (ref 7–19)
AST SERPL-CCNC: 31 U/L (ref 5–40)
BASOPHILS ABSOLUTE: 0.1 K/UL (ref 0–0.2)
BASOPHILS RELATIVE PERCENT: 1.3 % (ref 0–1)
BILIRUB SERPL-MCNC: 0.4 MG/DL (ref 0.2–1.2)
BUN BLDV-MCNC: 31 MG/DL (ref 6–20)
CALCIUM SERPL-MCNC: 8.8 MG/DL (ref 8.6–10)
CHLORIDE BLD-SCNC: 95 MMOL/L (ref 98–111)
CHOLESTEROL, TOTAL: 144 MG/DL (ref 160–199)
CO2: 26 MMOL/L (ref 22–29)
CREAT SERPL-MCNC: 1.1 MG/DL (ref 0.5–1.2)
EOSINOPHILS ABSOLUTE: 0.2 K/UL (ref 0–0.6)
EOSINOPHILS RELATIVE PERCENT: 3.2 % (ref 0–5)
GFR NON-AFRICAN AMERICAN: >60
GLUCOSE BLD-MCNC: 108 MG/DL (ref 74–109)
HBA1C MFR BLD: 6.3 % (ref 4–6)
HCT VFR BLD CALC: 36.2 % (ref 42–52)
HDLC SERPL-MCNC: 39 MG/DL (ref 55–121)
HEMOGLOBIN: 11.6 G/DL (ref 14–18)
IMMATURE GRANULOCYTES #: 0 K/UL
LDL CHOLESTEROL CALCULATED: 88 MG/DL
LV EF: 20 %
LVEF MODALITY: NORMAL
LYMPHOCYTES ABSOLUTE: 1.3 K/UL (ref 1.1–4.5)
LYMPHOCYTES RELATIVE PERCENT: 20.3 % (ref 20–40)
MAGNESIUM: 2.3 MG/DL (ref 1.6–2.6)
MCH RBC QN AUTO: 29.7 PG (ref 27–31)
MCHC RBC AUTO-ENTMCNC: 32 G/DL (ref 33–37)
MCV RBC AUTO: 92.8 FL (ref 80–94)
MONOCYTES ABSOLUTE: 0.6 K/UL (ref 0–0.9)
MONOCYTES RELATIVE PERCENT: 9.1 % (ref 0–10)
NEUTROPHILS ABSOLUTE: 4.1 K/UL (ref 1.5–7.5)
NEUTROPHILS RELATIVE PERCENT: 65.8 % (ref 50–65)
PDW BLD-RTO: 16.6 % (ref 11.5–14.5)
PLATELET # BLD: 263 K/UL (ref 130–400)
PMV BLD AUTO: 9.9 FL (ref 9.4–12.4)
POTASSIUM SERPL-SCNC: 4.3 MMOL/L (ref 3.5–5)
RBC # BLD: 3.9 M/UL (ref 4.7–6.1)
SODIUM BLD-SCNC: 137 MMOL/L (ref 136–145)
TOTAL PROTEIN: 6 G/DL (ref 6.6–8.7)
TRIGL SERPL-MCNC: 84 MG/DL (ref 0–149)
WBC # BLD: 6.3 K/UL (ref 4.8–10.8)

## 2020-05-24 PROCEDURE — 2140000000 HC CCU INTERMEDIATE R&B

## 2020-05-24 PROCEDURE — 6370000000 HC RX 637 (ALT 250 FOR IP): Performed by: INTERNAL MEDICINE

## 2020-05-24 PROCEDURE — 85025 COMPLETE CBC W/AUTO DIFF WBC: CPT

## 2020-05-24 PROCEDURE — 2580000003 HC RX 258: Performed by: INTERNAL MEDICINE

## 2020-05-24 PROCEDURE — 83036 HEMOGLOBIN GLYCOSYLATED A1C: CPT

## 2020-05-24 PROCEDURE — 93306 TTE W/DOPPLER COMPLETE: CPT

## 2020-05-24 PROCEDURE — 99232 SBSQ HOSP IP/OBS MODERATE 35: CPT | Performed by: INTERNAL MEDICINE

## 2020-05-24 PROCEDURE — 36415 COLL VENOUS BLD VENIPUNCTURE: CPT

## 2020-05-24 PROCEDURE — 80053 COMPREHEN METABOLIC PANEL: CPT

## 2020-05-24 PROCEDURE — 6360000002 HC RX W HCPCS: Performed by: INTERNAL MEDICINE

## 2020-05-24 PROCEDURE — 80061 LIPID PANEL: CPT

## 2020-05-24 PROCEDURE — 83735 ASSAY OF MAGNESIUM: CPT

## 2020-05-24 RX ADMIN — SODIUM CHLORIDE, PRESERVATIVE FREE 10 ML: 5 INJECTION INTRAVENOUS at 20:30

## 2020-05-24 RX ADMIN — CARVEDILOL 18.75 MG: 12.5 TABLET, FILM COATED ORAL at 08:45

## 2020-05-24 RX ADMIN — SACUBITRIL AND VALSARTAN 1 TABLET: 24; 26 TABLET, FILM COATED ORAL at 08:45

## 2020-05-24 RX ADMIN — SACUBITRIL AND VALSARTAN 1 TABLET: 24; 26 TABLET, FILM COATED ORAL at 20:30

## 2020-05-24 RX ADMIN — ATORVASTATIN CALCIUM 40 MG: 40 TABLET, FILM COATED ORAL at 20:30

## 2020-05-24 RX ADMIN — APIXABAN 5 MG: 5 TABLET, FILM COATED ORAL at 08:45

## 2020-05-24 RX ADMIN — FUROSEMIDE 40 MG: 10 INJECTION, SOLUTION INTRAMUSCULAR; INTRAVENOUS at 08:45

## 2020-05-24 RX ADMIN — ASPIRIN 81 MG: 81 TABLET, COATED ORAL at 08:45

## 2020-05-24 RX ADMIN — CARVEDILOL 18.75 MG: 12.5 TABLET, FILM COATED ORAL at 17:38

## 2020-05-24 RX ADMIN — APIXABAN 5 MG: 5 TABLET, FILM COATED ORAL at 20:30

## 2020-05-24 RX ADMIN — FUROSEMIDE 40 MG: 10 INJECTION, SOLUTION INTRAMUSCULAR; INTRAVENOUS at 17:37

## 2020-05-24 RX ADMIN — SODIUM CHLORIDE, PRESERVATIVE FREE 10 ML: 5 INJECTION INTRAVENOUS at 08:45

## 2020-05-24 NOTE — PROGRESS NOTES
Davion Cull, APRN   aspirin 81 MG chewable tablet Take 1 tablet by mouth daily 11/15/19   Chelo Lara MD        sodium chloride flush  10 mL Intravenous 2 times per day    furosemide  40 mg Intravenous BID    apixaban  5 mg Oral BID    sacubitril-valsartan  1 tablet Oral BID    carvedilol  18.75 mg Oral BID WC    aspirin  81 mg Oral Daily    atorvastatin  40 mg Oral Nightly       TELEMETRY: Sinus     Physical Exam:      Physical Exam      General:  Awake, alert, NAD  Skin:  Warm and dry  Neck:  no jvd , no carotid bruits  Chest:  Clear to auscultation, no wheezing or rales  Cardiovascular:  RRR U8L3 no murmurs, clicks, gallups, or rubs  Abdomen:  Soft nontender, nondistended, bowel sounds present  Extremities:  Edema: none      Lab Data:  CBC:   Recent Labs     05/23/20  0959 05/24/20  0303   WBC 7.2 6.3   HGB 12.4* 11.6*   HCT 37.3* 36.2*   MCV 91.2 92.8    263     BMP:   Recent Labs     05/23/20  1033 05/24/20  0303   * 137   K 4.2 4.3   CL 94* 95*   CO2 27 26   BUN 31* 31*   CREATININE 1.2 1.1     LIVER PROFILE:   Recent Labs     05/23/20  1033 05/24/20  0303   AST 34 31   ALT 34 29   BILITOT 0.6 0.4   ALKPHOS 191* 189*     PT/INR:   Recent Labs     05/23/20  0959   PROTIME 15.9*   INR 1.27*     APTT:   Recent Labs     05/23/20  0959   APTT 32.1     BNP:  No results for input(s): BNP in the last 72 hours. CK, CKMB, Troponin: @LABRCNT (CKTOTAL:3, CKMB:3, TROPONINI:3)@    IMAGING:  Xr Chest Portable    Result Date: 5/23/2020  XR CHEST PORTABLE 5/23/2020 9:00 AM HISTORY: CHF COMPARISON: 11/13/2019 CXR: A single frontal view of the chest is performed. Findings: There are residual primarily linear right upper lobe opacities of the site of the previous consolidation which may represent postinflammatory scarring. Cardiac silhouette remains enlarged. Central pulmonary vessels are prominent. Medial right basilar opacities. Questionable small pleural effusions. No pneumothorax.  No acute bony pathology. 1. Cardiomegaly with pulmonary venous congestion. Medial right basilar opacities may relate to atelectasis versus pneumonia. Right upper lobe opacities are favorable for postinflammatory scarring at the site of the previous right upper lobe consolidation present on 11/13/2019. . Signed by Dr Lev Desai on 5/23/2020 10:10 AM        Assessment:  1. Acute on chronic systolic and diastolic congestive heart failure  2. Cardiomyopathy  3. History of intravenous drug usage  4. Medically noncompliant  5. Tobacco abuse  6. History of pulmonary embolism  7. Gastroesophageal reflux disease  8. Elevated liver function test  9. History of pneumonia  10. CT of the chest 11/25/2019 reidentified multifocal subpleural consolidations right lower lobe also 1 each right upper lobe left lower lobe right consolidation suggest internal cavitation no worsening infiltrates or consolidations noted four-chamber cardiomegaly evidence of pulmonary hypertension  11. CT abdomen pelvis 11/9/2019 anasarca diffuse body wall edema small volume ascites partially imaged small pleural effusions bilateral lower lobe consolidative nodules/masses 1 of which is cavitary in the right lower lobe four-chamber cardiomegaly  12. CTA pulmonary 11/8/2019 residual recurrent right upper lobe pulmonary emboli no central pulmonary emboli identified previous left lower lobe emboli have been resorbed  13. Echocardiogram 11/8/2019 severe mitral gravitation severely dilated left ventricle global hypokinesis ejection fraction 15% restrictive grade 3 pattern on mitral valve inflow velocities consistent with grade 3 diastolic dysfunction  14. Elevated troponin 0 0.16 do not believe this represents an acute coronary syndrome  15. Cardiac catheterization 10/15/2018 mild nonobstructive coronary disease severe left ventricular systolic dysfunction consistent with dilated ischemic cardiomyopathy    Plan:  1.  Continue current treatment in the hospital another 24 to 48 hours  2. Suggest Entresto 24/26 p.o. twice daily  3. Lengthy discussion with the patient regarding possible ICD implantation he is clearly a candidate for this and I did recommend this to him but at the present time he is declining consideration for therapy.     Iker Drummond MD 5/24/2020 4:11 PM

## 2020-05-24 NOTE — PROGRESS NOTES
[facility]        [facility] Progress Note  5/24/2020 9:32 AM  Subjective:   Admit Date: 5/23/2020  PCP: KAYLEEN Cummings    Chief Complaint: Dyspnea    Subjective: Patient seen and examined at bedside eating breakfast.  No acute distress. Improving. Cumulative Hospital History: 42-year-old male with previous history of polysubstance abuse and known history of combined systolic and diastolic congestive heart failure presenting for dyspnea after being unable to obtain his diuretics from his pharmacy. Patient found to be acutely fluid overloaded with an elevated troponin and cardiology consulted. ROS: 14 point review of systems is negative except as specifically addressed above.     DIET LOW SODIUM 2 GM; 1500 ml    Intake/Output Summary (Last 24 hours) at 5/24/2020 0932  Last data filed at 5/24/2020 9928  Gross per 24 hour   Intake 720 ml   Output 950 ml   Net -230 ml     Medications:  Current Facility-Administered Medications   Medication Dose Route Frequency Provider Last Rate Last Dose    sodium chloride flush 0.9 % injection 10 mL  10 mL Intravenous 2 times per day Murtaza Olguin MD   10 mL at 05/24/20 0845    sodium chloride flush 0.9 % injection 10 mL  10 mL Intravenous PRN Murtaza Olguin MD        acetaminophen (TYLENOL) tablet 650 mg  650 mg Oral Q6H PRN Murtaza Olguin MD        Or    acetaminophen (TYLENOL) suppository 650 mg  650 mg Rectal Q6H PRN Murtaza Olguin MD        polyethylene glycol [facility]) packet 17 g  17 g Oral Daily PRN Murtaza Olguin MD        promethazine (PHENERGAN) tablet 12.5 mg  12.5 mg Oral Q6H PRN Murtaza Olguin MD        Or    ondansetron [facility]) injection 4 mg  4 mg Intravenous Q6H PRN Murtaza Olguin MD        potassium chloride [facility]) extended release tablet 40 mEq  40 mEq Oral PRN Murtaza Olguin MD        Or    potassium bicarb-citric acid (EFFER-K) effervescent tablet 40 mEq  40 mEq Oral PRN Murtaza Olguin MD        Or    potassium chloride 10 per 24 hour   Intake 720 ml   Output 950 ml   Net -230 ml     General appearance: NAD. AAOx3  Head: NC/AT  Eyes: conjunctivae/corneas clear   Ears: normal external ears  Neck: Supple  Lungs: BLAE, crackles+, no wheezing appreciated   Heart: tachycardic, regular rhythm   Abdomen: soft, NT, ND, BS+  Extremities: +edema  Skin: warm  Neurologic: AAOx3, gross motor and sensory function intact  Psychiatric:  Mood appropriate    Assessment and Plan: Active Problems:    Tobacco abuse    Nonischemic cardiomyopathy (HCC)    Acute on chronic combined systolic (congestive) and diastolic (congestive) heart failure (HCC)    History of pulmonary embolism    NSTEMI (non-ST elevated myocardial infarction) ([facility] [address]  Resolved Problems:    * No resolved hospital problems. *    Acute on chronic combined systolic and diastolic CHF: Diuresis. Monitor I's and O's. Beta-blocker. Entresto.     Elevated troponin: Has remained stable 0.12. Monitor on telemetry. Cardiology on board.  Aspirin/Statin.     H/o PE: Eliquis    Advance Directive: Full Code    DVT prophylaxis: Eliquis    Discharge planning: TBD      Signed:  Marcos Merrill MD 5/24/2020 9:32 AM  Rounding Hospitalist

## 2020-05-24 NOTE — PLAN OF CARE
Problem: Falls - Risk of:  Goal: Will remain free from falls  Description: Will remain free from falls  Outcome: Ongoing  Goal: Absence of physical injury  Description: Absence of physical injury  Outcome: Ongoing     Problem: SAFETY  Goal: Free from accidental physical injury  Outcome: Ongoing  Goal: Free from intentional harm  Outcome: Ongoing     Problem: DAILY CARE  Goal: Daily care needs are met  Outcome: Ongoing     Problem: PAIN  Goal: Patient's pain/discomfort is manageable  Outcome: Ongoing     Problem: KNOWLEDGE DEFICIT  Goal: Patient/S.O. demonstrates understanding of disease process, treatment plan, medications, and discharge instructions.   Outcome: Ongoing     Problem: OXYGENATION/RESPIRATORY FUNCTION  Goal: Patient will maintain patent airway  Outcome: Ongoing  Goal: Patient will achieve/maintain normal respiratory rate/effort  Description: Respiratory rate and effort will be within normal limits for the patient  Outcome: Ongoing     Problem: HEMODYNAMIC STATUS  Goal: Patient has stable vital signs and fluid balance  Outcome: Ongoing     Problem: FLUID AND ELECTROLYTE IMBALANCE  Goal: Fluid and electrolyte balance are achieved/maintained  Outcome: Ongoing     Problem: ACTIVITY INTOLERANCE/IMPAIRED MOBILITY  Goal: Mobility/activity is maintained at optimum level for patient  Outcome: Ongoing
Ongoing  5/23/2020 1224 by Stephan Cuellar RN  Outcome: Ongoing     Problem: FLUID AND ELECTROLYTE IMBALANCE  Goal: Fluid and electrolyte balance are achieved/maintained  5/23/2020 2351 by Domenic Dominguez RN  Outcome: Ongoing  5/23/2020 1224 by Stephan Cuellar RN  Outcome: Ongoing     Problem: ACTIVITY INTOLERANCE/IMPAIRED MOBILITY  Goal: Mobility/activity is maintained at optimum level for patient  5/23/2020 2351 by Domenic Dominguez RN  Outcome: Ongoing  5/23/2020 1224 by Stephan Cuellar RN  Outcome: Ongoing  Electronically signed by Domenic Dominguez RN on 5/23/2020 at 11:51 PM

## 2020-05-25 VITALS
OXYGEN SATURATION: 99 % | DIASTOLIC BLOOD PRESSURE: 81 MMHG | TEMPERATURE: 96.6 F | HEART RATE: 95 BPM | SYSTOLIC BLOOD PRESSURE: 123 MMHG | RESPIRATION RATE: 20 BRPM | HEIGHT: 67 IN | BODY MASS INDEX: 34.12 KG/M2 | WEIGHT: 217.4 LBS

## 2020-05-25 LAB
ALBUMIN SERPL-MCNC: 3.4 G/DL (ref 3.5–5.2)
ALP BLD-CCNC: 182 U/L (ref 40–130)
ALT SERPL-CCNC: 26 U/L (ref 5–41)
ANION GAP SERPL CALCULATED.3IONS-SCNC: 14 MMOL/L (ref 7–19)
AST SERPL-CCNC: 28 U/L (ref 5–40)
BASOPHILS ABSOLUTE: 0.1 K/UL (ref 0–0.2)
BASOPHILS RELATIVE PERCENT: 1 % (ref 0–1)
BILIRUB SERPL-MCNC: 0.5 MG/DL (ref 0.2–1.2)
BUN BLDV-MCNC: 31 MG/DL (ref 6–20)
CALCIUM SERPL-MCNC: 8.7 MG/DL (ref 8.6–10)
CHLORIDE BLD-SCNC: 96 MMOL/L (ref 98–111)
CO2: 26 MMOL/L (ref 22–29)
CREAT SERPL-MCNC: 1.1 MG/DL (ref 0.5–1.2)
EOSINOPHILS ABSOLUTE: 0.2 K/UL (ref 0–0.6)
EOSINOPHILS RELATIVE PERCENT: 3 % (ref 0–5)
GFR NON-AFRICAN AMERICAN: >60
GLUCOSE BLD-MCNC: 111 MG/DL (ref 74–109)
HCT VFR BLD CALC: 36.5 % (ref 42–52)
HEMOGLOBIN: 11.8 G/DL (ref 14–18)
IMMATURE GRANULOCYTES #: 0 K/UL
LYMPHOCYTES ABSOLUTE: 1.3 K/UL (ref 1.1–4.5)
LYMPHOCYTES RELATIVE PERCENT: 21.7 % (ref 20–40)
MAGNESIUM: 2.3 MG/DL (ref 1.6–2.6)
MCH RBC QN AUTO: 29.6 PG (ref 27–31)
MCHC RBC AUTO-ENTMCNC: 32.3 G/DL (ref 33–37)
MCV RBC AUTO: 91.5 FL (ref 80–94)
MONOCYTES ABSOLUTE: 0.6 K/UL (ref 0–0.9)
MONOCYTES RELATIVE PERCENT: 9.9 % (ref 0–10)
NEUTROPHILS ABSOLUTE: 3.8 K/UL (ref 1.5–7.5)
NEUTROPHILS RELATIVE PERCENT: 64.1 % (ref 50–65)
PDW BLD-RTO: 16.5 % (ref 11.5–14.5)
PLATELET # BLD: 268 K/UL (ref 130–400)
PMV BLD AUTO: 9.6 FL (ref 9.4–12.4)
POTASSIUM SERPL-SCNC: 4.4 MMOL/L (ref 3.5–5)
PRO-BNP: 1895 PG/ML (ref 0–450)
RBC # BLD: 3.99 M/UL (ref 4.7–6.1)
SODIUM BLD-SCNC: 136 MMOL/L (ref 136–145)
TOTAL PROTEIN: 6.2 G/DL (ref 6.6–8.7)
WBC # BLD: 6 K/UL (ref 4.8–10.8)

## 2020-05-25 PROCEDURE — 36415 COLL VENOUS BLD VENIPUNCTURE: CPT

## 2020-05-25 PROCEDURE — 6370000000 HC RX 637 (ALT 250 FOR IP): Performed by: INTERNAL MEDICINE

## 2020-05-25 PROCEDURE — 83880 ASSAY OF NATRIURETIC PEPTIDE: CPT

## 2020-05-25 PROCEDURE — 83735 ASSAY OF MAGNESIUM: CPT

## 2020-05-25 PROCEDURE — 6360000002 HC RX W HCPCS: Performed by: INTERNAL MEDICINE

## 2020-05-25 PROCEDURE — 99231 SBSQ HOSP IP/OBS SF/LOW 25: CPT | Performed by: INTERNAL MEDICINE

## 2020-05-25 PROCEDURE — 85025 COMPLETE CBC W/AUTO DIFF WBC: CPT

## 2020-05-25 PROCEDURE — 2580000003 HC RX 258: Performed by: INTERNAL MEDICINE

## 2020-05-25 PROCEDURE — 80053 COMPREHEN METABOLIC PANEL: CPT

## 2020-05-25 RX ORDER — ATORVASTATIN CALCIUM 40 MG/1
40 TABLET, FILM COATED ORAL NIGHTLY
Qty: 30 TABLET | Refills: 3 | Status: CANCELLED | OUTPATIENT
Start: 2020-05-25

## 2020-05-25 RX ADMIN — SODIUM CHLORIDE, PRESERVATIVE FREE 10 ML: 5 INJECTION INTRAVENOUS at 08:17

## 2020-05-25 RX ADMIN — ASPIRIN 81 MG: 81 TABLET, COATED ORAL at 08:17

## 2020-05-25 RX ADMIN — FUROSEMIDE 40 MG: 10 INJECTION, SOLUTION INTRAMUSCULAR; INTRAVENOUS at 08:17

## 2020-05-25 RX ADMIN — SACUBITRIL AND VALSARTAN 1 TABLET: 24; 26 TABLET, FILM COATED ORAL at 08:17

## 2020-05-25 RX ADMIN — APIXABAN 5 MG: 5 TABLET, FILM COATED ORAL at 08:17

## 2020-05-25 RX ADMIN — CARVEDILOL 18.75 MG: 12.5 TABLET, FILM COATED ORAL at 08:17

## 2020-05-25 NOTE — PROGRESS NOTES
pulmonary embolism   Tobacco abuse   Nonischemic cardiomyopathy [facility])      Cardiac Specific Data:  Specialty Problems        Cardiology Problems    Chronic systolic heart failure (HCC)        Nonischemic cardiomyopathy (HCC)        Acute on chronic combined systolic (congestive) and diastolic (congestive) heart failure (HCC)        Acute pulmonary embolism (HCC)        Nonischemic dilated cardiomyopathy (HCC)        Pulmonary embolus, right (HCC)        Pulmonary infarct (HCC)        Acute massive pulmonary embolism (HCC)        Pulmonary edema cardiac cause ([facility])        NSTEMI (non-ST elevated myocardial infarction) ([facility] [address]              Subjective:  Mr. Lorna Harley seen today resting comfortably feels better dyspnea improved back to baseline denies anginal chest pain vital signs stable blood pressure 123/81 heart 95 no other complaints. Objective:   /81   Pulse 95   Temp 96.6 °F (35.9 °C) (Temporal)   Resp 20   Ht 5' 7\" (1.702 m)   Wt 217 lb 6.4 oz (98.6 kg)   SpO2 99%   BMI 34.05 kg/m²       Intake/Output Summary (Last 24 hours) at 5/25/2020 0818  Last data filed at 5/25/2020 1737  Gross per 24 hour   Intake 1320 ml   Output 3350 ml   Net -2030 ml       Prior to Admission medications    Medication Sig Start Date End Date Taking?  Authorizing Provider   sacubitril-valsartan (ENTRESTO) 24-26 MG per tablet Take 1 tablet by mouth 2 times daily 2/10/20   KAYLEEN Martinez - CNP   apixaban (ELIQUIS) 5 MG TABS tablet Take 1 tablet by mouth 2 times daily 1/27/20   Rosa Elena Castillo MD   spironolactone (ALDACTONE) 25 MG tablet Take 1 tablet by mouth daily 12/23/19 1/27/20  KAYLEEN Morrissey   carvedilol (COREG) 12.5 MG tablet Take 1 1/2 in am and 1 at night  Patient taking differently: Take 1 1/2 in am and 1 1/2 at night 12/11/19   KAYLEEN Morrissey   torsemide [facility]) 100 MG tablet Take 1 tablet by mouth daily 12/4/19   KAYLEEN Morrissey   albuterol sulfate HFA (PROVENTIL HFA) 108 (90 Base)

## 2020-05-25 NOTE — DISCHARGE SUMMARY
Discharge Summary      Luma Blum  :  1985  MRN:  [id number]    Admit date:  2020  Discharge date:      Admitting Physician:  Luis A Stacy MD    Advance Directive: Full Code    Consults: cardiology    Primary Care Physician:  KAYLEEN Myers    Discharge Diagnoses:  Principal Problem:    Acute on chronic combined systolic (congestive) and diastolic (congestive) heart failure ([facility] [address]  Active Problems:    Tobacco abuse    Nonischemic cardiomyopathy ([facility] [address]    History of pulmonary embolism    NSTEMI (non-ST elevated myocardial infarction) ([facility] [address]  Resolved Problems:    * No resolved hospital problems. *      Significant Diagnostic Studies:   Xr Chest Portable    Result Date: 2020  XR CHEST PORTABLE 2020 9:00 AM HISTORY: CHF COMPARISON: 2019 CXR: A single frontal view of the chest is performed. Findings: There are residual primarily linear right upper lobe opacities of the site of the previous consolidation which may represent postinflammatory scarring. Cardiac silhouette remains enlarged. Central pulmonary vessels are prominent. Medial right basilar opacities. Questionable small pleural effusions. No pneumothorax. No acute bony pathology. 1. Cardiomegaly with pulmonary venous congestion. Medial right basilar opacities may relate to atelectasis versus pneumonia. Right upper lobe opacities are favorable for postinflammatory scarring at the site of the previous right upper lobe consolidation present on 2019. . Signed by Dr Fredo Atkinson on 2020 10:10 AM      Pertinent Labs:   CBC:   Recent Labs     20  0959 20  0303 20  0241   WBC 7.2 6.3 6.0   HGB 12.4* 11.6* 11.8*    263 268     BMP:    Recent Labs     20  1033 20  0303 20  0241   * 137 136   K 4.2 4.3 4.4   CL 94* 95* 96*   CO2 27 26 26   BUN 31* 31* 31*   CREATININE 1.2 1.1 1.1   GLUCOSE 112* 108 111*     INR:   Recent Labs     20  0959   INR 1.27*     ABGs:No results for hours as needed for Wheezing             apixaban (ELIQUIS) 5 MG TABS tablet  Take 1 tablet by mouth 2 times daily             aspirin 81 MG chewable tablet  Take 1 tablet by mouth daily             carvedilol (COREG) 12.5 MG tablet  Take 1 1/2 in am and 1 at night             sacubitril-valsartan (ENTRESTO) 24-26 MG per tablet  Take 1 tablet by mouth 2 times daily             spironolactone (ALDACTONE) 25 MG tablet  Take 1 tablet by mouth daily             torsemide (DEMADEX) 100 MG tablet  Take 1 tablet by mouth daily                 Discharge Instructions: Follow up with KAYLEEN Ellison in 7 days. Take medications as directed. Resume activity as tolerated. Diet: DIET LOW SODIUM 2 GM; 1500 ml     Disposition: Patient is medically stable and will be discharged Home. Time spent on discharge 35 minutes.     Signed:  Tasha Mcclendon MD 5/25/2020 8:32 AM

## 2020-05-27 ENCOUNTER — TELEPHONE (OUTPATIENT)
Dept: INTERNAL MEDICINE | Age: 35
End: 2020-05-27

## 2020-05-27 NOTE — TELEPHONE ENCOUNTER
Kathy 45 Transitions Initial Follow Up Call    Outreach made within 2 business days of discharge: Yes    Patient: Annamaria Aviles Patient : 1985   MRN: [id number]    Reason for Admission:   Principal Problem:    Acute on chronic combined systolic (congestive) and diastolic (congestive) heart failure ([facility] [address]  Active Problems:    Tobacco abuse    Nonischemic cardiomyopathy ([facility] [address]    History of pulmonary embolism    NSTEMI (non-ST elevated myocardial infarction) ([facility] [address]  Resolved Problems:    * No resolved hospital problems. *    Admit date:  2020            Discharge Date: 20       Spoke with: patient     Discharge department/facility: [facility] SHAUNA ALEXANDER    [facility] Interactive Patient Contact:  Was patient able to fill all prescriptions: Yes  Was patient instructed to bring all medications to the follow-up visit: Yes  Is patient taking all medications as directed in the discharge summary? Yes  Does patient understand their discharge instructions: Yes  Does patient have questions or concerns that need addressed prior to 7-14 day follow up office visit: no      Per patient he is better. He reports he is still retaining a little fluid, but that is slowly improving. He reports his appetite is good and he has been resting well. He was agreeable to a HFU at first, but then quickly changed his mind and said he would be following up with cardio. I asked him if Ramiro Choudhary is still his PCP and he said yes. I advised him to call when is ready to make an appt. Scheduled appointment with PCP within 7-14 days    Follow Up  No future appointments.      Max Rocha LPN

## 2020-06-23 RX ORDER — TORSEMIDE 100 MG/1
100 TABLET ORAL DAILY
Qty: 14 TABLET | Refills: 0 | Status: SHIPPED | OUTPATIENT
Start: 2020-06-23 | End: 2020-07-13

## 2020-07-13 ENCOUNTER — OFFICE VISIT (OUTPATIENT)
Dept: INTERNAL MEDICINE | Age: 35
End: 2020-07-13
Payer: COMMERCIAL

## 2020-07-13 VITALS
BODY MASS INDEX: 30.29 KG/M2 | HEART RATE: 87 BPM | DIASTOLIC BLOOD PRESSURE: 82 MMHG | SYSTOLIC BLOOD PRESSURE: 102 MMHG | HEIGHT: 67 IN | WEIGHT: 193 LBS | OXYGEN SATURATION: 96 %

## 2020-07-13 PROCEDURE — 99214 OFFICE O/P EST MOD 30 MIN: CPT | Performed by: NURSE PRACTITIONER

## 2020-07-13 PROCEDURE — 99401 PREV MED CNSL INDIV APPRX 15: CPT | Performed by: NURSE PRACTITIONER

## 2020-07-13 RX ORDER — FUROSEMIDE 40 MG/1
40 TABLET ORAL 2 TIMES DAILY
Qty: 60 TABLET | Refills: 1 | Status: SHIPPED | OUTPATIENT
Start: 2020-07-13

## 2020-07-13 RX ORDER — SPIRONOLACTONE 25 MG/1
25 TABLET ORAL DAILY
Qty: 30 TABLET | Refills: 0 | Status: SHIPPED | OUTPATIENT
Start: 2020-07-13 | End: 2020-08-12

## 2020-07-13 ASSESSMENT — ENCOUNTER SYMPTOMS
VOMITING: 0
SORE THROAT: 0
TROUBLE SWALLOWING: 0
DIARRHEA: 0
COLOR CHANGE: 0
SHORTNESS OF BREATH: 1
EYE ITCHING: 0
ABDOMINAL PAIN: 0
CONSTIPATION: 0
CHOKING: 0
NAUSEA: 0
ABDOMINAL DISTENTION: 0
EYE DISCHARGE: 0
WHEEZING: 0
STRIDOR: 0
COUGH: 0
BLOOD IN STOOL: 0

## 2020-07-13 ASSESSMENT — PATIENT HEALTH QUESTIONNAIRE - PHQ9
1. LITTLE INTEREST OR PLEASURE IN DOING THINGS: 0
SUM OF ALL RESPONSES TO PHQ QUESTIONS 1-9: 0
2. FEELING DOWN, DEPRESSED OR HOPELESS: 0
SUM OF ALL RESPONSES TO PHQ9 QUESTIONS 1 & 2: 0
SUM OF ALL RESPONSES TO PHQ QUESTIONS 1-9: 0

## 2020-07-13 NOTE — PATIENT INSTRUCTIONS
1.  Cardiomyopathy  He has agreed to see Dr. Ruth Gomez regarding the AICD we have made an appointment for him. We also discussed his medications the use and the importance of continuing those on a regular basis  2. Methamphetamine abuse he reports that he is no longer using. He feels poorly.   We will see him back in 2 months

## 2020-07-13 NOTE — PROGRESS NOTES
[facility] INTERNAL MEDICINE  [address] [address]  [address] [address]  Dept: [phone]  Dept Fax: [phone]  Loc: [phone]    Anna Motta is a 29 y.o. male who presents today for his medical conditions/complaints as noted below. Anna Motta is c/gisselle Hypertension (Patient is here for routine follow up visit.)        HPI:     HPI   1. Cardiomyopathy; ischemic with 5/30/2020 echocardiogram showing an EF of 20%. 2.  Methamphetamine abuse with IV drug user. Most recently iron had been to the point we could not contact him on the phone. He had missed several appointments. He missed his cardiology follow-up. We tried multiple times to call him on his phone we will there was no number but his dad's available and we were unable to leave voicemails there. We were quite concerned about what happened to him we finally got in touch with him and he came to the office today. He does say that for about a month he was back using methamphetamine IV again. He says that he was taking his medicines but he began feeling very poorly and was able to distance himself from the people that were influencing his use. He has custody of his daughter now and they live with Emery's father. He tells me he has been off of it for some time. He seems depressed to me today. But he denies this or suicidal ideations. I asked him while he declined to get the AICD with Dr. Benjamin Wallace and he said he just felt he was too young for that. I again spent 20 minutes counseling with him regarding the use of AICD in his situation the possibility of lethal arrhythmias. He did agree to go back to see Dr. Benjamin Wallace and discussed the AICD.   We are make an appointment for him and call him    20 minutes counseling regarding his history of drug abuse dilated cardiomyopathy and non-ST segment elevated MI  Chief Complaint   Patient presents with    Hypertension     Patient is here for routine follow up visit.        Past Medical History:   Diagnosis Date    Cardiomyopathy [facility])     CHF (congestive heart failure) (HCC)     Hemoptysis 8/31/2019    Hx of blood clots     Hypertension     Hypervolemia     Hyponatremia     Palliative care patient 05/08/2019    Pulmonary embolism ([address]     Smoker       Past Surgical History:   Procedure Laterality Date    HAND SURGERY      IVC FILTER INSERTION         Vitals 7/13/2020 5/25/2020 5/25/2020 5/25/2020 5/24/2020 8/90/1597   SYSTOLIC 336 296 - 99 304 98   DIASTOLIC 82 81 - 68 75 65   Pulse 87 95 - 121 137 83   Temp - 96.6 - 97 97.7 96.4   Resp - 20 - 16 18 18   SpO2 96 99 - 95 94 93   Weight 193 lb - 217 lb 6.4 oz - - -   Height 5' 7\" - - - - -   BMI (wt*703/ht~2) 30.22 kg/m2 - - - - -   Pain Level - - - - - -   Some recent data might be hidden       Family History   Problem Relation Age of Onset    Cancer Mother     No Known Problems Father     No Known Problems Brother        Social History     Tobacco Use    Smoking status: Current Every Day Smoker     Packs/day: 0.50     Years: 13.00     Pack years: 6.50     Types: Cigarettes    Smokeless tobacco: Never Used   Substance Use Topics    Alcohol use: Never     Frequency: Never      Current Outpatient Medications   Medication Sig Dispense Refill    spironolactone (ALDACTONE) 25 MG tablet Take 1 tablet by mouth daily 30 tablet 0    furosemide (LASIX) 40 MG tablet Take 1 tablet by mouth 2 times daily 60 tablet 1    sacubitril-valsartan (ENTRESTO) 24-26 MG per tablet Take 1 tablet by mouth 2 times daily 60 tablet 5    apixaban (ELIQUIS) 5 MG TABS tablet Take 1 tablet by mouth 2 times daily 180 tablet 3    carvedilol (COREG) 12.5 MG tablet Take 1 1/2 in am and 1 at night (Patient taking differently: Take 1 1/2 in am and 1 1/2 at night) 75 tablet 5    albuterol sulfate HFA (PROVENTIL HFA) 108 (90 Base) MCG/ACT inhaler Inhale 2 puffs into the lungs every 6 hours as needed for Wheezing 1 Inhaler 3    aspirin 81 MG chewable tablet Take 1 tablet by mouth daily 30 tablet 3     No current facility-administered medications for this visit.       No Known Allergies    Health Maintenance   Topic Date Due    Varicella vaccine (1 of 2 - 2-dose childhood series) 07/20/2020 (Originally 9/5/1986)    DTaP/Tdap/Td vaccine (1 - Tdap) 07/20/2020 (Originally 9/5/2004)    Hepatitis B vaccine (1 of 3 - Risk 3-dose series) 10/16/2020 (Originally 9/5/2004)    Hepatitis A vaccine (1 of 2 - Risk 2-dose series) 12/29/2020 (Originally 9/5/1986)    Pneumococcal 0-64 years Vaccine (1 of 1 - PPSV23) 10/07/2030 (Originally 9/5/1991)    Flu vaccine (1) 09/01/2020    Potassium monitoring  05/25/2021    Creatinine monitoring  05/25/2021    Hib vaccine  Aged Out    Meningococcal (ACWY) vaccine  Aged Out       Lab Results   Component Value Date    LABA1C 6.3 (H) 05/24/2020     No results found for: PSA, PSADIA  TSH   Date Value Ref Range Status   10/02/2019 2.990 0.270 - 4.200 uIU/mL Final   ]  Lab Results   Component Value Date     05/25/2020    K 4.4 05/25/2020    CL 96 (L) 05/25/2020    CO2 26 05/25/2020    BUN 31 (H) 05/25/2020    CREATININE 1.1 05/25/2020    GLUCOSE 111 (H) 05/25/2020    CALCIUM 8.7 05/25/2020    PROT 6.2 (L) 05/25/2020    LABALBU 3.4 (L) 05/25/2020    BILITOT 0.5 05/25/2020    ALKPHOS 182 (H) 05/25/2020    AST 28 05/25/2020    ALT 26 05/25/2020    LABGLOM >60 05/25/2020    GLOB 3.2 11/21/2016     Lab Results   Component Value Date    CHOL 144 (L) 05/24/2020    CHOL 184 05/07/2019    CHOL 252 (H) 10/18/2018     Lab Results   Component Value Date    TRIG 84 05/24/2020    TRIG 102 05/07/2019    TRIG 309 (H) 10/18/2018     Lab Results   Component Value Date    HDL 39 (L) 05/24/2020    HDL 22 (L) 05/07/2019    HDL 30 (L) 10/18/2018     Lab Results   Component Value Date    LDLCALC 88 05/24/2020    LDLCALC 142 05/07/2019    LDLCALC 160 10/18/2018     Lab Results   Component Value Date     05/25/2020    K 4.4 05/25/2020 K 3.1 11/11/2019    CL 96 05/25/2020    CO2 26 05/25/2020    BUN 31 05/25/2020    CREATININE 1.1 05/25/2020    GLUCOSE 111 05/25/2020    CALCIUM 8.7 05/25/2020      Lab Results   Component Value Date    WBC 6.0 05/25/2020    HGB 11.8 (L) 05/25/2020    HCT 36.5 (L) 05/25/2020    MCV 91.5 05/25/2020     05/25/2020    LYMPHOPCT 21.7 05/25/2020    RBC 3.99 (L) 05/25/2020    MCH 29.6 05/25/2020    MCHC 32.3 (L) 05/25/2020    RDW 16.5 (H) 05/25/2020     Lab Results   Component Value Date    VITD25 44.6 10/02/2019       Subjective:      Review of Systems   Constitutional: Positive for fatigue. Negative for fever and unexpected weight change. HENT: Negative for ear discharge, ear pain, mouth sores, sore throat and trouble swallowing. Eyes: Negative for discharge, itching and visual disturbance. Respiratory: Positive for shortness of breath. Negative for cough, choking, wheezing and stridor. Cardiovascular: Positive for palpitations. Negative for chest pain and leg swelling. Gastrointestinal: Negative for abdominal distention, abdominal pain, blood in stool, constipation, diarrhea, nausea and vomiting. Endocrine: Negative for cold intolerance, polydipsia and polyuria. Genitourinary: Negative for difficulty urinating, dysuria, frequency and urgency. Musculoskeletal: Negative for arthralgias and gait problem. Skin: Negative for color change and rash. Allergic/Immunologic: Negative for food allergies and immunocompromised state. Neurological: Negative for dizziness, tremors, syncope, speech difficulty, weakness and headaches. Hematological: Negative for adenopathy. Does not bruise/bleed easily. Psychiatric/Behavioral: Negative for confusion and hallucinations. Objective:     Physical Exam  Constitutional:       General: He is not in acute distress. Appearance: He is well-developed. HENT:      Head: Normocephalic and atraumatic. Eyes:      General: No scleral icterus. Right eye: No discharge. Left eye: No discharge. Pupils: Pupils are equal, round, and reactive to light. Neck:      Musculoskeletal: Normal range of motion and neck supple. Thyroid: No thyromegaly. Vascular: No JVD. Cardiovascular:      Rate and Rhythm: Regular rhythm. Tachycardia present. Heart sounds: Murmur present. Pulmonary:      Effort: Pulmonary effort is normal. No respiratory distress. Breath sounds: Normal breath sounds. No wheezing or rales. Abdominal:      General: Bowel sounds are normal. There is no distension. Palpations: Abdomen is soft. There is no mass. Tenderness: There is no abdominal tenderness. There is no guarding or rebound. Musculoskeletal: Normal range of motion. General: No tenderness. Right lower leg: No edema. Left lower leg: No edema. Skin:     General: Skin is warm and dry. Findings: No erythema or rash. Neurological:      Mental Status: He is alert and oriented to person, place, and time. Cranial Nerves: No cranial nerve deficit. Coordination: Coordination normal.      Deep Tendon Reflexes: Reflexes are normal and symmetric. Reflexes normal.   Psychiatric:         Mood and Affect: Mood is not depressed. Behavior: Behavior normal.         Thought Content: Thought content normal.         Judgment: Judgment normal.       /82   Pulse 87   Ht 5' 7\" (1.702 m)   Wt 193 lb (87.5 kg)   SpO2 96%   BMI 30.23 kg/m²     Assessment:       Diagnosis Orders   1. Chronic systolic heart failure (HCC)  CBC Auto Differential    Comprehensive Metabolic Panel    Lipid Panel    Vitamin D 25 Hydroxy    Urinalysis Reflex to Culture    TSH without Reflex   2. Vitamin D deficiency  Vitamin D 25 Hydroxy   3. Nonischemic dilated cardiomyopathy ([address]     4. Tachycardia     5. IVDU (intravenous drug user)     6.  Methamphetamine abuse ([facility] [address]       Labs reviewedfrom 5/25/2020 from hospital stay  Diagnostics reviewed from 5/30/2020 echocardiogram with Dr. Sathya Rocha:        Patient given educational materials - see patient instructions. Discussed use, benefit, and side effects of prescribed medications. Allpatient questions answered. Pt voiced understanding. Reviewed health maintenance. Instructed to continue current medications, diet and exercise. Patient agreed with treatment plan. Follow up as directed. MEDICATIONS:  Orders Placed This Encounter   Medications    spironolactone (ALDACTONE) 25 MG tablet     Sig: Take 1 tablet by mouth daily     Dispense:  30 tablet     Refill:  0    furosemide (LASIX) 40 MG tablet     Sig: Take 1 tablet by mouth 2 times daily     Dispense:  60 tablet     Refill:  1         ORDERS:  Orders Placed This Encounter   Procedures    CBC Auto Differential    Comprehensive Metabolic Panel    Lipid Panel    Vitamin D 25 Hydroxy    Urinalysis Reflex to Culture    TSH without Reflex       Follow-up:  Return in about 2 months (around 9/13/2020). PATIENT INSTRUCTIONS:  Patient Instructions   1. Cardiomyopathy  He has agreed to see Dr. Alvina Null regarding the AICD we have made an appointment for him. We also discussed his medications the use and the importance of continuing those on a regular basis  2. Methamphetamine abuse he reports that he is no longer using. He feels poorly. We will see him back in 2 months    Electronically signed by KAYLEEN Purcell on 7/13/2020 at 5:01 PM    EMRDragon/transcription disclaimer:  Much of this encounter note is electronic transcription/translation of spoken language to printed texts. The electronic translation of spoken language may be erroneous, or at times,nonsensical words or phrases may be inadvertently transcribed.   Although I have reviewed the note for such errors, some may still exist.

## 2020-08-18 RX ORDER — CARVEDILOL 12.5 MG/1
TABLET ORAL
Qty: 75 TABLET | Refills: 1 | Status: SHIPPED | OUTPATIENT
Start: 2020-08-18

## 2020-08-21 NOTE — ED PROVIDER NOTES
Subjective   History of Present Illness    Patient is a 34-year-old male chief complaint of rash.  The patient describes his rash is present for at least 3 months.  Is really small spots arise entire body and itches significantly.  He does not think is bedbugs.  He does admit to using meth.  He is up-to-date with tetanus vaccination.  He denies being immunocompromise.    Review of Systems   All other systems reviewed and are negative.      Past Medical History:   Diagnosis Date   • Cardiomyopathy (CMS/HCC)    • CHF (congestive heart failure) (CMS/HCC)    • Chronic respiratory failure (CMS/HCC)    • GERD (gastroesophageal reflux disease)    • Hypertension    • IV drug abuse (CMS/HCC)    • Methamphetamine abuse (CMS/HCC)    • Pulmonary embolism (CMS/HCC)    • Pulmonary infarct (CMS/HCC)    • Tobacco abuse        No Known Allergies    Past Surgical History:   Procedure Laterality Date   • HAND SURGERY         Family History   Problem Relation Age of Onset   • Cancer Mother    • No Known Problems Father    • No Known Problems Brother        Social History     Socioeconomic History   • Marital status: Single     Spouse name: Not on file   • Number of children: Not on file   • Years of education: Not on file   • Highest education level: Not on file   Tobacco Use   • Smoking status: Current Every Day Smoker     Packs/day: 1.00     Years: 15.00     Pack years: 15.00     Types: Cigarettes   • Smokeless tobacco: Never Used   Substance and Sexual Activity   • Alcohol use: Yes     Comment: occasional   • Drug use: Yes     Types: Methamphetamines     Comment: 1 month ago   • Sexual activity: Defer       Prior to Admission medications    Medication Sig Start Date End Date Taking? Authorizing Provider   albuterol sulfate  (90 Base) MCG/ACT inhaler Inhale 2 puffs Every 6 (Six) Hours As Needed for Wheezing.    Provider, MD Umang   furosemide (LASIX) 40 MG tablet Take 1 tablet by mouth 2 (Two) Times a Day. 12/20/18    "Chastity Roberts APRN   lisinopril (PRINIVIL,ZESTRIL) 5 MG tablet Take 1 tablet by mouth Daily. 10/26/19   Romain Graham MD   metoprolol succinate XL (TOPROL-XL) 100 MG 24 hr tablet Take 1 tablet by mouth Daily. 10/26/19   Romain Graham MD   Rivaroxaban (XARELTO STARTER PACK) tablet therapy pack starter pack Take one 15 mg tablet twice daily with food for 21 days.  Followed by one 20 mg tablet by mouth once daily with food. Take as directed 10/25/19   Romain Graham MD       Medications - No data to display    /68   Pulse 114   Temp 98.2 °F (36.8 °C)   Resp 18   Ht 170.2 cm (67\")   Wt 79.8 kg (176 lb)   SpO2 99%   BMI 27.57 kg/m²       Objective   Physical Exam   Constitutional: He is oriented to person, place, and time. He appears well-developed and well-nourished.   HENT:   Head: Normocephalic and atraumatic.   Right Ear: External ear normal.   Left Ear: External ear normal.   Nose: Nose normal.   Mouth/Throat: Oropharynx is clear and moist.   Eyes: Pupils are equal, round, and reactive to light. Conjunctivae and EOM are normal.   Neck: Normal range of motion. Neck supple. No tracheal deviation present.   Cardiovascular: Normal rate, regular rhythm, normal heart sounds and intact distal pulses. Exam reveals no gallop and no friction rub.   No murmur heard.  Pulmonary/Chest: Effort normal and breath sounds normal. No respiratory distress. He has no wheezes. He has no rales. He exhibits no tenderness.   Musculoskeletal: Normal range of motion. He exhibits no edema, tenderness or deformity.   Neurological: He is alert and oriented to person, place, and time. He has normal reflexes. He exhibits normal muscle tone. Coordination normal.   Skin: Skin is warm and dry. Capillary refill takes less than 2 seconds. Rash noted. No erythema. No pallor.   Patient does have a generally diffuse macular punctate excoriated rash without any obvious secondary signs infection noted on the " scalp, face, all of his extremities.  None noted on palm.   Psychiatric: He has a normal mood and affect. His behavior is normal.   Vitals reviewed.      Procedures         Lab Results (last 24 hours)     ** No results found for the last 24 hours. **          No results found.    ED Course  ED Course as of Aug 21 1512   Fri Aug 21, 2020   1510 I have educated the patient his presentation is consistent with a rash secondary to using methamphetamines.  I recommend patient avoid using meth.  Patient voiced understanding on this presentation.  Will prescribe doxycycline and steriod cream (avoid use on face).  He has been educated that this will not stop the rash if he continues to using methamphetamines.    [TK]      ED Course User Index  [TK] Hair Dubose PA          [facility]    Final diagnoses:   Rash, drug          Hair Dubose PA  08/21/20 1512

## 2020-09-14 NOTE — OUTREACH NOTE
COPD/PN Week 2 Survey      Responses   Facility patient discharged from?  [place]   Does the patient have one of the following disease processes/diagnoses(primary or secondary)?  COPD/Pneumonia   Was the primary reason for admission:  Pneumonia   Week 2 attempt successful?  Yes   Call start time  1240   Call end time  1248   Discharge diagnosis  PNA   Is patient permission given to speak with other caregiver?  Yes   Meds reviewed with patient/caregiver?  Yes   Is the patient having any side effects they believe may be caused by any medication additions or changes?  No   Does the patient have all medications ordered at discharge?  Yes   Is the patient taking all medications as directed (includes completed medication regime)?  Yes   Does the patient have a primary care provider?   No   Does the patient have an appointment with their PCP or pulmonologist within 7 days of discharge?  Yes   Comments regarding PCP  Per father, pt has no PCP but goes to coumadin clinic.   Has the patient kept scheduled appointments due by today?  Yes   Has home health visited the patient within 72 hours of discharge?  N/A   Psychosocial issues?  No   Did the patient receive a copy of their discharge instructions?  Yes   Nursing interventions  Reviewed instructions with patient   What is the patient's perception of their health status since discharge?  Same   Nursing Interventions  Nurse provided patient education   Are the patient's immunizations up to date?   Yes   Is the patient/caregiver able to teach back the hierarchy of who to call/visit for symptoms/problems? PCP, Specialist, Home health nurse, Urgent Care, ED, 911  Yes   Additional teach back comments  Patient does have a cough. He requested cough syrup . I explained he would need to contact his MD to request med refills. He verbalized understanding.    Is the patient/caregiver able to teach back signs and symptoms of worsening condition:  Fever/chills, Shortness of breath, Chest  pain   Is the patient/caregiver able to teach back importance of completing antibiotic course of treatment?  Yes   Week 2 call completed?  Yes          Anibal Chin RN   Transposition Flap Text: The defect edges were debeveled with a #15 scalpel blade.  Given the location of the defect and the proximity to free margins a transposition flap was deemed most appropriate.  Using a sterile surgical marker, an appropriate transposition flap was drawn incorporating the defect.    The area thus outlined was incised deep to adipose tissue with a #15 scalpel blade.  The skin margins were undermined to an appropriate distance in all directions utilizing iris scissors.

## 2020-10-05 ENCOUNTER — TELEPHONE (OUTPATIENT)
Dept: INTERNAL MEDICINE | Age: 35
End: 2020-10-05

## 2020-12-04 NOTE — ED PROVIDER NOTES
----- Message from Inés Stephens MD sent at 12/4/2020  7:00 AM CST -----  Please let Lashawn's family know that her culture did not grow out any specific bacteria, as we sometimes see with a child who has already been on ear drops.  Please find out of her ear is still draining.  If it is, will send in oral antibiotics.   Notable for the following:     Glucose 150 (*)     All other components within normal limits   D-DIMER, QUANTITATIVE - Abnormal; Notable for the following:     D-Dimer, Quant 0.87 (*)     All other components within normal limits   BRAIN NATRIURETIC PEPTIDE - Abnormal; Notable for the following:     Pro-BNP 3,952 (*)     All other components within normal limits   RAPID INFLUENZA A/B ANTIGENS   POCT VENOUS   POCT TROPONIN       All other labs were within normal range or not returned as of this dictation. EMERGENCY DEPARTMENT COURSE and DIFFERENTIAL DIAGNOSIS/MDM:   Vitals:    Vitals:    02/08/18 1037   BP: 116/82   Pulse: 110   Resp: 24   Temp: 97.7 °F (36.5 °C)   SpO2: 98%   Weight: 205 lb (93 kg)   Height: 5' 7\" (1.702 m)       MDM  Patient's x-ray shows small pleural effusion, questionable early pneumonia. The patient has a normal oxygen saturation. He is in no respiratory distress. He was given some IV Lasix prior to discharge home. He will be discharged home with some Lasix. He will also be sent home with antibiotics to cover for possible early pneumonia. Patient given strong return precautions for worsening shortness of breath, development of chest pain. Patient encouraged to follow up with cardiology tomorrow or early next week if possible. I'm not going to start him on all of his other medicines as I think he needs reevaluation by cardiology prior to starting his medicines. CONSULTS:  None    PROCEDURES:  Unless otherwise noted below, none     Procedures    FINAL IMPRESSION      1. Acute on chronic congestive heart failure, unspecified congestive heart failure type ([address]    2. Pneumonia due to organism    3. Pleural effusion          DISPOSITION/PLAN   DISPOSITION Decision To Discharge 02/08/2018 02:22:02 PM      PATIENT REFERRED TO:  Farzana Ta MD  [address] [facility] [address]  [phone]    In 1 day      [facility]  [address]   [facility] [address]  [phone]  In 1 day        DISCHARGE MEDICATIONS:  New Prescriptions    FUROSEMIDE (LASIX) 40 MG TABLET    Take 1 tablet by mouth daily for 5 days    LEVOFLOXACIN (LEVAQUIN) 750 MG TABLET    Take 1 tablet by mouth daily for 7 days          (Please note that portions of this note were completed with a voice recognition program.  Efforts were made to edit the dictations but occasionally words are mis-transcribed.)    Sharif Corrales MD (electronically signed)  Attending Emergency Physician         Sharif Corrales MD  02/08/18 8407

## 2024-02-01 NOTE — DISCHARGE SUMMARY
Hospitalist Discharge Summary    Brian Pelletier  :  1985  MRN:  [id number]    Admit date:  3/13/2018  Discharge date:  2018    Admitting Physician:  Faheem Adame MD  Primary Care Physician:  No primary care provider on file. Discharge Diagnoses: Active Problems:    Acute acalculous cholecystitis    Chronic systolic heart failure ([address]    CHF with unknown LVEF (HCC)  Resolved Problems:    * No resolved hospital problems. [facility] Course: Joe Carrillo a 28 y. o. male who presented to [facility] progressive care unit with diagnosis of acute cholecystitis. The patient has a history of a nonischemic cardiomyopathy with a markedly reduced left ventricular ejection fraction of approximately 20%. He was at [facility] last week with the abdominal discomfort and congestive heart failure. Presented to [facility] with complaints of severe abdominal pain and found to have acalculus cholecystitis. Was given IV abx (cards/surgery determined not a candidate for surgery) during inpatient stay. He has responded well to antibiotic treatment. He is tolerating oral diet and abdominal pain has resolved. He was also seen by Cardiology in regards to his CHF. Started on lisinopril, metoprolol and spironolactone in addition to his lasix. He has been encouraged to stop taking all illicit drugs. He is also encouraged to be medication compliant.   Discharge home in stable condition.       Discharge Medications:       [facility], [address] Medication Instructions [id number]    Printed on:18 1204   Medication Information                      amoxicillin-clavulanate (AUGMENTIN) 875-125 MG per tablet  Take 1 tablet by mouth every 12 hours for 7 days             aspirin EC 81 MG EC tablet  Take 1 tablet by mouth daily             furosemide (LASIX) 40 MG tablet  Take 1 tablet by mouth daily for 5 days             lisinopril (PRINIVIL;ZESTRIL) 10 MG tablet  Take 1 tablet by mouth every 12 hours metoprolol tartrate (LOPRESSOR) 25 MG tablet  Take 1 tablet by mouth 2 times daily             nitroGLYCERIN (NITROSTAT) 0.4 MG SL tablet  up to max of 3 total doses. If no relief after 1 dose, call 911.             spironolactone (ALDACTONE) 25 MG tablet  Take 1 tablet by mouth daily                 Consults:  Dr. Linsey Herrera with [facility] with Cardiology    Significant Diagnostic Studies:    RUQ US    1. Diffuse gallbladder wall thickening and pericholecystic fluid        CT ABD/PELVIS    1. Periportal edema with gallbladder wall thickening. 2. Gallbladder ultrasound recommended to assess for stones and to  better assess gallbladder wall thickening. 3. Correlation with amylase and lipase values recommended to assess  for pancreatitis. 4. The small fat-containing umbilical hernia does not appear to be  significant.        CXR    1. Cardiomegaly. 2. No acute lung disease      ECHO:  Summary   Mitral valve leaflets are mildly thickened.   Decreased mitral valve mobility noted.   Severe mitral regurgitation is present.   Tricuspid valve is structurally normal.   Moderate-to-severe tricuspid regurgitation.   Moderate pulmonic regurgitation present.   Moderately dilated left atrium.   Severely dilated left ventricle.   Globally hypokinesis is present.   Estimated ejection fraction is 15-20%   Normal size right ventricle cavity.   Right ventricular systolic pressure is noted at 58mmHg.      Signature      ----------------------------------------------------------------   Electronically signed by Alejandro Breaux MD      Disposition:   Discharge in stable condition to home. Follow up with Dr. Cesilia Reyez  in 4 weeks.         SignedSybil Dowell DO  3/17/2018, 12:09 PM Detail Level: Detailed Depth Of Biopsy: dermis Was A Bandage Applied: Yes Size Of Lesion In Cm: 0.9 Additional Anticipated Plans: If malignant consider Mohs surgery Biopsy Type: H and E Biopsy Method: 15 blade Anesthesia Type: 2% lidocaine with epinephrine Anesthesia Volume In Cc: 0.5 Additional Anesthesia Volume In Cc (Will Not Render If 0): 0 Hemostasis: Gel Foam Wound Care: Petrolatum Dressing: bandage Destruction After The Procedure: No Type Of Destruction Used: Curettage Curettage Text: The wound bed was treated with curettage after the biopsy was performed. Cryotherapy Text: The wound bed was treated with cryotherapy after the biopsy was performed. Electrodesiccation Text: The wound bed was treated with electrodesiccation after the biopsy was performed. Electrodesiccation And Curettage Text: The wound bed was treated with electrodesiccation and curettage after the biopsy was performed. Silver Nitrate Text: The wound bed was treated with silver nitrate after the biopsy was performed. Lab: [id number] Lab Facility: [id number] Consent: Written consent was obtained and risks were reviewed including but not limited to scarring, infection, bleeding, scabbing, incomplete removal, nerve damage and allergy to anesthesia. Post-Care Instructions: I reviewed with the patient in detail post-care instructions. Patient is to keep the biopsy site dry overnight, and then apply bacitracin twice daily until healed. Patient may apply hydrogen peroxide soaks to remove any crusting. Notification Instructions: Patient will be notified of biopsy results. However, patient instructed to call the office if not contacted within 2 weeks. Billing Type: Third-Party Bill Information: Selecting Yes will display possible errors in your note based on the variables you have selected. This validation is only offered as a suggestion for you. PLEASE NOTE THAT THE VALIDATION TEXT WILL BE REMOVED WHEN YOU FINALIZE YOUR NOTE. IF YOU WANT TO FAX A PRELIMINARY NOTE YOU WILL NEED TO TOGGLE THIS TO 'NO' IF YOU DO NOT WANT IT IN YOUR FAXED NOTE.